# Patient Record
Sex: FEMALE | Race: WHITE | Employment: OTHER | URBAN - METROPOLITAN AREA
[De-identification: names, ages, dates, MRNs, and addresses within clinical notes are randomized per-mention and may not be internally consistent; named-entity substitution may affect disease eponyms.]

---

## 2017-02-05 DIAGNOSIS — I10 HYPERTENSION GOAL BP (BLOOD PRESSURE) < 140/90: Primary | ICD-10-CM

## 2017-02-06 RX ORDER — LISINOPRIL 5 MG/1
TABLET ORAL
Qty: 90 TABLET | Refills: 0 | Status: SHIPPED | OUTPATIENT
Start: 2017-02-06 | End: 2017-04-12

## 2017-02-06 NOTE — TELEPHONE ENCOUNTER
Pres[place] per [facility] [place].  Annel Garay RN

## 2017-02-06 NOTE — TELEPHONE ENCOUNTER
lisinopril (PRINIVIL/ZESTRIL) 5 MG tablet      Last Written Prescription Date: 8.8.16  Last Fill Quantity: 90, # refills: 1  Last Office Visit with G, P or [facility] prescribing provider: 10.5.16       POTASSIUM   Date Value Ref Range Status   04/29/2016 4.5 3.4 - 5.3 mmol/L Final     CREATININE   Date Value Ref Range Status   04/29/2016 0.82 0.52 - 1.04 mg/dL Final     BP Readings from Last 3 Encounters:   10/05/16 124/82   09/26/16 124/86   07/23/16 122/82

## 2017-02-07 DIAGNOSIS — I10 ESSENTIAL HYPERTENSION WITH GOAL BLOOD PRESSURE LESS THAN 140/90: Primary | ICD-10-CM

## 2017-02-07 NOTE — TELEPHONE ENCOUNTER
Last Written Prescription Date: 8-8-16  Last Fill Quantity: 90, # refills: 1  Last Office Visit with [facility], [facility] or [facility] prescribing provider: 10-5-16       POTASSIUM   Date Value Ref Range Status   04/29/2016 4.5 3.4 - 5.3 mmol/L Final     CREATININE   Date Value Ref Range Status   04/29/2016 0.82 0.52 - 1.04 mg/dL Final     BP Readings from Last 3 Encounters:   10/05/16 124/82   09/26/16 124/86   07/23/16 122/82     Thank you,  Jo Dykes, [facility] [place]

## 2017-02-08 RX ORDER — TRIAMTERENE AND HYDROCHLOROTHIAZIDE 37.5; 25 MG/1; MG/1
1 CAPSULE ORAL DAILY
Qty: 90 CAPSULE | Refills: 1 | Status: SHIPPED | OUTPATIENT
Start: 2017-02-08 | End: 2017-04-12

## 2017-02-08 NOTE — TELEPHONE ENCOUNTER
Pres[place] per [facility] [place].  Annel Garay RN

## 2017-03-04 DIAGNOSIS — N95.1 SYMPTOMATIC MENOPAUSAL OR FEMALE CLIMACTERIC STATES: ICD-10-CM

## 2017-03-06 DIAGNOSIS — F41.9 ANXIETY: ICD-10-CM

## 2017-03-06 NOTE — TELEPHONE ENCOUNTER
Last Written Prescription Date: 10-5-16  Last Fill Quantity: 90, # refills: 0  Last Office Visit with G, P or [facility] prescribing provider: 10-5-16        BP Readings from Last 3 Encounters:   10/05/16 124/82   09/26/16 124/86   07/23/16 122/82     Pulse: (for [place])  Creatinine   Date Value Ref Range Status   04/29/2016 0.82 0.52 - 1.04 mg/dL Final   ]    Last PHQ-9 score on record=   PHQ-9 SCORE 9/26/2016   Total Score -   Total Score MyChart -   Total Score 2     Thank you,  Jo Dykes, [place]  [facility]

## 2017-03-07 NOTE — TELEPHONE ENCOUNTER
ESTRADIOL 0.5MG TABS      Last Written Prescription Date: 08/08/2016  Last Fill Quantity: 180, # refills: 1  Last Office Visit with [facility], [facility] or [facility] prescribing provider: 10/05/2016       BP Readings from Last 3 Encounters:   10/05/16 124/82   09/26/16 124/86   07/23/16 122/82     Date of last Breast Exam:

## 2017-03-09 RX ORDER — VENLAFAXINE 37.5 MG/1
TABLET ORAL
Qty: 90 TABLET | Refills: 0 | Status: SHIPPED | OUTPATIENT
Start: 2017-03-09 | End: 2017-04-12

## 2017-03-09 RX ORDER — ESTRADIOL 0.5 MG/1
TABLET ORAL
Qty: 180 TABLET | Refills: 0 | Status: SHIPPED | OUTPATIENT
Start: 2017-03-09 | End: 2017-04-12

## 2017-03-09 NOTE — TELEPHONE ENCOUNTER
For anxiety     Need to verify how much pt is taking     Called # [phone]    Pt stated she is taking 1 tab daily     Refill per RN protocol     Samantha Escoto RN, BSN  [facility]

## 2017-03-22 ENCOUNTER — MYC MEDICAL ADVICE (OUTPATIENT)
Dept: FAMILY MEDICINE | Facility: CLINIC | Age: 57
End: 2017-03-22

## 2017-03-22 DIAGNOSIS — I10 ESSENTIAL HYPERTENSION WITH GOAL BLOOD PRESSURE LESS THAN 140/90: ICD-10-CM

## 2017-03-22 DIAGNOSIS — Z12.11 SPECIAL SCREENING FOR MALIGNANT NEOPLASMS, COLON: ICD-10-CM

## 2017-03-22 DIAGNOSIS — Z79.899 CONTROLLED SUBSTANCE AGREEMENT SIGNED: ICD-10-CM

## 2017-03-22 DIAGNOSIS — E78.5 HYPERLIPIDEMIA LDL GOAL <130: Primary | ICD-10-CM

## 2017-03-22 DIAGNOSIS — K21.9 GASTROESOPHAGEAL REFLUX DISEASE WITHOUT ESOPHAGITIS: ICD-10-CM

## 2017-03-22 DIAGNOSIS — E78.1 HYPERTRIGLYCERIDEMIA: ICD-10-CM

## 2017-03-22 DIAGNOSIS — Z11.59 NEED FOR HEPATITIS C SCREENING TEST: ICD-10-CM

## 2017-03-22 DIAGNOSIS — F51.01 PRIMARY INSOMNIA: ICD-10-CM

## 2017-04-12 ENCOUNTER — OFFICE VISIT (OUTPATIENT)
Dept: FAMILY MEDICINE | Facility: CLINIC | Age: 57
End: 2017-04-12
Payer: COMMERCIAL

## 2017-04-12 VITALS
SYSTOLIC BLOOD PRESSURE: 148 MMHG | OXYGEN SATURATION: 98 % | HEIGHT: 60 IN | TEMPERATURE: 98.1 F | BODY MASS INDEX: 31.1 KG/M2 | WEIGHT: 158.4 LBS | DIASTOLIC BLOOD PRESSURE: 90 MMHG | HEART RATE: 82 BPM

## 2017-04-12 DIAGNOSIS — J30.2 OTHER SEASONAL ALLERGIC RHINITIS: ICD-10-CM

## 2017-04-12 DIAGNOSIS — K91.1 POSTSURGICAL DUMPING SYNDROME: ICD-10-CM

## 2017-04-12 DIAGNOSIS — Z12.31 VISIT FOR SCREENING MAMMOGRAM: ICD-10-CM

## 2017-04-12 DIAGNOSIS — L21.9 SEBORRHEIC DERMATITIS: ICD-10-CM

## 2017-04-12 DIAGNOSIS — K21.9 GASTROESOPHAGEAL REFLUX DISEASE WITHOUT ESOPHAGITIS: ICD-10-CM

## 2017-04-12 DIAGNOSIS — Z11.59 NEED FOR HEPATITIS C SCREENING TEST: ICD-10-CM

## 2017-04-12 DIAGNOSIS — Z12.11 SCREEN FOR COLON CANCER: ICD-10-CM

## 2017-04-12 DIAGNOSIS — Z79.899 CONTROLLED SUBSTANCE AGREEMENT SIGNED: ICD-10-CM

## 2017-04-12 DIAGNOSIS — Z00.01 ENCOUNTER FOR ROUTINE ADULT HEALTH EXAMINATION WITH ABNORMAL FINDINGS: Primary | ICD-10-CM

## 2017-04-12 DIAGNOSIS — F51.01 PRIMARY INSOMNIA: ICD-10-CM

## 2017-04-12 DIAGNOSIS — E78.1 HYPERTRIGLYCERIDEMIA: ICD-10-CM

## 2017-04-12 DIAGNOSIS — F41.9 ANXIETY: ICD-10-CM

## 2017-04-12 DIAGNOSIS — N95.1 SYMPTOMATIC MENOPAUSAL OR FEMALE CLIMACTERIC STATES: ICD-10-CM

## 2017-04-12 DIAGNOSIS — E78.5 HYPERLIPIDEMIA LDL GOAL <130: ICD-10-CM

## 2017-04-12 DIAGNOSIS — I10 ESSENTIAL HYPERTENSION WITH GOAL BLOOD PRESSURE LESS THAN 140/90: ICD-10-CM

## 2017-04-12 LAB
ALBUMIN SERPL-MCNC: 3.7 G/DL (ref 3.4–5)
ALBUMIN UR-MCNC: NEGATIVE MG/DL
ALP SERPL-CCNC: 26 U/L (ref 40–150)
ALT SERPL W P-5'-P-CCNC: 18 U/L (ref 0–50)
ANION GAP SERPL CALCULATED.3IONS-SCNC: 9 MMOL/L (ref 3–14)
APPEARANCE UR: CLEAR
AST SERPL W P-5'-P-CCNC: 12 U/L (ref 0–45)
BACTERIA #/AREA URNS HPF: ABNORMAL /HPF
BASOPHILS # BLD AUTO: 0.1 10E9/L (ref 0–0.2)
BASOPHILS NFR BLD AUTO: 0.7 %
BILIRUB SERPL-MCNC: 0.4 MG/DL (ref 0.2–1.3)
BILIRUB UR QL STRIP: NEGATIVE
BUN SERPL-MCNC: 11 MG/DL (ref 7–30)
CALCIUM SERPL-MCNC: 8.7 MG/DL (ref 8.5–10.1)
CHLORIDE SERPL-SCNC: 97 MMOL/L (ref 94–109)
CHOLEST SERPL-MCNC: 210 MG/DL
CO2 SERPL-SCNC: 29 MMOL/L (ref 20–32)
COLOR UR AUTO: YELLOW
CREAT SERPL-MCNC: 0.74 MG/DL (ref 0.52–1.04)
CREAT UR-MCNC: 150 MG/DL
DIFFERENTIAL METHOD BLD: ABNORMAL
EOSINOPHIL # BLD AUTO: 0.3 10E9/L (ref 0–0.7)
EOSINOPHIL NFR BLD AUTO: 3.3 %
ERYTHROCYTE [DISTWIDTH] IN BLOOD BY AUTOMATED COUNT: 12.1 % (ref 10–15)
GFR SERPL CREATININE-BSD FRML MDRD: 80 ML/MIN/1.7M2
GLUCOSE SERPL-MCNC: 91 MG/DL (ref 70–99)
GLUCOSE UR STRIP-MCNC: NEGATIVE MG/DL
HCT VFR BLD AUTO: 37.1 % (ref 35–47)
HCV AB SERPL QL IA: NORMAL
HDLC SERPL-MCNC: 47 MG/DL
HGB BLD-MCNC: 12.9 G/DL (ref 11.7–15.7)
HGB UR QL STRIP: ABNORMAL
KETONES UR STRIP-MCNC: NEGATIVE MG/DL
LDLC SERPL CALC-MCNC: ABNORMAL MG/DL
LDLC SERPL DIRECT ASSAY-MCNC: 90 MG/DL
LEUKOCYTE ESTERASE UR QL STRIP: NEGATIVE
LYMPHOCYTES # BLD AUTO: 1.8 10E9/L (ref 0.8–5.3)
LYMPHOCYTES NFR BLD AUTO: 23.7 %
MAGNESIUM SERPL-MCNC: 2 MG/DL (ref 1.6–2.3)
MCH RBC QN AUTO: 33.3 PG (ref 26.5–33)
MCHC RBC AUTO-ENTMCNC: 34.8 G/DL (ref 31.5–36.5)
MCV RBC AUTO: 96 FL (ref 78–100)
MICROALBUMIN UR-MCNC: 6 MG/L
MICROALBUMIN/CREAT UR: 3.84 MG/G CR (ref 0–25)
MONOCYTES # BLD AUTO: 0.5 10E9/L (ref 0–1.3)
MONOCYTES NFR BLD AUTO: 7 %
NEUTROPHILS # BLD AUTO: 4.9 10E9/L (ref 1.6–8.3)
NEUTROPHILS NFR BLD AUTO: 65.3 %
NITRATE UR QL: NEGATIVE
NON-SQ EPI CELLS #/AREA URNS LPF: ABNORMAL /LPF
NONHDLC SERPL-MCNC: 163 MG/DL
PH UR STRIP: 7 PH (ref 5–7)
PLATELET # BLD AUTO: 321 10E9/L (ref 150–450)
POTASSIUM SERPL-SCNC: 3.8 MMOL/L (ref 3.4–5.3)
PROT SERPL-MCNC: 7.3 G/DL (ref 6.8–8.8)
RBC # BLD AUTO: 3.87 10E12/L (ref 3.8–5.2)
RBC #/AREA URNS AUTO: ABNORMAL /HPF (ref 0–2)
SODIUM SERPL-SCNC: 135 MMOL/L (ref 133–144)
SP GR UR STRIP: 1.01 (ref 1–1.03)
TRIGL SERPL-MCNC: 660 MG/DL
TSH SERPL DL<=0.005 MIU/L-ACNC: 3.18 MU/L (ref 0.4–4)
URN SPEC COLLECT METH UR: ABNORMAL
UROBILINOGEN UR STRIP-ACNC: 0.2 EU/DL (ref 0.2–1)
WBC # BLD AUTO: 7.6 10E9/L (ref 4–11)
WBC #/AREA URNS AUTO: ABNORMAL /HPF (ref 0–2)

## 2017-04-12 PROCEDURE — 83735 ASSAY OF MAGNESIUM: CPT | Performed by: FAMILY MEDICINE

## 2017-04-12 PROCEDURE — 86803 HEPATITIS C AB TEST: CPT | Performed by: FAMILY MEDICINE

## 2017-04-12 PROCEDURE — 81001 URINALYSIS AUTO W/SCOPE: CPT | Performed by: FAMILY MEDICINE

## 2017-04-12 PROCEDURE — 99214 OFFICE O/P EST MOD 30 MIN: CPT | Mod: 25 | Performed by: FAMILY MEDICINE

## 2017-04-12 PROCEDURE — 99396 PREV VISIT EST AGE 40-64: CPT | Performed by: FAMILY MEDICINE

## 2017-04-12 PROCEDURE — 36415 COLL VENOUS BLD VENIPUNCTURE: CPT | Performed by: FAMILY MEDICINE

## 2017-04-12 PROCEDURE — 80050 GENERAL HEALTH PANEL: CPT | Performed by: FAMILY MEDICINE

## 2017-04-12 PROCEDURE — 82043 UR ALBUMIN QUANTITATIVE: CPT | Performed by: FAMILY MEDICINE

## 2017-04-12 PROCEDURE — 83721 ASSAY OF BLOOD LIPOPROTEIN: CPT | Mod: 59 | Performed by: FAMILY MEDICINE

## 2017-04-12 PROCEDURE — 80061 LIPID PANEL: CPT | Performed by: FAMILY MEDICINE

## 2017-04-12 RX ORDER — FLUTICASONE PROPIONATE 50 MCG
SPRAY, SUSPENSION (ML) NASAL
Qty: 48 G | Refills: 1 | Status: SHIPPED | OUTPATIENT
Start: 2017-04-12 | End: 2018-06-07

## 2017-04-12 RX ORDER — ZOLPIDEM TARTRATE 5 MG/1
5 TABLET ORAL
Qty: 30 TABLET | Refills: 5 | Status: SHIPPED | OUTPATIENT
Start: 2017-04-12 | End: 2017-10-13

## 2017-04-12 RX ORDER — ESTRADIOL 0.5 MG/1
TABLET ORAL
Qty: 180 TABLET | Refills: 3 | Status: SHIPPED | OUTPATIENT
Start: 2017-04-12 | End: 2017-10-30

## 2017-04-12 RX ORDER — LISINOPRIL 5 MG/1
10 TABLET ORAL DAILY
Qty: 180 TABLET | Refills: 1 | Status: SHIPPED | OUTPATIENT
Start: 2017-04-12 | End: 2017-10-30

## 2017-04-12 RX ORDER — HYDROCORTISONE 2.5 %
CREAM (GRAM) TOPICAL
Qty: 30 G | Refills: 3 | Status: SHIPPED | OUTPATIENT
Start: 2017-04-12 | End: 2021-01-29

## 2017-04-12 RX ORDER — VENLAFAXINE 37.5 MG/1
TABLET ORAL
Qty: 90 TABLET | Refills: 1 | Status: SHIPPED | OUTPATIENT
Start: 2017-04-12 | End: 2017-11-03

## 2017-04-12 RX ORDER — ALBUTEROL SULFATE 90 UG/1
2 AEROSOL, METERED RESPIRATORY (INHALATION) EVERY 6 HOURS PRN
Qty: 1 INHALER | Refills: 0 | Status: SHIPPED | OUTPATIENT
Start: 2017-04-12 | End: 2017-11-03

## 2017-04-12 RX ORDER — TRIAMTERENE AND HYDROCHLOROTHIAZIDE 37.5; 25 MG/1; MG/1
1 CAPSULE ORAL DAILY
Qty: 90 CAPSULE | Refills: 1 | Status: SHIPPED | OUTPATIENT
Start: 2017-04-12 | End: 2018-02-04

## 2017-04-12 ASSESSMENT — ANXIETY QUESTIONNAIRES
2. NOT BEING ABLE TO STOP OR CONTROL WORRYING: NOT AT ALL
6. BECOMING EASILY ANNOYED OR IRRITABLE: NOT AT ALL
IF YOU CHECKED OFF ANY PROBLEMS ON THIS QUESTIONNAIRE, HOW DIFFICULT HAVE THESE PROBLEMS MADE IT FOR YOU TO DO YOUR WORK, TAKE CARE OF THINGS AT HOME, OR GET ALONG WITH OTHER PEOPLE: NOT DIFFICULT AT ALL
1. FEELING NERVOUS, ANXIOUS, OR ON EDGE: NOT AT ALL
GAD7 TOTAL SCORE: 2
3. WORRYING TOO MUCH ABOUT DIFFERENT THINGS: SEVERAL DAYS
7. FEELING AFRAID AS IF SOMETHING AWFUL MIGHT HAPPEN: NOT AT ALL
5. BEING SO RESTLESS THAT IT IS HARD TO SIT STILL: NOT AT ALL

## 2017-04-12 ASSESSMENT — PATIENT HEALTH QUESTIONNAIRE - PHQ9: 5. POOR APPETITE OR OVEREATING: SEVERAL DAYS

## 2017-04-12 NOTE — PATIENT INSTRUCTIONS
"Try black cohosh for night sweats - available at [facility] or here at our pharmacy.     Increase your lisinopril to 10mg daily. Recheck your blood pressure in our or any [place] pharmacy in 1 week or sooner if needed.  Have pharmacy send me their note.      Establish an exercise regimen. Activity goal: 45 minutes 5 days a week. New exercise routine: cardiovascular workout on exercise equipment, walking and weightlifting. Diet regimen was discussed and plan is self-directed dieting: reduce calories, reduce portions, reduce processed  carbs, increase fruits/vegetables and avoid sweets and supervised diet program. Avoid artificial sweeteners and \"diet\" drinks and sodas.         Preventive Health Recommendations  Female Ages 50 - 64    Yearly exam: See your health care provider every year in order to  o Review health changes.   o Discuss preventive care.    o Review your medicines if your doctor has prescribed any.      Get a Pap test every three years (unless you have an abnormal result and your provider advises testing more often).    If you get Pap tests with HPV test, you only need to test every 5 years, unless you have an abnormal result.     You do not need a Pap test if your uterus was removed (hysterectomy) and you have not had cancer.    You should be tested each year for STDs (sexually transmitted diseases) if you're at risk.     Have a mammogram every 1 to 2 years.    Have a colonoscopy at age 50, or have a yearly FIT test (stool test). These exams screen for colon cancer.      Have a cholesterol test every 5 years, or more often if advised.    Have a diabetes test (fasting glucose) every three years. If you are at risk for diabetes, you should have this test more often.     If you are at risk for osteoporosis (brittle bone disease), think about having a bone density scan (DEXA).    Shots: Get a flu shot each year. Get a tetanus shot every 10 years.    Nutrition:     Eat at least 5 servings of fruits and " vegetables each day.    Eat whole-grain bread, whole-wheat pasta and brown rice instead of white grains and rice.    Talk to your provider about Calcium and Vitamin D.     Lifestyle    Exercise at least 150 minutes a week (30 minutes a day, 5 days a week). This will help you control your weight and prevent disease.    Limit alcohol to one drink per day.    No smoking.     Wear sunscreen to prevent skin cancer.     See your dentist every six months for an exam and cleaning.    See your eye doctor every 1 to 2 years.               Thank you for choosing [facility]  for your Health Care. It was a pleasure seeing you at your visit today. Please contact us with any questions or concerns you may have.                   Gayatri Stephens MD                                  To reach your [facility] care team after hours call:   [phone]    Our clinic hours are:     Monday- 7:30 am - 7:00 pm                             Tuesday through Friday- 7:30 am - 5:00 pm                                        Saturday- 8:00 am - 12:00 pm                  Phone:  [phone]    Our pharmacy hours are:     Monday  8:00 am to 7:00 pm      Tuesday through Friday 8:00am to 6:00pm                        Saturday - 9:00 am to 1:00 pm      Sunday : Closed.              Phone:  [phone]      There is also information available at our web site:  [email or web address]    If your provider ordered any lab tests and you do not receive the results within 10 business days, please call the clinic.    If you need a medication refill please contact your pharmacy.  Please allow 2 business days for your refill to be completed.    Our clinic offers telephone visits and e visits.  Please ask one of your team members to explain more.      Use [employer] (secure email communication and access to your chart) to send your primary care provider a message or make an appointment. Ask someone on your Team how to sign up for  MyChart.

## 2017-04-12 NOTE — LETTER
[facility] [place]    04/12/17    Patient: Connie Brunson  YOB: 1960  Medical Record Number: [id number]                                                                  Controlled Substance Agreement  I understand that my care provider has prescribed controlled substances (narcotics, tranquilizers, and/or stimulants) to help manage my condition(s).  I am taking this medicine to help me function or work.  I know that this is strong medicine.  It could have serious side effects and even cause a dependency on the drug.  If I stop these medicines suddenly, I could have severe withdrawal symptoms.    The risks, benefits, and side effects of these medication(s) were explained to me.  I agree that:  1. I will take part in other treatments as advised by my provider.  This may be psychiatry or counseling, physical therapy, behavioral therapy, group treatment, or a referral to a pain clinic.  I will reduce or stop my medicine when my provider tells me to do so.   2. I will take my medicines as prescribed.  I will not change the dose or schedule unless my provider tells me to.  There will be no refills if I  run out early.   I may be contacted at any time without warning and asked to complete a drug test or pill count.   3. I will keep all my appointments at the clinic.  If I miss appointments or fail to follow instructions, my provider may stop my medicine.  4. I will not ask other providers to prescribe controlled substances. And I will not accept controlled substances from other people. If I need another prescribed controlled substance for a new reason, I will notify my provider within one business day.  5. If I enroll in the [place] Medical Marijuana program, I will tell my provider.  I will also sign an agreement to share my medical records with my provider.  6. I will use one pharmacy to fill all of my controlled substance prescriptions.  If my prescription is mailed to my pharmacy, it may  take 5 to 7 days for my medicine to be ready.  7. I understand that my provider, clinic care team, and pharmacy can track controlled substance prescriptions from other providers through a central database (prescription monitoring program).  8. I will bring in my list of medications (or my medicine bottles) each time I come to the clinic.  REV- 04/2016                                                                                                                                            Page 1 of 2      [facility] [place]    04/12/17    Patient: Connie Brunson  YOB: 1960  Medical Record Number: [id number]    9. Refills of controlled substances will be made only during office hours.  It is up to me to make sure that I do not run out of my medicines on weekends or holidays.    10. I am responsible for my prescriptions.  If the medicine is lost or stolen, it will not be replaced.   I also agree not to share these medicines with anyone.  11. I agree to not use ANY illegal or recreational drugs.  This includes marijuana, cocaine, bath salts or other drugs.  I agree not to use alcohol unless my provider says I may.  I agree to give urine samples whenever asked.  If I fail to give a urine sample, the provider may stop my medicine.     12. I will tell my nurse or provider right away if I become pregnant or have a new medical problem treated outside of [facility].  13. I understand that this medicine can affect my thinking and judgment.  It may be unsafe for me to drive, use machinery and do dangerous tasks.  I will not do any of these things until I know how the medicine affects me.  If my dose changes, I will wait to see how it affects me.  I will contact my provider if I have concerns about medicine side effects.  I understand that if I do not follow any of the conditions above, my prescriptions or treatment may be stopped.    I agree that my provider, clinic care team, and pharmacy may  work with any city, state or federal law enforcement agency that investigates the misuse, sale, or other diversion of my controlled medicine. I will allow my provider to discuss my care with or share a copy of this agreement with any other treating provider, pharmacy or emergency room where I receive care.  I agree to give up (waive) any right of privacy or confidentiality with respect to these authorizations.   I have read this agreement and have asked questions about anything I did not understand.   ___________________________________    ___________________________  Patient Signature                                                           Date and Time  ___________________________________     ____________________________  Witness                                                                            Date and Time  ___________________________________  Gayatri Stephens MD  REV-  04/2016                                                                                                                                                                 Page 2 of 2

## 2017-04-12 NOTE — PROGRESS NOTES
SUBJECTIVE:     CC: Connie Brunson is an 56 year old woman who presents for preventive health visit.     Healthy Habits:    Do you get at least three servings of calcium containing foods daily (dairy, green leafy vegetables, etc.)? yes    Amount of exercise or daily activities, outside of work: None    Problems taking medications regularly No    Medication side effects: No    Have you had an eye exam in the past two years? yes    Do you see a dentist twice per year? yes    Do you have sleep apnea, excessive snoring or daytime drowsiness? no        Hyperlipidemia Follow-Up      Rate your low fat/cholesterol diet?: not monitoring fat    Taking statin?  No    Other lipid medications/supplements?:  Fenofibrate, without side effects     Hypertension Follow-up      Outpatient blood pressures are not being checked.    Low Salt Diet: no added salt       Depression and Anxiety Follow-Up    Status since last visit: No change - but wants to get off medication.    Other associated symptoms:None    Complicating factors:     Significant life event: No     Current substance abuse: None    PHQ-9 SCORE 1/27/2016 9/26/2016 4/12/2017   Total Score - - -   Total Score MyChart - - -   Total Score 0 2 4     NAHOMY-7 SCORE 1/27/2016 9/26/2016 4/12/2017   Total Score - - -   Total Score - - -   Total Score 4 0 2        PHQ-9  English      PHQ-9   Any Language     GAD7       Today's PHQ-2 Score:   PHQ-2 ( 1999 Pf[employer]) 4/12/2017 4/11/2017   Q1: Little interest or pleasure in doing things 0 -   Q2: Feeling down, depressed or hopeless 0 -   PHQ-2 Score 0 -   Little interest or pleasure in doing things - Not at all   Feeling down, depressed or hopeless - Not at all   PHQ-2 Score - 0       Abuse: Current or Past(Physical, Sexual or Emotional)- No  Do you feel safe in your environment - Yes    Social History   Substance Use Topics     Smoking status: Current Some Day Smoker     Packs/day: 0.00     Types: Other, Cigarettes     Last attempt to  quit: 1/1/1994     Smokeless tobacco: Never Used      Comment: 11/6/13 using nicorette patch now     Alcohol use 0.0 oz/week     0 Standard drinks or equivalent per week     The patient does not drink >3 drinks per day nor >7 drinks per week.    Recent Labs   Lab Test  08/11/16   0742  01/27/16   0903  05/14/14   0753   07/05/13   0857   CHOL  221*  255*  246*   --   215*   HDL  51  31*  40*   --   36*   LDL  113*  Cannot estimate LDL when triglyceride exceeds 400 mg/dL  104*  Cannot estimate LDL when triglyceride exceeds 400 mg/dL  132*   < >  134*   TRIG  284*  911*  424*   --   221*   CHOLHDLRATIO   --    --   6.2*   --   5.9*   NHDL  170*  224*   --    --    --     < > = values in this interval not displayed.   Just stopped taking her fenofibrate 10/2016 secondary to diarrhea s/p her gallbladder surgery.  Chronic diarrhea now. Stools now really loose and getting more urgent . No stool incontinence.  Daily fiber therapy made diarrhea worse. Offered again cholestyramine rx , but pt declines that rx at this time.     Reviewed orders with patient.  Reviewed health maintenance and updated orders accordingly - Yes    Mammo Decision Support:   Patient over age 50, mutual decision to screen reflected in health maintenance.    Pertinent mammograms are reviewed under the imaging tab.  History of abnormal Pap smear: Status post benign hysterectomy. Health Maintenance and Surgical History updated.    Reviewed and updated as needed this visit by clinical staff  Tobacco  Allergies  Meds  Med Hx  Surg Hx  Fam Hx  Soc Hx        Reviewed and updated as needed this visit by Provider          Health Maintenance   Topic Date Due     HEPATITIS C SCREENING  10/11/1976     WELLNESS VISIT Q1 YR (NO INBASKET)  12/19/2012     FIT Q1 YR (NO INBASKET)  12/17/2013     MICROALBUMIN Q1 YEAR( NO INBASKET)  01/27/2017     DEPRESSION ACTION PLAN Q1 YR (NO INBASKET)  01/27/2017     PHQ-9 Q6 MONTHS (NO INBASKET)  03/26/2017     MAMMO Q1 YR  INBASKET MESSAGE  02/25/2017     BMP Q1 YR (NO INBASKET)  04/29/2017     INFLUENZA VACCINE (SYSTEM ASSIGNED)  09/01/2017     ADVANCE DIRECTIVE PLANNING Q5 YRS (NO INBASKET)  12/15/2019     LIPID MONITORING Q5 YEARS (NO INBASKET)  08/11/2021     COLONOSCOPY Q10 YEARS NO INBASKET MESSAGE  01/16/2022     TETANUS Q10 YR  03/14/2025       Patient Active Problem List   Diagnosis     Allergic rhinitis     Diverticulosis of large intestine     Benign meningioma-right frontal posterior - no more annual MRI's needed per neurosurgery     Symptomatic menopausal or female climacteric states     Benign neoplasm of tonsil     Anxiety     HYPERLIPIDEMIA LDL GOAL <130 [272.4CK] - joint aches w/ simvastatin 11/2010,lipitor=nausea/abd pain 1/2012     Primary insomnia     Major depressive disorder, recurrent episode, in partial or unspecified remission     Essential hypertension with goal blood pressure less than 140/90     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- ophtho recommended auto-immune/arthritis work-up     Epiploic appendagitis- 2008 and 10/26/2014     Advanced care planning/counseling discussion     Controlled substance agreement signed- re-signed 4/12/2017 ambien #30 -5mg tabs monthly - refill x5 - ok to see q6 months      Gastroesophageal reflux disease without esophagitis     Hypertriglyceridemia     S/P laparoscopic cholecystectomy for acute cholecystitis with cholelithiasis     Postsurgical dumping syndrome - s/p lap shashi - consider cholestyramine       Past Medical History:   Diagnosis Date     Allergic rhinitis, cause unspecified     year round - dog,dust-  Failed on claritin, claritin-D, zyrtec and zyrtec-D in past.      Benign neoplasm of cerebral meninges (H) 1999    has yearly MRI's. - 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 Thomas ENT      Depressive disorder      Deviated nasal septum     sees  Dr. Thomas ENT      Diverticulosis of colon (without mention of hemorrhage) 02-     History of Guillain-[place] syndrome     NO FLU SHOTS - very mild case in [place] many years ago     Hyperlipidemia LDL goal < 130 doesn't want statins    simvastatin = severe hand joint pain , lipitor =nausea/abd. pain     Hypertension goal BP (blood pressure) < 140/90      Insomnia     trazodone -made pt feel hung over      Major depressive disorder, recurrent episode, moderate (H)     lexapro - sexual side effects      Moderate major depression (H) 2/4/2005    trazodone -made pt feel hung over      Other acne      Other extrapyramidal disease and abnormal movement disorder     ?GBS     Symptomatic menopausal or female climacteric states     vivelle-dot        Past Surgical History:   Procedure Laterality Date     ABDOMEN SURGERY       BIOPSY       C LIGATE FALLOPIAN TUBE  1988    Tubal Ligation     C NONSPECIFIC PROCEDURE      PAROTID TUMOR L SIDE OF NECK - BENIGN     C TOTAL ABDOM HYSTERECTOMY  2000 ?    Hysterectomy, Total Abdominal with BSO, paps every 5 years      COLONOSCOPY  1/16/2012    Procedure:COLONOSCOPY; Colonoscopy; Surgeon:SHOLA JOYCE; Location:[facility]     HC REMOVE TONSILS/ADENOIDS,<11 Y/O      T and A, under 12 yrs     HEAD & NECK SURGERY       LAPAROSCOPIC CHOLECYSTECTOMY WITH CHOLANGIOGRAMS N/A 4/25/2016    Procedure: LAPAROSCOPIC CHOLECYSTECTOMY WITH CHOLANGIOGRAMS;  Surgeon: Rosa M Cristobal MD;  Location:  OR     SURGICAL HISTORY OF -   2004    Menigioma excision       Social History     Social History     Marital status:      Spouse name: Perry Brunson      Number of children: 3     Years of education: 15     Occupational History     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  [employer]     Social History Main Topics     Smoking status: Current Some Day Smoker     Packs/day: 0.00     Types: Other, Cigarettes     Last attempt to quit: 1/1/1994     Smokeless tobacco:  Never Used      Comment: 11/6/13 using nicorette patch now     Alcohol use 0.0 oz/week     0 Standard drinks or equivalent per week     Drug use: No     Sexual activity: No      Comment: tubal ligation-      Other Topics Concern      Service No     Blood Transfusions No     Caffeine Concern Yes     2-3 cups coffee in am     Exercise No     regular walking 4x/week      Seat Belt Yes     always     Self-Exams Yes     SBE encouraged monthly     Parent/Sibling W/ Cabg, Mi Or Angioplasty Before 65f 55m? No     Social History Narrative    calcium - taking 500mg po qday - increase to Calcium - 1000-1200mg/day    flex sig/colonoscopy -at age 48    sun precautions - discussed    mammogram - yearly    Td booster - 1991 per our records - pt will call [facility]     pneumovax -at age 58    DEXA -this year- 2003    stool hemoccults - every year after age 40    ASA- start 81 mg ASA qday.    mulvitamin - encouraged    doing citrucel qday         from second , Dan Schoenbauer. Now back together with her first  and father of their  children, Perry Brunson - fall 2011.        Family History   Problem Relation Age of Onset     CANCER Father      lung     Hypertension Father      Current Outpatient Prescriptions   Medication Sig Dispense Refill     estradiol (ESTRACE) 0.5 MG tablet TAKE TWO TABLETS BY MOUTH ONCE DAILY 180 tablet 3     venlafaxine (EFFEXOR) 37.5 MG tablet Take 1 tab daily 90 tablet 1     triamterene-hydrochlorothiazide (DYAZIDE) 37.5-25 MG per capsule Take 1 capsule by mouth daily 90 capsule 1     zolpidem (AMBIEN) 5 MG tablet Take 1 tablet (5 mg) by mouth nightly as needed for sleep 30 tablet 5     albuterol (PROAIR HFA/PROVENTIL HFA/VENTOLIN HFA) 108 (90 BASE) MCG/ACT Inhaler Inhale 2 puffs into the lungs every 6 hours as needed for shortness of breath / dyspnea or wheezing 1 Inhaler 0     fluticasone (FLONASE) 50 MCG/ACT spray USE TWO SPRAYS IN EACH NOSTRIL EVERY DAY 48 g  1     omeprazole (PRILOSEC) 20 MG CR capsule Take 1 capsule (20 mg) by mouth daily 90 capsule 3     lisinopril (PRINIVIL/ZESTRIL) 5 MG tablet Take 2 tablets (10 mg) by mouth daily 180 tablet 1     hydrocortisone 2.5 % cream Apply sparingly to dermatitis left lower eye 30 g 3     guaiFENesin-codeine (ROBITUSSIN AC) 100-10 MG/5ML SOLN Take 5 mLs by mouth every 4 hours as needed for cough 240 mL 0     triamcinolone (KENALOG) 0.5 % cream Apply topically 2 times daily as needed for irritation (to eczema or psoriatic lesions - Never use on face)       cetirizine (ZYRTEC) 10 MG tablet Take 10 mg by mouth every evening       ibuprofen (ADVIL,MOTRIN) 600 MG tablet Take 1 tablet (600 mg) by mouth every 6 hours as needed for pain (mild) 30 tablet 0     omeprazole (PRILOSEC) 40 MG capsule Take 1 capsule (40 mg) by mouth daily Take 30-60 minutes before a meal. 90 capsule 3     [DISCONTINUED] estradiol (ESTRACE) 0.5 MG tablet TAKE TWO TABLETS BY MOUTH ONCE DAILY 180 tablet 0     [DISCONTINUED] venlafaxine (EFFEXOR) 37.5 MG tablet Take 1 tab daily 90 tablet 0     [DISCONTINUED] triamterene-hydrochlorothiazide (DYAZIDE) 37.5-25 MG per capsule Take 1 capsule by mouth daily 90 capsule 1     [DISCONTINUED] lisinopril (PRINIVIL/ZESTRIL) 5 MG tablet TAKE ONE TABLET BY MOUTH ONCE DAILY 90 tablet 0     [DISCONTINUED] albuterol (PROAIR HFA, PROVENTIL HFA, VENTOLIN HFA) 108 (90 BASE) MCG/ACT inhaler Inhale 2 puffs into the lungs every 6 hours as needed for shortness of breath / dyspnea or wheezing 1 Inhaler 0        Allergies   Allergen Reactions     Atorvastatin Nausea     Nausea and abd pain      Ceftin GI Disturbance     Stomach upset and bloating - given at same time as clindamycin ? Which one as cause.       Clindamycin GI Disturbance     Stomach upset and bloating - given at same time as ceftin, ? Which one as cause      Flagyl [Metronidazole]      immediate migraine headache      Levaquin Rash     Morphine Itching     Severe with  "nose, facial  Swelling - oxycodone = ok /no problems      Penicillins Hives     Sulfa Drugs Rash     Severe red , flushed rash      ROS:pt trying to get off omeprazole. Would like to try to go down to 20mg daily.  Will do this otc if not covered by insurance. Some night sweats at night, but doesn't want to go up on her estradiol quite yet.  Offered option of black cohosh otc.  Pt would like to try that.   C: NEGATIVE for fever, chills, change in weight  I: NEGATIVE for worrisome rashes, moles or lesions  E: NEGATIVE for vision changes or irritation  ENT: NEGATIVE for ear, mouth and throat problems.   R: NEGATIVE for significant cough or SOB  B: NEGATIVE for masses, tenderness or discharge  CV: NEGATIVE for chest pain, palpitations or peripheral edema  GI: NEGATIVE for nausea, abdominal pain, heartburn, or change in bowel habits  : NEGATIVE for unusual urinary or vaginal symptoms. No vaginal bleeding.  M: NEGATIVE for significant arthralgias or myalgia  N: NEGATIVE for weakness, dizziness or paresthesias  P: NEGATIVE for changes in mood or affect     Problem list, Medication list, Allergies, and Medical/Social/Surgical histories reviewed in [facility] and updated as appropriate.  Labs reviewed in EPIC  BP Readings from Last 3 Encounters:   04/12/17 148/90   10/05/16 124/82   09/26/16 124/86    Wt Readings from Last 3 Encounters:   04/12/17 158 lb 6.4 oz (71.8 kg)   10/05/16 153 lb (69.4 kg)   09/26/16 153 lb 3 oz (69.5 kg)         Scheduled for mammogram on Friday 4/14/2017.      OBJECTIVE:     /90 (BP Location: Left arm, Patient Position: Chair, Cuff Size: Adult Large)  Pulse 82  Temp 98.1  F (36.7  C) (Oral)  Ht 4' 11.5\" (1.511 m)  Wt 158 lb 6.4 oz (71.8 kg)  LMP 09/05/2000  SpO2 98%  BMI 31.46 kg/m2  EXAM:  GENERAL APPEARANCE: healthy, alert and no distress  EYES: Eyes grossly normal to inspection, PERRL and conjunctivae and sclerae normal  HENT: ear canals and TM's normal, nose and mouth without ulcers " or lesions, oropharynx clear and oral mucous membranes moist  NECK: no adenopathy, no asymmetry, masses, or scars and thyroid normal to palpation  RESP: lungs clear to auscultation - no rales, rhonchi or wheezes  BREAST: normal without masses, tenderness or nipple discharge and no palpable axillary masses or adenopathy  CV: regular rate and rhythm, normal S1 S2, no S3 or S4, no murmur, click or rub, no peripheral edema and peripheral pulses strong  ABDOMEN: soft, nontender, no hepatosplenomegaly, no masses and bowel sounds normal   (female): normal female external genitalia, normal urethral meatus, vaginal mucosal atrophy noted surgically absent cervix, adnexae, and uterus without masses or abnormal discharge  MS: no musculoskeletal defects are noted and gait is age appropriate without ataxia  SKIN: no suspicious lesions but mild  Slightly seborrheic redness left lower eye area rash  NEURO: Normal strength and tone, sensory exam grossly normal, mentation intact and speech normal  PSYCH: mentation appears normal and affect normal/bright    ASSESSMENT/PLAN:         ICD-10-CM    1. Encounter for routine adult health examination with abnormal findings Z00.01 DEPRESSION ACTION PLAN (DAP) Order [[id number]]   2. Essential hypertension with goal blood pressure less than 140/90- not well controlled today I10 CBC with platelets differential     Comprehensive metabolic panel     UA reflex to Microscopic and Culture     Albumin Random Urine Quantitative     TSH with free T4 reflex     triamterene-hydrochlorothiazide (DYAZIDE) 37.5-25 MG per capsule     lisinopril (PRINIVIL/ZESTRIL) 5 MG tablet     OFFICE/OUTPT VISIT,EST,LEVL IV   3. Controlled substance agreement signed Z79.899 OFFICE/OUTPT VISIT,EST,LEVL IV   4. Postsurgical dumping syndrome - s/p lap shashi - consider cholestyramine K91.1 OFFICE/OUTPT VISIT,EST,LEVL IV   5. HYPERLIPIDEMIA LDL GOAL <130 [272.4CK] - uncertain control - awaiting labs-joint aches w/ simvastatin  "11/2010,lipitor=nausea/abd pain 1/2012 E78.5 Comprehensive metabolic panel     Albumin Random Urine Quantitative     Lipid panel reflex to direct LDL     OFFICE/OUTPT VISIT,EST,LEVL IV   6. Seborrheic dermatitis L21.9 hydrocortisone 2.5 % cream     OFFICE/OUTPT VISIT,EST,LEVL IV   7. Primary insomnia F51.01 zolpidem (AMBIEN) 5 MG tablet     OFFICE/OUTPT VISIT,EST,LEVL IV   8. Hypertriglyceridemia E78.1 Comprehensive metabolic panel     Albumin Random Urine Quantitative     Lipid panel reflex to direct LDL     OFFICE/OUTPT VISIT,EST,LEVL IV   9. Gastroesophageal reflux disease without esophagitis K21.9 Magnesium     omeprazole (PRILOSEC) 20 MG CR capsule     OFFICE/OUTPT VISIT,EST,LEVL IV   10. Symptomatic menopausal or female climacteric states N95.1 estradiol (ESTRACE) 0.5 MG tablet   11. Anxiety F41.9 venlafaxine (EFFEXOR) 37.5 MG tablet   12. Other seasonal allergic rhinitis J30.2 albuterol (PROAIR HFA/PROVENTIL HFA/VENTOLIN HFA) 108 (90 BASE) MCG/ACT Inhaler     fluticasone (FLONASE) 50 MCG/ACT spray   13. Visit for screening mammogram Z12.31 MA SCREENING DIGITAL BILAT - Future  (s+30)     Urine Microscopic   14. Screen for colon cancer Z12.11 Fecal colorectal cancer screen FIT - Future (S+30)   15. Need for hepatitis C screening test Z11.59 Hepatitis C Screen Reflex to HCV RNA Quant and Genotype       Increase your lisinopril to 10mg daily. Recheck your blood pressure in our pharmacy in 1 week or sooner if needed.  Have pharmacy send me their note.  Establish an exercise regimen. Activity goal: 45 minutes 5 days a week. New exercise routine: cardiovascular workout on exercise equipment, walking and weightlifting. Diet regimen was discussed and plan is self-directed dieting: reduce calories, reduce portions, reduce processed  carbs, increase fruits/vegetables and avoid sweets and supervised diet program. Avoid artificial sweeteners and \"diet\" drinks and sodas.       COUNSELING:   Reviewed preventive health " "counseling, as reflected in patient instructions    Spent  25 extra minutes on pt care today outside of preventative care. All face to face time from 0937am  to 10:05am .  Greater than 50% of time spent in coordination of care/counseling today re:  Essential hypertension with goal blood pressure less than 140/90- not well controlled today    Controlled substance agreement signed =re-signed again and discussed in detail 4/12/2017.    Postsurgical dumping syndrome - s/p lap shashi - consider cholestyramine    HYPERLIPIDEMIA LDL GOAL <130 [272.4CK] - uncertain control - awaiting labs-joint aches w/ simvastatin 11/2010,lipitor=nausea/abd pain 1/2012    Primary insomnia    Gastroesophageal reflux disease without esophagitis    Symptomatic menopausal or female climacteric states    Anxiety    Other seasonal allergic rhinitis    Seborrheic dermatitis             reports that she has been smoking Other and Cigarettes.  She has been smoking about 0.00 packs per day. She has never used smokeless tobacco.    Estimated body mass index is 31.46 kg/(m^2) as calculated from the following:    Height as of this encounter: 4' 11.5\" (1.511 m).    Weight as of this encounter: 158 lb 6.4 oz (71.8 kg).   Weight management plan: see next   Establish an exercise regimen. Activity goal: 45 minutes 5 days a week. New exercise routine: cardiovascular workout on exercise equipment, walking and weightlifting. Diet regimen was discussed and plan is self-directed dieting: reduce calories, reduce portions, reduce processed  carbs, increase fruits/vegetables and avoid sweets and supervised diet program. Avoid artificial sweeteners and \"diet\" drinks and sodas.       Counseling Resources:  ATP IV Guidelines  Pooled Cohorts Equation Calculator  Breast Cancer Risk Calculator  FRAX Risk Assessment  ICSI Preventive Guidelines  Dietary Guidelines for Americans, 2010  USDA's MyPlate  ASA Prophylaxis  Lung CA Screening    Gayatri Stephens MD  [facility] " [facility]

## 2017-04-12 NOTE — MR AVS SNAPSHOT
"              After Visit Summary   4/12/2017    Connie Brunson    MRN: [id number]           Patient Information     Date Of Birth          1960        Visit Information        Provider Department      4/12/2017 9:00 AM Gayatri Stephens MD [facility] [place]        Today's Diagnoses     Encounter for routine adult health examination with abnormal findings    -  1    Essential hypertension with goal blood pressure less than 140/90- not well controlled today        Controlled substance agreement signed        Postsurgical dumping syndrome - s/p lap shashi - consider cholestyramine        HYPERLIPIDEMIA LDL GOAL <130 [272.4CK] - uncertain control - awaiting labs-joint aches w/ simvastatin 11/2010,lipitor=nausea/abd pain 1/2012        Seborrheic dermatitis        Primary insomnia        Hypertriglyceridemia        Gastroesophageal reflux disease without esophagitis        Symptomatic menopausal or female climacteric states        Anxiety        Other seasonal allergic rhinitis        Visit for screening mammogram        Screen for colon cancer        Need for hepatitis C screening test          Care Instructions    Try black cohosh for night sweats - available at [facility] or here at our pharmacy.     Increase your lisinopril to 10mg daily. Recheck your blood pressure in our or any [place] pharmacy in 1 week or sooner if needed.  Have pharmacy send me their note.      Establish an exercise regimen. Activity goal: 45 minutes 5 days a week. New exercise routine: cardiovascular workout on exercise equipment, walking and weightlifting. Diet regimen was discussed and plan is self-directed dieting: reduce calories, reduce portions, reduce processed  carbs, increase fruits/vegetables and avoid sweets and supervised diet program. Avoid artificial sweeteners and \"diet\" drinks and sodas.         Preventive Health Recommendations  Female Ages 50 - 64    Yearly exam: See your health care provider every year in " order to  o Review health changes.   o Discuss preventive care.    o Review your medicines if your doctor has prescribed any.      Get a Pap test every three years (unless you have an abnormal result and your provider advises testing more often).    If you get Pap tests with HPV test, you only need to test every 5 years, unless you have an abnormal result.     You do not need a Pap test if your uterus was removed (hysterectomy) and you have not had cancer.    You should be tested each year for STDs (sexually transmitted diseases) if you're at risk.     Have a mammogram every 1 to 2 years.    Have a colonoscopy at age 50, or have a yearly FIT test (stool test). These exams screen for colon cancer.      Have a cholesterol test every 5 years, or more often if advised.    Have a diabetes test (fasting glucose) every three years. If you are at risk for diabetes, you should have this test more often.     If you are at risk for osteoporosis (brittle bone disease), think about having a bone density scan (DEXA).    Shots: Get a flu shot each year. Get a tetanus shot every 10 years.    Nutrition:     Eat at least 5 servings of fruits and vegetables each day.    Eat whole-grain bread, whole-wheat pasta and brown rice instead of white grains and rice.    Talk to your provider about Calcium and Vitamin D.     Lifestyle    Exercise at least 150 minutes a week (30 minutes a day, 5 days a week). This will help you control your weight and prevent disease.    Limit alcohol to one drink per day.    No smoking.     Wear sunscreen to prevent skin cancer.     See your dentist every six months for an exam and cleaning.    See your eye doctor every 1 to 2 years.               Thank you for choosing [facility]  for your Health Care. It was a pleasure seeing you at your visit today. Please contact us with any questions or concerns you may have.                   Gayatri Stephens MD                                  To  reach your [facility] care team after hours call:   [phone]    Our clinic hours are:     Monday- 7:30 am - 7:00 pm                             Tuesday through Friday- 7:30 am - 5:00 pm                                        Saturday- 8:00 am - 12:00 pm                  Phone:  [phone]    Our pharmacy hours are:     Monday  8:00 am to 7:00 pm      Tuesday through Friday 8:00am to 6:00pm                        Saturday - 9:00 am to 1:00 pm      Sunday : Closed.              Phone:  [phone]      There is also information available at our web site:  [email or web address]    If your provider ordered any lab tests and you do not receive the results within 10 business days, please call the clinic.    If you need a medication refill please contact your pharmacy.  Please allow 2 business days for your refill to be completed.    Our clinic offers telephone visits and e visits.  Please ask one of your team members to explain more.      Use [employer] (secure email communication and access to your chart) to send your primary care provider a message or make an appointment. Ask someone on your Team how to sign up for [employer].                       Follow-ups after your visit        Your next 10 appointments already scheduled     Apr 14, 2017 12:05 PM CDT   MA SCREENING DIGITAL BILATERAL with [id number]   [facility]    [address], [address]  [facility] [id number] [phone]           Do not use any powder, lotion or deodorant under your arms or on your breast. If you do, we will ask you to remove it before your exam.  Wear comfortable, two-piece clothing.  If you have any allergies, tell your care team.  Bring any previous mammograms from other facilities or have them mailed to the breast center. This mammogram location, [facility], now offers 3D mammography. It doesn't replace a screening mammogram and can be done with a regular  screening mammogram. It is optional and not all insurances will pay for it. 3D mammography is a special kind of mammogram that produces a three-dimensional image of the breast by using low dose-xrays. 3D allows the radiologist to see the breast tissue differently from 2D, which reduces the chance of repeat testing due to overlapping breast tissue. If you are interested in have a 3D mammogram, please check with your insurance before you arrive for your exam. On the day of your exam you will be asked if you would like 3D imaging.              Future tests that were ordered for you today     Open Future Orders        Priority Expected Expires Ordered    MA SCREENING DIGITAL BILAT - Future  (s+30) Routine  4/12/2018 4/12/2017    Fecal colorectal cancer screen FIT - Future (S+30) Routine 5/3/2017 5/12/2017 4/12/2017            Who to contact     If you have questions or need follow up information about today's clinic visit or your schedule please contact [facility] directly at [phone].  Normal or non-critical lab and imaging results will be communicated to you by [employer], letter or phone within 4 business days after the clinic has received the results. If you do not hear from us within 7 days, please contact the clinic through [employer] or phone. If you have a critical or abnormal lab result, we will notify you by phone as soon as possible.  Submit refill requests through [employer] or call your pharmacy and they will forward the refill request to us. Please allow 3 business days for your refill to be completed.          Additional Information About Your Visit        [employer] Information     [employer] gives you secure access to your electronic health record. If you see a primary care provider, you can also send messages to your care team and make appointments. If you have questions, please call your primary care clinic.  If you do not have a primary care provider, please call [phone] and they will  "assist you.        Care EveryWhere ID     This is your Care EveryWhere ID. This could be used by other organizations to access your [facility] medical records  [id number]        Your Vitals Were     Pulse Temperature Height Last Period Pulse Oximetry BMI (Body Mass Index)    82 98.1  F (36.7  C) (Oral) 4' 11.5\" (1.511 m) 09/05/2000 98% 31.46 kg/m2       Blood Pressure from Last 3 Encounters:   04/12/17 148/90   10/05/16 124/82   09/26/16 124/86    Weight from Last 3 Encounters:   04/12/17 158 lb 6.4 oz (71.8 kg)   10/05/16 153 lb (69.4 kg)   09/26/16 153 lb 3 oz (69.5 kg)              We Performed the Following     Albumin Random Urine Quantitative     CBC with platelets differential     Comprehensive metabolic panel     DEPRESSION ACTION PLAN (DAP) Order [[id number]]     Hepatitis C Screen Reflex to HCV RNA Quant and Genotype     Lipid panel reflex to direct LDL     Magnesium     OFFICE/OUTPT VISIT,EST,LEVL IV     TSH with free T4 reflex     UA reflex to Microscopic and Culture     Urine Microscopic          Today's Medication Changes          These changes are accurate as of: 4/12/17 10:13 AM.  If you have any questions, ask your nurse or doctor.               Start taking these medicines.        Dose/Directions    hydrocortisone 2.5 % cream   Used for:  Seborrheic dermatitis   Started by:  Gayatri Stephens MD        Apply sparingly to dermatitis left lower eye   Quantity:  30 g   Refills:  3         These medicines have changed or have updated prescriptions.        Dose/Directions    estradiol 0.5 MG tablet   Commonly known as:  ESTRACE   This may have changed:  See the new instructions.   Used for:  Symptomatic menopausal or female climacteric states   Changed by:  Gayatri Stephens MD        TAKE TWO TABLETS BY MOUTH ONCE DAILY   Quantity:  180 tablet   Refills:  3       lisinopril 5 MG tablet   Commonly known as:  PRINIVIL/ZESTRIL   This may have changed:  See the new instructions.   Used for:  " Essential hypertension with goal blood pressure less than 140/90   Changed by:  Gayatri Stephens MD        Dose:  10 mg   Take 2 tablets (10 mg) by mouth daily   Quantity:  180 tablet   Refills:  1       * omeprazole 40 MG capsule   Commonly known as:  priLOSEC   This may have changed:  Another medication with the same name was added. Make sure you understand how and when to take each.   Used for:  Gastroesophageal reflux disease without esophagitis   Changed by:  Gayatri Stephens MD        Dose:  40 mg   Take 1 capsule (40 mg) by mouth daily Take 30-60 minutes before a meal.   Quantity:  90 capsule   Refills:  3       * omeprazole 20 MG CR capsule   Commonly known as:  priLOSEC   This may have changed:  You were already taking a medication with the same name, and this prescription was added. Make sure you understand how and when to take each.   Used for:  Gastroesophageal reflux disease without esophagitis   Changed by:  Gayatri Stepehns MD        Dose:  20 mg   Take 1 capsule (20 mg) by mouth daily   Quantity:  90 capsule   Refills:  3       * Notice:  This list has 2 medication(s) that are the same as other medications prescribed for you. Read the directions carefully, and ask your doctor or other care provider to review them with you.      Stop taking these medicines if you haven't already. Please contact your care team if you have questions.     fenofibrate 145 MG tablet   Stopped by:  Gayatri Stephens MD           FLUoxetine 10 MG capsule   Commonly known as:  PROzac   Stopped by:  Gayatri Stephens MD                Where to get your medicines      These medications were sent to [facility] [address] [place] [id number]     Phone:  [phone]     albuterol 108 (90 BASE) MCG/ACT Inhaler    estradiol 0.5 MG tablet    fluticasone 50 MCG/ACT spray    hydrocortisone 2.5 % cream    lisinopril 5 MG  tablet    omeprazole 20 MG CR capsule    triamterene-hydrochlorothiazide 37.5-25 MG per capsule    venlafaxine 37.5 MG tablet         Some of these will need a paper prescription and others can be bought over the counter.  Ask your nurse if you have questions.     Bring a paper prescription for each of these medications     zolpidem 5 MG tablet                Primary Care Provider Office Phone # Fax #    Gayatri Stephens [phone]       [address] [address]        Thank you!     Thank you for choosing [facility]  for your care. Our goal is always to provide you with excellent care. Hearing back from our patients is one way we can continue to improve our services. Please take a few minutes to complete the written survey that you may receive in the mail after your visit with us. Thank you!             Your Updated Medication List - Protect others around you: Learn how to safely use, store and throw away your medicines at www.disposemymeds.org.          This list is accurate as of: 4/12/17 10:13 AM.  Always use your most recent med list.                   Brand Name Dispense Instructions for use    albuterol 108 (90 BASE) MCG/ACT Inhaler    PROAIR HFA/PROVENTIL HFA/VENTOLIN HFA    1 Inhaler    Inhale 2 puffs into the lungs every 6 hours as needed for shortness of breath / dyspnea or wheezing       cetirizine 10 MG tablet    zyrTEC     Take 10 mg by mouth every evening       estradiol 0.5 MG tablet    ESTRACE    180 tablet    TAKE TWO TABLETS BY MOUTH ONCE DAILY       fluticasone 50 MCG/ACT spray    FLONASE    48 g    USE TWO SPRAYS IN EACH NOSTRIL EVERY DAY       guaiFENesin-codeine 100-10 MG/5ML Soln solution    ROBITUSSIN AC    240 mL    Take 5 mLs by mouth every 4 hours as needed for cough       hydrocortisone 2.5 % cream     30 g    Apply sparingly to dermatitis left lower eye       ibuprofen 600 MG tablet    ADVIL/MOTRIN     30 tablet    Take 1 tablet (600 mg) by mouth every 6 hours as needed for pain (mild)       lisinopril 5 MG tablet    PRINIVIL/ZESTRIL    180 tablet    Take 2 tablets (10 mg) by mouth daily       * omeprazole 40 MG capsule    priLOSEC    90 capsule    Take 1 capsule (40 mg) by mouth daily Take 30-60 minutes before a meal.       * omeprazole 20 MG CR capsule    priLOSEC    90 capsule    Take 1 capsule (20 mg) by mouth daily       triamcinolone 0.5 % cream    KENALOG     Apply topically 2 times daily as needed for irritation (to eczema or psoriatic lesions - Never use on face)       triamterene-hydrochlorothiazide 37.5-25 MG per capsule    DYAZIDE    90 capsule    Take 1 capsule by mouth daily       venlafaxine 37.5 MG tablet    EFFEXOR    90 tablet    Take 1 tab daily       zolpidem 5 MG tablet    AMBIEN    30 tablet    Take 1 tablet (5 mg) by mouth nightly as needed for sleep       * Notice:  This list has 2 medication(s) that are the same as other medications prescribed for you. Read the directions carefully, and ask your doctor or other care provider to review them with you.

## 2017-04-12 NOTE — NURSING NOTE
"Chief Complaint   Patient presents with     Physical       Initial /90 (BP Location: Left arm, Patient Position: Chair, Cuff Size: Adult Large)  Pulse 82  Temp 98.1  F (36.7  C) (Oral)  Ht 4' 11.5\" (1.511 m)  Wt 158 lb 6.4 oz (71.8 kg)  LMP 09/05/2000  SpO2 98%  BMI 31.46 kg/m2 Estimated body mass index is 31.46 kg/(m^2) as calculated from the following:    Height as of this encounter: 4' 11.5\" (1.511 m).    Weight as of this encounter: 158 lb 6.4 oz (71.8 kg).  Medication Reconciliation: complete   Anitha Deluca CMA  "

## 2017-04-12 NOTE — LETTER
My Depression Action Plan  Name: Connie Brunson   Date of Birth 1960  Date: 4/12/2017    My doctor: Gayatri Stephens   My clinic: [address] [address] [phone]          GREEN    ZONE   Good Control    What it looks like:     Things are going generally well. You have normal up s and down s. You may even feel depressed from time to time, but bad moods usually last less than a day.   What you need to do:  1. Continue to care for yourself (see self care plan)  2. Check your depression survival kit and update it as needed  3. Follow your physician s recommendations including any medication.  4. Do not stop taking medication unless you consult with your physician first.           YELLOW         ZONE Getting Worse    What it looks like:     Depression is starting to interfere with your life.     It may be hard to get out of bed; you may be starting to isolate yourself from others.    Symptoms of depression are starting to last most all day and this has happened for several days.     You may have suicidal thoughts but they are not constant.   What you need to do:     1. Call your care team, your response to treatment will improve if you keep your care team informed of your progress. Yellow periods are signs an adjustment may need to be made.     2. Continue your self-care, even if you have to fake it!    3. Talk to someone in your support network    4. Open up your depression survival kit           RED    ZONE Medical Alert - Get Help    What it looks like:     Depression is seriously interfering with your life.     You may experience these or other symptoms: You can t get out of bed most days, can t work or engage in other necessary activities, you have trouble taking care of basic hygiene, or basic responsibilities, thoughts of suicide or death that will not go away, self-injurious behavior.     What you need to do:  1. Call  your care team and request a same-day appointment. If they are not available (weekends or after hours) call your local crisis line, emergency room or 911.      Electronically signed by: Anitha Deluca, April 12, 2017    Depression Self Care Plan / Survival Kit    Self-Care for Depression  Here s the deal. Your body and mind are really not as separate as most people think.  What you do and think affects how you feel and how you feel influences what you do and think. This means if you do things that people who feel good do, it will help you feel better.  Sometimes this is all it takes.  There is also a place for medication and therapy depending on how severe your depression is, so be sure to consult with your medical provider and/ or Behavioral Health Consultant if your symptoms are worsening or not improving.     In order to better manage my stress, I will:    Exercise  Get some form of exercise, every day. This will help reduce pain and release endorphins, the  feel good  chemicals in your brain. This is almost as good as taking antidepressants!  This is not the same as joining a gym and then never going! (they count on that by the way ) It can be as simple as just going for a walk or doing some gardening, anything that will get you moving.      Hygiene   Maintain good hygiene (Get out of bed in the morning, Make your bed, Brush your teeth, Take a shower, and Get dressed like you were going to work, even if you are unemployed).  If your clothes don't fit try to get ones that do.    Diet  I will strive to eat foods that are good for me, drink plenty of water, and avoid excessive sugar, caffeine, alcohol, and other mood-altering substances.  Some foods that are helpful in depression are: complex carbohydrates, B vitamins, flaxseed, fish or fish oil, fresh fruits and vegetables.    Psychotherapy  I agree to participate in Individual Therapy (if recommended).    Medication  If prescribed medications, I agree to take  them.  Missing doses can result in serious side effects.  I understand that drinking alcohol, or other illicit drug use, may cause potential side effects.  I will not stop my medication abruptly without first discussing it with my provider.    Staying Connected With Others  I will stay in touch with my friends, family members, and my primary care provider/team.    Use your imagination  Be creative.  We all have a creative side; it doesn t matter if it s oil painting, sand castles, or mud pies! This will also kick up the endorphins.    Witness Beauty  (AKA stop and smell the roses) Take a look outside, even in mid-winter. Notice colors, textures. Watch the squirrels and birds.     Service to others  Be of service to others.  There is always someone else in need.  By helping others we can  get out of ourselves  and remember the really important things.  This also provides opportunities for practicing all the other parts of the program.    Humor  Laugh and be silly!  Adjust your TV habits for less news and crime-drama and more comedy.    Control your stress  Try breathing deep, massage therapy, biofeedback, and meditation. Find time to relax each day.     My support system    Clinic Contact:  Phone number:    Contact 1:  Phone number:    Contact 2:  Phone number:    [employer]eterian/:  Phone number:    Therapist:  Phone number:    Local crisis center:    Phone number:    Other community support:  Phone number:

## 2017-04-13 ASSESSMENT — PATIENT HEALTH QUESTIONNAIRE - PHQ9: SUM OF ALL RESPONSES TO PHQ QUESTIONS 1-9: 4

## 2017-04-13 ASSESSMENT — ANXIETY QUESTIONNAIRES: GAD7 TOTAL SCORE: 2

## 2017-04-14 ENCOUNTER — HOSPITAL ENCOUNTER (OUTPATIENT)
Dept: MAMMOGRAPHY | Facility: CLINIC | Age: 57
Discharge: HOME | End: 2017-04-14
Attending: FAMILY MEDICINE | Admitting: FAMILY MEDICINE
Payer: COMMERCIAL

## 2017-04-14 DIAGNOSIS — Z12.31 VISIT FOR SCREENING MAMMOGRAM: ICD-10-CM

## 2017-04-14 PROCEDURE — G0202 SCR MAMMO BI INCL CAD: HCPCS

## 2017-04-17 DIAGNOSIS — E78.5 HYPERLIPIDEMIA LDL GOAL <130: Primary | ICD-10-CM

## 2017-04-17 DIAGNOSIS — K21.9 GASTROESOPHAGEAL REFLUX DISEASE WITHOUT ESOPHAGITIS: ICD-10-CM

## 2017-04-17 RX ORDER — FENOFIBRATE 130 MG/1
130 CAPSULE ORAL DAILY
Qty: 90 CAPSULE | Refills: 0 | Status: SHIPPED | OUTPATIENT
Start: 2017-04-17 | End: 2017-07-21

## 2017-04-17 NOTE — PROGRESS NOTES
Please call pt with results below:     Mag, tsh, albumin, hep C christian levels = negative/normal.   Total cholesterol high and triglycerides = quite a bit higher than 8 months ago, but better than previous.  Recommend starting on fenofibrate 130mg po qday and recheck fasting lipids, ast , alt (liver function tests)  in 12 weeks. Please  enter future orders for this and pt  can do this as a lab only appointment.     Your recent urine wasn't entirely a clean catch, which is sometimes difficult to obtain, but with no white blood cells in your urine , there is no sign of true infection.   CBC with hemoglobin, white blood cells , and platelets all normal

## 2017-04-21 DIAGNOSIS — Z12.11 SCREEN FOR COLON CANCER: ICD-10-CM

## 2017-04-21 PROCEDURE — 82274 ASSAY TEST FOR BLOOD FECAL: CPT | Performed by: FAMILY MEDICINE

## 2017-04-24 LAB — HEMOCCULT STL QL IA: NEGATIVE

## 2017-04-25 ENCOUNTER — ALLIED HEALTH/NURSE VISIT (OUTPATIENT)
Dept: FAMILY MEDICINE | Facility: CLINIC | Age: 57
End: 2017-04-25
Payer: COMMERCIAL

## 2017-04-25 VITALS — SYSTOLIC BLOOD PRESSURE: 118 MMHG | DIASTOLIC BLOOD PRESSURE: 80 MMHG

## 2017-04-25 DIAGNOSIS — I10 ESSENTIAL HYPERTENSION WITH GOAL BLOOD PRESSURE LESS THAN 140/90: Primary | ICD-10-CM

## 2017-04-25 PROCEDURE — 99207 ZZC NO CHARGE NURSE ONLY: CPT | Performed by: FAMILY MEDICINE

## 2017-04-25 NOTE — MR AVS SNAPSHOT
After Visit Summary   4/25/2017    Connie Brunson    MRN: [id number]           Patient Information     Date Of Birth          1960        Visit Information        Provider Department      4/25/2017 5:32 PM Gayatri Stephens MD [facility]        Today's Diagnoses     Essential hypertension with goal blood pressure less than 140/90    -  1       Follow-ups after your visit        Who to contact     If you have questions or need follow up information about today's clinic visit or your schedule please contact [facility] directly at [phone].  Normal or non-critical lab and imaging results will be communicated to you by [employer], letter or phone within 4 business days after the clinic has received the results. If you do not hear from us within 7 days, please contact the clinic through [employer] or phone. If you have a critical or abnormal lab result, we will notify you by phone as soon as possible.  Submit refill requests through [employer] or call your pharmacy and they will forward the refill request to us. Please allow 3 business days for your refill to be completed.          Additional Information About Your Visit        MyChart Information     [employer] gives you secure access to your electronic health record. If you see a primary care provider, you can also send messages to your care team and make appointments. If you have questions, please call your primary care clinic.  If you do not have a primary care provider, please call [phone] and they will assist you.        Care EveryWhere ID     This is your Care EveryWhere ID. This could be used by other organizations to access your [facility] medical records  [id number]        Your Vitals Were     Last Period                   09/05/2000            Blood Pressure from Last 3 Encounters:   04/25/17 118/80   04/12/17 148/90   10/05/16 124/82    Weight from Last 3 Encounters:   04/12/17 158 lb 6.4 oz (71.8 kg)    10/05/16 153 lb (69.4 kg)   09/26/16 153 lb 3 oz (69.5 kg)              Today, you had the following     No orders found for display       Primary Care Provider Office Phone # Fax #    Gayatri Stephens [phone]       [facility] [address] [facility] [address]        Thank you!     Thank you for choosing [facility]  for your care. Our goal is always to provide you with excellent care. Hearing back from our patients is one way we can continue to improve our services. Please take a few minutes to complete the written survey that you may receive in the mail after your visit with us. Thank you!             Your Updated Medication List - Protect others around you: Learn how to safely use, store and throw away your medicines at www.disposemymeds.org.          This list is accurate as of: 4/25/17  5:32 PM.  Always use your most recent med list.                   Brand Name Dispense Instructions for use    albuterol 108 (90 BASE) MCG/ACT Inhaler    PROAIR HFA/PROVENTIL HFA/VENTOLIN HFA    1 Inhaler    Inhale 2 puffs into the lungs every 6 hours as needed for shortness of breath / dyspnea or wheezing       cetirizine 10 MG tablet    zyrTEC     Take 10 mg by mouth every evening       estradiol 0.5 MG tablet    ESTRACE    180 tablet    TAKE TWO TABLETS BY MOUTH ONCE DAILY       Fenofibrate Micronized 130 MG Caps     90 capsule    Take 130 mg by mouth daily       fluticasone 50 MCG/ACT spray    FLONASE    48 g    USE TWO SPRAYS IN EACH NOSTRIL EVERY DAY       guaiFENesin-codeine 100-10 MG/5ML Soln solution    ROBITUSSIN AC    240 mL    Take 5 mLs by mouth every 4 hours as needed for cough       hydrocortisone 2.5 % cream     30 g    Apply sparingly to dermatitis left lower eye       ibuprofen 600 MG tablet    ADVIL/MOTRIN    30 tablet    Take 1 tablet (600 mg) by mouth every 6 hours as needed for pain (mild)       lisinopril 5 MG tablet    PRINIVIL/ZESTRIL     180 tablet    Take 2 tablets (10 mg) by mouth daily       * omeprazole 40 MG capsule    priLOSEC    90 capsule    Take 1 capsule (40 mg) by mouth daily Take 30-60 minutes before a meal.       * omeprazole 20 MG CR capsule    priLOSEC    90 capsule    Take 1 capsule (20 mg) by mouth daily       triamcinolone 0.5 % cream    KENALOG     Apply topically 2 times daily as needed for irritation (to eczema or psoriatic lesions - Never use on face)       triamterene-hydrochlorothiazide 37.5-25 MG per capsule    DYAZIDE    90 capsule    Take 1 capsule by mouth daily       venlafaxine 37.5 MG tablet    EFFEXOR    90 tablet    Take 1 tab daily       zolpidem 5 MG tablet    AMBIEN    30 tablet    Take 1 tablet (5 mg) by mouth nightly as needed for sleep       * Notice:  This list has 2 medication(s) that are the same as other medications prescribed for you. Read the directions carefully, and ask your doctor or other care provider to review them with you.

## 2017-04-25 NOTE — NURSING NOTE
Connie Brunson is enrolled/participating in the retail pharmacy Blood Pressure Goals Achievement Program (BPGAP).  Connie Brunson was evaluated at [facility] on April 25, 2017 at which time her blood pressure was:    BP Readings from Last 3 Encounters:   04/25/17 118/80   04/12/17 148/90   10/05/16 124/82     Reviewed lifestyle modifications for blood pressure control and reduction: including making healthy food choices, managing weight, getting regular exercise, smoking cessation, reducing alcohol consumption, monitoring blood pressure regularly.     Connie Brunson is not experiencing symptoms.    Follow-Up: BP is at goal of < 140/90mmHg (patient 18+ years of age with or without diabetes).  Recommended follow-up at pharmacy in 6 months.     Completed by: Thank you,  Emilia Gaona RPh, [facility] [phone]

## 2017-06-01 DIAGNOSIS — N95.1 SYMPTOMATIC MENOPAUSAL OR FEMALE CLIMACTERIC STATES: ICD-10-CM

## 2017-06-02 NOTE — TELEPHONE ENCOUNTER
Estrace      Last Written Prescription Date: 04/12/2017  Last Fill Quantity: 180, # refills: 3  Last Office Visit with [facility], [facility] or [facility] prescribing provider: 04/12/2017       BP Readings from Last 3 Encounters:   04/25/17 118/80   04/12/17 148/90   10/05/16 124/82     Date of last Breast Exam: 04/12/2017

## 2017-06-05 RX ORDER — ESTRADIOL 0.5 MG/1
TABLET ORAL
Qty: 180 TABLET | Refills: 0 | OUTPATIENT
Start: 2017-06-05

## 2017-06-27 ENCOUNTER — MYC MEDICAL ADVICE (OUTPATIENT)
Dept: FAMILY MEDICINE | Facility: CLINIC | Age: 57
End: 2017-06-27

## 2017-06-27 DIAGNOSIS — L30.9 DERMATITIS: Primary | ICD-10-CM

## 2017-06-27 DIAGNOSIS — F41.9 ANXIETY: ICD-10-CM

## 2017-06-27 NOTE — TELEPHONE ENCOUNTER
Please see my chart message below     Please review order pended - please advise     Thank you     Samantha Escoto RN, BSN  [place] [facility]

## 2017-06-28 RX ORDER — VENLAFAXINE 37.5 MG/1
TABLET ORAL
Qty: 90 TABLET | Refills: 0 | Status: SHIPPED | OUTPATIENT
Start: 2017-06-28 | End: 2018-04-03

## 2017-06-28 NOTE — TELEPHONE ENCOUNTER
Effexor    Last Written Prescription Date: 04/12/2017  Last Fill Quantity: 90, # refills: 1  Last Office Visit with [facility], [facility] or  [facility] prescribing provider: 04/12/2017        BP Readings from Last 3 Encounters:   04/25/17 118/80   04/12/17 148/90   10/05/16 124/82     Pulse: (for Fetzima)  Creatinine   Date Value Ref Range Status   04/12/2017 0.74 0.52 - 1.04 mg/dL Final   ]    Last PHQ-9 score on record=   PHQ-9 SCORE 4/12/2017   Total Score MyChart -   Total Score 4     NAHOMY-7 SCORE 1/27/2016 9/26/2016 4/12/2017   Total Score - - -   Total Score - - -   Total Score 4 0 2

## 2017-06-29 ENCOUNTER — TRANSFERRED RECORDS (OUTPATIENT)
Dept: HEALTH INFORMATION MANAGEMENT | Facility: CLINIC | Age: 57
End: 2017-06-29

## 2017-07-21 DIAGNOSIS — E78.5 HYPERLIPIDEMIA LDL GOAL <130: ICD-10-CM

## 2017-07-21 DIAGNOSIS — K21.9 GASTROESOPHAGEAL REFLUX DISEASE WITHOUT ESOPHAGITIS: ICD-10-CM

## 2017-07-24 NOTE — TELEPHONE ENCOUNTER
Fenofibrate (last refill states to have patient recheck labs before further refills)       Last Written Prescription Date: 4/17/2017  Last Fill Quantity: 90, # refills: 0    Last Office Visit with [facility], [facility] or [facility] prescribing provider:  4/12/2017   Future Office Visit:      Cholesterol   Date Value Ref Range Status   04/12/2017 210 (H) <200 mg/dL Final     Comment:     Desirable:       <200 mg/dl     HDL Cholesterol   Date Value Ref Range Status   04/12/2017 47 (L) >49 mg/dL Final     LDL Cholesterol Calculated   Date Value Ref Range Status   04/12/2017  <100 mg/dL Final    Cannot estimate LDL when triglyceride exceeds 400 mg/dL     LDL Cholesterol Direct   Date Value Ref Range Status   04/12/2017 90 <100 mg/dL Final     Comment:     Desirable:       <100 mg/dl     Triglycerides   Date Value Ref Range Status   04/12/2017 660 (H) <150 mg/dL Final     Comment:     Borderline high:  150-199 mg/dl   High:             200-499 mg/dl   Very high:       >499 mg/dl   Fasting specimen       Cholesterol/HDL Ratio   Date Value Ref Range Status   05/14/2014 6.2 (H) 0.0 - 5.0 Final     ALT   Date Value Ref Range Status   04/12/2017 18 0 - 50 U/L Final      Anitha Deluca CMA

## 2017-07-25 NOTE — TELEPHONE ENCOUNTER
Routing refill request to provider for review/approval because:  Kari given x1 and patient did not follow up, please advise  Patient due for further labs per [facility] notes  Zoë Mcpherson RN - Triage  [facility]

## 2017-07-26 RX ORDER — FENOFIBRATE 130 MG/1
CAPSULE ORAL
Qty: 90 CAPSULE | Refills: 0 | Status: SHIPPED | OUTPATIENT
Start: 2017-07-26 | End: 2017-08-11 | Stop reason: ALTCHOICE

## 2017-07-26 NOTE — TELEPHONE ENCOUNTER
Called [phone] (home)     Informed of message below - states she will get in within the next few weeks to have her labs drawn    Patient stated an understanding and agreed with plan.    Vero Jones RN  [facility]

## 2017-07-26 NOTE — TELEPHONE ENCOUNTER
rx done x 1 again to give pt a chance to get in for fasting recheck on her labs. Future orders in place. Please let pt know, she can do these at ANY [facility] facility.  Just needs to be fasting for 8 hours, but ok for water, black coffee and plain green or black tea, just no sweeteners or creamers.

## 2017-08-08 DIAGNOSIS — E78.5 HYPERLIPIDEMIA LDL GOAL <130: ICD-10-CM

## 2017-08-08 DIAGNOSIS — K21.9 GASTROESOPHAGEAL REFLUX DISEASE WITHOUT ESOPHAGITIS: ICD-10-CM

## 2017-08-08 LAB
ALT SERPL W P-5'-P-CCNC: 22 U/L (ref 0–50)
AST SERPL W P-5'-P-CCNC: 15 U/L (ref 0–45)
CHOLEST SERPL-MCNC: 219 MG/DL
HDLC SERPL-MCNC: 59 MG/DL
LDLC SERPL CALC-MCNC: ABNORMAL MG/DL
LDLC SERPL DIRECT ASSAY-MCNC: 115 MG/DL
NONHDLC SERPL-MCNC: 160 MG/DL
TRIGL SERPL-MCNC: 476 MG/DL

## 2017-08-08 PROCEDURE — 83721 ASSAY OF BLOOD LIPOPROTEIN: CPT | Mod: 59 | Performed by: FAMILY MEDICINE

## 2017-08-08 PROCEDURE — 84450 TRANSFERASE (AST) (SGOT): CPT | Performed by: FAMILY MEDICINE

## 2017-08-08 PROCEDURE — 84460 ALANINE AMINO (ALT) (SGPT): CPT | Performed by: FAMILY MEDICINE

## 2017-08-08 PROCEDURE — 36415 COLL VENOUS BLD VENIPUNCTURE: CPT | Performed by: FAMILY MEDICINE

## 2017-08-08 PROCEDURE — 80061 LIPID PANEL: CPT | Performed by: FAMILY MEDICINE

## 2017-08-09 ENCOUNTER — MYC MEDICAL ADVICE (OUTPATIENT)
Dept: FAMILY MEDICINE | Facility: CLINIC | Age: 57
End: 2017-08-09

## 2017-08-09 DIAGNOSIS — E78.5 HYPERLIPIDEMIA LDL GOAL <130: ICD-10-CM

## 2017-08-09 DIAGNOSIS — E78.1 HYPERTRIGLYCERIDEMIA: Primary | ICD-10-CM

## 2017-08-11 RX ORDER — FENOFIBRATE 160 MG/1
160 TABLET ORAL DAILY
Qty: 90 TABLET | Refills: 1 | Status: SHIPPED | OUTPATIENT
Start: 2017-08-11 | End: 2018-05-10

## 2017-10-09 ENCOUNTER — HOSPITAL ENCOUNTER (EMERGENCY)
Facility: CLINIC | Age: 57
Discharge: HOME | End: 2017-10-09
Attending: EMERGENCY MEDICINE | Admitting: EMERGENCY MEDICINE
Payer: COMMERCIAL

## 2017-10-09 ENCOUNTER — TELEPHONE (OUTPATIENT)
Dept: FAMILY MEDICINE | Facility: CLINIC | Age: 57
End: 2017-10-09

## 2017-10-09 ENCOUNTER — APPOINTMENT (OUTPATIENT)
Dept: CT IMAGING | Facility: CLINIC | Age: 57
End: 2017-10-09
Attending: EMERGENCY MEDICINE
Payer: COMMERCIAL

## 2017-10-09 VITALS
DIASTOLIC BLOOD PRESSURE: 89 MMHG | SYSTOLIC BLOOD PRESSURE: 132 MMHG | TEMPERATURE: 97.9 F | OXYGEN SATURATION: 98 % | RESPIRATION RATE: 20 BRPM | HEART RATE: 81 BPM

## 2017-10-09 DIAGNOSIS — K63.89 SMALL BOWEL MASS: ICD-10-CM

## 2017-10-09 DIAGNOSIS — K57.32 DIVERTICULITIS OF COLON: ICD-10-CM

## 2017-10-09 LAB
ALBUMIN SERPL-MCNC: 3.7 G/DL (ref 3.4–5)
ALP SERPL-CCNC: 31 U/L (ref 40–150)
ALT SERPL W P-5'-P-CCNC: 23 U/L (ref 0–50)
ANION GAP SERPL CALCULATED.3IONS-SCNC: 9 MMOL/L (ref 3–14)
AST SERPL W P-5'-P-CCNC: 16 U/L (ref 0–45)
BASOPHILS # BLD AUTO: 0.1 10E9/L (ref 0–0.2)
BASOPHILS NFR BLD AUTO: 1.1 %
BILIRUB SERPL-MCNC: 0.5 MG/DL (ref 0.2–1.3)
BUN SERPL-MCNC: 8 MG/DL (ref 7–30)
CALCIUM SERPL-MCNC: 9.3 MG/DL (ref 8.5–10.1)
CHLORIDE SERPL-SCNC: 97 MMOL/L (ref 94–109)
CO2 SERPL-SCNC: 28 MMOL/L (ref 20–32)
CREAT SERPL-MCNC: 0.69 MG/DL (ref 0.52–1.04)
DIFFERENTIAL METHOD BLD: ABNORMAL
EOSINOPHIL # BLD AUTO: 0.3 10E9/L (ref 0–0.7)
EOSINOPHIL NFR BLD AUTO: 3.9 %
ERYTHROCYTE [DISTWIDTH] IN BLOOD BY AUTOMATED COUNT: 12.1 % (ref 10–15)
GFR SERPL CREATININE-BSD FRML MDRD: 87 ML/MIN/1.7M2
GLUCOSE SERPL-MCNC: 101 MG/DL (ref 70–99)
HCT VFR BLD AUTO: 36.5 % (ref 35–47)
HGB BLD-MCNC: 13.1 G/DL (ref 11.7–15.7)
IMM GRANULOCYTES # BLD: 0 10E9/L (ref 0–0.4)
IMM GRANULOCYTES NFR BLD: 0.3 %
LACTATE BLD-SCNC: 1.1 MMOL/L (ref 0.7–2)
LIPASE SERPL-CCNC: 58 U/L (ref 73–393)
LYMPHOCYTES # BLD AUTO: 1.3 10E9/L (ref 0.8–5.3)
LYMPHOCYTES NFR BLD AUTO: 20.4 %
MCH RBC QN AUTO: 33.2 PG (ref 26.5–33)
MCHC RBC AUTO-ENTMCNC: 35.9 G/DL (ref 31.5–36.5)
MCV RBC AUTO: 93 FL (ref 78–100)
MONOCYTES # BLD AUTO: 0.5 10E9/L (ref 0–1.3)
MONOCYTES NFR BLD AUTO: 7.3 %
NEUTROPHILS # BLD AUTO: 4.3 10E9/L (ref 1.6–8.3)
NEUTROPHILS NFR BLD AUTO: 67 %
NRBC # BLD AUTO: 0 10*3/UL
NRBC BLD AUTO-RTO: 0 /100
PLATELET # BLD AUTO: 343 10E9/L (ref 150–450)
POTASSIUM SERPL-SCNC: 3.3 MMOL/L (ref 3.4–5.3)
PROT SERPL-MCNC: 7.9 G/DL (ref 6.8–8.8)
RBC # BLD AUTO: 3.94 10E12/L (ref 3.8–5.2)
SODIUM SERPL-SCNC: 134 MMOL/L (ref 133–144)
WBC # BLD AUTO: 6.4 10E9/L (ref 4–11)

## 2017-10-09 PROCEDURE — 25000128 H RX IP 250 OP 636: Performed by: EMERGENCY MEDICINE

## 2017-10-09 PROCEDURE — 96375 TX/PRO/DX INJ NEW DRUG ADDON: CPT

## 2017-10-09 PROCEDURE — 96361 HYDRATE IV INFUSION ADD-ON: CPT

## 2017-10-09 PROCEDURE — 83605 ASSAY OF LACTIC ACID: CPT | Performed by: EMERGENCY MEDICINE

## 2017-10-09 PROCEDURE — 25000132 ZZH RX MED GY IP 250 OP 250 PS 637: Performed by: EMERGENCY MEDICINE

## 2017-10-09 PROCEDURE — 74177 CT ABD & PELVIS W/CONTRAST: CPT

## 2017-10-09 PROCEDURE — 99285 EMERGENCY DEPT VISIT HI MDM: CPT | Mod: 25

## 2017-10-09 PROCEDURE — 96374 THER/PROPH/DIAG INJ IV PUSH: CPT | Mod: 59

## 2017-10-09 PROCEDURE — 85025 COMPLETE CBC W/AUTO DIFF WBC: CPT | Performed by: EMERGENCY MEDICINE

## 2017-10-09 PROCEDURE — 80053 COMPREHEN METABOLIC PANEL: CPT | Performed by: EMERGENCY MEDICINE

## 2017-10-09 PROCEDURE — 83690 ASSAY OF LIPASE: CPT | Performed by: EMERGENCY MEDICINE

## 2017-10-09 RX ORDER — OXYCODONE HYDROCHLORIDE 5 MG/1
5 TABLET ORAL ONCE
Status: DISCONTINUED | OUTPATIENT
Start: 2017-10-09 | End: 2017-10-09 | Stop reason: HOSPADM

## 2017-10-09 RX ORDER — ONDANSETRON 2 MG/ML
4 INJECTION INTRAMUSCULAR; INTRAVENOUS EVERY 30 MIN PRN
Status: DISCONTINUED | OUTPATIENT
Start: 2017-10-09 | End: 2017-10-09 | Stop reason: HOSPADM

## 2017-10-09 RX ORDER — ONDANSETRON 4 MG/1
4 TABLET, ORALLY DISINTEGRATING ORAL EVERY 8 HOURS PRN
Qty: 10 TABLET | Refills: 0 | Status: SHIPPED | OUTPATIENT
Start: 2017-10-09 | End: 2017-10-12

## 2017-10-09 RX ORDER — OXYCODONE HYDROCHLORIDE 5 MG/1
5 TABLET ORAL EVERY 6 HOURS PRN
Qty: 15 TABLET | Refills: 0 | Status: SHIPPED | OUTPATIENT
Start: 2017-10-09 | End: 2017-11-03

## 2017-10-09 RX ORDER — SODIUM CHLORIDE 9 MG/ML
1000 INJECTION, SOLUTION INTRAVENOUS CONTINUOUS
Status: DISCONTINUED | OUTPATIENT
Start: 2017-10-09 | End: 2017-10-09 | Stop reason: HOSPADM

## 2017-10-09 RX ORDER — KETOROLAC TROMETHAMINE 30 MG/ML
30 INJECTION, SOLUTION INTRAMUSCULAR; INTRAVENOUS ONCE
Status: COMPLETED | OUTPATIENT
Start: 2017-10-09 | End: 2017-10-09

## 2017-10-09 RX ORDER — CIPROFLOXACIN 500 MG/1
500 TABLET, FILM COATED ORAL 2 TIMES DAILY
Qty: 14 TABLET | Refills: 0 | Status: SHIPPED | OUTPATIENT
Start: 2017-10-09 | End: 2020-02-24

## 2017-10-09 RX ORDER — CIPROFLOXACIN 500 MG/1
500 TABLET, FILM COATED ORAL ONCE
Status: COMPLETED | OUTPATIENT
Start: 2017-10-09 | End: 2017-10-09

## 2017-10-09 RX ORDER — IOPAMIDOL 755 MG/ML
500 INJECTION, SOLUTION INTRAVASCULAR ONCE
Status: COMPLETED | OUTPATIENT
Start: 2017-10-09 | End: 2017-10-09

## 2017-10-09 RX ADMIN — SODIUM CHLORIDE 60 ML: 9 INJECTION, SOLUTION INTRAVENOUS at 09:56

## 2017-10-09 RX ADMIN — SODIUM CHLORIDE 1000 ML: 9 INJECTION, SOLUTION INTRAVENOUS at 09:43

## 2017-10-09 RX ADMIN — KETOROLAC TROMETHAMINE 30 MG: 30 INJECTION, SOLUTION INTRAMUSCULAR at 09:40

## 2017-10-09 RX ADMIN — CIPROFLOXACIN HYDROCHLORIDE 500 MG: 500 TABLET, FILM COATED ORAL at 11:05

## 2017-10-09 RX ADMIN — IOPAMIDOL 80 ML: 755 INJECTION, SOLUTION INTRAVENOUS at 09:55

## 2017-10-09 RX ADMIN — ONDANSETRON 4 MG: 2 INJECTION INTRAMUSCULAR; INTRAVENOUS at 09:40

## 2017-10-09 ASSESSMENT — ENCOUNTER SYMPTOMS
VOMITING: 1
ABDOMINAL PAIN: 1
CHILLS: 1

## 2017-10-09 NOTE — TELEPHONE ENCOUNTER
Patient notified by phone and given [facility] number.  She verbalized understanding and agrees with plan.    Latoya Selby, BS, RN, N  [facility]) [phone]

## 2017-10-09 NOTE — ED AVS SNAPSHOT
[facility]    [address]    [facility] [address]    Phone:  [phone]    Fax:  [phone]                                       Connie Brunson   MRN: [id number]    Department:  [facility]   Date of Visit:  10/9/2017           After Visit Summary Signature Page     I have received my discharge instructions, and my questions have been answered. I have discussed any challenges I see with this plan with the nurse or doctor.    ..........................................................................................................................................  Patient/Patient Representative Signature      ..........................................................................................................................................  Patient Representative Print Name and Relationship to Patient    ..................................................               ................................................  Date                                            Time    ..........................................................................................................................................  Reviewed by Signature/Title    ...................................................              ..............................................  Date                                                            Time

## 2017-10-09 NOTE — ED PROVIDER NOTES
History     Chief Complaint:  Abdominal Pain      HPI   Connie Brunson is a 56 year old female with a history of diverticulitis who presents with lower abdominal pain since Saturday (10/7). This, to her, feels like diverticulitis. Patient had gall bladder removed recently and reports that she has had about 6 loose stools a day since. Patient reports that she was advised by mother to take ammonia on Saturday and has since been having this abdominal pain. Patient also complains of mild chills, and increased pressure in the lower abdomen with urination. She took ibuprofen last night for pain.      Allergies:  Arorvastatin  Ceftin  Clindamycin  Flagyl  Levaquin  Morphine  Penicillins  Sulfa drugs    Medications:    Fenofibrate  effexor  Estrace  Dyazide  ambien  Albuterol  prilosec    Past Medical History:      Allergic rhinitis, cause unspecified   Benign neoplasm of cerebral meninges (H)   Benign neoplasm of tonsil   Depressive disorder   Deviated nasal septum   Diverticulosis of colon (without mention of hemorrhage)   History of Guillain-Carver syndrome   Hyperlipidemia LDL goal < 130   Hypertension goal BP (blood pressure) < 140/90   Insomnia   Major depressive disorder, recurrent episode, moderate (H)   Moderate major depression (H)   Other acne   Other extrapyramidal disease and abnormal movement disorder \  Symptomatic menopausal or female climacteric states   S/P laparoscopic cholecystectomy for acute cholecystitis with cholelithiasis   Hypertriglyceridemia   Gastroesophageal reflux disease without esophagitis   Epiploic   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- ophtho recommended auto-immune/arthritis work-up  Diverticulosis of large intestine   Benign neoplasm of tonsil    Past Surgical History:    Abdomen surgery  C ligate fallopian tube  Tubal ligation  C nonspecific procedure    Parotid tumor l side of neck   Colonoscopy    Hc remove tonsils/adenoids,  Head & neck surgery    Laparoscopic  cholecystectomy with cholangiograms   Surgical history of ]menigioma excision     Family History:    Cancer  Hypertension      Social History:  Smoking status: Yes  Alcohol use: No  Marital Status:   [2]     Review of Systems   Constitutional: Positive for chills.   Gastrointestinal: Positive for abdominal pain and vomiting.   All other systems reviewed and are negative.    Physical Exam   First Vitals:  BP: 132/89  Pulse: 81  Temp: 97.9  F (36.6  C)  Resp: 20  SpO2: 98 %    Physical Exam  General: Well-nourished, appears to be uncomfortable  Eyes: PERRL, conjunctivae pink no scleral icterus or conjunctival injection  ENT:  Moist mucus membranes, posterior oropharynx clear without erythema or exudates  Respiratory:  Lungs clear to auscultation bilaterally, no crackles/rubs/wheezes.  Good air movement  CV: Normal rate and rhythm, no murmurs/rubs/gallops  GI:  Abdomen soft and non-distended.  Normoactive BS.  +left sided and suprapubic tenderness, no guarding or rebound  Skin: Warm, dry.  No rashes or petechiae  Musculoskeletal: No peripheral edema or calf tenderness  Neuro: Alert and oriented to person/place/time  Psychiatric: Normal affect    Emergency Department Course   Imaging:  Radiographic findings were communicated with the patient who voiced understanding of the findings.  CT Abdomen Pelvis w Contrast (Final result):  1. Mild acute diverticulitis in the mid sigmoid colon without  associated abscess or free intraperitoneal air.  2. Incidental finding of an enhancing 1.3 cm intraluminal nodule in  the distal ileum. A polyp or mass is possible. No other similar  findings are appreciated. No evidence of luminal obstruction.  3. Cholecystectomy changes. Upper abdominal organs are otherwise  within normal limits. No hydronephrosis.  As read by Radiology.    Laboratory:  CBC: WNL (WBC 6.4, HGB 13.1, )   CMP: K 3.3 (L) Glucose 101 (H) Alkphos 31 (L)   Lactic acid whole blood: 1.1  Lipase: 58 (L)      Interventions:  0.9% sodium chloride BOLUS (1,000 mLs Intravenous New Bag 10/9/17 0943)    Followed by  0.9% sodium chloride infusion (not administered)  ondansetron (ZOFRAN) injection 4 mg (4 mg Intravenous Given 10/9/17 0940)  ketorolac (TORADOL) injection 30 mg (30 mg Intravenous Given 10/9/17 0940)  iopamidol (ISOVUE-370) solution 500 mL (80 mLs Intravenous Given 10/9/17 0955)  0.9% sodium chloride BOLUS (0 mLs Intravenous given 10/9/17 0956)  ciprofloxacin (CIPRO) tablet 500 mg (500 mg Oral Given 10/9/17 1105)    Emergency Department Course:  Past medical records, nursing notes, and vitals reviewed.  0926: I performed an exam of the patient and obtained history, as documented above. An IV was inserted to administer the above interventions. IV inserted and blood drawn. I rechecked the patient. Findings and plan explained to the Patient. Patient discharged home with instructions regarding supportive care, medications, and reasons to return. The importance of close follow-up was reviewed.     An incidental finding of a possible ileum mass or polyp was noted on imaging.  I alerted the patient, gave copies of lab results and urged follow-up with PMD for further testing and repeat imaging as recommended per radiology.      Impression & Plan    Medical Decision Making:  Connie Brunson is a 56 year old female presented with abdominal pain and CT confirms diverticulitis without abscess or perforation; this appears consistent with the history and physical exam so I doubt another underlying etiology is also present.  The patient's pain has been controlled by interventions in the emergency department.  This represents uncomplicated diverticulitis at this time.  There are no signs of sepsis, shock or bacteremia.  The patient is tolerating po and pain is controlled and I believe safe for outpatient treatment.  She states she cannot tolerate augmentin or flagyl and was previously treated with ciprofloxacin alone.  I  discussed with her that she will need close follow-up given the antibiotics would not include flagyl. I discussed with her the incidental finding of a possible ileum mass and referred her to GI.  I educated the patient regarding the symptoms and signs that should prompt return to the Emergency Department.  This would include worsening fevers, chills, vomiting, and more intense pain.  The patient is to take antibiotics and pain medications as directed.    Follow-up with primary care physician is indicated in 1-2 days.     Diagnosis:    ICD-10-CM    1. Diverticulitis of colon K57.32 GASTROENTEROLOGY ADULT REF CONSULT ONLY   2. Possible mass or nodule seen in distal ileum on CT 10/9/17 K63.89 GASTROENTEROLOGY ADULT REF CONSULT ONLY       Disposition:  discharged to home    Discharge Medications:  Discharge Medication List as of 10/9/2017 10:57 AM      START taking these medications    Details   ciprofloxacin (CIPRO) 500 MG tablet Take 1 tablet (500 mg) by mouth 2 times daily for 7 days, Disp-14 tablet, R-0, Local Print      oxyCODONE (ROXICODONE) 5 MG IR tablet Take 1 tablet (5 mg) by mouth every 6 hours as needed for pain, Disp-15 tablet, R-0, Local PrintDo not drive or operate heavy machinery while taking this medication.  Take a stool softener to prevent constipation while taking this medication.      ondansetron (ZOFRAN ODT) 4 MG ODT tab Take 1 tablet (4 mg) by mouth every 8 hours as needed for nausea, Disp-10 tablet, R-0, Local Print             Reji FONTAINE, juan serving as a scribe at 9:26 AM on 10/9/2017 to document services personally performed by Emilia Prajapati MD based on my observations and the provider's statements to me.   [facility] EMERGENCY DEPARTMENT       Emilia Prajapati MD  10/09/17 1646

## 2017-10-09 NOTE — ED AVS SNAPSHOT
[facility] Emergency Department    [address]    [facility] [address]    Phone:  [phone]    Fax:  [phone]                                       Connie Brunson   MRN: [id number]    Department:  [facility] Emergency Department   Date of Visit:  10/9/2017           Patient Information     Date Of Birth          1960        Your diagnoses for this visit were:     Diverticulitis of colon     Possible mass or nodule seen in distal ileum on CT 10/9/17        You were seen by Emilia Prajapati MD.      Follow-up Information     Follow up with Gayatri Stephens MD. Schedule an appointment as soon as possible for a visit in 3 days.    Specialty:  Family Practice    Contact information:    [address] [place] [id number] [phone]          Discharge Instructions       *Get plenty of rest and avoid strenuous activities.  *Take medications as prescribed.  Ibuprofen and/or tylenol for pain.  Oxycodone for severe pain.  Ciproloxacin. Zofran for nausea.   *Follow-up with your doctor for a recheck within 12-36 hours.  You should follow-up with GI regarding the diverticulitis and a possible mass seen in your distal ileum small bowel.  A referral has been made for you in [facility].  *Return to the ER if you develop fever, worsening pain, pain that moves to the right lower abdomen, faint or feel like you will faint or become worse in any way.    Discharge Instructions  Diverticulitis  Your provider has diagnosed you with diverticulitis.  Diverticulitis is an infection of a diverticulum, which is a tiny sack-like structure that protrudes off the wall of the colon (large intestine).  These sacks are created over years of increased pressure in the colon (this is diverticulosis), usually as a result of a diet without enough fruits, vegetables, and whole grains. Because these sacks are small, bacteria can get trapped inside them and cause an infection (this is  divericulitis).  This infection often causes abdominal (belly) pain, fever, nausea (sick to stomach), and vomiting (throwing up). Diverticulitis is usually treated at home.  However, sometimes diverticulitis needs treatment in the hospital and may even need surgery.    Generally, every Emergency Department visit should have a follow-up clinic visit with either a primary or a specialty clinic/provider. Please follow-up as instructed by your emergency provider today.    Return to the Emergency Department if:     You get an oral temperature above 102oF or as directed by your provider.    You have blood in your stools (bright red or black, tarry stools), or have blood in your vomit.    You keep vomiting or cannot drink liquids or cannot keep your medicine down.    You cannot have a bowel movement or you cannot pass gas.    Your stomach gets bloated or bigger.    You faint or become very weak.    Your pain is too bad to tolerate.    You have new symptoms or anything that worries you.    What can I do to help myself?    Fill any antibiotic prescriptions the provider gave you and take them right away. Be sure to finish the whole antibiotic prescription.    For the first day or two at home drink only clear liquids.  This lets your intestines rest.    If your pain has improved after one or two days, you may start eating mild foods. Soda crackers, toast, plain noodles, gelatin, applesauce and bananas are good first choices.  Avoid foods that have acid, are spicy, fatty or fibrous (such as meats, coarse grains, vegetables). You may start eating these foods again in about 3-4 days when you are better.    Once you are back to normal, eat a high fiber diet of fruits, vegetables and whole grains. Some people think you should avoid eating nuts, seeds, and corn, but there is no definite proof this makes any difference in whether you will get diverticulitis again.     Pain and fever can be treated with Tylenol  (acetaminophen) or you  "might have been prescribed a pain medication.    Probiotics: If you have been given an antibiotic, you may want to also take a probiotic pill or eat yogurt with live cultures. Probiotics have \"good bacteria\" to help your intestines stay healthy. Studies have shown that probiotics help prevent diarrhea and other intestine problems (including C. diff infection) when you take antibiotics. You can buy these without a prescription in the pharmacy section of the store.  If you were given a prescription for medicine here today, be sure to read all of the information (including the package insert) that comes with your prescription.  This will include important information about the medicine, its side effects, and any warnings that you need to know about.  The pharmacist who fills the prescription can provide more information and answer questions you may have about the medicine.  If you have questions or concerns that the pharmacist cannot address, please call or return to the Emergency Department.     Remember that you can always come back to the Emergency Department if you are not able to see your regular provider in the amount of time listed above, if you get any new symptoms, or if there is anything that worries you.      24 Hour Appointment Hotline       To make an appointment at any [facility], call [phone] ([phone]). If you don't have a family doctor or clinic, we will help you find one. [facility] clinics are conveniently located to serve the needs of you and your family.          ED Discharge Orders     GASTROENTEROLOGY ADULT REF CONSULT ONLY       Preferred Location: [facility] [phone]      Please be aware that coverage of these services is subject to the terms and limitations of your health insurance plan.  Call member services at your health plan with any benefit or coverage questions.  Any procedures must be performed at a [facility] facility OR coordinated by your clinic's referral " office.    Please bring the following with you to your appointment:    (1) Any X-Rays, CTs or MRIs which have been performed.  Contact the facility where they were done to arrange for  prior to your scheduled appointment.    (2) List of current medications   (3) This referral request   (4) Any documents/labs given to you for this referral                     Review of your medicines      START taking        Dose / Directions Last dose taken    ciprofloxacin 500 MG tablet   Commonly known as:  CIPRO   Dose:  500 mg   Quantity:  14 tablet        Take 1 tablet (500 mg) by mouth 2 times daily for 7 days   Refills:  0        ondansetron 4 MG ODT tab   Commonly known as:  ZOFRAN ODT   Dose:  4 mg   Quantity:  10 tablet        Take 1 tablet (4 mg) by mouth every 8 hours as needed for nausea   Refills:  0        oxyCODONE 5 MG IR tablet   Commonly known as:  ROXICODONE   Dose:  5 mg   Quantity:  15 tablet        Take 1 tablet (5 mg) by mouth every 6 hours as needed for pain   Refills:  0          Our records show that you are taking the medicines listed below. If these are incorrect, please call your family doctor or clinic.        Dose / Directions Last dose taken    albuterol 108 (90 BASE) MCG/ACT Inhaler   Commonly known as:  PROAIR HFA/PROVENTIL HFA/VENTOLIN HFA   Dose:  2 puff   Quantity:  1 Inhaler        Inhale 2 puffs into the lungs every 6 hours as needed for shortness of breath / dyspnea or wheezing   Refills:  0        cetirizine 10 MG tablet   Commonly known as:  zyrTEC   Dose:  10 mg        Take 10 mg by mouth every evening   Refills:  0        estradiol 0.5 MG tablet   Commonly known as:  ESTRACE   Quantity:  180 tablet        TAKE TWO TABLETS BY MOUTH ONCE DAILY   Refills:  3        fenofibrate 160 MG tablet   Dose:  160 mg   Quantity:  90 tablet        Take 1 tablet (160 mg) by mouth daily   Refills:  1        fluticasone 50 MCG/ACT spray   Commonly known as:  FLONASE   Quantity:  48 g        USE TWO  SPRAYS IN EACH NOSTRIL EVERY DAY   Refills:  1        guaiFENesin-codeine 100-10 MG/5ML Soln solution   Commonly known as:  ROBITUSSIN AC   Dose:  1 tsp.   Quantity:  240 mL        Take 5 mLs by mouth every 4 hours as needed for cough   Refills:  0        hydrocortisone 2.5 % cream   Quantity:  30 g        Apply sparingly to dermatitis left lower eye   Refills:  3        ibuprofen 600 MG tablet   Commonly known as:  ADVIL/MOTRIN   Dose:  600 mg   Quantity:  30 tablet        Take 1 tablet (600 mg) by mouth every 6 hours as needed for pain (mild)   Refills:  0        lisinopril 5 MG tablet   Commonly known as:  PRINIVIL/ZESTRIL   Dose:  10 mg   Quantity:  180 tablet        Take 2 tablets (10 mg) by mouth daily   Refills:  1        * omeprazole 40 MG capsule   Commonly known as:  priLOSEC   Dose:  40 mg   Quantity:  90 capsule        Take 1 capsule (40 mg) by mouth daily Take 30-60 minutes before a meal.   Refills:  3        * omeprazole 20 MG CR capsule   Commonly known as:  priLOSEC   Dose:  20 mg   Quantity:  90 capsule        Take 1 capsule (20 mg) by mouth daily   Refills:  3        triamcinolone 0.5 % cream   Commonly known as:  KENALOG        Apply topically 2 times daily as needed for irritation (to eczema or psoriatic lesions - Never use on face)   Refills:  0        triamterene-hydrochlorothiazide 37.5-25 MG per capsule   Commonly known as:  DYAZIDE   Dose:  1 capsule   Quantity:  90 capsule        Take 1 capsule by mouth daily   Refills:  1        * venlafaxine 37.5 MG tablet   Commonly known as:  EFFEXOR   Quantity:  90 tablet        Take 1 tab daily   Refills:  1        * venlafaxine 37.5 MG tablet   Commonly known as:  EFFEXOR   Quantity:  90 tablet        TAKE ONE TABLET BY MOUTH EVERY DAY   Refills:  0        zolpidem 5 MG tablet   Commonly known as:  AMBIEN   Dose:  5 mg   Quantity:  30 tablet        Take 1 tablet (5 mg) by mouth nightly as needed for sleep   Refills:  5        * Notice:  This list has  4 medication(s) that are the same as other medications prescribed for you. Read the directions carefully, and ask your doctor or other care provider to review them with you.            Prescriptions were sent or printed at these locations (3 Prescriptions)                   Other Prescriptions                Printed at Department/Unit printer (3 of 3)         ciprofloxacin (CIPRO) 500 MG tablet               oxyCODONE (ROXICODONE) 5 MG IR tablet               ondansetron (ZOFRAN ODT) 4 MG ODT tab                Procedures and tests performed during your visit     CBC with platelets differential    CT Abdomen Pelvis w Contrast    Comprehensive metabolic panel    Give 20 ounces of water 15 minutes before CT of abdomen    Lactic acid whole blood    Lipase    Pulse oximetry nursing    Vital signs      Orders Needing Specimen Collection     None      Pending Results     No orders found from 10/7/2017 to 10/10/2017.            Pending Culture Results     No orders found from 10/7/2017 to 10/10/2017.            Pending Results Instructions     If you had any lab results that were not finalized at the time of your Discharge, you can call the ED Lab Result RN at [phone]. You will be contacted by this team for any positive Lab results or changes in treatment. The nurses are available 7 days a week from 10A to 6:30P.  You can leave a message 24 hours per day and they will return your call.        Test Results From Your Hospital Stay        10/9/2017  9:49 AM      Component Results     Component Value Ref Range & Units Status    WBC 6.4 4.0 - 11.0 10e9/L Final    RBC Count 3.94 3.8 - 5.2 10e12/L Final    Hemoglobin 13.1 11.7 - 15.7 g/dL Final    Hematocrit 36.5 35.0 - 47.0 % Final    MCV 93 78 - 100 fl Final    MCH 33.2 (H) 26.5 - 33.0 pg Final    MCHC 35.9 31.5 - 36.5 g/dL Final    RDW 12.1 10.0 - 15.0 % Final    Platelet Count 343 150 - 450 10e9/L Final    Diff Method Automated Method  Final    % Neutrophils 67.0 % Final     % Lymphocytes 20.4 % Final    % Monocytes 7.3 % Final    % Eosinophils 3.9 % Final    % Basophils 1.1 % Final    % Immature Granulocytes 0.3 % Final    Nucleated RBCs 0 0 /100 Final    Absolute Neutrophil 4.3 1.6 - 8.3 10e9/L Final    Absolute Lymphocytes 1.3 0.8 - 5.3 10e9/L Final    Absolute Monocytes 0.5 0.0 - 1.3 10e9/L Final    Absolute Eosinophils 0.3 0.0 - 0.7 10e9/L Final    Absolute Basophils 0.1 0.0 - 0.2 10e9/L Final    Abs Immature Granulocytes 0.0 0 - 0.4 10e9/L Final    Absolute Nucleated RBC 0.0  Final         10/9/2017 10:13 AM      Component Results     Component Value Ref Range & Units Status    Sodium 134 133 - 144 mmol/L Final    Potassium 3.3 (L) 3.4 - 5.3 mmol/L Final    Chloride 97 94 - 109 mmol/L Final    Carbon Dioxide 28 20 - 32 mmol/L Final    Anion Gap 9 3 - 14 mmol/L Final    Glucose 101 (H) 70 - 99 mg/dL Final    Urea Nitrogen 8 7 - 30 mg/dL Final    Creatinine 0.69 0.52 - 1.04 mg/dL Final    GFR Estimate 87 >60 mL/min/1.7m2 Final    Non  GFR Calc    GFR Estimate If Black >90 >60 mL/min/1.7m2 Final    African American GFR Calc    Calcium 9.3 8.5 - 10.1 mg/dL Final    Bilirubin Total 0.5 0.2 - 1.3 mg/dL Final    Albumin 3.7 3.4 - 5.0 g/dL Final    Protein Total 7.9 6.8 - 8.8 g/dL Final    Alkaline Phosphatase 31 (L) 40 - 150 U/L Final    ALT 23 0 - 50 U/L Final    AST 16 0 - 45 U/L Final         10/9/2017 10:07 AM      Component Results     Component Value Ref Range & Units Status    Lactic Acid 1.1 0.7 - 2.0 mmol/L Final         10/9/2017 10:13 AM      Component Results     Component Value Ref Range & Units Status    Lipase 58 (L) 73 - 393 U/L Final         10/9/2017 10:25 AM      Narrative     CT ABDOMEN/PELVIS WITH CONTRAST October 9, 2017 10:04 AM     HISTORY: Abdominal pain worse in lower abdomen, loose stools, chills.    COMPARISON: CT abdomen/pelvis 10/27/2014.    TECHNIQUE: Axial images from the lung bases to the symphysis are  performed with additional coronal  reformatted images. 80 mL of Isovue  370 are given intravenously.  Radiation dose for this scan was reduced  using automated exposure control, adjustment of the mA and/or kV  according to patient size, or iterative reconstruction technique.    FINDINGS: Respiratory motion artifact is present at the lung bases but  the lung bases are otherwise clear.    Abdomen: Cholecystectomy changes. The liver, spleen, pancreas, adrenal  glands and kidneys are unremarkable. No hydronephrosis or renal  calculi. No enlarged lymph nodes. The bowel is normal in caliber  without obstruction. Focal inflammation is noted in the mid sigmoid  colon on series 2, image 60 and series 3, image 75 suggesting changes  of mild diverticulitis. A loop of adjacent small bowel demonstrates an  enhancing intraluminal nodule measuring 1.3 cm of uncertain  significance. Small bowel polyp or mass is possible. Appendix is  normal.    Pelvis: The bladder and rectum are unremarkable. Trace free pelvic  fluid is noted likely from the diverticular inflammation. Bone window  examination is within normal limits.        Impression     IMPRESSION:  1. Mild acute diverticulitis in the mid sigmoid colon without  associated abscess or free intraperitoneal air.  2. Incidental finding of an enhancing 1.3 cm intraluminal nodule in  the distal ileum. A polyp or mass is possible. No other similar  findings are appreciated. No evidence of luminal obstruction.  3. Cholecystectomy changes. Upper abdominal organs are otherwise  within normal limits. No hydronephrosis.    KRISTEN SON MD                Clinical Quality Measure: Blood Pressure Screening     Your blood pressure was checked while you were in the emergency department today. The last reading we obtained was  BP: 132/89 . Please read the guidelines below about what these numbers mean and what you should do about them.  If your systolic blood pressure (the top number) is less than 120 and your diastolic blood  pressure (the bottom number) is less than 80, then your blood pressure is normal. There is nothing more that you need to do about it.  If your systolic blood pressure (the top number) is 120-139 or your diastolic blood pressure (the bottom number) is 80-89, your blood pressure may be higher than it should be. You should have your blood pressure rechecked within a year by a primary care provider.  If your systolic blood pressure (the top number) is 140 or greater or your diastolic blood pressure (the bottom number) is 90 or greater, you may have high blood pressure. High blood pressure is treatable, but if left untreated over time it can put you at risk for heart attack, stroke, or kidney failure. You should have your blood pressure rechecked by a primary care provider within the next 4 weeks.  If your provider in the emergency department today gave you specific instructions to follow-up with your doctor or provider even sooner than that, you should follow that instruction and not wait for up to 4 weeks for your follow-up visit.        Thank you for choosing [facility]       Thank you for choosing [facility] for your care. Our goal is always to provide you with excellent care. Hearing back from our patients is one way we can continue to improve our services. Please take a few minutes to complete the written survey that you may receive in the mail after you visit with us. Thank you!        [facility] Information     [employer] gives you secure access to your electronic health record. If you see a primary care provider, you can also send messages to your care team and make appointments. If you have questions, please call your primary care clinic.  If you do not have a primary care provider, please call [phone] and they will assist you.        Care EveryWhere ID     This is your Care EveryWhere ID. This could be used by other organizations to access your [facility] medical records  [id number]        Equal Access to Services      IVA MCKENZIE : Hadii narayan Brown, waaxda luqadaha, qaybta kaalmada robi, nithya camejo. So [facility] [phone].    ATENCIÓN: Si habla español, tiene a ceron disposición servicios gratuitos de asistencia lingüística. Llame al [phone].    We comply with applicable federal civil rights laws and [place] laws. We do not discriminate on the basis of race, color, national origin, age, disability, sex, sexual orientation, or gender identity.            After Visit Summary       This is your record. Keep this with you and show to your community pharmacist(s) and doctor(s) at your next visit.

## 2017-10-09 NOTE — DISCHARGE INSTRUCTIONS
*Get plenty of rest and avoid strenuous activities.  *Take medications as prescribed.  Ibuprofen and/or tylenol for pain.  Oxycodone for severe pain.  Ciproloxacin. Zofran for nausea.   *Follow-up with your doctor for a recheck within 12-36 hours.  You should follow-up with GI regarding the diverticulitis and a possible mass seen in your distal ileum small bowel.  A referral has been made for you in [facility].  *Return to the ER if you develop fever, worsening pain, pain that moves to the right lower abdomen, faint or feel like you will faint or become worse in any way.    Discharge Instructions  Diverticulitis  Your provider has diagnosed you with diverticulitis.  Diverticulitis is an infection of a diverticulum, which is a tiny sack-like structure that protrudes off the wall of the colon (large intestine).  These sacks are created over years of increased pressure in the colon (this is diverticulosis), usually as a result of a diet without enough fruits, vegetables, and whole grains. Because these sacks are small, bacteria can get trapped inside them and cause an infection (this is divericulitis).  This infection often causes abdominal (belly) pain, fever, nausea (sick to stomach), and vomiting (throwing up). Diverticulitis is usually treated at home.  However, sometimes diverticulitis needs treatment in the hospital and may even need surgery.    Generally, every Emergency Department visit should have a follow-up clinic visit with either a primary or a specialty clinic/provider. Please follow-up as instructed by your emergency provider today.    Return to the Emergency Department if:     You get an oral temperature above 102oF or as directed by your provider.    You have blood in your stools (bright red or black, tarry stools), or have blood in your vomit.    You keep vomiting or cannot drink liquids or cannot keep your medicine down.    You cannot have a bowel movement or you cannot pass gas.    Your stomach gets  "bloated or bigger.    You faint or become very weak.    Your pain is too bad to tolerate.    You have new symptoms or anything that worries you.    What can I do to help myself?    Fill any antibiotic prescriptions the provider gave you and take them right away. Be sure to finish the whole antibiotic prescription.    For the first day or two at home drink only clear liquids.  This lets your intestines rest.    If your pain has improved after one or two days, you may start eating mild foods. Soda crackers, toast, plain noodles, gelatin, applesauce and bananas are good first choices.  Avoid foods that have acid, are spicy, fatty or fibrous (such as meats, coarse grains, vegetables). You may start eating these foods again in about 3-4 days when you are better.    Once you are back to normal, eat a high fiber diet of fruits, vegetables and whole grains. Some people think you should avoid eating nuts, seeds, and corn, but there is no definite proof this makes any difference in whether you will get diverticulitis again.     Pain and fever can be treated with Tylenol  (acetaminophen) or you might have been prescribed a pain medication.    Probiotics: If you have been given an antibiotic, you may want to also take a probiotic pill or eat yogurt with live cultures. Probiotics have \"good bacteria\" to help your intestines stay healthy. Studies have shown that probiotics help prevent diarrhea and other intestine problems (including C. diff infection) when you take antibiotics. You can buy these without a prescription in the pharmacy section of the store.  If you were given a prescription for medicine here today, be sure to read all of the information (including the package insert) that comes with your prescription.  This will include important information about the medicine, its side effects, and any warnings that you need to know about.  The pharmacist who fills the prescription can provide more information and answer " questions you may have about the medicine.  If you have questions or concerns that the pharmacist cannot address, please call or return to the Emergency Department.     Remember that you can always come back to the Emergency Department if you are not able to see your regular provider in the amount of time listed above, if you get any new symptoms, or if there is anything that worries you.

## 2017-10-09 NOTE — TELEPHONE ENCOUNTER
From ER notes from today:   CT abd/pelvis showed:   Incidental finding of an enhancing 1.3 cm intraluminal nodule in  the distal ileum. A polyp or mass is possible. No other similar  findings are appreciated. No evidence of luminal obstruction.    Pt was referred to GI by ER doctor today.    Please call pt and let her know, if this is too far into the ileum for the GI doctors to take care of that we can refer her to general surgery - some of their surgeons do a fair amount of colorectal/intestinal surgery as well.   Please have pt call and let us know what the GI doctor's recommendations are when she sees them.

## 2017-10-09 NOTE — TELEPHONE ENCOUNTER
Patient calling with abdominal pain    ABDOMINAL   PAIN     Onset: 2 days    Description:   Character: Sharp (at times) and Fullness  Location: right lower quadrant left lower quadrant pelvic region (concentrated on LLQ)  Radiation: None    Intensity: moderate, severe, 6/10    Progression of Symptoms:  Worsening, constant    Accompanying Signs & Symptoms:  Fever/Chills?: YES- chills  Gas/Bloating: YES- bloating, little bits of gas  Nausea: no   Vomitting: no   Diarrhea?: no   Constipation:YES- Since Saturday, 10/07/2017  Dysuria or Hematuria: no    History:   Trauma: no   Previous similar pain: YES- hx of diverticulitis   Previous tests done: CT    Precipitating factors:   Does the pain change with:     Food: no      BM: no     Urination: YES- after urination, pressure lessens    Alleviating factors:  None    Therapies Tried and outcome: one    LMP:  not applicable      Patient has a history of diverticulitis, having rectal pressure    DENIES: CP, SOB, Difficulty Breathing, numbness/tingling in extremities, severe HA, N/V    Advised patient to go to ED - Patient stated an understanding and agreed with plan.    Vero Jones RN  [facility]

## 2017-10-09 NOTE — ED NOTES
"Bloating and lower abdominal pain since Saturday associated with loose stools.  History of diverticulitis and\" this feels the same.\" Patient alert and oriented x3.  Airway, breathing and circulation intact.  "

## 2017-10-11 ENCOUNTER — TELEPHONE (OUTPATIENT)
Dept: FAMILY MEDICINE | Facility: CLINIC | Age: 57
End: 2017-10-11

## 2017-10-11 DIAGNOSIS — K63.89 MASS OF SMALL INTESTINE: Primary | ICD-10-CM

## 2017-10-11 NOTE — TELEPHONE ENCOUNTER
Our hands are a bit tied with pt's allergies:      Allergies   Allergen Reactions     Atorvastatin Nausea     Nausea and abd pain      Ceftin GI Disturbance     Stomach upset and bloating - given at same time as clindamycin ? Which one as cause.       Clindamycin GI Disturbance     Stomach upset and bloating - given at same time as ceftin, ? Which one as cause      Flagyl [Metronidazole]      immediate migraine headache      Morphine Itching     Severe with nose, facial  Swelling - oxycodone = ok /no problems      Penicillins Hives     Sulfa Drugs Rash     Severe red , flushed rash     Levaquin Rash     States she can take cipro    so we can't use metronidazole ,, but ? Should we try to add clindamycin to the cipro?    Reviewed up to date and that is pretty much our only other oral abx option besides trying moxifloxacin , which might cross react with the levaquin allergy.    Offer both options to patient. Please, call or return to clinic or go to the ER immediately if signs or symptoms worsen or fail to improve as anticipated.

## 2017-10-11 NOTE — TELEPHONE ENCOUNTER
Pt calling     Stating that she is checking with MD KING from her ER visit on Monday - she was diagnosis with diverticulitis and she has been on cipro almost 48 hours and has not noticed a huge improvement yet,   Rn advised pt should try to have antibiotics for 72 hours and if still not improving she should call     Pt has been taking cipro and oxy when the pain is very bad but has been taking tylenol or motrin usually which has helped for pain     Pt was expecting to feel much better by today        Best # [phone] ok LM     Please review and advise     Thank you     Samantha Escoto RN, BSN  [facility]

## 2017-10-12 PROBLEM — K63.89 MASS OF SMALL INTESTINE: Status: ACTIVE | Noted: 2017-10-12

## 2017-10-12 NOTE — TELEPHONE ENCOUNTER
I agree about the surgeon.  Referral placed to [facility]. Please call pt with phone number. In orders . Number for [place] office  = #[phone]  Doesn't need to see GI from my perspective.

## 2017-10-12 NOTE — TELEPHONE ENCOUNTER
"Pt feels they are getting a bit better and are taking the Cipro and will try to go to work tomorrow.    Pt has an appt with GI 11/3/2017. Wonders if they should still go to them due to polyp or mass in intestine.  Pt would rather just go to the surgeon instead of the consult with GI to say \"yup you have this polyp\".    Please advise. Triage noted that most likely seeing the specialist first then helps you make the distinction for surgery or other recommendations.    Routing to PCP for further review/recommendations/orders.  Annel Garay RN  [facility]                "

## 2017-10-13 DIAGNOSIS — F51.01 PRIMARY INSOMNIA: ICD-10-CM

## 2017-10-13 RX ORDER — ZOLPIDEM TARTRATE 5 MG/1
5 TABLET ORAL
Qty: 30 TABLET | Refills: 5 | Status: SHIPPED | OUTPATIENT
Start: 2017-10-13 | End: 2018-03-07

## 2017-10-13 NOTE — TELEPHONE ENCOUNTER
Done. rx walked over to  [facility] .   Can  through the drive-thru. Please inform patient.

## 2017-10-13 NOTE — TELEPHONE ENCOUNTER
Disp Refills Start End CALVIN   zolpidem (AMBIEN) 5 MG tablet 30 tablet 5 4/12/2017  No   Sig: Take 1 tablet (5 mg) by mouth nightly as needed for sleep     Problem List Complete:  Yes    Last Office Visit with [facility] primary care provider: 04/12/2017    Future Office visit: None Scheduled    Last Encounter with Controlled substance agreement signed- re-signed 4/12/2017 ambien #30 -5mg tabs monthly - refill x5 - ok to see q6 months      Processing:  [facility]    Routing refill request to provider for review/approval because:  Drug not on the [facility] refill protocol       Vero Jones RN  [facility]

## 2017-10-13 NOTE — TELEPHONE ENCOUNTER
The patient indicates understanding of these issues and agrees with the plan.  Annel Garay RN  [facility]

## 2017-10-31 ENCOUNTER — OFFICE VISIT (OUTPATIENT)
Dept: SURGERY | Facility: CLINIC | Age: 57
End: 2017-10-31
Payer: COMMERCIAL

## 2017-10-31 VITALS
DIASTOLIC BLOOD PRESSURE: 88 MMHG | OXYGEN SATURATION: 97 % | SYSTOLIC BLOOD PRESSURE: 144 MMHG | HEIGHT: 59 IN | BODY MASS INDEX: 30.24 KG/M2 | WEIGHT: 150 LBS | HEART RATE: 86 BPM

## 2017-10-31 DIAGNOSIS — K63.89 SMALL BOWEL MASS: Primary | ICD-10-CM

## 2017-10-31 DIAGNOSIS — K63.89 MASS OF SMALL INTESTINE: Primary | ICD-10-CM

## 2017-10-31 PROCEDURE — 99204 OFFICE O/P NEW MOD 45 MIN: CPT | Performed by: SURGERY

## 2017-10-31 NOTE — MR AVS SNAPSHOT
After Visit Summary   10/31/2017    Connie Brunson    MRN: [id number]           Patient Information     Date Of Birth          1960        Visit Information        Provider Department      10/31/2017 9:30 AM Rosa M Cristobal MD [facility] [place] [facility] General Surgery      Today's Diagnoses     Mass of small intestine    -  1       Follow-ups after your visit        Your next 10 appointments already scheduled     Nov 02, 2017  9:30 AM CDT   CT ENTEROGRAPHY with [id number]   [facility] ([facility])    [address]  [facility] [id number] [phone]           Please bring any scans or X-rays taken at other hospitals, if similar tests were done. Also bring a list of your medicines, including vitamins, minerals and over-the-counter drugs. It is safest to leave personal items at home.  Be sure to tell your doctor:   If you have any allergies.   If there s any chance you are pregnant.   If you are breastfeeding.   If you have any special needs.  You will have contrast for this exam. To prepare:   Do not eat or drink for 6 hours before your exam. If you need to take medicine, you may take it with small sips of water. (We may ask you to take liquid medicine as well.)   The day before your exam, drink extra fluids at least six 8-ounce glasses (unless your doctor tells you to restrict your fluids).  Patients over 70 or patients with diabetes or kidney problems:   If you haven t had a blood test (creatinine test) within the last 30 days, go to your clinic or Diagnostic Imaging Department for this test.  If you have diabetes:   If your kidney function is normal, continue taking your metformin (Avandamet, Glucophage, Glucovance, Metaglip) on the day of your exam.   If your kidney function is abnormal, wait 48 hours before restarting this medicine.  You will have oral contrast for this exam:   You  will drink the contrast at the imaging site. Please arrive 1 hour early.  Please wear loose clothing, such as a sweat suit or jogging clothes. Avoid snaps, zippers and other metal. We may ask you to undress and put on a hospital gown.  If you have any questions, please call the Imaging Department where you will have your exam.            Nov 07, 2017   Procedure with Rosa M Cristobal MD   [facility] (--)    [address]  [facility] [address]   [phone]            Nov 07, 2017  1:30 PM CST   [facility] OR with Rosa M Cristobal MD, Michael Nguyen PA-C   [facility] ([facility])    [facility] ([facility])   [phone]              Future tests that were ordered for you today     Open Future Orders        Priority Expected Expires Ordered    CT Enterography Routine 10/31/2017 10/31/2018 10/31/2017            Who to contact     If you have questions or need follow up information about today's clinic visit or your schedule please contact [facility] directly at [phone].  Normal or non-critical lab and imaging results will be communicated to you by MyChart, letter or phone within 4 business days after the clinic has received the results. If you do not hear from us within 7 days, please contact the clinic through [employer] or phone. If you have a critical or abnormal lab result, we will notify you by phone as soon as possible.  Submit refill requests through [employer] or call your pharmacy and they will forward the refill request to us. Please allow 3 business days for your refill to be completed.          Additional Information About Your Visit        [employer] Information     [employer] gives you secure access to your electronic health record. If you see a primary care provider, you can also send messages to your care team and make appointments. If  "you have questions, please call your primary care clinic.  If you do not have a primary care provider, please call [phone] and they will assist you.        Care EveryWhere ID     This is your Care EveryWhere ID. This could be used by other organizations to access your [facility] medical records  [id number]        Your Vitals Were     Pulse Height Last Period Pulse Oximetry Breastfeeding? BMI (Body Mass Index)    86 4' 11\" (1.499 m) 09/05/2000 97% No 30.3 kg/m2       Blood Pressure from Last 3 Encounters:   10/31/17 144/88   10/09/17 132/89   04/25/17 118/80    Weight from Last 3 Encounters:   10/31/17 150 lb (68 kg)   04/12/17 158 lb 6.4 oz (71.8 kg)   10/05/16 153 lb (69.4 kg)              Today, you had the following     No orders found for display       Primary Care Provider Office Phone # Fax #    Gayatricassie Stephens [phone]       [address] [place] [address]        Equal Access to Services     [facility]: Hadii narayan ku hadasho Soomaali, waaxda luqadaha, qaybta kaalmada adesuzi, nithya pires . So [facility] [phone].    ATENCIÓN: Si habla español, tiene a ceron disposición servicios gratuitos de asistencia lingüística. [facility] [phone].    We comply with applicable federal civil rights laws and [place] laws. We do not discriminate on the basis of race, color, national origin, age, disability, sex, sexual orientation, or gender identity.            Thank you!     Thank you for choosing [facility]  for your care. Our goal is always to provide you with excellent care. Hearing back from our patients is one way we can continue to improve our services. Please take a few minutes to complete the written survey that you may receive in the mail after your visit with us. Thank you!             Your Updated Medication List - Protect others around you: Learn how to safely use, store and throw away your medicines at " www.disposemymeds.org.          This list is accurate as of: 10/31/17 10:00 AM.  Always use your most recent med list.                   Brand Name Dispense Instructions for use Diagnosis    albuterol 108 (90 BASE) MCG/ACT Inhaler    PROAIR HFA/PROVENTIL HFA/VENTOLIN HFA    1 Inhaler    Inhale 2 puffs into the lungs every 6 hours as needed for shortness of breath / dyspnea or wheezing    Other seasonal allergic rhinitis       cetirizine 10 MG tablet    zyrTEC     Take 10 mg by mouth every evening        estradiol 1 MG tablet    ESTRACE    90 tablet    TAKE ONE TABLET BY MOUTH ONCE DAILY    Symptomatic menopausal or female climacteric states       fenofibrate 160 MG tablet     90 tablet    Take 1 tablet (160 mg) by mouth daily    Hypertriglyceridemia       fluticasone 50 MCG/ACT spray    FLONASE    48 g    USE TWO SPRAYS IN EACH NOSTRIL EVERY DAY    Other seasonal allergic rhinitis       guaiFENesin-codeine 100-10 MG/5ML Soln solution    ROBITUSSIN AC    240 mL    Take 5 mLs by mouth every 4 hours as needed for cough    Acute bronchitis, unspecified organism       hydrocortisone 2.5 % cream     30 g    Apply sparingly to dermatitis left lower eye    Seborrheic dermatitis       ibuprofen 600 MG tablet    ADVIL/MOTRIN    30 tablet    Take 1 tablet (600 mg) by mouth every 6 hours as needed for pain (mild)    Acute cholecystitis       lisinopril 10 MG tablet    PRINIVIL/ZESTRIL    90 tablet    Take 1 tablet (10 mg) by mouth daily    Essential hypertension with goal blood pressure less than 140/90       * omeprazole 40 MG capsule    priLOSEC    90 capsule    Take 1 capsule (40 mg) by mouth daily Take 30-60 minutes before a meal.    Gastroesophageal reflux disease without esophagitis       * omeprazole 20 MG CR capsule    priLOSEC    90 capsule    Take 1 capsule (20 mg) by mouth daily    Gastroesophageal reflux disease without esophagitis       oxyCODONE 5 MG IR tablet    ROXICODONE    15 tablet    Take 1 tablet (5 mg) by  mouth every 6 hours as needed for pain        triamcinolone 0.5 % cream    KENALOG     Apply topically 2 times daily as needed for irritation (to eczema or psoriatic lesions - Never use on face)        triamterene-hydrochlorothiazide 37.5-25 MG per capsule    DYAZIDE    90 capsule    Take 1 capsule by mouth daily    Essential hypertension with goal blood pressure less than 140/90       * venlafaxine 37.5 MG tablet    EFFEXOR    90 tablet    Take 1 tab daily    Anxiety       * venlafaxine 37.5 MG tablet    EFFEXOR    90 tablet    TAKE ONE TABLET BY MOUTH EVERY DAY    Anxiety       zolpidem 5 MG tablet    AMBIEN    30 tablet    Take 1 tablet (5 mg) by mouth nightly as needed for sleep    Primary insomnia       * Notice:  This list has 4 medication(s) that are the same as other medications prescribed for you. Read the directions carefully, and ask your doctor or other care provider to review them with you.

## 2017-10-31 NOTE — PROGRESS NOTES
HPI      ROS (Review of Systems):     Cardiovascular: Positive for hypertension and hyperlipidemia.          Physical Exam           Assessment and Plan:   Assessment:   Connie Brunson is seen in consultation for a small bowel mass, at the request of Gayatri Stephens MD.    Small bowel mass noted on recent CT a/p for abd pain related to sigmoid diverticulitis.  Case discussed with Dr. Rosa M Camacho of [facility].      Plan:   CT enterography to confirm small bowel mass and estimate its location.  Pending the results of this study, the mass may be accessible for a tattoo via colonoscopy.  We discussed exploratory laparoscopy, possible laparotomy and small bowel resection.    We have had a brief discussion of the procedure, its risks, benefits, alternatives, recovery, postop limitations, and likely need for bowel resection with primary anastomosis.    She will need a minimum of three weeks off work postoperatively.                  Chief Complaint:   Small bowel mass diagnosed on recent CT a/p.           History of Present Illness:   This patient is a 57 year old female with a history of tobacco use disorder and diverticulitis who presents to discuss the incidental finding of a small bowel mass on a recent CT of the abdomen and pelvis.  The CT was obtained for bilateral lower quadrant pain.  The patient was diagnosed with diverticulitis and treated with antibiotics as an outpatient.  She is currently pain free.  The patient endorses loose stools, particularly after eating fatty foods.  This may be related to her history of cholecystectomy, though her diarrhea predates cholecystectomy.  She denies blood in the stool, fever, chills, or weight loss.          Past Medical History:    has a past medical history of Allergic rhinitis, cause unspecified; Benign neoplasm of cerebral meninges (H) (1999); Benign neoplasm of tonsil (7/05); Depressive disorder; Deviated nasal septum; Diverticulosis of colon (without  mention of hemorrhage) (02-); History of Guillain-Gladstone syndrome; Hyperlipidemia LDL goal < 130 (doesn't want statins); Hypertension goal BP (blood pressure) < 140/90; Insomnia; Major depressive disorder, recurrent episode, moderate (H); Moderate major depression (H) (2/4/2005); Other acne; Other extrapyramidal disease and abnormal movement disorder; and Symptomatic menopausal or female climacteric states.          Past Surgical History:     Past Surgical History:   Procedure Laterality Date     ABDOMEN SURGERY       BIOPSY       C LIGATE FALLOPIAN TUBE  1988    Tubal Ligation     C NONSPECIFIC PROCEDURE      PAROTID TUMOR L SIDE OF NECK - BENIGN     C TOTAL ABDOM HYSTERECTOMY  2000 ?    Hysterectomy, Total Abdominal with BSO, paps every 5 years      COLONOSCOPY  1/16/2012    Procedure:COLONOSCOPY; Colonoscopy; Surgeon:SHOLA JOYCE; Location: GI     HC REMOVE TONSILS/ADENOIDS,<13 Y/O      T and A, under 12 yrs     HEAD & NECK SURGERY       LAPAROSCOPIC CHOLECYSTECTOMY WITH CHOLANGIOGRAMS N/A 4/25/2016    Procedure: LAPAROSCOPIC CHOLECYSTECTOMY WITH CHOLANGIOGRAMS;  Surgeon: Rosa M Cristobal MD;  Location:  OR     SURGICAL HISTORY OF -   2004    Menigioma excision             Social History:     Social History   Substance Use Topics     Smoking status: Current Some Day Smoker     Packs/day: 0.00     Types: Other, Cigarettes     Last attempt to quit: 1/1/1994     Smokeless tobacco: Never Used      Comment: 11/6/13 using nicorette patch now     Alcohol use 0.0 oz/week     0 Standard drinks or equivalent per week           Family History:   Family history reviewed and not pertinent          Medications:     Current Outpatient Prescriptions on File Prior to Visit:  lisinopril (PRINIVIL/ZESTRIL) 10 MG tablet Take 1 tablet (10 mg) by mouth daily   estradiol (ESTRACE) 1 MG tablet TAKE ONE TABLET BY MOUTH ONCE DAILY   zolpidem (AMBIEN) 5 MG tablet Take 1 tablet (5 mg) by mouth nightly as needed for  "sleep   oxyCODONE (ROXICODONE) 5 MG IR tablet Take 1 tablet (5 mg) by mouth every 6 hours as needed for pain   fenofibrate 160 MG tablet Take 1 tablet (160 mg) by mouth daily   venlafaxine (EFFEXOR) 37.5 MG tablet TAKE ONE TABLET BY MOUTH EVERY DAY   venlafaxine (EFFEXOR) 37.5 MG tablet Take 1 tab daily   triamterene-hydrochlorothiazide (DYAZIDE) 37.5-25 MG per capsule Take 1 capsule by mouth daily   albuterol (PROAIR HFA/PROVENTIL HFA/VENTOLIN HFA) 108 (90 BASE) MCG/ACT Inhaler Inhale 2 puffs into the lungs every 6 hours as needed for shortness of breath / dyspnea or wheezing   fluticasone (FLONASE) 50 MCG/ACT spray USE TWO SPRAYS IN EACH NOSTRIL EVERY DAY   omeprazole (PRILOSEC) 20 MG CR capsule Take 1 capsule (20 mg) by mouth daily   hydrocortisone 2.5 % cream Apply sparingly to dermatitis left lower eye   guaiFENesin-codeine (ROBITUSSIN AC) 100-10 MG/5ML SOLN Take 5 mLs by mouth every 4 hours as needed for cough   triamcinolone (KENALOG) 0.5 % cream Apply topically 2 times daily as needed for irritation (to eczema or psoriatic lesions - Never use on face)   cetirizine (ZYRTEC) 10 MG tablet Take 10 mg by mouth every evening   ibuprofen (ADVIL,MOTRIN) 600 MG tablet Take 1 tablet (600 mg) by mouth every 6 hours as needed for pain (mild)   omeprazole (PRILOSEC) 40 MG capsule Take 1 capsule (40 mg) by mouth daily Take 30-60 minutes before a meal.     No current facility-administered medications on file prior to visit.          Review of Systems:   The Review of Systems is negative other than noted in the HPI.          Physical Exam:   /88 (BP Location: Left arm, Cuff Size: Adult Regular)  Pulse 86  Ht 4' 11\" (1.499 m)  Wt 150 lb (68 kg)  LMP 09/05/2000  SpO2 97%  Breastfeeding? No  BMI 30.3 kg/m2  General - Well developed, well nourished female in no apparent distress  HEENT:  Head normocephalic and atraumatic, pupils equal and round, conjunctivae clear, no scleral icterus, mucous membranes moist, " external ears and nose normal  Neck: Supple without thyromegaly or masses. Left incisional scar.  Lymphatic: No cervical, or supraclavicular lymphadenopathy  Abdomen:   soft, rounded, non-distended without tenderness.  no masses palpated.  Neurologic: alert, speech is clear, moves all extremities with good strength  Psychiatric: Mood and affect appropriate  Skin: Without lesions, rashes, or juandice         Data:   All imaging studies reviewed by me.    Recent Results (from the past 744 hour(s))   CT Abdomen Pelvis w Contrast    Narrative    CT ABDOMEN/PELVIS WITH CONTRAST October 9, 2017 10:04 AM     HISTORY: Abdominal pain worse in lower abdomen, loose stools, chills.    COMPARISON: CT abdomen/pelvis 10/27/2014.    TECHNIQUE: Axial images from the lung bases to the symphysis are  performed with additional coronal reformatted images. 80 mL of Isovue  370 are given intravenously.  Radiation dose for this scan was reduced  using automated exposure control, adjustment of the mA and/or kV  according to patient size, or iterative reconstruction technique.    FINDINGS: Respiratory motion artifact is present at the lung bases but  the lung bases are otherwise clear.    Abdomen: Cholecystectomy changes. The liver, spleen, pancreas, adrenal  glands and kidneys are unremarkable. No hydronephrosis or renal  calculi. No enlarged lymph nodes. The bowel is normal in caliber  without obstruction. Focal inflammation is noted in the mid sigmoid  colon on series 2, image 60 and series 3, image 75 suggesting changes  of mild diverticulitis. A loop of adjacent small bowel demonstrates an  enhancing intraluminal nodule measuring 1.3 cm of uncertain  significance. Small bowel polyp or mass is possible. Appendix is  normal.    Pelvis: The bladder and rectum are unremarkable. Trace free pelvic  fluid is noted likely from the diverticular inflammation. Bone window  examination is within normal limits.      Impression    IMPRESSION:  1.  Mild acute diverticulitis in the mid sigmoid colon without  associated abscess or free intraperitoneal air.  2. Incidental finding of an enhancing 1.3 cm intraluminal nodule in  the distal ileum. A polyp or mass is possible. No other similar  findings are appreciated. No evidence of luminal obstruction.  3. Cholecystectomy changes. Upper abdominal organs are otherwise  within normal limits. No hydronephrosis.    KRISTEN SON MD         This note was created using voice recognition software. Undetected word substitutions or other errors may have occurred.     Time spent of which more than 50% was counseling and coordinating care:  15 minutes.    Rosa M Cristobal MD    Please route or send letter to:  Primary Care Provider (PCP) and Referring Provider

## 2017-10-31 NOTE — LETTER
"2017    Re: Connie Brunson : 1960    Small bowel mass diagnosed on recent CT a/p.     This patient is a 57 year old female with a history of tobacco use disorder and diverticulitis who presents to discuss the incidental finding of a small bowel mass on a recent CT of the abdomen and pelvis.  The CT was obtained for bilateral lower quadrant pain.  The patient was diagnosed with diverticulitis and treated with antibiotics as an outpatient.  She is currently pain free.  The patient endorses loose stools, particularly after eating fatty foods.  This may be related to her history of cholecystectomy, though her diarrhea predates cholecystectomy.  She denies blood in the stool, fever, chills, or weight loss.      Has a past medical history of Allergic rhinitis, cause unspecified; Benign neoplasm of cerebral meninges (H) (); Benign neoplasm of tonsil (); Depressive disorder; Deviated nasal septum; Diverticulosis of colon (without mention of hemorrhage) (02-); History of Guillain-[place] syndrome; Hyperlipidemia LDL goal < 130 (doesn't want statins); Hypertension goal BP (blood pressure) < 140/90; Insomnia; Major depressive disorder, recurrent episode, moderate (H); Moderate major depression (H) (2005); Other acne; Other extrapyramidal disease and abnormal movement disorder; and Symptomatic menopausal or female climacteric states.     Current Outpatient Prescriptions on File Prior to Visit:  No current facility-administered medications on file prior to visit.      The Review of Systems is negative other than noted in the HPI.      /88 (BP Location: Left arm, Cuff Size: Adult Regular)  Pulse 86  Ht 4' 11\" (1.499 m)  Wt 150 lb (68 kg)  LMP 2000  SpO2 97%  Breastfeeding? No  BMI 30.3 kg/m2  General - Well developed, well nourished female in no apparent distress  HEENT:  Head normocephalic and atraumatic, pupils equal and round, conjunctivae clear, no scleral icterus, " mucous membranes moist, external ears and nose normal  Neck: Supple without thyromegaly or masses. Left incisional scar.  Lymphatic: No cervical, or supraclavicular lymphadenopathy  Abdomen: soft, rounded, non-distended without tenderness.  no masses palpated.  Neurologic: alert, speech is clear, moves all extremities with good strength  Psychiatric: Mood and affect appropriate  Skin: Without lesions, rashes, or juandice      Rosa M Cristobal MD

## 2017-11-02 ENCOUNTER — HOSPITAL ENCOUNTER (OUTPATIENT)
Dept: CT IMAGING | Facility: CLINIC | Age: 57
Discharge: HOME | End: 2017-11-02
Attending: SURGERY | Admitting: SURGERY
Payer: COMMERCIAL

## 2017-11-02 DIAGNOSIS — K63.89 SMALL BOWEL MASS: ICD-10-CM

## 2017-11-02 PROCEDURE — 74177 CT ABD & PELVIS W/CONTRAST: CPT

## 2017-11-02 PROCEDURE — 25000128 H RX IP 250 OP 636: Performed by: RADIOLOGY

## 2017-11-02 RX ORDER — IOPAMIDOL 755 MG/ML
500 INJECTION, SOLUTION INTRAVASCULAR ONCE
Status: COMPLETED | OUTPATIENT
Start: 2017-11-02 | End: 2017-11-02

## 2017-11-02 RX ADMIN — IOPAMIDOL 75 ML: 755 INJECTION, SOLUTION INTRAVENOUS at 09:45

## 2017-11-02 RX ADMIN — SODIUM CHLORIDE 49 ML: 9 INJECTION, SOLUTION INTRAVENOUS at 09:45

## 2017-11-03 ENCOUNTER — OFFICE VISIT (OUTPATIENT)
Dept: FAMILY MEDICINE | Facility: CLINIC | Age: 57
End: 2017-11-03
Payer: COMMERCIAL

## 2017-11-03 VITALS
BODY MASS INDEX: 30.82 KG/M2 | OXYGEN SATURATION: 97 % | HEIGHT: 60 IN | TEMPERATURE: 98.4 F | DIASTOLIC BLOOD PRESSURE: 88 MMHG | WEIGHT: 157 LBS | HEART RATE: 86 BPM | SYSTOLIC BLOOD PRESSURE: 136 MMHG

## 2017-11-03 DIAGNOSIS — H15.003 SCLERITIS OF BOTH EYES: ICD-10-CM

## 2017-11-03 DIAGNOSIS — K91.1 POSTSURGICAL DUMPING SYNDROME: ICD-10-CM

## 2017-11-03 DIAGNOSIS — K63.89 MASS OF SMALL INTESTINE: ICD-10-CM

## 2017-11-03 DIAGNOSIS — Z01.818 PREOP GENERAL PHYSICAL EXAM: Primary | ICD-10-CM

## 2017-11-03 DIAGNOSIS — F41.9 ANXIETY: ICD-10-CM

## 2017-11-03 DIAGNOSIS — E78.5 HYPERLIPIDEMIA LDL GOAL <130: ICD-10-CM

## 2017-11-03 DIAGNOSIS — Z90.49 S/P LAPAROSCOPIC CHOLECYSTECTOMY: ICD-10-CM

## 2017-11-03 DIAGNOSIS — Z79.899 CONTROLLED SUBSTANCE AGREEMENT SIGNED: ICD-10-CM

## 2017-11-03 DIAGNOSIS — K63.89 EPIPLOIC APPENDAGITIS: ICD-10-CM

## 2017-11-03 DIAGNOSIS — K21.9 GASTROESOPHAGEAL REFLUX DISEASE WITHOUT ESOPHAGITIS: ICD-10-CM

## 2017-11-03 DIAGNOSIS — K57.30 DIVERTICULOSIS OF LARGE INTESTINE WITHOUT HEMORRHAGE: ICD-10-CM

## 2017-11-03 DIAGNOSIS — D32.9 BENIGN MENINGIOMA (H): ICD-10-CM

## 2017-11-03 DIAGNOSIS — K30 CHRONIC UPSET STOMACH: ICD-10-CM

## 2017-11-03 DIAGNOSIS — I10 ESSENTIAL HYPERTENSION WITH GOAL BLOOD PRESSURE LESS THAN 140/90: ICD-10-CM

## 2017-11-03 LAB
ALBUMIN SERPL-MCNC: 3.8 G/DL (ref 3.4–5)
ALP SERPL-CCNC: 26 U/L (ref 40–150)
ALT SERPL W P-5'-P-CCNC: 21 U/L (ref 0–50)
ANION GAP SERPL CALCULATED.3IONS-SCNC: 11 MMOL/L (ref 3–14)
AST SERPL W P-5'-P-CCNC: 15 U/L (ref 0–45)
BASOPHILS # BLD AUTO: 0 10E9/L (ref 0–0.2)
BASOPHILS NFR BLD AUTO: 0.6 %
BILIRUB SERPL-MCNC: 0.3 MG/DL (ref 0.2–1.3)
BUN SERPL-MCNC: 16 MG/DL (ref 7–30)
CALCIUM SERPL-MCNC: 9.2 MG/DL (ref 8.5–10.1)
CHLORIDE SERPL-SCNC: 100 MMOL/L (ref 94–109)
CO2 SERPL-SCNC: 26 MMOL/L (ref 20–32)
CREAT SERPL-MCNC: 0.72 MG/DL (ref 0.52–1.04)
DIFFERENTIAL METHOD BLD: ABNORMAL
EOSINOPHIL # BLD AUTO: 0.2 10E9/L (ref 0–0.7)
EOSINOPHIL NFR BLD AUTO: 3.4 %
ERYTHROCYTE [DISTWIDTH] IN BLOOD BY AUTOMATED COUNT: 12.1 % (ref 10–15)
GFR SERPL CREATININE-BSD FRML MDRD: 83 ML/MIN/1.7M2
GLUCOSE SERPL-MCNC: 88 MG/DL (ref 70–99)
HCT VFR BLD AUTO: 36.4 % (ref 35–47)
HGB BLD-MCNC: 12.8 G/DL (ref 11.7–15.7)
LYMPHOCYTES # BLD AUTO: 1.4 10E9/L (ref 0.8–5.3)
LYMPHOCYTES NFR BLD AUTO: 19.2 %
MCH RBC QN AUTO: 33.3 PG (ref 26.5–33)
MCHC RBC AUTO-ENTMCNC: 35.2 G/DL (ref 31.5–36.5)
MCV RBC AUTO: 95 FL (ref 78–100)
MONOCYTES # BLD AUTO: 0.5 10E9/L (ref 0–1.3)
MONOCYTES NFR BLD AUTO: 6.9 %
NEUTROPHILS # BLD AUTO: 5 10E9/L (ref 1.6–8.3)
NEUTROPHILS NFR BLD AUTO: 69.9 %
PLATELET # BLD AUTO: 338 10E9/L (ref 150–450)
POTASSIUM SERPL-SCNC: 3.3 MMOL/L (ref 3.4–5.3)
PROT SERPL-MCNC: 7.2 G/DL (ref 6.8–8.8)
RBC # BLD AUTO: 3.84 10E12/L (ref 3.8–5.2)
SODIUM SERPL-SCNC: 137 MMOL/L (ref 133–144)
WBC # BLD AUTO: 7.1 10E9/L (ref 4–11)

## 2017-11-03 PROCEDURE — 99214 OFFICE O/P EST MOD 30 MIN: CPT | Performed by: PHYSICIAN ASSISTANT

## 2017-11-03 PROCEDURE — 93000 ELECTROCARDIOGRAM COMPLETE: CPT | Performed by: PHYSICIAN ASSISTANT

## 2017-11-03 PROCEDURE — 80053 COMPREHEN METABOLIC PANEL: CPT | Performed by: PHYSICIAN ASSISTANT

## 2017-11-03 PROCEDURE — 36415 COLL VENOUS BLD VENIPUNCTURE: CPT | Performed by: PHYSICIAN ASSISTANT

## 2017-11-03 PROCEDURE — 85025 COMPLETE CBC W/AUTO DIFF WBC: CPT | Performed by: PHYSICIAN ASSISTANT

## 2017-11-03 ASSESSMENT — ANXIETY QUESTIONNAIRES
5. BEING SO RESTLESS THAT IT IS HARD TO SIT STILL: NOT AT ALL
6. BECOMING EASILY ANNOYED OR IRRITABLE: NOT AT ALL
2. NOT BEING ABLE TO STOP OR CONTROL WORRYING: NOT AT ALL
7. FEELING AFRAID AS IF SOMETHING AWFUL MIGHT HAPPEN: NOT AT ALL
GAD7 TOTAL SCORE: 0
3. WORRYING TOO MUCH ABOUT DIFFERENT THINGS: NOT AT ALL
1. FEELING NERVOUS, ANXIOUS, OR ON EDGE: NOT AT ALL

## 2017-11-03 ASSESSMENT — PATIENT HEALTH QUESTIONNAIRE - PHQ9
SUM OF ALL RESPONSES TO PHQ QUESTIONS 1-9: 0
5. POOR APPETITE OR OVEREATING: NOT AT ALL

## 2017-11-03 NOTE — PROGRESS NOTES
[address] [address]  [phone]  Dept: [phone]    PRE-OP EVALUATION:  Today's date: 11/3/2017    Connie Brunson (: 1960) presents for pre-operative evaluation assessment as requested by Dr. Rosa M Cristobal.  She requires evaluation and anesthesia risk assessment prior to undergoing surgery/procedure for treatment of Small intestinal mass.  Proposed procedure:     LAPAROTOMY EXPLORATORY N/A General   RESECT SMALL BOWEL WITHOUT OSTOMY           Date of Surgery/ Procedure: 2017  Time of Surgery/ Procedure: 10:10pm  Hospital/Surgical Facility: [facility]  Primary Physician: Gayatri Stephens  Type of Anesthesia Anticipated: General    Patient has a Health Care Directive or Living Will:  YES working on [employer] --      1. NO - Do you have a history of heart attack, stroke, stent, bypass or surgery on an artery in the head, neck, heart or legs?  2. NO - Do you ever have any pain or discomfort in your chest?  3. NO - Do you have a history of  Heart Failure?  4. NO - Are you troubled by shortness of breath when: walking on the level, up a slight hill or at night?  5. NO - Do you currently have a cold, bronchitis or other respiratory infection?  6. NO - Do you have a cough, shortness of breath or wheezing?  7. NO - Do you sometimes get pains in the calves of your legs when you walk?  8. NO - Do you or anyone in your family have previous history of blood clots?  9. NO - Do you or does anyone in your family have a serious bleeding problem such as prolonged bleeding following surgeries or cuts?  10. NO - Have you ever had problems with anemia or been told to take iron pills?  11. NO - Have you had any abnormal blood loss such as black, tarry or bloody stools, or abnormal vaginal bleeding?  12. YES - HAVE YOU EVER HAD A BLOOD TRANSFUSION? Patient is not sure.  She thinks maybe once, but has never had any problems so she is not  certain.    13. NO - Have you or any of your relatives ever had problems with anesthesia?  14. NO - Do you have sleep apnea, excessive snoring or daytime drowsiness?  15. NO - Do you have any prosthetic heart valves?  16. NO - Do you have prosthetic joints?  17. NO - Is there any chance that you may be pregnant?        HPI:                                                      Brief HPI related to upcoming procedure: Patient reports that she had a CT a couple weeks ago for a diverticulitis flare and on the scan they found this mass, and decided to have it looked at.  She has been advised she will likely have it out.  Surgery is planned for Tuesday, but she is still waiting on the final read from the surgeon.        See problem list for active medical problems.  Problems all longstanding and stable, except as noted/documented.  See ROS for pertinent symptoms related to these conditions.                                                                                                  .    MEDICAL HISTORY:                                                    Patient Active Problem List    Diagnosis Date Noted     Essential hypertension with goal blood pressure less than 140/90      Priority: High     Major depressive disorder, recurrent episode, in partial or unspecified remission 06/11/2012     Priority: High     Primary insomnia 08/04/2011     Priority: High     Patient is followed by JIGNA MARTNÍEZ for ongoing prescription of pain medication.  All refills should be approved by this provider, or covering partner.    Medication(s): ambien 5mg .   Maximum quantity per month: #30  Clinic visit frequency required: Q 6  months     Controlled substance agreement on file: Yes       Date(s): 4/12/2017    Pain Clinic evaluation in the past: No    DIRE Total Score(s):  No flowsheet data found.    Last [facility] website verification:  done on 4/12/2017   [email or web address]         HYPERLIPIDEMIA LDL GOAL <130  [272.4CK] - joint aches w/ simvastatin 11/2010,lipitor=nausea/abd pain 1/2012 12/31/2010     Priority: High     Anxiety 01/21/2010     Priority: High     Tried one tab of paxil and felt side effects - too anxious to try again        Benign meningioma-right frontal posterior - no more annual MRI's needed per neurosurgery 10/06/2004     Priority: High     Mass of small intestine- distal ileum - seen on CT scan at time of diverticulitis  10/12/2017     Priority: Medium     Postsurgical dumping syndrome - s/p lap shashi - consider cholestyramine 10/05/2016     Priority: Medium     S/P laparoscopic cholecystectomy for acute cholecystitis with cholelithiasis 04/25/2016     Priority: Medium     Hypertriglyceridemia 02/21/2016     Priority: Medium     Gastroesophageal reflux disease without esophagitis 01/27/2016     Priority: Medium     Controlled substance agreement signed- re-signed 4/12/2017 ambien #30 -5mg tabs monthly - refill x5 - ok to see q6 months  06/19/2015     Priority: Medium     Advanced care planning/counseling discussion 12/15/2014     Priority: Medium     Epiploic appendagitis- 2008 and 10/26/2014 10/27/2014     Priority: Medium     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 Provider to review       Allergic rhinitis      Priority: Medium      Failed on claritin, claritin-D, zyrtec and zyrtec-D in past.   Problem list name updated by automated process. Provider to review       Benign neoplasm of tonsil      Priority: Low     ?tonsillar re-growth - sees Dr. Thomas ENT        Symptomatic menopausal or female climacteric states 11/04/2005     Priority: Low      Past Medical History:   Diagnosis Date     Allergic rhinitis, cause unspecified     year round - dog,dust-  Failed on claritin, claritin-D,  zyrtec and zyrtec-D in past.      Benign neoplasm of cerebral meninges (H) 1999    has yearly MRI's. - 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 Thomas ENT      Depressive disorder      Deviated nasal septum     sees Dr. Thomas ENT      Diverticulosis of colon (without mention of hemorrhage) 02-     History of Guillain-[place] syndrome     NO FLU SHOTS - very mild case in [place] many years ago     Hyperlipidemia LDL goal < 130 doesn't want statins    simvastatin = severe hand joint pain , lipitor =nausea/abd. pain     Hypertension goal BP (blood pressure) < 140/90      Insomnia     trazodone -made pt feel hung over      Major depressive disorder, recurrent episode, moderate (H)     lexapro - sexual side effects      Moderate major depression (H) 2/4/2005    trazodone -made pt feel hung over      Other acne      Other extrapyramidal disease and abnormal movement disorder     ?GBS     Symptomatic menopausal or female climacteric states     vivelle-dot      Past Surgical History:   Procedure Laterality Date     ABDOMEN SURGERY       BIOPSY       C LIGATE FALLOPIAN TUBE  1988    Tubal Ligation     C NONSPECIFIC PROCEDURE      PAROTID TUMOR L SIDE OF NECK - BENIGN     C TOTAL ABDOM HYSTERECTOMY  2000 ?    Hysterectomy, Total Abdominal with BSO, paps every 5 years      COLONOSCOPY  1/16/2012    Procedure:COLONOSCOPY; Colonoscopy; Surgeon:SHOLA JOYCE; Location: GI     HC REMOVE TONSILS/ADENOIDS,<13 Y/O      T and A, under 12 yrs     HEAD & NECK SURGERY       LAPAROSCOPIC CHOLECYSTECTOMY WITH CHOLANGIOGRAMS N/A 4/25/2016    Procedure: LAPAROSCOPIC CHOLECYSTECTOMY WITH CHOLANGIOGRAMS;  Surgeon: Rosa M Cristobal MD;  Location:  OR     SURGICAL HISTORY OF -   2004    Menigioma excision     Current Outpatient Prescriptions   Medication Sig Dispense Refill     lisinopril (PRINIVIL/ZESTRIL) 10 MG  tablet Take 1 tablet (10 mg) by mouth daily 90 tablet 1     estradiol (ESTRACE) 1 MG tablet TAKE ONE TABLET BY MOUTH ONCE DAILY 90 tablet 1     zolpidem (AMBIEN) 5 MG tablet Take 1 tablet (5 mg) by mouth nightly as needed for sleep 30 tablet 5     oxyCODONE (ROXICODONE) 5 MG IR tablet Take 1 tablet (5 mg) by mouth every 6 hours as needed for pain 15 tablet 0     fenofibrate 160 MG tablet Take 1 tablet (160 mg) by mouth daily 90 tablet 1     venlafaxine (EFFEXOR) 37.5 MG tablet TAKE ONE TABLET BY MOUTH EVERY DAY 90 tablet 0     venlafaxine (EFFEXOR) 37.5 MG tablet Take 1 tab daily 90 tablet 1     triamterene-hydrochlorothiazide (DYAZIDE) 37.5-25 MG per capsule Take 1 capsule by mouth daily 90 capsule 1     albuterol (PROAIR HFA/PROVENTIL HFA/VENTOLIN HFA) 108 (90 BASE) MCG/ACT Inhaler Inhale 2 puffs into the lungs every 6 hours as needed for shortness of breath / dyspnea or wheezing 1 Inhaler 0     fluticasone (FLONASE) 50 MCG/ACT spray USE TWO SPRAYS IN EACH NOSTRIL EVERY DAY 48 g 1     omeprazole (PRILOSEC) 20 MG CR capsule Take 1 capsule (20 mg) by mouth daily 90 capsule 3     hydrocortisone 2.5 % cream Apply sparingly to dermatitis left lower eye 30 g 3     guaiFENesin-codeine (ROBITUSSIN AC) 100-10 MG/5ML SOLN Take 5 mLs by mouth every 4 hours as needed for cough 240 mL 0     triamcinolone (KENALOG) 0.5 % cream Apply topically 2 times daily as needed for irritation (to eczema or psoriatic lesions - Never use on face)       cetirizine (ZYRTEC) 10 MG tablet Take 10 mg by mouth every evening       ibuprofen (ADVIL,MOTRIN) 600 MG tablet Take 1 tablet (600 mg) by mouth every 6 hours as needed for pain (mild) 30 tablet 0     omeprazole (PRILOSEC) 40 MG capsule Take 1 capsule (40 mg) by mouth daily Take 30-60 minutes before a meal. 90 capsule 3     OTC products: Tylenol - PRN    Allergies   Allergen Reactions     Atorvastatin Nausea     Nausea and abd pain      Ceftin GI Disturbance     Stomach upset and bloating -  "given at same time as clindamycin ? Which one as cause.       Clindamycin GI Disturbance     Stomach upset and bloating - given at same time as ceftin, ? Which one as cause      Flagyl [Metronidazole]      immediate migraine headache      Morphine Itching     Severe with nose, facial  Swelling - oxycodone = ok /no problems      Penicillins Hives     Sulfa Drugs Rash     Severe red , flushed rash     Levaquin Rash     States she can take cipro      Latex Allergy: NO    Social History   Substance Use Topics     Smoking status: Current Some Day Smoker     Packs/day: 0.00     Types: Other, Cigarettes     Last attempt to quit: 1/1/1994     Smokeless tobacco: Never Used      Comment: 11/6/13 using nicorette patch now     Alcohol use 0.0 oz/week     0 Standard drinks or equivalent per week     History   Drug Use No       REVIEW OF SYSTEMS:                                                    Constitutional, neuro, ENT, endocrine, pulmonary, cardiac, gastrointestinal, genitourinary, musculoskeletal, integument and psychiatric systems are negative, except as otherwise noted.      EXAM:                                                    /88 (BP Location: Left arm, Patient Position: Chair, Cuff Size: Adult Regular)  Pulse 86  Temp 98.4  F (36.9  C) (Oral)  Ht 4' 11.9\" (1.521 m)  Wt 157 lb (71.2 kg)  LMP 09/05/2000  SpO2 97%  Breastfeeding? No  BMI 30.76 kg/m2    GENERAL APPEARANCE: healthy, alert and no distress     EYES: EOMI, PERRL     HENT: ear canals and TM's normal and nose and mouth without ulcers or lesions     NECK: no adenopathy, no asymmetry, masses, or scars and thyroid normal to palpation     RESP: lungs clear to auscultation - no rales, rhonchi or wheezes     CV: regular rates and rhythm, normal S1 S2, no S3 or S4 and no murmur, click or rub     ABDOMEN:  soft, nontender, no HSM or masses and bowel sounds normal     MS: extremities normal- no gross deformities noted, no evidence of inflammation in " joints, FROM in all extremities.     SKIN: no suspicious lesions or rashes     NEURO: Normal strength and tone, sensory exam grossly normal, mentation intact and speech normal     PSYCH: mentation appears normal. and affect normal/bright     LYMPHATICS: No axillary, cervical, or supraclavicular nodes    DIAGNOSTICS:                                                      EKG: appears normal, NSR, normal axis, normal intervals, no acute ST/T changes c/w ischemia, no LVH by voltage criteria, unchanged from previous tracings  Labs Resulted Today: CBC and CMP - Pending    Results for orders placed or performed during the hospital encounter of 11/02/17   CT Enterography    Narrative    CT ENTEROGRAPHY November 2, 2017 9:52 AM     HISTORY: Small bowel mass.    TECHNIQUE:  CT abdomen and pelvis with 75mL Isovue-370 IV.  Enterography protocol. Radiation dose for this scan was reduced using  automated exposure control, adjustment of the mA and/or kV according  to patient size, or iterative reconstruction technique.    COMPARISON: CT abdomen and pelvis 10/9/2017. CT abdomen and pelvis  10/27/2014.    FINDINGS: Again identified is an enhancing nodular structure involving  an ileal small bowel loop at the central pelvis that measures 1.4 x  1.0 cm, stable in size, series 2 image 69. No associated bowel  obstruction. No extraluminal component of this lesion is visualized.  In retrospect, when evaluated images from a CT abdomen and pelvis from  10/27/2014 there is an apparent similar sized nodule in this region,  seen on the prior study series 2 image 59. There are no other visible  bowel focal lesions identified. Colonic assessment is limited. No  acute inflammatory change of the bowel is seen. No abscess, or  fistula.    Cholecystectomy. Liver, adrenals, spleen, pancreas, and kidneys do not  show any significant abnormalities. No enlarged lymph nodes  identified. Normal appendix.      Impression    IMPRESSION:  1. Stable enhancing  nodule involving indication ileal small bowel loop  at the central pelvis level. In retrospect, this appears to be present  and stable since an older CT from 10/27/2014.  2. No active inflammatory bowel disease. No fistula, abscess, or bowel  obstruction.    ISHMAEL ASKEW MD     Labs Drawn and in Process:   Unresulted Labs Ordered in the Past 30 Days of this Admission     No orders found from 9/4/2017 to 11/4/2017.          Recent Labs   Lab Test  10/09/17   0930  04/12/17   0843   HGB  13.1  12.9   PLT  343  321   NA  134  135   POTASSIUM  3.3*  3.8   CR  0.69  0.74        IMPRESSION:                                                    Reason for surgery/procedure: Removal of intestinal mass  Diagnosis/reason for consult: Pre-op for surgery on Tuesday Nov. 7, 2017    The proposed surgical procedure is considered INTERMEDIATE risk.    REVISED CARDIAC RISK INDEX  The patient has the following serious cardiovascular risks for perioperative complications such as (MI, PE, VFib and 3  AV Block):  No serious cardiac risks  INTERPRETATION: 0 risks: Class I (very low risk - 0.4% complication rate)    The patient has the following additional risks for perioperative complications:  No identified additional risks      ICD-10-CM    1. Preop general physical exam Z01.818 CBC with platelets and differential     Comprehensive metabolic panel (BMP + Alb, Alk Phos, ALT, AST, Total. Bili, TP)     EKG 12-lead complete w/read - Clinics   2. Mass of small intestine- distal ileum - seen on CT scan at time of diverticulitis  K63.89    3. S/P laparoscopic cholecystectomy for acute cholecystitis with cholelithiasis Z90.49    4. Postsurgical dumping syndrome - s/p lap shashi - consider cholestyramine K91.1    5. Diverticulosis of large intestine without hemorrhage K57.30    6. Epiploic appendagitis- 2008 and 10/26/2014 K52.9    7. Benign meningioma-right frontal posterior - no more annual MRI's needed per neurosurgery D32.0    8. Essential  hypertension with goal blood pressure less than 140/90 I10    9. Anxiety F41.9    10. HYPERLIPIDEMIA LDL GOAL <130 [272.4CK] - joint aches w/ simvastatin 11/2010,lipitor=nausea/abd pain 1/2012 E78.5    11. 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 Gastroesophageal reflux disease without esophagitis K21.9    13. Controlled substance agreement signed- re-signed 4/12/2017 ambien #30 -5mg tabs monthly - refill x5 - ok to see q6 months  Z79.899    14. Chronic upset stomach K31.9 Tissue transglutaminase christian IgA and IgG    R10.13        RECOMMENDATIONS:                                                      --Consult hospital rounder / IM to assist post-op medical management    --Patient is to take all scheduled medications on the day of surgery EXCEPT for modifications listed below.    ACE Inhibitor or Angiotensin Receptor Blocker (ARB) Use  Ace inhibitor or Angiotensin Receptor Blocker (ARB) and should HOLD this medication for the 24 hours prior to surgery.    APPROVAL GIVEN to proceed with proposed procedure, without further diagnostic evaluation       Signed Electronically by: Mag Baker PA-C    I have reviewed this H&P and any associated studies and agree with Mag Baker PA-C's assessment and plan.        Kj Ly MD         Copy of this evaluation report is provided to requesting physician.    [facility] Preop Guidelines

## 2017-11-03 NOTE — MR AVS SNAPSHOT
After Visit Summary   11/3/2017    Connie Brunson    MRN: [id number]           Patient Information     Date Of Birth          1960        Visit Information        Provider Department      11/3/2017 7:40 AM Mag Baker PA-C [facility] [place]        Today's Diagnoses     Preop general physical exam    -  1    Mass of small intestine- distal ileum - seen on CT scan at time of diverticulitis         S/P laparoscopic cholecystectomy for acute cholecystitis with cholelithiasis        Postsurgical dumping syndrome - s/p lap shashi - consider cholestyramine        Benign meningioma-right frontal posterior - no more annual MRI's needed per neurosurgery        Anxiety        HYPERLIPIDEMIA LDL GOAL <130 [272.4CK] - joint aches w/ simvastatin 11/2010,lipitor=nausea/abd pain 1/2012        Essential hypertension with goal blood pressure less than 140/90          Care Instructions      Before Your Surgery      Call your surgeon if there is any change in your health. This includes signs of a cold or flu (such as a sore throat, runny nose, cough, rash or fever).    Do not smoke, drink alcohol or take over the counter medicine (unless your surgeon or primary care doctor tells you to) for the 24 hours before and after surgery.    If you take prescribed drugs: Follow your doctor s orders about which medicines to take and which to stop until after surgery.    Eating and drinking prior to surgery: follow the instructions from your surgeon    Take a shower or bath the night before surgery. Use the soap your surgeon gave you to gently clean your skin. If you do not have soap from your surgeon, use your regular soap. Do not shave or scrub the surgery site.  Wear clean pajamas and have clean sheets on your bed.           Follow-ups after your visit        Your next 10 appointments already scheduled     Nov 07, 2017   Procedure with Rosa M Cristobal MD   [facility] [place] [facility] (--)    [address]  [facility] [address]   [phone]            Nov 07, 2017 12:30 PM CST   [facility] Main OR with Rosa M Cristobal MD, Michael Nguyen PA-C   [facility] ([facility]    [facility] ([facility])   [phone]              Who to contact     If you have questions or need follow up information about today's clinic visit or your schedule please contact [facility] directly at [phone].  Normal or non-critical lab and imaging results will be communicated to you by Epunchithart, letter or phone within 4 business days after the clinic has received the results. If you do not hear from us within 7 days, please contact the clinic through [email or web address] or phone. If you have a critical or abnormal lab result, we will notify you by phone as soon as possible.  Submit refill requests through [employer] or call your pharmacy and they will forward the refill request to us. Please allow 3 business days for your refill to be completed.          Additional Information About Your Visit        MyChart Information     [employer] gives you secure access to your electronic health record. If you see a primary care provider, you can also send messages to your care team and make appointments. If you have questions, please call your primary care clinic.  If you do not have a primary care provider, please call [phone] and they will assist you.        Care EveryWhere ID     This is your Care EveryWhere ID. This could be used by other organizations to access your [place] medical records  [id number]        Your Vitals Were     Pulse Temperature Height Last Period Pulse Oximetry Breastfeeding?    86 98.4  F (36.9  C) (Oral) 4' 11.9\" (1.521 m) 09/05/2000 97% No    BMI (Body Mass Index)                   30.76 kg/m2            Blood Pressure from Last 3 Encounters:   11/03/17 136/88   10/31/17 144/88   10/09/17 " 132/89    Weight from Last 3 Encounters:   11/03/17 157 lb (71.2 kg)   10/31/17 150 lb (68 kg)   04/12/17 158 lb 6.4 oz (71.8 kg)              We Performed the Following     CBC with platelets and differential     Comprehensive metabolic panel (BMP + Alb, Alk Phos, ALT, AST, Total. Bili, TP)     EKG 12-lead complete w/read - Clinics        Primary Care Provider Office Phone # Fax #    Gayatri Stephens [phone] [address] [facility] [address]        Equal Access to Services     [facility]: Hadii aad ku hadasho Soomaali, waaxda luqadaha, qaybta kaalmada ademarlyyaeulalio, nithya pires . So [facility] [phone].    ATENCIÓN: Si habla español, tiene a ceron disposición servicios gratuitos de asistencia lingüística. [facility] [phone].    We comply with applicable federal civil rights laws and [place] laws. We do not discriminate on the basis of race, color, national origin, age, disability, sex, sexual orientation, or gender identity.            Thank you!     Thank you for choosing [facility]  for your care. Our goal is always to provide you with excellent care. Hearing back from our patients is one way we can continue to improve our services. Please take a few minutes to complete the written survey that you may receive in the mail after your visit with us. Thank you!             Your Updated Medication List - Protect others around you: Learn how to safely use, store and throw away your medicines at www.disposemymeds.org.          This list is accurate as of: 11/3/17  8:33 AM.  Always use your most recent med list.                   Brand Name Dispense Instructions for use Diagnosis    cetirizine 10 MG tablet    zyrTEC     Take 10 mg by mouth every evening        estradiol 1 MG tablet    ESTRACE    90 tablet    TAKE ONE TABLET BY MOUTH ONCE DAILY    Symptomatic menopausal or female climacteric states       fenofibrate 160 MG tablet     90  tablet    Take 1 tablet (160 mg) by mouth daily    Hypertriglyceridemia       fluticasone 50 MCG/ACT spray    FLONASE    48 g    USE TWO SPRAYS IN EACH NOSTRIL EVERY DAY    Other seasonal allergic rhinitis       hydrocortisone 2.5 % cream     30 g    Apply sparingly to dermatitis left lower eye    Seborrheic dermatitis       ibuprofen 600 MG tablet    ADVIL/MOTRIN    30 tablet    Take 1 tablet (600 mg) by mouth every 6 hours as needed for pain (mild)    Acute cholecystitis       lisinopril 10 MG tablet    PRINIVIL/ZESTRIL    90 tablet    Take 1 tablet (10 mg) by mouth daily    Essential hypertension with goal blood pressure less than 140/90       omeprazole 20 MG CR capsule    priLOSEC    90 capsule    Take 1 capsule (20 mg) by mouth daily    Gastroesophageal reflux disease without esophagitis       triamcinolone 0.5 % cream    KENALOG     Apply topically 2 times daily as needed for irritation (to eczema or psoriatic lesions - Never use on face)        triamterene-hydrochlorothiazide 37.5-25 MG per capsule    DYAZIDE    90 capsule    Take 1 capsule by mouth daily    Essential hypertension with goal blood pressure less than 140/90       venlafaxine 37.5 MG tablet    EFFEXOR    90 tablet    TAKE ONE TABLET BY MOUTH EVERY DAY    Anxiety       zolpidem 5 MG tablet    AMBIEN    30 tablet    Take 1 tablet (5 mg) by mouth nightly as needed for sleep    Primary insomnia

## 2017-11-03 NOTE — H&P (VIEW-ONLY)
[address] [address]  [phone]  Dept: [phone]    PRE-OP EVALUATION:  Today's date: 11/3/2017    Connie Brunson (: 1960) presents for pre-operative evaluation assessment as requested by Dr. Rosa M Cristobal.  She requires evaluation and anesthesia risk assessment prior to undergoing surgery/procedure for treatment of Small intestinal mass.  Proposed procedure:     LAPAROTOMY EXPLORATORY N/A General   RESECT SMALL BOWEL WITHOUT OSTOMY           Date of Surgery/ Procedure: 2017  Time of Surgery/ Procedure: 10:10pm  Hospital/Surgical Facility: [facility]  Primary Physician: Gayatri Stephens  Type of Anesthesia Anticipated: General    Patient has a Health Care Directive or Living Will:  YES working on [employer] --      1. NO - Do you have a history of heart attack, stroke, stent, bypass or surgery on an artery in the head, neck, heart or legs?  2. NO - Do you ever have any pain or discomfort in your chest?  3. NO - Do you have a history of  Heart Failure?  4. NO - Are you troubled by shortness of breath when: walking on the level, up a slight hill or at night?  5. NO - Do you currently have a cold, bronchitis or other respiratory infection?  6. NO - Do you have a cough, shortness of breath or wheezing?  7. NO - Do you sometimes get pains in the calves of your legs when you walk?  8. NO - Do you or anyone in your family have previous history of blood clots?  9. NO - Do you or does anyone in your family have a serious bleeding problem such as prolonged bleeding following surgeries or cuts?  10. NO - Have you ever had problems with anemia or been told to take iron pills?  11. NO - Have you had any abnormal blood loss such as black, tarry or bloody stools, or abnormal vaginal bleeding?  12. YES - HAVE YOU EVER HAD A BLOOD TRANSFUSION? Patient is not sure.  She thinks maybe once, but has never had any problems so she is not  certain.    13. NO - Have you or any of your relatives ever had problems with anesthesia?  14. NO - Do you have sleep apnea, excessive snoring or daytime drowsiness?  15. NO - Do you have any prosthetic heart valves?  16. NO - Do you have prosthetic joints?  17. NO - Is there any chance that you may be pregnant?        HPI:                                                      Brief HPI related to upcoming procedure: Patient reports that she had a CT a couple weeks ago for a diverticulitis flare and on the scan they found this mass, and decided to have it looked at.  She has been advised she will likely have it out.  Surgery is planned for Tuesday, but she is still waiting on the final read from the surgeon.        See problem list for active medical problems.  Problems all longstanding and stable, except as noted/documented.  See ROS for pertinent symptoms related to these conditions.                                                                                                  .    MEDICAL HISTORY:                                                    Patient Active Problem List    Diagnosis Date Noted     Essential hypertension with goal blood pressure less than 140/90      Priority: High     Major depressive disorder, recurrent episode, in partial or unspecified remission 06/11/2012     Priority: High     Primary insomnia 08/04/2011     Priority: High     Patient is followed by JIGNA MARTÍNEZ for ongoing prescription of pain medication.  All refills should be approved by this provider, or covering partner.    Medication(s): ambien 5mg .   Maximum quantity per month: #30  Clinic visit frequency required: Q 6  months     Controlled substance agreement on file: Yes       Date(s): 4/12/2017    Pain Clinic evaluation in the past: No    DIRE Total Score(s):  No flowsheet data found.    Last [facility] website verification:  done on 4/12/2017   [email or web address]         HYPERLIPIDEMIA LDL GOAL <130  [272.4CK] - joint aches w/ simvastatin 11/2010,lipitor=nausea/abd pain 1/2012 12/31/2010     Priority: High     Anxiety 01/21/2010     Priority: High     Tried one tab of paxil and felt side effects - too anxious to try again        Benign meningioma-right frontal posterior - no more annual MRI's needed per neurosurgery 10/06/2004     Priority: High     Mass of small intestine- distal ileum - seen on CT scan at time of diverticulitis  10/12/2017     Priority: Medium     Postsurgical dumping syndrome - s/p lap shashi - consider cholestyramine 10/05/2016     Priority: Medium     S/P laparoscopic cholecystectomy for acute cholecystitis with cholelithiasis 04/25/2016     Priority: Medium     Hypertriglyceridemia 02/21/2016     Priority: Medium     Gastroesophageal reflux disease without esophagitis 01/27/2016     Priority: Medium     Controlled substance agreement signed- re-signed 4/12/2017 ambien #30 -5mg tabs monthly - refill x5 - ok to see q6 months  06/19/2015     Priority: Medium     Advanced care planning/counseling discussion 12/15/2014     Priority: Medium     Epiploic appendagitis- 2008 and 10/26/2014 10/27/2014     Priority: Medium     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 Provider to review       Allergic rhinitis      Priority: Medium      Failed on claritin, claritin-D, zyrtec and zyrtec-D in past.   Problem list name updated by automated process. Provider to review       Benign neoplasm of tonsil      Priority: Low     ?tonsillar re-growth - sees Dr. Thomas ENT        Symptomatic menopausal or female climacteric states 11/04/2005     Priority: Low      Past Medical History:   Diagnosis Date     Allergic rhinitis, cause unspecified     year round - dog,dust-  Failed on claritin, claritin-D,  zyrtec and zyrtec-D in past.      Benign neoplasm of cerebral meninges (H) 1999    has yearly MRI's. - sees Dr. 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 Thomas ENT      Depressive disorder      Deviated nasal septum     sees Dr. Thomas ENT      Diverticulosis of colon (without mention of hemorrhage) 02-     History of Guillain-[place] syndrome     NO FLU SHOTS - very mild case in [place] many years ago     Hyperlipidemia LDL goal < 130 doesn't want statins    simvastatin = severe hand joint pain , lipitor =nausea/abd. pain     Hypertension goal BP (blood pressure) < 140/90      Insomnia     trazodone -made pt feel hung over      Major depressive disorder, recurrent episode, moderate (H)     lexapro - sexual side effects      Moderate major depression (H) 2/4/2005    trazodone -made pt feel hung over      Other acne      Other extrapyramidal disease and abnormal movement disorder     ?GBS     Symptomatic menopausal or female climacteric states     vivelle-dot      Past Surgical History:   Procedure Laterality Date     ABDOMEN SURGERY       BIOPSY       C LIGATE FALLOPIAN TUBE  1988    Tubal Ligation     C NONSPECIFIC PROCEDURE      PAROTID TUMOR L SIDE OF NECK - BENIGN     C TOTAL ABDOM HYSTERECTOMY  2000 ?    Hysterectomy, Total Abdominal with BSO, paps every 5 years      COLONOSCOPY  1/16/2012    Procedure:COLONOSCOPY; Colonoscopy; Surgeon:SHOLA JOYCE; Location: GI     HC REMOVE TONSILS/ADENOIDS,<11 Y/O      T and A, under 12 yrs     HEAD & NECK SURGERY       LAPAROSCOPIC CHOLECYSTECTOMY WITH CHOLANGIOGRAMS N/A 4/25/2016    Procedure: LAPAROSCOPIC CHOLECYSTECTOMY WITH CHOLANGIOGRAMS;  Surgeon: Rosa M Cristobal MD;  Location:  OR     SURGICAL HISTORY OF -   2004    Menigioma excision     Current Outpatient Prescriptions   Medication Sig Dispense Refill     lisinopril (PRINIVIL/ZESTRIL) 10 MG  tablet Take 1 tablet (10 mg) by mouth daily 90 tablet 1     estradiol (ESTRACE) 1 MG tablet TAKE ONE TABLET BY MOUTH ONCE DAILY 90 tablet 1     zolpidem (AMBIEN) 5 MG tablet Take 1 tablet (5 mg) by mouth nightly as needed for sleep 30 tablet 5     oxyCODONE (ROXICODONE) 5 MG IR tablet Take 1 tablet (5 mg) by mouth every 6 hours as needed for pain 15 tablet 0     fenofibrate 160 MG tablet Take 1 tablet (160 mg) by mouth daily 90 tablet 1     venlafaxine (EFFEXOR) 37.5 MG tablet TAKE ONE TABLET BY MOUTH EVERY DAY 90 tablet 0     venlafaxine (EFFEXOR) 37.5 MG tablet Take 1 tab daily 90 tablet 1     triamterene-hydrochlorothiazide (DYAZIDE) 37.5-25 MG per capsule Take 1 capsule by mouth daily 90 capsule 1     albuterol (PROAIR HFA/PROVENTIL HFA/VENTOLIN HFA) 108 (90 BASE) MCG/ACT Inhaler Inhale 2 puffs into the lungs every 6 hours as needed for shortness of breath / dyspnea or wheezing 1 Inhaler 0     fluticasone (FLONASE) 50 MCG/ACT spray USE TWO SPRAYS IN EACH NOSTRIL EVERY DAY 48 g 1     omeprazole (PRILOSEC) 20 MG CR capsule Take 1 capsule (20 mg) by mouth daily 90 capsule 3     hydrocortisone 2.5 % cream Apply sparingly to dermatitis left lower eye 30 g 3     guaiFENesin-codeine (ROBITUSSIN AC) 100-10 MG/5ML SOLN Take 5 mLs by mouth every 4 hours as needed for cough 240 mL 0     triamcinolone (KENALOG) 0.5 % cream Apply topically 2 times daily as needed for irritation (to eczema or psoriatic lesions - Never use on face)       cetirizine (ZYRTEC) 10 MG tablet Take 10 mg by mouth every evening       ibuprofen (ADVIL,MOTRIN) 600 MG tablet Take 1 tablet (600 mg) by mouth every 6 hours as needed for pain (mild) 30 tablet 0     omeprazole (PRILOSEC) 40 MG capsule Take 1 capsule (40 mg) by mouth daily Take 30-60 minutes before a meal. 90 capsule 3     OTC products: Tylenol - PRN    Allergies   Allergen Reactions     Atorvastatin Nausea     Nausea and abd pain      Ceftin GI Disturbance     Stomach upset and bloating -  "given at same time as clindamycin ? Which one as cause.       Clindamycin GI Disturbance     Stomach upset and bloating - given at same time as ceftin, ? Which one as cause      Flagyl [Metronidazole]      immediate migraine headache      Morphine Itching     Severe with nose, facial  Swelling - oxycodone = ok /no problems      Penicillins Hives     Sulfa Drugs Rash     Severe red , flushed rash     Levaquin Rash     States she can take cipro      Latex Allergy: NO    Social History   Substance Use Topics     Smoking status: Current Some Day Smoker     Packs/day: 0.00     Types: Other, Cigarettes     Last attempt to quit: 1/1/1994     Smokeless tobacco: Never Used      Comment: 11/6/13 using nicorette patch now     Alcohol use 0.0 oz/week     0 Standard drinks or equivalent per week     History   Drug Use No       REVIEW OF SYSTEMS:                                                    Constitutional, neuro, ENT, endocrine, pulmonary, cardiac, gastrointestinal, genitourinary, musculoskeletal, integument and psychiatric systems are negative, except as otherwise noted.      EXAM:                                                    /88 (BP Location: Left arm, Patient Position: Chair, Cuff Size: Adult Regular)  Pulse 86  Temp 98.4  F (36.9  C) (Oral)  Ht 4' 11.9\" (1.521 m)  Wt 157 lb (71.2 kg)  LMP 09/05/2000  SpO2 97%  Breastfeeding? No  BMI 30.76 kg/m2    GENERAL APPEARANCE: healthy, alert and no distress     EYES: EOMI, PERRL     HENT: ear canals and TM's normal and nose and mouth without ulcers or lesions     NECK: no adenopathy, no asymmetry, masses, or scars and thyroid normal to palpation     RESP: lungs clear to auscultation - no rales, rhonchi or wheezes     CV: regular rates and rhythm, normal S1 S2, no S3 or S4 and no murmur, click or rub     ABDOMEN:  soft, nontender, no HSM or masses and bowel sounds normal     MS: extremities normal- no gross deformities noted, no evidence of inflammation in " joints, FROM in all extremities.     SKIN: no suspicious lesions or rashes     NEURO: Normal strength and tone, sensory exam grossly normal, mentation intact and speech normal     PSYCH: mentation appears normal. and affect normal/bright     LYMPHATICS: No axillary, cervical, or supraclavicular nodes    DIAGNOSTICS:                                                      EKG: appears normal, NSR, normal axis, normal intervals, no acute ST/T changes c/w ischemia, no LVH by voltage criteria, unchanged from previous tracings  Labs Resulted Today: CBC and CMP - Pending    Results for orders placed or performed during the hospital encounter of 11/02/17   CT Enterography    Narrative    CT ENTEROGRAPHY November 2, 2017 9:52 AM     HISTORY: Small bowel mass.    TECHNIQUE:  CT abdomen and pelvis with 75mL Isovue-370 IV.  Enterography protocol. Radiation dose for this scan was reduced using  automated exposure control, adjustment of the mA and/or kV according  to patient size, or iterative reconstruction technique.    COMPARISON: CT abdomen and pelvis 10/9/2017. CT abdomen and pelvis  10/27/2014.    FINDINGS: Again identified is an enhancing nodular structure involving  an ileal small bowel loop at the central pelvis that measures 1.4 x  1.0 cm, stable in size, series 2 image 69. No associated bowel  obstruction. No extraluminal component of this lesion is visualized.  In retrospect, when evaluated images from a CT abdomen and pelvis from  10/27/2014 there is an apparent similar sized nodule in this region,  seen on the prior study series 2 image 59. There are no other visible  bowel focal lesions identified. Colonic assessment is limited. No  acute inflammatory change of the bowel is seen. No abscess, or  fistula.    Cholecystectomy. Liver, adrenals, spleen, pancreas, and kidneys do not  show any significant abnormalities. No enlarged lymph nodes  identified. Normal appendix.      Impression    IMPRESSION:  1. Stable enhancing  nodule involving indication ileal small bowel loop  at the central pelvis level. In retrospect, this appears to be present  and stable since an older CT from 10/27/2014.  2. No active inflammatory bowel disease. No fistula, abscess, or bowel  obstruction.    ISHMAEL ASKEW MD     Labs Drawn and in Process:   Unresulted Labs Ordered in the Past 30 Days of this Admission     No orders found from 9/4/2017 to 11/4/2017.          Recent Labs   Lab Test  10/09/17   0930  04/12/17   0843   HGB  13.1  12.9   PLT  343  321   NA  134  135   POTASSIUM  3.3*  3.8   CR  0.69  0.74        IMPRESSION:                                                    Reason for surgery/procedure: Removal of intestinal mass  Diagnosis/reason for consult: Pre-op for surgery on Tuesday Nov. 7, 2017    The proposed surgical procedure is considered INTERMEDIATE risk.    REVISED CARDIAC RISK INDEX  The patient has the following serious cardiovascular risks for perioperative complications such as (MI, PE, VFib and 3  AV Block):  No serious cardiac risks  INTERPRETATION: 0 risks: Class I (very low risk - 0.4% complication rate)    The patient has the following additional risks for perioperative complications:  No identified additional risks      ICD-10-CM    1. Preop general physical exam Z01.818 CBC with platelets and differential     Comprehensive metabolic panel (BMP + Alb, Alk Phos, ALT, AST, Total. Bili, TP)     EKG 12-lead complete w/read - Clinics   2. Mass of small intestine- distal ileum - seen on CT scan at time of diverticulitis  K63.89    3. S/P laparoscopic cholecystectomy for acute cholecystitis with cholelithiasis Z90.49    4. Postsurgical dumping syndrome - s/p lap shashi - consider cholestyramine K91.1    5. Diverticulosis of large intestine without hemorrhage K57.30    6. Epiploic appendagitis- 2008 and 10/26/2014 K52.9    7. Benign meningioma-right frontal posterior - no more annual MRI's needed per neurosurgery D32.0    8. Essential  hypertension with goal blood pressure less than 140/90 I10    9. Anxiety F41.9    10. HYPERLIPIDEMIA LDL GOAL <130 [272.4CK] - joint aches w/ simvastatin 11/2010,lipitor=nausea/abd pain 1/2012 E78.5    11. 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 Gastroesophageal reflux disease without esophagitis K21.9    13. Controlled substance agreement signed- re-signed 4/12/2017 ambien #30 -5mg tabs monthly - refill x5 - ok to see q6 months  Z79.899    14. Chronic upset stomach K31.9 Tissue transglutaminase christian IgA and IgG    R10.13        RECOMMENDATIONS:                                                      --Consult hospital rounder / IM to assist post-op medical management    --Patient is to take all scheduled medications on the day of surgery EXCEPT for modifications listed below.    ACE Inhibitor or Angiotensin Receptor Blocker (ARB) Use  Ace inhibitor or Angiotensin Receptor Blocker (ARB) and should HOLD this medication for the 24 hours prior to surgery.    APPROVAL GIVEN to proceed with proposed procedure, without further diagnostic evaluation       Signed Electronically by: Mag Baker PA-C    Copy of this evaluation report is provided to requesting physician.    [facility] Preop Guidelines

## 2017-11-03 NOTE — NURSING NOTE
"Chief Complaint   Patient presents with     Pre-Op Exam       Initial /88 (BP Location: Left arm, Patient Position: Chair, Cuff Size: Adult Regular)  Pulse 86  Temp 98.4  F (36.9  C) (Oral)  Ht 4' 11.9\" (1.521 m)  Wt 157 lb (71.2 kg)  LMP 09/05/2000  SpO2 97%  Breastfeeding? No  BMI 30.76 kg/m2 Estimated body mass index is 30.76 kg/(m^2) as calculated from the following:    Height as of this encounter: 4' 11.9\" (1.521 m).    Weight as of this encounter: 157 lb (71.2 kg).  Medication Reconciliation: complete   Csaba Mlnarik CMA    "

## 2017-11-04 ASSESSMENT — ANXIETY QUESTIONNAIRES: GAD7 TOTAL SCORE: 0

## 2017-11-06 DIAGNOSIS — E87.6 LOW BLOOD POTASSIUM: Primary | ICD-10-CM

## 2017-11-06 RX ORDER — POTASSIUM CHLORIDE 1500 MG/1
20 TABLET, EXTENDED RELEASE ORAL DAILY
Qty: 30 TABLET | Refills: 0 | Status: SHIPPED | OUTPATIENT
Start: 2017-11-06 | End: 2017-12-18

## 2017-11-06 NOTE — PROGRESS NOTES
Note to staff: Please call the patient to explain results, also, please send:  Potassium 20 mEq, take one tab once daily, #30, 1 refill.    Please also order a future lab for a BMP to be done in 2 weeks.      The results from your recent lab work show that the potassium is still very mildly decreased.  I advise that you take 20 mEq potassium once daily and followup for a re-check on this lab in about two weeks.        Thank you for choosing [facility] for your health care needs,      Mag Baker PA-C

## 2017-11-07 ENCOUNTER — HOSPITAL ENCOUNTER (INPATIENT)
Facility: CLINIC | Age: 57
LOS: 3 days | Discharge: HOME | DRG: 827 | End: 2017-11-10
Attending: SURGERY | Admitting: SURGERY
Payer: COMMERCIAL

## 2017-11-07 ENCOUNTER — ANESTHESIA EVENT (OUTPATIENT)
Dept: SURGERY | Facility: CLINIC | Age: 57
DRG: 827 | End: 2017-11-07
Payer: COMMERCIAL

## 2017-11-07 ENCOUNTER — ANESTHESIA (OUTPATIENT)
Dept: SURGERY | Facility: CLINIC | Age: 57
DRG: 827 | End: 2017-11-07
Payer: COMMERCIAL

## 2017-11-07 ENCOUNTER — OFFICE VISIT (OUTPATIENT)
Dept: SURGERY | Facility: PHYSICIAN GROUP | Age: 57
End: 2017-11-07
Payer: COMMERCIAL

## 2017-11-07 DIAGNOSIS — Z53.9 ERRONEOUS ENCOUNTER--DISREGARD: Primary | ICD-10-CM

## 2017-11-07 DIAGNOSIS — G89.18 ACUTE POST-OPERATIVE PAIN: Primary | ICD-10-CM

## 2017-11-07 LAB
CREAT SERPL-MCNC: 0.7 MG/DL (ref 0.52–1.04)
GFR SERPL CREATININE-BSD FRML MDRD: 86 ML/MIN/1.7M2
PLATELET # BLD AUTO: 294 10E9/L (ref 150–450)

## 2017-11-07 PROCEDURE — 36000063 ZZH SURGERY LEVEL 4 EA 15 ADDTL MIN: Performed by: SURGERY

## 2017-11-07 PROCEDURE — 71000013 ZZH RECOVERY PHASE 1 LEVEL 1 EA ADDTL HR: Performed by: SURGERY

## 2017-11-07 PROCEDURE — 25000128 H RX IP 250 OP 636: Performed by: ANESTHESIOLOGY

## 2017-11-07 PROCEDURE — 88342 IMHCHEM/IMCYTCHM 1ST ANTB: CPT | Mod: 26 | Performed by: SURGERY

## 2017-11-07 PROCEDURE — 88342 IMHCHEM/IMCYTCHM 1ST ANTB: CPT | Performed by: SURGERY

## 2017-11-07 PROCEDURE — 25000128 H RX IP 250 OP 636: Performed by: SURGERY

## 2017-11-07 PROCEDURE — 71000012 ZZH RECOVERY PHASE 1 LEVEL 1 FIRST HR: Performed by: SURGERY

## 2017-11-07 PROCEDURE — 27210794 ZZH OR GENERAL SUPPLY STERILE: Performed by: SURGERY

## 2017-11-07 PROCEDURE — 37000008 ZZH ANESTHESIA TECHNICAL FEE, 1ST 30 MIN: Performed by: SURGERY

## 2017-11-07 PROCEDURE — 25000566 ZZH SEVOFLURANE, EA 15 MIN: Performed by: SURGERY

## 2017-11-07 PROCEDURE — 36415 COLL VENOUS BLD VENIPUNCTURE: CPT | Performed by: SURGERY

## 2017-11-07 PROCEDURE — 44120 REMOVAL OF SMALL INTESTINE: CPT | Performed by: SURGERY

## 2017-11-07 PROCEDURE — 0DB80ZZ EXCISION OF SMALL INTESTINE, OPEN APPROACH: ICD-10-PCS | Performed by: SURGERY

## 2017-11-07 PROCEDURE — 85049 AUTOMATED PLATELET COUNT: CPT | Performed by: SURGERY

## 2017-11-07 PROCEDURE — 88329 PATH CONSLTJ DRG SURG: CPT | Performed by: SURGERY

## 2017-11-07 PROCEDURE — 88309 TISSUE EXAM BY PATHOLOGIST: CPT | Mod: 26 | Performed by: SURGERY

## 2017-11-07 PROCEDURE — 25000125 ZZHC RX 250: Performed by: NURSE ANESTHETIST, CERTIFIED REGISTERED

## 2017-11-07 PROCEDURE — 40000306 ZZH STATISTIC PRE PROC ASSESS II: Performed by: SURGERY

## 2017-11-07 PROCEDURE — 25000128 H RX IP 250 OP 636: Performed by: NURSE ANESTHETIST, CERTIFIED REGISTERED

## 2017-11-07 PROCEDURE — 36000093 ZZH SURGERY LEVEL 4 1ST 30 MIN: Performed by: SURGERY

## 2017-11-07 PROCEDURE — 25000125 ZZHC RX 250: Performed by: SURGERY

## 2017-11-07 PROCEDURE — 37000009 ZZH ANESTHESIA TECHNICAL FEE, EACH ADDTL 15 MIN: Performed by: SURGERY

## 2017-11-07 PROCEDURE — S0020 INJECTION, BUPIVICAINE HYDRO: HCPCS | Performed by: SURGERY

## 2017-11-07 PROCEDURE — 12000007 ZZH R&B INTERMEDIATE

## 2017-11-07 PROCEDURE — 88341 IMHCHEM/IMCYTCHM EA ADD ANTB: CPT | Mod: 26 | Performed by: SURGERY

## 2017-11-07 PROCEDURE — 44120 REMOVAL OF SMALL INTESTINE: CPT | Mod: AS | Performed by: PHYSICIAN ASSISTANT

## 2017-11-07 PROCEDURE — 82565 ASSAY OF CREATININE: CPT | Performed by: SURGERY

## 2017-11-07 PROCEDURE — 88341 IMHCHEM/IMCYTCHM EA ADD ANTB: CPT | Performed by: SURGERY

## 2017-11-07 PROCEDURE — 88309 TISSUE EXAM BY PATHOLOGIST: CPT | Performed by: SURGERY

## 2017-11-07 RX ORDER — PROMETHAZINE HYDROCHLORIDE 25 MG/ML
6.25 INJECTION, SOLUTION INTRAMUSCULAR; INTRAVENOUS
Status: DISCONTINUED | OUTPATIENT
Start: 2017-11-07 | End: 2017-11-07 | Stop reason: HOSPADM

## 2017-11-07 RX ORDER — PROCHLORPERAZINE MALEATE 5 MG
5-10 TABLET ORAL EVERY 6 HOURS PRN
Status: DISCONTINUED | OUTPATIENT
Start: 2017-11-07 | End: 2017-11-10 | Stop reason: HOSPADM

## 2017-11-07 RX ORDER — DIPHENHYDRAMINE HYDROCHLORIDE 50 MG/ML
25 INJECTION INTRAMUSCULAR; INTRAVENOUS EVERY 6 HOURS PRN
Status: DISCONTINUED | OUTPATIENT
Start: 2017-11-07 | End: 2017-11-09

## 2017-11-07 RX ORDER — ONDANSETRON 4 MG/1
4 TABLET, ORALLY DISINTEGRATING ORAL EVERY 6 HOURS PRN
Status: DISCONTINUED | OUTPATIENT
Start: 2017-11-07 | End: 2017-11-10 | Stop reason: HOSPADM

## 2017-11-07 RX ORDER — BUPIVACAINE HYDROCHLORIDE 2.5 MG/ML
INJECTION, SOLUTION EPIDURAL; INFILTRATION; INTRACAUDAL PRN
Status: DISCONTINUED | OUTPATIENT
Start: 2017-11-07 | End: 2017-11-07 | Stop reason: HOSPADM

## 2017-11-07 RX ORDER — ONDANSETRON 4 MG/1
4 TABLET, ORALLY DISINTEGRATING ORAL EVERY 30 MIN PRN
Status: DISCONTINUED | OUTPATIENT
Start: 2017-11-07 | End: 2017-11-07 | Stop reason: HOSPADM

## 2017-11-07 RX ORDER — DIPHENHYDRAMINE HYDROCHLORIDE 50 MG/ML
25 INJECTION INTRAMUSCULAR; INTRAVENOUS EVERY 6 HOURS PRN
Status: DISCONTINUED | OUTPATIENT
Start: 2017-11-07 | End: 2017-11-07 | Stop reason: HOSPADM

## 2017-11-07 RX ORDER — PROPOFOL 10 MG/ML
INJECTION, EMULSION INTRAVENOUS PRN
Status: DISCONTINUED | OUTPATIENT
Start: 2017-11-07 | End: 2017-11-07

## 2017-11-07 RX ORDER — DIPHENHYDRAMINE HCL 25 MG
25 CAPSULE ORAL EVERY 6 HOURS PRN
Status: DISCONTINUED | OUTPATIENT
Start: 2017-11-07 | End: 2017-11-09

## 2017-11-07 RX ORDER — SODIUM CHLORIDE 9 MG/ML
INJECTION, SOLUTION INTRAVENOUS CONTINUOUS
Status: DISCONTINUED | OUTPATIENT
Start: 2017-11-07 | End: 2017-11-10 | Stop reason: HOSPADM

## 2017-11-07 RX ORDER — NALOXONE HYDROCHLORIDE 0.4 MG/ML
.1-.4 INJECTION, SOLUTION INTRAMUSCULAR; INTRAVENOUS; SUBCUTANEOUS
Status: DISCONTINUED | OUTPATIENT
Start: 2017-11-07 | End: 2017-11-10 | Stop reason: HOSPADM

## 2017-11-07 RX ORDER — ACETAMINOPHEN 325 MG/1
650 TABLET ORAL EVERY 4 HOURS PRN
Status: DISCONTINUED | OUTPATIENT
Start: 2017-11-10 | End: 2017-11-10 | Stop reason: HOSPADM

## 2017-11-07 RX ORDER — ZOLPIDEM TARTRATE 5 MG/1
5 TABLET ORAL
Status: DISCONTINUED | OUTPATIENT
Start: 2017-11-07 | End: 2017-11-10 | Stop reason: HOSPADM

## 2017-11-07 RX ORDER — ACETAMINOPHEN 325 MG/1
650 TABLET ORAL EVERY 8 HOURS
Status: DISCONTINUED | OUTPATIENT
Start: 2017-11-07 | End: 2017-11-10 | Stop reason: HOSPADM

## 2017-11-07 RX ORDER — LIDOCAINE HYDROCHLORIDE 10 MG/ML
INJECTION, SOLUTION INFILTRATION; PERINEURAL PRN
Status: DISCONTINUED | OUTPATIENT
Start: 2017-11-07 | End: 2017-11-07

## 2017-11-07 RX ORDER — LIDOCAINE 40 MG/G
CREAM TOPICAL
Status: DISCONTINUED | OUTPATIENT
Start: 2017-11-07 | End: 2017-11-07 | Stop reason: HOSPADM

## 2017-11-07 RX ORDER — SODIUM CHLORIDE, SODIUM LACTATE, POTASSIUM CHLORIDE, CALCIUM CHLORIDE 600; 310; 30; 20 MG/100ML; MG/100ML; MG/100ML; MG/100ML
INJECTION, SOLUTION INTRAVENOUS CONTINUOUS
Status: DISCONTINUED | OUTPATIENT
Start: 2017-11-07 | End: 2017-11-07 | Stop reason: HOSPADM

## 2017-11-07 RX ORDER — OXYCODONE HYDROCHLORIDE 5 MG/1
5-10 TABLET ORAL
Status: DISCONTINUED | OUTPATIENT
Start: 2017-11-07 | End: 2017-11-10 | Stop reason: HOSPADM

## 2017-11-07 RX ORDER — ONDANSETRON 2 MG/ML
INJECTION INTRAMUSCULAR; INTRAVENOUS PRN
Status: DISCONTINUED | OUTPATIENT
Start: 2017-11-07 | End: 2017-11-07

## 2017-11-07 RX ORDER — NALOXONE HYDROCHLORIDE 0.4 MG/ML
.1-.4 INJECTION, SOLUTION INTRAMUSCULAR; INTRAVENOUS; SUBCUTANEOUS
Status: DISCONTINUED | OUTPATIENT
Start: 2017-11-07 | End: 2017-11-07 | Stop reason: HOSPADM

## 2017-11-07 RX ORDER — ONDANSETRON 2 MG/ML
4 INJECTION INTRAMUSCULAR; INTRAVENOUS EVERY 6 HOURS PRN
Status: DISCONTINUED | OUTPATIENT
Start: 2017-11-07 | End: 2017-11-10 | Stop reason: HOSPADM

## 2017-11-07 RX ORDER — VENLAFAXINE 37.5 MG/1
37.5 TABLET ORAL DAILY
Status: DISCONTINUED | OUTPATIENT
Start: 2017-11-08 | End: 2017-11-10 | Stop reason: HOSPADM

## 2017-11-07 RX ORDER — FENTANYL CITRATE 50 UG/ML
25-50 INJECTION, SOLUTION INTRAMUSCULAR; INTRAVENOUS
Status: DISCONTINUED | OUTPATIENT
Start: 2017-11-07 | End: 2017-11-07 | Stop reason: HOSPADM

## 2017-11-07 RX ORDER — HYDROMORPHONE HYDROCHLORIDE 1 MG/ML
.3-.5 INJECTION, SOLUTION INTRAMUSCULAR; INTRAVENOUS; SUBCUTANEOUS
Status: DISCONTINUED | OUTPATIENT
Start: 2017-11-07 | End: 2017-11-10 | Stop reason: HOSPADM

## 2017-11-07 RX ORDER — POTASSIUM CHLORIDE 1500 MG/1
20 TABLET, EXTENDED RELEASE ORAL DAILY
Status: DISCONTINUED | OUTPATIENT
Start: 2017-11-07 | End: 2017-11-10 | Stop reason: HOSPADM

## 2017-11-07 RX ORDER — PIPERACILLIN SODIUM, TAZOBACTAM SODIUM 3; .375 G/15ML; G/15ML
3.38 INJECTION, POWDER, LYOPHILIZED, FOR SOLUTION INTRAVENOUS ONCE
Status: COMPLETED | OUTPATIENT
Start: 2017-11-07 | End: 2017-11-07

## 2017-11-07 RX ORDER — ONDANSETRON 2 MG/ML
4 INJECTION INTRAMUSCULAR; INTRAVENOUS EVERY 30 MIN PRN
Status: DISCONTINUED | OUTPATIENT
Start: 2017-11-07 | End: 2017-11-07 | Stop reason: HOSPADM

## 2017-11-07 RX ORDER — KETOROLAC TROMETHAMINE 30 MG/ML
30 INJECTION, SOLUTION INTRAMUSCULAR; INTRAVENOUS EVERY 6 HOURS
Status: CANCELLED | OUTPATIENT
Start: 2017-11-07 | End: 2017-11-08

## 2017-11-07 RX ORDER — MEPERIDINE HYDROCHLORIDE 25 MG/ML
12.5 INJECTION INTRAMUSCULAR; INTRAVENOUS; SUBCUTANEOUS
Status: DISCONTINUED | OUTPATIENT
Start: 2017-11-07 | End: 2017-11-07 | Stop reason: HOSPADM

## 2017-11-07 RX ORDER — FENTANYL CITRATE 50 UG/ML
INJECTION, SOLUTION INTRAMUSCULAR; INTRAVENOUS PRN
Status: DISCONTINUED | OUTPATIENT
Start: 2017-11-07 | End: 2017-11-07

## 2017-11-07 RX ORDER — DEXAMETHASONE SODIUM PHOSPHATE 4 MG/ML
INJECTION, SOLUTION INTRA-ARTICULAR; INTRALESIONAL; INTRAMUSCULAR; INTRAVENOUS; SOFT TISSUE PRN
Status: DISCONTINUED | OUTPATIENT
Start: 2017-11-07 | End: 2017-11-07

## 2017-11-07 RX ORDER — METOPROLOL TARTRATE 1 MG/ML
1-2 INJECTION, SOLUTION INTRAVENOUS EVERY 5 MIN PRN
Status: DISCONTINUED | OUTPATIENT
Start: 2017-11-07 | End: 2017-11-07 | Stop reason: HOSPADM

## 2017-11-07 RX ORDER — ACETAMINOPHEN 10 MG/ML
1000 INJECTION, SOLUTION INTRAVENOUS EVERY 8 HOURS
Status: DISCONTINUED | OUTPATIENT
Start: 2017-11-07 | End: 2017-11-10 | Stop reason: HOSPADM

## 2017-11-07 RX ORDER — DIPHENHYDRAMINE HYDROCHLORIDE 50 MG/ML
25 INJECTION INTRAMUSCULAR; INTRAVENOUS ONCE
Status: COMPLETED | OUTPATIENT
Start: 2017-11-07 | End: 2017-11-07

## 2017-11-07 RX ORDER — LIDOCAINE 40 MG/G
CREAM TOPICAL
Status: DISCONTINUED | OUTPATIENT
Start: 2017-11-07 | End: 2017-11-10 | Stop reason: HOSPADM

## 2017-11-07 RX ORDER — NALOXONE HYDROCHLORIDE 0.4 MG/ML
.1-.4 INJECTION, SOLUTION INTRAMUSCULAR; INTRAVENOUS; SUBCUTANEOUS
Status: DISCONTINUED | OUTPATIENT
Start: 2017-11-07 | End: 2017-11-07

## 2017-11-07 RX ORDER — FLUTICASONE PROPIONATE 50 MCG
2 SPRAY, SUSPENSION (ML) NASAL DAILY
Status: DISCONTINUED | OUTPATIENT
Start: 2017-11-08 | End: 2017-11-10 | Stop reason: HOSPADM

## 2017-11-07 RX ORDER — NEOSTIGMINE METHYLSULFATE 1 MG/ML
VIAL (ML) INJECTION PRN
Status: DISCONTINUED | OUTPATIENT
Start: 2017-11-07 | End: 2017-11-07

## 2017-11-07 RX ORDER — GLYCOPYRROLATE 0.2 MG/ML
INJECTION, SOLUTION INTRAMUSCULAR; INTRAVENOUS PRN
Status: DISCONTINUED | OUTPATIENT
Start: 2017-11-07 | End: 2017-11-07

## 2017-11-07 RX ORDER — ALBUTEROL SULFATE 0.83 MG/ML
2.5 SOLUTION RESPIRATORY (INHALATION) EVERY 4 HOURS PRN
Status: DISCONTINUED | OUTPATIENT
Start: 2017-11-07 | End: 2017-11-07 | Stop reason: HOSPADM

## 2017-11-07 RX ORDER — TRIAMTERENE/HYDROCHLOROTHIAZID 37.5-25 MG
1 TABLET ORAL DAILY
Status: DISCONTINUED | OUTPATIENT
Start: 2017-11-08 | End: 2017-11-10 | Stop reason: HOSPADM

## 2017-11-07 RX ORDER — ACETAMINOPHEN 325 MG/1
975 TABLET ORAL EVERY 8 HOURS
Status: DISCONTINUED | OUTPATIENT
Start: 2017-11-07 | End: 2017-11-07

## 2017-11-07 RX ORDER — LISINOPRIL 10 MG/1
10 TABLET ORAL DAILY
Status: DISCONTINUED | OUTPATIENT
Start: 2017-11-07 | End: 2017-11-10 | Stop reason: HOSPADM

## 2017-11-07 RX ADMIN — ROCURONIUM BROMIDE 40 MG: 10 INJECTION INTRAVENOUS at 12:40

## 2017-11-07 RX ADMIN — FENTANYL CITRATE 100 MCG: 50 INJECTION, SOLUTION INTRAMUSCULAR; INTRAVENOUS at 12:40

## 2017-11-07 RX ADMIN — SODIUM CHLORIDE, POTASSIUM CHLORIDE, SODIUM LACTATE AND CALCIUM CHLORIDE: 600; 310; 30; 20 INJECTION, SOLUTION INTRAVENOUS at 13:47

## 2017-11-07 RX ADMIN — PROPOFOL 150 MG: 10 INJECTION, EMULSION INTRAVENOUS at 12:40

## 2017-11-07 RX ADMIN — GLYCOPYRROLATE 0.2 MG: 0.2 INJECTION, SOLUTION INTRAMUSCULAR; INTRAVENOUS at 12:40

## 2017-11-07 RX ADMIN — HYDROMORPHONE HYDROCHLORIDE 1 MG: 1 INJECTION, SOLUTION INTRAMUSCULAR; INTRAVENOUS; SUBCUTANEOUS at 13:05

## 2017-11-07 RX ADMIN — HYDROMORPHONE HYDROCHLORIDE 0.5 MG: 1 INJECTION, SOLUTION INTRAMUSCULAR; INTRAVENOUS; SUBCUTANEOUS at 15:18

## 2017-11-07 RX ADMIN — FENTANYL CITRATE 50 MCG: 50 INJECTION INTRAMUSCULAR; INTRAVENOUS at 14:52

## 2017-11-07 RX ADMIN — HYDROMORPHONE HYDROCHLORIDE: 10 INJECTION, SOLUTION INTRAMUSCULAR; INTRAVENOUS; SUBCUTANEOUS at 18:20

## 2017-11-07 RX ADMIN — DEXAMETHASONE SODIUM PHOSPHATE 4 MG: 4 INJECTION, SOLUTION INTRA-ARTICULAR; INTRALESIONAL; INTRAMUSCULAR; INTRAVENOUS; SOFT TISSUE at 12:40

## 2017-11-07 RX ADMIN — ROCURONIUM BROMIDE 10 MG: 10 INJECTION INTRAVENOUS at 13:16

## 2017-11-07 RX ADMIN — DIPHENHYDRAMINE HYDROCHLORIDE 25 MG: 50 INJECTION, SOLUTION INTRAMUSCULAR; INTRAVENOUS at 15:23

## 2017-11-07 RX ADMIN — Medication 3 MG: at 14:11

## 2017-11-07 RX ADMIN — HYDROMORPHONE HYDROCHLORIDE 1 MG: 1 INJECTION, SOLUTION INTRAMUSCULAR; INTRAVENOUS; SUBCUTANEOUS at 13:16

## 2017-11-07 RX ADMIN — ROCURONIUM BROMIDE 10 MG: 10 INJECTION INTRAVENOUS at 13:46

## 2017-11-07 RX ADMIN — PIPERACILLIN SODIUM,TAZOBACTAM SODIUM 3.38 G: 3; .375 INJECTION, POWDER, FOR SOLUTION INTRAVENOUS at 12:34

## 2017-11-07 RX ADMIN — DIPHENHYDRAMINE HYDROCHLORIDE 25 MG: 50 INJECTION, SOLUTION INTRAMUSCULAR; INTRAVENOUS at 18:56

## 2017-11-07 RX ADMIN — SODIUM CHLORIDE, POTASSIUM CHLORIDE, SODIUM LACTATE AND CALCIUM CHLORIDE: 600; 310; 30; 20 INJECTION, SOLUTION INTRAVENOUS at 12:34

## 2017-11-07 RX ADMIN — MIDAZOLAM HYDROCHLORIDE 2 MG: 1 INJECTION, SOLUTION INTRAMUSCULAR; INTRAVENOUS at 12:37

## 2017-11-07 RX ADMIN — GLYCOPYRROLATE 0.4 MG: 0.2 INJECTION, SOLUTION INTRAMUSCULAR; INTRAVENOUS at 14:11

## 2017-11-07 RX ADMIN — FENTANYL CITRATE 50 MCG: 50 INJECTION INTRAMUSCULAR; INTRAVENOUS at 14:31

## 2017-11-07 RX ADMIN — HYDROMORPHONE HYDROCHLORIDE 0.5 MG: 1 INJECTION, SOLUTION INTRAMUSCULAR; INTRAVENOUS; SUBCUTANEOUS at 17:21

## 2017-11-07 RX ADMIN — LIDOCAINE HYDROCHLORIDE 30 MG: 10 INJECTION, SOLUTION INFILTRATION; PERINEURAL at 12:40

## 2017-11-07 RX ADMIN — HYDROMORPHONE HYDROCHLORIDE 0.5 MG: 1 INJECTION, SOLUTION INTRAMUSCULAR; INTRAVENOUS; SUBCUTANEOUS at 14:35

## 2017-11-07 RX ADMIN — ONDANSETRON 4 MG: 2 INJECTION INTRAMUSCULAR; INTRAVENOUS at 13:47

## 2017-11-07 RX ADMIN — SODIUM CHLORIDE: 9 INJECTION, SOLUTION INTRAVENOUS at 16:57

## 2017-11-07 ASSESSMENT — LIFESTYLE VARIABLES: TOBACCO_USE: 1

## 2017-11-07 ASSESSMENT — ACTIVITIES OF DAILY LIVING (ADL): ADLS_ACUITY_SCORE: 9

## 2017-11-07 ASSESSMENT — PAIN DESCRIPTION - DESCRIPTORS: DESCRIPTORS: ACHING;CONSTANT

## 2017-11-07 NOTE — IP AVS SNAPSHOT
[place] [id number] [facility]    [address]    [facility] [address]    Phone:  [phone]    Fax:  [phone]                                       After Visit Summary   11/7/2017    Connie Brunson    MRN: [id number]           After Visit Summary Signature Page     I have received my discharge instructions, and my questions have been answered. I have discussed any challenges I see with this plan with the nurse or doctor.    ..........................................................................................................................................  Patient/Patient Representative Signature      ..........................................................................................................................................  Patient Representative Print Name and Relationship to Patient    ..................................................               ................................................  Date                                            Time    ..........................................................................................................................................  Reviewed by Signature/Title    ...................................................              ..............................................  Date                                                            Time

## 2017-11-07 NOTE — ANESTHESIA CARE TRANSFER NOTE
Patient: Connie Brunson    Procedure(s):  Exploratory Laparoscopy, Laparotomy and Small Bowel resection. - Wound Class: I-Clean   - Wound Class: I-Clean   - Wound Class: II-Clean Contaminated    Diagnosis: small bowel mass  Diagnosis Additional Information: No value filed.    Anesthesia Type:   General, ETT     Note:  Airway :Face Mask  Patient transferred to:PACU  Comments: Report and signed off to RN in PACU.  Good Resps, skin pink, VSS, O2 via face tent.      Vitals: (Last set prior to Anesthesia Care Transfer)    CRNA VITALS  11/7/2017 1344 - 11/7/2017 1422      11/7/2017             Pulse: 67    SpO2: 98 %    Resp Rate (observed): 10                Electronically Signed By: LORAINE Medina CRNA  November 7, 2017  2:22 PM

## 2017-11-07 NOTE — BRIEF OP NOTE
[facility] Brief Operative Note    Pre-operative diagnosis: Small bowel mass   Post-operative diagnosis Mass of the Ileum   Procedure: Procedure(s):  Exploratory Laparoscopy, Possible Laparotomy, Possible Small Bowel resection - Wound Class: I-Clean   - Wound Class: I-Clean   - Wound Class: II-Clean Contaminated   Surgeon(s): Surgeon(s) and Role:     * Rosa M Cristobal MD - Primary     * Michael Nguyen PA-C - Assisting   Estimated blood loss: 10 mL    Specimens:   ID Type Source Tests Collected by Time Destination   A : Segment of Ileum (stitch marks distal) Tissue Colon SURGICAL PATHOLOGY EXAM Rosa M Cristobal MD 11/7/2017  1:21 PM       Findings: Submucosal mass in the distal ileum measuring 1.5 x 1.3 cm.  There was no evidence of bowel obstruction.  There was no mesenteric adenopathy or other intraabdominal abnormalities noted.  A segmental small bowel resection with primary anastomosis was performed.

## 2017-11-07 NOTE — MR AVS SNAPSHOT
After Visit Summary   11/7/2017    Connie Brunson    MRN: [id number]           Patient Information     Date Of Birth          1960        Visit Information        Provider Department      11/7/2017 12:30 PM Michael Nguyen PA-C; Rosa M Cristobal MD [facility] Scheduling [facility] General Surgery      Today's Diagnoses     ERRONEOUS ENCOUNTER--DISREGARD    -  1       Follow-ups after your visit        Your next 10 appointments already scheduled     Nov 22, 2017  1:00 PM CST   Post Op with Annel Corbin PA-C   [facility] ([facility]    [address] [facility] [address]  [facility] [address]   [phone]            Dec 04, 2017  8:00 AM CST   NM RADIOPHARM with RHNMINJ   [facility]    [address] [facility] [address]   [phone]            Dec 04, 2017 12:00 PM CST   NM SCAN2 with RHNM1   [facility] ([facility])    [address] [facility] [address]   [phone]            Dec 05, 2017  8:00 AM CST   NM WHOLE BODY OCTREOSCAN with RHNM1   [facility] ([facility])    [address] [facility] [address]   [phone]           Please bring a list of your medicines to the exam. (Include vitamins, minerals and over-the-counter drugs.) You should wear comfortable clothes. Leave your valuables at home. Please bring related prior results and films.  Tell your doctor:   If you are breastfeeding or may be pregnant.   If you have had a barium test within the past few days. Barium may change the results of certain exams.   If you think you may need sedation (medicine to help you relax).  You may eat and drink as normal.            Dec 06, 2017  7:30 AM CST   NM SCAN2 with RHNM1   [facility] ([facility])     [address]  [facility] [address]   [phone]            Dec 08, 2017  8:30 AM CST   Return Visit with  ONCOLOGY NURSE   [facility] ([facility])    [facility]  [address]  [facility] [address]   [phone]            Dec 08, 2017  9:30 AM CST   MR ABDOMEN W/O & W CONTRAST with RSCCMR1   [facility] [place] ([facility])    [address]  [facility] [address]   [phone]           Take your medicines as usual, unless your doctor tells you not to. Bring a list of your current medicines to your exam (including vitamins, minerals and over-the-counter drugs). Also bring the results of similar scans you may have had.    The day before your exam, drink extra fluids at least six 8-ounce glasses (unless your doctor tells you to restrict your fluids).   Have a blood test (creatinine test) within 30 days of your exam. Go to your clinic or Diagnostic Imaging Department for this test.   Do not eat or drink for 6 hours prior to exam.  The MRI machine uses a strong magnet. Please wear clothes without metal (snaps, zippers). A sweatsuit works well, or we may give you a hospital gown.  Please remove any body piercings and hair extensions before you arrive. You will also remove watches, jewelry, hairpins, wallets, dentures, partial dental plates and hearing aids. You may wear contact lenses, and you may be able to wear your rings. We have a safe place to keep your personal items, but it is safer to leave them at home.   **IMPORTANT** THE INSTRUCTIONS BELOW ARE ONLY FOR THOSE PATIENTS WHO HAVE BEEN TOLD THEY WILL RECEIVE SEDATION OR GENERAL ANESTHESIA DURING THEIR MRI PROCEDURE:  IF YOU WILL RECEIVE SEDATION (take medicine to help you relax during your exam):   You must get the medicine from your doctor before you arrive. Bring the medicine to the exam. Do not take it at home.    Arrive one hour early. Bring someone who can take you home after the test. Your medicine will make you sleepy. After the exam, you may not drive, take a bus or take a taxi by yourself.   No eating 8 hours before your exam. You may have clear liquids up until 4 hours before your exam. (Clear liquids include water, clear tea, black coffee and fruit juice without pulp.)  IF YOU WILL RECEIVE ANESTHESIA (be asleep for your exam):   Arrive 1 1/2 hours early. Bring someone who can take you home after the test. You may not drive, take a bus or take a taxi by yourself.   No eating 8 hours before your exam. You may have clear liquids up until 4 hours before your exam. (Clear liquids include water, clear tea, black coffee and fruit juice without pulp.)  If you have any questions, please contact your Imaging Department exam site.            Dec 08, 2017 10:45 AM CST   CT CHEST W/O CONTRAST with [address] ([facility])    [address] [facility] [address]  [facility] [id number] [phone]           Please bring any scans or X-rays taken at other hospitals, if similar tests were done. Also bring a list of your medicines, including vitamins, minerals and over-the-counter drugs. It is safest to leave personal items at home.  Be sure to tell your doctor:   If you have any allergies.   If there s any chance you are pregnant.   If you are breastfeeding.   If you have any special needs.  You do not need to do anything special to prepare.  Please wear loose clothing, such as a sweat suit or jogging clothes. Avoid snaps, zippers and other metal. We may ask you to undress and put on a hospital gown.            Dec 14, 2017  8:45 AM CST   Return Visit with Mary Weir MD   [facility] ([facility])    [facility]  [address]  [facility] [address] [phone]              Who to contact      If you have questions or need follow up information about today's clinic visit or your schedule please contact [facility] directly at [phone].  Normal or non-critical lab and imaging results will be communicated to you by MyChart, letter or phone within 4 business days after the clinic has received the results. If you do not hear from us within 7 days, please contact the clinic through [employer] or phone. If you have a critical or abnormal lab result, we will notify you by phone as soon as possible.  Submit refill requests through [employer] or call your pharmacy and they will forward the refill request to us. Please allow 3 business days for your refill to be completed.          Additional Information About Your Visit        [email or web address] Information     [employer] gives you secure access to your electronic health record. If you see a primary care provider, you can also send messages to your care team and make appointments. If you have questions, please call your primary care clinic.  If you do not have a primary care provider, please call [phone] and they will assist you.        Care EveryWhere ID     This is your Care EveryWhere ID. This could be used by other organizations to access your [place] medical records  [id number]        Your Vitals Were     Last Period                   09/05/2000            Blood Pressure from Last 3 Encounters:   11/16/17 129/82   11/10/17 131/67   11/03/17 136/88    Weight from Last 3 Encounters:   11/16/17 150 lb 6 oz (68.2 kg)   11/07/17 151 lb 11.2 oz (68.8 kg)   11/03/17 157 lb (71.2 kg)              Today, you had the following     No orders found for display         Today's Medication Changes      Notice     This visit is during an admission. Changes to the med list made in this visit will be reflected in the After Visit Summary of the admission.             Primary Care Provider Office Phone # Fax #    Gayatri Stephens [phone]  [phone]       [address] [place] [id number]        Equal Access to Services     IVA MCKENZIE : Hadii aad ku haddyandaksha Sugeyconchis, waeduarda clyderikkiha, kentonta kagianlucada jeremiahronda, nithya lilian karmaandres baldwinmarly levy pranay camejo. So [facility] [phone].    ATENCIÓN: Si habla español, tiene a ceron disposición servicios gratuitos de asistencia lingüística. Llame al [phone].    We comply with applicable federal civil rights laws and [place] laws. We do not discriminate on the basis of race, color, national origin, age, disability, sex, sexual orientation, or gender identity.            Thank you!     Thank you for choosing [facility]  for your care. Our goal is always to provide you with excellent care. Hearing back from our patients is one way we can continue to improve our services. Please take a few minutes to complete the written survey that you may receive in the mail after your visit with us. Thank you!             Your Updated Medication List - Protect others around you: Learn how to safely use, store and throw away your medicines at www.disposemymeds.org.      Notice     This visit is during an admission. Changes to the med list made in this visit will be reflected in the After Visit Summary of the admission.

## 2017-11-07 NOTE — IP AVS SNAPSHOT
MRN:[id number]                      After Visit Summary   11/7/2017    Connie Brunson    MRN: [id number]           Thank you!     Thank you for choosing [facility] for your care. Our goal is always to provide you with excellent care. Hearing back from our patients is one way we can continue to improve our services. Please take a few minutes to complete the written survey that you may receive in the mail after you visit. If you would like to speak to someone directly about your visit please contact Patient Relations at [phone]. Thank you!          Patient Information     Date Of Birth          1960        Designated Caregiver       Most Recent Value    Caregiver    Will someone help with your care after discharge? yes    Name of designated caregiver Perry Brunson    Phone number of caregiver [phone]    Caregiver address [address], [place]       About your hospital stay     You were admitted on:  November 7, 2017 You last received care in the:  [place] [address] [facility]    You were discharged on:  November 10, 2017       Who to Call     For medical emergencies, please call 911.  For non-urgent questions about your medical care, please call your primary care provider or clinic, [phone]  For questions related to your surgery, please call your surgery clinic        Attending Provider     Provider Specialty    Rosa M Cristobal MD Surgery       Primary Care Provider Office Phone # Fax #    Gayatri Stephens [phone]      Your next 10 appointments already scheduled     Nov 16, 2017  7:45 AM CST   New Visit with Mary Weir MD   [facility] ([facility])    [facility]  [address] [place] [address] [place] [id number] [phone]              Further instructions from your care team       HOME CARE FOLLOWING ABDOMINAL SURGERY  Eric GREEN  EUNICE Herring E. Gavin, N. Guttormson, D. Maurer, R. O Donnell, L. Thomas   Special instructions for Connie Brunson:  --Continue soft diet (no raw fruits/vegetables) for 3-4 more days.       INCISIONAL CARE:  Replace the bandage over your incision (or incisions) until all drainage stops, or if more comfortable to have in place.  If present, leave the steri-strips (white paper tapes) in place for 14 days after surgery.  If you have staples in your incision at the time of discharge, they will be removed at your follow-up appointment.  If Dermabond (a type of skin glue) is present, leave in place until it wears/flakes off.     BATHING:  Avoid baths for 1 week after surgery.  Showers are okay.  You may wash your hair at any time.  Gently pat your incision dry after bathing.    ACTIVITY:  Light Activity -- you may immediately be up and about as tolerated.  Driving -- you may drive when comfortable and off narcotic pain medications.  Light Work -- resume when comfortable off pain medications.  (If you can drive, you probably can work.)  Strenuous Work/Activity -- limit lifting to 20 pounds for 4 weeks.  Then, progressively increase with time.  Active Sports (running, biking, etc.) -- cautiously resume after 6 weeks.    DISCOMFORT:  Use pain medications as prescribed by your surgeon.  Take the pain medication with some food, when possible, to minimize side effects.  Expect gradual improvement.    DIET:  Return to diet you were on before surgery, unless you are given specific diet instructions.  Drink plenty of fluids.  While taking pain medications, increase dietary fiber or add a fiber supplementation like Metamucil or Citrucel to help prevent constipation - a possible side effect of pain medications.    NAUSEA:  If nauseated from the anesthetic/pain meds; rest in bed, get up cautiously with assistance, and drink clear liquids (juice, tea, broth).    RETURN APPOINTMENT:  Schedule a follow-up visit 2-3 weeks  after discharge from the hospital.  Office Phone:  [phone]     CONTACT US IF THE FOLLOWING DEVELOPS:   1. A fever that is above 101     2. If there is a large amount of drainage, bleeding, or swelling.   3. Severe pain that is not relieved by your prescription.   4. Drainage that is thick, cloudy, yellow, green or white.   5. Any other questions not answered by  Frequently Asked Questions  sheet.      FREQUENTLY ASKED QUESTIONS:    Q:  How should my incision look?    A:  Normally your incision will appear slightly swollen with light redness directly along the incision itself as it heals.  It may feel like a bump or ridge as the healing/scarring happens, and over time (3-4 months) this bump or ridge feeling should slowly go away.  In general, clear or pink watery drainage can be normal at first as your incision heals, but should decrease over time.    Q:  How do I know if my incision is infected?  A:  Look at your incision for signs of infection, like redness around the incision spreading to surrounding skin, or drainage of cloudy or foul-smelling drainage.  If you feel warm, check your temperature to see if you are running a fever.    **If any of these things occur, please notify the nurse at our office.  We may need you to come into the office for an incision check.      Q:  How do I take care of my incision?  A:  If you have a dressing in place - Starting the day after surgery, replace the dressing 1-2 times a day until there is no further drainage from the incision.  At that time, a dressing is no longer needed.  Try to minimize tape on the skin if irritation is occurring at the tape sites.  If you have significant irritation from tape on the skin, please call the office to discuss other method of dressing your incision.    Small pieces of tape called  steri-strips  may be present directly overlying your incision; these may be removed 10 days after surgery unless otherwise specified by your surgeon.  If  these tapes start to loosen at the ends, you may trim them back until they fall off or are removed.    A:  If you had  Dermabond  tissue glue used as a dressing (this causes your incision to look shiny with a clear covering over it) - This type of dressing wears off with time and does not require more dressings over the top unless it is draining around the glue as it wears off.  Do not apply ointments or lotions over the incisions until the glue has completely worn off.    Q:  There is a piece of tape or a sticky  lead  still on my skin.  Can I remove this?  A:  Sometimes the sticky  leads  used for monitoring during surgery or for evaluation in the emergency department are not all removed while you are in the hospital.  These sometimes have a tab or metal dot on them.  You can easily remove these on your own, like taking off a band-aid.  If there is a gel substance under the  lead , simply wipe/clean it off with a washcloth or paper towel.      Q:  What can I do to minimize constipation (very hard stools, or lack of stools)?  A:  Stay well hydrated.  Increase your dietary fiber intake or take a fiber supplement -with plenty of water.  Walk around frequently.  You may consider an over-the-counter stool-softener.  Your Pharmacist can assist you with choosing one that is stocked at your pharmacy.  Constipation is also one of the most common side effects of pain medication.  If you are using pain medication, be pro-active and try to PREVENT problems with constipation by taking the steps above BEFORE constipation becomes a problem.    Q:  What do I do if I need more pain medications?  A:  Call the office to receive refills.  Be aware that certain pain meds cannot be called into a pharmacy and actually require a paper prescription.  A change may be made in your pain med as you progress thru your recovery period or if you have side effects to certain meds.    --Pain meds are NOT refilled after 5pm on weekdays, and NOT AT  ALL on the weekends, so please look ahead to prevent problems.      Q:  Why am I having a hard time sleeping now that I am at home?  A:  Many medications you receive while you are in the hospital can impact your sleep for a number of days after your surgery/hospitalization.  Decreased level of activity and naps during the day may also make sleeping at night difficult.  Try to minimize day-time naps, and get up frequently during the day to walk around your home during your recovery time.  Sleep aides may be of some help, but are not recommended for long-term use.      Q:  I am having some back discomfort.  What should I do?  A:  This may be related to certain positioning that was required for your surgery, extended periods of time in bed, or other changes in your overall activity level.  You may try ice, heat, acetaminophen, or ibuprofen to treat this temporarily.  Note that many pain medications have acetaminophen in them and would state this on the prescription bottle.  Be sure not to exceed the maximum of 4000mg per day of acetaminophen.     **If the pain you are having does not resolve, is severe, or is a flare of back pain you have had on other occasions prior to surgery, please contact your primary physician for further recommendations or for an appointment to be examined at their office.    Q:  Why am I having headaches?  A:  Headaches can be caused by many things:  caffeine withdrawal, use of pain meds, dehydration, high blood pressure, lack of sleep, over-activity/exhaustion, flare-up of usual migraine headaches.  If you feel this is related to muscle tension (a band-like feeling around the head, or a pressure at the low-back of the head) you may try ice or heat to this area.  You may need to drink more fluids (try electrolyte drink like Gatorade), rest, or take your usual migraine medications.   **If your headaches do not resolve, worsen, are accompanied by other symptoms, or if your blood pressure is high,  please call your primary physician for recommendation and/or examination.    Q:  I am unable to urinate.  What do I do?  A:  A small percentage of people can have difficulty urinating initially after surgery.  This includes being able to urinate only a very small amount at a time and feeling discomfort or pressure in the very low abdomen.  This is called  urinary retention , and is actually an urgent situation.  Proceed to your nearest Emergency department for evaluation (not an Urgent Care Center).  Sometimes the bladder does not work correctly after certain medications you receive during surgery, or related to certain procedures.  You may need to have a catheter placed until your bladder recovers.  When planning to go to an Emergency department, it may help to call the ER to let them know you are coming in for this problem after a surgery.  This may help you get in quicker to be evaluated.  **If you have symptoms of a urinary tract infection, please contact your primary physician for the proper evaluation and treatment.          If you have other questions, please call the office Monday thru Friday between 8am and 5pm to discuss with the nurse or physician assistant.  #[phone]    There is a surgeon ON CALL on weekday evenings and over the weekend in case of urgent need only, and may be contacted at the same number.    If you are having an emergency, call 911 or proceed to your nearest emergency department.        Pending Results     No orders found from 11/5/2017 to 11/8/2017.            Statement of Approval     Ordered          11/10/17 1530  I have reviewed and agree with all the recommendations and orders detailed in this document.  EFFECTIVE NOW     Approved and electronically signed by:  Michel Monzon MD             Admission Information     Date & Time Provider Department Dept. Phone    11/7/2017 Rosa M Cristobal MD [place] [address] [facility] [phone]      Your Vitals Were      "Blood Pressure Pulse Temperature Respirations Height Weight    131/67 (BP Location: Left arm) 89 96.1  F (35.6  C) (Oral) 16 1.521 m (4' 11.9\") 68.8 kg (151 lb 11.2 oz)    Last Period Pulse Oximetry BMI (Body Mass Index)             09/05/2000 93% 29.73 kg/m2         [employer] Information     [employer] gives you secure access to your electronic health record. If you see a primary care provider, you can also send messages to your care team and make appointments. If you have questions, please call your primary care clinic.  If you do not have a primary care provider, please call [phone] and they will assist you.        Care EveryWhere ID     This is your Care EveryWhere ID. This could be used by other organizations to access your [facility] medical records  [id number]        Equal Access to Services     IVA MCKENZIE : Danish Brown, landon figueroa, nithya arteaga. So [facility] [phone].    ATENCIÓN: Si habla español, tiene a ceron disposición servicios gratuitos de asistencia lingüística. Marina al [phone].    We comply with applicable federal civil rights laws and [place] laws. We do not discriminate on the basis of race, color, national origin, age, disability, sex, sexual orientation, or gender identity.               Review of your medicines      START taking        Dose / Directions    oxyCODONE IR 5 MG tablet   Commonly known as:  ROXICODONE   Used for:  Acute post-operative pain        Dose:  5-10 mg   Take 1-2 tablets (5-10 mg) by mouth every 3 hours as needed for moderate to severe pain   Quantity:  18 tablet   Refills:  0         CONTINUE these medicines which have NOT CHANGED        Dose / Directions    cetirizine 10 MG tablet   Commonly known as:  zyrTEC        Dose:  10 mg   Take 10 mg by mouth every evening   Refills:  0       estradiol 1 MG tablet   Commonly known as:  ESTRACE   Used for:  Symptomatic menopausal or female " climacteric states        TAKE ONE TABLET BY MOUTH ONCE DAILY   Quantity:  90 tablet   Refills:  1       fenofibrate 160 MG tablet   Used for:  Hypertriglyceridemia        Dose:  160 mg   Take 1 tablet (160 mg) by mouth daily   Quantity:  90 tablet   Refills:  1       fluticasone 50 MCG/ACT spray   Commonly known as:  FLONASE   Used for:  Other seasonal allergic rhinitis        USE TWO SPRAYS IN EACH NOSTRIL EVERY DAY   Quantity:  48 g   Refills:  1       hydrocortisone 2.5 % cream   Used for:  Seborrheic dermatitis        Apply sparingly to dermatitis left lower eye   Quantity:  30 g   Refills:  3       ibuprofen 600 MG tablet   Commonly known as:  ADVIL/MOTRIN   Used for:  Acute cholecystitis        Dose:  600 mg   Take 1 tablet (600 mg) by mouth every 6 hours as needed for pain (mild)   Quantity:  30 tablet   Refills:  0       lisinopril 10 MG tablet   Commonly known as:  PRINIVIL/ZESTRIL   Used for:  Essential hypertension with goal blood pressure less than 140/90        Dose:  10 mg   Take 1 tablet (10 mg) by mouth daily   Quantity:  90 tablet   Refills:  1       omeprazole 20 MG CR capsule   Commonly known as:  priLOSEC   Used for:  Gastroesophageal reflux disease without esophagitis        Dose:  20 mg   Take 1 capsule (20 mg) by mouth daily   Quantity:  90 capsule   Refills:  3       potassium chloride SA 20 MEQ CR tablet   Commonly known as:  KLOR-CON   Used for:  Low blood potassium        Dose:  20 mEq   Take 1 tablet (20 mEq) by mouth daily   Quantity:  30 tablet   Refills:  0       triamcinolone 0.5 % cream   Commonly known as:  KENALOG        Apply topically 2 times daily as needed for irritation (to eczema or psoriatic lesions - Never use on face)   Refills:  0       triamterene-hydrochlorothiazide 37.5-25 MG per capsule   Commonly known as:  DYAZIDE   Used for:  Essential hypertension with goal blood pressure less than 140/90        Dose:  1 capsule   Take 1 capsule by mouth daily   Quantity:  90  capsule   Refills:  1       venlafaxine 37.5 MG tablet   Commonly known as:  EFFEXOR   Used for:  Anxiety        TAKE ONE TABLET BY MOUTH EVERY DAY   Quantity:  90 tablet   Refills:  0       zolpidem 5 MG tablet   Commonly known as:  AMBIEN   Used for:  Primary insomnia        Dose:  5 mg   Take 1 tablet (5 mg) by mouth nightly as needed for sleep   Quantity:  30 tablet   Refills:  5            Where to get your medicines      Some of these will need a paper prescription and others can be bought over the counter. Ask your nurse if you have questions.     Bring a paper prescription for each of these medications     oxyCODONE IR 5 MG tablet                Protect others around you: Learn how to safely use, store and throw away your medicines at www.disposemymeds.org.             Medication List: This is a list of all your medications and when to take them. Check marks below indicate your daily home schedule. Keep this list as a reference.      Medications           Morning Afternoon Evening Bedtime As Needed    cetirizine 10 MG tablet   Commonly known as:  zyrTEC   Take 10 mg by mouth every evening   Next Dose Due:  Resume as previously prescribed                                 estradiol 1 MG tablet   Commonly known as:  ESTRACE   TAKE ONE TABLET BY MOUTH ONCE DAILY   Last time this was given:  1 mg on 11/10/2017  8:55 AM   Next Dose Due:  Take again tomorrow morning 11/10/17                                   fenofibrate 160 MG tablet   Take 1 tablet (160 mg) by mouth daily   Next Dose Due:  Resume as previously prescribed                                 fluticasone 50 MCG/ACT spray   Commonly known as:  FLONASE   USE TWO SPRAYS IN EACH NOSTRIL EVERY DAY   Last time this was given:  2 sprays on 11/9/2017  8:30 AM   Next Dose Due:  Take tomorrow morning 11/10/17                                   hydrocortisone 2.5 % cream   Apply sparingly to dermatitis left lower eye                                ibuprofen 600 MG  tablet   Commonly known as:  ADVIL/MOTRIN   Take 1 tablet (600 mg) by mouth every 6 hours as needed for pain (mild)                                   lisinopril 10 MG tablet   Commonly known as:  PRINIVIL/ZESTRIL   Take 1 tablet (10 mg) by mouth daily   Last time this was given:  10 mg on 11/10/2017  8:56 AM   Next Dose Due:  Take tomorrow morning 11/11/17                                   omeprazole 20 MG CR capsule   Commonly known as:  priLOSEC   Take 1 capsule (20 mg) by mouth daily   Last time this was given:  20 mg on 11/10/2017  8:56 AM   Next Dose Due:  Take again tomorrow morning 11/11/17                                   oxyCODONE IR 5 MG tablet   Commonly known as:  ROXICODONE   Take 1-2 tablets (5-10 mg) by mouth every 3 hours as needed for moderate to severe pain   Last time this was given:  10 mg on 11/10/2017  5:58 PM                                   potassium chloride SA 20 MEQ CR tablet   Commonly known as:  KLOR-CON   Take 1 tablet (20 mEq) by mouth daily   Last time this was given:  20 mEq on 11/10/2017  8:59 AM   Next Dose Due:  Take tomorrow morning 11/11/17                                   triamcinolone 0.5 % cream   Commonly known as:  KENALOG   Apply topically 2 times daily as needed for irritation (to eczema or psoriatic lesions - Never use on face)                                   triamterene-hydrochlorothiazide 37.5-25 MG per capsule   Commonly known as:  DYAZIDE   Take 1 capsule by mouth daily   Next Dose Due:  Resume as previously prescribed                                 venlafaxine 37.5 MG tablet   Commonly known as:  EFFEXOR   TAKE ONE TABLET BY MOUTH EVERY DAY   Last time this was given:  37.5 mg on 11/10/2017  8:56 AM   Next Dose Due:  Take next tomorrow morning 11/11/17                                   zolpidem 5 MG tablet   Commonly known as:  AMBIEN   Take 1 tablet (5 mg) by mouth nightly as needed for sleep   Last time this was given:  5 mg on 11/9/2017 10:13 PM

## 2017-11-07 NOTE — ANESTHESIA PREPROCEDURE EVALUATION
Anesthesia Evaluation     .             ROS/MED HX    ENT/Pulmonary:     (+)tobacco use, , . .    Neurologic:  - neg neurologic ROS     Cardiovascular:     (+) Dyslipidemia, hypertension----. : . . . :. .       METS/Exercise Tolerance:     Hematologic:  - neg hematologic  ROS       Musculoskeletal:  - neg musculoskeletal ROS       GI/Hepatic: Comment: Small bowel mass        Renal/Genitourinary:  - ROS Renal section negative       Endo: Comment: .Body mass index is 29.73 kg/(m^2).   - neg endo ROS       Psychiatric:     (+) psychiatric history anxiety and depression      Infectious Disease:  - neg infectious disease ROS       Malignancy:         Other: Comment: .Lab Test        11/03/17     10/09/17     04/12/17                       0811          0930          0843          WBC          7.1          6.4          7.6           HGB          12.8         13.1         12.9          MCV          95           93           96            PLT          338          343          321            Lab Test        11/03/17     10/09/17     04/12/17                       0811          0930          0843          NA           137          134          135           POTASSIUM    3.3*         3.3*         3.8           CHLORIDE     100          97           97            CO2          26           28           29            BUN          16           8            11            CR           0.72         0.69         0.74          ANIONGAP     11           9            9             FADI          9.2          9.3          8.7           GLC          88           101*         91                                Physical Exam  Normal systems: cardiovascular and pulmonary    Airway   Mallampati: II    Dental     Cardiovascular   Rhythm and rate: regular and normal      Pulmonary    breath sounds clear to auscultation                    Anesthesia Plan      History & Physical Review  History and physical reviewed and following examination; no  interval change.    ASA Status:  2 .        Plan for General and ETT with Intravenous induction.   PONV prophylaxis:  Ondansetron (or other 5HT-3) and Dexamethasone or Solumedrol       Postoperative Care  Postoperative pain management:  IV analgesics, Oral pain medications and Multi-modal analgesia.      Consents  Anesthetic plan, risks, benefits and alternatives discussed with:  Patient or representative..                          .

## 2017-11-08 LAB
ANION GAP SERPL CALCULATED.3IONS-SCNC: 6 MMOL/L (ref 3–14)
BUN SERPL-MCNC: 6 MG/DL (ref 7–30)
CALCIUM SERPL-MCNC: 7.4 MG/DL (ref 8.5–10.1)
CHLORIDE SERPL-SCNC: 101 MMOL/L (ref 94–109)
CO2 SERPL-SCNC: 30 MMOL/L (ref 20–32)
CREAT SERPL-MCNC: 0.72 MG/DL (ref 0.52–1.04)
ERYTHROCYTE [DISTWIDTH] IN BLOOD BY AUTOMATED COUNT: 12.4 % (ref 10–15)
GFR SERPL CREATININE-BSD FRML MDRD: 82 ML/MIN/1.7M2
GLUCOSE SERPL-MCNC: 97 MG/DL (ref 70–99)
HCT VFR BLD AUTO: 30.8 % (ref 35–47)
HGB BLD-MCNC: 10.4 G/DL (ref 11.7–15.7)
MCH RBC QN AUTO: 32.5 PG (ref 26.5–33)
MCHC RBC AUTO-ENTMCNC: 33.8 G/DL (ref 31.5–36.5)
MCV RBC AUTO: 96 FL (ref 78–100)
PLATELET # BLD AUTO: 277 10E9/L (ref 150–450)
POTASSIUM SERPL-SCNC: 3.8 MMOL/L (ref 3.4–5.3)
RBC # BLD AUTO: 3.2 10E12/L (ref 3.8–5.2)
SODIUM SERPL-SCNC: 137 MMOL/L (ref 133–144)
WBC # BLD AUTO: 11.2 10E9/L (ref 4–11)

## 2017-11-08 PROCEDURE — 12000007 ZZH R&B INTERMEDIATE

## 2017-11-08 PROCEDURE — 25000132 ZZH RX MED GY IP 250 OP 250 PS 637: Performed by: SURGERY

## 2017-11-08 PROCEDURE — 36415 COLL VENOUS BLD VENIPUNCTURE: CPT | Performed by: SURGERY

## 2017-11-08 PROCEDURE — 25000128 H RX IP 250 OP 636: Performed by: SURGERY

## 2017-11-08 PROCEDURE — 80048 BASIC METABOLIC PNL TOTAL CA: CPT | Performed by: SURGERY

## 2017-11-08 PROCEDURE — 85027 COMPLETE CBC AUTOMATED: CPT | Performed by: SURGERY

## 2017-11-08 RX ORDER — POTASSIUM CHLORIDE 7.45 MG/ML
10 INJECTION INTRAVENOUS
Status: DISCONTINUED | OUTPATIENT
Start: 2017-11-08 | End: 2017-11-10 | Stop reason: HOSPADM

## 2017-11-08 RX ORDER — POTASSIUM CL/LIDO/0.9 % NACL 10MEQ/0.1L
10 INTRAVENOUS SOLUTION, PIGGYBACK (ML) INTRAVENOUS
Status: DISCONTINUED | OUTPATIENT
Start: 2017-11-08 | End: 2017-11-10 | Stop reason: HOSPADM

## 2017-11-08 RX ORDER — POTASSIUM CHLORIDE 1500 MG/1
20-40 TABLET, EXTENDED RELEASE ORAL
Status: DISCONTINUED | OUTPATIENT
Start: 2017-11-08 | End: 2017-11-10 | Stop reason: HOSPADM

## 2017-11-08 RX ORDER — POTASSIUM CHLORIDE 1.5 G/1.58G
20-40 POWDER, FOR SOLUTION ORAL
Status: DISCONTINUED | OUTPATIENT
Start: 2017-11-08 | End: 2017-11-10 | Stop reason: HOSPADM

## 2017-11-08 RX ORDER — POTASSIUM CHLORIDE 29.8 MG/ML
20 INJECTION INTRAVENOUS
Status: DISCONTINUED | OUTPATIENT
Start: 2017-11-08 | End: 2017-11-10 | Stop reason: HOSPADM

## 2017-11-08 RX ORDER — MAGNESIUM SULFATE HEPTAHYDRATE 40 MG/ML
4 INJECTION, SOLUTION INTRAVENOUS EVERY 4 HOURS PRN
Status: DISCONTINUED | OUTPATIENT
Start: 2017-11-08 | End: 2017-11-10 | Stop reason: HOSPADM

## 2017-11-08 RX ADMIN — VENLAFAXINE 37.5 MG: 37.5 TABLET ORAL at 08:34

## 2017-11-08 RX ADMIN — DIPHENHYDRAMINE HYDROCHLORIDE 25 MG: 25 CAPSULE ORAL at 08:28

## 2017-11-08 RX ADMIN — HYDROMORPHONE HYDROCHLORIDE: 10 INJECTION, SOLUTION INTRAMUSCULAR; INTRAVENOUS; SUBCUTANEOUS at 15:00

## 2017-11-08 RX ADMIN — DIPHENHYDRAMINE HYDROCHLORIDE 25 MG: 25 CAPSULE ORAL at 21:44

## 2017-11-08 RX ADMIN — ACETAMINOPHEN 650 MG: 325 TABLET, FILM COATED ORAL at 08:43

## 2017-11-08 RX ADMIN — DIPHENHYDRAMINE HYDROCHLORIDE 25 MG: 25 CAPSULE ORAL at 16:04

## 2017-11-08 RX ADMIN — SODIUM CHLORIDE: 9 INJECTION, SOLUTION INTRAVENOUS at 22:12

## 2017-11-08 RX ADMIN — SODIUM CHLORIDE: 9 INJECTION, SOLUTION INTRAVENOUS at 02:30

## 2017-11-08 RX ADMIN — OMEPRAZOLE 20 MG: 20 CAPSULE, DELAYED RELEASE ORAL at 08:28

## 2017-11-08 RX ADMIN — TRIAMTERENE AND HYDROCHLOROTHIAZIDE 1 TABLET: 37.5; 25 TABLET ORAL at 08:28

## 2017-11-08 RX ADMIN — ACETAMINOPHEN 1000 MG: 10 INJECTION, SOLUTION INTRAVENOUS at 00:47

## 2017-11-08 RX ADMIN — POTASSIUM CHLORIDE 20 MEQ: 1500 TABLET, EXTENDED RELEASE ORAL at 08:28

## 2017-11-08 RX ADMIN — SODIUM CHLORIDE: 9 INJECTION, SOLUTION INTRAVENOUS at 12:41

## 2017-11-08 RX ADMIN — ENOXAPARIN SODIUM 40 MG: 40 INJECTION SUBCUTANEOUS at 08:30

## 2017-11-08 ASSESSMENT — PAIN DESCRIPTION - DESCRIPTORS
DESCRIPTORS: ACHING;CONSTANT
DESCRIPTORS: DULL;SHARP
DESCRIPTORS: ACHING;CONSTANT;SHARP

## 2017-11-08 ASSESSMENT — ACTIVITIES OF DAILY LIVING (ADL)
ADLS_ACUITY_SCORE: 9

## 2017-11-08 NOTE — PLAN OF CARE
Problem: Patient Care Overview  Goal: Plan of Care/Patient Progress Review  Outcome: Therapy, progress toward functional goals is gradual  Orientation: Alert and oriented x4.   VSS. 94% on RA. Weaned from 2L NC overnight. Afebrile. .    LS: clear and equal bilaterally.  GI: Denies Passing gas. No BM. Denies N/V. Tolerating ice chips.   : Adequate urine output. Clear cheryl.   Skin: incisions to abdomin. Dressing in place. Dried drainage marked. No change from prior shift.   Activity: SBA. Up to bathroom overnight. Pt slept comfortably throughout shift.   Pain: 7/10 abdominal pain. PCA. 2.8 mg total overnight. Pt still complaining of pain with use of IV tylenol and dilaudid. Refusing ice.   Plan: Continue with current cares.

## 2017-11-08 NOTE — PROGRESS NOTES
"[facility]   General Surgery Progress Note           Assessment and Plan:   Assessment:   POD#1 s/p Procedure(s):  Exploratory Laparoscopy, Laparotomy and Small Bowel resection. - Wound Class: I-Clean   - Wound Class: I-Clean   - Wound Class: II-Clean Contaminated  Ileus, as expected  Afebrile      Plan:   -Continue NPO, await return of bowel function  -pain control: ofirmev, Dilaudid PCA  -VTE prophylaxis: lovenox  -GI prophylaxis:omeprazole  -Activity as tolerated         Interval History:   Comfortable in bed. Reports pain is well controlled with medication. Up walking this morning. Voiding independently. -flatus, -BM. Denies nausea/vomiting or bloating. C/o being thirsty. OK for ice chips for comfort.        Physical Exam:   Blood pressure 108/58, temperature 96.2  F (35.7  C), temperature source Oral, resp. rate 12, height 1.521 m (4' 11.9\"), weight 68.8 kg (151 lb 11.2 oz), last menstrual period 09/05/2000, SpO2 100 %, not currently breastfeeding.    I/O last 3 completed shifts:  In: 2979 [P.O.:380; I.V.:2599]  Out: 561 [Urine:551; Blood:10]    Abdomen:   soft, non-distended, tenderness noted in the epigastric region and at incisions and no masses palpated rare BS   Inc(s) - clean, dry, steristrips intact                Data:     Recent Labs   Lab Test  11/08/17   0552  11/03/17   0811  10/09/17   0930   HGB  10.4*  12.8  13.1   WBC  11.2*  7.1  6.4       Haley Valera PA-C     Pt really would like a cup of broth for taste.ok to chew gum, take ice chips,and 1 cup broth per day.  PCA increased to 0.4mg per pt request.  Await RBF.    Rosa M Cristobal MD    "

## 2017-11-08 NOTE — PLAN OF CARE
Problem: Patient Care Overview  Goal: Plan of Care/Patient Progress Review  Outcome: Improving  Pt up with SBA, up x2 to bathroom and once for a walk in the hallway. Voiding small amounts, PVR of 95 after last void. PCDs on while in bed. Pt instructed to use IS, verbalizes understanding. Lovenox education brought into room, pt has no further questions at this time. Continues on 2L O2 via nasal cannula while at rest/sleeping. During walk, oxygen saturation dropped to 91% on RA, utilized 1L O2 via NC for the rest of the walk. IV dilaudid via PCA, rating abdominal pain at 3-5/10. IV ofirmev available if pt requests, pt declining scheduled dose, as her pain is currently well controlled. Tolerating ice chips, denies any nausea.

## 2017-11-08 NOTE — PLAN OF CARE
Problem: Patient Care Overview  Goal: Plan of Care/Patient Progress Review  Outcome: No Change  Pt remains admitted for ex. Lap with small bowel mass removal. Pt reported moderate pain during shift, dilaudid PCA in place, dose increase to 0.4/10 min/1.2 max, used 4mg during shift, PO tylenol given, moderate relief. NPO with only ice, meds, and sips of broth, no nausea reported. Lisinopril held due to low BP. Benadryl given x1 for itching, moderate relief. Scopolamine patch in place. Midline incision dressing clean dry and intact. Up SBA, walked in hallway. Will continue to monitor.

## 2017-11-09 LAB
COPATH REPORT: NORMAL
MAGNESIUM SERPL-MCNC: 1.8 MG/DL (ref 1.6–2.3)
PHOSPHATE SERPL-MCNC: 2.4 MG/DL (ref 2.5–4.5)
POTASSIUM SERPL-SCNC: 3.1 MMOL/L (ref 3.4–5.3)
POTASSIUM SERPL-SCNC: 3.5 MMOL/L (ref 3.4–5.3)

## 2017-11-09 PROCEDURE — 25000125 ZZHC RX 250: Performed by: SURGERY

## 2017-11-09 PROCEDURE — 84100 ASSAY OF PHOSPHORUS: CPT | Performed by: SURGERY

## 2017-11-09 PROCEDURE — 12000000 ZZH R&B MED SURG/OB

## 2017-11-09 PROCEDURE — 25000132 ZZH RX MED GY IP 250 OP 250 PS 637: Performed by: PHYSICIAN ASSISTANT

## 2017-11-09 PROCEDURE — 84132 ASSAY OF SERUM POTASSIUM: CPT | Performed by: SURGERY

## 2017-11-09 PROCEDURE — 36415 COLL VENOUS BLD VENIPUNCTURE: CPT | Performed by: SURGERY

## 2017-11-09 PROCEDURE — 25000132 ZZH RX MED GY IP 250 OP 250 PS 637: Performed by: SURGERY

## 2017-11-09 PROCEDURE — 25000128 H RX IP 250 OP 636: Performed by: SURGERY

## 2017-11-09 PROCEDURE — 83735 ASSAY OF MAGNESIUM: CPT | Performed by: SURGERY

## 2017-11-09 RX ORDER — DIPHENHYDRAMINE HYDROCHLORIDE 50 MG/ML
25 INJECTION INTRAMUSCULAR; INTRAVENOUS EVERY 6 HOURS PRN
Status: DISCONTINUED | OUTPATIENT
Start: 2017-11-09 | End: 2017-11-10 | Stop reason: HOSPADM

## 2017-11-09 RX ORDER — DIPHENHYDRAMINE HCL 50 MG
50 CAPSULE ORAL EVERY 6 HOURS PRN
Status: DISCONTINUED | OUTPATIENT
Start: 2017-11-09 | End: 2017-11-10 | Stop reason: HOSPADM

## 2017-11-09 RX ORDER — ESTRADIOL 0.5 MG/1
1 TABLET ORAL DAILY
Status: DISCONTINUED | OUTPATIENT
Start: 2017-11-09 | End: 2017-11-10 | Stop reason: HOSPADM

## 2017-11-09 RX ADMIN — ACETAMINOPHEN 650 MG: 325 TABLET, FILM COATED ORAL at 00:26

## 2017-11-09 RX ADMIN — DEXTROSE MONOHYDRATE 15 MMOL: 5 INJECTION, SOLUTION INTRAVENOUS at 12:42

## 2017-11-09 RX ADMIN — HYDROMORPHONE HYDROCHLORIDE: 10 INJECTION, SOLUTION INTRAMUSCULAR; INTRAVENOUS; SUBCUTANEOUS at 07:03

## 2017-11-09 RX ADMIN — ACETAMINOPHEN 650 MG: 325 TABLET, FILM COATED ORAL at 08:29

## 2017-11-09 RX ADMIN — FLUTICASONE PROPIONATE 2 SPRAY: 50 SPRAY, METERED NASAL at 08:30

## 2017-11-09 RX ADMIN — ESTRADIOL 1 MG: 0.5 TABLET ORAL at 10:30

## 2017-11-09 RX ADMIN — DIPHENHYDRAMINE HYDROCHLORIDE 50 MG: 50 CAPSULE ORAL at 12:48

## 2017-11-09 RX ADMIN — TRIAMTERENE AND HYDROCHLOROTHIAZIDE 1 TABLET: 37.5; 25 TABLET ORAL at 08:28

## 2017-11-09 RX ADMIN — OMEPRAZOLE 20 MG: 20 CAPSULE, DELAYED RELEASE ORAL at 08:29

## 2017-11-09 RX ADMIN — POTASSIUM CHLORIDE 40 MEQ: 1500 TABLET, EXTENDED RELEASE ORAL at 10:17

## 2017-11-09 RX ADMIN — ZOLPIDEM TARTRATE 5 MG: 5 TABLET, FILM COATED ORAL at 22:13

## 2017-11-09 RX ADMIN — ACETAMINOPHEN 650 MG: 325 TABLET, FILM COATED ORAL at 22:16

## 2017-11-09 RX ADMIN — ENOXAPARIN SODIUM 40 MG: 40 INJECTION SUBCUTANEOUS at 08:28

## 2017-11-09 RX ADMIN — VENLAFAXINE 37.5 MG: 37.5 TABLET ORAL at 08:28

## 2017-11-09 RX ADMIN — POTASSIUM CHLORIDE 20 MEQ: 1500 TABLET, EXTENDED RELEASE ORAL at 08:29

## 2017-11-09 RX ADMIN — LISINOPRIL 10 MG: 10 TABLET ORAL at 08:27

## 2017-11-09 RX ADMIN — SODIUM CHLORIDE: 9 INJECTION, SOLUTION INTRAVENOUS at 22:08

## 2017-11-09 ASSESSMENT — ACTIVITIES OF DAILY LIVING (ADL)
ADLS_ACUITY_SCORE: 9

## 2017-11-09 ASSESSMENT — PAIN DESCRIPTION - DESCRIPTORS: DESCRIPTORS: SHARP

## 2017-11-09 NOTE — PROGRESS NOTES
"[facility]   General Surgery Progress Note           Assessment and Plan:   Assessment:   POD#2 s/p Procedure(s):  Exploratory Laparoscopy, Laparotomy and Small Bowel resection. - Wound Class: I-Clean   - Wound Class: I-Clean   - Wound Class: II-Clean Contaminated  Ileus, as expected  Afebrile  Pathology pending      Plan:   -OK for ice chips/one cup broth/gum, await return of bowel function  -pain control: ofirmev, Dilaudid PCA  -VTE prophylaxis: lovenox  -GI prophylaxis:omeprazole  -Activity as tolerated  -Await return of bowel function         Interval History:   Comfortable in bed. Reports pain is well controlled with PCA, although does report itching with narcotics and is requesting increased benadryl dose. Tolerated broth yesterday, +flatus, +BM x2, +bloating, denies nauseas/vomiting. Up walking. Voiding independently.        Physical Exam:   Blood pressure 113/63, temperature 98.5  F (36.9  C), temperature source Oral, resp. rate 16, height 1.521 m (4' 11.9\"), weight 68.8 kg (151 lb 11.2 oz), last menstrual period 09/05/2000, SpO2 96 %, not currently breastfeeding.    I/O last 3 completed shifts:  In: 2686 [P.O.:320; I.V.:2366]  Out: 1925 [Urine:1925]    Abdomen:   soft, non-distended, tenderness noted in the epigastric region and at incisions and no masses palpated, +BS   Inc(s) - clean, dry, steristrips intact                Data:     Recent Labs   Lab Test  11/08/17   0552  11/03/17   0811  10/09/17   0930   HGB  10.4*  12.8  13.1   WBC  11.2*  7.1  6.4       Haley Valera PA-C     Clear liquids as tolerated.  Path discussed with pt and with Dr. KATERINA Weir of [facility].  Pt to follow up with oncology as an outpatient next week.    Rosa M Cristobal MD    "

## 2017-11-09 NOTE — PLAN OF CARE
Problem: Patient Care Overview  Goal: Plan of Care/Patient Progress Review  Outcome: Improving  Pain 6-7/10. pca for pain. +BS+gas, no BM. VSS with 4L o2. C/o itching, no rash noted, benadryl prn. Up sba to void. Uneventful night. States she did not sleep well

## 2017-11-09 NOTE — PLAN OF CARE
Problem: Patient Care Overview  Goal: Plan of Care/Patient Progress Review  Outcome: No Change  Pt A&O, up in halls with SBA, Tier A activity level. VSS, afebrile + sats maintained on RA while awake, placed on 2-4LPM for sleep. PCA 0.4mg dose, 2mg total with adequate pain control. BS+, +flatus this evening. NPO with ice chips, cup of broth per MD note, tolerating well without n/v.

## 2017-11-09 NOTE — PLAN OF CARE
Problem: Patient Care Overview  Goal: Plan of Care/Patient Progress Review  Outcome: Improving  BS hyper, passing flatus and now having loose frequent stools, states at baseline she has 3-10 stools daily, started on clear liquids today, pain controlled with PCA but did loose IV access and was unable to get new IV for 2 hours, potassium phosphate replacing per protocol and potassium replaced per protocol, only gave 40 meq K+ replacement as she will get some in her potassium phosphate infusion also this was discussed with pharmacy, up independently and showered today, surgeon discussed pathology results with patient and she seems to be handling the news well, will follow up with oncologist outpatient

## 2017-11-10 VITALS
HEART RATE: 89 BPM | SYSTOLIC BLOOD PRESSURE: 131 MMHG | BODY MASS INDEX: 29.78 KG/M2 | RESPIRATION RATE: 16 BRPM | DIASTOLIC BLOOD PRESSURE: 67 MMHG | OXYGEN SATURATION: 93 % | HEIGHT: 60 IN | TEMPERATURE: 96.1 F | WEIGHT: 151.7 LBS

## 2017-11-10 LAB
PHOSPHATE SERPL-MCNC: 2.4 MG/DL (ref 2.5–4.5)
PLATELET # BLD AUTO: 259 10E9/L (ref 150–450)
POTASSIUM SERPL-SCNC: 3.2 MMOL/L (ref 3.4–5.3)

## 2017-11-10 PROCEDURE — 25000132 ZZH RX MED GY IP 250 OP 250 PS 637: Performed by: SURGERY

## 2017-11-10 PROCEDURE — 25000132 ZZH RX MED GY IP 250 OP 250 PS 637: Performed by: PHYSICIAN ASSISTANT

## 2017-11-10 PROCEDURE — 84100 ASSAY OF PHOSPHORUS: CPT | Performed by: SURGERY

## 2017-11-10 PROCEDURE — 84132 ASSAY OF SERUM POTASSIUM: CPT | Performed by: SURGERY

## 2017-11-10 PROCEDURE — 36415 COLL VENOUS BLD VENIPUNCTURE: CPT | Performed by: SURGERY

## 2017-11-10 PROCEDURE — 25000128 H RX IP 250 OP 636: Performed by: SURGERY

## 2017-11-10 PROCEDURE — 85049 AUTOMATED PLATELET COUNT: CPT | Performed by: SURGERY

## 2017-11-10 PROCEDURE — 25000125 ZZHC RX 250: Performed by: SURGERY

## 2017-11-10 RX ORDER — OXYCODONE HYDROCHLORIDE 5 MG/1
5-10 TABLET ORAL
Qty: 18 TABLET | Refills: 0 | Status: SHIPPED | OUTPATIENT
Start: 2017-11-10 | End: 2017-11-13

## 2017-11-10 RX ADMIN — HYDROMORPHONE HYDROCHLORIDE: 10 INJECTION, SOLUTION INTRAMUSCULAR; INTRAVENOUS; SUBCUTANEOUS at 06:07

## 2017-11-10 RX ADMIN — OMEPRAZOLE 20 MG: 20 CAPSULE, DELAYED RELEASE ORAL at 08:56

## 2017-11-10 RX ADMIN — VENLAFAXINE 37.5 MG: 37.5 TABLET ORAL at 08:56

## 2017-11-10 RX ADMIN — DEXTROSE MONOHYDRATE 15 MMOL: 5 INJECTION, SOLUTION INTRAVENOUS at 13:25

## 2017-11-10 RX ADMIN — ENOXAPARIN SODIUM 40 MG: 40 INJECTION SUBCUTANEOUS at 09:01

## 2017-11-10 RX ADMIN — OXYCODONE HYDROCHLORIDE 10 MG: 5 TABLET ORAL at 17:58

## 2017-11-10 RX ADMIN — TRIAMTERENE AND HYDROCHLOROTHIAZIDE 1 TABLET: 37.5; 25 TABLET ORAL at 08:56

## 2017-11-10 RX ADMIN — LISINOPRIL 10 MG: 10 TABLET ORAL at 08:56

## 2017-11-10 RX ADMIN — HYDROMORPHONE HYDROCHLORIDE: 10 INJECTION, SOLUTION INTRAMUSCULAR; INTRAVENOUS; SUBCUTANEOUS at 06:21

## 2017-11-10 RX ADMIN — DIPHENHYDRAMINE HYDROCHLORIDE 50 MG: 50 CAPSULE ORAL at 10:32

## 2017-11-10 RX ADMIN — POTASSIUM CHLORIDE 20 MEQ: 1500 TABLET, EXTENDED RELEASE ORAL at 08:59

## 2017-11-10 RX ADMIN — OXYCODONE HYDROCHLORIDE 10 MG: 5 TABLET ORAL at 14:50

## 2017-11-10 RX ADMIN — ACETAMINOPHEN 650 MG: 325 TABLET, FILM COATED ORAL at 16:02

## 2017-11-10 RX ADMIN — ESTRADIOL 1 MG: 0.5 TABLET ORAL at 08:55

## 2017-11-10 ASSESSMENT — ACTIVITIES OF DAILY LIVING (ADL)
ADLS_ACUITY_SCORE: 9

## 2017-11-10 NOTE — PLAN OF CARE
Problem: Patient Care Overview  Goal: Plan of Care/Patient Progress Review  Outcome: Improving  Drowsy at midnight from ambien. After 0300 pt awake and alert. No stools tonight, enteric isolation initiated r/t reports of diarrhea x10 on evening shift. Specimen still to be collected. PCA for pain, 4mg overnight, 6/10 pain. Incisions CDI with steristrips and bruising. Hopes to advance diet today, start PO pain meds and possibly go home.

## 2017-11-10 NOTE — PLAN OF CARE
"Problem: Patient Care Overview  Goal: Plan of Care/Patient Progress Review  Outcome: No Change  Pt A&O, up in room independently. VSS, afebrile + sats maintained on 2-3LPM. C/o moderate abd pain, PCA dilaudid 0.4mg dose, 4.8mg total. BS+, +flatus, +\"10 bowel movements today.\" Clear liquids, tolerating well, -n/v. K rechecked this evening WDL, Phos replacement completed, recheck in AM.      "

## 2017-11-10 NOTE — PROGRESS NOTES
"[facility]   General Surgery Progress Note           Assessment and Plan:   Assessment:   POD#3 s/p Procedure(s):  Exploratory Laparoscopy, Laparotomy and Small Bowel resection. - Wound Class: I-Clean   - Wound Class: I-Clean   - Wound Class: II-Clean Contaminated  Ileus, resolved  Afebrile  Pathology :  1.5 cm neuroendocrine tumor      Plan:   -ADAT  -DC PCA, start PO pain meds  -DC home later today if tolerates solids and pain controlled with PO meds  -Rx done for oxycodone  -Pt to follow up with oncology as an outpatient next week         Interval History:   Comfortable in bed, pain controlled with PCA.  Tolerating clear liquid diet, about to have full liquid lunch.  + hungry.  Reports minimal bloating.  + passing flatus and multiple loose stools yesterday.  Wants to go home today.          Physical Exam:   Blood pressure 130/59, pulse 89, temperature 96.1  F (35.6  C), temperature source Oral, resp. rate 16, height 1.521 m (4' 11.9\"), weight 68.8 kg (151 lb 11.2 oz), last menstrual period 09/05/2000, SpO2 93 %, not currently breastfeeding.    I/O last 3 completed shifts:  In: 1861 [P.O.:640; I.V.:1221]  Out: 1850 [Urine:1850]    Abdomen:   soft, non-distended, tenderness noted in the epigastric region and at incisions and no masses palpated, +BS   Inc(s) - clean, dry, steristrips intact                Data:       Recent Labs  Lab 11/08/17  0552   HGB 10.4*       Recent Labs  Lab 11/08/17  0552   WBC 11.2*         Bell Schmitz PA-C     Seen and agree,    Michel Monzon MD  [facility]    "

## 2017-11-10 NOTE — DISCHARGE INSTRUCTIONS
HOME CARE FOLLOWING ABDOMINAL SURGERY  EUNICE Hernandez E. Gavin, N. Guttormson, D. Maurer, NORMA Galan   Special instructions for Connie Brunson:  --Continue soft diet (no raw fruits/vegetables) for 3-4 more days.       INCISIONAL CARE:  Replace the bandage over your incision (or incisions) until all drainage stops, or if more comfortable to have in place.  If present, leave the steri-strips (white paper tapes) in place for 14 days after surgery.  If you have staples in your incision at the time of discharge, they will be removed at your follow-up appointment.  If Dermabond (a type of skin glue) is present, leave in place until it wears/flakes off.     BATHING:  Avoid baths for 1 week after surgery.  Showers are okay.  You may wash your hair at any time.  Gently pat your incision dry after bathing.    ACTIVITY:  Light Activity -- you may immediately be up and about as tolerated.  Driving -- you may drive when comfortable and off narcotic pain medications.  Light Work -- resume when comfortable off pain medications.  (If you can drive, you probably can work.)  Strenuous Work/Activity -- limit lifting to 20 pounds for 4 weeks.  Then, progressively increase with time.  Active Sports (running, biking, etc.) -- cautiously resume after 6 weeks.    DISCOMFORT:  Use pain medications as prescribed by your surgeon.  Take the pain medication with some food, when possible, to minimize side effects.  Expect gradual improvement.    DIET:  Return to diet you were on before surgery, unless you are given specific diet instructions.  Drink plenty of fluids.  While taking pain medications, increase dietary fiber or add a fiber supplementation like Metamucil or Citrucel to help prevent constipation - a possible side effect of pain medications.    NAUSEA:  If nauseated from the anesthetic/pain meds; rest in bed, get up cautiously with assistance, and drink clear liquids (juice, tea, broth).    RETURN  APPOINTMENT:  Schedule a follow-up visit 2-3 weeks after discharge from the hospital.  Office Phone:  [phone]     CONTACT US IF THE FOLLOWING DEVELOPS:   1. A fever that is above 101     2. If there is a large amount of drainage, bleeding, or swelling.   3. Severe pain that is not relieved by your prescription.   4. Drainage that is thick, cloudy, yellow, green or white.   5. Any other questions not answered by  Frequently Asked Questions  sheet.      FREQUENTLY ASKED QUESTIONS:    Q:  How should my incision look?    A:  Normally your incision will appear slightly swollen with light redness directly along the incision itself as it heals.  It may feel like a bump or ridge as the healing/scarring happens, and over time (3-4 months) this bump or ridge feeling should slowly go away.  In general, clear or pink watery drainage can be normal at first as your incision heals, but should decrease over time.    Q:  How do I know if my incision is infected?  A:  Look at your incision for signs of infection, like redness around the incision spreading to surrounding skin, or drainage of cloudy or foul-smelling drainage.  If you feel warm, check your temperature to see if you are running a fever.    **If any of these things occur, please notify the nurse at our office.  We may need you to come into the office for an incision check.      Q:  How do I take care of my incision?  A:  If you have a dressing in place - Starting the day after surgery, replace the dressing 1-2 times a day until there is no further drainage from the incision.  At that time, a dressing is no longer needed.  Try to minimize tape on the skin if irritation is occurring at the tape sites.  If you have significant irritation from tape on the skin, please call the office to discuss other method of dressing your incision.    Small pieces of tape called  steri-strips  may be present directly overlying your incision; these may be removed 10 days after surgery  unless otherwise specified by your surgeon.  If these tapes start to loosen at the ends, you may trim them back until they fall off or are removed.    A:  If you had  Dermabond  tissue glue used as a dressing (this causes your incision to look shiny with a clear covering over it) - This type of dressing wears off with time and does not require more dressings over the top unless it is draining around the glue as it wears off.  Do not apply ointments or lotions over the incisions until the glue has completely worn off.    Q:  There is a piece of tape or a sticky  lead  still on my skin.  Can I remove this?  A:  Sometimes the sticky  leads  used for monitoring during surgery or for evaluation in the emergency department are not all removed while you are in the hospital.  These sometimes have a tab or metal dot on them.  You can easily remove these on your own, like taking off a band-aid.  If there is a gel substance under the  lead , simply wipe/clean it off with a washcloth or paper towel.      Q:  What can I do to minimize constipation (very hard stools, or lack of stools)?  A:  Stay well hydrated.  Increase your dietary fiber intake or take a fiber supplement -with plenty of water.  Walk around frequently.  You may consider an over-the-counter stool-softener.  Your Pharmacist can assist you with choosing one that is stocked at your pharmacy.  Constipation is also one of the most common side effects of pain medication.  If you are using pain medication, be pro-active and try to PREVENT problems with constipation by taking the steps above BEFORE constipation becomes a problem.    Q:  What do I do if I need more pain medications?  A:  Call the office to receive refills.  Be aware that certain pain meds cannot be called into a pharmacy and actually require a paper prescription.  A change may be made in your pain med as you progress thru your recovery period or if you have side effects to certain meds.    --Pain meds are  NOT refilled after 5pm on weekdays, and NOT AT ALL on the weekends, so please look ahead to prevent problems.      Q:  Why am I having a hard time sleeping now that I am at home?  A:  Many medications you receive while you are in the hospital can impact your sleep for a number of days after your surgery/hospitalization.  Decreased level of activity and naps during the day may also make sleeping at night difficult.  Try to minimize day-time naps, and get up frequently during the day to walk around your home during your recovery time.  Sleep aides may be of some help, but are not recommended for long-term use.      Q:  I am having some back discomfort.  What should I do?  A:  This may be related to certain positioning that was required for your surgery, extended periods of time in bed, or other changes in your overall activity level.  You may try ice, heat, acetaminophen, or ibuprofen to treat this temporarily.  Note that many pain medications have acetaminophen in them and would state this on the prescription bottle.  Be sure not to exceed the maximum of 4000mg per day of acetaminophen.     **If the pain you are having does not resolve, is severe, or is a flare of back pain you have had on other occasions prior to surgery, please contact your primary physician for further recommendations or for an appointment to be examined at their office.    Q:  Why am I having headaches?  A:  Headaches can be caused by many things:  caffeine withdrawal, use of pain meds, dehydration, high blood pressure, lack of sleep, over-activity/exhaustion, flare-up of usual migraine headaches.  If you feel this is related to muscle tension (a band-like feeling around the head, or a pressure at the low-back of the head) you may try ice or heat to this area.  You may need to drink more fluids (try electrolyte drink like Gatorade), rest, or take your usual migraine medications.   **If your headaches do not resolve, worsen, are accompanied by  other symptoms, or if your blood pressure is high, please call your primary physician for recommendation and/or examination.    Q:  I am unable to urinate.  What do I do?  A:  A small percentage of people can have difficulty urinating initially after surgery.  This includes being able to urinate only a very small amount at a time and feeling discomfort or pressure in the very low abdomen.  This is called  urinary retention , and is actually an urgent situation.  Proceed to your nearest Emergency department for evaluation (not an Urgent Care Center).  Sometimes the bladder does not work correctly after certain medications you receive during surgery, or related to certain procedures.  You may need to have a catheter placed until your bladder recovers.  When planning to go to an Emergency department, it may help to call the ER to let them know you are coming in for this problem after a surgery.  This may help you get in quicker to be evaluated.  **If you have symptoms of a urinary tract infection, please contact your primary physician for the proper evaluation and treatment.          If you have other questions, please call the office Monday thru Friday between 8am and 5pm to discuss with the nurse or physician assistant.  [phone]    There is a surgeon ON CALL on weekday evenings and over the weekend in case of urgent need only, and may be contacted at the same number.    If you are having an emergency, call 911 or proceed to your nearest emergency department.

## 2017-11-11 NOTE — PLAN OF CARE
Problem: Bowel Resection (Adult)  Goal: Signs and Symptoms of Listed Potential Problems Will be Absent, Minimized or Managed (Bowel Resection)  Signs and symptoms of listed potential problems will be absent, minimized or managed by discharge/transition of care (reference Bowel Resection (Adult) CPG).   Outcome: Adequate for Discharge Date Met:  11/10/17  Discharge education provided to patient and patient verbalized understanding of medications and orders. Patient has scheduled follow up with oncology and will follow up with surgery in 2-3 weeks.  Pain medication sent with patient.  Patient discharging to home  And transportation provided by . No further questions at this time.

## 2017-11-13 ENCOUNTER — TELEPHONE (OUTPATIENT)
Dept: FAMILY MEDICINE | Facility: CLINIC | Age: 57
End: 2017-11-13

## 2017-11-13 DIAGNOSIS — G89.18 ACUTE POST-OPERATIVE PAIN: ICD-10-CM

## 2017-11-13 RX ORDER — OXYCODONE HYDROCHLORIDE 5 MG/1
5-10 TABLET ORAL
Qty: 18 TABLET | Refills: 0 | Status: SHIPPED | OUTPATIENT
Start: 2017-11-13 | End: 2017-11-22

## 2017-11-13 NOTE — DISCHARGE SUMMARY
[facility]    Discharge Summary  Surgery    Date of Admission:  11/7/2017  Date of Discharge:  11/10/2017  7:01 PM  Discharging Provider: Michel Monzon MD  Discharge Summary Note completed by: Michael Nguyen PA-C on 11/13/2017  Date of Service: The patient was personally seen by Discharging Providers on the day of discharge.    Discharge Diagnoses   S/p exploratory laparoscopy, laparotomy and small bowel resection for a 1.5cm neuroendocrine tumor with 2 out of three mesenteric lymph nodes with metastatic disease.    Procedure/Surgery Information   Procedure(s):  Exploratory Laparoscopy, Laparotomy and Small Bowel resection. - Wound Class: I-Clean   - Wound Class: I-Clean   - Wound Class: II-Clean Contaminated   Surgeon(s) and Role:     * Rosa M Cristobal MD - Primary     * Michael Nguyen PA-C - Assisting     Specimens:   ID Type Source Tests Collected by Time Destination   A : Segment of Ileum (stitch marks distal) Tissue Colon SURGICAL PATHOLOGY EXAM Rosa M Cristobal MD 11/7/2017  1:21 PM       Non-operative procedures: None performed       History of Present Illness   Connie Brunson is a 57-year-old female with a history of sigmoid diverticulitis who was noted to have a small-bowel mass on a recent CT of the abdomen and pelvis for sigmoid diverticulitis.  This mass was confirmed with CT enterography.  The patient now presents for surgical resection. She consented and agreed to proceed.    Hospital Course   Connie Brunson was admitted on 11/7/2017.  The following problems were addressed during her hospitalization:  Patient Active Problem List   Diagnosis     Mass of small intestine- distal ileum - seen on CT scan at time of diverticulitis        Post-operative antibiotic therapy included: N/A.  Post-operative pain control: was via IV until able to tolerate PO intake and transitioned to PO pain meds.    Remarkable hospital course events: None, she recovered as  otis Rosales met all criteria for release on 11/10/2017  7:01 PM.  She was afebrile, tolerating diet, pain controlled on PO meds, ambulating well, and had return of bowel function.    Medications discontinued or adjusted during this hospitalization: see discharge med list below.    Antibiotics prescribed at discharge: None prescribed     Imaging study follow up needs:   -None performed    Discharge Instructions and Follow-Up:  Discharge diet: Regular   Discharge activity: Lifting restricted to 20 pounds   Discharge follow-up: Follow up with me in 1-3 weeks   Wound/Incision care: May get incision wet in shower but do not soak or scrub       Michael Nguyen PA-C      Discharge Disposition   Discharged to home   Condition at discharge: Stable    Pending Results   Final pathology results: 1.5cm neuroendocrine tumor, Grade 2 pT3 pN1 (see pathology results for further details.     Unresulted Labs Ordered in the Past 30 Days of this Admission     No orders found from 9/8/2017 to 11/8/2017.          Primary Care Physician   Gayatri Stephens    Consultations This Hospital Stay   None    Discharge Orders   No discharge procedures on file.  Discharge Medications   Discharge Medication List as of 11/10/2017  6:03 PM      START taking these medications    Details   oxyCODONE IR (ROXICODONE) 5 MG tablet Take 1-2 tablets (5-10 mg) by mouth every 3 hours as needed for moderate to severe pain, Disp-18 tablet, R-0, Local Print         CONTINUE these medications which have NOT CHANGED    Details   potassium chloride SA (KLOR-CON) 20 MEQ CR tablet Take 1 tablet (20 mEq) by mouth daily, Disp-30 tablet, R-0, E-Prescribe      lisinopril (PRINIVIL/ZESTRIL) 10 MG tablet Take 1 tablet (10 mg) by mouth daily, Disp-90 tablet, R-1, E-PrescribeTablet size change      estradiol (ESTRACE) 1 MG tablet TAKE ONE TABLET BY MOUTH ONCE DAILY, Disp-90 tablet, R-1, E-PrescribeTablet size change      zolpidem (AMBIEN) 5 MG tablet Take 1  tablet (5 mg) by mouth nightly as needed for sleep, Disp-30 tablet, R-5, Local Print      fenofibrate 160 MG tablet Take 1 tablet (160 mg) by mouth daily, Disp-90 tablet, R-1, E-Prescribe      venlafaxine (EFFEXOR) 37.5 MG tablet TAKE ONE TABLET BY MOUTH EVERY DAY, Disp-90 tablet, R-0, E-Prescribe      triamterene-hydrochlorothiazide (DYAZIDE) 37.5-25 MG per capsule Take 1 capsule by mouth daily, Disp-90 capsule, R-1, E-Prescribe      fluticasone (FLONASE) 50 MCG/ACT spray USE TWO SPRAYS IN EACH NOSTRIL EVERY DAY, Disp-48 g, R-1, E-Prescribe      omeprazole (PRILOSEC) 20 MG CR capsule Take 1 capsule (20 mg) by mouth daily, Disp-90 capsule, R-3, E-Prescribe      hydrocortisone 2.5 % cream Apply sparingly to dermatitis left lower eyeDisp-30 g, [id number]      triamcinolone (KENALOG) 0.5 % cream Apply topically 2 times daily as needed for irritation (to eczema or psoriatic lesions - Never use on face)Historical      cetirizine (ZYRTEC) 10 MG tablet Take 10 mg by mouth every evening, Historical      ibuprofen (ADVIL,MOTRIN) 600 MG tablet Take 1 tablet (600 mg) by mouth every 6 hours as needed for pain (mild), Disp-30 tablet, R-0, OTC           Allergies   Allergies   Allergen Reactions     Atorvastatin Nausea     Nausea and abd pain      Ceftin GI Disturbance     Stomach upset and bloating - given at same time as clindamycin ? Which one as cause.       Clindamycin GI Disturbance     Stomach upset and bloating - given at same time as ceftin, ? Which one as cause      Flagyl [Metronidazole]      immediate migraine headache      Morphine Itching     Severe with nose, facial  Swelling - oxycodone = ok /no problems      Penicillins Hives     Sulfa Drugs Rash     Severe red , flushed rash     Levaquin Rash     States she can take cipro     Data   Most Recent 3 CBC's:  Recent Labs   Lab Test  11/10/17   0800  11/08/17   0552  11/07/17   2040  11/03/17   0811  10/09/17   0930   WBC   --   11.2*   --   7.1  6.4   HGB   --    10.4*   --   12.8  13.1   MCV   --   96   --   95  93   PLT  259  277  294  338  343      Most Recent 3 BMP's:  Recent Labs   Lab Test  11/10/17   0800  11/09/17   2130  11/09/17   0810  11/08/17   0552  11/07/17   2040  11/03/17   0811  10/09/17   0930   NA   --    --    --   137   --   137  134   POTASSIUM  3.2*  3.5  3.1*  3.8   --   3.3*  3.3*   CHLORIDE   --    --    --   101   --   100  97   CO2   --    --    --   30   --   26  28   BUN   --    --    --   6*   --   16  8   CR   --    --    --   0.72  0.70  0.72  0.69   ANIONGAP   --    --    --   6   --   11  9   FADI   --    --    --   7.4*   --   9.2  9.3   GLC   --    --    --   97   --   88  101*     Most Recent 2 LFT's:  Recent Labs   Lab Test  11/03/17   0811  10/09/17   0930   AST  15  16   ALT  21  23   ALKPHOS  26*  31*   BILITOTAL  0.3  0.5     Most Recent INR's and Anticoagulation Dosing History:  Anticoagulation Dose History     Recent Dosing and Labs Latest Ref Rng & Units 10/26/2004    INR 0.86 - 1.14 0.96        Most Recent 3 Troponin's:  Recent Labs   Lab Test  04/25/16   0519   TROPI  <0.015  The 99th percentile for upper reference range is 0.045 ug/L.  Troponin values in   the range of 0.045 - 0.120 ug/L may be associated with risks of adverse   clinical events.       Most Recent Cholesterol Panel:  Recent Labs   Lab Test  08/08/17   0852   CHOL  219*   LDL  Cannot estimate LDL when triglyceride exceeds 400 mg/dL  115*   HDL  59   TRIG  476*     Most Recent 6 Bacteria Isolates From Any Culture (See EPIC Reports for Culture Details):  Recent Labs   Lab Test  04/25/16   1137  06/11/12   1228   CULT  On day 7, isolated in broth only: Propionibacterium acnes  Susceptibility testing of Propionibacterium species is not done from this   source. Our antibiogram indicates that Propionibacterum species is susceptible   to penicillin and cefotaxime and most are susceptible to clindamycin. Rocío Brody M.D., Medical Director  *  No growth  No  Beta Streptococcus isolated No growth     Most Recent TSH, T4 and A1c Labs:  Recent Labs   Lab Test  04/12/17   0843   08/20/10   1101   TSH  3.18   < >   --    T4   --    --   0.93    < > = values in this interval not displayed.     Results for orders placed or performed during the hospital encounter of 11/02/17   CT Enterography    Narrative    CT ENTEROGRAPHY November 2, 2017 9:52 AM     HISTORY: Small bowel mass.    TECHNIQUE:  CT abdomen and pelvis with 75mL Isovue-370 IV.  Enterography protocol. Radiation dose for this scan was reduced using  automated exposure control, adjustment of the mA and/or kV according  to patient size, or iterative reconstruction technique.    COMPARISON: CT abdomen and pelvis 10/9/2017. CT abdomen and pelvis  10/27/2014.    FINDINGS: Again identified is an enhancing nodular structure involving  an ileal small bowel loop at the central pelvis that measures 1.4 x  1.0 cm, stable in size, series 2 image 69. No associated bowel  obstruction. No extraluminal component of this lesion is visualized.  In retrospect, when evaluated images from a CT abdomen and pelvis from  10/27/2014 there is an apparent similar sized nodule in this region,  seen on the prior study series 2 image 59. There are no other visible  bowel focal lesions identified. Colonic assessment is limited. No  acute inflammatory change of the bowel is seen. No abscess, or  fistula.    Cholecystectomy. Liver, adrenals, spleen, pancreas, and kidneys do not  show any significant abnormalities. No enlarged lymph nodes  identified. Normal appendix.      Impression    IMPRESSION:  1. Stable enhancing nodule involving indication ileal small bowel loop  at the central pelvis level. In retrospect, this appears to be present  and stable since an older CT from 10/27/2014.  2. No active inflammatory bowel disease. No fistula, abscess, or bowel  obstruction.    ISHMAEL ASKEW MD

## 2017-11-13 NOTE — OP NOTE
PREOPERATIVE DIAGNOSIS:  Small-bowel mass.      POSTOPERATIVE DIAGNOSIS:  Mass of the distal ileum.      PROCEDURE:     1.  Exploratory laparoscopy.    2.  Laparotomy.   3.  Small-bowel resection with primary anastomosis.      SURGEON:  Rosa M Cristobal MD      ASSISTANT:  Michael Nguyen PA-C      ANESTHESIA:  General.      ESTIMATED BLOOD LOSS:  10 mL      SPECIMEN:  Segment of ileum.      INDICATIONS:  This is a 57-year-old female with a history of sigmoid diverticulitis who was noted to have a small-bowel mass on a recent CT of the abdomen and pelvis.  This mass was confirmed with CT enterography.  The patient now presents for surgical resection.      DESCRIPTION OF PROCEDURE:  After obtaining informed consent, the patient was taken to the operating room.  The abdomen was prepped and draped in standard sterile fashion.  A longitudinal incision just above the umbilicus was made with the blade.  The incision was carried down to the fascia.  The fascia was incised in the midline.  Stay sutures of 0 Vicryl were placed at the extremes of this fascial incision.  The peritoneum was entered bluntly with a Carmalt clamp.  The Flower trocar was introduced.  Two 5 mm trocars were placed in the left lower quadrant.  We began running the ileum starting at the ileocecal valve with atraumatic graspers.  A mass was noted in the distal ileum.  No other abnormalities were noted in the peritoneal cavity.  The liver, peritoneal surfaces and bowel were otherwise normal.  We extended our incision for a length of 6 cm centered around the umbilicus.  The incision was carried down to the fascia.  The fascia was divided with Bovie electrocautery.  The Akshat wound retractor was used for exposure.  The distal ileum was brought up through the laparotomy incision.  The firm mass was noted to be 15 cm proximal to the ileocecal valve.  We came across the ileum 4 cm downstream of the mass with the JASS stapler.  We came across the ileum 6 cm  upstream of the mass with a reload of the JASS 80 stapler.  The adjacent mesentery was serially ligated and divided with the LigaSure.  The specimen was marked with a suture at the distal aspect and passed off the field as specimen.  We then performed a side-to-side functional end-to-end anastomosis between the 2 limbs of ileum using a reload of the JASS 80 stapler.  The confluent defect was closed with a TA-60 stapler.  Our anastomosis was widely patent by palpation.  Our staple lines were hemostatic.  We took seromuscular bites of the adjacent limbs of small bowel with a 3-0 Vicryl suture, which was cinched down and tied to take tension off the end of the staple line.  The mesenteric defect was closed with running 2-0 Vicryl suture.  The anastomosis was dropped back into the abdomen.  We irrigated the abdomen with sterile saline and aspirated the effluent.  The fascial incision was closed with running 0 looped PDS.  The subcutaneous tissue was closed with interrupted 3-0 Vicryl sutures, and the skin was closed with a running 4-0 Vicryl subcuticular suture and Steri-Strips.  The two 5 mm trocar sites were likewise closed with interrupted 4-0 Vicryl subcuticular sutures and Steri-Strips.  The patient tolerated the procedure well.  She was transferred to the recovery room in good condition.  All sponge, needle and instrument counts were correct at the end of the case.         IGNACIO VARELA MD             D: 2017 14:15   T: 2017 20:33   MT:       Name:     DMO RENTERIA   MRN:      [id number]        Account:        [id number]   :      1960           Procedure Date: 2017      Document: [id number]

## 2017-11-13 NOTE — TELEPHONE ENCOUNTER
REFILL REQUEST GENERAL SURGERY   Refill request: first  Connie Brunson      MRN# [id number]  AGE:  57 year old     YOB: 1960  [phone] (home)   Surgeon: Dr. Cristobal  Surgical assist: Michael Nguyen PA-C      Surgery type:   1.  Exploratory laparoscopy.    2.  Laparotomy.   3.  Small-bowel resection with primary anastomosis.       Surgery Date: November / 07 / 2017   Discharged: 11/10/17      POD: 6   Connie Brunson is a 57 year old female who called requesting a refill for pain medication.  Patient medication request: Oxycodone  Type/Amount of pain medication in current use: Oxycodone (18 tablets)     Fever:  no  Incision: Patient reports  Healing well, well approximated, without signs of infection and no drainage  Last bowel movement: 1 day ago.  Are you prescribed any other pain medication by any other provider?  no   Patient does have a controlled substance agreement on file with Dr. Stephens.    Patient states agreement is for Ambien only.  Requested patient notify Dr. Stephens.  Patient gave approval for  Perry Brunson to  RX    PLAN:   Refill request forwarded to clinic [facility] for approval.  Additionally to assist with pain control patient will     Cool packs    400 mg Ibuprofen PO 4 times a day.  (with meals and at bedtime with a snack).   Patient is aware we will call after prescription has been approved by provider.  Clinic is located at [address] [place].  Hours are 8-5pm.  Patient is also aware that our clinic policy is one refill by phone triage.  After that, all patients must be physically assessed.    Connie Brunson  is recommended to contact the clinic if worsening pain, onset of fever/redness at any incision site, or new drainage from the area. Pt also recommended to call office at any time if ongoing questions/concerns during recovery. Pt is in agreement with this plan.  Nanda Yates RN

## 2017-11-13 NOTE — TELEPHONE ENCOUNTER
ED / Discharge Outreach Protocol    Patient Contact    Attempt # 1    Was call answered?  No.  Left message on voicemail with information to call me back.  Annel Garay RN  [facility]

## 2017-11-13 NOTE — TELEPHONE ENCOUNTER
RN checked with [employer] and they do not have medical leave paper work for the patient.  She said she will stop by clinic tomorrow so we can help her re-print and fax to Dr. Cristobal.    Latoya Selby, BS, RN, [facility]) [phone]

## 2017-11-13 NOTE — TELEPHONE ENCOUNTER
Patient discharged from [facility] for inpatient hospital stay on 11/10 for acute post op pain.    Please contact patient to follow up; no appointment scheduled at this time.    ER / IP:  1/1    Care Coordination:  susan Grullon

## 2017-11-13 NOTE — TELEPHONE ENCOUNTER
"ED/Discharge Protocol    \"Hi, my name is Latoya ELY Selby, a registered nurse, and I am calling on behalf of Dr. Stephens's office at [place].  I am calling to follow up and see how things are going for you after your recent visit.\"    \"I see that you were in the (ER/UC/IP) on 11/7/2017 to11/10/2017.    How are you doing now that you are home?\" \"slowly getting better\"    Is patient experiencing symptoms that may require a hospital visit?  None    Discharge Instructions    \"Let's review your discharge instructions.  What is/are the follow-up recommendations?  Pt. Response: Follow up with surgeon in 2 weeks    \"Were you instructed to make a follow-up appointment?\"  Pt. Response: Yes.  Has appointment been made?   No.  \"Can I help you schedule that appointment?\" She will call surgeon to follow up      \"When you see the provider, I would recommend that you bring your discharge instructions with you.    Medications    \"How many new medications are you on since your hospitalization/ED visit?\"    0-1  \"How many of your current medicines changed (dose, timing, name, etc.) while you were in the hospital/ED visit?\"   0-1  \"Do you have questions about your medications?\"   No  \"Were you newly diagnosed with heart failure, COPD, diabetes or did you have a heart attack?\"   No  For patients on insulin: \"Did you start on insulin in the hospital or did you have your insulin dose changed?\"   No    Medication reconciliation completed? Yes    Was MTM referral placed (*Make sure to put transitions as reason for referral)?   No    Call Summary    \"Do you have any questions or concerns about your condition or care plan at the moment?\"    Yes Needs medical leave paperwork faxed to Dr. Cristobal's office as surgeon needs to fill it out not PCP  Triage nurse advice given: Patient advised that I will look for forms and fax to Dr. Cristobal at [phone].  I advised her we will let her know.  RN advised patient to call surgeon with questions, " "concerns, fever or increased pain.    Patient was in ER 1 in the past year (assess appropriateness of ER visits.)      \"If you have questions or things don't continue to improve, we encourage you contact us through the main clinic number,  [phone].  Even if the clinic is not open, triage nurses are available 24/7 to help you.     We would like you to know that our clinic has extended hours (provide information).  We also have urgent care (provide details on closest location and hours/contact info)\"      \"Thank you for your time and take care!\"        COCO Gregory, RN, N  [facility]) [phone]            "

## 2017-11-15 ENCOUNTER — TELEPHONE (OUTPATIENT)
Dept: SURGERY | Facility: CLINIC | Age: 57
End: 2017-11-15

## 2017-11-15 NOTE — TELEPHONE ENCOUNTER
S/p exp lap, laparotomy, small bowel resection w/primary anastomosis. 11/7/17.  Surgeon:      Patient reports that on Monday morning she vomited after taking her pills. Since then she has noted that she alternates between chills and sweats. She did take her temperature this morning and it was normal: 97.6.   Incisional area remains tender, but no increased abdominal pain.  Denies, redness, swelling or drainage from incision.  +BM, one loose stool per day (this was her baseline prior to surgery).     Discussed with Bell Schmitz PA-C.  Likely this is viral.  Patient should monitor and call if any new or increased abdominal pain, fever, redness or drainage from incision.  Or if any other new or worsening symptoms.    If she should develop any upper respiratory symptoms she should call PCP.  Pt is scheduled for routine post-op appointment on 11/22/17.

## 2017-11-16 ENCOUNTER — ONCOLOGY VISIT (OUTPATIENT)
Dept: ONCOLOGY | Facility: CLINIC | Age: 57
End: 2017-11-16
Attending: INTERNAL MEDICINE
Payer: COMMERCIAL

## 2017-11-16 ENCOUNTER — CARE COORDINATION (OUTPATIENT)
Dept: ONCOLOGY | Facility: CLINIC | Age: 57
End: 2017-11-16

## 2017-11-16 ENCOUNTER — HOSPITAL ENCOUNTER (OUTPATIENT)
Facility: CLINIC | Age: 57
Setting detail: SPECIMEN
Discharge: HOME | End: 2017-11-16
Attending: SURGERY | Admitting: INTERNAL MEDICINE
Payer: COMMERCIAL

## 2017-11-16 VITALS
TEMPERATURE: 97.8 F | OXYGEN SATURATION: 97 % | DIASTOLIC BLOOD PRESSURE: 82 MMHG | RESPIRATION RATE: 16 BRPM | HEART RATE: 86 BPM | SYSTOLIC BLOOD PRESSURE: 129 MMHG | BODY MASS INDEX: 29.52 KG/M2 | WEIGHT: 150.38 LBS | HEIGHT: 60 IN

## 2017-11-16 DIAGNOSIS — C7A.8 PRIMARY MALIGNANT NEUROENDOCRINE NEOPLASM OF ILEUM (H): Primary | ICD-10-CM

## 2017-11-16 LAB
ALBUMIN SERPL-MCNC: 4 G/DL (ref 3.4–5)
ALP SERPL-CCNC: 34 U/L (ref 40–150)
ALT SERPL W P-5'-P-CCNC: 20 U/L (ref 0–50)
ANION GAP SERPL CALCULATED.3IONS-SCNC: 9 MMOL/L (ref 3–14)
AST SERPL W P-5'-P-CCNC: 12 U/L (ref 0–45)
BASOPHILS # BLD AUTO: 0.1 10E9/L (ref 0–0.2)
BASOPHILS NFR BLD AUTO: 0.7 %
BILIRUB SERPL-MCNC: 0.3 MG/DL (ref 0.2–1.3)
BUN SERPL-MCNC: 6 MG/DL (ref 7–30)
CALCIUM SERPL-MCNC: 9.2 MG/DL (ref 8.5–10.1)
CHLORIDE SERPL-SCNC: 97 MMOL/L (ref 94–109)
CO2 SERPL-SCNC: 27 MMOL/L (ref 20–32)
CREAT SERPL-MCNC: 0.75 MG/DL (ref 0.52–1.04)
DIFFERENTIAL METHOD BLD: ABNORMAL
EOSINOPHIL # BLD AUTO: 0.1 10E9/L (ref 0–0.7)
EOSINOPHIL NFR BLD AUTO: 1 %
ERYTHROCYTE [DISTWIDTH] IN BLOOD BY AUTOMATED COUNT: 11.9 % (ref 10–15)
GFR SERPL CREATININE-BSD FRML MDRD: 79 ML/MIN/1.7M2
GLUCOSE SERPL-MCNC: 112 MG/DL (ref 70–99)
HCT VFR BLD AUTO: 36.9 % (ref 35–47)
HGB BLD-MCNC: 13.1 G/DL (ref 11.7–15.7)
IMM GRANULOCYTES # BLD: 0 10E9/L (ref 0–0.4)
IMM GRANULOCYTES NFR BLD: 0.3 %
LYMPHOCYTES # BLD AUTO: 0.8 10E9/L (ref 0.8–5.3)
LYMPHOCYTES NFR BLD AUTO: 7.5 %
MCH RBC QN AUTO: 32.9 PG (ref 26.5–33)
MCHC RBC AUTO-ENTMCNC: 35.5 G/DL (ref 31.5–36.5)
MCV RBC AUTO: 93 FL (ref 78–100)
MONOCYTES # BLD AUTO: 0.5 10E9/L (ref 0–1.3)
MONOCYTES NFR BLD AUTO: 5.2 %
NEUTROPHILS # BLD AUTO: 8.7 10E9/L (ref 1.6–8.3)
NEUTROPHILS NFR BLD AUTO: 85.3 %
NRBC # BLD AUTO: 0 10*3/UL
NRBC BLD AUTO-RTO: 0 /100
PLATELET # BLD AUTO: 433 10E9/L (ref 150–450)
POTASSIUM SERPL-SCNC: 3.7 MMOL/L (ref 3.4–5.3)
PROT SERPL-MCNC: 8.3 G/DL (ref 6.8–8.8)
RBC # BLD AUTO: 3.98 10E12/L (ref 3.8–5.2)
SODIUM SERPL-SCNC: 133 MMOL/L (ref 133–144)
WBC # BLD AUTO: 10.2 10E9/L (ref 4–11)

## 2017-11-16 PROCEDURE — 86316 IMMUNOASSAY TUMOR OTHER: CPT | Performed by: INTERNAL MEDICINE

## 2017-11-16 PROCEDURE — 85025 COMPLETE CBC W/AUTO DIFF WBC: CPT | Performed by: INTERNAL MEDICINE

## 2017-11-16 PROCEDURE — 80053 COMPREHEN METABOLIC PANEL: CPT | Performed by: INTERNAL MEDICINE

## 2017-11-16 PROCEDURE — 99205 OFFICE O/P NEW HI 60 MIN: CPT | Performed by: INTERNAL MEDICINE

## 2017-11-16 ASSESSMENT — PAIN SCALES - GENERAL: PAINLEVEL: MILD PAIN (3)

## 2017-11-16 NOTE — PATIENT INSTRUCTIONS
-labs today- Drawn BC  -schedule CT chest, MRI abdomen, and octreotide scan and labs the week of 12/4/17  -return to clinic on 12/7/17 with results-Scheduled for Octreotide scan 12/4/17, 12/5/17 and 12/6/17, scheduled for Labs, MRI and CT on 12/8/17, per fercho Roberts to schedule follow-up on 12/14/17. Barby JOYNER printed and mailed to pt home address. Barby RAMSEY

## 2017-11-16 NOTE — LETTER
11/16/2017         RE: Connie Brunson  [address]  [facility] [address]        Dear Colleague,    Thank you for referring your patient, Connie Brunson, to the [facility]. Please see a copy of my visit note below.    [facility]    Hematology/Oncology New Patient Note      Today's Date: 11/16/17    Reason for Consult: neuroendocrine tumor of the ileum      HISTORY OF PRESENT ILLNESS: Connie Brunson is a 57 year old female with PMHx of HTN, HLD, depression, who presents with neuroendocrine tumor of the ileum.  In October 2017, she had a CT abdomen/pelvis done due to lower abdominal pain related to sigmoid diverticulitis and loose stools.  She had been having diarrhea for ~3 years.  It showed an incidental finding of an enhancing 1.3 cm intraluminal nodule in the distal ileum.  There was comment on radiology report on CT enterography on 11/2/17 that the nodule, in retrospect, appears to be present and stable since an older CT from 10/27/14.  On 11/7/17, she underwent a segmental small bowel resection with primary anastomosis by Dr. Cristobal.  Pathology showed a 1.5 cm tumor in the ileum, well-differentiated neuroendocrine tumor, grade 2, mitotic rate 410 HPF, Ki-67 of 5%, 2 of 3 lymph nodes were involved, stage pT3N1 (stage IIIB).      Connie is here with her daughter and  today.  She is still recovering from surgery.   She says that she feels hot and cold. She takes pain medication once or twice a day.  She is having bowel movements.          REVIEW OF SYSTEMS:   14 point ROS was reviewed and is negative other than as noted above in HPI.       HOME MEDICATIONS:  Current Outpatient Prescriptions   Medication Sig Dispense Refill     oxyCODONE IR (ROXICODONE) 5 MG tablet Take 1-2 tablets (5-10 mg) by mouth every 3 hours as needed for moderate to severe pain 18 tablet 0     potassium chloride SA (KLOR-CON) 20 MEQ CR tablet Take 1 tablet (20 mEq) by  mouth daily 30 tablet 0     lisinopril (PRINIVIL/ZESTRIL) 10 MG tablet Take 1 tablet (10 mg) by mouth daily 90 tablet 1     estradiol (ESTRACE) 1 MG tablet TAKE ONE TABLET BY MOUTH ONCE DAILY 90 tablet 1     zolpidem (AMBIEN) 5 MG tablet Take 1 tablet (5 mg) by mouth nightly as needed for sleep 30 tablet 5     fenofibrate 160 MG tablet Take 1 tablet (160 mg) by mouth daily 90 tablet 1     venlafaxine (EFFEXOR) 37.5 MG tablet TAKE ONE TABLET BY MOUTH EVERY DAY 90 tablet 0     triamterene-hydrochlorothiazide (DYAZIDE) 37.5-25 MG per capsule Take 1 capsule by mouth daily 90 capsule 1     fluticasone (FLONASE) 50 MCG/ACT spray USE TWO SPRAYS IN EACH NOSTRIL EVERY DAY 48 g 1     omeprazole (PRILOSEC) 20 MG CR capsule Take 1 capsule (20 mg) by mouth daily 90 capsule 3     hydrocortisone 2.5 % cream Apply sparingly to dermatitis left lower eye 30 g 3     triamcinolone (KENALOG) 0.5 % cream Apply topically 2 times daily as needed for irritation (to eczema or psoriatic lesions - Never use on face)       cetirizine (ZYRTEC) 10 MG tablet Take 10 mg by mouth every evening       ibuprofen (ADVIL,MOTRIN) 600 MG tablet Take 1 tablet (600 mg) by mouth every 6 hours as needed for pain (mild) 30 tablet 0         ALLERGIES:  Allergies   Allergen Reactions     Atorvastatin Nausea     Nausea and abd pain      Ceftin GI Disturbance     Stomach upset and bloating - given at same time as clindamycin ? Which one as cause.       Clindamycin GI Disturbance     Stomach upset and bloating - given at same time as ceftin, ? Which one as cause      Flagyl [Metronidazole]      immediate migraine headache      Morphine Itching     Severe with nose, facial  Swelling - oxycodone = ok /no problems      Penicillins Hives     Sulfa Drugs Rash     Severe red , flushed rash     Levaquin Rash     States she can take cipro         PAST MEDICAL HISTORY:  Past Medical History:   Diagnosis Date     Allergic rhinitis, cause unspecified     year round - dog,dust-   Failed on claritin, claritin-D, zyrtec and zyrtec-D in past.      Benign neoplasm of cerebral meninges (H) 1999    has yearly MRI's. - sees Dr. 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 Thomas ENT      Depressive disorder      Deviated nasal septum     sees Dr. Thomas ENT      Diverticulosis of colon (without mention of hemorrhage) 02-     History of Guillain-[place] syndrome     NO FLU SHOTS - very mild case in [place] many years ago     Hyperlipidemia LDL goal < 130 doesn't want statins    simvastatin = severe hand joint pain , lipitor =nausea/abd. pain     Hypertension goal BP (blood pressure) < 140/90      Insomnia     trazodone -made pt feel hung over      Major depressive disorder, recurrent episode, moderate (H)     lexapro - sexual side effects      Moderate major depression (H) 2/4/2005    trazodone -made pt feel hung over      Other acne      Other extrapyramidal disease and abnormal movement disorder     ?GBS     Symptomatic menopausal or female climacteric states     vivelle-dot          PAST SURGICAL HISTORY:  Past Surgical History:   Procedure Laterality Date     ABDOMEN SURGERY       BIOPSY       C LIGATE FALLOPIAN TUBE  1988    Tubal Ligation     C NONSPECIFIC PROCEDURE      PAROTID TUMOR L SIDE OF NECK - BENIGN     C TOTAL ABDOM HYSTERECTOMY  2000 ?    Hysterectomy, Total Abdominal with BSO, paps every 5 years      COLONOSCOPY  1/16/2012    Procedure:COLONOSCOPY; Colonoscopy; Surgeon:SHOLA JOYCE; Location: GI     HC REMOVE TONSILS/ADENOIDS,<13 Y/O      T and A, under 12 yrs     HEAD & NECK SURGERY       LAPAROSCOPIC CHOLECYSTECTOMY WITH CHOLANGIOGRAMS N/A 4/25/2016    Procedure: LAPAROSCOPIC CHOLECYSTECTOMY WITH CHOLANGIOGRAMS;  Surgeon: Rosa M Cristobal MD;  Location:  OR     LAPAROSCOPY DIAGNOSTIC (GENERAL) N/A 11/7/2017    Procedure: LAPAROSCOPY DIAGNOSTIC (GENERAL);   Exploratory Laparoscopy, Laparotomy and Small Bowel resection.;  Surgeon: Rosa M Cristobal MD;  Location: [facility] OR     LAPAROTOMY EXPLORATORY N/A 11/7/2017    Procedure: LAPAROTOMY EXPLORATORY;;  Surgeon: Rosa M Cristobal MD;  Location: [facility] OR     RESECT SMALL BOWEL WITHOUT OSTOMY N/A 11/7/2017    Procedure: RESECT SMALL BOWEL WITHOUT OSTOMY;;  Surgeon: Rosa M Cristobal MD;  Location: [facility] OR     SURGICAL HISTORY OF -   2004    Menigioma excision         SOCIAL HISTORY:  Social History     Social History     Marital status:      Spouse name: Perry Brunson      Number of children: 3     Years of education: 15     Occupational History     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  [employer]     Social History Main Topics     Smoking status: Light Tobacco Smoker     Packs/day: 0.00     Years: 5.00     Types: Other, Cigarettes     Last attempt to quit: 1/1/1994     Smokeless tobacco: Current User      Comment: 11/3/17 - occ e-cig use     Alcohol use 0.0 oz/week      Comment: 3-5 drinks per week     Drug use: No     Sexual activity: No      Comment: tubal ligation-      Other Topics Concern      Service No     Blood Transfusions No     Caffeine Concern Yes     2-3 cups coffee in am     Exercise No     regular walking 4x/week      Seat Belt Yes     always     Self-Exams Yes     SBE encouraged monthly     Parent/Sibling W/ Cabg, Mi Or Angioplasty Before 65f 55m? No     Social History Narrative    calcium - taking 500mg po qday - increase to Calcium - 1000-1200mg/day    flex sig/colonoscopy -at age 48    sun precautions - discussed    mammogram - yearly    Td booster - 1991 per our records - pt will call [facility]     pneumovax -at age 58    DEXA -this year- 2003    stool hemoccults - every year after age 40    ASA- start 81 mg ASA qday.    mulvitamin - encouraged    doing citrucel qday         from second , Dan [employer]. Now back together with her  "first  and father of their  children, Perry Brunson - 2011.          FAMILY HISTORY:  Family History   Problem Relation Age of Onset     Hyperlipidemia Mother      Thyroid Disease Mother      CANCER Father      lung     Hypertension Father      Hyperlipidemia Father      Thyroid Disease Brother      DIABETES Maternal Grandmother      CEREBROVASCULAR DISEASE Maternal Grandmother      Substance Abuse Maternal Grandfather      Thyroid Disease Son      Her father  of lung cancer and paranasal cancer at around age 65.    She has 3 children.      PHYSICAL EXAM:  Vital signs:  /82  Pulse 86  Temp 97.8  F (36.6  C) (Tympanic)  Resp 16  Ht 1.521 m (4' 11.9\")  Wt 68.2 kg (150 lb 6 oz)  LMP 2000  SpO2 97%  BMI 29.47 kg/m2   ECO  GENERAL/CONSTITUTIONAL: No acute distress. Accompanied by daughter and .  EYES: No scleral icterus.  LYMPH: No anterior cervical, posterior cervical, or supraclavicular adenopathy.   RESPIRATORY: Clear to auscultation bilaterally. No crackles or wheezing.   CARDIOVASCULAR: Regular rate and rhythm without murmurs, gallops, or rubs.  GASTROINTESTINAL: No tenderness. The patient has normal bowel sounds. No guarding.  No distention.  Surgical scars with steri-strips still in place, c/d/i, no erythema or drainage.  MUSCULOSKELETAL: Warm and well-perfused, no cyanosis, clubbing, or edema.  NEUROLOGIC: Alert, oriented, answers questions appropriately.  INTEGUMENTARY: No jaundice.  GAIT: Steady, does not use assistive device      LABS:  CBC RESULTS:   Recent Labs   Lab Test  17   0847   WBC  10.2   RBC  3.98   HGB  13.1   HCT  36.9   MCV  93   MCH  32.9   MCHC  35.5   RDW  11.9   PLT  433         PATHOLOGY:  17:  FINAL DIAGNOSIS:   Small intestine, ileum, segmental resection:     Specimen        Specimen:    Small intestine: Ileum.        Procedure:    Segmental resection        Specimen Size:   See gross description.        Primary Tumor Site: "  Ileum.        Additional Sites Involved by Tumor:   None identified        Tumor Focality:   Unifocal     Tumor        Histologic Type:   Well-differentiated neuroendocrine tumor.        Histologic Grade:    Grade 2.     Extent        Tumor Size:   1.5 cm        Microscopic Tumor Extension, Small Intestine:   Tumor invades   subserosal tissue without involvement of visceral peritoneum     Margins: Negative.   Tumor at 1.5 cm from mesenteric margin, 2.5 cm from   distal margin and 5.5 cm from proximal margin.     Accessory Findings        Mitotic Rate:   4/10 HPF.   (Ki-67 Index: 5%)        Lymph-Vascular Invasion:   Not identified        Perineural Invasion:   Not identified     Pathologic Staging (pTNM)        TNM Descriptors:        Primary Tumor (pT):    pT3:  Tumor invades through the muscularis   propria into subserosal tissue without penetration of overlying serosa.        Regional Lymph Nodes:    pN1:  Metastasis in regional lymph nodes             Number of Lymph Nodes Examined:  3             Number of Lymph Nodes Involved:   2        Distant Metastasis (pM):    pM N/A.     Ancillary Studies:    Immunohistochemical stains for chromogranin and   synaptophysin.       IMAGING:  CT abdomen/pelvis 10/9/17:  FINDINGS: Respiratory motion artifact is present at the lung bases but  the lung bases are otherwise clear.     Abdomen: Cholecystectomy changes. The liver, spleen, pancreas, adrenal  glands and kidneys are unremarkable. No hydronephrosis or renal  calculi. No enlarged lymph nodes. The bowel is normal in caliber  without obstruction. Focal inflammation is noted in the mid sigmoid  colon on series 2, image 60 and series 3, image 75 suggesting changes  of mild diverticulitis. A loop of adjacent small bowel demonstrates an  enhancing intraluminal nodule measuring 1.3 cm of uncertain  significance. Small bowel polyp or mass is possible. Appendix is  normal.     Pelvis: The bladder and rectum are unremarkable.  Trace free pelvic  fluid is noted likely from the diverticular inflammation. Bone window  examination is within normal limits.         IMPRESSION:  1. Mild acute diverticulitis in the mid sigmoid colon without  associated abscess or free intraperitoneal air.  2. Incidental finding of an enhancing 1.3 cm intraluminal nodule in  the distal ileum. A polyp or mass is possible. No other similar  findings are appreciated. No evidence of luminal obstruction.  3. Cholecystectomy changes. Upper abdominal organs are otherwise  within normal limits. No hydronephrosis.     CT enterography 11/2/17:  1. Stable enhancing nodule involving indication ileal small bowel loop  at the central pelvis level. In retrospect, this appears to be present  and stable since an older CT from 10/27/2014.  2. No active inflammatory bowel disease. No fistula, abscess, or bowel  obstruction.      ASSESSMENT/PLAN:  Connie Brunson is a 57 year old female with:    1) Neuroendocrine tumor of the ileum: s/p resection on 11/7/17, 1.5 cm, grade 2, well-differentiated, T3N1 (stage IIIB).  We reviewed the imaging and pathology, as well as the natural history of the disease.  We still need to obtain full staging scans.  This is typically an indolent tumor, with good prognosis.  We will obtain staging scans.  If there is more tumor, then we will discuss if further surgery is needed versus treatment with octreotide injections.  We will also obtain octreotide scan and labs.  She currently does not have symptoms of carcinoid syndrome, although she notes that she has had diarrhea for the last 3 years.  If her scans show no evidence of disease, then we will proceed with surveillance.  -she has post-op with Dr. Cristobal's clinic on 11/22/17  -labs today: CBC, CMP, chromogranan A, urine 5-HIAA  -schedule CT chest, MRI abdomen, and octreotide scan in ~3 weeks  -RTC in 3 weeks to review results    2) HTN, HLD  -follow-up with PCP      I spent a total of 60 minutes with  the patient, with over >50% of the time in counseling and/or coordination of care.       Mary Weir MD  Hematology/Oncology  [facility]      Again, thank you for allowing me to participate in the care of your patient.        Sincerely,        Mary Weir MD

## 2017-11-16 NOTE — PROGRESS NOTES
[facility]    Hematology/Oncology New Patient Note      Today's Date: 11/16/17    Reason for Consult: neuroendocrine tumor of the ileum      HISTORY OF PRESENT ILLNESS: Connie Brunson is a 57 year old female with PMHx of HTN, HLD, depression, who presents with neuroendocrine tumor of the ileum.  In October 2017, she had a CT abdomen/pelvis done due to lower abdominal pain related to sigmoid diverticulitis and loose stools.  She had been having diarrhea for ~3 years.  It showed an incidental finding of an enhancing 1.3 cm intraluminal nodule in the distal ileum.  There was comment on radiology report on CT enterography on 11/2/17 that the nodule, in retrospect, appears to be present and stable since an older CT from 10/27/14.  On 11/7/17, she underwent a segmental small bowel resection with primary anastomosis by Dr. Cristobal.  Pathology showed a 1.5 cm tumor in the ileum, well-differentiated neuroendocrine tumor, grade 2, mitotic rate 410 HPF, Ki-67 of 5%, 2 of 3 lymph nodes were involved, stage pT3N1 (stage IIIB).      Connie is here with her daughter and  today.  She is still recovering from surgery.   She says that she feels hot and cold. She takes pain medication once or twice a day.  She is having bowel movements.          REVIEW OF SYSTEMS:   14 point ROS was reviewed and is negative other than as noted above in HPI.       HOME MEDICATIONS:  Current Outpatient Prescriptions   Medication Sig Dispense Refill     oxyCODONE IR (ROXICODONE) 5 MG tablet Take 1-2 tablets (5-10 mg) by mouth every 3 hours as needed for moderate to severe pain 18 tablet 0     potassium chloride SA (KLOR-CON) 20 MEQ CR tablet Take 1 tablet (20 mEq) by mouth daily 30 tablet 0     lisinopril (PRINIVIL/ZESTRIL) 10 MG tablet Take 1 tablet (10 mg) by mouth daily 90 tablet 1     estradiol (ESTRACE) 1 MG tablet TAKE ONE TABLET BY MOUTH ONCE DAILY 90 tablet 1     zolpidem (AMBIEN) 5 MG tablet Take 1 tablet (5 mg) by  mouth nightly as needed for sleep 30 tablet 5     fenofibrate 160 MG tablet Take 1 tablet (160 mg) by mouth daily 90 tablet 1     venlafaxine (EFFEXOR) 37.5 MG tablet TAKE ONE TABLET BY MOUTH EVERY DAY 90 tablet 0     triamterene-hydrochlorothiazide (DYAZIDE) 37.5-25 MG per capsule Take 1 capsule by mouth daily 90 capsule 1     fluticasone (FLONASE) 50 MCG/ACT spray USE TWO SPRAYS IN EACH NOSTRIL EVERY DAY 48 g 1     omeprazole (PRILOSEC) 20 MG CR capsule Take 1 capsule (20 mg) by mouth daily 90 capsule 3     hydrocortisone 2.5 % cream Apply sparingly to dermatitis left lower eye 30 g 3     triamcinolone (KENALOG) 0.5 % cream Apply topically 2 times daily as needed for irritation (to eczema or psoriatic lesions - Never use on face)       cetirizine (ZYRTEC) 10 MG tablet Take 10 mg by mouth every evening       ibuprofen (ADVIL,MOTRIN) 600 MG tablet Take 1 tablet (600 mg) by mouth every 6 hours as needed for pain (mild) 30 tablet 0         ALLERGIES:  Allergies   Allergen Reactions     Atorvastatin Nausea     Nausea and abd pain      Ceftin GI Disturbance     Stomach upset and bloating - given at same time as clindamycin ? Which one as cause.       Clindamycin GI Disturbance     Stomach upset and bloating - given at same time as ceftin, ? Which one as cause      Flagyl [Metronidazole]      immediate migraine headache      Morphine Itching     Severe with nose, facial  Swelling - oxycodone = ok /no problems      Penicillins Hives     Sulfa Drugs Rash     Severe red , flushed rash     Levaquin Rash     States she can take cipro         PAST MEDICAL HISTORY:  Past Medical History:   Diagnosis Date     Allergic rhinitis, cause unspecified     year round - dog,dust-  Failed on claritin, claritin-D, zyrtec and zyrtec-D in past.      Benign neoplasm of cerebral meninges (H) 1999    has yearly MRI's. - sees Dr. [facility] MRI7/24/06 = no change from 7/21/05      Benign neoplasm of tonsil 7/05    ?tonsillar  re-growth - sees Dr. Thomas ENT      Depressive disorder      Deviated nasal septum     sees Dr. Thomas ENT      Diverticulosis of colon (without mention of hemorrhage) 02-     History of Guillain-Montrose syndrome     NO FLU SHOTS - very mild case in [place] many years ago     Hyperlipidemia LDL goal < 130 doesn't want statins    simvastatin = severe hand joint pain , lipitor =nausea/abd. pain     Hypertension goal BP (blood pressure) < 140/90      Insomnia     trazodone -made pt feel hung over      Major depressive disorder, recurrent episode, moderate (H)     lexapro - sexual side effects      Moderate major depression (H) 2/4/2005    trazodone -made pt feel hung over      Other acne      Other extrapyramidal disease and abnormal movement disorder     ?GBS     Symptomatic menopausal or female climacteric states     vivelle-dot          PAST SURGICAL HISTORY:  Past Surgical History:   Procedure Laterality Date     ABDOMEN SURGERY       BIOPSY       C LIGATE FALLOPIAN TUBE  1988    Tubal Ligation     C NONSPECIFIC PROCEDURE      PAROTID TUMOR L SIDE OF NECK - BENIGN     C TOTAL ABDOM HYSTERECTOMY  2000 ?    Hysterectomy, Total Abdominal with BSO, paps every 5 years      COLONOSCOPY  1/16/2012    Procedure:COLONOSCOPY; Colonoscopy; Surgeon:SHOLA JOYCE; Location: GI     HC REMOVE TONSILS/ADENOIDS,<13 Y/O      T and A, under 12 yrs     HEAD & NECK SURGERY       LAPAROSCOPIC CHOLECYSTECTOMY WITH CHOLANGIOGRAMS N/A 4/25/2016    Procedure: LAPAROSCOPIC CHOLECYSTECTOMY WITH CHOLANGIOGRAMS;  Surgeon: Rosa M Cristobal MD;  Location: [facility] OR     LAPAROSCOPY DIAGNOSTIC (GENERAL) N/A 11/7/2017    Procedure: LAPAROSCOPY DIAGNOSTIC (GENERAL);  Exploratory Laparoscopy, Laparotomy and Small Bowel resection.;  Surgeon: Rosa M Cristobal MD;  Location: [facility] OR     LAPAROTOMY EXPLORATORY N/A 11/7/2017    Procedure: LAPAROTOMY EXPLORATORY;;  Surgeon: Rosa M Cristobal MD;  Location: [facility] OR     RESECT  SMALL BOWEL WITHOUT OSTOMY N/A 11/7/2017    Procedure: RESECT SMALL BOWEL WITHOUT OSTOMY;;  Surgeon: Rosa M Cristobal MD;  Location: [facility] OR     SURGICAL HISTORY OF -   2004    Menigioma excision         SOCIAL HISTORY:  Social History     Social History     Marital status:      Spouse name: Perry Brunson      Number of children: 3     Years of education: 15     Occupational History     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  [employer]     Social History Main Topics     Smoking status: Light Tobacco Smoker     Packs/day: 0.00     Years: 5.00     Types: Other, Cigarettes     Last attempt to quit: 1/1/1994     Smokeless tobacco: Current User      Comment: 11/3/17 - occ e-cig use     Alcohol use 0.0 oz/week      Comment: 3-5 drinks per week     Drug use: No     Sexual activity: No      Comment: tubal ligation-      Other Topics Concern      Service No     Blood Transfusions No     Caffeine Concern Yes     2-3 cups coffee in am     Exercise No     regular walking 4x/week      Seat Belt Yes     always     Self-Exams Yes     SBE encouraged monthly     Parent/Sibling W/ Cabg, Mi Or Angioplasty Before 65f 55m? No     Social History Narrative    calcium - taking 500mg po qday - increase to Calcium - 1000-1200mg/day    flex sig/colonoscopy -at age 48    sun precautions - discussed    mammogram - yearly    Td booster - 1991 per our records - pt will call [facility]     pneumovax -at age 58    DEXA -this year- 2003    stool hemoccults - every year after age 38    ASA- start 81 mg ASA qday.    mulvitamin - encouraged    doing citrucel qday         from second , [employer]. Now back together with her first  and father of their  children, Perry Brunson - fall 2011.          FAMILY HISTORY:  Family History   Problem Relation Age of Onset     Hyperlipidemia Mother      Thyroid Disease Mother      CANCER Father      lung     Hypertension Father       "Hyperlipidemia Father      Thyroid Disease Brother      DIABETES Maternal Grandmother      CEREBROVASCULAR DISEASE Maternal Grandmother      Substance Abuse Maternal Grandfather      Thyroid Disease Son      Her father  of lung cancer and paranasal cancer at around age 65.    She has 3 children.      PHYSICAL EXAM:  Vital signs:  /82  Pulse 86  Temp 97.8  F (36.6  C) (Tympanic)  Resp 16  Ht 1.521 m (4' 11.9\")  Wt 68.2 kg (150 lb 6 oz)  LMP 2000  SpO2 97%  BMI 29.47 kg/m2   ECO  GENERAL/CONSTITUTIONAL: No acute distress. Accompanied by daughter and .  EYES: No scleral icterus.  LYMPH: No anterior cervical, posterior cervical, or supraclavicular adenopathy.   RESPIRATORY: Clear to auscultation bilaterally. No crackles or wheezing.   CARDIOVASCULAR: Regular rate and rhythm without murmurs, gallops, or rubs.  GASTROINTESTINAL: No tenderness. The patient has normal bowel sounds. No guarding.  No distention.  Surgical scars with steri-strips still in place, c/d/i, no erythema or drainage.  MUSCULOSKELETAL: Warm and well-perfused, no cyanosis, clubbing, or edema.  NEUROLOGIC: Alert, oriented, answers questions appropriately.  INTEGUMENTARY: No jaundice.  GAIT: Steady, does not use assistive device      LABS:  CBC RESULTS:   Recent Labs   Lab Test  17   0847   WBC  10.2   RBC  3.98   HGB  13.1   HCT  36.9   MCV  93   MCH  32.9   MCHC  35.5   RDW  11.9   PLT  433         PATHOLOGY:  17:  FINAL DIAGNOSIS:   Small intestine, ileum, segmental resection:     Specimen        Specimen:    Small intestine: Ileum.        Procedure:    Segmental resection        Specimen Size:   See gross description.        Primary Tumor Site:  Ileum.        Additional Sites Involved by Tumor:   None identified        Tumor Focality:   Unifocal     Tumor        Histologic Type:   Well-differentiated neuroendocrine tumor.        Histologic Grade:    Grade 2.     Extent        Tumor Size:   1.5 cm       "  Microscopic Tumor Extension, Small Intestine:   Tumor invades   subserosal tissue without involvement of visceral peritoneum     Margins: Negative.   Tumor at 1.5 cm from mesenteric margin, 2.5 cm from   distal margin and 5.5 cm from proximal margin.     Accessory Findings        Mitotic Rate:   4/10 HPF.   (Ki-67 Index: 5%)        Lymph-Vascular Invasion:   Not identified        Perineural Invasion:   Not identified     Pathologic Staging (pTNM)        TNM Descriptors:        Primary Tumor (pT):    pT3:  Tumor invades through the muscularis   propria into subserosal tissue without penetration of overlying serosa.        Regional Lymph Nodes:    pN1:  Metastasis in regional lymph nodes             Number of Lymph Nodes Examined:  3             Number of Lymph Nodes Involved:   2        Distant Metastasis (pM):    pM N/A.     Ancillary Studies:    Immunohistochemical stains for chromogranin and   synaptophysin.       IMAGING:  CT abdomen/pelvis 10/9/17:  FINDINGS: Respiratory motion artifact is present at the lung bases but  the lung bases are otherwise clear.     Abdomen: Cholecystectomy changes. The liver, spleen, pancreas, adrenal  glands and kidneys are unremarkable. No hydronephrosis or renal  calculi. No enlarged lymph nodes. The bowel is normal in caliber  without obstruction. Focal inflammation is noted in the mid sigmoid  colon on series 2, image 60 and series 3, image 75 suggesting changes  of mild diverticulitis. A loop of adjacent small bowel demonstrates an  enhancing intraluminal nodule measuring 1.3 cm of uncertain  significance. Small bowel polyp or mass is possible. Appendix is  normal.     Pelvis: The bladder and rectum are unremarkable. Trace free pelvic  fluid is noted likely from the diverticular inflammation. Bone window  examination is within normal limits.         IMPRESSION:  1. Mild acute diverticulitis in the mid sigmoid colon without  associated abscess or free intraperitoneal air.  2.  Incidental finding of an enhancing 1.3 cm intraluminal nodule in  the distal ileum. A polyp or mass is possible. No other similar  findings are appreciated. No evidence of luminal obstruction.  3. Cholecystectomy changes. Upper abdominal organs are otherwise  within normal limits. No hydronephrosis.     CT enterography 11/2/17:  1. Stable enhancing nodule involving indication ileal small bowel loop  at the central pelvis level. In retrospect, this appears to be present  and stable since an older CT from 10/27/2014.  2. No active inflammatory bowel disease. No fistula, abscess, or bowel  obstruction.      ASSESSMENT/PLAN:  Connie Brunson is a 57 year old female with:    1) Neuroendocrine tumor of the ileum: s/p resection on 11/7/17, 1.5 cm, grade 2, well-differentiated, T3N1 (stage IIIB).  We reviewed the imaging and pathology, as well as the natural history of the disease.  We still need to obtain full staging scans.  This is typically an indolent tumor, with good prognosis.  We will obtain staging scans.  If there is more tumor, then we will discuss if further surgery is needed versus treatment with octreotide injections.  We will also obtain octreotide scan and labs.  She currently does not have symptoms of carcinoid syndrome, although she notes that she has had diarrhea for the last 3 years.  If her scans show no evidence of disease, then we will proceed with surveillance.  -she has post-op with Dr. Cristobal's clinic on 11/22/17  -labs today: CBC, CMP, chromogranan A, urine 5-HIAA  -schedule CT chest, MRI abdomen, and octreotide scan in ~3 weeks  -RTC in 3 weeks to review results    2) HTN, HLD  -follow-up with PCP      I spent a total of 60 minutes with the patient, with over >50% of the time in counseling and/or coordination of care.       Mary Weir MD  Hematology/Oncology  [facility]

## 2017-11-16 NOTE — MR AVS SNAPSHOT
After Visit Summary   11/16/2017    Connie Brunson    MRN: [id number]           Patient Information     Date Of Birth          1960        Visit Information        Provider Department      11/16/2017 7:45 AM Mary Weir MD [facility]  Oncology [facility]      Today's Diagnoses     Primary malignant neuroendocrine neoplasm of ileum (H)    -  1      Care Instructions      -labs today- [facility]  -schedule CT chest, MRI abdomen, and octreotide scan and labs the week of 12/4/17  -return to clinic on 12/7/17 with results-Scheduled for Octreotide scan 12/4/17, 12/5/17 and 12/6/17, scheduled for Labs, MRI and CT on 12/8/17, per fercho Roberts to schedule follow-up on 12/14/17. Barby RAMSEY  AVS printed and mailed to pt home address. Barby RAMSEY              Follow-ups after your visit        Your next 10 appointments already scheduled     Nov 22, 2017  1:00 PM CST   Post Op with Annel Corbin PA-C   [facility] ([facility]    [address]  [facility] [address]   [phone]            Dec 04, 2017  8:00 AM CST   NM RADIOPHARM with RHNMINJ   [facility])    [address] [facility] [address]   [phone]            Dec 04, 2017 12:00 PM CST   NM SCAN2 with RHNM1   [facility])    [address] [facility] [address]   [phone]            Dec 05, 2017  8:00 AM CST   NM WHOLE BODY OCTREOSCAN with RHNM1   [facility] ([facility]    [address] [facility] [address]   [phone]           Please bring a list of your medicines to the exam. (Include vitamins, minerals and over-the-counter drugs.) You should wear comfortable clothes. Leave your valuables at home. Please bring related prior results and films.  Tell your doctor:   If you are  breastfeeding or may be pregnant.   If you have had a barium test within the past few days. Barium may change the results of certain exams.   If you think you may need sedation (medicine to help you relax).  You may eat and drink as normal.            Dec 06, 2017  7:30 AM CST   NM SCAN2 with RHNM1   [facility] ([facility])    [address]  [facility] [id number] [phone]            Dec 08, 2017  8:30 AM CST   Return Visit with  ONCOLOGY NURSE   [facility] ([facility])    [facility]  [address]  [facility] [id number] [phone]            Dec 08, 2017  9:30 AM CST   MR ABDOMEN W/O & W CONTRAST with RSCCMR1   [facility] ([facility])    [address]  [facility] [id number] [phone]           Take your medicines as usual, unless your doctor tells you not to. Bring a list of your current medicines to your exam (including vitamins, minerals and over-the-counter drugs). Also bring the results of similar scans you may have had.    The day before your exam, drink extra fluids at least six 8-ounce glasses (unless your doctor tells you to restrict your fluids).   Have a blood test (creatinine test) within 30 days of your exam. Go to your clinic or Diagnostic Imaging Department for this test.   Do not eat or drink for 6 hours prior to exam.  The MRI machine uses a strong magnet. Please wear clothes without metal (snaps, zippers). A sweatsuit works well, or we may give you a hospital gown.  Please remove any body piercings and hair extensions before you arrive. You will also remove watches, jewelry, hairpins, wallets, dentures, partial dental plates and hearing aids. You may wear contact lenses, and you may be able to wear your rings. We have a safe place to keep your personal items, but it is safer to leave them at home.    **IMPORTANT** THE INSTRUCTIONS BELOW ARE ONLY FOR THOSE PATIENTS WHO HAVE BEEN TOLD THEY WILL RECEIVE SEDATION OR GENERAL ANESTHESIA DURING THEIR MRI PROCEDURE:  IF YOU WILL RECEIVE SEDATION (take medicine to help you relax during your exam):   You must get the medicine from your doctor before you arrive. Bring the medicine to the exam. Do not take it at home.   Arrive one hour early. Bring someone who can take you home after the test. Your medicine will make you sleepy. After the exam, you may not drive, take a bus or take a taxi by yourself.   No eating 8 hours before your exam. You may have clear liquids up until 4 hours before your exam. (Clear liquids include water, clear tea, black coffee and fruit juice without pulp.)  IF YOU WILL RECEIVE ANESTHESIA (be asleep for your exam):   Arrive 1 1/2 hours early. Bring someone who can take you home after the test. You may not drive, take a bus or take a taxi by yourself.   No eating 8 hours before your exam. You may have clear liquids up until 4 hours before your exam. (Clear liquids include water, clear tea, black coffee and fruit juice without pulp.)  If you have any questions, please contact your Imaging Department exam site.            Dec 08, 2017 10:45 AM CST   CT CHEST W/O CONTRAST with [id number] [facility] ([facility])    [address]  [facility] [address] [phone]           Please bring any scans or X-rays taken at other hospitals, if similar tests were done. Also bring a list of your medicines, including vitamins, minerals and over-the-counter drugs. It is safest to leave personal items at home.  Be sure to tell your doctor:   If you have any allergies.   If there s any chance you are pregnant.   If you are breastfeeding.   If you have any special needs.  You do not need to do anything special to prepare.  Please wear loose clothing, such as a sweat suit or jogging clothes. Avoid snaps,  zippers and other metal. We may ask you to undress and put on a hospital gown.            Dec 14, 2017  8:45 AM CST   Return Visit with Mary Weir MD   [facility] ([facility])    [facility]  [address]  [place] [address]   [phone]              Future tests that were ordered for you today     Open Future Orders        Priority Expected Expires Ordered    CBC with platelets differential Routine 12/6/2017 11/16/2018 11/16/2017    Comprehensive metabolic panel Routine 12/6/2017 11/16/2018 11/16/2017    Chromogranin A Routine 12/6/2017 11/16/2018 11/16/2017    NM Octreoscan Whole Body Routine 12/6/2017 11/16/2018 11/16/2017    MR Abdomen w/o & w Contrast Routine 12/6/2017 11/16/2018 11/16/2017    CT Chest w/o Contrast Routine 12/6/2017 11/16/2018 11/16/2017    5-HIAA quantitative urine Routine  12/16/2017 11/16/2017            Who to contact     If you have questions or need follow up information about today's clinic visit or your schedule please contact [facility] directly at [phone].  Normal or non-critical lab and imaging results will be communicated to you by [employer], letter or phone within 4 business days after the clinic has received the results. If you do not hear from us within 7 days, please contact the clinic through [employer] or phone. If you have a critical or abnormal lab result, we will notify you by phone as soon as possible.  Submit refill requests through [employer] or call your pharmacy and they will forward the refill request to us. Please allow 3 business days for your refill to be completed.          Additional Information About Your Visit        [employer] Information     [employer] gives you secure access to your electronic health record. If you see a primary care provider, you can also send messages to your care team and make appointments. If you have questions, please call your  "primary care clinic.  If you do not have a primary care provider, please call [phone] and they will assist you.        Care EveryWhere ID     This is your Care EveryWhere ID. This could be used by other organizations to access your [facility] medical records  [id number]        Your Vitals Were     Pulse Temperature Respirations Height Last Period Pulse Oximetry    86 97.8  F (36.6  C) (Tympanic) 16 1.521 m (4' 11.9\") 09/05/2000 97%    BMI (Body Mass Index)                   29.47 kg/m2            Blood Pressure from Last 3 Encounters:   11/16/17 129/82   11/10/17 131/67   11/03/17 136/88    Weight from Last 3 Encounters:   11/16/17 68.2 kg (150 lb 6 oz)   11/07/17 68.8 kg (151 lb 11.2 oz)   11/03/17 71.2 kg (157 lb)              We Performed the Following     CBC with platelets differential     Chromogranin A     Comprehensive metabolic panel        Primary Care Provider Office Phone # Fax #    Gayatri Stephens [phone] [address] [address]        Equal Access to Services     [facility] : Hadii aad ku hadasho Sokalpeshali, waaxda luqadaha, qaybta kaalmada ademarlyyada, nithya pires . So [facility] [phone].    ATENCIÓN: Si habla español, tiene a ceron disposición servicios gratuitos de asistencia lingüística. [facility] [phone].    We comply with applicable federal civil rights laws and [place] laws. We do not discriminate on the basis of race, color, national origin, age, disability, sex, sexual orientation, or gender identity.            Thank you!     Thank you for choosing [facility]  for your care. Our goal is always to provide you with excellent care. Hearing back from our patients is one way we can continue to improve our services. Please take a few minutes to complete the written survey that you may receive in the mail after your visit with us. Thank you!             Your Updated Medication List - " Protect others around you: Learn how to safely use, store and throw away your medicines at www.disposemymeds.org.          This list is accurate as of: 11/16/17 10:41 AM.  Always use your most recent med list.                   Brand Name Dispense Instructions for use Diagnosis    cetirizine 10 MG tablet    zyrTEC     Take 10 mg by mouth every evening        estradiol 1 MG tablet    ESTRACE    90 tablet    TAKE ONE TABLET BY MOUTH ONCE DAILY    Symptomatic menopausal or female climacteric states       fenofibrate 160 MG tablet     90 tablet    Take 1 tablet (160 mg) by mouth daily    Hypertriglyceridemia       fluticasone 50 MCG/ACT spray    FLONASE    48 g    USE TWO SPRAYS IN EACH NOSTRIL EVERY DAY    Other seasonal allergic rhinitis       hydrocortisone 2.5 % cream     30 g    Apply sparingly to dermatitis left lower eye    Seborrheic dermatitis       ibuprofen 600 MG tablet    ADVIL/MOTRIN    30 tablet    Take 1 tablet (600 mg) by mouth every 6 hours as needed for pain (mild)    Acute cholecystitis       lisinopril 10 MG tablet    PRINIVIL/ZESTRIL    90 tablet    Take 1 tablet (10 mg) by mouth daily    Essential hypertension with goal blood pressure less than 140/90       omeprazole 20 MG CR capsule    priLOSEC    90 capsule    Take 1 capsule (20 mg) by mouth daily    Gastroesophageal reflux disease without esophagitis       oxyCODONE IR 5 MG tablet    ROXICODONE    18 tablet    Take 1-2 tablets (5-10 mg) by mouth every 3 hours as needed for moderate to severe pain    Acute post-operative pain       potassium chloride SA 20 MEQ CR tablet    KLOR-CON    30 tablet    Take 1 tablet (20 mEq) by mouth daily    Low blood potassium       triamcinolone 0.5 % cream    KENALOG     Apply topically 2 times daily as needed for irritation (to eczema or psoriatic lesions - Never use on face)        triamterene-hydrochlorothiazide 37.5-25 MG per capsule    DYAZIDE    90 capsule    Take 1 capsule by mouth daily    Essential  hypertension with goal blood pressure less than 140/90       venlafaxine 37.5 MG tablet    EFFEXOR    90 tablet    TAKE ONE TABLET BY MOUTH EVERY DAY    Anxiety       zolpidem 5 MG tablet    AMBIEN    30 tablet    Take 1 tablet (5 mg) by mouth nightly as needed for sleep    Primary insomnia

## 2017-11-20 LAB — CGA SERPL-MCNC: 339 NG/ML (ref 0–95)

## 2017-11-21 ENCOUNTER — TELEPHONE (OUTPATIENT)
Dept: ONCOLOGY | Facility: CLINIC | Age: 57
End: 2017-11-21

## 2017-11-21 ENCOUNTER — NURSE TRIAGE (OUTPATIENT)
Dept: NURSING | Facility: CLINIC | Age: 57
End: 2017-11-21

## 2017-11-21 DIAGNOSIS — F40.240 CLAUSTROPHOBIA: Primary | ICD-10-CM

## 2017-11-21 NOTE — TELEPHONE ENCOUNTER
Received Written Order from Dr Weir for Valium 10 mg X 1 dose only prior to MRI scan. Called pt and left message to call back.  Also sent reply back to pt with detailed instructions for medication and  and on my chart and instructed pt to call back with further questions or concerns.    Vandana Zamudio RN, BSN

## 2017-11-21 NOTE — TELEPHONE ENCOUNTER
Pt returned call. Discussed orders with patient. Pt had no questions. She requests Rx go to [facility] pharmacy. Snow Sanchez RN/FNA

## 2017-11-22 ENCOUNTER — HOSPITAL ENCOUNTER (OUTPATIENT)
Dept: LAB | Facility: CLINIC | Age: 57
Discharge: HOME | End: 2017-11-22
Attending: INTERNAL MEDICINE | Admitting: INTERNAL MEDICINE
Payer: COMMERCIAL

## 2017-11-22 ENCOUNTER — OFFICE VISIT (OUTPATIENT)
Dept: SURGERY | Facility: CLINIC | Age: 57
End: 2017-11-22
Payer: COMMERCIAL

## 2017-11-22 VITALS
SYSTOLIC BLOOD PRESSURE: 128 MMHG | HEART RATE: 74 BPM | OXYGEN SATURATION: 98 % | RESPIRATION RATE: 16 BRPM | DIASTOLIC BLOOD PRESSURE: 82 MMHG | WEIGHT: 148 LBS | HEIGHT: 59 IN | BODY MASS INDEX: 29.84 KG/M2

## 2017-11-22 DIAGNOSIS — Z09 SURGICAL FOLLOWUP VISIT: Primary | ICD-10-CM

## 2017-11-22 DIAGNOSIS — C7A.8 PRIMARY MALIGNANT NEUROENDOCRINE NEOPLASM OF ILEUM (H): ICD-10-CM

## 2017-11-22 PROCEDURE — 99024 POSTOP FOLLOW-UP VISIT: CPT | Performed by: PHYSICIAN ASSISTANT

## 2017-11-22 PROCEDURE — 83497 ASSAY OF 5-HIAA: CPT | Performed by: INTERNAL MEDICINE

## 2017-11-22 RX ORDER — DIAZEPAM 10 MG
10 TABLET ORAL ONCE
Qty: 1 TABLET | Refills: 0
Start: 2017-11-22 | End: 2017-11-22

## 2017-11-22 ASSESSMENT — PAIN SCALES - GENERAL: PAINLEVEL: MILD PAIN (2)

## 2017-11-22 NOTE — LETTER
"2017    Re: Connie Brunson - 1960    Connie Brunson is here for her first postoperative visit.  She underwent Laparoscopic exploration with conversion to laparotomy with small bowel resection by Dr. Cristobal on .  Final pathology revealed 1.5cm neuroendocrine tumor with +2/3 lymph nodes.       She is now 2 weeks postop and has seen Oncology for eval and planned outpatient lab/imaging.  After discharge from the hospital she had a few days of chills, sweats, and low energy which has now resolved.  She temporarily reverted her diet to clears and light intake.  She is now feeling like she is turning the corner and back on a better track.  No fever/chills sensation, she is tolerating a \"low fiber\" diet, having some loose stools but overall feeling her BMs are back to her baseline, and slowly having improvement of energy and stamina.  Incisions are healing well, no concerns.  She notes occasional \"acidy stomach\" postop and notes that occasional dosing of zantac has helped this.     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     She wonders today if she may stop taking potassium supplement prescribed by her PCP pre-op.     Objective:  Abd - soft, non-tender, non-distended, normal bowel sounds  Incisions - steri-strips removed, healing well, no erythema/bruising, +normal healing ridge, no seroma/hematoma noted, no hernia noted     Assessment:  S/p Laparoscopic exploration with conversion to laparotomy with small bowel resection  Pathology: 1.5cm neuroendocrine tumor with +2/3 lymph nodes     Plan:  Pt may continue to slowly advance her activities at this time.  General recommendation is to remain at a 20 lb weight restriction until 4 weeks after surgery.  After that time, she may increase activity as tolerated.  We discussed slowly increasing her diet intake as tolerated.  She " may continue to utilize OTC pain management options as well as use of ice/heat to site for comfort.  She should expect progressive resolution of the healing ridge along the incisional site over the following 2-3 months.       Recommended patient to follow-up with PCP for post-op recheck and possible labs for potassium follow-up.  She was encouraged to also discuss effectiveness of zantac for her symptoms in case alternate medication would be recommended.     Pt is recommended to return for appt with Dr Cristobal in 1-2 weeks for recheck as well to ensure her diet/energy is improving and bowel sensitivity is resolving prior to her return to work.  Connie is in agreement with this plan.     Annel Corbin PA-C

## 2017-11-22 NOTE — PROGRESS NOTES
"[facility] Note 11/22/2017  Subjective:  Connie Brunson is here for her first postoperative visit.  She underwent Laparoscopic exploration with conversion to laparotomy with small bowel resection by Dr. Cristobal on November/07.  Final pathology revealed 1.5cm neuroendocrine tumor with +2/3 lymph nodes.      She is now 2 weeks postop and has seen Oncology for eval and planned outpatient lab/imaging.  After discharge from the hospital she had a few days of chills, sweats, and low energy which has now resolved.  She temporarily reverted her diet to clears and light intake.  She is now feeling like she is turning the corner and back on a better track.  No fever/chills sensation, she is tolerating a \"low fiber\" diet, having some loose stools but overall feeling her BMs are back to her baseline, and slowly having improvement of energy and stamina.  Incisions are healing well, no concerns.  She notes occasional \"acidy stomach\" postop and notes that occasional dosing of zantac has helped this.    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    She wonders today if she may stop taking potassium supplement prescribed by her PCP pre-op.    Objective:  Abd - soft, non-tender, non-distended, normal bowel sounds  Incisions - steri-strips removed, healing well, no erythema/bruising, +normal healing ridge, no seroma/hematoma noted, no hernia noted    Assessment:  S/p Laparoscopic exploration with conversion to laparotomy with small bowel resection  Pathology: 1.5cm neuroendocrine tumor with +2/3 lymph nodes    Plan:  Pt may continue to slowly advance her activities at this time.  General recommendation is to remain at a 20 lb weight restriction until 4 weeks after surgery.  After that time, she may increase activity as tolerated.  We discussed slowly increasing her diet intake as tolerated.  She may " continue to utilize OTC pain management options as well as use of ice/heat to site for comfort.  She should expect progressive resolution of the healing ridge along the incisional site over the following 2-3 months.      Recommended patient to follow-up with PCP for post-op recheck and possible labs for potassium follow-up.  She was encouraged to also discuss effectiveness of zantac for her symptoms in case alternate medication would be recommended.    Pt is recommended to return for appt with Dr Cristobal in 1-2 weeks for recheck as well to ensure her diet/energy is improving and bowel sensitivity is resolving prior to her return to work.  Connie is in agreement with this plan.    Annel Corbin PA-C    Please route or send letter to:  Primary Care Provider (PCP)

## 2017-11-22 NOTE — MR AVS SNAPSHOT
After Visit Summary   11/22/2017    Connie Brunson    MRN: [id number]           Patient Information     Date Of Birth          1960        Visit Information        Provider Department      11/22/2017 1:00 PM Annel Corbin PA-C [facility] General Surgery      Today's Diagnoses     Surgical followup visit    -  1       Follow-ups after your visit        Follow-up notes from your care team     Return in about 1 week (around 11/29/2017).      Your next 10 appointments already scheduled     Dec 04, 2017  8:00 AM CST   NM RADIOPHARM with RHNMINJ   [facility] ([facility])    [address] [facility] [address]   [phone]            Dec 04, 2017 12:00 PM CST   NM SCAN2 with RHNM1   [facility] ([facility])    [address] [facility] [address]   [phone]            Dec 05, 2017  8:00 AM CST   NM WHOLE BODY OCTREOSCAN with NM1   [facility] ([facility])    [address] [facility] [address]   [phone]           Please bring a list of your medicines to the exam. (Include vitamins, minerals and over-the-counter drugs.) You should wear comfortable clothes. Leave your valuables at home. Please bring related prior results and films.  Tell your doctor:   If you are breastfeeding or may be pregnant.   If you have had a barium test within the past few days. Barium may change the results of certain exams.   If you think you may need sedation (medicine to help you relax).  You may eat and drink as normal.            Dec 06, 2017  7:30 AM CST   NM SCAN2 with NM1   [facility] ([facility])    [address] [facility] [address]   [phone]            Dec 08, 2017  8:30 AM CST   Return Visit with  ONCOLOGY NURSE   [facility]  [facility])    [facility]  [address]  [facility] [address]   [phone]            Dec 08, 2017  9:30 AM CST   MR ABDOMEN W/O & W CONTRAST with RSCCMR1   [facility] ([facility])    [address]  [facility] [address]   [phone]           Take your medicines as usual, unless your doctor tells you not to. Bring a list of your current medicines to your exam (including vitamins, minerals and over-the-counter drugs). Also bring the results of similar scans you may have had.    The day before your exam, drink extra fluids at least six 8-ounce glasses (unless your doctor tells you to restrict your fluids).   Have a blood test (creatinine test) within 30 days of your exam. Go to your clinic or Diagnostic Imaging Department for this test.   Do not eat or drink for 6 hours prior to exam.  The MRI machine uses a strong magnet. Please wear clothes without metal (snaps, zippers). A sweatsuit works well, or we may give you a hospital gown.  Please remove any body piercings and hair extensions before you arrive. You will also remove watches, jewelry, hairpins, wallets, dentures, partial dental plates and hearing aids. You may wear contact lenses, and you may be able to wear your rings. We have a safe place to keep your personal items, but it is safer to leave them at home.   **IMPORTANT** THE INSTRUCTIONS BELOW ARE ONLY FOR THOSE PATIENTS WHO HAVE BEEN TOLD THEY WILL RECEIVE SEDATION OR GENERAL ANESTHESIA DURING THEIR MRI PROCEDURE:  IF YOU WILL RECEIVE SEDATION (take medicine to help you relax during your exam):   You must get the medicine from your doctor before you arrive. Bring the medicine to the exam. Do not take it at home.   Arrive one hour early. Bring someone who can take you home after the test. Your medicine will make you sleepy. After the exam, you may not drive, take a bus or take a taxi by yourself.    No eating 8 hours before your exam. You may have clear liquids up until 4 hours before your exam. (Clear liquids include water, clear tea, black coffee and fruit juice without pulp.)  IF YOU WILL RECEIVE ANESTHESIA (be asleep for your exam):   Arrive 1 1/2 hours early. Bring someone who can take you home after the test. You may not drive, take a bus or take a taxi by yourself.   No eating 8 hours before your exam. You may have clear liquids up until 4 hours before your exam. (Clear liquids include water, clear tea, black coffee and fruit juice without pulp.)  If you have any questions, please contact your Imaging Department exam site.            Dec 08, 2017 10:45 AM CST   CT CHEST W/O CONTRAST with [address] [facility] ([facility])    [address]  [facility] [id number] [phone]           Please bring any scans or X-rays taken at other hospitals, if similar tests were done. Also bring a list of your medicines, including vitamins, minerals and over-the-counter drugs. It is safest to leave personal items at home.  Be sure to tell your doctor:   If you have any allergies.   If there s any chance you are pregnant.   If you are breastfeeding.   If you have any special needs.  You do not need to do anything special to prepare.  Please wear loose clothing, such as a sweat suit or jogging clothes. Avoid snaps, zippers and other metal. We may ask you to undress and put on a hospital gown.            Dec 14, 2017  8:45 AM CST   Return Visit with Mary Weir MD   [facility] ([facility])    [facility]  [address]  [facility] [address] [phone]              Who to contact     If you have questions or need follow up information about today's clinic visit or your schedule please contact [facility] directly at [phone].  Normal or  "non-critical lab and imaging results will be communicated to you by MyChart, letter or phone within 4 business days after the clinic has received the results. If you do not hear from us within 7 days, please contact the clinic through [employer] or phone. If you have a critical or abnormal lab result, we will notify you by phone as soon as possible.  Submit refill requests through [employer] or call your pharmacy and they will forward the refill request to us. Please allow 3 business days for your refill to be completed.          Additional Information About Your Visit        [employer] Information     [employer] gives you secure access to your electronic health record. If you see a primary care provider, you can also send messages to your care team and make appointments. If you have questions, please call your primary care clinic.  If you do not have a primary care provider, please call [phone] and they will assist you.        Care EveryWhere ID     This is your Care EveryWhere ID. This could be used by other organizations to access your [place] medical records  [id number]        Your Vitals Were     Pulse Respirations Height Last Period Pulse Oximetry Breastfeeding?    74 16 4' 11\" (1.499 m) 09/05/2000 98% No    BMI (Body Mass Index)                   29.89 kg/m2            Blood Pressure from Last 3 Encounters:   11/22/17 128/82   11/16/17 129/82   11/10/17 131/67    Weight from Last 3 Encounters:   11/22/17 148 lb (67.1 kg)   11/16/17 150 lb 6 oz (68.2 kg)   11/07/17 151 lb 11.2 oz (68.8 kg)              Today, you had the following     No orders found for display       Primary Care Provider Office Phone # Fax #    Gayatri Stephens [phone] [address] [facility] [address]        Equal Access to Services     IVA MCKENZIE : Danish Brown, landon figueroa, nithya arteaga. So [facility] [phone].    ATENCIÓN: " Si habla ping, tiene a ceron disposición servicios gratuitos de asistencia lingüística. Marina marie [phone].    We comply with applicable federal civil rights laws and [place] laws. We do not discriminate on the basis of race, color, national origin, age, disability, sex, sexual orientation, or gender identity.            Thank you!     Thank you for choosing [facility]  for your care. Our goal is always to provide you with excellent care. Hearing back from our patients is one way we can continue to improve our services. Please take a few minutes to complete the written survey that you may receive in the mail after your visit with us. Thank you!             Your Updated Medication List - Protect others around you: Learn how to safely use, store and throw away your medicines at www.disposemymeds.org.          This list is accurate as of: 11/22/17  1:56 PM.  Always use your most recent med list.                   Brand Name Dispense Instructions for use Diagnosis    cetirizine 10 MG tablet    zyrTEC     Take 10 mg by mouth every evening        diazepam 10 MG tablet    VALIUM    1 tablet    Take 1 tablet (10 mg) by mouth once for 1 dose Take 30-60 minutes before procedure.  Do not operate a vehicle after taking this medication.    Claustrophobia       estradiol 1 MG tablet    ESTRACE    90 tablet    TAKE ONE TABLET BY MOUTH ONCE DAILY    Symptomatic menopausal or female climacteric states       fenofibrate 160 MG tablet     90 tablet    Take 1 tablet (160 mg) by mouth daily    Hypertriglyceridemia       fluticasone 50 MCG/ACT spray    FLONASE    48 g    USE TWO SPRAYS IN EACH NOSTRIL EVERY DAY    Other seasonal allergic rhinitis       hydrocortisone 2.5 % cream     30 g    Apply sparingly to dermatitis left lower eye    Seborrheic dermatitis       ibuprofen 600 MG tablet    ADVIL/MOTRIN    30 tablet    Take 1 tablet (600 mg) by mouth every 6 hours as needed for pain (mild)    Acute  cholecystitis       lisinopril 10 MG tablet    PRINIVIL/ZESTRIL    90 tablet    Take 1 tablet (10 mg) by mouth daily    Essential hypertension with goal blood pressure less than 140/90       omeprazole 20 MG CR capsule    priLOSEC    90 capsule    Take 1 capsule (20 mg) by mouth daily    Gastroesophageal reflux disease without esophagitis       potassium chloride SA 20 MEQ CR tablet    KLOR-CON    30 tablet    Take 1 tablet (20 mEq) by mouth daily    Low blood potassium       triamcinolone 0.5 % cream    KENALOG     Apply topically 2 times daily as needed for irritation (to eczema or psoriatic lesions - Never use on face)        triamterene-hydrochlorothiazide 37.5-25 MG per capsule    DYAZIDE    90 capsule    Take 1 capsule by mouth daily    Essential hypertension with goal blood pressure less than 140/90       venlafaxine 37.5 MG tablet    EFFEXOR    90 tablet    TAKE ONE TABLET BY MOUTH EVERY DAY    Anxiety       zolpidem 5 MG tablet    AMBIEN    30 tablet    Take 1 tablet (5 mg) by mouth nightly as needed for sleep    Primary insomnia

## 2017-11-22 NOTE — TELEPHONE ENCOUNTER
Noted that pt returned call to nurse line after hours and pt received message about medication.  Prescription for Valium called to [facility] on 11/21/17 to [phone].    Vandana Zamudio, RN, BSN

## 2017-11-25 LAB
5HIAA & CREATININE UR-IMP: NORMAL
5OH-INDOLEACETATE 24H UR-MCNC: 3.1 MG/L
5OH-INDOLEACETATE 24H UR-MRATE: 7 MG/D (ref 0–15)
5OH-INDOLEACETATE/CREAT 24H UR: 6 MG/GCR (ref 0–14)
COLLECT DURATION TIME UR: 24 HR
CREAT 24H UR-MRATE: 1020 MG/D (ref 500–1400)
CREAT SERPL-MCNC: 48 MG/DL
SPECIMEN VOL ?TM UR: 2125 ML

## 2017-11-27 ENCOUNTER — TELEPHONE (OUTPATIENT)
Dept: FAMILY MEDICINE | Facility: CLINIC | Age: 57
End: 2017-11-27

## 2017-11-27 NOTE — TELEPHONE ENCOUNTER
Reason for Call:  Other med question    Detailed comments: Pt would like to know if she should stoop taking medication potassium chloride SA (KLOR-CON) 20 MEQ CR tablet before her surgery.    Phone Number Patient can be reached at: Home number on file [phone] (home)    Best Time: anytime    Can we leave a detailed message on this number? YES    Call taken on 11/27/2017 at 11:38 AM by Kerry Brown

## 2017-11-27 NOTE — TELEPHONE ENCOUNTER
Preop PX done on 11/03/2017 by DWIGHT WHALEN    Note states:   --Patient is to take all scheduled medications on the day of surgery EXCEPT for modifications listed below.   ACE Inhibitor or Angiotensin Receptor Blocker (ARB) Use  Ace inhibitor or Angiotensin Receptor Blocker (ARB) and should HOLD this medication for the 24 hours prior to surgery.      Routing to DWIGHT WHALEN for further review/recommendations/orders.    Vero Jones RN  [facility]

## 2017-11-27 NOTE — TELEPHONE ENCOUNTER
Reason for Call:  Same Day Appointment, Requested Provider:  Gayatri Stephens MD    PCP: Gayatri Stephens    Reason for visit: surgery 11/7, saw oncology recently and advised to follow up with Dr RUVALCABA for med check - stop Estradiol and increase in Fenofibrate (check dose, has 160 but previously took 120 she thinks. Has not taken any for a week.)    Duration of symptoms:     Have you been treated for this in the past? Yes    Additional comments:     Can we leave a detailed message on this number? YES    Phone number patient can be reached at: Home number on file [phone] (home)    Best Time: anytime    Call taken on 11/27/2017 at 11:07 AM by Nay Grullon

## 2017-11-28 ENCOUNTER — MYC MEDICAL ADVICE (OUTPATIENT)
Dept: FAMILY MEDICINE | Facility: CLINIC | Age: 57
End: 2017-11-28

## 2017-11-28 DIAGNOSIS — C7A.8 PRIMARY MALIGNANT NEUROENDOCRINE NEOPLASM OF ILEUM (H): ICD-10-CM

## 2017-11-28 DIAGNOSIS — F43.22 ADJUSTMENT DISORDER WITH ANXIOUS MOOD: Primary | ICD-10-CM

## 2017-11-29 RX ORDER — LORAZEPAM 0.5 MG/1
.25-.5 TABLET ORAL EVERY 8 HOURS PRN
Qty: 10 TABLET | Refills: 0 | Status: SHIPPED | OUTPATIENT
Start: 2017-11-29 | End: 2018-11-30

## 2017-11-29 NOTE — TELEPHONE ENCOUNTER
If oncology wants pt to stop the estrogen, then stop.   Ok to restart fenofibrate for high triglycerides, if she can.   The reason for the potassium supplement is that pt is on triamterene/HCTZ 37.5/25 which can lower potassium.   I'd recommend checking a basic metabolic panel now that pt is recovered from surgery a bit more and seeing where K level is - if it is normal, then stay with current regimen, including potassium supplementation.   Ok for #10 lorazepam - rx at [facility], please bring to me later this am when back in clinic and I'll sign.  ? To our pharmacy?

## 2017-12-04 ENCOUNTER — HOSPITAL ENCOUNTER (OUTPATIENT)
Dept: NUCLEAR MEDICINE | Facility: CLINIC | Age: 57
Setting detail: NUCLEAR MEDICINE
End: 2017-12-04
Attending: INTERNAL MEDICINE
Payer: COMMERCIAL

## 2017-12-04 ENCOUNTER — HOSPITAL ENCOUNTER (OUTPATIENT)
Dept: NUCLEAR MEDICINE | Facility: CLINIC | Age: 57
Setting detail: NUCLEAR MEDICINE
Discharge: HOME | End: 2017-12-04
Attending: INTERNAL MEDICINE | Admitting: INTERNAL MEDICINE
Payer: COMMERCIAL

## 2017-12-04 DIAGNOSIS — C7A.8 PRIMARY MALIGNANT NEUROENDOCRINE NEOPLASM OF ILEUM (H): ICD-10-CM

## 2017-12-04 PROCEDURE — A9572 INDIUM IN-111 PENTETREOTIDE: HCPCS | Performed by: INTERNAL MEDICINE

## 2017-12-04 PROCEDURE — 34300033 ZZH RX 343: Performed by: INTERNAL MEDICINE

## 2017-12-04 RX ADMIN — INDIUM IN -111 PENTETREOTIDE 6.2 MCI.: KIT at 08:28

## 2017-12-05 ENCOUNTER — HOSPITAL ENCOUNTER (OUTPATIENT)
Dept: NUCLEAR MEDICINE | Facility: CLINIC | Age: 57
Setting detail: NUCLEAR MEDICINE
End: 2017-12-05
Attending: INTERNAL MEDICINE
Payer: COMMERCIAL

## 2017-12-06 ENCOUNTER — HOSPITAL ENCOUNTER (OUTPATIENT)
Dept: NUCLEAR MEDICINE | Facility: CLINIC | Age: 57
Setting detail: NUCLEAR MEDICINE
Discharge: HOME | End: 2017-12-06
Attending: INTERNAL MEDICINE | Admitting: INTERNAL MEDICINE
Payer: COMMERCIAL

## 2017-12-06 PROCEDURE — A9572 INDIUM IN-111 PENTETREOTIDE: HCPCS | Performed by: INTERNAL MEDICINE

## 2017-12-06 PROCEDURE — 34300033 ZZH RX 343: Performed by: INTERNAL MEDICINE

## 2017-12-06 PROCEDURE — 78803 RP LOCLZJ TUM SPECT 1 AREA: CPT

## 2017-12-08 ENCOUNTER — HOSPITAL ENCOUNTER (OUTPATIENT)
Facility: CLINIC | Age: 57
Setting detail: SPECIMEN
Discharge: HOME | End: 2017-12-08
Attending: INTERNAL MEDICINE | Admitting: INTERNAL MEDICINE
Payer: COMMERCIAL

## 2017-12-08 ENCOUNTER — HOSPITAL ENCOUNTER (OUTPATIENT)
Dept: MRI IMAGING | Facility: CLINIC | Age: 57
Discharge: HOME | End: 2017-12-08
Attending: INTERNAL MEDICINE | Admitting: INTERNAL MEDICINE
Payer: COMMERCIAL

## 2017-12-08 ENCOUNTER — HOSPITAL ENCOUNTER (OUTPATIENT)
Dept: CT IMAGING | Facility: CLINIC | Age: 57
Discharge: HOME | End: 2017-12-08
Attending: INTERNAL MEDICINE | Admitting: INTERNAL MEDICINE
Payer: COMMERCIAL

## 2017-12-08 ENCOUNTER — ONCOLOGY VISIT (OUTPATIENT)
Dept: ONCOLOGY | Facility: CLINIC | Age: 57
End: 2017-12-08
Attending: INTERNAL MEDICINE
Payer: COMMERCIAL

## 2017-12-08 DIAGNOSIS — C7A.8 PRIMARY MALIGNANT NEUROENDOCRINE NEOPLASM OF ILEUM (H): ICD-10-CM

## 2017-12-08 LAB
ALBUMIN SERPL-MCNC: 3.8 G/DL (ref 3.4–5)
ALP SERPL-CCNC: 33 U/L (ref 40–150)
ALT SERPL W P-5'-P-CCNC: 21 U/L (ref 0–50)
ANION GAP SERPL CALCULATED.3IONS-SCNC: 9 MMOL/L (ref 3–14)
AST SERPL W P-5'-P-CCNC: 14 U/L (ref 0–45)
BASOPHILS # BLD AUTO: 0.1 10E9/L (ref 0–0.2)
BASOPHILS NFR BLD AUTO: 1.3 %
BILIRUB SERPL-MCNC: 0.3 MG/DL (ref 0.2–1.3)
BUN SERPL-MCNC: 13 MG/DL (ref 7–30)
CALCIUM SERPL-MCNC: 8.8 MG/DL (ref 8.5–10.1)
CHLORIDE SERPL-SCNC: 100 MMOL/L (ref 94–109)
CO2 SERPL-SCNC: 26 MMOL/L (ref 20–32)
CREAT SERPL-MCNC: 0.73 MG/DL (ref 0.52–1.04)
DIFFERENTIAL METHOD BLD: NORMAL
EOSINOPHIL # BLD AUTO: 0.3 10E9/L (ref 0–0.7)
EOSINOPHIL NFR BLD AUTO: 5.4 %
ERYTHROCYTE [DISTWIDTH] IN BLOOD BY AUTOMATED COUNT: 12.6 % (ref 10–15)
GFR SERPL CREATININE-BSD FRML MDRD: 82 ML/MIN/1.7M2
GLUCOSE SERPL-MCNC: 100 MG/DL (ref 70–99)
HCT VFR BLD AUTO: 35.8 % (ref 35–47)
HGB BLD-MCNC: 12.5 G/DL (ref 11.7–15.7)
IMM GRANULOCYTES # BLD: 0 10E9/L (ref 0–0.4)
IMM GRANULOCYTES NFR BLD: 0.4 %
LYMPHOCYTES # BLD AUTO: 1.3 10E9/L (ref 0.8–5.3)
LYMPHOCYTES NFR BLD AUTO: 25.8 %
MCH RBC QN AUTO: 32.3 PG (ref 26.5–33)
MCHC RBC AUTO-ENTMCNC: 34.9 G/DL (ref 31.5–36.5)
MCV RBC AUTO: 93 FL (ref 78–100)
MONOCYTES # BLD AUTO: 0.4 10E9/L (ref 0–1.3)
MONOCYTES NFR BLD AUTO: 7.7 %
NEUTROPHILS # BLD AUTO: 3.1 10E9/L (ref 1.6–8.3)
NEUTROPHILS NFR BLD AUTO: 59.4 %
NRBC # BLD AUTO: 0 10*3/UL
NRBC BLD AUTO-RTO: 0 /100
PLATELET # BLD AUTO: 319 10E9/L (ref 150–450)
POTASSIUM SERPL-SCNC: 3.6 MMOL/L (ref 3.4–5.3)
PROT SERPL-MCNC: 7.4 G/DL (ref 6.8–8.8)
RBC # BLD AUTO: 3.87 10E12/L (ref 3.8–5.2)
SODIUM SERPL-SCNC: 135 MMOL/L (ref 133–144)
WBC # BLD AUTO: 5.2 10E9/L (ref 4–11)

## 2017-12-08 PROCEDURE — 25000128 H RX IP 250 OP 636: Performed by: INTERNAL MEDICINE

## 2017-12-08 PROCEDURE — 25000128 H RX IP 250 OP 636: Performed by: RADIOLOGY

## 2017-12-08 PROCEDURE — 36415 COLL VENOUS BLD VENIPUNCTURE: CPT

## 2017-12-08 PROCEDURE — 74183 MRI ABD W/O CNTR FLWD CNTR: CPT

## 2017-12-08 PROCEDURE — 71260 CT THORAX DX C+: CPT

## 2017-12-08 PROCEDURE — 80053 COMPREHEN METABOLIC PANEL: CPT | Performed by: INTERNAL MEDICINE

## 2017-12-08 PROCEDURE — 85025 COMPLETE CBC W/AUTO DIFF WBC: CPT | Performed by: INTERNAL MEDICINE

## 2017-12-08 PROCEDURE — A9585 GADOBUTROL INJECTION: HCPCS | Performed by: INTERNAL MEDICINE

## 2017-12-08 PROCEDURE — 86316 IMMUNOASSAY TUMOR OTHER: CPT | Performed by: INTERNAL MEDICINE

## 2017-12-08 RX ORDER — IOPAMIDOL 755 MG/ML
500 INJECTION, SOLUTION INTRAVASCULAR ONCE
Status: COMPLETED | OUTPATIENT
Start: 2017-12-08 | End: 2017-12-08

## 2017-12-08 RX ORDER — GADOBUTROL 604.72 MG/ML
7.5 INJECTION INTRAVENOUS ONCE
Status: COMPLETED | OUTPATIENT
Start: 2017-12-08 | End: 2017-12-08

## 2017-12-08 RX ADMIN — IOPAMIDOL 80 ML: 755 INJECTION, SOLUTION INTRAVENOUS at 10:05

## 2017-12-08 RX ADMIN — SODIUM CHLORIDE 60 ML: 9 INJECTION, SOLUTION INTRAVENOUS at 10:05

## 2017-12-08 RX ADMIN — GADOBUTROL 6.5 ML: 604.72 INJECTION INTRAVENOUS at 09:10

## 2017-12-08 NOTE — MR AVS SNAPSHOT
After Visit Summary   12/8/2017    Connie Brunson    MRN: [id number]           Patient Information     Date Of Birth          1960        Visit Information        Provider Department      12/8/2017 8:30 AM  ONCOLOGY NURSE [facility]        Today's Diagnoses     Primary malignant neuroendocrine neoplasm of ileum (H)           Follow-ups after your visit        Your next 10 appointments already scheduled     Dec 08, 2017  9:30 AM CST   MR ABDOMEN W/O & W CONTRAST with RSCCMR1   [facility]    [address]  [facility] [address] [phone]           Take your medicines as usual, unless your doctor tells you not to. Bring a list of your current medicines to your exam (including vitamins, minerals and over-the-counter drugs). Also bring the results of similar scans you may have had.    The day before your exam, drink extra fluids at least six 8-ounce glasses (unless your doctor tells you to restrict your fluids).   Have a blood test (creatinine test) within 30 days of your exam. Go to your clinic or Diagnostic Imaging Department for this test.   Do not eat or drink for 6 hours prior to exam.  The MRI machine uses a strong magnet. Please wear clothes without metal (snaps, zippers). A sweatsuit works well, or we may give you a hospital gown.  Please remove any body piercings and hair extensions before you arrive. You will also remove watches, jewelry, hairpins, wallets, dentures, partial dental plates and hearing aids. You may wear contact lenses, and you may be able to wear your rings. We have a safe place to keep your personal items, but it is safer to leave them at home.   **IMPORTANT** THE INSTRUCTIONS BELOW ARE ONLY FOR THOSE PATIENTS WHO HAVE BEEN TOLD THEY WILL RECEIVE SEDATION OR GENERAL ANESTHESIA DURING THEIR MRI PROCEDURE:  IF YOU WILL RECEIVE SEDATION (take medicine to help you  relax during your exam):   You must get the medicine from your doctor before you arrive. Bring the medicine to the exam. Do not take it at home.   Arrive one hour early. Bring someone who can take you home after the test. Your medicine will make you sleepy. After the exam, you may not drive, take a bus or take a taxi by yourself.   No eating 8 hours before your exam. You may have clear liquids up until 4 hours before your exam. (Clear liquids include water, clear tea, black coffee and fruit juice without pulp.)  IF YOU WILL RECEIVE ANESTHESIA (be asleep for your exam):   Arrive 1 1/2 hours early. Bring someone who can take you home after the test. You may not drive, take a bus or take a taxi by yourself.   No eating 8 hours before your exam. You may have clear liquids up until 4 hours before your exam. (Clear liquids include water, clear tea, black coffee and fruit juice without pulp.)  If you have any questions, please contact your Imaging Department exam site.            Dec 08, 2017 10:45 AM CST   CT CHEST W/O CONTRAST with [address] [facility] [place] ([facility])    [address]  [facility] [address] [phone]           Please bring any scans or X-rays taken at other hospitals, if similar tests were done. Also bring a list of your medicines, including vitamins, minerals and over-the-counter drugs. It is safest to leave personal items at home.  Be sure to tell your doctor:   If you have any allergies.   If there s any chance you are pregnant.   If you are breastfeeding.   If you have any special needs.  You do not need to do anything special to prepare.  Please wear loose clothing, such as a sweat suit or jogging clothes. Avoid snaps, zippers and other metal. We may ask you to undress and put on a hospital gown.            Dec 14, 2017  8:45 AM CST   Return Visit with Mary Weir MD   [facility]  [facility])    [facility]  [address]  [facility] [address] [phone]              Who to contact     If you have questions or need follow up information about today's clinic visit or your schedule please contact [facility] directly at [phone].  Normal or non-critical lab and imaging results will be communicated to you by MyChart, letter or phone within 4 business days after the clinic has received the results. If you do not hear from us within 7 days, please contact the clinic through MyChart or phone. If you have a critical or abnormal lab result, we will notify you by phone as soon as possible.  Submit refill requests through [employer] or call your pharmacy and they will forward the refill request to us. Please allow 3 business days for your refill to be completed.          Additional Information About Your Visit        [employer] Information     [employer] gives you secure access to your electronic health record. If you see a primary care provider, you can also send messages to your care team and make appointments. If you have questions, please call your primary care clinic.  If you do not have a primary care provider, please call [phone] and they will assist you.        Care EveryWhere ID     This is your Care EveryWhere ID. This could be used by other organizations to access your [place] medical records  [id number]        Your Vitals Were     Last Period                   09/05/2000            Blood Pressure from Last 3 Encounters:   11/22/17 128/82   11/16/17 129/82   11/10/17 131/67    Weight from Last 3 Encounters:   11/22/17 67.1 kg (148 lb)   11/16/17 68.2 kg (150 lb 6 oz)   11/07/17 68.8 kg (151 lb 11.2 oz)              We Performed the Following     CBC with platelets differential     Chromogranin A     Comprehensive metabolic panel        Primary Care Provider Office Phone # Fax #    Gayatri Stephens [phone]  [phone]       [address] [facility] [address]        Equal Access to Services     IVA MCKENZIE : Hadii aad ku haddyandaksha Brown, waeduarda clyderikkiha, qaybta kagianlucada jeremiahcapoeulalio, waxay idiin haysinanandres wardgumaroroberto camejo. So [facility] [phone].    ATENCIÓN: Si habla español, tiene a ceron disposición servicios gratuitos de asistencia lingüística. Aravindame al [phone].    We comply with applicable federal civil rights laws and [place] laws. We do not discriminate on the basis of race, color, national origin, age, disability, sex, sexual orientation, or gender identity.            Thank you!     Thank you for choosing [facility]  for your care. Our goal is always to provide you with excellent care. Hearing back from our patients is one way we can continue to improve our services. Please take a few minutes to complete the written survey that you may receive in the mail after your visit with us. Thank you!             Your Updated Medication List - Protect others around you: Learn how to safely use, store and throw away your medicines at www.disposemymeds.org.          This list is accurate as of: 12/8/17  8:47 AM.  Always use your most recent med list.                   Brand Name Dispense Instructions for use Diagnosis    cetirizine 10 MG tablet    zyrTEC     Take 10 mg by mouth every evening        estradiol 1 MG tablet    ESTRACE    90 tablet    TAKE ONE TABLET BY MOUTH ONCE DAILY    Symptomatic menopausal or female climacteric states       fenofibrate 160 MG tablet     90 tablet    Take 1 tablet (160 mg) by mouth daily    Hypertriglyceridemia       fluticasone 50 MCG/ACT spray    FLONASE    48 g    USE TWO SPRAYS IN EACH NOSTRIL EVERY DAY    Other seasonal allergic rhinitis       hydrocortisone 2.5 % cream     30 g    Apply sparingly to dermatitis left lower eye    Seborrheic dermatitis       ibuprofen 600 MG tablet    ADVIL/MOTRIN    30 tablet    Take 1 tablet (600 mg) by mouth  every 6 hours as needed for pain (mild)    Acute cholecystitis       lisinopril 10 MG tablet    PRINIVIL/ZESTRIL    90 tablet    Take 1 tablet (10 mg) by mouth daily    Essential hypertension with goal blood pressure less than 140/90       LORazepam 0.5 MG tablet    ATIVAN    10 tablet    Take 0.5-1 tablets (0.25-0.5 mg) by mouth every 8 hours as needed for anxiety    Adjustment disorder with anxious mood       omeprazole 20 MG CR capsule    priLOSEC    90 capsule    Take 1 capsule (20 mg) by mouth daily    Gastroesophageal reflux disease without esophagitis       potassium chloride SA 20 MEQ CR tablet    KLOR-CON    30 tablet    Take 1 tablet (20 mEq) by mouth daily    Low blood potassium       triamcinolone 0.5 % cream    KENALOG     Apply topically 2 times daily as needed for irritation (to eczema or psoriatic lesions - Never use on face)        triamterene-hydrochlorothiazide 37.5-25 MG per capsule    DYAZIDE    90 capsule    Take 1 capsule by mouth daily    Essential hypertension with goal blood pressure less than 140/90       venlafaxine 37.5 MG tablet    EFFEXOR    90 tablet    TAKE ONE TABLET BY MOUTH EVERY DAY    Anxiety       zolpidem 5 MG tablet    AMBIEN    30 tablet    Take 1 tablet (5 mg) by mouth nightly as needed for sleep    Primary insomnia

## 2017-12-08 NOTE — LETTER
12/8/2017         RE: Connie Brunson  [address]  [place] [address]        Dear Colleague,    Thank you for referring your patient, Connie Brunson, to the [facility]. Please see a copy of my visit note below.    HPI      ROS      Physical Exam    See nursing note    Again, thank you for allowing me to participate in the care of your patient.        Sincerely,        [facility] Oncology Nurse

## 2017-12-08 NOTE — NURSING NOTE
Medical Assistant Note:  Connie Brunson presents today for Blood Draw.    Patient seen by provider today: No.   present during visit today: Not Applicable.    Concerns: No Concerns.    Procedure:  Labs drawn: Yes.    Post Assessment:  Labs drawn without difficulty: Yes.    Discharge Plan:  Patient discharged in stable condition accompanied by: self.    Face to Face Time: 15 min.    Hodan Holliday MA

## 2017-12-14 ENCOUNTER — ONCOLOGY VISIT (OUTPATIENT)
Dept: ONCOLOGY | Facility: CLINIC | Age: 57
End: 2017-12-14
Attending: INTERNAL MEDICINE
Payer: COMMERCIAL

## 2017-12-14 VITALS
HEART RATE: 107 BPM | WEIGHT: 150 LBS | OXYGEN SATURATION: 95 % | TEMPERATURE: 97.6 F | SYSTOLIC BLOOD PRESSURE: 137 MMHG | RESPIRATION RATE: 16 BRPM | HEIGHT: 59 IN | DIASTOLIC BLOOD PRESSURE: 80 MMHG | BODY MASS INDEX: 30.24 KG/M2

## 2017-12-14 DIAGNOSIS — C7A.8 PRIMARY MALIGNANT NEUROENDOCRINE NEOPLASM OF ILEUM (H): Primary | ICD-10-CM

## 2017-12-14 DIAGNOSIS — R91.8 PULMONARY NODULES: ICD-10-CM

## 2017-12-14 PROCEDURE — 99214 OFFICE O/P EST MOD 30 MIN: CPT | Performed by: INTERNAL MEDICINE

## 2017-12-14 ASSESSMENT — PAIN SCALES - GENERAL: PAINLEVEL: NO PAIN (0)

## 2017-12-14 NOTE — PATIENT INSTRUCTIONS
-schedule CT chest, MRI abdomen, and labs in 3 months, a few days prior to the next appointment  Scheduled  Klarissa GALLEGOS    -return to clinic in 3 months Scheduled  Klarissa GALLEGOS  AVS given to patient

## 2017-12-14 NOTE — PROGRESS NOTES
[facility]    Hematology/Oncology Established Patient Note      Today's Date: 12/14/17    Reason for Follow-up: neuroendocrine tumor of the ileum      HISTORY OF PRESENT ILLNESS: Connie Brunson is a 57 year old female with PMHx of HTN, HLD, depression, who presents with neuroendocrine tumor of the ileum.  In October 2017, she had a CT abdomen/pelvis done due to lower abdominal pain related to sigmoid diverticulitis and loose stools.  She had been having diarrhea for ~3 years.  It showed an incidental finding of an enhancing 1.3 cm intraluminal nodule in the distal ileum.  There was comment on radiology report on CT enterography on 11/2/17 that the nodule, in retrospect, appears to be present and stable since an older CT from 10/27/14.  On 11/7/17, she underwent a segmental small bowel resection with primary anastomosis by Dr. Cristobal.  Pathology showed a 1.5 cm tumor in the ileum, well-differentiated neuroendocrine tumor, grade 2, mitotic rate 410 HPF, Ki-67 of 5%, 2 of 3 lymph nodes were involved, stage pT3N1 (stage IIIB).          INTERIM HISTORY: Connie returns for follow-up today.  She is feeling better since surgery.  She still has diarrhea, but feels that it is getting better since surgery.  She feels like she is still healing from surgery and not fully recovered.        REVIEW OF SYSTEMS:   14 point ROS was reviewed and is negative other than as noted above in HPI.       HOME MEDICATIONS:  Current Outpatient Prescriptions   Medication Sig Dispense Refill     LORazepam (ATIVAN) 0.5 MG tablet Take 0.5-1 tablets (0.25-0.5 mg) by mouth every 8 hours as needed for anxiety 10 tablet 0     potassium chloride SA (KLOR-CON) 20 MEQ CR tablet Take 1 tablet (20 mEq) by mouth daily 30 tablet 0     lisinopril (PRINIVIL/ZESTRIL) 10 MG tablet Take 1 tablet (10 mg) by mouth daily 90 tablet 1     zolpidem (AMBIEN) 5 MG tablet Take 1 tablet (5 mg) by mouth nightly as needed for sleep 30 tablet 5      fenofibrate 160 MG tablet Take 1 tablet (160 mg) by mouth daily 90 tablet 1     venlafaxine (EFFEXOR) 37.5 MG tablet TAKE ONE TABLET BY MOUTH EVERY DAY 90 tablet 0     triamterene-hydrochlorothiazide (DYAZIDE) 37.5-25 MG per capsule Take 1 capsule by mouth daily 90 capsule 1     fluticasone (FLONASE) 50 MCG/ACT spray USE TWO SPRAYS IN EACH NOSTRIL EVERY DAY 48 g 1     omeprazole (PRILOSEC) 20 MG CR capsule Take 1 capsule (20 mg) by mouth daily 90 capsule 3     hydrocortisone 2.5 % cream Apply sparingly to dermatitis left lower eye 30 g 3     triamcinolone (KENALOG) 0.5 % cream Apply topically 2 times daily as needed for irritation (to eczema or psoriatic lesions - Never use on face)       cetirizine (ZYRTEC) 10 MG tablet Take 10 mg by mouth every evening       ibuprofen (ADVIL,MOTRIN) 600 MG tablet Take 1 tablet (600 mg) by mouth every 6 hours as needed for pain (mild) 30 tablet 0         ALLERGIES:  Allergies   Allergen Reactions     Atorvastatin Nausea     Nausea and abd pain      Ceftin GI Disturbance     Stomach upset and bloating - given at same time as clindamycin ? Which one as cause.       Clindamycin GI Disturbance     Stomach upset and bloating - given at same time as ceftin, ? Which one as cause      Flagyl [Metronidazole]      immediate migraine headache      Morphine Itching     Severe with nose, facial  Swelling - oxycodone = ok /no problems      Penicillins Hives     Sulfa Drugs Rash     Severe red , flushed rash     Levaquin Rash     States she can take cipro         PAST MEDICAL HISTORY:  Past Medical History:   Diagnosis Date     Allergic rhinitis, cause unspecified     year round - dog,dust-  Failed on claritin, claritin-D, zyrtec and zyrtec-D in past.      Benign neoplasm of cerebral meninges (H) 1999    has yearly MRI's. - sees [facility]. 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 Thomas ENT       Depressive disorder      Deviated nasal septum     sees Dr. Thomas ENT      Diverticulosis of colon (without mention of hemorrhage) 02-     History of Guillain-[place] syndrome     NO FLU SHOTS - very mild case in [place] many years ago     Hyperlipidemia LDL goal < 130 doesn't want statins    simvastatin = severe hand joint pain , lipitor =nausea/abd. pain     Hypertension goal BP (blood pressure) < 140/90      Insomnia     trazodone -made pt feel hung over      Major depressive disorder, recurrent episode, moderate (H)     lexapro - sexual side effects      Moderate major depression (H) 2/4/2005    trazodone -made pt feel hung over      Other acne      Other extrapyramidal disease and abnormal movement disorder     ?GBS     Symptomatic menopausal or female climacteric states     vivelle-dot          PAST SURGICAL HISTORY:  Past Surgical History:   Procedure Laterality Date     ABDOMEN SURGERY       BIOPSY       C LIGATE FALLOPIAN TUBE  1988    Tubal Ligation     C NONSPECIFIC PROCEDURE      PAROTID TUMOR L SIDE OF NECK - BENIGN     C TOTAL ABDOM HYSTERECTOMY  2000 ?    Hysterectomy, Total Abdominal with BSO, paps every 5 years      COLONOSCOPY  1/16/2012    Procedure:COLONOSCOPY; Colonoscopy; Surgeon:SHOLA JOYCE; Location: GI     HC REMOVE TONSILS/ADENOIDS,<11 Y/O      T and A, under 12 yrs     HEAD & NECK SURGERY       LAPAROSCOPIC CHOLECYSTECTOMY WITH CHOLANGIOGRAMS N/A 4/25/2016    Procedure: LAPAROSCOPIC CHOLECYSTECTOMY WITH CHOLANGIOGRAMS;  Surgeon: Rosa M Cristobal MD;  Location:  OR     LAPAROSCOPY DIAGNOSTIC (GENERAL) N/A 11/7/2017    Procedure: LAPAROSCOPY DIAGNOSTIC (GENERAL);  Exploratory Laparoscopy, Laparotomy and Small Bowel resection.;  Surgeon: Rosa M Cristobal MD;  Location:  OR     LAPAROTOMY EXPLORATORY N/A 11/7/2017    Procedure: LAPAROTOMY EXPLORATORY;;  Surgeon: Rosa M Cristobal MD;  Location:  OR     RESECT SMALL BOWEL WITHOUT OSTOMY N/A 11/7/2017     Procedure: RESECT SMALL BOWEL WITHOUT OSTOMY;;  Surgeon: Rosa M Cristobal MD;  Location: [facility] OR     SURGICAL HISTORY OF -   2004    Menigioma excision         SOCIAL HISTORY:  Social History     Social History     Marital status:      Spouse name: Perry Brunson      Number of children: 3     Years of education: 15     Occupational History     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  [employer]     Social History Main Topics     Smoking status: Light Tobacco Smoker     Packs/day: 0.00     Years: 5.00     Types: Other, Cigarettes     Last attempt to quit: 1/1/1994     Smokeless tobacco: Current User      Comment: 11/3/17 - occ e-cig use     Alcohol use 0.0 oz/week      Comment: 3-5 drinks per week     Drug use: No     Sexual activity: No      Comment: tubal ligation-      Other Topics Concern      Service No     Blood Transfusions No     Caffeine Concern Yes     2-3 cups coffee in am     Exercise No     regular walking 4x/week      Seat Belt Yes     always     Self-Exams Yes     SBE encouraged monthly     Parent/Sibling W/ Cabg, Mi Or Angioplasty Before 65f 55m? No     Social History Narrative    calcium - taking 500mg po qday - increase to Calcium - 1000-1200mg/day    flex sig/colonoscopy -at age 48    sun precautions - discussed    mammogram - yearly    Td booster - 1991 per our records - pt will call [facility]     pneumovax -at age 58    DEXA -this year- 2003    stool hemoccults - every year after age 38    ASA- start 81 mg ASA qday.    mulvitamin - encouraged    doing citrucel qday         from second , Dan [employer]en[employer]. Now back together with her first  and father of their  children, Perry Brunson - fall 2011.          FAMILY HISTORY:  Family History   Problem Relation Age of Onset     Hyperlipidemia Mother      Thyroid Disease Mother      CANCER Father      lung     Hypertension Father      Hyperlipidemia Father      Thyroid Disease  "Brother      DIABETES Maternal Grandmother      CEREBROVASCULAR DISEASE Maternal Grandmother      Substance Abuse Maternal Grandfather      Thyroid Disease Son      Her father  of lung cancer and paranasal cancer at around age 65.    She has 3 children.      PHYSICAL EXAM:  Vital signs:  /80  Pulse 107  Temp 97.6  F (36.4  C) (Esophageal)  Resp 16  Ht 1.499 m (4' 11\")  Wt 68 kg (150 lb)  LMP 2000  SpO2 95%  BMI 30.3 kg/m2   ECO  GENERAL/CONSTITUTIONAL: No acute distress. Accompanied by daughter and .  EYES: No scleral icterus.  NEUROLOGIC: Alert, oriented, answers questions appropriately.  INTEGUMENTARY: No jaundice.      LABS:  CBC RESULTS:   Recent Labs   Lab Test  17   0835   WBC  5.2   RBC  3.87   HGB  12.5   HCT  35.8   MCV  93   MCH  32.3   MCHC  34.9   RDW  12.6   PLT  319     Recent Labs   Lab Test  17   0835  17   0847   NA  135  133   POTASSIUM  3.6  3.7   CHLORIDE  100  97   CO2  26  27   ANIONGAP  9  9   GLC  100*  112*   BUN  13  6*   CR  0.73  0.75   FADI  8.8  9.2     Lab Results   Component Value Date    AST 14 2017     Lab Results   Component Value Date    ALT 21 2017     No results found for: BILICONJ   Lab Results   Component Value Date    BILITOTAL 0.3 2017     Lab Results   Component Value Date    ALBUMIN 3.8 2017     Lab Results   Component Value Date    PROTTOTAL 7.4 2017      Lab Results   Component Value Date    ALKPHOS 33 2017     Component      Latest Ref Rng & Units 2017   Chromogranin A      0 - 95 ng/mL 339 (H)       PATHOLOGY:  17:  FINAL DIAGNOSIS:   Small intestine, ileum, segmental resection:     Specimen        Specimen:    Small intestine: Ileum.        Procedure:    Segmental resection        Specimen Size:   See gross description.        Primary Tumor Site:  Ileum.        Additional Sites Involved by Tumor:   None identified        Tumor Focality:   Unifocal     Tumor       "  Histologic Type:   Well-differentiated neuroendocrine tumor.        Histologic Grade:    Grade 2.     Extent        Tumor Size:   1.5 cm        Microscopic Tumor Extension, Small Intestine:   Tumor invades   subserosal tissue without involvement of visceral peritoneum     Margins: Negative.   Tumor at 1.5 cm from mesenteric margin, 2.5 cm from   distal margin and 5.5 cm from proximal margin.     Accessory Findings        Mitotic Rate:   4/10 HPF.   (Ki-67 Index: 5%)        Lymph-Vascular Invasion:   Not identified        Perineural Invasion:   Not identified     Pathologic Staging (pTNM)        TNM Descriptors:        Primary Tumor (pT):    pT3:  Tumor invades through the muscularis   propria into subserosal tissue without penetration of overlying serosa.        Regional Lymph Nodes:    pN1:  Metastasis in regional lymph nodes             Number of Lymph Nodes Examined:  3             Number of Lymph Nodes Involved:   2        Distant Metastasis (pM):    pM N/A.     Ancillary Studies:    Immunohistochemical stains for chromogranin and   synaptophysin.       IMAGING:  Octreotide scan 12/6/17:  No suspicious focal tracer uptake identified to suggest  convincing metastatic disease.    MRI abdomen 12/8/17:  No evidence for metastatic disease.    CT chest with contrast 12/8/17:  Indeterminate 2 x 3 mm anterior right mid lung nodular  density. Otherwise normal chest CT.      ASSESSMENT/PLAN:  Connie Brunson is a 57 year old female with:    1) Neuroendocrine tumor of the ileum: s/p resection on 11/7/17, 1.5 cm, grade 2, well-differentiated, T3N1 (stage IIIB).  Post-op scans snow no evidence of metastatic disease.  Octreotide scan shows no suspicious focal tracer uptake to suggest convincing metastatic disease.    We discussed surveillance going forward.  Will obtain scans again in 3 months.  Connie would like to go to every 6 months after that, if the scans look good, which is reasonable.    -CT chest and MRI abdomen in 3  months  -labs in 3 months: CBC, CMP, chromogranin A  -RTC in 3 months    2) HTN, HLD  -follow-up with PCP    3) History of meningioma: diagnosed in 1999, was getting yearly MRI's with neurosurgery, but was told she does not need surveillance MRI's anymore unless she has new neurologic symptoms.    4) Pulmonary nodule: There is an indeterminate 2 x 3 mm anterior right mid-lung nodule seen on CT.  She does have history of smoking.  -monitor on scans      I spent a total of 25 minutes with the patient, with over >50% of the time in counseling and/or coordination of care.       Mary Weir MD  Hematology/Oncology  [facility]

## 2017-12-14 NOTE — NURSING NOTE
"Oncology Rooming Note    December 14, 2017 8:58 AM   Connie Brunson is a 57 year old female who presents for:    Chief Complaint   Patient presents with     Oncology Clinic Visit     Follow up     Initial Vitals: /80  Pulse 107  Temp 97.6  F (36.4  C) (Esophageal)  Resp 16  Ht 1.499 m (4' 11\")  Wt 68 kg (150 lb)  LMP 09/05/2000  SpO2 95%  BMI 30.3 kg/m2 Estimated body mass index is 30.3 kg/(m^2) as calculated from the following:    Height as of this encounter: 1.499 m (4' 11\").    Weight as of this encounter: 68 kg (150 lb). Body surface area is 1.68 meters squared.  No Pain (0) Comment: Data Unavailable   Patient's last menstrual period was 09/05/2000.  Allergies reviewed: Yes  Medications reviewed: Yes    Medications: Medication refills not needed today.  Pharmacy name entered into [facility]:    [facility] [place], [place] - [address] [place] [address]  WRITTEN PRESCRIPTION REQUESTED  [facility] [id number] [place] [facility] [address] [facility]    Clinical concerns: follow up     0 minutes for nursing intake (face to face time)     Patti Garcia, FRANKLYN              "

## 2017-12-14 NOTE — LETTER
12/14/2017         RE: Connie Brunson  [address]  [facility] [address]        Dear Colleague,    Thank you for referring your patient, Connie Brunson, to the [facility]. Please see a copy of my visit note below.    [facility]    Hematology/Oncology Established Patient Note      Today's Date: 12/14/17    Reason for Follow-up: neuroendocrine tumor of the ileum      HISTORY OF PRESENT ILLNESS: Connie Brunson is a 57 year old female with PMHx of HTN, HLD, depression, who presents with neuroendocrine tumor of the ileum.  In October 2017, she had a CT abdomen/pelvis done due to lower abdominal pain related to sigmoid diverticulitis and loose stools.  She had been having diarrhea for ~3 years.  It showed an incidental finding of an enhancing 1.3 cm intraluminal nodule in the distal ileum.  There was comment on radiology report on CT enterography on 11/2/17 that the nodule, in retrospect, appears to be present and stable since an older CT from 10/27/14.  On 11/7/17, she underwent a segmental small bowel resection with primary anastomosis by Dr. Cristobal.  Pathology showed a 1.5 cm tumor in the ileum, well-differentiated neuroendocrine tumor, grade 2, mitotic rate 410 HPF, Ki-67 of 5%, 2 of 3 lymph nodes were involved, stage pT3N1 (stage IIIB).          INTERIM HISTORY: Connie returns for follow-up today.  She is feeling better since surgery.  She still has diarrhea, but feels that it is getting better since surgery.  She feels like she is still healing from surgery and not fully recovered.        REVIEW OF SYSTEMS:   14 point ROS was reviewed and is negative other than as noted above in HPI.       HOME MEDICATIONS:  Current Outpatient Prescriptions   Medication Sig Dispense Refill     LORazepam (ATIVAN) 0.5 MG tablet Take 0.5-1 tablets (0.25-0.5 mg) by mouth every 8 hours as needed for anxiety 10 tablet 0     potassium chloride SA (KLOR-CON) 20 MEQ CR  tablet Take 1 tablet (20 mEq) by mouth daily 30 tablet 0     lisinopril (PRINIVIL/ZESTRIL) 10 MG tablet Take 1 tablet (10 mg) by mouth daily 90 tablet 1     zolpidem (AMBIEN) 5 MG tablet Take 1 tablet (5 mg) by mouth nightly as needed for sleep 30 tablet 5     fenofibrate 160 MG tablet Take 1 tablet (160 mg) by mouth daily 90 tablet 1     venlafaxine (EFFEXOR) 37.5 MG tablet TAKE ONE TABLET BY MOUTH EVERY DAY 90 tablet 0     triamterene-hydrochlorothiazide (DYAZIDE) 37.5-25 MG per capsule Take 1 capsule by mouth daily 90 capsule 1     fluticasone (FLONASE) 50 MCG/ACT spray USE TWO SPRAYS IN EACH NOSTRIL EVERY DAY 48 g 1     omeprazole (PRILOSEC) 20 MG CR capsule Take 1 capsule (20 mg) by mouth daily 90 capsule 3     hydrocortisone 2.5 % cream Apply sparingly to dermatitis left lower eye 30 g 3     triamcinolone (KENALOG) 0.5 % cream Apply topically 2 times daily as needed for irritation (to eczema or psoriatic lesions - Never use on face)       cetirizine (ZYRTEC) 10 MG tablet Take 10 mg by mouth every evening       ibuprofen (ADVIL,MOTRIN) 600 MG tablet Take 1 tablet (600 mg) by mouth every 6 hours as needed for pain (mild) 30 tablet 0         ALLERGIES:  Allergies   Allergen Reactions     Atorvastatin Nausea     Nausea and abd pain      Ceftin GI Disturbance     Stomach upset and bloating - given at same time as clindamycin ? Which one as cause.       Clindamycin GI Disturbance     Stomach upset and bloating - given at same time as ceftin, ? Which one as cause      Flagyl [Metronidazole]      immediate migraine headache      Morphine Itching     Severe with nose, facial  Swelling - oxycodone = ok /no problems      Penicillins Hives     Sulfa Drugs Rash     Severe red , flushed rash     Levaquin Rash     States she can take cipro         PAST MEDICAL HISTORY:  Past Medical History:   Diagnosis Date     Allergic rhinitis, cause unspecified     year round - dog,dust-  Failed on claritin, claritin-D, zyrtec and  zyrtec-D in past.      Benign neoplasm of cerebral meninges (H) 1999    has yearly MRI's. - 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 Thomas ENT      Depressive disorder      Deviated nasal septum     sees Dr. Thomas ENT      Diverticulosis of colon (without mention of hemorrhage) 02-     History of Guillain-Randolph syndrome     NO FLU SHOTS - very mild case in [place] many years ago     Hyperlipidemia LDL goal < 130 doesn't want statins    simvastatin = severe hand joint pain , lipitor =nausea/abd. pain     Hypertension goal BP (blood pressure) < 140/90      Insomnia     trazodone -made pt feel hung over      Major depressive disorder, recurrent episode, moderate (H)     lexapro - sexual side effects      Moderate major depression (H) 2/4/2005    trazodone -made pt feel hung over      Other acne      Other extrapyramidal disease and abnormal movement disorder     ?GBS     Symptomatic menopausal or female climacteric states     vivelle-dot          PAST SURGICAL HISTORY:  Past Surgical History:   Procedure Laterality Date     ABDOMEN SURGERY       BIOPSY       C LIGATE FALLOPIAN TUBE  1988    Tubal Ligation     C NONSPECIFIC PROCEDURE      PAROTID TUMOR L SIDE OF NECK - BENIGN     C TOTAL ABDOM HYSTERECTOMY  2000 ?    Hysterectomy, Total Abdominal with BSO, paps every 5 years      COLONOSCOPY  1/16/2012    Procedure:COLONOSCOPY; Colonoscopy; Surgeon:SHOLA JOYCE; Location: GI     HC REMOVE TONSILS/ADENOIDS,<11 Y/O      T and A, under 12 yrs     HEAD & NECK SURGERY       LAPAROSCOPIC CHOLECYSTECTOMY WITH CHOLANGIOGRAMS N/A 4/25/2016    Procedure: LAPAROSCOPIC CHOLECYSTECTOMY WITH CHOLANGIOGRAMS;  Surgeon: Rosa M Cristobal MD;  Location:  OR     LAPAROSCOPY DIAGNOSTIC (GENERAL) N/A 11/7/2017    Procedure: LAPAROSCOPY DIAGNOSTIC (GENERAL);  Exploratory Laparoscopy, Laparotomy and Small  Bowel resection.;  Surgeon: Rosa M Cristobal MD;  Location: [facility] OR     LAPAROTOMY EXPLORATORY N/A 11/7/2017    Procedure: LAPAROTOMY EXPLORATORY;;  Surgeon: Rosa M Cristobal MD;  Location: [facility] OR     RESECT SMALL BOWEL WITHOUT OSTOMY N/A 11/7/2017    Procedure: RESECT SMALL BOWEL WITHOUT OSTOMY;;  Surgeon: Rosa M Cristobal MD;  Location: [facility] OR     SURGICAL HISTORY OF -   2004    Menigioma excision         SOCIAL HISTORY:  Social History     Social History     Marital status:      Spouse name: Perry Brunson      Number of children: 3     Years of education: 15     Occupational History     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  [employer]     Social History Main Topics     Smoking status: Light Tobacco Smoker     Packs/day: 0.00     Years: 5.00     Types: Other, Cigarettes     Last attempt to quit: 1/1/1994     Smokeless tobacco: Current User      Comment: 11/3/17 - occ e-cig use     Alcohol use 0.0 oz/week      Comment: 3-5 drinks per week     Drug use: No     Sexual activity: No      Comment: tubal ligation-      Other Topics Concern      Service No     Blood Transfusions No     Caffeine Concern Yes     2-3 cups coffee in am     Exercise No     regular walking 4x/week      Seat Belt Yes     always     Self-Exams Yes     SBE encouraged monthly     Parent/Sibling W/ Cabg, Mi Or Angioplasty Before 65f 55m? No     Social History Narrative    calcium - taking 500mg po qday - increase to Calcium - 1000-1200mg/day    flex sig/colonoscopy -at age 48    sun precautions - discussed    mammogram - yearly    Td booster - 1991 per our records - pt will call [facility]     pneumovax -at age 58    DEXA -this year- 2003    stool hemoccults - every year after age 40    ASA- start 81 mg ASA qday.    mulvitamin - encouraged    doing citrucel qday         from second , Dan [employer]. Now back together with her first  and father of their  children,  " [facility] - 2011.          FAMILY HISTORY:  Family History   Problem Relation Age of Onset     Hyperlipidemia Mother      Thyroid Disease Mother      CANCER Father      lung     Hypertension Father      Hyperlipidemia Father      Thyroid Disease Brother      DIABETES Maternal Grandmother      CEREBROVASCULAR DISEASE Maternal Grandmother      Substance Abuse Maternal Grandfather      Thyroid Disease Son      Her father  of lung cancer and paranasal cancer at around age 65.    She has 3 children.      PHYSICAL EXAM:  Vital signs:  /80  Pulse 107  Temp 97.6  F (36.4  C) (Esophageal)  Resp 16  Ht 1.499 m (4' 11\")  Wt 68 kg (150 lb)  LMP 2000  SpO2 95%  BMI 30.3 kg/m2   ECO  GENERAL/CONSTITUTIONAL: No acute distress. Accompanied by daughter and .  EYES: No scleral icterus.  NEUROLOGIC: Alert, oriented, answers questions appropriately.  INTEGUMENTARY: No jaundice.      LABS:  CBC RESULTS:   Recent Labs   Lab Test  17   0835   WBC  5.2   RBC  3.87   HGB  12.5   HCT  35.8   MCV  93   MCH  32.3   MCHC  34.9   RDW  12.6   PLT  319     Recent Labs   Lab Test  17   0835  17   0847   NA  135  133   POTASSIUM  3.6  3.7   CHLORIDE  100  97   CO2  26  27   ANIONGAP  9  9   GLC  100*  112*   BUN  13  6*   CR  0.73  0.75   FADI  8.8  9.2     Lab Results   Component Value Date    AST 14 2017     Lab Results   Component Value Date    ALT 21 2017     No results found for: BILICONJ   Lab Results   Component Value Date    BILITOTAL 0.3 2017     Lab Results   Component Value Date    ALBUMIN 3.8 2017     Lab Results   Component Value Date    PROTTOTAL 7.4 2017      Lab Results   Component Value Date    ALKPHOS 33 2017     Component      Latest Ref Rng & Units 2017   Chromogranin A      0 - 95 ng/mL 339 (H)       PATHOLOGY:  17:  FINAL DIAGNOSIS:   Small intestine, ileum, segmental resection:     Specimen        Specimen:    Small " intestine: Ileum.        Procedure:    Segmental resection        Specimen Size:   See gross description.        Primary Tumor Site:  Ileum.        Additional Sites Involved by Tumor:   None identified        Tumor Focality:   Unifocal     Tumor        Histologic Type:   Well-differentiated neuroendocrine tumor.        Histologic Grade:    Grade 2.     Extent        Tumor Size:   1.5 cm        Microscopic Tumor Extension, Small Intestine:   Tumor invades   subserosal tissue without involvement of visceral peritoneum     Margins: Negative.   Tumor at 1.5 cm from mesenteric margin, 2.5 cm from   distal margin and 5.5 cm from proximal margin.     Accessory Findings        Mitotic Rate:   4/10 HPF.   (Ki-67 Index: 5%)        Lymph-Vascular Invasion:   Not identified        Perineural Invasion:   Not identified     Pathologic Staging (pTNM)        TNM Descriptors:        Primary Tumor (pT):    pT3:  Tumor invades through the muscularis   propria into subserosal tissue without penetration of overlying serosa.        Regional Lymph Nodes:    pN1:  Metastasis in regional lymph nodes             Number of Lymph Nodes Examined:  3             Number of Lymph Nodes Involved:   2        Distant Metastasis (pM):    pM N/A.     Ancillary Studies:    Immunohistochemical stains for chromogranin and   synaptophysin.       IMAGING:  Octreotide scan 12/6/17:  No suspicious focal tracer uptake identified to suggest  convincing metastatic disease.    MRI abdomen 12/8/17:  No evidence for metastatic disease.    CT chest with contrast 12/8/17:  Indeterminate 2 x 3 mm anterior right mid lung nodular  density. Otherwise normal chest CT.      ASSESSMENT/PLAN:  Connie Brunson is a 57 year old female with:    1) Neuroendocrine tumor of the ileum: s/p resection on 11/7/17, 1.5 cm, grade 2, well-differentiated, T3N1 (stage IIIB).  Post-op scans snow no evidence of metastatic disease.  Octreotide scan shows no suspicious focal tracer uptake to  suggest convincing metastatic disease.    We discussed surveillance going forward.  Will obtain scans again in 3 months.  Connie would like to go to every 6 months after that, if the scans look good, which is reasonable.    -CT chest and MRI abdomen in 3 months  -labs in 3 months: CBC, CMP, chromogranin A  -RTC in 3 months    2) HTN, HLD  -follow-up with PCP    3) History of meningioma: diagnosed in 1999, was getting yearly MRI's with neurosurgery, but was told she does not need surveillance MRI's anymore unless she has new neurologic symptoms.    4) Pulmonary nodule: There is an indeterminate 2 x 3 mm anterior right mid-lung nodule seen on CT.  She does have history of smoking.  -monitor on scans      I spent a total of 25 minutes with the patient, with over >50% of the time in counseling and/or coordination of care.       Mary Weir MD  Hematology/Oncology  [facility]      Again, thank you for allowing me to participate in the care of your patient.        Sincerely,        Mary Weir MD

## 2017-12-14 NOTE — MR AVS SNAPSHOT
After Visit Summary   12/14/2017    Connie Brunson    MRN: [id number]           Patient Information     Date Of Birth          1960        Visit Information        Provider Department      12/14/2017 8:45 AM Mary Weir MD [facility]      Today's Diagnoses     Primary malignant neuroendocrine neoplasm of ileum (H)    -  1    Pulmonary nodules          Care Instructions    -schedule CT chest, MRI abdomen, and labs in 3 months, a few days prior to the next appointment  -return to clinic in 3 months          Follow-ups after your visit        Future tests that were ordered for you today     Open Future Orders        Priority Expected Expires Ordered    CBC with platelets differential Routine 3/14/2018 12/14/2018 12/14/2017    Comprehensive metabolic panel Routine 3/14/2018 12/14/2018 12/14/2017    Chromogranin A Routine 3/14/2018 12/14/2018 12/14/2017    MR Abdomen w/o & w Contrast Routine  12/14/2018 12/14/2017    CT Chest w/o Contrast Routine 3/14/2018 12/14/2018 12/14/2017            Who to contact     If you have questions or need follow up information about today's clinic visit or your schedule please contact [facility] directly at [phone].  Normal or non-critical lab and imaging results will be communicated to you by MyChart, letter or phone within 4 business days after the clinic has received the results. If you do not hear from us within 7 days, please contact the clinic through [employer] or phone. If you have a critical or abnormal lab result, we will notify you by phone as soon as possible.  Submit refill requests through [employer] or call your pharmacy and they will forward the refill request to us. Please allow 3 business days for your refill to be completed.          Additional Information About Your Visit        MyChart Information     [employer] gives you secure access to your electronic health record. If  "you see a primary care provider, you can also send messages to your care team and make appointments. If you have questions, please call your primary care clinic.  If you do not have a primary care provider, please call [phone] and they will assist you.        Care EveryWhere ID     This is your Care EveryWhere ID. This could be used by other organizations to access your [facility] medical records  [id number]        Your Vitals Were     Pulse Temperature Respirations Height Last Period Pulse Oximetry    107 97.6  F (36.4  C) (Esophageal) 16 1.499 m (4' 11\") 09/05/2000 95%    BMI (Body Mass Index)                   30.3 kg/m2            Blood Pressure from Last 3 Encounters:   12/14/17 137/80   11/22/17 128/82   11/16/17 129/82    Weight from Last 3 Encounters:   12/14/17 68 kg (150 lb)   11/22/17 67.1 kg (148 lb)   11/16/17 68.2 kg (150 lb 6 oz)                 Today's Medication Changes          These changes are accurate as of: 12/14/17  9:30 AM.  If you have any questions, ask your nurse or doctor.               Stop taking these medicines if you haven't already. Please contact your care team if you have questions.     estradiol 1 MG tablet   Commonly known as:  ESTRACE                    Primary Care Provider Office Phone # Fax #    Gayatri Stephens [phone]       [address] [place], [place] [id number]        Equal Access to Services     [facility]: Hadii narayan Brown, waaxda luqadaha, qaybta kaalmaeulalio rosenbaum, nithya camejo. So [facility] [phone].    ATENCIÓN: Si habla español, tiene a ceron disposición servicios gratuitos de asistencia lingüística. Marina al [phone].    We comply with applicable federal civil rights laws and [place] laws. We do not discriminate on the basis of race, color, national origin, age, disability, sex, sexual orientation, or gender identity.            Thank you!     Thank you for choosing [facility]  for your care. Our goal is always to provide you with excellent care. Hearing back from our patients is one way we can continue to improve our services. Please take a few minutes to complete the written survey that you may receive in the mail after your visit with us. Thank you!             Your Updated Medication List - Protect others around you: Learn how to safely use, store and throw away your medicines at www.disposemymeds.org.          This list is accurate as of: 12/14/17  9:30 AM.  Always use your most recent med list.                   Brand Name Dispense Instructions for use Diagnosis    cetirizine 10 MG tablet    zyrTEC     Take 10 mg by mouth every evening        fenofibrate 160 MG tablet     90 tablet    Take 1 tablet (160 mg) by mouth daily    Hypertriglyceridemia       fluticasone 50 MCG/ACT spray    FLONASE    48 g    USE TWO SPRAYS IN EACH NOSTRIL EVERY DAY    Other seasonal allergic rhinitis       hydrocortisone 2.5 % cream     30 g    Apply sparingly to dermatitis left lower eye    Seborrheic dermatitis       ibuprofen 600 MG tablet    ADVIL/MOTRIN    30 tablet    Take 1 tablet (600 mg) by mouth every 6 hours as needed for pain (mild)    Acute cholecystitis       lisinopril 10 MG tablet    PRINIVIL/ZESTRIL    90 tablet    Take 1 tablet (10 mg) by mouth daily    Essential hypertension with goal blood pressure less than 140/90       LORazepam 0.5 MG tablet    ATIVAN    10 tablet    Take 0.5-1 tablets (0.25-0.5 mg) by mouth every 8 hours as needed for anxiety    Adjustment disorder with anxious mood       omeprazole 20 MG CR capsule    priLOSEC    90 capsule    Take 1 capsule (20 mg) by mouth daily    Gastroesophageal reflux disease without esophagitis       potassium chloride SA 20 MEQ CR tablet    KLOR-CON    30 tablet    Take 1 tablet (20 mEq) by mouth daily    Low blood potassium       triamcinolone 0.5 % cream    KENALOG     Apply topically 2 times daily as needed for  irritation (to eczema or psoriatic lesions - Never use on face)        triamterene-hydrochlorothiazide 37.5-25 MG per capsule    DYAZIDE    90 capsule    Take 1 capsule by mouth daily    Essential hypertension with goal blood pressure less than 140/90       venlafaxine 37.5 MG tablet    EFFEXOR    90 tablet    TAKE ONE TABLET BY MOUTH EVERY DAY    Anxiety       zolpidem 5 MG tablet    AMBIEN    30 tablet    Take 1 tablet (5 mg) by mouth nightly as needed for sleep    Primary insomnia

## 2017-12-15 LAB — CGA SERPL-MCNC: 385 NG/ML (ref 0–95)

## 2017-12-18 DIAGNOSIS — E87.6 LOW BLOOD POTASSIUM: ICD-10-CM

## 2017-12-21 RX ORDER — POTASSIUM CHLORIDE 1500 MG/1
TABLET, EXTENDED RELEASE ORAL
Qty: 30 TABLET | Refills: 1 | Status: SHIPPED | OUTPATIENT
Start: 2017-12-21 | End: 2018-03-07 | Stop reason: SINTOL

## 2017-12-21 NOTE — TELEPHONE ENCOUNTER
Requested Prescriptions   Pending Prescriptions Disp Refills     potassium chloride SA (K-DUR/KLOR-CON M) 20 MEQ CR tablet [Pharmacy Med Name: POTASSIUM CHLORIDE ISSA ER 20 TBCR] 30 tablet 0     Sig: TAKE ONE TABLET BY MOUTH EVERY DAY    Potassium Supplements Protocol Passed    12/18/2017  3:36 PM       Passed - Recent or future visit with authorizing provider's specialty    Patient had office visit in the last year or has a visit in the next 30 days with authorizing provider.  See chart review.              Passed - Patient is age 18 or older       Passed - Normal serum potassium in past 12 months    Recent Labs   Lab Test  12/08/17   0835   POTASSIUM  3.6                    LOV 11/3/2017  Prescription approved per [facility] Ref[facility] Protocol.  nAnel Garay RN  [facility]

## 2017-12-31 DIAGNOSIS — F41.9 ANXIETY: ICD-10-CM

## 2018-01-03 NOTE — TELEPHONE ENCOUNTER
Requested Prescriptions   Pending Prescriptions Disp Refills     venlafaxine (EFFEXOR) 37.5 MG tablet [Pharmacy Med Name: VENLAFAXINE HCL 37.5MG TABS]   90 tablet 1    Last Written Prescription Date:  6.28.17  Last Fill Quantity: 90 tablet,  # refills: 0   Last Office Visit with [facility] or [facility] prescribing provider:  11.3.17   Future Office Visit:    Next 5 appointments (look out 90 days)     Mar 15, 2018  3:45 PM CDT   Return Visit with Mary Weir MD   [facility]    [facility]  [address]  [facility] [address]   [phone]                  Sig: TAKE ONE TABLET BY MOUTH EVERY DAY    Serotonin-Norepinephrine Reuptake Inhibitors  Passed    12/31/2017  2:38 PM       Passed - Blood pressure under 140/90    BP Readings from Last 3 Encounters:   12/14/17 137/80   11/22/17 128/82   11/16/17 129/82            PHQ-9 SCORE 11/3/2017   Total Score -   Total Score MyChart -   Total Score 0         Passed - PHQ-9 score of less than 5 in past 6 months    The PHQ-9 criteria is meant to fail. It requires a PHQ-9 score review         Passed - Recent or future visit with authorizing provider's specialty    Patient had office visit in the last year or has a visit in the next 30 days with authorizing provider.  See chart review.              Passed - Patient is age 18 or older       Passed - No active pregnancy on record       Passed - Normal serum creatinine on file in past 12 months    Recent Labs   Lab Test  12/08/17   0835   CR  0.73            Passed - No positive pregnancy test in past 12 months       Passed - Recent (6 mo) or future visit with authorizing provider's specialty    Patient had office visit in the last 6 months or has a visit in the next 30 days with authorizing provider.  See chart review.

## 2018-01-04 RX ORDER — VENLAFAXINE 37.5 MG/1
TABLET ORAL
Qty: 90 TABLET | Refills: 1 | OUTPATIENT
Start: 2018-01-04

## 2018-01-04 NOTE — TELEPHONE ENCOUNTER
[facility] pharmacy reported they have this on file and to disregard.  Annel Garay RN  [facility]

## 2018-01-18 ENCOUNTER — TELEPHONE (OUTPATIENT)
Dept: ONCOLOGY | Facility: CLINIC | Age: 58
End: 2018-01-18

## 2018-01-18 DIAGNOSIS — C7A.8 PRIMARY MALIGNANT NEUROENDOCRINE NEOPLASM OF ILEUM (H): Primary | ICD-10-CM

## 2018-01-18 RX ORDER — DIAZEPAM 10 MG
TABLET ORAL
Qty: 1 TABLET | Refills: 0 | Status: SHIPPED | OUTPATIENT
Start: 2018-01-18 | End: 2018-09-05

## 2018-01-18 NOTE — TELEPHONE ENCOUNTER
Pt calling about order for valium that she will need prior to MRI in March.  Pt states she took valium 10 mg with last scan in November and it worked well.  Pt states she has taken 5 mg tabs also in the past but frequently needs to stop the scan to take another 5 mg tab and it interrupts the scan.  Dr Weir notified and aware of call and received okay to refill valium 10 mg X 1 dose only and pt to take 30-60 minutes prior to exam.  Pt also asking if she can have Dr Weir call pt with the results and hold on appointment but Dr Weir recommends follow up in clinic to review symptoms and results.  Pt verbalized understanding of plan.    Vandana Zamudio, RN, BSN

## 2018-01-18 NOTE — TELEPHONE ENCOUNTER
Prescription signed per Dr Weir and called to [facility] at [phone] as ordered per Dr Weir.    Vandana Zamudio, RN, BSN

## 2018-02-04 DIAGNOSIS — I10 ESSENTIAL HYPERTENSION WITH GOAL BLOOD PRESSURE LESS THAN 140/90: ICD-10-CM

## 2018-02-05 NOTE — TELEPHONE ENCOUNTER
"Requested Prescriptions   Pending Prescriptions Disp Refills     triamterene-hydrochlorothiazide (DYAZIDE) 37.5-25 MG per capsule [Pharmacy Med Name: TRIAMTERENE-HCTZ 37.5-25MG CAPS] 90 capsule 1    Last Written Prescription Date:  4.12.17  Last Fill Quantity: 90,  # refills: 1   Last Office Visit with [facility] [place]:  11.3.17   Future Office Visit:    Next 5 appointments (look out 90 days)     Mar 29, 2018  3:15 PM CDT   Return Visit with Mary Weir MD   [facility] ([facility])    [facility]  [address]  [address]  [facility] [id number] [phone]                  Sig: TAKE ONE CAPSULE BY MOUTH EVERY DAY    Diuretics (Including Combos) Protocol Passed    2/4/2018  4:01 PM       Passed - Blood pressure under 140/90    BP Readings from Last 3 Encounters:   12/14/17 137/80   11/22/17 128/82   11/16/17 129/82                Passed - Recent or future visit with authorizing provider's specialty    Patient had office visit in the last year or has a visit in the next 30 days with authorizing provider.  See \"Patient Info\" tab in inbasket, or \"Choose Columns\" in Meds & Orders section of the refill encounter.            Passed - Patient is age 18 or older       Passed - No active pregancy on record       Passed - Normal serum creatinine on file in past 12 months    Recent Labs   Lab Test  12/08/17   0835   CR  0.73             Passed - Normal serum potassium on file in past 12 months    Recent Labs   Lab Test  12/08/17   0835   POTASSIUM  3.6                   Passed - Normal serum sodium on file in past 12 months    Recent Labs   Lab Test  12/08/17   0835   NA  135             Passed - No positive pregnancy test in past 12 months          "

## 2018-02-07 DIAGNOSIS — I10 ESSENTIAL HYPERTENSION WITH GOAL BLOOD PRESSURE LESS THAN 140/90: ICD-10-CM

## 2018-02-07 RX ORDER — TRIAMTERENE AND HYDROCHLOROTHIAZIDE 37.5; 25 MG/1; MG/1
CAPSULE ORAL
Qty: 90 CAPSULE | Refills: 0 | Status: SHIPPED | OUTPATIENT
Start: 2018-02-07 | End: 2018-03-07

## 2018-02-08 RX ORDER — TRIAMTERENE AND HYDROCHLOROTHIAZIDE 37.5; 25 MG/1; MG/1
CAPSULE ORAL
Qty: 90 CAPSULE | Refills: 0 | Status: SHIPPED | OUTPATIENT
Start: 2018-02-08 | End: 2018-07-28

## 2018-03-07 ENCOUNTER — OFFICE VISIT (OUTPATIENT)
Dept: FAMILY MEDICINE | Facility: CLINIC | Age: 58
End: 2018-03-07
Payer: COMMERCIAL

## 2018-03-07 VITALS
TEMPERATURE: 98.1 F | OXYGEN SATURATION: 97 % | HEIGHT: 59 IN | BODY MASS INDEX: 31.29 KG/M2 | DIASTOLIC BLOOD PRESSURE: 86 MMHG | HEART RATE: 71 BPM | SYSTOLIC BLOOD PRESSURE: 138 MMHG | WEIGHT: 155.2 LBS

## 2018-03-07 DIAGNOSIS — L30.9 DERMATITIS: ICD-10-CM

## 2018-03-07 DIAGNOSIS — F51.01 PRIMARY INSOMNIA: ICD-10-CM

## 2018-03-07 DIAGNOSIS — R10.2 PELVIC PAIN IN FEMALE: Primary | ICD-10-CM

## 2018-03-07 DIAGNOSIS — C7A.8 PRIMARY MALIGNANT NEUROENDOCRINE NEOPLASM OF ILEUM (H): ICD-10-CM

## 2018-03-07 DIAGNOSIS — R19.7 DIARRHEA, UNSPECIFIED TYPE: ICD-10-CM

## 2018-03-07 DIAGNOSIS — R51.9 NONINTRACTABLE EPISODIC HEADACHE, UNSPECIFIED HEADACHE TYPE: ICD-10-CM

## 2018-03-07 DIAGNOSIS — D32.9 BENIGN MENINGIOMA (H): ICD-10-CM

## 2018-03-07 DIAGNOSIS — Z79.899 CONTROLLED SUBSTANCE AGREEMENT SIGNED: ICD-10-CM

## 2018-03-07 DIAGNOSIS — I10 ESSENTIAL HYPERTENSION WITH GOAL BLOOD PRESSURE LESS THAN 140/90: ICD-10-CM

## 2018-03-07 LAB
ALBUMIN UR-MCNC: NEGATIVE MG/DL
AMPHETAMINES UR QL: NOT DETECTED NG/ML
APPEARANCE UR: CLEAR
BARBITURATES UR QL SCN: NOT DETECTED NG/ML
BENZODIAZ UR QL SCN: NOT DETECTED NG/ML
BILIRUB UR QL STRIP: NEGATIVE
BUPRENORPHINE UR QL: NOT DETECTED NG/ML
CANNABINOIDS UR QL: NOT DETECTED NG/ML
COCAINE UR QL SCN: NOT DETECTED NG/ML
COLOR UR AUTO: YELLOW
D-METHAMPHET UR QL: NOT DETECTED NG/ML
GLUCOSE UR STRIP-MCNC: NEGATIVE MG/DL
HGB UR QL STRIP: ABNORMAL
KETONES UR STRIP-MCNC: NEGATIVE MG/DL
LEUKOCYTE ESTERASE UR QL STRIP: NEGATIVE
METHADONE UR QL SCN: NOT DETECTED NG/ML
NITRATE UR QL: NEGATIVE
NON-SQ EPI CELLS #/AREA URNS LPF: ABNORMAL /LPF
OPIATES UR QL SCN: NOT DETECTED NG/ML
OXYCODONE UR QL SCN: NOT DETECTED NG/ML
PCP UR QL SCN: NOT DETECTED NG/ML
PH UR STRIP: 6 PH (ref 5–7)
PROPOXYPH UR QL: NOT DETECTED NG/ML
RBC #/AREA URNS AUTO: ABNORMAL /HPF
SOURCE: ABNORMAL
SP GR UR STRIP: 1.01 (ref 1–1.03)
TRICYCLICS UR QL SCN: NOT DETECTED NG/ML
UROBILINOGEN UR STRIP-ACNC: 0.2 EU/DL (ref 0.2–1)
WBC #/AREA URNS AUTO: ABNORMAL /HPF

## 2018-03-07 PROCEDURE — 87086 URINE CULTURE/COLONY COUNT: CPT | Performed by: FAMILY MEDICINE

## 2018-03-07 PROCEDURE — 99214 OFFICE O/P EST MOD 30 MIN: CPT | Performed by: FAMILY MEDICINE

## 2018-03-07 PROCEDURE — 80306 DRUG TEST PRSMV INSTRMNT: CPT | Performed by: FAMILY MEDICINE

## 2018-03-07 PROCEDURE — 81001 URINALYSIS AUTO W/SCOPE: CPT | Mod: 59 | Performed by: FAMILY MEDICINE

## 2018-03-07 RX ORDER — ZOLPIDEM TARTRATE 5 MG/1
5 TABLET ORAL
Qty: 30 TABLET | Refills: 5 | Status: SHIPPED | OUTPATIENT
Start: 2018-04-08 | End: 2018-09-19

## 2018-03-07 RX ORDER — MOMETASONE FUROATE 1 MG/G
CREAM TOPICAL
Qty: 15 G | Refills: 3 | Status: SHIPPED | OUTPATIENT
Start: 2018-03-07 | End: 2022-05-02

## 2018-03-07 NOTE — NURSING NOTE
"Chief Complaint   Patient presents with     Urinary Problem       Initial BP (!) 160/98 (BP Location: Left arm, Patient Position: Chair, Cuff Size: Adult Large)  Pulse 71  Temp 98.1  F (36.7  C) (Oral)  Ht 4' 11\" (1.499 m)  Wt 155 lb 3.2 oz (70.4 kg)  LMP 09/05/2000  SpO2 97%  BMI 31.35 kg/m2 Estimated body mass index is 31.35 kg/(m^2) as calculated from the following:    Height as of this encounter: 4' 11\" (1.499 m).    Weight as of this encounter: 155 lb 3.2 oz (70.4 kg).  Medication Reconciliation: complete   Anitha Deluca CMA  "

## 2018-03-07 NOTE — PATIENT INSTRUCTIONS
Keep appt for your CT and MRI scans for 5 days from now. Pt stopped her potassium supplementation secondary to stomach upset.  Wants to wait for her oncology appt to recheck cmp.     See Patient Instructions.    Pt declines  MRI brain order at this time as well . Will call back and/or discuss with her oncologist as well.   Pt wants to hold on possible antibiotic for sinus infection right now as well. Please call if you change your mind. Nizoral shampoo ok to spot treat seborrheic dermatitis on face.     If you desire to try melatonin for sleep:   Try  Nature's Made or other [employer] certified Melatonin for sleep:  take 30-60 minutes prior to bedtime.  Start with 3mg at night x 2 nights, then increase to 6mg nightly for 2 nights, then 9mg nightly for 2 nights, then 12mg nightly x 2 nights, etc, increasing by 3 mg every 2 nights. Max dose is absolutely 18mg nightly.   You can stop at whatever milligram dosage prior to 18mg works well for you.                 Thank you for choosing [facility]  for your Health Care. It was a pleasure seeing you at your visit today. Please contact us with any questions or concerns you may have.                   Gayatri Stephens MD                                  To reach your [facility] care team after hours call:   [phone]    Our clinic hours are:     Monday- 7:30 am - 7:00 pm                             Tuesday through Friday- 7:30 am - 5:00 pm                                        Saturday- 8:00 am - 12:00 pm                  Phone:  [phone]    Our pharmacy hours are:     Monday  8:00 am to 7:00 pm      Tuesday through Friday 8:00am to 6:00pm                        Saturday - 9:00 am to 1:00 pm      Sunday : Closed.              Phone:  [phone]      There is also information available at our web site:  [email or web address]    If your provider ordered any lab tests and you do not receive the results within 10 business days, please  call the clinic.    If you need a medication refill please contact your pharmacy.  Please allow 2 business days for your refill to be completed.    Our clinic offers telephone visits and e visits.  Please ask one of your team members to explain more.      Use [employer] (secure email communication and access to your chart) to send your primary care provider a message or make an appointment. Ask someone on your Team how to sign up for [employer].

## 2018-03-07 NOTE — LETTER
[facility] [place]    03/07/18    Patient: Connie Brunson  YOB: 1960  Medical Record Number: [id number]                                                                  Controlled Substance Agreement  I understand that my care provider has prescribed controlled substances (narcotics, tranquilizers, and/or stimulants) to help manage my condition(s).  I am taking this medicine to help me function or work.  I know that this is strong medicine.  It could have serious side effects and even cause a dependency on the drug.  If I stop these medicines suddenly, I could have severe withdrawal symptoms.    The risks, benefits, and side effects of these medication(s) were explained to me.  I agree that:  1. I will take part in other treatments as advised by my provider.  This may be psychiatry or counseling, physical therapy, behavioral therapy, group treatment, or a referral to a pain clinic.  I will reduce or stop my medicine when my provider tells me to do so.   2. I will take my medicines as prescribed.  I will not change the dose or schedule unless my provider tells me to.  There will be no refills if I  run out early.   I may be contacted at any time without warning and asked to complete a drug test or pill count.   3. I will keep all my appointments at the clinic.  If I miss appointments or fail to follow instructions, my provider may stop my medicine.  4. I will not ask other providers to prescribe controlled substances. And I will not accept controlled substances from other people. If I need another prescribed controlled substance for a new reason, I will notify my provider within one business day.  5. If I enroll in the [place] Medical Marijuana program, I will tell my provider.  I will also sign an agreement to share my medical records with my provider.  6. I will use one pharmacy to fill all of my controlled substance prescriptions.  If my prescription is mailed to my pharmacy, it may  take 5 to 7 days for my medicine to be ready.  7. I understand that my provider, clinic care team, and pharmacy can track controlled substance prescriptions from other providers through a central database (prescription monitoring program).  8. I will bring in my list of medications (or my medicine bottles) each time I come to the clinic.  REV- 04/2016                                                                                                                                            Page 1 of 2      [facility] [place]    03/07/18    Patient: Connie Brunson  YOB: 1960  Medical Record Number: [id number]    9. Refills of controlled substances will be made only during office hours.  It is up to me to make sure that I do not run out of my medicines on weekends or holidays.    10. I am responsible for my prescriptions.  If the medicine is lost or stolen, it will not be replaced.   I also agree not to share these medicines with anyone.  11. I agree to not use ANY illegal or recreational drugs.  This includes marijuana, cocaine, bath salts or other drugs.  I agree not to use alcohol unless my provider says I may.  I agree to give urine samples whenever asked.  If I fail to give a urine sample, the provider may stop my medicine.     12. I will tell my nurse or provider right away if I become pregnant or have a new medical problem treated outside of [facility].  13. I understand that this medicine can affect my thinking and judgment.  It may be unsafe for me to drive, use machinery and do dangerous tasks.  I will not do any of these things until I know how the medicine affects me.  If my dose changes, I will wait to see how it affects me.  I will contact my provider if I have concerns about medicine side effects.  I understand that if I do not follow any of the conditions above, my prescriptions or treatment may be stopped.    I agree that my provider, clinic care team, and pharmacy may  work with any city, state or federal law enforcement agency that investigates the misuse, sale, or other diversion of my controlled medicine. I will allow my provider to discuss my care with or share a copy of this agreement with any other treating provider, pharmacy or emergency room where I receive care.  I agree to give up (waive) any right of privacy or confidentiality with respect to these authorizations.   I have read this agreement and have asked questions about anything I did not understand.   ___________________________________    ___________________________  Patient Signature                                                           Date and Time  ___________________________________     ____________________________  Witness                                                                            Date and Time  ___________________________________  Gayatri Stephens MD  REV-  04/2016                                                                                                                                                                 Page 2 of 2

## 2018-03-07 NOTE — PROGRESS NOTES
"  SUBJECTIVE:                                                    Connie Brunson is a 57 year old female who presents to clinic today for the following health issues:    URINARY TRACT SYMPTOMS:   Onset: x2-3 weeks    Description:   Painful urination (Dysuria): no   Blood in urine (Hematuria): no   Delay in urine (Hesitency): no, but doesn't feel like she is emptying her bladder fully    Intensity: mild    Progression of Symptoms:  worsening    Accompanying Signs & Symptoms:  Fever/chills: YES- chills, and is also having a headache  Flank pain YES- last night she felt a sharp pain in her right side  Nausea and vomiting: YES- nausea   Any vaginal symptoms: none  Abdominal/Pelvic Pain: YES- dull ache    History:   History of frequent UTI's: no   History of kidney stones: no   Sexually Active: no   Possibility of pregnancy: No    Precipitating factors:   She had a small bowel resection in November - final dx - Neuroendocrine ca - margins clear with mets to 2/3 lymph nodes. That was found incidentally on CT abd/pelvis . Hasn't had any radiation or chemo.     Had a full battery of tests at [place] - Dr. Weir her [facility] oncologist.     Having a repeat CT chest and MRI of abd/pelvis next week on 3/12/2018.      ? Hx of kidney stones: no.     Diarrhea and hot flashes present - ? From the neuroendocrine tumor vs. Being off hormones.     Getting some headaches now - throbbing in the last couple of weeks - worse lying on one side or the other. Not worse  lying on her back.  No headaches with exertion.  Feels like she might have a sinus infection.  Mouth feels like cotton. Hx of meningioma.      Had a  \"NUCLEAR MEDICINE OCTREOSCAN WHOLE BODY   12/6/2017 8:18 AM      TECHNIQUE:  The patient was given 6.2 mCi indium-111 octreotide IV.  Whole body scanning performed.     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 Status post small  bowel resection in " "November 2017.     COMPARISON:   Nuclear Study: None.     Other Relevant Studies: CT abdomen and pelvis 11/2/2017.     FINDINGS:  No focal suspicious tracer uptake identified on images of  the whole body. There is some faint tracer uptake within the bowel  that approximates the colon proximally and along the transverse colon.  This is likely physiologic. Physiologic uptake is also identified  within the liver, spleen, and kidneys.         IMPRESSION: No suspicious focal tracer uptake identified to suggest  convincing metastatic disease.\"  Therapies Tried and outcome: Increase fluid intake    Problem list and histories reviewed & adjusted, as indicated.  Additional history: as documented    Reviewed and updated as needed this visit by clinical staff  Tobacco  Allergies  Meds  Med Hx  Surg Hx  Fam Hx  Soc Hx      Reviewed and updated as needed this visit by Provider       bp noted to be a bit high today:   BP Readings from Last 6 Encounters:   03/07/18 (!) 160/98   12/14/17 137/80   11/22/17 128/82   11/16/17 129/82   11/10/17 131/67   11/03/17 136/88     Patient Active Problem List   Diagnosis     Allergic rhinitis     Diverticulosis of large intestine     Benign meningioma-right frontal posterior - no more annual MRI's needed per neurosurgery     Symptomatic menopausal or female climacteric states     Benign neoplasm of tonsil     Anxiety     HYPERLIPIDEMIA LDL GOAL <130 [272.4CK] - joint aches w/ simvastatin 11/2010,lipitor=nausea/abd pain 1/2012     Primary insomnia     Major depressive disorder, recurrent episode, in partial or unspecified remission     Essential hypertension with goal blood pressure less than 140/90     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- ophtho recommended auto-immune/arthritis work-up     Epiploic appendagitis- 2008 and 10/26/2014     Advanced care planning/counseling discussion     Controlled substance agreement signed- re-signed 4/12/2017 ambien #30 -5mg tabs monthly - " refill x5 - ok to see q6 months      Gastroesophageal reflux disease without esophagitis     Hypertriglyceridemia     S/P laparoscopic cholecystectomy for acute cholecystitis with cholelithiasis     Postsurgical dumping syndrome - s/p lap shashi - consider cholestyramine     Mass of small intestine- distal ileum - seen on CT scan at time of diverticulitis      Primary malignant neuroendocrine neoplasm of ileum (H)     Pulmonary nodules       Current Outpatient Prescriptions   Medication Sig Dispense Refill     triamterene-hydrochlorothiazide (DYAZIDE) 37.5-25 MG per capsule TAKE ONE CAPSULE BY MOUTH ONCE DAILY 90 capsule 0     diazepam (VALIUM) 10 MG tablet Take 1 tab or 10 mg 30-60 minutes prior to MRI scan. 1 tablet 0     LORazepam (ATIVAN) 0.5 MG tablet Take 0.5-1 tablets (0.25-0.5 mg) by mouth every 8 hours as needed for anxiety 10 tablet 0     lisinopril (PRINIVIL/ZESTRIL) 10 MG tablet Take 1 tablet (10 mg) by mouth daily 90 tablet 1     zolpidem (AMBIEN) 5 MG tablet Take 1 tablet (5 mg) by mouth nightly as needed for sleep 30 tablet 5     fenofibrate 160 MG tablet Take 1 tablet (160 mg) by mouth daily 90 tablet 1     venlafaxine (EFFEXOR) 37.5 MG tablet TAKE ONE TABLET BY MOUTH EVERY DAY 90 tablet 0     fluticasone (FLONASE) 50 MCG/ACT spray USE TWO SPRAYS IN EACH NOSTRIL EVERY DAY 48 g 1     omeprazole (PRILOSEC) 20 MG CR capsule Take 1 capsule (20 mg) by mouth daily 90 capsule 3     hydrocortisone 2.5 % cream Apply sparingly to dermatitis left lower eye 30 g 3     triamcinolone (KENALOG) 0.5 % cream Apply topically 2 times daily as needed for irritation (to eczema or psoriatic lesions - Never use on face)       cetirizine (ZYRTEC) 10 MG tablet Take 10 mg by mouth every evening       ibuprofen (ADVIL,MOTRIN) 600 MG tablet Take 1 tablet (600 mg) by mouth every 6 hours as needed for pain (mild) 30 tablet 0     [DISCONTINUED] triamterene-hydrochlorothiazide (DYAZIDE) 37.5-25 MG per capsule TAKE ONE CAPSULE BY  "MOUTH EVERY DAY 90 capsule 0          Allergies   Allergen Reactions     Atorvastatin Nausea     Nausea and abd pain      Ceftin GI Disturbance     Stomach upset and bloating - given at same time as clindamycin ? Which one as cause.       Clindamycin GI Disturbance     Stomach upset and bloating - given at same time as ceftin, ? Which one as cause      Flagyl [Metronidazole]      immediate migraine headache      Morphine Itching     Severe with nose, facial  Swelling - oxycodone = ok /no problems      Penicillins Hives     Sulfa Drugs Rash     Severe red , flushed rash     Levaquin Rash     States she can take cipro     ROS:   ROS: 12 point ROS neg other than the symptoms noted above.     OBJECTIVE:                                                    BP (!) 160/98 (BP Location: Left arm, Patient Position: Chair, Cuff Size: Adult Large)  Pulse 71  Temp 98.1  F (36.7  C) (Oral)  Ht 4' 11\" (1.499 m)  Wt 155 lb 3.2 oz (70.4 kg)  LMP 09/05/2000  SpO2 97%  BMI 31.35 kg/m2  Body mass index is 31.35 kg/(m^2).   GENERAL: healthy, alert, well nourished, well hydrated, no distress  HENT: ear canals- normal; TMs- normal; Nose- normal; Mouth- no ulcers, no lesions  NECK: no tenderness, no adenopathy, no asymmetry, no masses, no stiffness; thyroid- normal to palpation  RESP: lungs clear to auscultation - no rales, no rhonchi, no wheezes  CV: regular rates and rhythm, normal S1 S2, no S3 or S4 and no murmur, no click or rub -  ABDOMEN: soft, no tenderness, no  hepatosplenomegaly, no masses, normal bowel sounds  MS: extremities- no gross deformities noted, no edema  BACK: no CVA tenderness, no paralumbar tenderness.     Diagnostic test results:  Results for orders placed or performed in visit on 03/07/18 (from the past 24 hour(s))   UA with Microscopic   Result Value Ref Range    Color Urine Yellow     Appearance Urine Clear     Glucose Urine Negative NEG^Negative mg/dL    Bilirubin Urine Negative NEG^Negative    Ketones " Urine Negative NEG^Negative mg/dL    Specific Gravity Urine 1.010 1.003 - 1.035    pH Urine 6.0 5.0 - 7.0 pH    Protein Albumin Urine Negative NEG^Negative mg/dL    Urobilinogen Urine 0.2 0.2 - 1.0 EU/dL    Nitrite Urine Negative NEG^Negative    Blood Urine Trace (A) NEG^Negative    Leukocyte Esterase Urine Negative NEG^Negative    Source Midstream Urine     WBC Urine 0 - 5 OTO5^0 - 5 /HPF    RBC Urine O - 2 OTO2^O - 2 /HPF    Squamous Epithelial /LPF Urine Few FEW^Few /LPF        ASSESSMENT/PLAN:                                                        ICD-10-CM    1. Pelvic pain in female R10.2 UA with Microscopic     Urine Culture Aerobic Bacterial   2. Essential hypertension with goal blood pressure less than 140/90- elevated today  I10    3. Benign meningioma-right frontal posterior - no more annual MRI's needed per neurosurgery D32.0    4. Primary malignant neuroendocrine neoplasm of ileum (H) C7A.8    5. Diarrhea, unspecified type- since sm bowel resection 11/2017 R19.7    6. Dermatitis L30.9 mometasone (ELOCON) 0.1 % cream   7. Nonintractable episodic headache, unspecified headache type R51    8. Primary insomnia F51.01 zolpidem (AMBIEN) 5 MG tablet   9. Controlled substance agreement signed- re-signed 3/7/2018 ambien #30 -5mg tabs monthly - refill x5 - ok to see q6 months  Z79.899 Drug Abuse Screen Panel 13, Urine (Pain Care Package)     Keep appt for your CT and MRI scans for 5 days from now. Pt stopped her potassium supplementation secondary to stomach upset.  Wants to wait for her oncology appt to recheck cmp.     See Patient Instructions.    Pt refuses MRI brain order at this time as well .   Pt wants to hold on possible antibiotic for sinus infection right now as well. Nizoral shampoo ok to spot treat seborrheic dermatitis on face.   controlled substance agreement discussed in detail and pt initialed today line by line item and re-signed with pt.          Gayatri Stephens MD  [facility]  [place]

## 2018-03-07 NOTE — MR AVS SNAPSHOT
After Visit Summary   3/7/2018    Connie Brunson    MRN: [id number]           Patient Information     Date Of Birth          1960        Visit Information        Provider Department      3/7/2018 9:15 AM Gayatri Stephens MD [facility]        Today's Diagnoses     Pelvic pain in female    -  1    Essential hypertension with goal blood pressure less than 140/90- elevated today         Benign meningioma-right frontal posterior - no more annual MRI's needed per neurosurgery        Primary malignant neuroendocrine neoplasm of ileum (H)        Diarrhea, unspecified type- since sm bowel resection 11/2017        Dermatitis        Nonintractable episodic headache, unspecified headache type        Primary insomnia        Controlled substance agreement signed- re-signed 3/7/2018 ambien #30 -5mg tabs monthly - refill x5 - ok to see q6 months           Care Instructions    Keep appt for your CT and MRI scans for 5 days from now. Pt stopped her potassium supplementation secondary to stomach upset.  Wants to wait for her oncology appt to recheck cmp.     See Patient Instructions.    Pt declines  MRI brain order at this time as well . Will call back and/or discuss with her oncologist as well.   Pt wants to hold on possible antibiotic for sinus infection right now as well. Please call if you change your mind. Nizoral shampoo ok to spot treat seborrheic dermatitis on face.     If you desire to try melatonin for sleep:   Try  Nature's Made or other USP certified Melatonin for sleep:  take 30-60 minutes prior to bedtime.  Start with 3mg at night x 2 nights, then increase to 6mg nightly for 2 nights, then 9mg nightly for 2 nights, then 12mg nightly x 2 nights, etc, increasing by 3 mg every 2 nights. Max dose is absolutely 18mg nightly.   You can stop at whatever milligram dosage prior to 18mg works well for you.                 Thank you for choosing [facility]  for your  Health Care. It was a pleasure seeing you at your visit today. Please contact us with any questions or concerns you may have.                   Gayatri Stephens MD                                  To reach your [facility] care team after hours call:   [phone]    Our clinic hours are:     Monday- 7:30 am - 7:00 pm                             Tuesday through Friday- 7:30 am - 5:00 pm                                        Saturday- 8:00 am - 12:00 pm                  Phone:  [phone]    Our pharmacy hours are:     Monday  8:00 am to 7:00 pm      Tuesday through Friday 8:00am to 6:00pm                        Saturday - 9:00 am to 1:00 pm      Sunday : Closed.              Phone:  [phone]      There is also information available at our web site:  [email or web address]    If your provider ordered any lab tests and you do not receive the results within 10 business days, please call the clinic.    If you need a medication refill please contact your pharmacy.  Please allow 2 business days for your refill to be completed.    Our clinic offers telephone visits and e visits.  Please ask one of your team members to explain more.      Use [employer] (secure email communication and access to your chart) to send your primary care provider a message or make an appointment. Ask someone on your Team how to sign up for [employer]-Exchange.                       Follow-ups after your visit        Follow-up notes from your care team     Return in about 4 weeks (around 4/4/2018), or if symptoms worsen or fail to improve.      Your next 10 appointments already scheduled     Mar 12, 2018  8:30 AM CDT   LAB with RH LAB DRAW 1   [facility] ([facility])    [facility]  [address]  [facility] [address]   [phone]           Please do not eat 10-12 hours before your appointment if you are coming in fasting for labs on lipids,  cholesterol, or glucose (sugar). This does not apply to pregnant women. Water, hot tea and black coffee (with nothing added) are okay. Do not drink other fluids, diet soda or chew gum.            Mar 12, 2018  9:00 AM CDT   (Arrive by 8:45 AM)   CT CHEST W/O CONTRAST with [address] ([facility])    [address] [facility] [address]  [facility] [id number] [phone]           Please bring any scans or X-rays taken at other hospitals, if similar tests were done. Also bring a list of your medicines, including vitamins, minerals and over-the-counter drugs. It is safest to leave personal items at home.  Be sure to tell your doctor:   If you have any allergies.   If there s any chance you are pregnant.   If you are breastfeeding.  You do not need to do anything special to prepare for this exam.  Please wear loose clothing, such as a sweat suit or jogging clothes. Avoid snaps, zippers and other metal. We may ask you to undress and put on a hospital gown.            Mar 12, 2018  9:15 AM CDT   (Arrive by 9:00 AM)   MR ABDOMEN W/O & W CONTRAST with [facility]   [facility] ([facility])    [address] [address] [id number] [phone]           Take your medicines as usual, unless your doctor tells you not to. Bring a list of your current medicines to your exam (including vitamins, minerals and over-the-counter drugs). Also bring the results of similar scans you may have had.    You may or may not receive IV contrast for this exam pending the discretion of the Radiologist.   Do not eat or drink for 6 hours prior to exam.  The MRI machine uses a strong magnet. Please wear clothes without metal (snaps, zippers). A sweatsuit works well, or we may give you a hospital gown.  Please remove any body piercings and hair extensions before you arrive. You will also remove watches, jewelry, hairpins, wallets,  dentures, partial dental plates and hearing aids. You may wear contact lenses, and you may be able to wear your rings. We have a safe place to keep your personal items, but it is safer to leave them at home.  **IMPORTANT** THE INSTRUCTIONS BELOW ARE ONLY FOR THOSE PATIENTS WHO HAVE BEEN PRESCRIBED SEDATION OR GENERAL ANESTHESIA DURING THEIR MRI PROCEDURE:  IF YOUR DOCTOR PRESCRIBED ORAL SEDATION (take medicine to help you relax during your exam):   You must get the medicine from your doctor (oral medication) before you arrive. Bring the medicine to the exam. Do not take it at home. You ll be told when to take it upon arriving for your exam.   Arrive one hour early. Bring someone who can take you home after the test. Your medicine will make you sleepy. After the exam, you may not drive, take a bus or take a taxi by yourself.  IF YOUR DOCTOR PRESCRIBED IV SEDATION:   Arrive one hour early. Bring someone who can take you home after the test. Your medicine will make you sleepy. After the exam, you may not drive, take a bus or take a taxi by yourself.   No eating 6 hours before your exam. You may have clear liquids up until 4 hours before your exam. (Clear liquids include water, clear tea, black coffee and fruit juice without pulp.)  IF YOUR DOCTOR PRESCRIBED ANESTHESIA (be asleep for your exam):   Arrive 1 1/2 hours early. Bring someone who can take you home after the test. You may not drive, take a bus or take a taxi by yourself.   No eating 8 hours before your exam. You may have clear liquids up until 4 hours before your exam. (Clear liquids include water, clear tea, black coffee and fruit juice without pulp.)   You will spend four to five hours in the recovery room.  If you have any questions, please contact your Imaging Department exam site.            Mar 29, 2018  3:15 PM CDT   Return Visit with Mary Weir MD   [facility] ([facility])    [facility] [place]  "[place]  [address]  [facility] [address] [phone]              Who to contact     If you have questions or need follow up information about today's clinic visit or your schedule please contact [facility] directly at [phone].  Normal or non-critical lab and imaging results will be communicated to you by MyChart, letter or phone within 4 business days after the clinic has received the results. If you do not hear from us within 7 days, please contact the clinic through veriCARhart or phone. If you have a critical or abnormal lab result, we will notify you by phone as soon as possible.  Submit refill requests through [employer] or call your pharmacy and they will forward the refill request to us. Please allow 3 business days for your refill to be completed.          Additional Information About Your Visit        MyChart Information     [employer] gives you secure access to your electronic health record. If you see a primary care provider, you can also send messages to your care team and make appointments. If you have questions, please call your primary care clinic.  If you do not have a primary care provider, please call [phone] and they will assist you.        Care EveryWhere ID     This is your Care EveryWhere ID. This could be used by other organizations to access your [facility] medical records  [id number]        Your Vitals Were     Pulse Temperature Height Last Period Pulse Oximetry BMI (Body Mass Index)    71 98.1  F (36.7  C) (Oral) 4' 11\" (1.499 m) 09/05/2000 97% 31.35 kg/m2       Blood Pressure from Last 3 Encounters:   03/07/18 138/86   12/14/17 137/80   11/22/17 128/82    Weight from Last 3 Encounters:   03/07/18 155 lb 3.2 oz (70.4 kg)   12/14/17 150 lb (68 kg)   11/22/17 148 lb (67.1 kg)              We Performed the Following     Drug Abuse Screen Panel 13, Urine (Pain Care Package)     UA with Microscopic     Urine Culture Aerobic Bacterial          Today's " Medication Changes          These changes are accurate as of 3/7/18 10:29 AM.  If you have any questions, ask your nurse or doctor.               Start taking these medicines.        Dose/Directions    mometasone 0.1 % cream   Commonly known as:  ELOCON   Used for:  Dermatitis   Started by:  Gayatri Stephens MD        Apply sparingly to affected area twice daily for 14 days.  Do not apply to face.   Quantity:  15 g   Refills:  3         These medicines have changed or have updated prescriptions.        Dose/Directions    triamterene-hydrochlorothiazide 37.5-25 MG per capsule   Commonly known as:  DYAZIDE   This may have changed:  Another medication with the same name was removed. Continue taking this medication, and follow the directions you see here.   Used for:  Essential hypertension with goal blood pressure less than 140/90   Changed by:  Gayatri Stephens MD        TAKE ONE CAPSULE BY MOUTH ONCE DAILY   Quantity:  90 capsule   Refills:  0         Stop taking these medicines if you haven't already. Please contact your care team if you have questions.     potassium chloride SA 20 MEQ CR tablet   Commonly known as:  K-DUR/KLOR-CON M   Stopped by:  Gayatri Stephens MD                Where to get your medicines      These medications were sent to [facility] - [address] [address]     Phone:  [phone]     mometasone 0.1 % cream         Some of these will need a paper prescription and others can be bought over the counter.  Ask your nurse if you have questions.     Bring a paper prescription for each of these medications     zolpidem 5 MG tablet                Primary Care Provider Office Phone # Fax #    Gayatri Stephens [phone]       [address] [place] [address]        Equal Access to Services     IVA MCKENZIE AH: landon Ren qaybta  nithya leblanc karmaandres baldwinmarly camejo. [facility] [phone].    ATENCIÓN: Si johnnie feng, tiene a ceron disposición servicios gratuitos de asistencia lingüística. Marina al [phone].    We comply with applicable federal civil rights laws and [place] laws. We do not discriminate on the basis of race, color, national origin, age, disability, sex, sexual orientation, or gender identity.            Thank you!     Thank you for choosing [facility]  for your care. Our goal is always to provide you with excellent care. Hearing back from our patients is one way we can continue to improve our services. Please take a few minutes to complete the written survey that you may receive in the mail after your visit with us. Thank you!             Your Updated Medication List - Protect others around you: Learn how to safely use, store and throw away your medicines at www.disposemymeds.org.          This list is accurate as of 3/7/18 10:29 AM.  Always use your most recent med list.                   Brand Name Dispense Instructions for use Diagnosis    cetirizine 10 MG tablet    zyrTEC     Take 10 mg by mouth every evening        diazepam 10 MG tablet    VALIUM    1 tablet    Take 1 tab or 10 mg 30-60 minutes prior to MRI scan.    Primary malignant neuroendocrine neoplasm of ileum (H)       fenofibrate 160 MG tablet     90 tablet    Take 1 tablet (160 mg) by mouth daily    Hypertriglyceridemia       fluticasone 50 MCG/ACT spray    FLONASE    48 g    USE TWO SPRAYS IN EACH NOSTRIL EVERY DAY    Other seasonal allergic rhinitis       hydrocortisone 2.5 % cream     30 g    Apply sparingly to dermatitis left lower eye    Seborrheic dermatitis       ibuprofen 600 MG tablet    ADVIL/MOTRIN    30 tablet    Take 1 tablet (600 mg) by mouth every 6 hours as needed for pain (mild)    Acute cholecystitis       lisinopril 10 MG tablet    PRINIVIL/ZESTRIL    90 tablet    Take 1 tablet (10 mg) by mouth  daily    Essential hypertension with goal blood pressure less than 140/90       LORazepam 0.5 MG tablet    ATIVAN    10 tablet    Take 0.5-1 tablets (0.25-0.5 mg) by mouth every 8 hours as needed for anxiety    Adjustment disorder with anxious mood       mometasone 0.1 % cream    ELOCON    15 g    Apply sparingly to affected area twice daily for 14 days.  Do not apply to face.    Dermatitis       omeprazole 20 MG CR capsule    priLOSEC    90 capsule    Take 1 capsule (20 mg) by mouth daily    Gastroesophageal reflux disease without esophagitis       triamcinolone 0.5 % cream    KENALOG     Apply topically 2 times daily as needed for irritation (to eczema or psoriatic lesions - Never use on face)        triamterene-hydrochlorothiazide 37.5-25 MG per capsule    DYAZIDE    90 capsule    TAKE ONE CAPSULE BY MOUTH ONCE DAILY    Essential hypertension with goal blood pressure less than 140/90       venlafaxine 37.5 MG tablet    EFFEXOR    90 tablet    TAKE ONE TABLET BY MOUTH EVERY DAY    Anxiety       zolpidem 5 MG tablet   Start taking on:  4/8/2018    AMBIEN    30 tablet    Take 1 tablet (5 mg) by mouth nightly as needed for sleep    Primary insomnia

## 2018-03-08 LAB
BACTERIA SPEC CULT: NORMAL
SPECIMEN SOURCE: NORMAL

## 2018-03-12 ENCOUNTER — HOSPITAL ENCOUNTER (OUTPATIENT)
Facility: CLINIC | Age: 58
Setting detail: SPECIMEN
Discharge: HOME | End: 2018-03-12
Attending: INTERNAL MEDICINE | Admitting: INTERNAL MEDICINE
Payer: COMMERCIAL

## 2018-03-12 ENCOUNTER — HOSPITAL ENCOUNTER (OUTPATIENT)
Dept: CT IMAGING | Facility: CLINIC | Age: 58
Discharge: HOME | End: 2018-03-12
Attending: INTERNAL MEDICINE | Admitting: INTERNAL MEDICINE
Payer: COMMERCIAL

## 2018-03-12 ENCOUNTER — HOSPITAL ENCOUNTER (OUTPATIENT)
Dept: MRI IMAGING | Facility: CLINIC | Age: 58
End: 2018-03-12
Attending: INTERNAL MEDICINE
Payer: COMMERCIAL

## 2018-03-12 DIAGNOSIS — C7A.8 PRIMARY MALIGNANT NEUROENDOCRINE NEOPLASM OF ILEUM (H): ICD-10-CM

## 2018-03-12 DIAGNOSIS — R91.8 PULMONARY NODULES: ICD-10-CM

## 2018-03-12 LAB
ALBUMIN SERPL-MCNC: 3.9 G/DL (ref 3.4–5)
ALP SERPL-CCNC: 32 U/L (ref 40–150)
ALT SERPL W P-5'-P-CCNC: 31 U/L (ref 0–50)
ANION GAP SERPL CALCULATED.3IONS-SCNC: 9 MMOL/L (ref 3–14)
AST SERPL W P-5'-P-CCNC: 22 U/L (ref 0–45)
BASOPHILS # BLD AUTO: 0.1 10E9/L (ref 0–0.2)
BASOPHILS NFR BLD AUTO: 1.4 %
BILIRUB SERPL-MCNC: 0.8 MG/DL (ref 0.2–1.3)
BUN SERPL-MCNC: 16 MG/DL (ref 7–30)
CALCIUM SERPL-MCNC: 8.8 MG/DL (ref 8.5–10.1)
CHLORIDE SERPL-SCNC: 99 MMOL/L (ref 94–109)
CO2 SERPL-SCNC: 28 MMOL/L (ref 20–32)
CREAT SERPL-MCNC: 0.6 MG/DL (ref 0.52–1.04)
DIFFERENTIAL METHOD BLD: ABNORMAL
EOSINOPHIL # BLD AUTO: 0.2 10E9/L (ref 0–0.7)
EOSINOPHIL NFR BLD AUTO: 4 %
ERYTHROCYTE [DISTWIDTH] IN BLOOD BY AUTOMATED COUNT: 12.6 % (ref 10–15)
GFR SERPL CREATININE-BSD FRML MDRD: >90 ML/MIN/1.7M2
GLUCOSE SERPL-MCNC: 98 MG/DL (ref 70–99)
HCT VFR BLD AUTO: 34.9 % (ref 35–47)
HGB BLD-MCNC: 12.5 G/DL (ref 11.7–15.7)
IMM GRANULOCYTES # BLD: 0 10E9/L (ref 0–0.4)
IMM GRANULOCYTES NFR BLD: 0.4 %
LYMPHOCYTES # BLD AUTO: 1.5 10E9/L (ref 0.8–5.3)
LYMPHOCYTES NFR BLD AUTO: 29.2 %
MCH RBC QN AUTO: 32.7 PG (ref 26.5–33)
MCHC RBC AUTO-ENTMCNC: 35.8 G/DL (ref 31.5–36.5)
MCV RBC AUTO: 91 FL (ref 78–100)
MONOCYTES # BLD AUTO: 0.4 10E9/L (ref 0–1.3)
MONOCYTES NFR BLD AUTO: 8.2 %
NEUTROPHILS # BLD AUTO: 2.8 10E9/L (ref 1.6–8.3)
NEUTROPHILS NFR BLD AUTO: 56.8 %
NRBC # BLD AUTO: 0 10*3/UL
NRBC BLD AUTO-RTO: 0 /100
PLATELET # BLD AUTO: 315 10E9/L (ref 150–450)
POTASSIUM SERPL-SCNC: 3.4 MMOL/L (ref 3.4–5.3)
PROT SERPL-MCNC: 7.3 G/DL (ref 6.8–8.8)
RBC # BLD AUTO: 3.82 10E12/L (ref 3.8–5.2)
SODIUM SERPL-SCNC: 136 MMOL/L (ref 133–144)
WBC # BLD AUTO: 5 10E9/L (ref 4–11)

## 2018-03-12 PROCEDURE — 36415 COLL VENOUS BLD VENIPUNCTURE: CPT

## 2018-03-12 PROCEDURE — 85025 COMPLETE CBC W/AUTO DIFF WBC: CPT | Performed by: INTERNAL MEDICINE

## 2018-03-12 PROCEDURE — 80053 COMPREHEN METABOLIC PANEL: CPT | Performed by: INTERNAL MEDICINE

## 2018-03-12 PROCEDURE — 74183 MRI ABD W/O CNTR FLWD CNTR: CPT

## 2018-03-12 PROCEDURE — A9585 GADOBUTROL INJECTION: HCPCS | Performed by: INTERNAL MEDICINE

## 2018-03-12 PROCEDURE — 71250 CT THORAX DX C-: CPT

## 2018-03-12 PROCEDURE — 25000128 H RX IP 250 OP 636: Performed by: INTERNAL MEDICINE

## 2018-03-12 PROCEDURE — 86316 IMMUNOASSAY TUMOR OTHER: CPT | Performed by: INTERNAL MEDICINE

## 2018-03-12 RX ORDER — GADOBUTROL 604.72 MG/ML
15 INJECTION INTRAVENOUS ONCE
Status: COMPLETED | OUTPATIENT
Start: 2018-03-12 | End: 2018-03-12

## 2018-03-12 RX ADMIN — GADOBUTROL 7 ML: 604.72 INJECTION INTRAVENOUS at 09:20

## 2018-03-12 NOTE — PROGRESS NOTES
"Infusion Nursing Note:  Connie Brunson presents today for Labs prior to CT/MRI.    Patient seen by provider today: No   present during visit today: Not Applicable.    Note: Attempted to start 20g PIV for contrast for Radiology. Angiocath in vein, but unable to advance catheter. Sebastian labs with butterfly; IV will be needed in Radiology. Patient states she does have \"difficult veins, and sometimes anesthesia has to start it\".    Intravenous Access:  Lab draw site R hand, Needle type butterfly, Gauge 23.    Treatment Conditions:  Not Applicable.    Post Infusion Assessment:  Access discontinued per protocol.    Discharge Plan:   Discharge instructions reviewed with: Patient.  Patient and/or family verbalized understanding of discharge instructions and all questions answered.  AVS to patient via FoodflyHART.    Patient discharged in stable condition accompanied by: self.  Departure Mode: Ambulatory.  Patient going to radiology downstairs for scans.    Emilia Cortez RN  "

## 2018-03-14 LAB — CGA SERPL-MCNC: 597 NG/ML (ref 0–95)

## 2018-03-16 ENCOUNTER — TELEPHONE (OUTPATIENT)
Dept: ONCOLOGY | Facility: CLINIC | Age: 58
End: 2018-03-16

## 2018-03-16 NOTE — TELEPHONE ENCOUNTER
Return call to pt and informed that Dr Weir will need to review scans but stable findings and no new disease per report.  Will send message to Dr Weir and pt will also follow up with MD at visit on 3/291/8.  Pt aware and no further questions at this time.    Vandana Zamudio, RN, BSN

## 2018-03-16 NOTE — TELEPHONE ENCOUNTER
Message left 1:13pm    Patient would like the results of her recent CT .  Patient has an appointments with Dr. Weir March 29 th but thinks that is to long to wait for results. Patient would like her results.

## 2018-03-20 NOTE — TELEPHONE ENCOUNTER
Dr Weir notified and agreed with note, CT results stable and will discuss further with pt at appt as scheduled.  Pt called and aware and no further questions at this time.  Vandana Zamudio, RN, BSN

## 2018-03-29 ENCOUNTER — ONCOLOGY VISIT (OUTPATIENT)
Dept: ONCOLOGY | Facility: CLINIC | Age: 58
End: 2018-03-29
Attending: INTERNAL MEDICINE
Payer: COMMERCIAL

## 2018-03-29 VITALS
WEIGHT: 156 LBS | HEART RATE: 79 BPM | DIASTOLIC BLOOD PRESSURE: 90 MMHG | SYSTOLIC BLOOD PRESSURE: 147 MMHG | HEIGHT: 59 IN | TEMPERATURE: 97.6 F | RESPIRATION RATE: 16 BRPM | OXYGEN SATURATION: 98 % | BODY MASS INDEX: 31.45 KG/M2

## 2018-03-29 DIAGNOSIS — C7A.8 PRIMARY MALIGNANT NEUROENDOCRINE NEOPLASM OF ILEUM (H): Primary | ICD-10-CM

## 2018-03-29 PROCEDURE — 99214 OFFICE O/P EST MOD 30 MIN: CPT | Performed by: INTERNAL MEDICINE

## 2018-03-29 PROCEDURE — G0463 HOSPITAL OUTPT CLINIC VISIT: HCPCS

## 2018-03-29 ASSESSMENT — PAIN SCALES - GENERAL: PAINLEVEL: NO PAIN (0)

## 2018-03-29 NOTE — MR AVS SNAPSHOT
After Visit Summary   3/29/2018    Connie Brunson    MRN: [id number]           Patient Information     Date Of Birth          1960        Visit Information        Provider Department      3/29/2018 3:15 PM Mary Weir MD [facility]      Today's Diagnoses     Primary malignant neuroendocrine neoplasm of ileum (H)    -  1      Care Instructions    -schedule MRI abdomen, CT chest, and labs in 6 months, a few day prior to the next appointment  -return to clinic in 6 months Scheduled  Klarissa JOYNER given to patient            Follow-ups after your visit        Your next 10 appointments already scheduled     Sep 24, 2018  9:00 AM CDT   Return Visit with  ONCOLOGY NURSE   [facility] ([facility])    [facility]  [address]  [address]  [facility] [id number] [phone]            Sep 24, 2018  9:30 AM CDT   (Arrive by 9:15 AM)   MR ABDOMEN W/O & W CONTRAST with [id number]   [facility] ([facility])    [address]  [facility] [id number] [phone]           Take your medicines as usual, unless your doctor tells you not to. Bring a list of your current medicines to your exam (including vitamins, minerals and over-the-counter drugs). Also bring the results of similar scans you may have had.    You may or may not receive IV contrast for this exam pending the discretion of the Radiologist.   Do not eat or drink for 6 hours prior to exam.  The MRI machine uses a strong magnet. Please wear clothes without metal (snaps, zippers). A sweatsuit works well, or we may give you a hospital gown.  Please remove any body piercings and hair extensions before you arrive. You will also remove watches, jewelry, hairpins, wallets, dentures, partial dental plates and hearing aids. You may wear contact lenses, and you may be able to  wear your rings. We have a safe place to keep your personal items, but it is safer to leave them at home.  **IMPORTANT** THE INSTRUCTIONS BELOW ARE ONLY FOR THOSE PATIENTS WHO HAVE BEEN PRESCRIBED SEDATION OR GENERAL ANESTHESIA DURING THEIR MRI PROCEDURE:  IF YOUR DOCTOR PRESCRIBED ORAL SEDATION (take medicine to help you relax during your exam):   You must get the medicine from your doctor (oral medication) before you arrive. Bring the medicine to the exam. Do not take it at home. You ll be told when to take it upon arriving for your exam.   Arrive one hour early. Bring someone who can take you home after the test. Your medicine will make you sleepy. After the exam, you may not drive, take a bus or take a taxi by yourself.  IF YOUR DOCTOR PRESCRIBED IV SEDATION:   Arrive one hour early. Bring someone who can take you home after the test. Your medicine will make you sleepy. After the exam, you may not drive, take a bus or take a taxi by yourself.   No eating 6 hours before your exam. You may have clear liquids up until 4 hours before your exam. (Clear liquids include water, clear tea, black coffee and fruit juice without pulp.)  IF YOUR DOCTOR PRESCRIBED ANESTHESIA (be asleep for your exam):   Arrive 1 1/2 hours early. Bring someone who can take you home after the test. You may not drive, take a bus or take a taxi by yourself.   No eating 8 hours before your exam. You may have clear liquids up until 4 hours before your exam. (Clear liquids include water, clear tea, black coffee and fruit juice without pulp.)   You will spend four to five hours in the recovery room.  If you have any questions, please contact your Imaging Department exam site.            Sep 24, 2018 10:30 AM CDT   (Arrive by 10:15 AM)   CT CHEST W/O CONTRAST with [id number] ([facility])    [address]  [facility] [address] [phone]           Please bring any scans or  X-rays taken at other hospitals, if similar tests were done. Also bring a list of your medicines, including vitamins, minerals and over-the-counter drugs. It is safest to leave personal items at home.  Be sure to tell your doctor:   If you have any allergies.   If there s any chance you are pregnant.   If you are breastfeeding.  You do not need to do anything special to prepare for this exam.  Please wear loose clothing, such as a sweat suit or jogging clothes. Avoid snaps, zippers and other metal. We may ask you to undress and put on a hospital gown.            Sep 27, 2018  2:15 PM CDT   Return Visit with Mary Weir MD   [facility] ([facility])    [facility]  [address]  [address]  [facility] [address] [phone]              Future tests that were ordered for you today     Open Future Orders        Priority Expected Expires Ordered    CBC with platelets differential Routine 9/29/2018 3/29/2019 3/29/2018    Comprehensive metabolic panel Routine 9/29/2018 3/29/2019 3/29/2018    Chromogranin A Routine 9/29/2018 3/29/2019 3/29/2018    MR Abdomen w/o & w Contrast Routine 9/29/2018 3/29/2019 3/29/2018    CT Chest w/o Contrast Routine 9/29/2018 3/29/2019 3/29/2018            Who to contact     If you have questions or need follow up information about today's clinic visit or your schedule please contact [facility] directly at [phone].  Normal or non-critical lab and imaging results will be communicated to you by MyChart, letter or phone within 4 business days after the clinic has received the results. If you do not hear from us within 7 days, please contact the clinic through [employer] or phone. If you have a critical or abnormal lab result, we will notify you by phone as soon as possible.  Submit refill requests through [employer] or call your pharmacy and they will forward the refill request to us. Please  "allow 3 business days for your refill to be completed.          Additional Information About Your Visit        MyChart Information     [employer] gives you secure access to your electronic health record. If you see a primary care provider, you can also send messages to your care team and make appointments. If you have questions, please call your primary care clinic.  If you do not have a primary care provider, please call [phone] and they will assist you.        Care EveryWhere ID     This is your Care EveryWhere ID. This could be used by other organizations to access your [place] medical records  [id number]        Your Vitals Were     Pulse Temperature Respirations Height Last Period Pulse Oximetry    79 97.6  F (36.4  C) (Tympanic) 16 1.499 m (4' 11\") 09/05/2000 98%    BMI (Body Mass Index)                   31.51 kg/m2            Blood Pressure from Last 3 Encounters:   03/29/18 147/90   03/07/18 138/86   12/14/17 137/80    Weight from Last 3 Encounters:   03/29/18 70.8 kg (156 lb)   03/07/18 70.4 kg (155 lb 3.2 oz)   12/14/17 68 kg (150 lb)               Primary Care Provider Office Phone # Fax #    Gayatricassie Stephens [phone]       [address] [place] [address]        Equal Access to Services     IVA MCKENZIE : Hadii aad ku hadasho Soomaali, waaxda luqadaha, qaybta kaalmada adeegyada, nithya camejo. So [facility] [phone].    ATENCIÓN: Si habla español, tiene a ceron disposición servicios gratuitos de asistencia lingüística. Llelsa al [phone].    We comply with applicable federal civil rights laws and [place] laws. We do not discriminate on the basis of race, color, national origin, age, disability, sex, sexual orientation, or gender identity.            Thank you!     Thank you for choosing [facility]  for your care. Our goal is always to provide you with excellent care. Hearing back from our patients is one way we " can continue to improve our services. Please take a few minutes to complete the written survey that you may receive in the mail after your visit with us. Thank you!             Your Updated Medication List - Protect others around you: Learn how to safely use, store and throw away your medicines at www.disposemymeds.org.          This list is accurate as of 3/29/18  4:22 PM.  Always use your most recent med list.                   Brand Name Dispense Instructions for use Diagnosis    cetirizine 10 MG tablet    zyrTEC     Take 10 mg by mouth every evening        diazepam 10 MG tablet    VALIUM    1 tablet    Take 1 tab or 10 mg 30-60 minutes prior to MRI scan.    Primary malignant neuroendocrine neoplasm of ileum (H)       fenofibrate 160 MG tablet     90 tablet    Take 1 tablet (160 mg) by mouth daily    Hypertriglyceridemia       fluticasone 50 MCG/ACT spray    FLONASE    48 g    USE TWO SPRAYS IN EACH NOSTRIL EVERY DAY    Other seasonal allergic rhinitis       hydrocortisone 2.5 % cream     30 g    Apply sparingly to dermatitis left lower eye    Seborrheic dermatitis       ibuprofen 600 MG tablet    ADVIL/MOTRIN    30 tablet    Take 1 tablet (600 mg) by mouth every 6 hours as needed for pain (mild)    Acute cholecystitis       lisinopril 10 MG tablet    PRINIVIL/ZESTRIL    90 tablet    Take 1 tablet (10 mg) by mouth daily    Essential hypertension with goal blood pressure less than 140/90       LORazepam 0.5 MG tablet    ATIVAN    10 tablet    Take 0.5-1 tablets (0.25-0.5 mg) by mouth every 8 hours as needed for anxiety    Adjustment disorder with anxious mood       mometasone 0.1 % cream    ELOCON    15 g    Apply sparingly to affected area twice daily for 14 days.  Do not apply to face.    Dermatitis       omeprazole 20 MG CR capsule    priLOSEC    90 capsule    Take 1 capsule (20 mg) by mouth daily    Gastroesophageal reflux disease without esophagitis       triamcinolone 0.5 % cream    KENALOG     Apply  topically 2 times daily as needed for irritation (to eczema or psoriatic lesions - Never use on face)        triamterene-hydrochlorothiazide 37.5-25 MG per capsule    DYAZIDE    90 capsule    TAKE ONE CAPSULE BY MOUTH ONCE DAILY    Essential hypertension with goal blood pressure less than 140/90       venlafaxine 37.5 MG tablet    EFFEXOR    90 tablet    TAKE ONE TABLET BY MOUTH EVERY DAY    Anxiety       zolpidem 5 MG tablet   Start taking on:  4/8/2018    AMBIEN    30 tablet    Take 1 tablet (5 mg) by mouth nightly as needed for sleep    Primary insomnia

## 2018-03-29 NOTE — NURSING NOTE
"Oncology Rooming Note    March 29, 2018 3:14 PM   Connie Brunson is a 57 year old female who presents for:    Chief Complaint   Patient presents with     Oncology Clinic Visit     Follow up with Results     Initial Vitals: /90  Pulse 79  Temp 97.6  F (36.4  C) (Tympanic)  Resp 16  Ht 1.499 m (4' 11\")  Wt 70.8 kg (156 lb)  LMP 09/05/2000  SpO2 98%  BMI 31.51 kg/m2 Estimated body mass index is 31.51 kg/(m^2) as calculated from the following:    Height as of this encounter: 1.499 m (4' 11\").    Weight as of this encounter: 70.8 kg (156 lb). Body surface area is 1.72 meters squared.  No Pain (0) Comment: Data Unavailable   Patient's last menstrual period was 09/05/2000.  Allergies reviewed: Yes  Medications reviewed: Yes    Medications: Medication refills not needed today.  Pharmacy name entered into [facility]:    [facility] [address]  WRITTEN PRESCRIPTION REQUESTED  [facility] [address] [place] [facility] [address] [facility]    Clinical concerns: follow up     8 minutes for nursing intake (face to face time)     Patti Garcia CMA     DISCHARGE PLAN:  Next appointments: See patient instruction section  Departure Mode: Ambulatory  Accompanied by: self  0 minutes for nursing discharge (face to face time)   Patti Garcia CMA                  "

## 2018-03-29 NOTE — PROGRESS NOTES
[facility]    Hematology/Oncology Established Patient Note      Today's Date: 3/29/18    Reason for Follow-up: neuroendocrine tumor of the ileum      HISTORY OF PRESENT ILLNESS: Connie Brunson is a 57 year old female with PMHx of HTN, HLD, depression, who presents with neuroendocrine tumor of the ileum.  In October 2017, she had a CT abdomen/pelvis done due to lower abdominal pain related to sigmoid diverticulitis and loose stools.  She had been having diarrhea for ~3 years.  It showed an incidental finding of an enhancing 1.3 cm intraluminal nodule in the distal ileum.  There was comment on radiology report on CT enterography on 11/2/17 that the nodule, in retrospect, appears to be present and stable since an older CT from 10/27/14.  On 11/7/17, she underwent a segmental small bowel resection with primary anastomosis by Dr. Cristobal.  Pathology showed a 1.5 cm tumor in the ileum, well-differentiated neuroendocrine tumor, grade 2, mitotic rate 410 HPF, Ki-67 of 5%, 2 of 3 lymph nodes were involved, stage pT3N1 (stage IIIB).          INTERIM HISTORY: Connie returns for follow-up today.  She has been feeling well.  She has diarrhea again, about 2-3 times a day, which isn't nearly as bad as prior to her surgery, when she had diarrhea up to 10 times a day.  She denies abdominal pain.  She otherwise feels pretty good.        REVIEW OF SYSTEMS:   14 point ROS was reviewed and is negative other than as noted above in HPI.       HOME MEDICATIONS:  Current Outpatient Prescriptions   Medication Sig Dispense Refill     mometasone (ELOCON) 0.1 % cream Apply sparingly to affected area twice daily for 14 days.  Do not apply to face. 15 g 3     [START ON 4/8/2018] zolpidem (AMBIEN) 5 MG tablet Take 1 tablet (5 mg) by mouth nightly as needed for sleep 30 tablet 5     triamterene-hydrochlorothiazide (DYAZIDE) 37.5-25 MG per capsule TAKE ONE CAPSULE BY MOUTH ONCE DAILY 90 capsule 0     diazepam (VALIUM) 10 MG  tablet Take 1 tab or 10 mg 30-60 minutes prior to MRI scan. 1 tablet 0     LORazepam (ATIVAN) 0.5 MG tablet Take 0.5-1 tablets (0.25-0.5 mg) by mouth every 8 hours as needed for anxiety 10 tablet 0     lisinopril (PRINIVIL/ZESTRIL) 10 MG tablet Take 1 tablet (10 mg) by mouth daily 90 tablet 1     fenofibrate 160 MG tablet Take 1 tablet (160 mg) by mouth daily 90 tablet 1     venlafaxine (EFFEXOR) 37.5 MG tablet TAKE ONE TABLET BY MOUTH EVERY DAY 90 tablet 0     fluticasone (FLONASE) 50 MCG/ACT spray USE TWO SPRAYS IN EACH NOSTRIL EVERY DAY 48 g 1     omeprazole (PRILOSEC) 20 MG CR capsule Take 1 capsule (20 mg) by mouth daily 90 capsule 3     hydrocortisone 2.5 % cream Apply sparingly to dermatitis left lower eye 30 g 3     triamcinolone (KENALOG) 0.5 % cream Apply topically 2 times daily as needed for irritation (to eczema or psoriatic lesions - Never use on face)       cetirizine (ZYRTEC) 10 MG tablet Take 10 mg by mouth every evening       ibuprofen (ADVIL,MOTRIN) 600 MG tablet Take 1 tablet (600 mg) by mouth every 6 hours as needed for pain (mild) 30 tablet 0         ALLERGIES:  Allergies   Allergen Reactions     Atorvastatin Nausea     Nausea and abd pain      Ceftin GI Disturbance     Stomach upset and bloating - given at same time as clindamycin ? Which one as cause.       Clindamycin GI Disturbance     Stomach upset and bloating - given at same time as ceftin, ? Which one as cause      Flagyl [Metronidazole]      immediate migraine headache      Morphine Itching     Severe with nose, facial  Swelling - oxycodone = ok /no problems      Penicillins Hives     Sulfa Drugs Rash     Severe red , flushed rash     Levaquin Rash     States she can take cipro         PAST MEDICAL HISTORY:  Past Medical History:   Diagnosis Date     Allergic rhinitis, cause unspecified     year round - dog,dust-  Failed on claritin, claritin-D, zyrtec and zyrtec-D in past.      Benign neoplasm of cerebral meninges (H) 1999    has  yearly MRI's. - [facility] Dr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 Thomas ENT      Depressive disorder      Deviated nasal septum     sees Dr. Thomas ENT      Diverticulosis of colon (without mention of hemorrhage) 02-     History of Guillain-[place] syndrome     NO FLU SHOTS - very mild case in [place] many years ago     Hyperlipidemia LDL goal < 130 doesn't want statins    simvastatin = severe hand joint pain , lipitor =nausea/abd. pain     Hypertension goal BP (blood pressure) < 140/90      Insomnia     trazodone -made pt feel hung over      Major depressive disorder, recurrent episode, moderate (H)     lexapro - sexual side effects      Moderate major depression (H) 2/4/2005    trazodone -made pt feel hung over      Other acne      Other extrapyramidal disease and abnormal movement disorder     ?GBS     Symptomatic menopausal or female climacteric states     vivelle-dot          PAST SURGICAL HISTORY:  Past Surgical History:   Procedure Laterality Date     ABDOMEN SURGERY       BIOPSY       C LIGATE FALLOPIAN TUBE  1988    Tubal Ligation     C NONSPECIFIC PROCEDURE      PAROTID TUMOR L SIDE OF NECK - BENIGN     C TOTAL ABDOM HYSTERECTOMY  2000 ?    Hysterectomy, Total Abdominal with BSO, paps every 5 years      COLONOSCOPY  1/16/2012    Procedure:COLONOSCOPY; Colonoscopy; Surgeon:SHOLA JOYCE; Location: GI     HC REMOVE TONSILS/ADENOIDS,<13 Y/O      T and A, under 12 yrs     HEAD & NECK SURGERY       LAPAROSCOPIC CHOLECYSTECTOMY WITH CHOLANGIOGRAMS N/A 4/25/2016    Procedure: LAPAROSCOPIC CHOLECYSTECTOMY WITH CHOLANGIOGRAMS;  Surgeon: Rosa M Cristobal MD;  Location:  OR     LAPAROSCOPY DIAGNOSTIC (GENERAL) N/A 11/7/2017    Procedure: LAPAROSCOPY DIAGNOSTIC (GENERAL);  Exploratory Laparoscopy, Laparotomy and Small Bowel resection.;  Surgeon: Rosa M Cristobal MD;  Location:  OR      LAPAROTOMY EXPLORATORY N/A 11/7/2017    Procedure: LAPAROTOMY EXPLORATORY;;  Surgeon: Rosa M Cristobal MD;  Location: [facility] OR     RESECT SMALL BOWEL WITHOUT OSTOMY N/A 11/7/2017    Procedure: RESECT SMALL BOWEL WITHOUT OSTOMY;;  Surgeon: Rosa M Cristobal MD;  Location: [facility] OR     SURGICAL HISTORY OF -   2004    Menigioma excision         SOCIAL HISTORY:  Social History     Social History     Marital status:      Spouse name: Perry Brunson      Number of children: 3     Years of education: 15     Occupational History     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  [employer]     Social History Main Topics     Smoking status: Light Tobacco Smoker     Packs/day: 0.00     Years: 5.00     Types: Other, Cigarettes     Last attempt to quit: 1/1/1994     Smokeless tobacco: Current User      Comment: 11/3/17 - occ e-cig use     Alcohol use 0.0 oz/week      Comment: 3-5 drinks per week     Drug use: No     Sexual activity: No      Comment: tubal ligation-      Other Topics Concern      Service No     Blood Transfusions No     Caffeine Concern Yes     2-3 cups coffee in am     Exercise No     regular walking 4x/week      Seat Belt Yes     always     Self-Exams Yes     SBE encouraged monthly     Parent/Sibling W/ Cabg, Mi Or Angioplasty Before 65f 55m? No     Social History Narrative    calcium - taking 500mg po qday - increase to Calcium - 1000-1200mg/day    flex sig/colonoscopy -at age 48    sun precautions - discussed    mammogram - yearly    Td booster - 1991 per our records - pt will call [facility]     pneumovax -at age 58    DEXA -this year- 2003    stool hemoccults - every year after age 38    ASA- start 81 mg ASA qday.    mulvitamin - encouraged    doing citrucel qday         from second , Dan [employer]. Now back together with her first  and father of their  children, Perry Brunson - fall 2011.          FAMILY HISTORY:  Family History   Problem  "Relation Age of Onset     Hyperlipidemia Mother      Thyroid Disease Mother      CANCER Father      lung     Hypertension Father      Hyperlipidemia Father      Thyroid Disease Brother      DIABETES Maternal Grandmother      CEREBROVASCULAR DISEASE Maternal Grandmother      Substance Abuse Maternal Grandfather      Thyroid Disease Son      Her father  of lung cancer and paranasal cancer at around age 65.    She has 3 children.      PHYSICAL EXAM:  Vital signs:  /90  Pulse 79  Temp 97.6  F (36.4  C) (Tympanic)  Resp 16  Ht 1.499 m (4' 11\")  Wt 70.8 kg (156 lb)  LMP 2000  SpO2 98%  BMI 31.51 kg/m2   ECO  GENERAL/CONSTITUTIONAL: No acute distress.   RESPIRATORY: Clear to auscultation bilaterally.  CARDIOVASCULAR: Regular rate and rhythm, no murmurs.  GASTROINTESTINAL: Bowel sounds present, non-tender, no guarding, non-distended.  EYES: No scleral icterus.  NEUROLOGIC: Alert, oriented, answers questions appropriately.  INTEGUMENTARY: No jaundice.      LABS:  CBC RESULTS:   Recent Labs   Lab Test  18   0858   WBC  5.0   RBC  3.82   HGB  12.5   HCT  34.9*   MCV  91   MCH  32.7   MCHC  35.8   RDW  12.6   PLT  315     Recent Labs   Lab Test  18   0858  17   0835   NA  136  135   POTASSIUM  3.4  3.6   CHLORIDE  99  100   CO2  28  26   ANIONGAP  9  9   GLC  98  100*   BUN  16  13   CR  0.60  0.73   FADI  8.8  8.8     Lab Results   Component Value Date    AST 22 2018     Lab Results   Component Value Date    ALT 31 2018     No results found for: BILICONJ   Lab Results   Component Value Date    BILITOTAL 0.8 2018     Lab Results   Component Value Date    ALBUMIN 3.9 2018     Lab Results   Component Value Date    PROTTOTAL 7.3 2018      Lab Results   Component Value Date    ALKPHOS 32 2018     Component      Latest Ref Rng & Units 2017 2017 3/12/2018   Chromogranin A      0 - 95 ng/mL 339 (H) 385 (H) 597 (H)       IMAGING:  CT chest " 3/12/18:  FINDINGS:      Chest: Tiny area of subpleural scarring is noted in the anterior right  upper lobe which is stable. The lungs are otherwise clear. No new or  enlarging lung lesions. No infiltrate or consolidation. No pleural or  pericardial fluid. Heart is normal in size. The esophagus is  unremarkable. Thyroid gland appears normal in size. No enlarged  axillary or intrathoracic lymph nodes. Limited images upper abdomen  demonstrate cholecystectomy changes and probable fatty infiltration of  the liver. Bone window examination is unremarkable.         IMPRESSION: Stable tiny anterior right upper lobe lung nodule.  Surveillance as clinically warranted in setting of patient's  neuroendocrine cancer. No new or enlarging lung lesions. No enlarged  lymph nodes.    MRI abdomen 3/12/18:  FINDINGS:  Prior cholecystectomy changes. Upper abdominal organs are  within normal limits. There is evidence of diffuse mild fatty  infiltration of the liver with decreased signal on out-of-phase  T1-weighted sequence. No enlarged lymph nodes. No ascites.     Following contrast administration, no enhancing lesions are noted  within the upper abdominal organs. No hydronephrosis. Imaged portions  of bowel are nondistended.         IMPRESSION: Unremarkable MRI of the abdomen. No evidence of metastatic  disease or lymph node enlargement. Prior cholecystectomy changes.      ASSESSMENT/PLAN:  Connie Brunson is a 57 year old female with:    1) Neuroendocrine tumor of the ileum: s/p resection on 11/7/17, 1.5 cm, grade 2, well-differentiated, T3N1 (stage IIIB).      Recent MRI abdomen and CT scan reviewed.  MRI of abdomen showed no evidence of metastatic disease or lymph node enlargement.  No new or enlarging lung lesions.  No enlarged lymph nodes.    Chromogranin A trended up some this time.  In light of her normal imaging results, will monitor the lab for now.      -she will call if she gets worsening diarrhea or flushing symptoms or  pain and can get labs/scans done earlier.    -CT chest and MRI abdomen in 6 months  -labs in 6 months: CBC, CMP, chromogranin A  -RTC in 6 months    2) HTN, HLD  -follow-up with PCP    3) History of meningioma: diagnosed in 1999, was getting yearly MRI's with neurosurgery, but was told she does not need surveillance MRI's anymore unless she has new neurologic symptoms.    4) Pulmonary nodule: There is an indeterminate 2 x 3 mm anterior right mid-lung nodule seen on CT.  She does have history of smoking.  Recent CT chest shows that the tiny nodule is stable.  -monitor on scans      I spent a total of 25 minutes with the patient, with over >50% of the time in counseling and/or coordination of care.       Mary Weir MD  Hematology/Oncology  [facility]

## 2018-03-29 NOTE — LETTER
3/29/2018         RE: Connie Brunson  [address]  [facility] [address]        Dear Colleague,    Thank you for referring your patient, Connie Brunson, to the [facility]. Please see a copy of my visit note below.    [facility]    Hematology/Oncology Established Patient Note      Today's Date: 3/29/18    Reason for Follow-up: neuroendocrine tumor of the ileum      HISTORY OF PRESENT ILLNESS: Connie Brunson is a 57 year old female with PMHx of HTN, HLD, depression, who presents with neuroendocrine tumor of the ileum.  In October 2017, she had a CT abdomen/pelvis done due to lower abdominal pain related to sigmoid diverticulitis and loose stools.  She had been having diarrhea for ~3 years.  It showed an incidental finding of an enhancing 1.3 cm intraluminal nodule in the distal ileum.  There was comment on radiology report on CT enterography on 11/2/17 that the nodule, in retrospect, appears to be present and stable since an older CT from 10/27/14.  On 11/7/17, she underwent a segmental small bowel resection with primary anastomosis by Dr. Cristobal.  Pathology showed a 1.5 cm tumor in the ileum, well-differentiated neuroendocrine tumor, grade 2, mitotic rate 410 HPF, Ki-67 of 5%, 2 of 3 lymph nodes were involved, stage pT3N1 (stage IIIB).          INTERIM HISTORY: Connie returns for follow-up today.  She has been feeling well.  She has diarrhea again, about 2-3 times a day, which isn't nearly as bad as prior to her surgery, when she had diarrhea up to 10 times a day.  She denies abdominal pain.  She otherwise feels pretty good.        REVIEW OF SYSTEMS:   14 point ROS was reviewed and is negative other than as noted above in HPI.       HOME MEDICATIONS:  Current Outpatient Prescriptions   Medication Sig Dispense Refill     mometasone (ELOCON) 0.1 % cream Apply sparingly to affected area twice daily for 14 days.  Do not apply to face. 15 g 3     [START  ON 4/8/2018] zolpidem (AMBIEN) 5 MG tablet Take 1 tablet (5 mg) by mouth nightly as needed for sleep 30 tablet 5     triamterene-hydrochlorothiazide (DYAZIDE) 37.5-25 MG per capsule TAKE ONE CAPSULE BY MOUTH ONCE DAILY 90 capsule 0     diazepam (VALIUM) 10 MG tablet Take 1 tab or 10 mg 30-60 minutes prior to MRI scan. 1 tablet 0     LORazepam (ATIVAN) 0.5 MG tablet Take 0.5-1 tablets (0.25-0.5 mg) by mouth every 8 hours as needed for anxiety 10 tablet 0     lisinopril (PRINIVIL/ZESTRIL) 10 MG tablet Take 1 tablet (10 mg) by mouth daily 90 tablet 1     fenofibrate 160 MG tablet Take 1 tablet (160 mg) by mouth daily 90 tablet 1     venlafaxine (EFFEXOR) 37.5 MG tablet TAKE ONE TABLET BY MOUTH EVERY DAY 90 tablet 0     fluticasone (FLONASE) 50 MCG/ACT spray USE TWO SPRAYS IN EACH NOSTRIL EVERY DAY 48 g 1     omeprazole (PRILOSEC) 20 MG CR capsule Take 1 capsule (20 mg) by mouth daily 90 capsule 3     hydrocortisone 2.5 % cream Apply sparingly to dermatitis left lower eye 30 g 3     triamcinolone (KENALOG) 0.5 % cream Apply topically 2 times daily as needed for irritation (to eczema or psoriatic lesions - Never use on face)       cetirizine (ZYRTEC) 10 MG tablet Take 10 mg by mouth every evening       ibuprofen (ADVIL,MOTRIN) 600 MG tablet Take 1 tablet (600 mg) by mouth every 6 hours as needed for pain (mild) 30 tablet 0         ALLERGIES:  Allergies   Allergen Reactions     Atorvastatin Nausea     Nausea and abd pain      Ceftin GI Disturbance     Stomach upset and bloating - given at same time as clindamycin ? Which one as cause.       Clindamycin GI Disturbance     Stomach upset and bloating - given at same time as ceftin, ? Which one as cause      Flagyl [Metronidazole]      immediate migraine headache      Morphine Itching     Severe with nose, facial  Swelling - oxycodone = ok /no problems      Penicillins Hives     Sulfa Drugs Rash     Severe red , flushed rash     Levaquin Rash     States she can take cipro          PAST MEDICAL HISTORY:  Past Medical History:   Diagnosis Date     Allergic rhinitis, cause unspecified     year round - dog,dust-  Failed on claritin, claritin-D, zyrtec and zyrtec-D in past.      Benign neoplasm of cerebral meninges (H) 1999    has yearly MRI's. - [facility]. 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 Thomas ENT      Depressive disorder      Deviated nasal septum     sees Dr. Thomas ENT      Diverticulosis of colon (without mention of hemorrhage) 02-     History of Guillain-Birmingham syndrome     NO FLU SHOTS - very mild case in [place] many years ago     Hyperlipidemia LDL goal < 130 doesn't want statins    simvastatin = severe hand joint pain , lipitor =nausea/abd. pain     Hypertension goal BP (blood pressure) < 140/90      Insomnia     trazodone -made pt feel hung over      Major depressive disorder, recurrent episode, moderate (H)     lexapro - sexual side effects      Moderate major depression (H) 2/4/2005    trazodone -made pt feel hung over      Other acne      Other extrapyramidal disease and abnormal movement disorder     ?GBS     Symptomatic menopausal or female climacteric states     vivelle-dot          PAST SURGICAL HISTORY:  Past Surgical History:   Procedure Laterality Date     ABDOMEN SURGERY       BIOPSY       C LIGATE FALLOPIAN TUBE  1988    Tubal Ligation     C NONSPECIFIC PROCEDURE      PAROTID TUMOR L SIDE OF NECK - BENIGN     C TOTAL ABDOM HYSTERECTOMY  2000 ?    Hysterectomy, Total Abdominal with BSO, paps every 5 years      COLONOSCOPY  1/16/2012    Procedure:COLONOSCOPY; Colonoscopy; Surgeon:SHOLA JOYCE; Location: GI     HC REMOVE TONSILS/ADENOIDS,<11 Y/O      T and A, under 12 yrs     HEAD & NECK SURGERY       LAPAROSCOPIC CHOLECYSTECTOMY WITH CHOLANGIOGRAMS N/A 4/25/2016    Procedure: LAPAROSCOPIC CHOLECYSTECTOMY WITH CHOLANGIOGRAMS;  Surgeon: Levar  Rosa M Lea MD;  Location: [facility] OR     LAPAROSCOPY DIAGNOSTIC (GENERAL) N/A 11/7/2017    Procedure: LAPAROSCOPY DIAGNOSTIC (GENERAL);  Exploratory Laparoscopy, Laparotomy and Small Bowel resection.;  Surgeon: Rosa M Cristobal MD;  Location: [facility] OR     LAPAROTOMY EXPLORATORY N/A 11/7/2017    Procedure: LAPAROTOMY EXPLORATORY;;  Surgeon: Rosa M Cristobal MD;  Location: [facility] OR     RESECT SMALL BOWEL WITHOUT OSTOMY N/A 11/7/2017    Procedure: RESECT SMALL BOWEL WITHOUT OSTOMY;;  Surgeon: Rosa M Cristobal MD;  Location: [facility] OR     SURGICAL HISTORY OF -   2004    Menigioma excision         SOCIAL HISTORY:  Social History     Social History     Marital status:      Spouse name: Perry Brunson      Number of children: 3     Years of education: 15     Occupational History     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  [employer]     Social History Main Topics     Smoking status: Light Tobacco Smoker     Packs/day: 0.00     Years: 5.00     Types: Other, Cigarettes     Last attempt to quit: 1/1/1994     Smokeless tobacco: Current User      Comment: 11/3/17 - occ e-cig use     Alcohol use 0.0 oz/week      Comment: 3-5 drinks per week     Drug use: No     Sexual activity: No      Comment: tubal ligation-      Other Topics Concern      Service No     Blood Transfusions No     Caffeine Concern Yes     2-3 cups coffee in am     Exercise No     regular walking 4x/week      Seat Belt Yes     always     Self-Exams Yes     SBE encouraged monthly     Parent/Sibling W/ Cabg, Mi Or Angioplasty Before 65f 55m? No     Social History Narrative    calcium - taking 500mg po qday - increase to Calcium - 1000-1200mg/day    flex sig/colonoscopy -at age 48    sun precautions - discussed    mammogram - yearly    Td booster - 1991 per our records - pt will call [facility]     pneumovax -at age 58    DEXA -this year- 2003    stool hemoccults - every year after age 40    ASA- start 81 mg ASA  "qday.    mulvitamin - encouraged    doing citrucel qday         from second , Dan Sch[employer]enbauer. Now back together with her first  and father of their  children, Perry Brunson - 2011.          FAMILY HISTORY:  Family History   Problem Relation Age of Onset     Hyperlipidemia Mother      Thyroid Disease Mother      CANCER Father      lung     Hypertension Father      Hyperlipidemia Father      Thyroid Disease Brother      DIABETES Maternal Grandmother      CEREBROVASCULAR DISEASE Maternal Grandmother      Substance Abuse Maternal Grandfather      Thyroid Disease Son      Her father  of lung cancer and paranasal cancer at around age 65.    She has 3 children.      PHYSICAL EXAM:  Vital signs:  /90  Pulse 79  Temp 97.6  F (36.4  C) (Tympanic)  Resp 16  Ht 1.499 m (4' 11\")  Wt 70.8 kg (156 lb)  LMP 2000  SpO2 98%  BMI 31.51 kg/m2   ECO  GENERAL/CONSTITUTIONAL: No acute distress.   RESPIRATORY: Clear to auscultation bilaterally.  CARDIOVASCULAR: Regular rate and rhythm, no murmurs.  GASTROINTESTINAL: Bowel sounds present, non-tender, no guarding, non-distended.  EYES: No scleral icterus.  NEUROLOGIC: Alert, oriented, answers questions appropriately.  INTEGUMENTARY: No jaundice.      LABS:  CBC RESULTS:   Recent Labs   Lab Test  18   0858   WBC  5.0   RBC  3.82   HGB  12.5   HCT  34.9*   MCV  91   MCH  32.7   MCHC  35.8   RDW  12.6   PLT  315     Recent Labs   Lab Test  18   0858  17   0835   NA  136  135   POTASSIUM  3.4  3.6   CHLORIDE  99  100   CO2  28  26   ANIONGAP  9  9   GLC  98  100*   BUN  16  13   CR  0.60  0.73   FADI  8.8  8.8     Lab Results   Component Value Date    AST 22 2018     Lab Results   Component Value Date    ALT 31 2018     No results found for: BILICONJ   Lab Results   Component Value Date    BILITOTAL 0.8 2018     Lab Results   Component Value Date    ALBUMIN 3.9 2018     Lab Results   Component " Value Date    PROTTOTAL 7.3 03/12/2018      Lab Results   Component Value Date    ALKPHOS 32 03/12/2018     Component      Latest Ref Rng & Units 11/16/2017 12/8/2017 3/12/2018   Chromogranin A      0 - 95 ng/mL 339 (H) 385 (H) 597 (H)       IMAGING:  CT chest 3/12/18:  FINDINGS:      Chest: Tiny area of subpleural scarring is noted in the anterior right  upper lobe which is stable. The lungs are otherwise clear. No new or  enlarging lung lesions. No infiltrate or consolidation. No pleural or  pericardial fluid. Heart is normal in size. The esophagus is  unremarkable. Thyroid gland appears normal in size. No enlarged  axillary or intrathoracic lymph nodes. Limited images upper abdomen  demonstrate cholecystectomy changes and probable fatty infiltration of  the liver. Bone window examination is unremarkable.         IMPRESSION: Stable tiny anterior right upper lobe lung nodule.  Surveillance as clinically warranted in setting of patient's  neuroendocrine cancer. No new or enlarging lung lesions. No enlarged  lymph nodes.    MRI abdomen 3/12/18:  FINDINGS:  Prior cholecystectomy changes. Upper abdominal organs are  within normal limits. There is evidence of diffuse mild fatty  infiltration of the liver with decreased signal on out-of-phase  T1-weighted sequence. No enlarged lymph nodes. No ascites.     Following contrast administration, no enhancing lesions are noted  within the upper abdominal organs. No hydronephrosis. Imaged portions  of bowel are nondistended.         IMPRESSION: Unremarkable MRI of the abdomen. No evidence of metastatic  disease or lymph node enlargement. Prior cholecystectomy changes.      ASSESSMENT/PLAN:  Connie Brunson is a 57 year old female with:    1) Neuroendocrine tumor of the ileum: s/p resection on 11/7/17, 1.5 cm, grade 2, well-differentiated, T3N1 (stage IIIB).      Recent MRI abdomen and CT scan reviewed.  MRI of abdomen showed no evidence of metastatic disease or lymph node  enlargement.  No new or enlarging lung lesions.  No enlarged lymph nodes.    Chromogranin A trended up some this time.  In light of her normal imaging results, will monitor the lab for now.      -she will call if she gets worsening diarrhea or flushing symptoms or pain and can get labs/scans done earlier.    -CT chest and MRI abdomen in 6 months  -labs in 6 months: CBC, CMP, chromogranin A  -RTC in 6 months    2) HTN, HLD  -follow-up with PCP    3) History of meningioma: diagnosed in 1999, was getting yearly MRI's with neurosurgery, but was told she does not need surveillance MRI's anymore unless she has new neurologic symptoms.    4) Pulmonary nodule: There is an indeterminate 2 x 3 mm anterior right mid-lung nodule seen on CT.  She does have history of smoking.  Recent CT chest shows that the tiny nodule is stable.  -monitor on scans      I spent a total of 25 minutes with the patient, with over >50% of the time in counseling and/or coordination of care.       Mary Weir MD  Hematology/Oncology  [facility]      Again, thank you for allowing me to participate in the care of your patient.        Sincerely,        Mary Weir MD

## 2018-04-03 DIAGNOSIS — F41.9 ANXIETY: ICD-10-CM

## 2018-04-04 NOTE — TELEPHONE ENCOUNTER
"Requested Prescriptions   Pending Prescriptions Disp Refills     venlafaxine (EFFEXOR) 37.5 MG tablet [Pharmacy Med Name: VENLAFAXINE HCL 37.5MG TABS] 90 tablet 0        Last Written Prescription Date:  6.28.17  Last Fill Quantity: 90,  # refills: 0   Last office visit: 3/7/2018 with prescribing provider:     Future Office Visit:     Sig: TAKE ONE TABLET BY MOUTH ONCE DAILY    Serotonin-Norepinephrine Reuptake Inhibitors  Failed    4/3/2018  6:21 PM       Failed - Blood pressure under 140/90 in past 12 months    BP Readings from Last 3 Encounters:   03/29/18 147/90   03/07/18 138/86   12/14/17 137/80                Passed - Recent (12 mo) or future (30 days) visit within the authorizing provider's specialty    Patient had office visit in the last 12 months or has a visit in the next 30 days with authorizing provider or within the authorizing provider's specialty.  See \"Patient Info\" tab in inbasket, or \"Choose Columns\" in Meds & Orders section of the refill encounter.           Passed - Patient is age 18 or older       Passed - No active pregnancy on record       Passed - Normal serum creatinine on file in past 12 months    Recent Labs   Lab Test  03/12/18   0858   CR  0.60            Passed - No positive pregnancy test in past 12 months          "

## 2018-04-05 RX ORDER — VENLAFAXINE 37.5 MG/1
TABLET ORAL
Qty: 90 TABLET | Refills: 1 | Status: SHIPPED | OUTPATIENT
Start: 2018-04-05 | End: 2018-10-21

## 2018-04-05 NOTE — TELEPHONE ENCOUNTER
PHQ-9 SCORE 9/26/2016 4/12/2017 11/3/2017   Total Score - - -   Total Score MyChart - - -   Total Score 2 4 0     Refill per RN protocol     Samantha Escoto RN, BSN  [facility]

## 2018-04-29 DIAGNOSIS — I10 ESSENTIAL HYPERTENSION WITH GOAL BLOOD PRESSURE LESS THAN 140/90: ICD-10-CM

## 2018-04-30 NOTE — TELEPHONE ENCOUNTER
Routing refill request to provider for review/approval because:  BP not at goal.    Latoya Selby, COCO, RN, N  [facility]) [phone]

## 2018-04-30 NOTE — TELEPHONE ENCOUNTER
"Requested Prescriptions   Pending Prescriptions Disp Refills     lisinopril (PRINIVIL/ZESTRIL) 10 MG tablet [Pharmacy Med Name: LISINOPRIL 10MG TABS] 90 tablet 1      Last Written Prescription Date:  10.30.17  Last Fill Quantity: 90,  # refills: 1   Last Office Visit: 3/7/2018   Future Office Visit:      Sig: TAKE ONE TABLET BY MOUTH EVERY DAY    ACE Inhibitors (Including Combos) Protocol Failed    4/29/2018  5:24 PM       Failed - Blood pressure under 140/90 in past 12 months    BP Readings from Last 3 Encounters:   03/29/18 147/90   03/07/18 138/86   12/14/17 137/80                Passed - Recent (12 mo) or future (30 days) visit within the authorizing provider's specialty    Patient had office visit in the last 12 months or has a visit in the next 30 days with authorizing provider or within the authorizing provider's specialty.  See \"Patient Info\" tab in inbasket, or \"Choose Columns\" in Meds & Orders section of the refill encounter.           Passed - Patient is age 18 or older       Passed - No active pregnancy on record       Passed - Normal serum creatinine on file in past 12 months    Recent Labs   Lab Test  03/12/18   0858   CR  0.60            Passed - Normal serum potassium on file in past 12 months    Recent Labs   Lab Test  03/12/18   0858   POTASSIUM  3.4            Passed - No positive pregnancy test in past 12 months          "

## 2018-05-01 RX ORDER — LISINOPRIL 10 MG/1
TABLET ORAL
Qty: 90 TABLET | Refills: 1 | Status: SHIPPED | OUTPATIENT
Start: 2018-05-01 | End: 2018-10-21

## 2018-05-01 NOTE — TELEPHONE ENCOUNTER
Recheck your blood pressure in our pharmacy in 1 week or sooner if needed.  Have pharmacy send me their note.  Please inform patient.

## 2018-05-04 NOTE — TELEPHONE ENCOUNTER
Patient notified by phone.  She verbalized understanding and agreed with plan.    Latoya Selby, BS, RN, PHN  [facility]) [phone]

## 2018-05-04 NOTE — TELEPHONE ENCOUNTER
Attempt #1  Called [phone] - Left a non-detailed message to call back and speak with any triage nurse.    Vero Jones RN  [facility]

## 2018-05-08 ENCOUNTER — ALLIED HEALTH/NURSE VISIT (OUTPATIENT)
Dept: FAMILY MEDICINE | Facility: CLINIC | Age: 58
End: 2018-05-08
Payer: COMMERCIAL

## 2018-05-08 VITALS — DIASTOLIC BLOOD PRESSURE: 84 MMHG | SYSTOLIC BLOOD PRESSURE: 140 MMHG

## 2018-05-08 DIAGNOSIS — I10 ESSENTIAL HYPERTENSION WITH GOAL BLOOD PRESSURE LESS THAN 140/90: Primary | ICD-10-CM

## 2018-05-08 PROCEDURE — 99207 ZZC NO CHARGE NURSE ONLY: CPT | Performed by: FAMILY MEDICINE

## 2018-05-08 NOTE — PROGRESS NOTES
Connie Brunson is enrolled/participating in the retail pharmacy Blood Pressure Goals Achievement Program (BPGAP).  Connie Brunson was evaluated at [facility] on May 8, 2018 at which time her blood pressure was:    BP Readings from Last 3 Encounters:   05/08/18 140/84   03/29/18 147/90   03/07/18 138/86     Reviewed lifestyle modifications for blood pressure control and reduction: including making healthy food choices, managing weight, getting regular exercise, smoking cessation, reducing alcohol consumption, monitoring blood pressure regularly.     Connie Brunson is not experiencing symptoms.    Follow-Up: BP is not at goal of < 140/90mmHg (patient 18+ years of age with or without diabetes), Recommended follow-up in 1 month at the pharmacy. Routing to PCP as an FYI.    Recommendation to Provider: No change.  Pt will recheck again at next refill of htn meds.  She has recently started exercise program.      Connie Brunson was evaluated for enrollment into the PGEN study today.    Patient eligible for enrollment:  Yes  Patient interested in enrollment:  Unknown    Completed by: Thank you,  Emilia Gaona [facility], Mgr [facility] [phone]

## 2018-05-08 NOTE — MR AVS SNAPSHOT
After Visit Summary   5/8/2018    Connie Brunson    MRN: [id number]           Patient Information     Date Of Birth          1960        Visit Information        Provider Department      5/8/2018 8:11 AM Gayatri Stephens MD [facility] [place]        Today's Diagnoses     Essential hypertension with goal blood pressure less than 140/90    -  1       Follow-ups after your visit        Your next 10 appointments already scheduled     Sep 24, 2018  9:00 AM CDT   Return Visit with  ONCOLOGY NURSE   [facility] ([facility])    [facility]  [address]  [facility] [id number] [phone]            Sep 24, 2018  9:30 AM CDT   MR ABDOMEN W/O & W CONTRAST with [id number]   [facility] ([facility])    [address]  [facility] [address] [phone]           Take your medicines as usual, unless your doctor tells you not to. Bring a list of your current medicines to your exam (including vitamins, minerals and over-the-counter drugs). Also bring the results of similar scans you may have had.    You may or may not receive IV contrast for this exam pending the discretion of the Radiologist.   Do not eat or drink for 6 hours prior to exam.  The MRI machine uses a strong magnet. Please wear clothes without metal (snaps, zippers). A sweatsuit works well, or we may give you a hospital gown.  Please remove any body piercings and hair extensions before you arrive. You will also remove watches, jewelry, hairpins, wallets, dentures, partial dental plates and hearing aids. You may wear contact lenses, and you may be able to wear your rings. We have a safe place to keep your personal items, but it is safer to leave them at home.  **IMPORTANT** THE INSTRUCTIONS BELOW ARE ONLY FOR THOSE PATIENTS WHO HAVE BEEN PRESCRIBED SEDATION OR GENERAL ANESTHESIA DURING THEIR  MRI PROCEDURE:  IF YOUR DOCTOR PRESCRIBED ORAL SEDATION (take medicine to help you relax during your exam):   You must get the medicine from your doctor (oral medication) before you arrive. Bring the medicine to the exam. Do not take it at home. You ll be told when to take it upon arriving for your exam.   Arrive one hour early. Bring someone who can take you home after the test. Your medicine will make you sleepy. After the exam, you may not drive, take a bus or take a taxi by yourself.  IF YOUR DOCTOR PRESCRIBED IV SEDATION:   Arrive one hour early. Bring someone who can take you home after the test. Your medicine will make you sleepy. After the exam, you may not drive, take a bus or take a taxi by yourself.   No eating 6 hours before your exam. You may have clear liquids up until 4 hours before your exam. (Clear liquids include water, clear tea, black coffee and fruit juice without pulp.)  IF YOUR DOCTOR PRESCRIBED ANESTHESIA (be asleep for your exam):   Arrive 1 1/2 hours early. Bring someone who can take you home after the test. You may not drive, take a bus or take a taxi by yourself.   No eating 8 hours before your exam. You may have clear liquids up until 4 hours before your exam. (Clear liquids include water, clear tea, black coffee and fruit juice without pulp.)   You will spend four to five hours in the recovery room.  If you have any questions, please contact your Imaging Department exam site.            Sep 24, 2018 10:30 AM CDT   CT CHEST W/O CONTRAST with [id number] [facility] ([facility])    [address] [facility] [address] [place] [address] [phone]           Please bring any scans or X-rays taken at other hospitals, if similar tests were done. Also bring a list of your medicines, including vitamins, minerals and over-the-counter drugs. It is safest to leave personal items at home.  Be sure to tell your doctor:   If you have any allergies.    If there s any chance you are pregnant.   If you are breastfeeding.  You do not need to do anything special to prepare for this exam.  Please wear loose clothing, such as a sweat suit or jogging clothes. Avoid snaps, zippers and other metal. We may ask you to undress and put on a hospital gown.            Sep 27, 2018  2:15 PM CDT   Return Visit with Mary Weir MD   [facility] ([facility])    [facility]  [address]  [facility] [address] [phone]              Who to contact     If you have questions or need follow up information about today's clinic visit or your schedule please contact [facility] [place] directly at [phone].  Normal or non-critical lab and imaging results will be communicated to you by MyChart, letter or phone within 4 business days after the clinic has received the results. If you do not hear from us within 7 days, please contact the clinic through [email or web address] or phone. If you have a critical or abnormal lab result, we will notify you by phone as soon as possible.  Submit refill requests through [employer] or call your pharmacy and they will forward the refill request to us. Please allow 3 business days for your refill to be completed.          Additional Information About Your Visit        Fun[employer] Information     [employer] gives you secure access to your electronic health record. If you see a primary care provider, you can also send messages to your care team and make appointments. If you have questions, please call your primary care clinic.  If you do not have a primary care provider, please call [phone] and they will assist you.        Care EveryWhere ID     This is your Care EveryWhere ID. This could be used by other organizations to access your [place] medical records  [id number]        Your Vitals Were     Last Period                   09/05/2000            Blood Pressure from Last  3 Encounters:   05/08/18 140/84   03/29/18 147/90   03/07/18 138/86    Weight from Last 3 Encounters:   03/29/18 156 lb (70.8 kg)   03/07/18 155 lb 3.2 oz (70.4 kg)   12/14/17 150 lb (68 kg)              Today, you had the following     No orders found for display       Primary Care Provider Office Phone # Fax #    Gayatri Stephens [phone]       [facility] [address]        Equal Access to Services     [facility]: Hadii aad ku hadasho Soomaali, waaxda luqadaha, qaybta kaalmada adeegyaeulalio, nithya pires . So [facility] [phone].    ATENCIÓN: Si habla español, tiene a ceron disposición servicios gratuitos de asistencia lingüística. [facility] [phone].    We comply with applicable federal civil rights laws and [place] laws. We do not discriminate on the basis of race, color, national origin, age, disability, sex, sexual orientation, or gender identity.            Thank you!     Thank you for choosing [facility]  for your care. Our goal is always to provide you with excellent care. Hearing back from our patients is one way we can continue to improve our services. Please take a few minutes to complete the written survey that you may receive in the mail after your visit with us. Thank you!             Your Updated Medication List - Protect others around you: Learn how to safely use, store and throw away your medicines at www.disposemymeds.org.          This list is accurate as of 5/8/18  8:25 AM.  Always use your most recent med list.                   Brand Name Dispense Instructions for use Diagnosis    cetirizine 10 MG tablet    zyrTEC     Take 10 mg by mouth every evening        diazepam 10 MG tablet    VALIUM    1 tablet    Take 1 tab or 10 mg 30-60 minutes prior to MRI scan.    Primary malignant neuroendocrine neoplasm of ileum (H)       fenofibrate 160 MG tablet     90 tablet    Take 1 tablet (160 mg) by mouth daily     Hypertriglyceridemia       fluticasone 50 MCG/ACT spray    FLONASE    48 g    USE TWO SPRAYS IN EACH NOSTRIL EVERY DAY    Other seasonal allergic rhinitis       hydrocortisone 2.5 % cream     30 g    Apply sparingly to dermatitis left lower eye    Seborrheic dermatitis       ibuprofen 600 MG tablet    ADVIL/MOTRIN    30 tablet    Take 1 tablet (600 mg) by mouth every 6 hours as needed for pain (mild)    Acute cholecystitis       lisinopril 10 MG tablet    PRINIVIL/ZESTRIL    90 tablet    TAKE ONE TABLET BY MOUTH EVERY DAY    Essential hypertension with goal blood pressure less than 140/90       LORazepam 0.5 MG tablet    ATIVAN    10 tablet    Take 0.5-1 tablets (0.25-0.5 mg) by mouth every 8 hours as needed for anxiety    Adjustment disorder with anxious mood       mometasone 0.1 % cream    ELOCON    15 g    Apply sparingly to affected area twice daily for 14 days.  Do not apply to face.    Dermatitis       omeprazole 20 MG CR capsule    priLOSEC    90 capsule    Take 1 capsule (20 mg) by mouth daily    Gastroesophageal reflux disease without esophagitis       triamcinolone 0.5 % cream    KENALOG     Apply topically 2 times daily as needed for irritation (to eczema or psoriatic lesions - Never use on face)        triamterene-hydrochlorothiazide 37.5-25 MG per capsule    DYAZIDE    90 capsule    TAKE ONE CAPSULE BY MOUTH ONCE DAILY    Essential hypertension with goal blood pressure less than 140/90       venlafaxine 37.5 MG tablet    EFFEXOR    90 tablet    TAKE ONE TABLET BY MOUTH ONCE DAILY    Anxiety       zolpidem 5 MG tablet    AMBIEN    30 tablet    Take 1 tablet (5 mg) by mouth nightly as needed for sleep    Primary insomnia

## 2018-05-10 DIAGNOSIS — E78.1 HYPERTRIGLYCERIDEMIA: ICD-10-CM

## 2018-05-11 NOTE — TELEPHONE ENCOUNTER
"Requested Prescriptions   Pending Prescriptions Disp Refills     fenofibrate 160 MG tablet [Pharmacy Med Name: FENOFIBRATE 160MG TABS] 90 tablet 1      Last Written Prescription Date:  8.11.17  Last Fill Quantity: 90,  # refills: 1   Last Office Visit: 3/7/2018   Future Office Visit:      Sig: TAKE ONE TABLET BY MOUTH EVERY DAY    Fibrates Passed    5/10/2018  9:54 PM       Passed - Lipid panel on file in past 12 months    Recent Labs   Lab Test  08/08/17   0852   05/14/14   0753   CHOL  219*   < >  246*   TRIG  476*   < >  424*   HDL  59   < >  40*   LDL  Cannot estimate LDL when triglyceride exceeds 400 mg/dL  115*   < >  Cannot estimate LDL when triglyceride exceeds 400 mg/dL  132*   NHDL  160*   < >   --    VLDL   --    --   Cannot estimate VLDL when triglyceride exceeds 400 mg/dL.   CHOLHDLRATIO   --    --   6.2*    < > = values in this interval not displayed.              Passed - No abnormal creatine kinase in past 12 months    Recent Labs   Lab Test  08/27/12   0826   CKT  55               Passed - Recent (12 mo) or future (30 days) visit within the authorizing provider's specialty    Patient had office visit in the last 12 months or has a visit in the next 30 days with authorizing provider or within the authorizing provider's specialty.  See \"Patient Info\" tab in inbasket, or \"Choose Columns\" in Meds & Orders section of the refill encounter.           Passed - Patient is age 18 or older       Passed - No active pregnancy on record       Passed - No positive pregnancy test in past 12 months          "

## 2018-05-14 RX ORDER — FENOFIBRATE 160 MG/1
TABLET ORAL
Qty: 45 TABLET | Refills: 0 | Status: SHIPPED | OUTPATIENT
Start: 2018-05-14 | End: 2018-09-07

## 2018-05-14 NOTE — TELEPHONE ENCOUNTER
Pt due for labs for further refills.   Medication is being filled for 1 time refill only due to:  Patient needs to be seen because see above.   Annel Garay RN  [facility]

## 2018-05-20 ENCOUNTER — MYC REFILL (OUTPATIENT)
Dept: FAMILY MEDICINE | Facility: CLINIC | Age: 58
End: 2018-05-20

## 2018-05-20 DIAGNOSIS — K21.9 GASTROESOPHAGEAL REFLUX DISEASE WITHOUT ESOPHAGITIS: ICD-10-CM

## 2018-05-21 ENCOUNTER — TELEPHONE (OUTPATIENT)
Dept: FAMILY MEDICINE | Facility: CLINIC | Age: 58
End: 2018-05-21

## 2018-05-21 DIAGNOSIS — E78.5 HYPERLIPIDEMIA LDL GOAL <130: Primary | ICD-10-CM

## 2018-05-21 NOTE — TELEPHONE ENCOUNTER
Per 01/27/2016 Result Note:   -Microalbumin (urine protein) test is normal.  ADVISE: recheck annually    Order futured      MD KING - would you like any other labs drawn?      Vero Jones RN  [facility]

## 2018-05-21 NOTE — TELEPHONE ENCOUNTER
Message from [employer]:  Original authorizing provider: MD Connie Navarro would like a refill of the following medications:  omeprazole (PRILOSEC) 20 MG CR capsule [Gayatri Stephens MD]    Preferred pharmacy: [facility] - [address]    Comment:

## 2018-05-21 NOTE — TELEPHONE ENCOUNTER
"Last Written Prescription Date:  4/12/2017  Last Fill Quantity: 90,  # refills: 3   Last office visit: 3/7/2018 with prescribing provider:  Dr. Stephens   Future Office Visit:        Requested Prescriptions   Pending Prescriptions Disp Refills     omeprazole (PRILOSEC) 20 MG CR capsule 90 capsule 3     Sig: Take 1 capsule (20 mg) by mouth daily    PPI Protocol Passed    5/21/2018  7:19 AM       Passed - Not on Clopidogrel (unless Pantoprazole ordered)       Passed - No diagnosis of osteoporosis on record       Passed - Recent (12 mo) or future (30 days) visit within the authorizing provider's specialty    Patient had office visit in the last 12 months or has a visit in the next 30 days with authorizing provider or within the authorizing provider's specialty.  See \"Patient Info\" tab in inbasket, or \"Choose Columns\" in Meds & Orders section of the refill encounter.           Passed - Patient is age 18 or older       Passed - No active pregnacy on record       Passed - No positive pregnancy test in past 12 months            Prescription approved per [facility] Refill Protocol.    Latoya Selby, COCO, RN, PHN  [facility] [phone]      "

## 2018-05-21 NOTE — TELEPHONE ENCOUNTER
Routing to  to review and order.      Latoya Selby, BS, RN, N  [facility]) [phone]

## 2018-05-23 DIAGNOSIS — E78.5 HYPERLIPIDEMIA LDL GOAL <130: ICD-10-CM

## 2018-05-23 DIAGNOSIS — E78.1 HYPERTRIGLYCERIDEMIA: ICD-10-CM

## 2018-05-23 LAB
ALT SERPL W P-5'-P-CCNC: 34 U/L (ref 0–50)
AST SERPL W P-5'-P-CCNC: 17 U/L (ref 0–45)
CHOLEST SERPL-MCNC: 224 MG/DL
CK SERPL-CCNC: 74 U/L (ref 30–225)
CREAT UR-MCNC: 227 MG/DL
HDLC SERPL-MCNC: 52 MG/DL
LDLC SERPL CALC-MCNC: 132 MG/DL
MICROALBUMIN UR-MCNC: 12 MG/L
MICROALBUMIN/CREAT UR: 5.11 MG/G CR (ref 0–25)
NONHDLC SERPL-MCNC: 172 MG/DL
TRIGL SERPL-MCNC: 200 MG/DL

## 2018-05-23 PROCEDURE — 84460 ALANINE AMINO (ALT) (SGPT): CPT | Performed by: FAMILY MEDICINE

## 2018-05-23 PROCEDURE — 82043 UR ALBUMIN QUANTITATIVE: CPT | Performed by: FAMILY MEDICINE

## 2018-05-23 PROCEDURE — 82550 ASSAY OF CK (CPK): CPT | Performed by: FAMILY MEDICINE

## 2018-05-23 PROCEDURE — 80061 LIPID PANEL: CPT | Performed by: FAMILY MEDICINE

## 2018-05-23 PROCEDURE — 36415 COLL VENOUS BLD VENIPUNCTURE: CPT | Performed by: FAMILY MEDICINE

## 2018-05-23 PROCEDURE — 84450 TRANSFERASE (AST) (SGOT): CPT | Performed by: FAMILY MEDICINE

## 2018-05-23 NOTE — TELEPHONE ENCOUNTER
Recent Labs   Lab Test  08/08/17   0852  04/12/17   0843   05/14/14   0753  07/05/13   0857   CHOL  219*  210*   < >  246*  215*   HDL  59  47*   < >  40*  36*   LDL  Cannot estimate LDL when triglyceride exceeds 400 mg/dL  115*  Cannot estimate LDL when triglyceride exceeds 400 mg/dL  90   < >  Cannot estimate LDL when triglyceride exceeds 400 mg/dL  132*  134*   TRIG  476*  660*   < >  424*  221*   CHOLHDLRATIO   --    --    --   6.2*  5.9*    < > = values in this interval not displayed.      recommend rechecking lipid reflex. Please  enter future orders for this and pt  can do this as a lab only appointment.   Please inform patient. She can have this done at any [facility] without appt during regular outpt lab hours or by lab only appt at any [facility] clinic.

## 2018-06-07 ENCOUNTER — MYC REFILL (OUTPATIENT)
Dept: FAMILY MEDICINE | Facility: CLINIC | Age: 58
End: 2018-06-07

## 2018-06-07 DIAGNOSIS — J30.2 OTHER SEASONAL ALLERGIC RHINITIS: ICD-10-CM

## 2018-06-08 ENCOUNTER — HOSPITAL ENCOUNTER (OUTPATIENT)
Dept: MAMMOGRAPHY | Facility: CLINIC | Age: 58
Discharge: HOME | End: 2018-06-08
Attending: FAMILY MEDICINE | Admitting: FAMILY MEDICINE
Payer: COMMERCIAL

## 2018-06-08 DIAGNOSIS — Z12.31 VISIT FOR SCREENING MAMMOGRAM: ICD-10-CM

## 2018-06-08 PROCEDURE — 77067 SCR MAMMO BI INCL CAD: CPT

## 2018-06-08 RX ORDER — FLUTICASONE PROPIONATE 50 MCG
SPRAY, SUSPENSION (ML) NASAL
Qty: 48 G | Refills: 0 | Status: SHIPPED | OUTPATIENT
Start: 2018-06-08 | End: 2018-09-10

## 2018-06-08 NOTE — TELEPHONE ENCOUNTER
Message from [employer]:  Original authorizing provider: MD Connie Navarro would like a refill of the following medications:  fluticasone (FLONASE) 50 MCG/ACT spray [Gayatri Stephens MD]    Preferred pharmacy: [facility] - [address]    Comment:  please fill 90 day perscription

## 2018-06-28 ENCOUNTER — TELEPHONE (OUTPATIENT)
Dept: ONCOLOGY | Facility: CLINIC | Age: 58
End: 2018-06-28

## 2018-07-02 ENCOUNTER — ALLIED HEALTH/NURSE VISIT (OUTPATIENT)
Dept: FAMILY MEDICINE | Facility: CLINIC | Age: 58
End: 2018-07-02
Payer: COMMERCIAL

## 2018-07-02 VITALS — SYSTOLIC BLOOD PRESSURE: 120 MMHG | DIASTOLIC BLOOD PRESSURE: 82 MMHG

## 2018-07-02 DIAGNOSIS — I10 ESSENTIAL HYPERTENSION WITH GOAL BLOOD PRESSURE LESS THAN 140/90: Primary | ICD-10-CM

## 2018-07-02 PROCEDURE — 99207 ZZC NO CHARGE NURSE ONLY: CPT | Performed by: FAMILY MEDICINE

## 2018-07-02 NOTE — PROGRESS NOTES
Connie Brunson is enrolled/participating in the retail pharmacy Blood Pressure Goals Achievement Program (BPGAP).  Connie Brunson was evaluated at [facility] on July 2, 2018 at which time her blood pressure was:    BP Readings from Last 3 Encounters:   07/02/18 120/82   05/08/18 140/84   03/29/18 147/90     Reviewed lifestyle modifications for blood pressure control and reduction: including making healthy food choices, managing weight, getting regular exercise, smoking cessation, reducing alcohol consumption, monitoring blood pressure regularly.     Connie Brunson is not experiencing symptoms.    Follow-Up: BP is at goal of < 140/90mmHg (patient 18+ years of age with or without diabetes).  Recommended follow-up at pharmacy in 6 months.     Recommendation to Provider: No change    Connie Brunson was evaluated for enrollment into the PGEN study today.    Patient eligible for enrollment:  No  Patient interested in enrollment:  No    Completed by: Thank you,  Emilia Gaona RP, [facility] [place] [phone]

## 2018-07-02 NOTE — MR AVS SNAPSHOT
After Visit Summary   7/2/2018    Connie Brunson    MRN: [id number]           Patient Information     Date Of Birth          1960        Visit Information        Provider Department      7/2/2018 5:37 PM Gayatri Stephens MD [facility] [place]        Today's Diagnoses     Essential hypertension with goal blood pressure less than 140/90    -  1       Follow-ups after your visit        Your next 10 appointments already scheduled     Sep 24, 2018  9:00 AM CDT   Return Visit with  ONCOLOGY NURSE   [facility] ([facility])    [facility]  [address]  [facility] [id number] [phone]            Sep 24, 2018  9:30 AM CDT   MR ABDOMEN W/O & W CONTRAST with [id number]   [facility] ([facility])    [address]  [facility] [id number] [phone]           Take your medicines as usual, unless your doctor tells you not to. Bring a list of your current medicines to your exam (including vitamins, minerals and over-the-counter drugs). Also bring the results of similar scans you may have had.    You may or may not receive IV contrast for this exam pending the discretion of the Radiologist.   Do not eat or drink for 6 hours prior to exam.  The MRI machine uses a strong magnet. Please wear clothes without metal (snaps, zippers). A sweatsuit works well, or we may give you a hospital gown.  Please remove any body piercings and hair extensions before you arrive. You will also remove watches, jewelry, hairpins, wallets, dentures, partial dental plates and hearing aids. You may wear contact lenses, and you may be able to wear your rings. We have a safe place to keep your personal items, but it is safer to leave them at home.  **IMPORTANT** THE INSTRUCTIONS BELOW ARE ONLY FOR THOSE PATIENTS WHO HAVE BEEN PRESCRIBED SEDATION OR GENERAL ANESTHESIA DURING THEIR  MRI PROCEDURE:  IF YOUR DOCTOR PRESCRIBED ORAL SEDATION (take medicine to help you relax during your exam):   You must get the medicine from your doctor (oral medication) before you arrive. Bring the medicine to the exam. Do not take it at home. You ll be told when to take it upon arriving for your exam.   Arrive one hour early. Bring someone who can take you home after the test. Your medicine will make you sleepy. After the exam, you may not drive, take a bus or take a taxi by yourself.  IF YOUR DOCTOR PRESCRIBED IV SEDATION:   Arrive one hour early. Bring someone who can take you home after the test. Your medicine will make you sleepy. After the exam, you may not drive, take a bus or take a taxi by yourself.   No eating 6 hours before your exam. You may have clear liquids up until 4 hours before your exam. (Clear liquids include water, clear tea, black coffee and fruit juice without pulp.)  IF YOUR DOCTOR PRESCRIBED ANESTHESIA (be asleep for your exam):   Arrive 1 1/2 hours early. Bring someone who can take you home after the test. You may not drive, take a bus or take a taxi by yourself.   No eating 8 hours before your exam. You may have clear liquids up until 4 hours before your exam. (Clear liquids include water, clear tea, black coffee and fruit juice without pulp.)   You will spend four to five hours in the recovery room.  If you have any questions, please contact your Imaging Department exam site.            Sep 24, 2018 10:30 AM CDT   CT CHEST W/O CONTRAST with [id number] [facility] [place] ([facility])    [address] [facility] [address] [place] [address] [phone]           Please bring any scans or X-rays taken at other hospitals, if similar tests were done. Also bring a list of your medicines, including vitamins, minerals and over-the-counter drugs. It is safest to leave personal items at home.  Be sure to tell your doctor:   If you have any allergies.    If there s any chance you are pregnant.   If you are breastfeeding.  You do not need to do anything special to prepare for this exam.  Please wear loose clothing, such as a sweat suit or jogging clothes. Avoid snaps, zippers and other metal. We may ask you to undress and put on a hospital gown.            Sep 27, 2018  2:15 PM CDT   Return Visit with Mary Weir MD   [facility] ([facility])    [facility]  [address]  [facility] [address] [phone]              Who to contact     If you have questions or need follow up information about today's clinic visit or your schedule please contact [facility] [place] directly at [phone].  Normal or non-critical lab and imaging results will be communicated to you by MyChart, letter or phone within 4 business days after the clinic has received the results. If you do not hear from us within 7 days, please contact the clinic through [employer] or phone. If you have a critical or abnormal lab result, we will notify you by phone as soon as possible.  Submit refill requests through [employer] or call your pharmacy and they will forward the refill request to us. Please allow 3 business days for your refill to be completed.          Additional Information About Your Visit        Pl[employer]ter Information     [employer] gives you secure access to your electronic health record. If you see a primary care provider, you can also send messages to your care team and make appointments. If you have questions, please call your primary care clinic.  If you do not have a primary care provider, please call [phone] and they will assist you.        Care EveryWhere ID     This is your Care EveryWhere ID. This could be used by other organizations to access your [place] medical records  [id number]        Your Vitals Were     Last Period                   09/05/2000            Blood Pressure from Last  3 Encounters:   07/02/18 120/82   05/08/18 140/84   03/29/18 147/90    Weight from Last 3 Encounters:   03/29/18 156 lb (70.8 kg)   03/07/18 155 lb 3.2 oz (70.4 kg)   12/14/17 150 lb (68 kg)              Today, you had the following     No orders found for display       Primary Care Provider Office Phone # Fax #    Gayatri Stephens [phone]       [facility] [address]        Equal Access to Services     [facility]: Hadii aad ku hadasho Soomaali, waaxda luqadaha, qaybta kaalmada ademarlyyaeulalio, nithya pires . So [facility] [phone].    ATENCIÓN: Si habla español, tiene a ceron disposición servicios gratuitos de asistencia lingüística. [facility] [phone].    We comply with applicable federal civil rights laws and [place] laws. We do not discriminate on the basis of race, color, national origin, age, disability, sex, sexual orientation, or gender identity.            Thank you!     Thank you for choosing [facility]  for your care. Our goal is always to provide you with excellent care. Hearing back from our patients is one way we can continue to improve our services. Please take a few minutes to complete the written survey that you may receive in the mail after your visit with us. Thank you!             Your Updated Medication List - Protect others around you: Learn how to safely use, store and throw away your medicines at www.disposemymeds.org.          This list is accurate as of 7/2/18  5:38 PM.  Always use your most recent med list.                   Brand Name Dispense Instructions for use Diagnosis    cetirizine 10 MG tablet    zyrTEC     Take 10 mg by mouth every evening        diazepam 10 MG tablet    VALIUM    1 tablet    Take 1 tab or 10 mg 30-60 minutes prior to MRI scan.    Primary malignant neuroendocrine neoplasm of ileum (H)       fenofibrate 160 MG tablet     45 tablet    TAKE ONE TABLET BY MOUTH EVERY DAY     Hypertriglyceridemia       fluticasone 50 MCG/ACT spray    FLONASE    48 g    USE TWO SPRAYS IN EACH NOSTRIL EVERY DAY    Other seasonal allergic rhinitis       hydrocortisone 2.5 % cream     30 g    Apply sparingly to dermatitis left lower eye    Seborrheic dermatitis       ibuprofen 600 MG tablet    ADVIL/MOTRIN    30 tablet    Take 1 tablet (600 mg) by mouth every 6 hours as needed for pain (mild)    Acute cholecystitis       lisinopril 10 MG tablet    PRINIVIL/ZESTRIL    90 tablet    TAKE ONE TABLET BY MOUTH EVERY DAY    Essential hypertension with goal blood pressure less than 140/90       LORazepam 0.5 MG tablet    ATIVAN    10 tablet    Take 0.5-1 tablets (0.25-0.5 mg) by mouth every 8 hours as needed for anxiety    Adjustment disorder with anxious mood       mometasone 0.1 % cream    ELOCON    15 g    Apply sparingly to affected area twice daily for 14 days.  Do not apply to face.    Dermatitis       * omeprazole 20 MG CR capsule    priLOSEC    90 capsule    Take 1 capsule (20 mg) by mouth daily    Gastroesophageal reflux disease without esophagitis       * omeprazole 20 MG CR capsule    priLOSEC    90 capsule    Take 1 capsule (20 mg) by mouth daily    Gastroesophageal reflux disease without esophagitis       triamcinolone 0.5 % cream    KENALOG     Apply topically 2 times daily as needed for irritation (to eczema or psoriatic lesions - Never use on face)        triamterene-hydrochlorothiazide 37.5-25 MG per capsule    DYAZIDE    90 capsule    TAKE ONE CAPSULE BY MOUTH ONCE DAILY    Essential hypertension with goal blood pressure less than 140/90       venlafaxine 37.5 MG tablet    EFFEXOR    90 tablet    TAKE ONE TABLET BY MOUTH ONCE DAILY    Anxiety       zolpidem 5 MG tablet    AMBIEN    30 tablet    Take 1 tablet (5 mg) by mouth nightly as needed for sleep    Primary insomnia       * Notice:  This list has 2 medication(s) that are the same as other medications prescribed for you. Read the directions  carefully, and ask your doctor or other care provider to review them with you.

## 2018-07-08 DIAGNOSIS — E78.1 HYPERTRIGLYCERIDEMIA: ICD-10-CM

## 2018-07-09 NOTE — TELEPHONE ENCOUNTER
"Requested Prescriptions   Pending Prescriptions Disp Refills     fenofibrate 160 MG tablet [Pharmacy Med Name: FENOFIBRATE 160MG TABS]  Last Written Prescription Date:  5/14/2018  Last Fill Quantity: 45 tablet,  # refills: 0   Last Office Visit: 3/7/2018   Future Office Visit:    Next 5 appointments (look out 90 days)     Sep 24, 2018  9:00 AM CDT   Return Visit with  ONCOLOGY NURSE   [facility] ([facility])    [facility]  [address]  [facility] [address]   [phone]            Sep 27, 2018  2:15 PM CDT   Return Visit with Mary Weir MD   [facility] ([facility])    [facility]  [address]  [facility] [address]   [phone]                  90 tablet 1     Sig: TAKE ONE TABLET BY MOUTH EVERY DAY    Fibrates Passed    7/8/2018 12:37 PM       Passed - Lipid panel on file in past 12 months    Recent Labs   Lab Test  05/23/18   0743   05/14/14   0753   CHOL  224*   < >  246*   TRIG  200*   < >  424*   HDL  52   < >  40*   LDL  132*   < >  Cannot estimate LDL when triglyceride exceeds 400 mg/dL  132*   NHDL  172*   < >   --    VLDL   --    --   Cannot estimate VLDL when triglyceride exceeds 400 mg/dL.   CHOLHDLRATIO   --    --   6.2*    < > = values in this interval not displayed.              Passed - No abnormal creatine kinase in past 12 months    Recent Labs   Lab Test  05/23/18   0743   CKT  74               Passed - Recent (12 mo) or future (30 days) visit within the authorizing provider's specialty    Patient had office visit in the last 12 months or has a visit in the next 30 days with authorizing provider or within the authorizing provider's specialty.  See \"Patient Info\" tab in inbasket, or \"Choose Columns\" in Meds & Orders section of the refill encounter.           Passed - Patient is age 18 or older       Passed - No active pregnancy " on record       Passed - No positive pregnancy test in past 12 months

## 2018-07-10 NOTE — TELEPHONE ENCOUNTER
Routing refill request to provider for review/approval because:  Labs out of range:      Cherry MartinezRN BSN  [facility]  [phone]

## 2018-07-11 RX ORDER — FENOFIBRATE 160 MG/1
TABLET ORAL
Qty: 90 TABLET | Refills: 1 | Status: SHIPPED | OUTPATIENT
Start: 2018-07-11 | End: 2018-11-30

## 2018-07-28 DIAGNOSIS — I10 ESSENTIAL HYPERTENSION WITH GOAL BLOOD PRESSURE LESS THAN 140/90: ICD-10-CM

## 2018-07-30 RX ORDER — TRIAMTERENE AND HYDROCHLOROTHIAZIDE 37.5; 25 MG/1; MG/1
CAPSULE ORAL
Qty: 90 CAPSULE | Refills: 1 | Status: SHIPPED | OUTPATIENT
Start: 2018-07-30 | End: 2018-11-30

## 2018-07-30 NOTE — TELEPHONE ENCOUNTER
"Requested Prescriptions   Pending Prescriptions Disp Refills     triamterene-hydrochlorothiazide (DYAZIDE) 37.5-25 MG per capsule [Pharmacy Med Name: TRIAMTERENE-HCTZ 37.5-25MG CAPS]  Last Written Prescription Date:  2/8/2018  Last Fill Quantity: 90 capsule,  # refills: 0   Last Office Visit: 3/7/2018   Future Office Visit:    Next 5 appointments (look out 90 days)     Sep 24, 2018  9:00 AM CDT   Return Visit with  ONCOLOGY NURSE   [facility] ([facility])    [facility]  [id number] [place] [address] [address]  [facility] [address]   [phone]            Sep 27, 2018  2:15 PM CDT   Return Visit with Mary Weir MD   [facility] ([facility])    [facility]  [address]  [facility] [address]   [phone]                  90 capsule 0     Sig: TAKE ONE CAPSULE BY MOUTH EVERY DAY    Diuretics (Including Combos) Protocol Passed    7/28/2018 11:17 AM       Passed - Blood pressure under 140/90 in past 12 months    BP Readings from Last 3 Encounters:   07/02/18 120/82   05/08/18 140/84   03/29/18 147/90                Passed - Recent (12 mo) or future (30 days) visit within the authorizing provider's specialty    Patient had office visit in the last 12 months or has a visit in the next 30 days with authorizing provider or within the authorizing provider's specialty.  See \"Patient Info\" tab in inbasket, or \"Choose Columns\" in Meds & Orders section of the refill encounter.           Passed - Patient is age 18 or older       Passed - No active pregancy on record       Passed - Normal serum creatinine on file in past 12 months    Recent Labs   Lab Test  03/12/18   0858   CR  0.60             Passed - Normal serum potassium on file in past 12 months    Recent Labs   Lab Test  03/12/18   0858   POTASSIUM  3.4                   Passed - Normal serum sodium on file in past 12 months    " Recent Labs   Lab Test  03/12/18   0858   NA  136             Passed - No positive pregnancy test in past 12 months

## 2018-07-30 NOTE — TELEPHONE ENCOUNTER
Called patient to confirm medication is being taken as directed due to refill request and previous tablets being dispensed and no refills.  Patient unclear on when Rx were written but did state she is taking as direct.    Filled per [facility] protocol.     Vero Carey RN  [facility] T[facility]

## 2018-08-28 ENCOUNTER — MYC MEDICAL ADVICE (OUTPATIENT)
Dept: FAMILY MEDICINE | Facility: CLINIC | Age: 58
End: 2018-08-28

## 2018-08-28 DIAGNOSIS — C7A.8 PRIMARY MALIGNANT NEUROENDOCRINE NEOPLASM OF ILEUM (H): ICD-10-CM

## 2018-08-28 DIAGNOSIS — D32.9 BENIGN MENINGIOMA (H): Primary | ICD-10-CM

## 2018-09-05 ENCOUNTER — MYC MEDICAL ADVICE (OUTPATIENT)
Dept: FAMILY MEDICINE | Facility: CLINIC | Age: 58
End: 2018-09-05

## 2018-09-05 RX ORDER — DIAZEPAM 10 MG
TABLET ORAL
Qty: 2 TABLET | Refills: 1 | Status: SHIPPED | OUTPATIENT
Start: 2018-09-05 | End: 2018-09-05

## 2018-09-05 RX ORDER — DIAZEPAM 10 MG
TABLET ORAL
Qty: 4 TABLET | Refills: 1 | Status: SHIPPED | OUTPATIENT
Start: 2018-09-05 | End: 2019-04-08

## 2018-09-05 NOTE — TELEPHONE ENCOUNTER
Done per previous. at [facility] unit coordinator's in basket /file box.   Inform pt that we rx'd the 10mg valium tab last time in 1/2018.

## 2018-09-05 NOTE — TELEPHONE ENCOUNTER
LOV 3/7/2018    Please see my chart message below     Please review and advise     Thank you     Samantha Escoto RN, BSN  [facility]

## 2018-09-07 ENCOUNTER — OFFICE VISIT (OUTPATIENT)
Dept: FAMILY MEDICINE | Facility: CLINIC | Age: 58
End: 2018-09-07
Payer: COMMERCIAL

## 2018-09-07 VITALS
HEART RATE: 85 BPM | HEIGHT: 59 IN | SYSTOLIC BLOOD PRESSURE: 120 MMHG | BODY MASS INDEX: 32.05 KG/M2 | DIASTOLIC BLOOD PRESSURE: 80 MMHG | OXYGEN SATURATION: 96 % | WEIGHT: 159 LBS | TEMPERATURE: 98.2 F

## 2018-09-07 DIAGNOSIS — Z91.09 ENVIRONMENTAL ALLERGIES: ICD-10-CM

## 2018-09-07 DIAGNOSIS — F41.9 ANXIETY: ICD-10-CM

## 2018-09-07 DIAGNOSIS — C7A.8 PRIMARY MALIGNANT NEUROENDOCRINE NEOPLASM OF ILEUM (H): ICD-10-CM

## 2018-09-07 DIAGNOSIS — F33.0 MAJOR DEPRESSIVE DISORDER, RECURRENT EPISODE, MILD (H): ICD-10-CM
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
## 2018-09-07 DIAGNOSIS — Z51.81 MEDICATION MONITORING ENCOUNTER: ICD-10-CM

## 2018-09-07 DIAGNOSIS — I10 ESSENTIAL HYPERTENSION WITH GOAL BLOOD PRESSURE LESS THAN 140/90: ICD-10-CM

## 2018-09-07 DIAGNOSIS — Z23 NEED FOR TDAP VACCINATION: ICD-10-CM

## 2018-09-07 PROCEDURE — 90715 TDAP VACCINE 7 YRS/> IM: CPT | Performed by: FAMILY MEDICINE

## 2018-09-07 PROCEDURE — 99214 OFFICE O/P EST MOD 30 MIN: CPT | Mod: 25 | Performed by: FAMILY MEDICINE

## 2018-09-07 PROCEDURE — 90471 IMMUNIZATION ADMIN: CPT | Performed by: FAMILY MEDICINE

## 2018-09-07 RX ORDER — TRIAMCINOLONE ACETONIDE 5 MG/G
CREAM TOPICAL
Qty: 60 G | Refills: 3 | Status: SHIPPED | OUTPATIENT
Start: 2018-09-07 | End: 2021-01-29

## 2018-09-07 ASSESSMENT — ANXIETY QUESTIONNAIRES
6. BECOMING EASILY ANNOYED OR IRRITABLE: NOT AT ALL
5. BEING SO RESTLESS THAT IT IS HARD TO SIT STILL: NOT AT ALL
3. WORRYING TOO MUCH ABOUT DIFFERENT THINGS: SEVERAL DAYS
7. FEELING AFRAID AS IF SOMETHING AWFUL MIGHT HAPPEN: NOT AT ALL
IF YOU CHECKED OFF ANY PROBLEMS ON THIS QUESTIONNAIRE, HOW DIFFICULT HAVE THESE PROBLEMS MADE IT FOR YOU TO DO YOUR WORK, TAKE CARE OF THINGS AT HOME, OR GET ALONG WITH OTHER PEOPLE: NOT DIFFICULT AT ALL
GAD7 TOTAL SCORE: 3
2. NOT BEING ABLE TO STOP OR CONTROL WORRYING: SEVERAL DAYS
1. FEELING NERVOUS, ANXIOUS, OR ON EDGE: SEVERAL DAYS

## 2018-09-07 ASSESSMENT — PATIENT HEALTH QUESTIONNAIRE - PHQ9: 5. POOR APPETITE OR OVEREATING: NOT AT ALL

## 2018-09-07 NOTE — PATIENT INSTRUCTIONS
Continue using bug spray, refrain from scratching insect/bug bites, Start triamcinolone cream as needed for itchiness. Continue Zyrtec 10 mg daily. Take Zantac 150 mg twice daily. If symptoms persist consider oral steroids. Consider bug spray with DEET. Wear long pants and avoid exposure. Ask oncologist about symptoms of Neuroendocrine cancer if they were to flare up.       [facility]                        To reach your care team during and after hours:   [phone]  To reach our pharmacy:        [phone]    Clinic Hours                        Our clinic hours are:    Monday   7:30 am to 7:00 pm                  Tuesday through Friday 7:30 am to 5:00 pm                             Saturday   8:00 am to 12:00 pm      Sunday   Closed      Pharmacy Hours                        Our pharmacy hours are:    Monday   8:30 am to 7:00 pm       Tuesday to Friday  8:30 am to 6:00 pm                       Saturday    9:00 am to 1:00 pm              Sunday    Closed              There is also information available at our web site:  [email or web address]    If your provider ordered any lab tests and you do not receive the results within 10 business days, please call the clinic.    If you need a medication refill please contact your pharmacy.  Please allow 2-3 business days for your refill to be completed.    Our clinic offers telephone visits and e visits.  Please ask one of your team members to explain more.      Use [facility] (secure email communication and access to your chart) to send your primary care provider a message or make an appointment. Ask someone on your Team how to sign up for [facility].  Immunizations                      Immunization History   Administered Date(s) Administered     HepB 03/31/2000, 05/08/2000, 12/01/2000, 12/01/2000     TD (ADULT, 7+) 07/02/1991, 09/29/2005, 03/14/2015        Health Maintenance                         Health Maintenance Due   Topic Date Due     HIV SCREEN (SYSTEM  ASSIGNED)  10/11/1976     Depression Action Plan Review - yearly  04/12/2018     Wellness Visit with your Primary Provider - yearly  04/12/2018     Colon Cancer Screening - FIT Test - yearly  04/21/2018     Depression Assessment - every 6 months  05/03/2018     Flu Vaccine (1) 09/01/2018

## 2018-09-07 NOTE — LETTER
My Depression Action Plan  Name: Connie Brunson   Date of Birth 1960  Date: 9/7/2018    My doctor: Gayatri Stephens   My clinic: [address] [address] [phone]          GREEN    ZONE   Good Control    What it looks like:     Things are going generally well. You have normal up s and down s. You may even feel depressed from time to time, but bad moods usually last less than a day.   What you need to do:  1. Continue to care for yourself (see self care plan)  2. Check your depression survival kit and update it as needed  3. Follow your physician s recommendations including any medication.  4. Do not stop taking medication unless you consult with your physician first.           YELLOW         ZONE Getting Worse    What it looks like:     Depression is starting to interfere with your life.     It may be hard to get out of bed; you may be starting to isolate yourself from others.    Symptoms of depression are starting to last most all day and this has happened for several days.     You may have suicidal thoughts but they are not constant.   What you need to do:     1. Call your care team, your response to treatment will improve if you keep your care team informed of your progress. Yellow periods are signs an adjustment may need to be made.     2. Continue your self-care, even if you have to fake it!    3. Talk to someone in your support network    4. Open up your depression survival kit           RED    ZONE Medical Alert - Get Help    What it looks like:     Depression is seriously interfering with your life.     You may experience these or other symptoms: You can t get out of bed most days, can t work or engage in other necessary activities, you have trouble taking care of basic hygiene, or basic responsibilities, thoughts of suicide or death that will not go away, self-injurious behavior.     What you need to do:  1. Call your  care team and request a same-day appointment. If they are not available (weekends or after hours) call your local crisis line, emergency room or 911.            Depression Self Care Plan / Survival Kit    Self-Care for Depression  Here s the deal. Your body and mind are really not as separate as most people think.  What you do and think affects how you feel and how you feel influences what you do and think. This means if you do things that people who feel good do, it will help you feel better.  Sometimes this is all it takes.  There is also a place for medication and therapy depending on how severe your depression is, so be sure to consult with your medical provider and/ or Behavioral Health Consultant if your symptoms are worsening or not improving.     In order to better manage my stress, I will:    Exercise  Get some form of exercise, every day. This will help reduce pain and release endorphins, the  feel good  chemicals in your brain. This is almost as good as taking antidepressants!  This is not the same as joining a gym and then never going! (they count on that by the way ) It can be as simple as just going for a walk or doing some gardening, anything that will get you moving.      Hygiene   Maintain good hygiene (Get out of bed in the morning, Make your bed, Brush your teeth, Take a shower, and Get dressed like you were going to work, even if you are unemployed).  If your clothes don't fit try to get ones that do.    Diet  I will strive to eat foods that are good for me, drink plenty of water, and avoid excessive sugar, caffeine, alcohol, and other mood-altering substances.  Some foods that are helpful in depression are: complex carbohydrates, B vitamins, flaxseed, fish or fish oil, fresh fruits and vegetables.    Psychotherapy  I agree to participate in Individual Therapy (if recommended).    Medication  If prescribed medications, I agree to take them.  Missing doses can result in serious side effects.  I  understand that drinking alcohol, or other illicit drug use, may cause potential side effects.  I will not stop my medication abruptly without first discussing it with my provider.    Staying Connected With Others  I will stay in touch with my friends, family members, and my primary care provider/team.    Use your imagination  Be creative.  We all have a creative side; it doesn t matter if it s oil painting, sand castles, or mud pies! This will also kick up the endorphins.    Witness Beauty  (AKA stop and smell the roses) Take a look outside, even in mid-winter. Notice colors, textures. Watch the squirrels and birds.     Service to others  Be of service to others.  There is always someone else in need.  By helping others we can  get out of ourselves  and remember the really important things.  This also provides opportunities for practicing all the other parts of the program.    Humor  Laugh and be silly!  Adjust your TV habits for less news and crime-drama and more comedy.    Control your stress  Try breathing deep, massage therapy, biofeedback, and meditation. Find time to relax each day.     My support system    Clinic Contact:  Phone number:    Contact 1:  Phone number:    Contact 2:  Phone number:    Yazidism/:  Phone number:    Therapist:  Phone number:    Local crisis center:    Phone number:    Other community support:  Phone number:

## 2018-09-07 NOTE — MR AVS SNAPSHOT
After Visit Summary   9/7/2018    Connie Brunson    MRN: [id number]           Patient Information     Date Of Birth          1960        Visit Information        Provider Department      9/7/2018 9:20 AM Morgan Casey MD [facility]        Today's Diagnoses     Bug bite, initial encounter    -  1    Environmental allergies        Primary malignant neuroendocrine neoplasm of ileum (H)        Essential hypertension with goal blood pressure less than 140/90        Medication monitoring encounter        Need for Tdap vaccination          Care Instructions    Continue using bug spray, refrain from scratching insect/bug bites, Start triamcinolone cream as needed for itchiness. Continue Zyrtec 10 mg daily. Take Zantac 150 mg twice daily. If symptoms persist consider oral steroids. Consider bug spray with DEET. Wear long pants and avoid exposure. Ask oncologist about symptoms of Neuroendocrine cancer if they were to flare up.       [facility]                        To reach your care team during and after hours:   [phone]  To reach our pharmacy:        [phone]    Clinic Hours                        Our clinic hours are:    Monday   7:30 am to 7:00 pm                  Tuesday through Friday 7:30 am to 5:00 pm                             Saturday   8:00 am to 12:00 pm      Sunday   Closed      Pharmacy Hours                        Our pharmacy hours are:    Monday   8:30 am to 7:00 pm       Tuesday to Friday  8:30 am to 6:00 pm                       Saturday    9:00 am to 1:00 pm              Sunday    Closed              There is also information available at our web site:  [email or web address]    If your provider ordered any lab tests and you do not receive the results within 10 business days, please call the clinic.    If you need a medication refill please contact your pharmacy.  Please allow 2-3 business days for your refill to be completed.    Our clinic  offers telephone visits and e visits.  Please ask one of your team members to explain more.      Use MyChart (secure email communication and access to your chart) to send your primary care provider a message or make an appointment. Ask someone on your Team how to sign up for [facility].  Immunizations                      Immunization History   Administered Date(s) Administered     HepB 03/31/2000, 05/08/2000, 12/01/2000, 12/01/2000     TD (ADULT, 7+) 07/02/1991, 09/29/2005, 03/14/2015        Health Maintenance                         Health Maintenance Due   Topic Date Due     HIV SCREEN (SYSTEM ASSIGNED)  10/11/1976     Depression Action Plan Review - yearly  04/12/2018     Wellness Visit with your Primary Provider - yearly  04/12/2018     Colon Cancer Screening - FIT Test - yearly  04/21/2018     Depression Assessment - every 6 months  05/03/2018     Flu Vaccine (1) 09/01/2018               Follow-ups after your visit        Your next 10 appointments already scheduled     Sep 24, 2018  9:00 AM CDT   Return Visit with  ONCOLOGY NURSE   [facility] ([facility])    [facility]  [address]  [facility] [id number] [phone]            Sep 24, 2018  9:30 AM CDT   MR ABDOMEN W/O & W CONTRAST with RSCCMR1   [facility] ([facility])    [address]  [facility] [address] [phone]           Take your medicines as usual, unless your doctor tells you not to. Bring a list of your current medicines to your exam (including vitamins, minerals and over-the-counter drugs). Also bring the results of similar scans you may have had.    You may or may not receive IV contrast for this exam pending the discretion of the Radiologist.   Do not eat or drink for 6 hours prior to exam.  The MRI machine uses a strong magnet. Please wear clothes without metal (snaps, zippers). A sweatsuit works  well, or we may give you a hospital gown.  Please remove any body piercings and hair extensions before you arrive. You will also remove watches, jewelry, hairpins, wallets, dentures, partial dental plates and hearing aids. You may wear contact lenses, and you may be able to wear your rings. We have a safe place to keep your personal items, but it is safer to leave them at home.  **IMPORTANT** THE INSTRUCTIONS BELOW ARE ONLY FOR THOSE PATIENTS WHO HAVE BEEN PRESCRIBED SEDATION OR GENERAL ANESTHESIA DURING THEIR MRI PROCEDURE:  IF YOUR DOCTOR PRESCRIBED ORAL SEDATION (take medicine to help you relax during your exam):   You must get the medicine from your doctor (oral medication) before you arrive. Bring the medicine to the exam. Do not take it at home. You ll be told when to take it upon arriving for your exam.   Arrive one hour early. Bring someone who can take you home after the test. Your medicine will make you sleepy. After the exam, you may not drive, take a bus or take a taxi by yourself.  IF YOUR DOCTOR PRESCRIBED IV SEDATION:   Arrive one hour early. Bring someone who can take you home after the test. Your medicine will make you sleepy. After the exam, you may not drive, take a bus or take a taxi by yourself.   No eating 6 hours before your exam. You may have clear liquids up until 4 hours before your exam. (Clear liquids include water, clear tea, black coffee and fruit juice without pulp.)  IF YOUR DOCTOR PRESCRIBED ANESTHESIA (be asleep for your exam):   Arrive 1 1/2 hours early. Bring someone who can take you home after the test. You may not drive, take a bus or take a taxi by yourself.   No eating 8 hours before your exam. You may have clear liquids up until 4 hours before your exam. (Clear liquids include water, clear tea, black coffee and fruit juice without pulp.)   You will spend four to five hours in the recovery room.  If you have any questions, please contact your Imaging Department exam site.             Sep 24, 2018 10:30 AM CDT   CT CHEST W/O CONTRAST with RSCCCT1   [facility] ([facility])    [address]  [facility] [id number] [phone]           Please bring any scans or X-rays taken at other hospitals, if similar tests were done. Also bring a list of your medicines, including vitamins, minerals and over-the-counter drugs. It is safest to leave personal items at home.  Be sure to tell your doctor:   If you have any allergies.   If there s any chance you are pregnant.   If you are breastfeeding.  You do not need to do anything special to prepare for this exam.  Please wear loose clothing, such as a sweat suit or jogging clothes. Avoid snaps, zippers and other metal. We may ask you to undress and put on a hospital gown.            Sep 27, 2018  2:15 PM CDT   Return Visit with Mary Weir MD   [facility] ([facility])    [facility]  [address]  [facility] [id number] [phone]              Who to contact     If you have questions or need follow up information about today's clinic visit or your schedule please contact [facility] directly at [phone].  Normal or non-critical lab and imaging results will be communicated to you by MyChart, letter or phone within 4 business days after the clinic has received the results. If you do not hear from us within 7 days, please contact the clinic through MyChart or phone. If you have a critical or abnormal lab result, we will notify you by phone as soon as possible.  Submit refill requests through [email or web address] or call your pharmacy and they will forward the refill request to us. Please allow 3 business days for your refill to be completed.          Additional Information About Your Visit        [email or web address] Information     [email or web address] gives you secure access to your electronic health record. If you  "see a primary care provider, you can also send messages to your care team and make appointments. If you have questions, please call your primary care clinic.  If you do not have a primary care provider, please call [phone] and they will assist you.        Care EveryWhere ID     This is your Care EveryWhere ID. This could be used by other organizations to access your [facility] medical records  [id number]        Your Vitals Were     Pulse Temperature Height Last Period Pulse Oximetry BMI (Body Mass Index)    85 98.2  F (36.8  C) (Oral) 4' 11\" (1.499 m) 09/05/2000 96% 32.11 kg/m2       Blood Pressure from Last 3 Encounters:   09/07/18 (!) 164/94   07/02/18 120/82   05/08/18 140/84    Weight from Last 3 Encounters:   09/07/18 159 lb (72.1 kg)   03/29/18 156 lb (70.8 kg)   03/07/18 155 lb 3.2 oz (70.4 kg)              We Performed the Following     DEPRESSION ACTION PLAN (DAP)     TDAP, IM (10 - 64 YRS) - Adacel          Today's Medication Changes          These changes are accurate as of 9/7/18  9:54 AM.  If you have any questions, ask your nurse or doctor.               These medicines have changed or have updated prescriptions.        Dose/Directions    * triamcinolone 0.5 % cream   Commonly known as:  KENALOG   This may have changed:  Another medication with the same name was added. Make sure you understand how and when to take each.   Changed by:  Morgan Casey MD        Apply topically 2 times daily as needed for irritation (to eczema or psoriatic lesions - Never use on face)   Refills:  0       * triamcinolone 0.5 % cream   Commonly known as:  KENALOG   This may have changed:  You were already taking a medication with the same name, and this prescription was added. Make sure you understand how and when to take each.   Used for:  Bug bite, initial encounter   Changed by:  Morgan Casey MD        Apply sparingly to affected area three times daily.   Quantity:  60 g   Refills:  3       * Notice:  This list has 2 " medication(s) that are the same as other medications prescribed for you. Read the directions carefully, and ask your doctor or other care provider to review them with you.      Stop taking these medicines if you haven't already. Please contact your care team if you have questions.     ibuprofen 600 MG tablet   Commonly known as:  ADVIL/MOTRIN   Stopped by:  Morgan Casey MD                Where to get your medicines      These medications were sent to [facility] - [address]  [address] [place] [address]     Phone:  [phone]     triamcinolone 0.5 % cream                Primary Care Provider Office Phone # Fax #    Gayatri Stephens [phone]       [address] [place] [address]        Equal Access to Services     IVA MCKENZIE : Danish owens Soconchis, waaxda luqadaha, qaybta kaalmada adeegyada, nithya pires . So [facility] [phone].    ATENCIÓN: Si habla español, tiene a ceron disposición servicios gratuitos de asistencia lingüística. Ll[facility] [phone].    We comply with applicable federal civil rights laws and [place] laws. We do not discriminate on the basis of race, color, national origin, age, disability, sex, sexual orientation, or gender identity.            Thank you!     Thank you for choosing [facility]  for your care. Our goal is always to provide you with excellent care. Hearing back from our patients is one way we can continue to improve our services. Please take a few minutes to complete the written survey that you may receive in the mail after your visit with us. Thank you!             Your Updated Medication List - Protect others around you: Learn how to safely use, store and throw away your medicines at www.disposemymeds.org.          This list is accurate as of 9/7/18  9:54 AM.  Always use your most recent med list.                   Brand  Name Dispense Instructions for use Diagnosis    cetirizine 10 MG tablet    zyrTEC     Take 10 mg by mouth every evening        diazepam 10 MG tablet    VALIUM    4 tablet    Take 1-2  tab or 10-20 mg 30-60 minutes prior to MRI scan.    Primary malignant neuroendocrine neoplasm of ileum (H)       fenofibrate 160 MG tablet     90 tablet    TAKE ONE TABLET BY MOUTH EVERY DAY    Hypertriglyceridemia       fluticasone 50 MCG/ACT spray    FLONASE    48 g    USE TWO SPRAYS IN EACH NOSTRIL EVERY DAY    Other seasonal allergic rhinitis       hydrocortisone 2.5 % cream     30 g    Apply sparingly to dermatitis left lower eye    Seborrheic dermatitis       lisinopril 10 MG tablet    PRINIVIL/ZESTRIL    90 tablet    TAKE ONE TABLET BY MOUTH EVERY DAY    Essential hypertension with goal blood pressure less than 140/90       LORazepam 0.5 MG tablet    ATIVAN    10 tablet    Take 0.5-1 tablets (0.25-0.5 mg) by mouth every 8 hours as needed for anxiety    Adjustment disorder with anxious mood       mometasone 0.1 % cream    ELOCON    15 g    Apply sparingly to affected area twice daily for 14 days.  Do not apply to face.    Dermatitis       omeprazole 20 MG CR capsule    priLOSEC    90 capsule    Take 1 capsule (20 mg) by mouth daily    Gastroesophageal reflux disease without esophagitis       * triamcinolone 0.5 % cream    KENALOG     Apply topically 2 times daily as needed for irritation (to eczema or psoriatic lesions - Never use on face)        * triamcinolone 0.5 % cream    KENALOG    60 g    Apply sparingly to affected area three times daily.    Bug bite, initial encounter       triamterene-hydrochlorothiazide 37.5-25 MG per capsule    DYAZIDE    90 capsule    TAKE ONE CAPSULE BY MOUTH EVERY DAY    Essential hypertension with goal blood pressure less than 140/90       venlafaxine 37.5 MG tablet    EFFEXOR    90 tablet    TAKE ONE TABLET BY MOUTH ONCE DAILY    Anxiety       zolpidem 5 MG tablet    AMBIEN    30 tablet    Take 1  tablet (5 mg) by mouth nightly as needed for sleep    Primary insomnia       * Notice:  This list has 2 medication(s) that are the same as other medications prescribed for you. Read the directions carefully, and ask your doctor or other care provider to review them with you.

## 2018-09-07 NOTE — PROGRESS NOTES
SUBJECTIVE:   Connie Brunson is a 57 year old female who presents to clinic today for the following health issues:    Insect/bug bite-     Onset: 2 week(s) ago     Description:        Type of insect: knat        Location of bite: bilateral legs    Accompanying Signs & Symptoms:        Itching: YES  - prevents patient from sleeping well at night          Redness: YES        Warmth: no        Swelling: YES        Pain: moderate        Drainage: occasionally        Fever: no - did have some chills over the weekend        Chest Pain: no        Shortness of breath: no                              History of allergic reactions:    Anaphylaxis: no, no tongue swelling and no wheezing   Hives: no - but get as bid as  Hives when itching       If so, did you have/use an epi-pen:  no    Therapies tried and outcome: Calmoseptine, Hydrocortisone, and Zyrtec with minor relief    Her symptoms have somewhat improved today.    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 She isn't currently taking any treatment. She gets a CT chest, MRI abdomen, and octreotide scan every 6 months to monitor, due in a couple weeks.     Hypertension: The patients BP was 120/80 upon examination. Her hypertension is well controlled with Lisinopril.    BP Readings from Last 3 Encounters:   09/07/18 120/80   07/02/18 120/82   05/08/18 140/84       Wt Readings from Last 4 Encounters:   09/07/18 159 lb (72.1 kg)   03/29/18 156 lb (70.8 kg)   03/07/18 155 lb 3.2 oz (70.4 kg)   12/14/17 150 lb (68 kg)     Creatinine   Date Value Ref Range Status   03/12/2018 0.60 0.52 - 1.04 mg/dL Final     Anxiety/Depression/Insomnia:  Stable. Compliant on Effexor and Ambien with no reported side effects. She would like to slowly taper of Effexor, which has been difficult,  advised same with  ambien.    PHQ-2 Score:     PHQ-2 ( 1999 Pfizer) 4/12/2017 4/11/2017   Q1: Little interest or pleasure in doing things 0 -   Q2: Feeling down, depressed or hopeless 0 -   PHQ-2 Score 0 -   Q1: Little interest or pleasure in doing things - Not at all   Q2: Feeling down, depressed or hopeless - Not at all   PHQ-2 Score - 0     NAHOMY-7 SCORE 4/12/2017 11/3/2017 9/7/2018   Total Score - - -   Total Score - - -   Total Score 2 0 3     Problem list and histories reviewed & adjusted, as indicated.  Additional history: as documented    Health Maintenance    Health Maintenance Due   Topic Date Due     HIV SCREEN (SYSTEM ASSIGNED)  10/11/1976     WELLNESS VISIT Q1 YR  04/12/2018     FIT Q1 YR  04/21/2018     INFLUENZA VACCINE (1) 09/01/2018       Current Problem List    Patient Active Problem List   Diagnosis     Allergic rhinitis     Diverticulosis of large intestine     Benign meningioma-right frontal posterior - no more annual MRI's needed per neurosurgery     Symptomatic menopausal or female climacteric states     Benign neoplasm of tonsil     Anxiety     HYPERLIPIDEMIA LDL GOAL <130 [272.4CK] - joint aches w/ simvastatin 11/2010,lipitor=nausea/abd pain 1/2012     Primary insomnia     Major depressive disorder, recurrent episode, mild (H)     Essential hypertension with goal blood pressure less than 140/90     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- ophtho recommended auto-immune/arthritis work-up     Epiploic appendagitis- 2008 and 10/26/2014     Advanced care planning/counseling discussion     Controlled substance agreement signed- re-signed 3/7/2018 ambien #30 -5mg tabs monthly - refill x5 - ok to see q6 months      Gastroesophageal reflux disease without esophagitis     Hypertriglyceridemia     S/P laparoscopic cholecystectomy for acute cholecystitis with cholelithiasis     Postsurgical dumping syndrome - s/p lap shasih - consider cholestyramine     Mass of small intestine- distal ileum - seen on CT scan at  time of diverticulitis      Primary malignant neuroendocrine neoplasm of ileum (H)     Pulmonary nodules     Diarrhea, unspecified type- since sm bowel resection 11/2017       Past Medical History    Past Medical History:   Diagnosis Date     Allergic rhinitis, cause unspecified     year round - dog,dust-  Failed on claritin, claritin-D, zyrtec and zyrtec-D in past.      Benign neoplasm of cerebral meninges (H) 1999    has yearly MRI's. - sees Dr. 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 Thomas ENT      Depressive disorder      Deviated nasal septum     sees Dr. Thomas ENT      Diverticulosis of colon (without mention of hemorrhage) 02-     History of Guillain-[place] syndrome     NO FLU SHOTS - very mild case in [place] many years ago     Hyperlipidemia LDL goal < 130 doesn't want statins    simvastatin = severe hand joint pain , lipitor =nausea/abd. pain     Hypertension goal BP (blood pressure) < 140/90      Insomnia     trazodone -made pt feel hung over      Major depressive disorder, recurrent episode, moderate (H)     lexapro - sexual side effects      Moderate major depression (H) 2/4/2005    trazodone -made pt feel hung over      Other acne      Other extrapyramidal disease and abnormal movement disorder     ?GBS     Symptomatic menopausal or female climacteric states     vivelle-dot        Past Surgical History    Past Surgical History:   Procedure Laterality Date     ABDOMEN SURGERY       BIOPSY       C LIGATE FALLOPIAN TUBE  1988    Tubal Ligation     C NONSPECIFIC PROCEDURE      PAROTID TUMOR L SIDE OF NECK - BENIGN     C TOTAL ABDOM HYSTERECTOMY  2000 ?    Hysterectomy, Total Abdominal with BSO     COLONOSCOPY  1/16/2012    Procedure:COLONOSCOPY; Colonoscopy; Surgeon:SHOLA JOYCE; Location: GI     HC REMOVE TONSILS/ADENOIDS,<11 Y/O      T and A, under 12 yrs     HEAD & NECK SURGERY        LAPAROSCOPIC CHOLECYSTECTOMY WITH CHOLANGIOGRAMS N/A 4/25/2016    Procedure: LAPAROSCOPIC CHOLECYSTECTOMY WITH CHOLANGIOGRAMS;  Surgeon: Rosa M Cristobal MD;  Location: [facility] OR     LAPAROSCOPY DIAGNOSTIC (GENERAL) N/A 11/7/2017    Procedure: LAPAROSCOPY DIAGNOSTIC (GENERAL);  Exploratory Laparoscopy, Laparotomy and Small Bowel resection.;  Surgeon: Rosa M Cristobal MD;  Location: [facility] OR     LAPAROTOMY EXPLORATORY N/A 11/7/2017    Procedure: LAPAROTOMY EXPLORATORY;;  Surgeon: Rosa M Cristobal MD;  Location: [facility] OR     RESECT SMALL BOWEL WITHOUT OSTOMY N/A 11/7/2017    Procedure: RESECT SMALL BOWEL WITHOUT OSTOMY;;  Surgeon: Rosa M Cristobal MD;  Location: [facility] OR     SURGICAL HISTORY OF -   2004    Menigioma excision       Current Medications    Current Outpatient Prescriptions   Medication Sig Dispense Refill     cetirizine (ZYRTEC) 10 MG tablet Take 10 mg by mouth every evening       diazepam (VALIUM) 10 MG tablet Take 1-2  tab or 10-20 mg 30-60 minutes prior to MRI scan. 4 tablet 1     fenofibrate 160 MG tablet TAKE ONE TABLET BY MOUTH EVERY DAY 90 tablet 1     fluticasone (FLONASE) 50 MCG/ACT spray USE TWO SPRAYS IN EACH NOSTRIL EVERY DAY 48 g 0     hydrocortisone 2.5 % cream Apply sparingly to dermatitis left lower eye 30 g 3     lisinopril (PRINIVIL/ZESTRIL) 10 MG tablet TAKE ONE TABLET BY MOUTH EVERY DAY 90 tablet 1     LORazepam (ATIVAN) 0.5 MG tablet Take 0.5-1 tablets (0.25-0.5 mg) by mouth every 8 hours as needed for anxiety 10 tablet 0     mometasone (ELOCON) 0.1 % cream Apply sparingly to affected area twice daily for 14 days.  Do not apply to face. 15 g 3     omeprazole (PRILOSEC) 20 MG CR capsule Take 1 capsule (20 mg) by mouth daily 90 capsule 3     triamcinolone (KENALOG) 0.5 % cream Apply sparingly to affected area three times daily. 60 g 3     triamcinolone (KENALOG) 0.5 % cream Apply topically 2 times daily as needed for irritation (to eczema or psoriatic lesions - Never use  on face)       triamterene-hydrochlorothiazide (DYAZIDE) 37.5-25 MG per capsule TAKE ONE CAPSULE BY MOUTH EVERY DAY 90 capsule 1     venlafaxine (EFFEXOR) 37.5 MG tablet TAKE ONE TABLET BY MOUTH ONCE DAILY 90 tablet 1     zolpidem (AMBIEN) 5 MG tablet Take 1 tablet (5 mg) by mouth nightly as needed for sleep 30 tablet 5       Allergies    Allergies   Allergen Reactions     Atorvastatin Nausea     Nausea and abd pain      Ceftin GI Disturbance     Stomach upset and bloating - given at same time as clindamycin ? Which one as cause.       Clindamycin GI Disturbance     Stomach upset and bloating - given at same time as ceftin, ? Which one as cause      Flagyl [Metronidazole]      immediate migraine headache      Morphine Itching     Severe with nose, facial  Swelling - oxycodone = ok /no problems      Penicillins Hives     Sulfa Drugs Rash     Severe red , flushed rash     Levaquin Rash     States she can take cipro       Immunizations    Immunization History   Administered Date(s) Administered     HepB 03/31/2000, 05/08/2000, 12/01/2000, 12/01/2000     TD (ADULT, 7+) 07/02/1991, 09/29/2005, 03/14/2015     TDAP Vaccine (Adacel) 09/07/2018       Family History    Family History   Problem Relation Age of Onset     Hyperlipidemia Mother      Thyroid Disease Mother      Cancer Father      lung     Hypertension Father      Hyperlipidemia Father      Thyroid Disease Brother      Diabetes Maternal Grandmother      Cerebrovascular Disease Maternal Grandmother      Substance Abuse Maternal Grandfather      Thyroid Disease Son        Social History    Social History     Social History     Marital status:      Spouse name: Perry Brunson      Number of children: 3     Years of education: 15     Occupational History     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  [employer]     Social History Main Topics     Smoking status: Light Tobacco Smoker     Packs/day: 0.00     Years: 5.00     Types: Other, Cigarettes      "Last attempt to quit: 1/1/1994     Smokeless tobacco: Current User      Comment: 11/3/17 - occ e-cig use     Alcohol use 0.0 oz/week      Comment: 3-5 drinks per week     Drug use: No     Sexual activity: No      Comment: tubal ligation-      Other Topics Concern      Service No     Blood Transfusions No     Caffeine Concern Yes     2-3 cups coffee in am     Exercise No     regular walking 4x/week      Seat Belt Yes     always     Self-Exams Yes     SBE encouraged monthly     Parent/Sibling W/ Cabg, Mi Or Angioplasty Before 65f 55m? No     Social History Narrative    calcium - taking 500mg po qday - increase to Calcium - 1000-1200mg/day    flex sig/colonoscopy -at age 48    sun precautions - discussed    mammogram - yearly    [facility]     pneumovax -at age 58    DEXA -this year- 2003    stool hemoccults - every year after age 40    ASA- start 81 mg ASA qday.    mulvitamin - encouraged    doing citrucel qday         from second , Dan Schoenbauer. Now back together with her first  and father of their  children, Perry Brunson - fall 2011.        All above reviewed and updated, all stable unless otherwise noted    Recent labs reviewed    ROS:  Constitutional, HEENT, cardiovascular, pulmonary, GI, , musculoskeletal, neuro, skin, endocrine and psych systems are negative, except as otherwise noted.    OBJECTIVE:                                                    /80  Pulse 85  Temp 98.2  F (36.8  C) (Oral)  Ht 4' 11\" (1.499 m)  Wt 159 lb (72.1 kg)  LMP 09/05/2000  SpO2 96%  BMI 32.11 kg/m2  Body mass index is 32.11 kg/(m^2).  GENERAL: healthy, alert and no distress  EYES: Eyes grossly normal to inspection, extraocular movements - intact, and PERRL  HENT:ear canals and TM's normal upon viewing with otoscope, nose and mouth without ulcers or lesions upon viewing with otoscope  NECK: no tenderness, no adenopathy, no " asymmetry, no masses, no stiffness; thyroid- normal to palpation  RESP: lungs clear to auscultation - no rales, no rhonchi, no wheezes  CV: regular rates and rhythm, normal S1 S2, no S3 or S4 and no murmur, no click or rub -  ABDOMEN: soft, no tenderness, no hepatosplenomegaly, no masses, normal bowel sounds  MS: extremities- no gross deformities noted, no edema  SKIN: Fine papules/bug bites with excoriation marks. No signs of infection. No suspicious lesions, no rashes  NEURO: strength and tone- normal, sensory exam- grossly normal, mentation- intact, speech- normal  BACK: no CVA tenderness, no paralumbar tenderness  PSYCH: Alert and oriented times 3; speech- coherent , normal rate and volume; able to articulate logical thoughts, able to abstract reason, no tangential thoughts, no hallucinations or delusions, affect- normal  LYMPHATICS: ant. cervical- normal, post. cervical- normal, axillary- normal, supraclavicular- normal    DIAGNOSTICS/PROCEDURES:                                                      No results found for this or any previous visit (from the past 24 hour(s)).     ASSESSMENT/PLAN:                                                        ICD-10-CM    1. Bug bite, initial encounter W57.XXXA triamcinolone (KENALOG) 0.5 % cream   2. Environmental allergies Z91.09    3. Primary malignant neuroendocrine neoplasm of ileum (H) C7A.8    4. Essential hypertension with goal blood pressure less than 140/90 I10    5. Major depressive disorder, recurrent episode, mild (H) F33.0    6. Anxiety F41.9    7. Medication monitoring encounter Z51.81    8. Need for Tdap vaccination Z23 TDAP, IM (10 - 64 YRS) - Adacel      1) Advised to continue using bug spray with DEET, wear long pants, avoid exposure, and refrain from scratching insect/bug bites, Will start her on Triamcinolone cream today as needed for pruritis. Advised to continue using Zyrtec  10 mg daily, and begin Zantac 150 mg twice daily. If symptoms persist  consider we can consider oral steroids.     2) Recommended she consult her Oncologist about symptoms of her neuroendocrine cancer if they were to flare up in the case it may manifest as skin reactions that may be contributing to her non-resolving bug bites/pruritis.     3) Patient presented today and BP was abnormally elevated - likely due to taking Lisinopril 50 minutes prior to being checked. BP was reexamined and a value of 120/80 was obtained. Advised to continue use of Lisinopril for hypertension management.    Discussed treatment/modality options, including risk and benefits, she desires advised alcohol consumption 1oz per day or less, advised 1 multivitamin per day, advised dentist every 6 months, advised diet and exercise, advised ophthalmologist every 1-2 years and OTC meds, NAHOMY 7, completed and reviewed and PHQ-9, Depression Action Plan, completed and reviewed. All diagnosis above reviewed and noted above, otherwise stable.  See [facility] orders for further details.  Follow up as needed.      Health Maintenance Due   Topic Date Due     HIV SCREEN (SYSTEM ASSIGNED)  10/11/1976     WELLNESS VISIT Q1 YR  04/12/2018     FIT Q1 YR  04/21/2018     INFLUENZA VACCINE (1) 09/01/2018         Health Maintenance Due   Topic Date Due     HIV SCREEN (SYSTEM ASSIGNED)  10/11/1976     WELLNESS VISIT Q1 YR  04/12/2018     FIT Q1 YR  04/21/2018     INFLUENZA VACCINE (1) 09/01/2018     This document serves as a record of the services and decisions personally performed and made by Morgan Casey MD. It was created on his behalf by Chano Enriquez, a trained medical scribe. The creation of this document is based on the provider's statements to the medical scribe.  Chano Enriquez September 7, 2018 9:39 AM     The information in this document, created by the medical scribe for me, accurately reflects the services I personally performed and the decisions made by me. I have reviewed and approved this document for accuracy prior to leaving the  patient care area.  September 7, 2018 9:39 AM           Morgan Casey MD, FAAFP    [address]  [address] [phone] Fax

## 2018-09-07 NOTE — PROGRESS NOTES
SUBJECTIVE:   Connie Brunson is a 57 year old female who presents to clinic today for the following health issues:      Insect/bug bite-     Onset: 2 week(s) ago     Description:        Type of insect: knat        Location of bite: bilateral legs    Accompanying Signs & Symptoms:        Itching: YES              Redness: YES        Warmth: no        Swelling: YES        Pain: moderate        Drainage: occastionally        Fever: no - did have some chills over the weekend        Chest Pain: no        Shortness of breath: no                              History of allergic reactions:    Anaphylaxis: no  Hives: no - but get as bid as  Hives when itching       If so, did you have/use an epi-pen:  no    Therapies tried and outcome: antihistamine with minor relief    Problem list and histories reviewed & adjusted, as indicated.  Additional history: as documented    BP Readings from Last 3 Encounters:   09/07/18 (!) 164/94   07/02/18 120/82   05/08/18 140/84       Wt Readings from Last 4 Encounters:   09/07/18 72.1 kg (159 lb)   03/29/18 70.8 kg (156 lb)   03/07/18 70.4 kg (155 lb 3.2 oz)   12/14/17 68 kg (150 lb)       Health Maintenance    Health Maintenance Due   Topic Date Due     HIV SCREEN (SYSTEM ASSIGNED)  10/11/1976     DEPRESSION ACTION PLAN Q1 YR  04/12/2018     WELLNESS VISIT Q1 YR  04/12/2018     FIT Q1 YR  04/21/2018     PHQ-9 Q6 MONTHS  05/03/2018     INFLUENZA VACCINE (1) 09/01/2018       Current Problem List    Patient Active Problem List   Diagnosis     Allergic rhinitis     Diverticulosis of large intestine     Benign meningioma-right frontal posterior - no more annual MRI's needed per neurosurgery     Symptomatic menopausal or female climacteric states     Benign neoplasm of tonsil     Anxiety     HYPERLIPIDEMIA LDL GOAL <130 [272.4CK] - joint aches w/ simvastatin 11/2010,lipitor=nausea/abd pain 1/2012     Primary insomnia     Major depressive disorder, recurrent episode, in partial or unspecified  remission     Essential hypertension with goal blood pressure less than 140/90     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- ophtho recommended auto-immune/arthritis work-up     Epiploic appendagitis- 2008 and 10/26/2014     Advanced care planning/counseling discussion     Controlled substance agreement signed- re-signed 3/7/2018 ambien #30 -5mg tabs monthly - refill x5 - ok to see q6 months      Gastroesophageal reflux disease without esophagitis     Hypertriglyceridemia     S/P laparoscopic cholecystectomy for acute cholecystitis with cholelithiasis     Postsurgical dumping syndrome - s/p lap shashi - consider cholestyramine     Mass of small intestine- distal ileum - seen on CT scan at time of diverticulitis      Primary malignant neuroendocrine neoplasm of ileum (H)     Pulmonary nodules     Diarrhea, unspecified type- since sm bowel resection 11/2017       Past Medical History    Past Medical History:   Diagnosis Date     Allergic rhinitis, cause unspecified     year round - dog,dust-  Failed on claritin, claritin-D, zyrtec and zyrtec-D in past.      Benign neoplasm of cerebral meninges (H) 1999    has yearly MRI's. - sees Dr. 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 Thomas ENT      Depressive disorder      Deviated nasal septum     sees Dr. Thomas ENT      Diverticulosis of colon (without mention of hemorrhage) 02-     History of Guillain-[place] syndrome     NO FLU SHOTS - very mild case in [place] many years ago     Hyperlipidemia LDL goal < 130 doesn't want statins    simvastatin = severe hand joint pain , lipitor =nausea/abd. pain     Hypertension goal BP (blood pressure) < 140/90      Insomnia     trazodone -made pt feel hung over      Major depressive disorder, recurrent episode, moderate (H)     lexapro - sexual side effects      Moderate major depression (H) 2/4/2005    trazodone -made pt  feel hung over      Other acne      Other extrapyramidal disease and abnormal movement disorder     ?GBS     Symptomatic menopausal or female climacteric states     vivelle-dot        Past Surgical History    Past Surgical History:   Procedure Laterality Date     ABDOMEN SURGERY       BIOPSY       C LIGATE FALLOPIAN TUBE  1988    Tubal Ligation     C NONSPECIFIC PROCEDURE      PAROTID TUMOR L SIDE OF NECK - BENIGN     C TOTAL ABDOM HYSTERECTOMY  2000 ?    Hysterectomy, Total Abdominal with BSO     COLONOSCOPY  1/16/2012    Procedure:COLONOSCOPY; Colonoscopy; Surgeon:SHOLA JOYCE; Location:[facility]     HC REMOVE TONSILS/ADENOIDS,<11 Y/O      T and A, under 12 yrs     HEAD & NECK SURGERY       LAPAROSCOPIC CHOLECYSTECTOMY WITH CHOLANGIOGRAMS N/A 4/25/2016    Procedure: LAPAROSCOPIC CHOLECYSTECTOMY WITH CHOLANGIOGRAMS;  Surgeon: Rosa M Cristobal MD;  Location: [facility]     LAPAROSCOPY DIAGNOSTIC (GENERAL) N/A 11/7/2017    Procedure: LAPAROSCOPY DIAGNOSTIC (GENERAL);  Exploratory Laparoscopy, Laparotomy and Small Bowel resection.;  Surgeon: Rosa M Cristobal MD;  Location: [facility]     LAPAROTOMY EXPLORATORY N/A 11/7/2017    Procedure: LAPAROTOMY EXPLORATORY;;  Surgeon: Rosa M Cristobal MD;  Location: [facility]     RESECT SMALL BOWEL WITHOUT OSTOMY N/A 11/7/2017    Procedure: RESECT SMALL BOWEL WITHOUT OSTOMY;;  Surgeon: Rosa M Cristobal MD;  Location: [facility]     SURGICAL HISTORY OF -   2004    Menigioma excision       Current Medications    Current Outpatient Prescriptions   Medication Sig Dispense Refill     cetirizine (ZYRTEC) 10 MG tablet Take 10 mg by mouth every evening       diazepam (VALIUM) 10 MG tablet Take 1-2  tab or 10-20 mg 30-60 minutes prior to MRI scan. 4 tablet 1     fenofibrate 160 MG tablet TAKE ONE TABLET BY MOUTH EVERY DAY 90 tablet 1     fluticasone (FLONASE) 50 MCG/ACT spray USE TWO SPRAYS IN EACH NOSTRIL EVERY DAY 48 g 0     hydrocortisone 2.5 % cream Apply sparingly to  dermatitis left lower eye 30 g 3     lisinopril (PRINIVIL/ZESTRIL) 10 MG tablet TAKE ONE TABLET BY MOUTH EVERY DAY 90 tablet 1     LORazepam (ATIVAN) 0.5 MG tablet Take 0.5-1 tablets (0.25-0.5 mg) by mouth every 8 hours as needed for anxiety 10 tablet 0     mometasone (ELOCON) 0.1 % cream Apply sparingly to affected area twice daily for 14 days.  Do not apply to face. 15 g 3     omeprazole (PRILOSEC) 20 MG CR capsule Take 1 capsule (20 mg) by mouth daily 90 capsule 3     triamcinolone (KENALOG) 0.5 % cream Apply topically 2 times daily as needed for irritation (to eczema or psoriatic lesions - Never use on face)       triamterene-hydrochlorothiazide (DYAZIDE) 37.5-25 MG per capsule TAKE ONE CAPSULE BY MOUTH EVERY DAY 90 capsule 1     venlafaxine (EFFEXOR) 37.5 MG tablet TAKE ONE TABLET BY MOUTH ONCE DAILY 90 tablet 1     zolpidem (AMBIEN) 5 MG tablet Take 1 tablet (5 mg) by mouth nightly as needed for sleep 30 tablet 5     [DISCONTINUED] fenofibrate 160 MG tablet TAKE ONE TABLET BY MOUTH EVERY DAY 45 tablet 0       Allergies    Allergies   Allergen Reactions     Atorvastatin Nausea     Nausea and abd pain      Ceftin GI Disturbance     Stomach upset and bloating - given at same time as clindamycin ? Which one as cause.       Clindamycin GI Disturbance     Stomach upset and bloating - given at same time as ceftin, ? Which one as cause      Flagyl [Metronidazole]      immediate migraine headache      Morphine Itching     Severe with nose, facial  Swelling - oxycodone = ok /no problems      Penicillins Hives     Sulfa Drugs Rash     Severe red , flushed rash     Levaquin Rash     States she can take cipro       Immunizations    Immunization History   Administered Date(s) Administered     HepB 03/31/2000, 05/08/2000, 12/01/2000, 12/01/2000     TD (ADULT, 7+) 07/02/1991, 09/29/2005, 03/14/2015       Family History    Family History   Problem Relation Age of Onset     Hyperlipidemia Mother      Thyroid Disease Mother       Cancer Father      lung     Hypertension Father      Hyperlipidemia Father      Thyroid Disease Brother      Diabetes Maternal Grandmother      Cerebrovascular Disease Maternal Grandmother      Substance Abuse Maternal Grandfather      Thyroid Disease Son        Social History    Social History     Social History     Marital status:      Spouse name: Perry Brunson      Number of children: 3     Years of education: 15     Occupational History     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  [employer]     Social History Main Topics     Smoking status: Light Tobacco Smoker     Packs/day: 0.00     Years: 5.00     Types: Other, Cigarettes     Last attempt to quit: 1/1/1994     Smokeless tobacco: Current User      Comment: 11/3/17 - occ e-cig use     Alcohol use 0.0 oz/week      Comment: 3-5 drinks per week     Drug use: No     Sexual activity: No      Comment: tubal ligation-      Other Topics Concern      Service No     Blood Transfusions No     Caffeine Concern Yes     2-3 cups coffee in am     Exercise No     regular walking 4x/week      Seat Belt Yes     always     Self-Exams Yes     SBE encouraged monthly     Parent/Sibling W/ Cabg, Mi Or Angioplasty Before 65f 55m? No     Social History Narrative    calcium - taking 500mg po qday - increase to Calcium - 1000-1200mg/day    flex sig/colonoscopy -at age 48    sun precautions - discussed    mammogram - yearly    Td booster - 1991 per our records - pt will call [facility]     pneumovax -at age 58    DEXA -this year- 2003    stool hemoccults - every year after age 40    ASA- start 81 mg ASA qday.    mulvitamin - encouraged    doing citrucel qday         from second , Dan Sch[employer]enbauer. Now back together with her first  and father of their  children, Perry Brunson - fall 2011.        All above reviewed and updated, all stable unless otherwise noted    Recent labs reviewed    ROS:  Constitutional, HEENT,  "cardiovascular, pulmonary, GI, , musculoskeletal, neuro, skin, endocrine and psych systems are negative, except as otherwise noted.    OBJECTIVE:                                                    BP (!) 164/94  Pulse 85  Temp 98.2  F (36.8  C) (Oral)  Ht 1.499 m (4' 11\")  Wt 72.1 kg (159 lb)  LMP 09/05/2000  SpO2 96%  BMI 32.11 kg/m2  Body mass index is 32.11 kg/(m^2).  GENERAL: healthy, alert and no distress  EYES: Eyes grossly normal to inspection, extraocular movements - intact, and PERRL  HENT: ear canals- normal; TMs- normal; Nose- normal; Mouth- no ulcers, no lesions  NECK: no tenderness, no adenopathy, no asymmetry, no masses, no stiffness; thyroid- normal to palpation  RESP: lungs clear to auscultation - no rales, no rhonchi, no wheezes  CV: regular rates and rhythm, normal S1 S2, no S3 or S4 and no murmur, no click or rub -  ABDOMEN: soft, no tenderness, no  hepatosplenomegaly, no masses, normal bowel sounds  MS: extremities- no gross deformities noted, no edema  SKIN: no suspicious lesions, no rashes  NEURO: strength and tone- normal, sensory exam- grossly normal, mentation- intact, speech- normal  BACK: no CVA tenderness, no paralumbar tenderness  PSYCH: Alert and oriented times 3; speech- coherent , normal rate and volume; able to articulate logical thoughts, able to abstract reason, no tangential thoughts, no hallucinations or delusions, affect- normal  LYMPHATICS: ant. cervical- normal, post. cervical- normal, axillary- normal, supraclavicular- normal    DIAGNOSTICS/PROCEDURES:                                                      No results found for this or any previous visit (from the past 24 hour(s)).     ASSESSMENT/PLAN:                                                    No diagnosis found.    Discussed treatment/modality options, including risk and benefits, she desires {PATIENT WANTS:988046}. All diagnosis above reviewed and noted above, otherwise stable.  See [facility] orders for " further details.  {FOLLOWUP:668792}.    Health Maintenance Due   Topic Date Due     HIV SCREEN (SYSTEM ASSIGNED)  10/11/1976     DEPRESSION ACTION PLAN Q1 YR  04/12/2018     WELLNESS VISIT Q1 YR  04/12/2018     FIT Q1 YR  04/21/2018     PHQ-9 Q6 MONTHS  05/03/2018     INFLUENZA VACCINE (1) 09/01/2018       Follow up with Provider - ***            Morgan Casey MD, FAAFP    [address]  [id number] [phone] Fax    ***

## 2018-09-08 ASSESSMENT — PATIENT HEALTH QUESTIONNAIRE - PHQ9: SUM OF ALL RESPONSES TO PHQ QUESTIONS 1-9: 3

## 2018-09-08 ASSESSMENT — ANXIETY QUESTIONNAIRES: GAD7 TOTAL SCORE: 3

## 2018-09-10 DIAGNOSIS — J30.2 OTHER SEASONAL ALLERGIC RHINITIS: ICD-10-CM

## 2018-09-10 RX ORDER — FLUTICASONE PROPIONATE 50 MCG
2 SPRAY, SUSPENSION (ML) NASAL DAILY
Qty: 48 G | Refills: 0 | Status: SHIPPED | OUTPATIENT
Start: 2018-09-10 | End: 2018-11-30

## 2018-09-10 NOTE — TELEPHONE ENCOUNTER
Prescription approved per [facility] Refill Protocol.  Annel Garay RN  [facility]

## 2018-09-10 NOTE — TELEPHONE ENCOUNTER
"Requested Prescriptions   Pending Prescriptions Disp Refills     fluticasone (FLONASE) 50 MCG/ACT spray [Pharmacy Med Name: FLUTICASONE PROPIONATE 50 SUSP] 48 g 0        Last Written Prescription Date:  6.8.18  Last Fill Quantity: 48 g,  # refills: 0   Last Office Visit: 9/7/2018   Future Office Visit:    Next 5 appointments (look out 90 days)     Sep 24, 2018  9:00 AM CDT   Return Visit with  ONCOLOGY NURSE   [facility]    [facility]  [address]  [facility] [address]   [phone]            Sep 27, 2018  2:15 PM CDT   Return Visit with Mary Weir MD   [facility]    [facility]  [address]  [facility] [address]   [phone]                  Sig: USE TWO SPRAYS IN EACH NOSTRIL EVERY DAY (NEED APPOINTMENT FOR FURTHER REFILLS)    Inhaled Steroids Protocol Passed    9/10/2018 12:47 PM       Passed - Patient is age 12 or older       Passed - Recent (12 mo) or future (30 days) visit within the authorizing provider's specialty    Patient had office visit in the last 12 months or has a visit in the next 30 days with authorizing provider or within the authorizing provider's specialty.  See \"Patient Info\" tab in inbasket, or \"Choose Columns\" in Meds & Orders section of the refill encounter.              "

## 2018-09-19 DIAGNOSIS — F51.01 PRIMARY INSOMNIA: ICD-10-CM

## 2018-09-19 NOTE — TELEPHONE ENCOUNTER
zolpidem (AMBIEN) 5 MG tablet          Last Written Prescription Date:  4.8.18  Last Fill Quantity: 30,  # refills: 5   Last Office Visit: 9/7/2018   Future Office Visit:    Next 5 appointments (look out 90 days)     Sep 24, 2018  9:00 AM CDT   Return Visit with [facility] ONCOLOGY NURSE   [facility]    [place] [address]  [facility] [address]   [phone]            Sep 27, 2018  2:15 PM CDT   Return Visit with Mary Weir MD   [facility] ([facility])    [address]  [facility] [address]   [phone]                   Next 5 appointments (look out 90 days)     Sep 24, 2018  9:00 AM CDT   Return Visit with [facility] ONCOLOGY NURSE   [facility] ([facility])    [facility] [place] [address]  [place] [address]   [phone]            Sep 27, 2018  2:15 PM CDT   Return Visit with Mary Weir MD   [facility] ([facility])    [place] [address]  [facility] [address]   [phone]                   Routing refill request to provider for review/approval because:  Drug not on the [facility], P or [facility] refill protocol or controlled substance

## 2018-09-20 RX ORDER — ZOLPIDEM TARTRATE 5 MG/1
5 TABLET ORAL
Qty: 30 TABLET | Refills: 5 | Status: SHIPPED | OUTPATIENT
Start: 2018-09-20 | End: 2019-03-27

## 2018-09-20 NOTE — TELEPHONE ENCOUNTER
Routing refill request to provider for review/approval because:  Drug not on the [facility] refill protocol   Annel Garay RN  [facility]

## 2018-09-24 ENCOUNTER — HOSPITAL ENCOUNTER (OUTPATIENT)
Facility: CLINIC | Age: 58
Setting detail: SPECIMEN
Discharge: HOME | End: 2018-09-24
Attending: INTERNAL MEDICINE | Admitting: INTERNAL MEDICINE
Payer: COMMERCIAL

## 2018-09-24 ENCOUNTER — HOSPITAL ENCOUNTER (OUTPATIENT)
Dept: CT IMAGING | Facility: CLINIC | Age: 58
Discharge: HOME | End: 2018-09-24
Attending: INTERNAL MEDICINE | Admitting: INTERNAL MEDICINE
Payer: COMMERCIAL

## 2018-09-24 ENCOUNTER — HOSPITAL ENCOUNTER (OUTPATIENT)
Dept: MRI IMAGING | Facility: CLINIC | Age: 58
Discharge: HOME | End: 2018-09-24
Attending: INTERNAL MEDICINE | Admitting: INTERNAL MEDICINE
Payer: COMMERCIAL

## 2018-09-24 ENCOUNTER — ONCOLOGY VISIT (OUTPATIENT)
Dept: ONCOLOGY | Facility: CLINIC | Age: 58
End: 2018-09-24
Attending: INTERNAL MEDICINE
Payer: COMMERCIAL

## 2018-09-24 DIAGNOSIS — C7A.8 PRIMARY MALIGNANT NEUROENDOCRINE NEOPLASM OF ILEUM (H): ICD-10-CM

## 2018-09-24 LAB
ALBUMIN SERPL-MCNC: 4.4 G/DL (ref 3.4–5)
ALP SERPL-CCNC: 39 U/L (ref 40–150)
ALT SERPL W P-5'-P-CCNC: 38 U/L (ref 0–50)
ANION GAP SERPL CALCULATED.3IONS-SCNC: 7 MMOL/L (ref 3–14)
AST SERPL W P-5'-P-CCNC: 26 U/L (ref 0–45)
BASOPHILS # BLD AUTO: 0.1 10E9/L (ref 0–0.2)
BASOPHILS NFR BLD AUTO: 1.4 %
BILIRUB SERPL-MCNC: 0.4 MG/DL (ref 0.2–1.3)
BUN SERPL-MCNC: 17 MG/DL (ref 7–30)
CALCIUM SERPL-MCNC: 9.4 MG/DL (ref 8.5–10.1)
CHLORIDE SERPL-SCNC: 96 MMOL/L (ref 94–109)
CO2 SERPL-SCNC: 32 MMOL/L (ref 20–32)
CREAT SERPL-MCNC: 0.77 MG/DL (ref 0.52–1.04)
DIFFERENTIAL METHOD BLD: ABNORMAL
EOSINOPHIL # BLD AUTO: 0.2 10E9/L (ref 0–0.7)
EOSINOPHIL NFR BLD AUTO: 3.2 %
ERYTHROCYTE [DISTWIDTH] IN BLOOD BY AUTOMATED COUNT: 12.1 % (ref 10–15)
GFR SERPL CREATININE-BSD FRML MDRD: 77 ML/MIN/1.7M2
GLUCOSE SERPL-MCNC: 85 MG/DL (ref 70–99)
HCT VFR BLD AUTO: 38.2 % (ref 35–47)
HGB BLD-MCNC: 13.4 G/DL (ref 11.7–15.7)
IMM GRANULOCYTES # BLD: 0 10E9/L (ref 0–0.4)
IMM GRANULOCYTES NFR BLD: 0.6 %
LYMPHOCYTES # BLD AUTO: 1.6 10E9/L (ref 0.8–5.3)
LYMPHOCYTES NFR BLD AUTO: 23 %
MCH RBC QN AUTO: 33.2 PG (ref 26.5–33)
MCHC RBC AUTO-ENTMCNC: 35.1 G/DL (ref 31.5–36.5)
MCV RBC AUTO: 95 FL (ref 78–100)
MONOCYTES # BLD AUTO: 0.6 10E9/L (ref 0–1.3)
MONOCYTES NFR BLD AUTO: 9.2 %
NEUTROPHILS # BLD AUTO: 4.4 10E9/L (ref 1.6–8.3)
NEUTROPHILS NFR BLD AUTO: 62.6 %
NRBC # BLD AUTO: 0 10*3/UL
NRBC BLD AUTO-RTO: 0 /100
PLATELET # BLD AUTO: 387 10E9/L (ref 150–450)
POTASSIUM SERPL-SCNC: 3.8 MMOL/L (ref 3.4–5.3)
PROT SERPL-MCNC: 8.2 G/DL (ref 6.8–8.8)
RBC # BLD AUTO: 4.04 10E12/L (ref 3.8–5.2)
SODIUM SERPL-SCNC: 135 MMOL/L (ref 133–144)
WBC # BLD AUTO: 7 10E9/L (ref 4–11)

## 2018-09-24 PROCEDURE — 86316 IMMUNOASSAY TUMOR OTHER: CPT | Performed by: INTERNAL MEDICINE

## 2018-09-24 PROCEDURE — A9585 GADOBUTROL INJECTION: HCPCS | Performed by: INTERNAL MEDICINE

## 2018-09-24 PROCEDURE — 25500064 ZZH RX 255 OP 636: Performed by: INTERNAL MEDICINE

## 2018-09-24 PROCEDURE — 85025 COMPLETE CBC W/AUTO DIFF WBC: CPT | Performed by: INTERNAL MEDICINE

## 2018-09-24 PROCEDURE — 80053 COMPREHEN METABOLIC PANEL: CPT | Performed by: INTERNAL MEDICINE

## 2018-09-24 PROCEDURE — 36415 COLL VENOUS BLD VENIPUNCTURE: CPT

## 2018-09-24 PROCEDURE — 71250 CT THORAX DX C-: CPT

## 2018-09-24 PROCEDURE — 74183 MRI ABD W/O CNTR FLWD CNTR: CPT

## 2018-09-24 RX ORDER — GADOBUTROL 604.72 MG/ML
10 INJECTION INTRAVENOUS ONCE
Status: COMPLETED | OUTPATIENT
Start: 2018-09-24 | End: 2018-09-24

## 2018-09-24 RX ADMIN — GADOBUTROL 10 ML: 604.72 INJECTION INTRAVENOUS at 09:37

## 2018-09-24 NOTE — MR AVS SNAPSHOT
After Visit Summary   9/24/2018    Connie Brunson    MRN: [id number]           Patient Information     Date Of Birth          1960        Visit Information        Provider Department      9/24/2018 9:00 AM  ONCOLOGY NURSE [facility]        Today's Diagnoses     Primary malignant neuroendocrine neoplasm of ileum (H)           Follow-ups after your visit        Your next 10 appointments already scheduled     Sep 24, 2018  9:30 AM CDT   MR ABDOMEN W/O & W CONTRAST with [id number]   [facility] ([facility])    [address]  [place] [address] [phone]           How do I prepare for my exam? (Food and drink instructions) Do not eat or drink for 6 hours prior to exam. **If you will be receiving sedation or general anesthesia, please see special notes below.**  How do I prepare for my exam? (Other instructions) Take your medicines as usual, unless your doctor tells you not to. You may or may not receive IV contrast for this exam pending the discretion of the Radiologist.  **If you will be receiving sedation or general anesthesia, please see special notes below.**  What should I wear: The MRI machine uses a strong magnet. Please wear clothes without metal (snaps, zippers). A sweatsuit works well, or we may give you a hospital gown. Please remove any body piercings and hair extensions before you arrive. You will also remove watches, jewelry, hairpins, wallets, dentures, partial dental plates and hearing aids. You may wear contact lenses, and you may be able to wear your rings. We have a safe place to keep your personal items, but it is safer to leave them at home.  How long does the exam take: Most tests take 30 to 60 minutes.  HOWEVER, IF YOUR DOCTOR PRESCRIBES ANESTHESIA please plan on spending four to five hours in the recovery room.  What should I bring: Bring a list of your current medicines to your  exam (including vitamins, minerals and over-the-counter drugs). Also bring the results of similar scans you may have had.  Do I need a : **If you will be receiving sedation or general anesthesia, please see special notes below.**  What should I do after the exam: No Restrictions, You may resume normal activities.  What is this test: MRI (magnetic resonance imaging) uses a strong magnet and radio waves to look inside the body. An MRA (magnetic resonance angiogram) does the same thing, but it lets us look at your blood vessels. A computer turns the radio waves into pictures showing cross sections of the body, much like slices of bread. This helps us see any problems more clearly. You may receive fluid (called  contrast ) before or during your scan. The fluid helps us see the pictures better. We give the fluid through an IV (small needle in your arm).  Who should I call with questions: If you have any questions, please contact your Imaging Department exam site. Directions, parking instructions, and other information is available on our website, [email or web address].            Sep 24, 2018 10:30 AM CDT   CT CHEST W/O CONTRAST with [address] [facility] [place] ([facility])    [address]  [facility] [place] [facility] [address] [phone]           How do I prepare for my exam? (Food and drink instructions) No Food and Drink Restrictions.  How do I prepare for my exam? (Other instructions) You do not need to do anything special to prepare for this exam. For a sinus scan: Use your nose spray (nasal decongestant spray) as directed.  What should I wear: Please wear loose clothing, such as a sweat suit or jogging clothes. Avoid snaps, zippers and other metal. We may ask you to undress and put on a hospital gown.  How long does the exam take: Most scans take less than 20 minutes.  What should I bring: Please bring any scans or X-rays taken at other hospitals, if similar  tests were done. Also bring a list of your medicines, including vitamins, minerals and over-the-counter drugs. It is safest to leave personal items at home.  Do I need a : No  is needed.  What do I need to tell my doctor? Be sure to tell your doctor: * If you have any allergies. * If there s any chance you are pregnant. * If you are breastfeeding.  What should I do after the exam: No restrictions, You may resume normal activities.  What is this test: A CT (computed tomography) scan is a series of pictures that allows us to look inside your body. The scanner creates images of the body in cross sections, much like slices of bread. This helps us see any problems more clearly.  Who should I call with questions: If you have any questions, please call the Imaging Department where you will have your exam. Directions, parking instructions, and other information is available on our website, [email or web address].            Sep 27, 2018  2:15 PM CDT   Return Visit with Mary Weir MD   [facility] ([facility])    [facility]  [address]  [facility] [id number] [phone]              Who to contact     If you have questions or need follow up information about today's clinic visit or your schedule please contact [facility] directly at [phone].  Normal or non-critical lab and imaging results will be communicated to you by MyChart, letter or phone within 4 business days after the clinic has received the results. If you do not hear from us within 7 days, please contact the clinic through MyChart or phone. If you have a critical or abnormal lab result, we will notify you by phone as soon as possible.  Submit refill requests through [employer] or call your pharmacy and they will forward the refill request to us. Please allow 3 business days for your refill to be completed.          Additional  Information About Your Visit        [employer] Information     [employer] gives you secure access to your electronic health record. If you see a primary care provider, you can also send messages to your care team and make appointments. If you have questions, please call your primary care clinic.  If you do not have a primary care provider, please call [phone] and they will assist you.        Care EveryWhere ID     This is your Care EveryWhere ID. This could be used by other organizations to access your [facility] medical records  [id number]        Your Vitals Were     Last Period                   09/05/2000            Blood Pressure from Last 3 Encounters:   09/07/18 120/80   07/02/18 120/82   05/08/18 140/84    Weight from Last 3 Encounters:   09/07/18 72.1 kg (159 lb)   03/29/18 70.8 kg (156 lb)   03/07/18 70.4 kg (155 lb 3.2 oz)              We Performed the Following     CBC with platelets differential     Chromogranin A     Comprehensive metabolic panel        Primary Care Provider Office Phone # Fax #    Gayatricassie Stephens [phone]       [address] [place] [id number]        Equal Access to Services     [facility] : Hadii aad manuel hadasho Soconchis, waaxda luqadaha, qaybta kaalmada adeegyada, nithya pirse . So [facility] [phone].    ATENCIÓN: Si habla español, tiene a ceron disposición servicios gratuitos de asistencia lingüística. Llame al [phone].    We comply with applicable federal civil rights laws and [place] laws. We do not discriminate on the basis of race, color, national origin, age, disability, sex, sexual orientation, or gender identity.            Thank you!     Thank you for choosing [facility]  for your care. Our goal is always to provide you with excellent care. Hearing back from our patients is one way we can continue to improve our services. Please take a few minutes to complete the written survey that  you may receive in the mail after your visit with us. Thank you!             Your Updated Medication List - Protect others around you: Learn how to safely use, store and throw away your medicines at www.disposemymeds.org.          This list is accurate as of 9/24/18  9:09 AM.  Always use your most recent med list.                   Brand Name Dispense Instructions for use Diagnosis    cetirizine 10 MG tablet    zyrTEC     Take 10 mg by mouth every evening        diazepam 10 MG tablet    VALIUM    4 tablet    Take 1-2  tab or 10-20 mg 30-60 minutes prior to MRI scan.    Primary malignant neuroendocrine neoplasm of ileum (H)       fenofibrate 160 MG tablet     90 tablet    TAKE ONE TABLET BY MOUTH EVERY DAY    Hypertriglyceridemia       fluticasone 50 MCG/ACT spray    FLONASE    48 g    Spray 2 sprays into both nostrils daily    Other seasonal allergic rhinitis       hydrocortisone 2.5 % cream     30 g    Apply sparingly to dermatitis left lower eye    Seborrheic dermatitis       lisinopril 10 MG tablet    PRINIVIL/ZESTRIL    90 tablet    TAKE ONE TABLET BY MOUTH EVERY DAY    Essential hypertension with goal blood pressure less than 140/90       LORazepam 0.5 MG tablet    ATIVAN    10 tablet    Take 0.5-1 tablets (0.25-0.5 mg) by mouth every 8 hours as needed for anxiety    Adjustment disorder with anxious mood       mometasone 0.1 % cream    ELOCON    15 g    Apply sparingly to affected area twice daily for 14 days.  Do not apply to face.    Dermatitis       omeprazole 20 MG CR capsule    priLOSEC    90 capsule    Take 1 capsule (20 mg) by mouth daily    Gastroesophageal reflux disease without esophagitis       * triamcinolone 0.5 % cream    KENALOG     Apply topically 2 times daily as needed for irritation (to eczema or psoriatic lesions - Never use on face)        * triamcinolone 0.5 % cream    KENALOG    60 g    Apply sparingly to affected area three times daily.    Bug bite, initial encounter        triamterene-hydrochlorothiazide 37.5-25 MG per capsule    DYAZIDE    90 capsule    TAKE ONE CAPSULE BY MOUTH EVERY DAY    Essential hypertension with goal blood pressure less than 140/90       venlafaxine 37.5 MG tablet    EFFEXOR    90 tablet    TAKE ONE TABLET BY MOUTH ONCE DAILY    Anxiety       zolpidem 5 MG tablet    AMBIEN    30 tablet    Take 1 tablet (5 mg) by mouth nightly as needed for sleep    Primary insomnia       * Notice:  This list has 2 medication(s) that are the same as other medications prescribed for you. Read the directions carefully, and ask your doctor or other care provider to review them with you.

## 2018-09-24 NOTE — LETTER
9/24/2018         RE: Connie Brunson  [address]  [place] [address]        Dear Colleague,    Thank you for referring your patient, Connie Brunson, to the [facility]. Please see a copy of my visit note below.    Medical Assistant Note:  Connie Brunson presents today for lab draw.    Patient seen by provider today: No.   present during visit today: Not Applicable.    Concerns: No Concerns.    Procedure:  Labs drawn: .    Post Assessment:  Labs drawn without difficulty: Yes.    Discharge Plan:  Departure Mode: Ambulatory.    Face to Face Time: 10.    Patti Garcia              Again, thank you for allowing me to participate in the care of your patient.        Sincerely,        Torie Oncology Nurse

## 2018-09-24 NOTE — PROGRESS NOTES
Medical Assistant Note:  Connie Brunson presents today for lab draw.    Patient seen by provider today: No.   present during visit today: Not Applicable.    Concerns: No Concerns.    Procedure:  Labs drawn: .    Post Assessment:  Labs drawn without difficulty: Yes.    Discharge Plan:  Departure Mode: Ambulatory.    Face to Face Time: 10.    Patti Garcia

## 2018-09-26 LAB — CGA SERPL-MCNC: 602 NG/ML (ref 0–95)

## 2018-09-27 ENCOUNTER — ONCOLOGY VISIT (OUTPATIENT)
Dept: ONCOLOGY | Facility: CLINIC | Age: 58
End: 2018-09-27
Attending: INTERNAL MEDICINE
Payer: COMMERCIAL

## 2018-09-27 VITALS
OXYGEN SATURATION: 94 % | TEMPERATURE: 97.8 F | BODY MASS INDEX: 32.21 KG/M2 | DIASTOLIC BLOOD PRESSURE: 87 MMHG | SYSTOLIC BLOOD PRESSURE: 142 MMHG | HEIGHT: 59 IN | RESPIRATION RATE: 16 BRPM | WEIGHT: 159.8 LBS | HEART RATE: 87 BPM

## 2018-09-27 DIAGNOSIS — C7A.8 PRIMARY MALIGNANT NEUROENDOCRINE NEOPLASM OF ILEUM (H): Primary | ICD-10-CM

## 2018-09-27 PROCEDURE — G0463 HOSPITAL OUTPT CLINIC VISIT: HCPCS

## 2018-09-27 PROCEDURE — 99214 OFFICE O/P EST MOD 30 MIN: CPT | Performed by: INTERNAL MEDICINE

## 2018-09-27 ASSESSMENT — PAIN SCALES - GENERAL: PAINLEVEL: NO PAIN (0)

## 2018-09-27 NOTE — NURSING NOTE
"Oncology Rooming Note    September 27, 2018 2:17 PM   Connie Brunson is a 57 year old female who presents for:    Chief Complaint   Patient presents with     Oncology Clinic Visit     Benign neoplasm of tonsil,Primary malignant neuroendocrine neoplasm of ileum     Initial Vitals: /87  Pulse 87  Temp 97.8  F (36.6  C) (Tympanic)  Resp 16  Ht 1.499 m (4' 11\")  Wt 72.5 kg (159 lb 12.8 oz)  LMP 09/05/2000  SpO2 94%  BMI 32.28 kg/m2 Estimated body mass index is 32.28 kg/(m^2) as calculated from the following:    Height as of this encounter: 1.499 m (4' 11\").    Weight as of this encounter: 72.5 kg (159 lb 12.8 oz). Body surface area is 1.74 meters squared.  No Pain (0) Comment: Data Unavailable   Patient's last menstrual period was 09/05/2000.  Allergies reviewed: Yes  Medications reviewed: Yes    Medications: Medication refills not needed today.  Pharmacy name entered into [facility]:    [facility] [address]  WRITTEN PRESCRIPTION REQUESTED  [facility] [address] [place] [facility] [address] [facility]    Clinical concerns: Benign neoplasm of tonsil,Primary malignant neuroendocrine neoplasm of ileum     8 minutes for nursing intake (face to face time)     Rosa M Muse CMA            DISCHARGE PLAN:  Next appointments: See patient instruction section  Departure Mode: Ambulatory  Accompanied by: self  0 minutes for nursing discharge (face to face time)   Rosa M Muse CMA      "

## 2018-09-27 NOTE — PROGRESS NOTES
[facility]    Hematology/Oncology Established Patient Note      Today's Date: 9/27/18    Reason for Follow-up: neuroendocrine tumor of the ileum      HISTORY OF PRESENT ILLNESS: Connie Brunson is a 57 year old female with PMHx of HTN, HLD, depression, who presents with neuroendocrine tumor of the ileum.  In October 2017, she had a CT abdomen/pelvis done due to lower abdominal pain related to sigmoid diverticulitis and loose stools.  She had been having diarrhea for ~3 years.  It showed an incidental finding of an enhancing 1.3 cm intraluminal nodule in the distal ileum.  There was comment on radiology report on CT enterography on 11/2/17 that the nodule, in retrospect, appears to be present and stable since an older CT from 10/27/14.  On 11/7/17, she underwent a segmental small bowel resection with primary anastomosis by Dr. Cristobal.  Pathology showed a 1.5 cm tumor in the ileum, well-differentiated neuroendocrine tumor, grade 2, mitotic rate 410 HPF, Ki-67 of 5%, 2 of 3 lymph nodes were involved, stage pT3N1 (stage IIIB).        INTERIM HISTORY: Connie returns for follow-up today.  She is feeling well.  She continues to be busy at work.  Her bowel habits haven't changed; she still gets diarrhea intermittently, sometimes 2-3 times a day.  She has been trying to eat health.  She has quit smoking, but still uses e-cigarettes.      REVIEW OF SYSTEMS:   14 point ROS was reviewed and is negative other than as noted above in HPI.       HOME MEDICATIONS:  Current Outpatient Prescriptions   Medication Sig Dispense Refill     cetirizine (ZYRTEC) 10 MG tablet Take 10 mg by mouth every evening       diazepam (VALIUM) 10 MG tablet Take 1-2  tab or 10-20 mg 30-60 minutes prior to MRI scan. 4 tablet 1     fenofibrate 160 MG tablet TAKE ONE TABLET BY MOUTH EVERY DAY 90 tablet 1     fluticasone (FLONASE) 50 MCG/ACT spray Spray 2 sprays into both nostrils daily 48 g 0     hydrocortisone 2.5 % cream Apply  sparingly to dermatitis left lower eye 30 g 3     lisinopril (PRINIVIL/ZESTRIL) 10 MG tablet TAKE ONE TABLET BY MOUTH EVERY DAY 90 tablet 1     LORazepam (ATIVAN) 0.5 MG tablet Take 0.5-1 tablets (0.25-0.5 mg) by mouth every 8 hours as needed for anxiety 10 tablet 0     mometasone (ELOCON) 0.1 % cream Apply sparingly to affected area twice daily for 14 days.  Do not apply to face. 15 g 3     omeprazole (PRILOSEC) 20 MG CR capsule Take 1 capsule (20 mg) by mouth daily 90 capsule 3     triamcinolone (KENALOG) 0.5 % cream Apply sparingly to affected area three times daily. 60 g 3     triamcinolone (KENALOG) 0.5 % cream Apply topically 2 times daily as needed for irritation (to eczema or psoriatic lesions - Never use on face)       triamterene-hydrochlorothiazide (DYAZIDE) 37.5-25 MG per capsule TAKE ONE CAPSULE BY MOUTH EVERY DAY 90 capsule 1     venlafaxine (EFFEXOR) 37.5 MG tablet TAKE ONE TABLET BY MOUTH ONCE DAILY 90 tablet 1     zolpidem (AMBIEN) 5 MG tablet Take 1 tablet (5 mg) by mouth nightly as needed for sleep 30 tablet 5         ALLERGIES:  Allergies   Allergen Reactions     Atorvastatin Nausea     Nausea and abd pain      Ceftin GI Disturbance     Stomach upset and bloating - given at same time as clindamycin ? Which one as cause.       Clindamycin GI Disturbance     Stomach upset and bloating - given at same time as ceftin, ? Which one as cause      Flagyl [Metronidazole]      immediate migraine headache      Morphine Itching     Severe with nose, facial  Swelling - oxycodone = ok /no problems      Penicillins Hives     Sulfa Drugs Rash     Severe red , flushed rash     Levaquin Rash     States she can take cipro         PAST MEDICAL HISTORY:  Past Medical History:   Diagnosis Date     Allergic rhinitis, cause unspecified     year round - dog,dust-  Failed on claritin, claritin-D, zyrtec and zyrtec-D in past.      Benign neoplasm of cerebral meninges (H) 1999    has yearly MRI's. - [facility] MRI7/24/06 = no change from 7/21/05      Benign neoplasm of tonsil 7/05    ?tonsillar re-growth - sees Dr. Thomas ENT      Depressive disorder      Deviated nasal septum     sees Dr. Thomas ENT      Diverticulosis of colon (without mention of hemorrhage) 02-     History of Guillain-[place] syndrome     NO FLU SHOTS - very mild case in [place] many years ago     Hyperlipidemia LDL goal < 130 doesn't want statins    simvastatin = severe hand joint pain , lipitor =nausea/abd. pain     Hypertension goal BP (blood pressure) < 140/90      Insomnia     trazodone -made pt feel hung over      Major depressive disorder, recurrent episode, moderate (H)     lexapro - sexual side effects      Moderate major depression (H) 2/4/2005    trazodone -made pt feel hung over      Other acne      Other extrapyramidal disease and abnormal movement disorder     ?GBS     Symptomatic menopausal or female climacteric states     vivelle-dot          PAST SURGICAL HISTORY:  Past Surgical History:   Procedure Laterality Date     ABDOMEN SURGERY       BIOPSY       C LIGATE FALLOPIAN TUBE  1988    Tubal Ligation     C NONSPECIFIC PROCEDURE      PAROTID TUMOR L SIDE OF NECK - BENIGN     C TOTAL ABDOM HYSTERECTOMY  2000 ?    Hysterectomy, Total Abdominal with BSO     COLONOSCOPY  1/16/2012    Procedure:COLONOSCOPY; Colonoscopy; Surgeon:SHOLA JOYCE; Location:[facility]     HC REMOVE TONSILS/ADENOIDS,<11 Y/O      T and A, under 12 yrs     HEAD & NECK SURGERY       LAPAROSCOPIC CHOLECYSTECTOMY WITH CHOLANGIOGRAMS N/A 4/25/2016    Procedure: LAPAROSCOPIC CHOLECYSTECTOMY WITH CHOLANGIOGRAMS;  Surgeon: Rosa M Cristobal MD;  Location: [facility]     LAPAROSCOPY DIAGNOSTIC (GENERAL) N/A 11/7/2017    Procedure: LAPAROSCOPY DIAGNOSTIC (GENERAL);  Exploratory Laparoscopy, Laparotomy and Small Bowel resection.;  Surgeon: Rosa M Cristobal MD;  Location: [facility]     LAPAROTOMY EXPLORATORY N/A 11/7/2017    Procedure:  LAPAROTOMY EXPLORATORY;;  Surgeon: Rosa M Cristobal MD;  Location: [facility]     RESECT SMALL BOWEL WITHOUT OSTOMY N/A 11/7/2017    Procedure: RESECT SMALL BOWEL WITHOUT OSTOMY;;  Surgeon: Rosa M Cristobal MD;  Location: [facility]     SURGICAL HISTORY OF -   2004    Menigioma excision         SOCIAL HISTORY:  Social History     Social History     Marital status:      Spouse name: Perry Brunson      Number of children: 3     Years of education: 15     Occupational History     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  [employer]     Social History Main Topics     Smoking status: Light Tobacco Smoker     Packs/day: 0.00     Years: 5.00     Types: Other, Cigarettes     Last attempt to quit: 1/1/1994     Smokeless tobacco: Current User      Comment: 11/3/17 - occ e-cig use     Alcohol use 0.0 oz/week      Comment: 3-5 drinks per week     Drug use: No     Sexual activity: No      Comment: tubal ligation-      Other Topics Concern      Service No     Blood Transfusions No     Caffeine Concern Yes     2-3 cups coffee in am     Exercise No     regular walking 4x/week      Seat Belt Yes     always     Self-Exams Yes     SBE encouraged monthly     Parent/Sibling W/ Cabg, Mi Or Angioplasty Before 65f 55m? No     Social History Narrative    calcium - taking 500mg po qday - increase to Calcium - 1000-1200mg/day    flex sig/colonoscopy -at age 48    sun precautions - discussed    mammogram - yearly    Td booster - 1991 per our records - pt will call [facility]     pneumovax -at age 58    DEXA -this year- 2003    stool hemoccults - every year after age 40    ASA- start 81 mg ASA qday.    mulvitamin - encouraged    doing citrucel qday         from second , [employer]. Now back together with her first  and father of their  children, Perry Brunson - fall 2011.          FAMILY HISTORY:  Family History   Problem Relation Age of Onset     Hyperlipidemia Mother   "    Thyroid Disease Mother      Cancer Father      lung     Hypertension Father      Hyperlipidemia Father      Thyroid Disease Brother      Diabetes Maternal Grandmother      Cerebrovascular Disease Maternal Grandmother      Substance Abuse Maternal Grandfather      Thyroid Disease Son      Her father  of lung cancer and paranasal cancer at around age 65.    She has 3 children.      PHYSICAL EXAM:  Vital signs:  /87  Pulse 87  Temp 97.8  F (36.6  C) (Tympanic)  Resp 16  Ht 1.499 m (4' 11\")  Wt 72.5 kg (159 lb 12.8 oz)  LMP 2000  SpO2 94%  BMI 32.28 kg/m2   ECO  GENERAL/CONSTITUTIONAL: No acute distress.   EYES: No scleral icterus.  RESPIRATORY: Clear to auscultation bilaterally.  CARDIOVASCULAR: Regular rate and rhythm, no murmurs.  GASTROINTESTINAL: Non-tender, no guarding.  NEUROLOGIC: Alert, oriented, answers questions appropriately.  INTEGUMENTARY: No jaundice.      LABS:  CBC RESULTS:   Recent Labs   Lab Test  18   0900   WBC  7.0   RBC  4.04   HGB  13.4   HCT  38.2   MCV  95   MCH  33.2*   MCHC  35.1   RDW  12.1   PLT  387     Recent Labs   Lab Test  18   0900  18   0858   NA  135  136   POTASSIUM  3.8  3.4   CHLORIDE  96  99   CO2  32  28   ANIONGAP  7  9   GLC  85  98   BUN  17  16   CR  0.77  0.60   FADI  9.4  8.8     Lab Results   Component Value Date    AST 26 2018     Lab Results   Component Value Date    ALT 38 2018     No results found for: BILICONJ   Lab Results   Component Value Date    BILITOTAL 0.4 2018     Lab Results   Component Value Date    ALBUMIN 4.4 2018     Lab Results   Component Value Date    PROTTOTAL 8.2 2018      Lab Results   Component Value Date    ALKPHOS 39 2018     Component      Latest Ref Rng & Units 2017 2017 3/12/2018 2018   Chromogranin A      0 - 95 ng/mL 339 (H) 385 (H) 597 (H) 602 (H)       IMAGING:  CT chest 18:  1. Stable small right-sided pulmonary nodules since  " indicating a  nonaggressive etiology.  2. No new abnormality.  3. Fatty liver.  4. Mild coronary artery calcification.    MRI abdomen 9/24/18:  1. Stable exam without convincing evidence for new disease in the  abdomen.  2. Fatty liver.      ASSESSMENT/PLAN:  Connie Brunson is a 57 year old female with:    1) Neuroendocrine tumor of the ileum: s/p resection on 11/7/17, 1.5 cm, grade 2, well-differentiated, T3N1 (stage IIIB).      Recent MRI abdomen and CT scan reviewed with Connie.  MRI abdomen showed stable exam without convincing evidence for new disease in the abdomen.  CT chest shows no new abnormality.  Stable pulmonary nodules since 2017.    Chromogranin A has remained abnormal, trended up slightly from 6 months ago.  Her symptoms remain the same.  Her imaging remains clear.  Since her imaging is negative, we will continue to observe for now.  This is quite an indolent tumor, and may just take more time to present itself.      -she will call if she gets worsening diarrhea or flushing symptoms or pain and can get labs/scans done earlier.    -CT chest and MRI abdomen in 6 months  -labs in April 2019: CBC, CMP, chromogranin A  -RTC in April 2019 (she will be on vacation in [place] in March)    2) HTN, HLD  -follow-up with PCP    3) History of meningioma: diagnosed in 1999, was getting yearly MRI's with neurosurgery, but was told she does not need surveillance MRI's anymore unless she has new neurologic symptoms.    4) Pulmonary nodule: There is an indeterminate 2 x 3 mm anterior right mid-lung nodule seen on CT.  She does have history of smoking.  Recent CT chest shows that the tiny nodule is stable.  It has been stable since 2017.  -monitor on scans      I spent a total of 25 minutes with the patient, with over >50% of the time in counseling and/or coordination of care.       Mary Weir MD  Hematology/Oncology  [facility]

## 2018-09-27 NOTE — LETTER
9/27/2018         RE: Connie Brunson  [address]  [place] [place] [address]        Dear Colleague,    Thank you for referring your patient, Connie Brunson, to the [facility]. Please see a copy of my visit note below.    [facility]    Hematology/Oncology Established Patient Note      Today's Date: 9/27/18    Reason for Follow-up: neuroendocrine tumor of the ileum      HISTORY OF PRESENT ILLNESS: Connie Brunson is a 57 year old female with PMHx of HTN, HLD, depression, who presents with neuroendocrine tumor of the ileum.  In October 2017, she had a CT abdomen/pelvis done due to lower abdominal pain related to sigmoid diverticulitis and loose stools.  She had been having diarrhea for ~3 years.  It showed an incidental finding of an enhancing 1.3 cm intraluminal nodule in the distal ileum.  There was comment on radiology report on CT enterography on 11/2/17 that the nodule, in retrospect, appears to be present and stable since an older CT from 10/27/14.  On 11/7/17, she underwent a segmental small bowel resection with primary anastomosis by Dr. Cristobal.  Pathology showed a 1.5 cm tumor in the ileum, well-differentiated neuroendocrine tumor, grade 2, mitotic rate 410 HPF, Ki-67 of 5%, 2 of 3 lymph nodes were involved, stage pT3N1 (stage IIIB).        INTERIM HISTORY: Connie returns for follow-up today.  She is feeling well.  She continues to be busy at work.  Her bowel habits haven't changed; she still gets diarrhea intermittently, sometimes 2-3 times a day.  She has been trying to eat health.  She has quit smoking, but still uses e-cigarettes.      REVIEW OF SYSTEMS:   14 point ROS was reviewed and is negative other than as noted above in HPI.       HOME MEDICATIONS:  Current Outpatient Prescriptions   Medication Sig Dispense Refill     cetirizine (ZYRTEC) 10 MG tablet Take 10 mg by mouth every evening       diazepam (VALIUM) 10 MG tablet Take 1-2  tab or  10-20 mg 30-60 minutes prior to MRI scan. 4 tablet 1     fenofibrate 160 MG tablet TAKE ONE TABLET BY MOUTH EVERY DAY 90 tablet 1     fluticasone (FLONASE) 50 MCG/ACT spray Spray 2 sprays into both nostrils daily 48 g 0     hydrocortisone 2.5 % cream Apply sparingly to dermatitis left lower eye 30 g 3     lisinopril (PRINIVIL/ZESTRIL) 10 MG tablet TAKE ONE TABLET BY MOUTH EVERY DAY 90 tablet 1     LORazepam (ATIVAN) 0.5 MG tablet Take 0.5-1 tablets (0.25-0.5 mg) by mouth every 8 hours as needed for anxiety 10 tablet 0     mometasone (ELOCON) 0.1 % cream Apply sparingly to affected area twice daily for 14 days.  Do not apply to face. 15 g 3     omeprazole (PRILOSEC) 20 MG CR capsule Take 1 capsule (20 mg) by mouth daily 90 capsule 3     triamcinolone (KENALOG) 0.5 % cream Apply sparingly to affected area three times daily. 60 g 3     triamcinolone (KENALOG) 0.5 % cream Apply topically 2 times daily as needed for irritation (to eczema or psoriatic lesions - Never use on face)       triamterene-hydrochlorothiazide (DYAZIDE) 37.5-25 MG per capsule TAKE ONE CAPSULE BY MOUTH EVERY DAY 90 capsule 1     venlafaxine (EFFEXOR) 37.5 MG tablet TAKE ONE TABLET BY MOUTH ONCE DAILY 90 tablet 1     zolpidem (AMBIEN) 5 MG tablet Take 1 tablet (5 mg) by mouth nightly as needed for sleep 30 tablet 5         ALLERGIES:  Allergies   Allergen Reactions     Atorvastatin Nausea     Nausea and abd pain      Ceftin GI Disturbance     Stomach upset and bloating - given at same time as clindamycin ? Which one as cause.       Clindamycin GI Disturbance     Stomach upset and bloating - given at same time as ceftin, ? Which one as cause      Flagyl [Metronidazole]      immediate migraine headache      Morphine Itching     Severe with nose, facial  Swelling - oxycodone = ok /no problems      Penicillins Hives     Sulfa Drugs Rash     Severe red , flushed rash     Levaquin Rash     States she can take cipro         PAST MEDICAL HISTORY:  Past  Medical History:   Diagnosis Date     Allergic rhinitis, cause unspecified     year round - dog,dust-  Failed on claritin, claritin-D, zyrtec and zyrtec-D in past.      Benign neoplasm of cerebral meninges (H) 1999    has yearly MRI's. - 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 Thomas ENT      Depressive disorder      Deviated nasal septum     sees Dr. Thomas ENT      Diverticulosis of colon (without mention of hemorrhage) 02-     History of Guillain-[place] syndrome     NO FLU SHOTS - very mild case in [place] many years ago     Hyperlipidemia LDL goal < 130 doesn't want statins    simvastatin = severe hand joint pain , lipitor =nausea/abd. pain     Hypertension goal BP (blood pressure) < 140/90      Insomnia     trazodone -made pt feel hung over      Major depressive disorder, recurrent episode, moderate (H)     lexapro - sexual side effects      Moderate major depression (H) 2/4/2005    trazodone -made pt feel hung over      Other acne      Other extrapyramidal disease and abnormal movement disorder     ?GBS     Symptomatic menopausal or female climacteric states     vivelle-dot          PAST SURGICAL HISTORY:  Past Surgical History:   Procedure Laterality Date     ABDOMEN SURGERY       BIOPSY       C LIGATE FALLOPIAN TUBE  1988    Tubal Ligation     C NONSPECIFIC PROCEDURE      PAROTID TUMOR L SIDE OF NECK - BENIGN     C TOTAL ABDOM HYSTERECTOMY  2000 ?    Hysterectomy, Total Abdominal with BSO     COLONOSCOPY  1/16/2012    Procedure:COLONOSCOPY; Colonoscopy; Surgeon:SHOLA JOYCE; Location: GI     HC REMOVE TONSILS/ADENOIDS,<13 Y/O      T and A, under 12 yrs     HEAD & NECK SURGERY       LAPAROSCOPIC CHOLECYSTECTOMY WITH CHOLANGIOGRAMS N/A 4/25/2016    Procedure: LAPAROSCOPIC CHOLECYSTECTOMY WITH CHOLANGIOGRAMS;  Surgeon: Rosa M Cristobal MD;  Location:  OR     LAPAROSCOPY  DIAGNOSTIC (GENERAL) N/A 11/7/2017    Procedure: LAPAROSCOPY DIAGNOSTIC (GENERAL);  Exploratory Laparoscopy, Laparotomy and Small Bowel resection.;  Surgeon: Rosa M Cristobal MD;  Location: [facility]     LAPAROTOMY EXPLORATORY N/A 11/7/2017    Procedure: LAPAROTOMY EXPLORATORY;;  Surgeon: Rosa M Cristobal MD;  Location: [facility]     RESECT SMALL BOWEL WITHOUT OSTOMY N/A 11/7/2017    Procedure: RESECT SMALL BOWEL WITHOUT OSTOMY;;  Surgeon: Rosa M Cristobal MD;  Location: [facility]     SURGICAL HISTORY OF -   2004    Menigioma excision         SOCIAL HISTORY:  Social History     Social History     Marital status:      Spouse name: Perry Brunson      Number of children: 3     Years of education: 15     Occupational History     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  [employer]     Social History Main Topics     Smoking status: Light Tobacco Smoker     Packs/day: 0.00     Years: 5.00     Types: Other, Cigarettes     Last attempt to quit: 1/1/1994     Smokeless tobacco: Current User      Comment: 11/3/17 - occ e-cig use     Alcohol use 0.0 oz/week      Comment: 3-5 drinks per week     Drug use: No     Sexual activity: No      Comment: tubal ligation-      Other Topics Concern      Service No     Blood Transfusions No     Caffeine Concern Yes     2-3 cups coffee in am     Exercise No     regular walking 4x/week      Seat Belt Yes     always     Self-Exams Yes     SBE encouraged monthly     Parent/Sibling W/ Cabg, Mi Or Angioplasty Before 65f 55m? No     Social History Narrative    calcium - taking 500mg po qday - increase to Calcium - 1000-1200mg/day    flex sig/colonoscopy -at age 48    sun precautions - discussed    mammogram - yearly    Td booster - 1991 per our records - pt will call [facility]     pneumovax -at age 58    DEXA -this year- 2003    stool hemoccults - every year after age 40    ASA- start 81 mg ASA qday.    mulvitamin - encouraged    doing citrucel  "qday         from second , [employer]. Now back together with her first  and father of their  children, Perry Brunson - 2011.          FAMILY HISTORY:  Family History   Problem Relation Age of Onset     Hyperlipidemia Mother      Thyroid Disease Mother      Cancer Father      lung     Hypertension Father      Hyperlipidemia Father      Thyroid Disease Brother      Diabetes Maternal Grandmother      Cerebrovascular Disease Maternal Grandmother      Substance Abuse Maternal Grandfather      Thyroid Disease Son      Her father  of lung cancer and paranasal cancer at around age 65.    She has 3 children.      PHYSICAL EXAM:  Vital signs:  /87  Pulse 87  Temp 97.8  F (36.6  C) (Tympanic)  Resp 16  Ht 1.499 m (4' 11\")  Wt 72.5 kg (159 lb 12.8 oz)  LMP 2000  SpO2 94%  BMI 32.28 kg/m2   ECO  GENERAL/CONSTITUTIONAL: No acute distress.   EYES: No scleral icterus.  RESPIRATORY: Clear to auscultation bilaterally.  CARDIOVASCULAR: Regular rate and rhythm, no murmurs.  GASTROINTESTINAL: Non-tender, no guarding.  NEUROLOGIC: Alert, oriented, answers questions appropriately.  INTEGUMENTARY: No jaundice.      LABS:  CBC RESULTS:   Recent Labs   Lab Test  18   0900   WBC  7.0   RBC  4.04   HGB  13.4   HCT  38.2   MCV  95   MCH  33.2*   MCHC  35.1   RDW  12.1   PLT  387     Recent Labs   Lab Test  18   0900  18   0858   NA  135  136   POTASSIUM  3.8  3.4   CHLORIDE  96  99   CO2  32  28   ANIONGAP  7  9   GLC  85  98   BUN  17  16   CR  0.77  0.60   FADI  9.4  8.8     Lab Results   Component Value Date    AST 26 2018     Lab Results   Component Value Date    ALT 38 2018     No results found for: BILICONJ   Lab Results   Component Value Date    BILITOTAL 0.4 2018     Lab Results   Component Value Date    ALBUMIN 4.4 2018     Lab Results   Component Value Date    PROTTOTAL 8.2 2018      Lab Results   Component Value Date    " ALKPHOS 39 09/24/2018     Component      Latest Ref Rng & Units 11/16/2017 12/8/2017 3/12/2018 9/24/2018   Chromogranin A      0 - 95 ng/mL 339 (H) 385 (H) 597 (H) 602 (H)       IMAGING:  CT chest 9/24/18:  1. Stable small right-sided pulmonary nodules since 2017 indicating a  nonaggressive etiology.  2. No new abnormality.  3. Fatty liver.  4. Mild coronary artery calcification.    MRI abdomen 9/24/18:  1. Stable exam without convincing evidence for new disease in the  abdomen.  2. Fatty liver.      ASSESSMENT/PLAN:  Connie Brunson is a 57 year old female with:    1) Neuroendocrine tumor of the ileum: s/p resection on 11/7/17, 1.5 cm, grade 2, well-differentiated, T3N1 (stage IIIB).      Recent MRI abdomen and CT scan reviewed with Connie.  MRI abdomen showed stable exam without convincing evidence for new disease in the abdomen.  CT chest shows no new abnormality.  Stable pulmonary nodules since 2017.    Chromogranin A has remained abnormal, trended up slightly from 6 months ago.  Her symptoms remain the same.  Her imaging remains clear.  Since her imaging is negative, we will continue to observe for now.  This is quite an indolent tumor, and may just take more time to present itself.      -she will call if she gets worsening diarrhea or flushing symptoms or pain and can get labs/scans done earlier.    -CT chest and MRI abdomen in 6 months  -labs in April 2019: CBC, CMP, chromogranin A  -RTC in April 2019 (she will be on vacation in [place] in March)    2) HTN, HLD  -follow-up with PCP    3) History of meningioma: diagnosed in 1999, was getting yearly MRI's with neurosurgery, but was told she does not need surveillance MRI's anymore unless she has new neurologic symptoms.    4) Pulmonary nodule: There is an indeterminate 2 x 3 mm anterior right mid-lung nodule seen on CT.  She does have history of smoking.  Recent CT chest shows that the tiny nodule is stable.  It has been stable since 2017.  -monitor on  scans      I spent a total of 25 minutes with the patient, with over >50% of the time in counseling and/or coordination of care.       Mary Weir MD  Hematology/Oncology  [facility]      Again, thank you for allowing me to participate in the care of your patient.        Sincerely,        Mary Weir MD

## 2018-09-27 NOTE — PATIENT INSTRUCTIONS
-schedule CT chest, MRI abdomen, and labs in April 2019, a few days prior to the next appointment  -return to clinic with  Dr. Weir on April 2018]9  -patient had to leave, please call her in March 2019 to schedule

## 2018-09-27 NOTE — MR AVS SNAPSHOT
After Visit Summary   9/27/2018    Connie Brunson    MRN: [id number]           Patient Information     Date Of Birth          1960        Visit Information        Provider Department      9/27/2018 2:15 PM Mary Weir MD [facility] Oncology [facility]      Today's Diagnoses     Primary malignant neuroendocrine neoplasm of ileum (H)    -  1      Care Instructions    -schedule CT chest, MRI abdomen, and labs in April 2019, a few days prior to the next appointment  -return to clinic with  Dr. Weir on April 2018]9  -patient had to leave, please call her in March 2019 to schedule          Follow-ups after your visit        Future tests that were ordered for you today     Open Future Orders        Priority Expected Expires Ordered    CBC with platelets differential Routine 4/27/2019 9/27/2019 9/27/2018    Comprehensive metabolic panel Routine 4/27/2019 9/27/2019 9/27/2018    Chromogranin A Routine 4/27/2019 9/27/2019 9/27/2018    MR Abdomen w/o & w Contrast Routine 4/27/2019 9/27/2019 9/27/2018    CT Chest w/o Contrast Routine 4/27/2019 9/27/2019 9/27/2018            Who to contact     If you have questions or need follow up information about today's clinic visit or your schedule please contact [facility] directly at [phone].  Normal or non-critical lab and imaging results will be communicated to you by MyChart, letter or phone within 4 business days after the clinic has received the results. If you do not hear from us within 7 days, please contact the clinic through MyChart or phone. If you have a critical or abnormal lab result, we will notify you by phone as soon as possible.  Submit refill requests through WinProbe or call your pharmacy and they will forward the refill request to us. Please allow 3 business days for your refill to be completed.          Additional Information About Your Visit        MyChart Information     PopUpsterst  "gives you secure access to your electronic health record. If you see a primary care provider, you can also send messages to your care team and make appointments. If you have questions, please call your primary care clinic.  If you do not have a primary care provider, please call [phone] and they will assist you.        Care EveryWhere ID     This is your Care EveryWhere ID. This could be used by other organizations to access your [place] medical records  [id number]        Your Vitals Were     Pulse Temperature Respirations Height Last Period Pulse Oximetry    87 97.8  F (36.6  C) (Tympanic) 16 1.499 m (4' 11\") 09/05/2000 94%    BMI (Body Mass Index)                   32.28 kg/m2            Blood Pressure from Last 3 Encounters:   09/27/18 142/87   09/07/18 120/80   07/02/18 120/82    Weight from Last 3 Encounters:   09/27/18 72.5 kg (159 lb 12.8 oz)   09/07/18 72.1 kg (159 lb)   03/29/18 70.8 kg (156 lb)               Primary Care Provider Office Phone # Fax #    Gayatri Stephens [phone] [address] [address]        Equal Access to Services     IVA MCKENZIE AH: Hadii narayan ku hadasho Soomaali, waaxda luqadaha, qaybta kaalmada adeegyada, waxay idiin haysinann jeremiah levy ladwight . So [facility] [phone].    ATENCIÓN: Si habla español, tiene a ceron disposición servicios gratuitos de asistencia lingüística. ame al [phone].    We comply with applicable federal civil rights laws and [place] laws. We do not discriminate on the basis of race, color, national origin, age, disability, sex, sexual orientation, or gender identity.            Thank you!     Thank you for choosing [facility]  for your care. Our goal is always to provide you with excellent care. Hearing back from our patients is one way we can continue to improve our services. Please take a few minutes to complete the written survey that you may receive in the mail after your " visit with us. Thank you!             Your Updated Medication List - Protect others around you: Learn how to safely use, store and throw away your medicines at www.disposemymeds.org.          This list is accurate as of 9/27/18  2:48 PM.  Always use your most recent med list.                   Brand Name Dispense Instructions for use Diagnosis    cetirizine 10 MG tablet    zyrTEC     Take 10 mg by mouth every evening        diazepam 10 MG tablet    VALIUM    4 tablet    Take 1-2  tab or 10-20 mg 30-60 minutes prior to MRI scan.    Primary malignant neuroendocrine neoplasm of ileum (H)       fenofibrate 160 MG tablet     90 tablet    TAKE ONE TABLET BY MOUTH EVERY DAY    Hypertriglyceridemia       fluticasone 50 MCG/ACT spray    FLONASE    48 g    Spray 2 sprays into both nostrils daily    Other seasonal allergic rhinitis       hydrocortisone 2.5 % cream     30 g    Apply sparingly to dermatitis left lower eye    Seborrheic dermatitis       lisinopril 10 MG tablet    PRINIVIL/ZESTRIL    90 tablet    TAKE ONE TABLET BY MOUTH EVERY DAY    Essential hypertension with goal blood pressure less than 140/90       LORazepam 0.5 MG tablet    ATIVAN    10 tablet    Take 0.5-1 tablets (0.25-0.5 mg) by mouth every 8 hours as needed for anxiety    Adjustment disorder with anxious mood       mometasone 0.1 % cream    ELOCON    15 g    Apply sparingly to affected area twice daily for 14 days.  Do not apply to face.    Dermatitis       omeprazole 20 MG CR capsule    priLOSEC    90 capsule    Take 1 capsule (20 mg) by mouth daily    Gastroesophageal reflux disease without esophagitis       * triamcinolone 0.5 % cream    KENALOG     Apply topically 2 times daily as needed for irritation (to eczema or psoriatic lesions - Never use on face)        * triamcinolone 0.5 % cream    KENALOG    60 g    Apply sparingly to affected area three times daily.    Bug bite, initial encounter       triamterene-hydrochlorothiazide 37.5-25 MG per  capsule    DYAZIDE    90 capsule    TAKE ONE CAPSULE BY MOUTH EVERY DAY    Essential hypertension with goal blood pressure less than 140/90       venlafaxine 37.5 MG tablet    EFFEXOR    90 tablet    TAKE ONE TABLET BY MOUTH ONCE DAILY    Anxiety       zolpidem 5 MG tablet    AMBIEN    30 tablet    Take 1 tablet (5 mg) by mouth nightly as needed for sleep    Primary insomnia       * Notice:  This list has 2 medication(s) that are the same as other medications prescribed for you. Read the directions carefully, and ask your doctor or other care provider to review them with you.

## 2018-10-18 ENCOUNTER — MYC MEDICAL ADVICE (OUTPATIENT)
Dept: FAMILY MEDICINE | Facility: CLINIC | Age: 58
End: 2018-10-18

## 2018-10-18 NOTE — TELEPHONE ENCOUNTER
Routing to PCP for further review/recommendations/orders.  OK for letter?    Vero Jones RN  [facility]

## 2018-10-18 NOTE — LETTER
[address] [address]  [phone]          2018    Re: Connie Brunson                                                                                                                     [address] [facility] [address]    : 1960       To whom it may concern,     Please allow Connie Brunson to have a sit/stand convertible desk at work as a medical necessity.  She is having some chronic problems with back pain and alternating between sitting and standing while at work will help her back pain considerably.          Sincerely,        Gayatri Stephens MD

## 2018-10-19 NOTE — TELEPHONE ENCOUNTER
Done. at [facility] unit coordinator's in basket /file box.   Please inform patient.

## 2018-10-21 DIAGNOSIS — I10 ESSENTIAL HYPERTENSION WITH GOAL BLOOD PRESSURE LESS THAN 140/90: ICD-10-CM

## 2018-10-21 DIAGNOSIS — F41.9 ANXIETY: ICD-10-CM

## 2018-10-22 RX ORDER — LISINOPRIL 10 MG/1
TABLET ORAL
Qty: 30 TABLET | Refills: 0 | Status: SHIPPED | OUTPATIENT
Start: 2018-10-22 | End: 2018-11-18

## 2018-10-22 RX ORDER — VENLAFAXINE 37.5 MG/1
TABLET ORAL
Qty: 30 TABLET | Refills: 0 | Status: SHIPPED | OUTPATIENT
Start: 2018-10-22 | End: 2018-11-18

## 2018-10-22 NOTE — TELEPHONE ENCOUNTER
"Requested Prescriptions   Pending Prescriptions Disp Refills     venlafaxine (EFFEXOR) 37.5 MG tablet [Pharmacy Med Name: VENLAFAXINE HCL 37.5MG TABS] 90 tablet 1    Last Written Prescription Date:  4.5.18  Last Fill Quantity: 90,  # refills: 1   Last Office Visit: 9/7/2018   Future Office Visit:      Sig: TAKE ONE TABLET BY MOUTH ONCE DAILY    Serotonin-Norepinephrine Reuptake Inhibitors  Failed    10/21/2018  2:07 PM       Failed - Blood pressure under 140/90 in past 12 months    BP Readings from Last 3 Encounters:   09/27/18 142/87   09/07/18 120/80   07/02/18 120/82                Passed - Recent (12 mo) or future (30 days) visit within the authorizing provider's specialty    Patient had office visit in the last 12 months or has a visit in the next 30 days with authorizing provider or within the authorizing provider's specialty.  See \"Patient Info\" tab in inbasket, or \"Choose Columns\" in Meds & Orders section of the refill encounter.             Passed - Patient is age 18 or older       Passed - No active pregnancy on record       Passed - Normal serum creatinine on file in past 12 months    Recent Labs   Lab Test  09/24/18   0900   CR  0.77            Passed - No positive pregnancy test in past 12 months        lisinopril (PRINIVIL/ZESTRIL) 10 MG tablet [Pharmacy Med Name: LISINOPRIL 10MG TABS] 90 tablet 1    Last Written Prescription Date:  5.1.18  Last Fill Quantity: 90,  # refills: 1   Last Office Visit: 9/7/2018   Future Office Visit:      Sig: TAKE ONE TABLET BY MOUTH EVERY DAY    ACE Inhibitors (Including Combos) Protocol Failed    10/21/2018  2:07 PM       Failed - Blood pressure under 140/90 in past 12 months    BP Readings from Last 3 Encounters:   09/27/18 142/87   09/07/18 120/80   07/02/18 120/82                Passed - Recent (12 mo) or future (30 days) visit within the authorizing provider's specialty    Patient had office visit in the last 12 months or has a visit in the next 30 days with " "authorizing provider or within the authorizing provider's specialty.  See \"Patient Info\" tab in inbasket, or \"Choose Columns\" in Meds & Orders section of the refill encounter.             Passed - Patient is age 18 or older       Passed - No active pregnancy on record       Passed - Normal serum creatinine on file in past 12 months    Recent Labs   Lab Test  09/24/18   0900   CR  0.77            Passed - Normal serum potassium on file in past 12 months    Recent Labs   Lab Test  09/24/18   0900   POTASSIUM  3.8            Passed - No positive pregnancy test in past 12 months          "

## 2018-10-22 NOTE — TELEPHONE ENCOUNTER
Due for an Office visit for further refills, only fill for 30 days     Samantha Escoto RN, BSN  [facility]

## 2018-10-22 NOTE — TELEPHONE ENCOUNTER
Letter placed at .  Will leave encounter open to document .    COCO Gregory, RN, PHN  [facility]) [phone]

## 2018-11-18 DIAGNOSIS — E78.5 HYPERLIPIDEMIA LDL GOAL <130: Primary | ICD-10-CM

## 2018-11-18 DIAGNOSIS — F41.9 ANXIETY: ICD-10-CM

## 2018-11-18 DIAGNOSIS — I10 ESSENTIAL HYPERTENSION WITH GOAL BLOOD PRESSURE LESS THAN 140/90: ICD-10-CM

## 2018-11-19 NOTE — TELEPHONE ENCOUNTER
"Requested Prescriptions   Pending Prescriptions Disp Refills     lisinopril (PRINIVIL/ZESTRIL) 10 MG tablet [Pharmacy Med Name: LISINOPRIL 10MG TABS] 30 tablet 0     Sig: TAKE ONE TABLET BY MOUTH ONCE DAILY    ACE Inhibitors (Including Combos) Protocol Failed    11/19/2018 10:04 AM       Failed - Blood pressure under 140/90 in past 12 months    BP Readings from Last 3 Encounters:   09/27/18 142/87   09/07/18 120/80   07/02/18 120/82                Passed - Recent (12 mo) or future (30 days) visit within the authorizing provider's specialty    Patient had office visit in the last 12 months or has a visit in the next 30 days with authorizing provider or within the authorizing provider's specialty.  See \"Patient Info\" tab in inbasket, or \"Choose Columns\" in Meds & Orders section of the refill encounter.             Passed - Patient is age 18 or older       Passed - No active pregnancy on record       Passed - Normal serum creatinine on file in past 12 months    Recent Labs   Lab Test  09/24/18   0900   CR  0.77            Passed - Normal serum potassium on file in past 12 months    Recent Labs   Lab Test  09/24/18   0900   POTASSIUM  3.8            Passed - No positive pregnancy test in past 12 months        venlafaxine (EFFEXOR) 37.5 MG tablet [Pharmacy Med Name: VENLAFAXINE HCL 37.5MG TABS] 30 tablet 0     Sig: TAKE ONE TABLET BY MOUTH ONCE DAILY    Serotonin-Norepinephrine Reuptake Inhibitors  Failed    11/19/2018 10:04 AM       Failed - Blood pressure under 140/90 in past 12 months    BP Readings from Last 3 Encounters:   09/27/18 142/87   09/07/18 120/80   07/02/18 120/82                Passed - Recent (12 mo) or future (30 days) visit within the authorizing provider's specialty    Patient had office visit in the last 12 months or has a visit in the next 30 days with authorizing provider or within the authorizing provider's specialty.  See \"Patient Info\" tab in inbasket, or \"Choose Columns\" in Meds & Orders " section of the refill encounter.             Passed - Patient is age 18 or older       Passed - No active pregnancy on record       Passed - Normal serum creatinine on file in past 12 months    Recent Labs   Lab Test  09/24/18   0900   CR  0.77            Passed - No positive pregnancy test in past 12 months        Pt did not follow up per last refill notes. Sent iCharts alerting pt.   Annel Garay RN  [facility]

## 2018-11-23 RX ORDER — LISINOPRIL 10 MG/1
TABLET ORAL
Qty: 30 TABLET | Refills: 0 | Status: SHIPPED | OUTPATIENT
Start: 2018-11-23 | End: 2018-11-30

## 2018-11-23 RX ORDER — VENLAFAXINE 37.5 MG/1
TABLET ORAL
Qty: 30 TABLET | Refills: 0 | Status: SHIPPED | OUTPATIENT
Start: 2018-11-23 | End: 2018-11-30

## 2018-11-23 NOTE — TELEPHONE ENCOUNTER
Pt did not read mychart yet from 11/19/18    Pt noted they have to take the Effexor and they cannot go off of it, due to s/e of dizziness, etc. Pt due for lipid panel per last lab notes.    Scheduled pt for 11/30/18 for [facility] med check appt. Fasting lipid as well. Gave one refill.     The patient indicates understanding of these issues and agrees with the plan.  Annel Garay RN  [facility]

## 2018-11-30 ENCOUNTER — OFFICE VISIT (OUTPATIENT)
Dept: FAMILY MEDICINE | Facility: CLINIC | Age: 58
End: 2018-11-30
Payer: COMMERCIAL

## 2018-11-30 VITALS
DIASTOLIC BLOOD PRESSURE: 88 MMHG | WEIGHT: 157 LBS | HEIGHT: 59 IN | SYSTOLIC BLOOD PRESSURE: 128 MMHG | BODY MASS INDEX: 31.65 KG/M2 | OXYGEN SATURATION: 97 % | TEMPERATURE: 98.1 F | HEART RATE: 72 BPM

## 2018-11-30 DIAGNOSIS — E78.1 HYPERTRIGLYCERIDEMIA: ICD-10-CM

## 2018-11-30 DIAGNOSIS — I10 ESSENTIAL HYPERTENSION WITH GOAL BLOOD PRESSURE LESS THAN 140/90: ICD-10-CM

## 2018-11-30 DIAGNOSIS — F41.9 ANXIETY: ICD-10-CM

## 2018-11-30 DIAGNOSIS — Z11.4 SCREENING FOR HIV (HUMAN IMMUNODEFICIENCY VIRUS): ICD-10-CM

## 2018-11-30 DIAGNOSIS — Z12.11 SCREEN FOR COLON CANCER: ICD-10-CM

## 2018-11-30 DIAGNOSIS — J30.2 OTHER SEASONAL ALLERGIC RHINITIS: ICD-10-CM

## 2018-11-30 LAB — HIV 1+2 AB+HIV1 P24 AG SERPL QL IA: NONREACTIVE

## 2018-11-30 PROCEDURE — 87389 HIV-1 AG W/HIV-1&-2 AB AG IA: CPT | Performed by: PHYSICIAN ASSISTANT

## 2018-11-30 PROCEDURE — 99214 OFFICE O/P EST MOD 30 MIN: CPT | Performed by: PHYSICIAN ASSISTANT

## 2018-11-30 PROCEDURE — 36415 COLL VENOUS BLD VENIPUNCTURE: CPT | Performed by: PHYSICIAN ASSISTANT

## 2018-11-30 RX ORDER — VENLAFAXINE 37.5 MG/1
37.5 TABLET ORAL DAILY
Qty: 90 TABLET | Refills: 1 | Status: SHIPPED | OUTPATIENT
Start: 2018-11-30 | End: 2019-07-01

## 2018-11-30 RX ORDER — FLUTICASONE PROPIONATE 50 MCG
2 SPRAY, SUSPENSION (ML) NASAL DAILY
Qty: 48 G | Refills: 5 | Status: SHIPPED | OUTPATIENT
Start: 2018-11-30 | End: 2020-06-09

## 2018-11-30 RX ORDER — TRIAMTERENE AND HYDROCHLOROTHIAZIDE 37.5; 25 MG/1; MG/1
1 CAPSULE ORAL DAILY
Qty: 90 CAPSULE | Refills: 1 | Status: SHIPPED | OUTPATIENT
Start: 2018-11-30 | End: 2019-08-10

## 2018-11-30 RX ORDER — FENOFIBRATE 160 MG/1
160 TABLET ORAL DAILY
Qty: 90 TABLET | Refills: 1 | Status: SHIPPED | OUTPATIENT
Start: 2018-11-30 | End: 2019-07-13

## 2018-11-30 RX ORDER — LISINOPRIL 10 MG/1
10 TABLET ORAL DAILY
Qty: 90 TABLET | Refills: 1 | Status: SHIPPED | OUTPATIENT
Start: 2018-11-30 | End: 2019-05-31

## 2018-11-30 ASSESSMENT — ANXIETY QUESTIONNAIRES
2. NOT BEING ABLE TO STOP OR CONTROL WORRYING: NOT AT ALL
5. BEING SO RESTLESS THAT IT IS HARD TO SIT STILL: NOT AT ALL
GAD7 TOTAL SCORE: 0
1. FEELING NERVOUS, ANXIOUS, OR ON EDGE: NOT AT ALL
7. FEELING AFRAID AS IF SOMETHING AWFUL MIGHT HAPPEN: NOT AT ALL
3. WORRYING TOO MUCH ABOUT DIFFERENT THINGS: NOT AT ALL
6. BECOMING EASILY ANNOYED OR IRRITABLE: NOT AT ALL

## 2018-11-30 ASSESSMENT — PATIENT HEALTH QUESTIONNAIRE - PHQ9
5. POOR APPETITE OR OVEREATING: NOT AT ALL
SUM OF ALL RESPONSES TO PHQ QUESTIONS 1-9: 0

## 2018-11-30 NOTE — MR AVS SNAPSHOT
After Visit Summary   11/30/2018    Connie Brunson    MRN: [id number]           Patient Information     Date Of Birth          1960        Visit Information        Provider Department      11/30/2018 8:20 AM Mag Baker PA-C [facility]        Today's Diagnoses     Hypertriglyceridemia        Essential hypertension with goal blood pressure less than 140/90- not well controlled today        Other seasonal allergic rhinitis        Anxiety        Screen for colon cancer        Screening for HIV (human immunodeficiency virus)           Follow-ups after your visit        Follow-up notes from your care team     Return in about 6 months (around 5/30/2019) for medication re-check, please be seen sooner if needed.      Future tests that were ordered for you today     Open Future Orders        Priority Expected Expires Ordered    Fecal colorectal cancer screen FIT - Future (S+30) Routine 12/21/2018 12/30/2018 11/30/2018            Who to contact     If you have questions or need follow up information about today's clinic visit or your schedule please contact [facility] directly at [phone].  Normal or non-critical lab and imaging results will be communicated to you by Intertwinehart, letter or phone within 4 business days after the clinic has received the results. If you do not hear from us within 7 days, please contact the clinic through Tribogenicst or phone. If you have a critical or abnormal lab result, we will notify you by phone as soon as possible.  Submit refill requests through Mismi or call your pharmacy and they will forward the refill request to us. Please allow 3 business days for your refill to be completed.          Additional Information About Your Visit        Intertwinehart Information     Mismi gives you secure access to your electronic health record. If you see a primary care provider, you can also send messages to your care team and make appointments. If  "you have questions, please call your primary care clinic.  If you do not have a primary care provider, please call [phone] and they will assist you.        Care EveryWhere ID     This is your Care EveryWhere ID. This could be used by other organizations to access your [place] medical records  [id number]        Your Vitals Were     Pulse Temperature Height Last Period Pulse Oximetry Breastfeeding?    72 98.1  F (36.7  C) (Oral) 4' 11\" (1.499 m) 09/05/2000 97% No    BMI (Body Mass Index)                   31.71 kg/m2            Blood Pressure from Last 3 Encounters:   11/30/18 128/88   09/27/18 142/87   09/07/18 120/80    Weight from Last 3 Encounters:   11/30/18 157 lb (71.2 kg)   09/27/18 159 lb 12.8 oz (72.5 kg)   09/07/18 159 lb (72.1 kg)              We Performed the Following     HIV Screening          Today's Medication Changes          These changes are accurate as of 11/30/18  8:56 AM.  If you have any questions, ask your nurse or doctor.               These medicines have changed or have updated prescriptions.        Dose/Directions    fenofibrate 160 MG tablet   Commonly known as:  TRIGLIDE/LOFIBRA   This may have changed:  See the new instructions.   Used for:  Hypertriglyceridemia        Dose:  160 mg   Take 1 tablet (160 mg) by mouth daily   Quantity:  90 tablet   Refills:  1       lisinopril 10 MG tablet   Commonly known as:  PRINIVIL/ZESTRIL   This may have changed:  See the new instructions.   Used for:  Essential hypertension with goal blood pressure less than 140/90        Dose:  10 mg   Take 1 tablet (10 mg) by mouth daily   Quantity:  90 tablet   Refills:  1       triamterene-HCTZ 37.5-25 MG capsule   Commonly known as:  DYAZIDE   This may have changed:  See the new instructions.   Used for:  Essential hypertension with goal blood pressure less than 140/90        Dose:  1 capsule   Take 1 capsule by mouth daily   Quantity:  90 capsule   Refills:  1       venlafaxine 37.5 MG tablet "   Commonly known as:  EFFEXOR   This may have changed:  See the new instructions.   Used for:  Anxiety        Dose:  37.5 mg   Take 1 tablet (37.5 mg) by mouth daily   Quantity:  90 tablet   Refills:  1            Where to get your medicines      These medications were sent to [facility] - [place], [place] - [address]  [address], [facility] [address]     Phone:  [phone]     fenofibrate 160 MG tablet    fluticasone 50 MCG/ACT nasal spray    lisinopril 10 MG tablet    triamterene-HCTZ 37.5-25 MG capsule    venlafaxine 37.5 MG tablet                Primary Care Provider Office Phone # Fax #    Gayatri Stephens [phone]       [address] [place] [address]        Equal Access to Services     IVA MCKENZIE : Hadii aad ku hadasho Soomaali, waaxda luqadaha, qaybta kaalmada adeegyada, nithya pires . So [facility] [phone].    ATENCIÓN: Si habla español, tiene a ceron disposición servicios gratuitos de asistencia lingüística. [facility] [phone].    We comply with applicable federal civil rights laws and [place] laws. We do not discriminate on the basis of race, color, national origin, age, disability, sex, sexual orientation, or gender identity.            Thank you!     Thank you for choosing [facility]  for your care. Our goal is always to provide you with excellent care. Hearing back from our patients is one way we can continue to improve our services. Please take a few minutes to complete the written survey that you may receive in the mail after your visit with us. Thank you!             Your Updated Medication List - Protect others around you: Learn how to safely use, store and throw away your medicines at www.disposemymeds.org.          This list is accurate as of 11/30/18  8:56 AM.  Always use your most recent med list.                   Brand Name Dispense Instructions for use  Diagnosis    cetirizine 10 MG tablet    zyrTEC     Take 10 mg by mouth every evening        diazepam 10 MG tablet    VALIUM    4 tablet    Take 1-2  tab or 10-20 mg 30-60 minutes prior to MRI scan.    Primary malignant neuroendocrine neoplasm of ileum (H)       fenofibrate 160 MG tablet    TRIGLIDE/LOFIBRA    90 tablet    Take 1 tablet (160 mg) by mouth daily    Hypertriglyceridemia       fluticasone 50 MCG/ACT nasal spray    FLONASE    48 g    Spray 2 sprays into both nostrils daily    Other seasonal allergic rhinitis       hydrocortisone 2.5 % cream     30 g    Apply sparingly to dermatitis left lower eye    Seborrheic dermatitis       lisinopril 10 MG tablet    PRINIVIL/ZESTRIL    90 tablet    Take 1 tablet (10 mg) by mouth daily    Essential hypertension with goal blood pressure less than 140/90       mometasone 0.1 % external cream    ELOCON    15 g    Apply sparingly to affected area twice daily for 14 days.  Do not apply to face.    Dermatitis       omeprazole 20 MG DR capsule    priLOSEC    90 capsule    Take 1 capsule (20 mg) by mouth daily    Gastroesophageal reflux disease without esophagitis       triamcinolone 0.5 % external cream    KENALOG    60 g    Apply sparingly to affected area three times daily.    Bug bite, initial encounter       triamterene-HCTZ 37.5-25 MG capsule    DYAZIDE    90 capsule    Take 1 capsule by mouth daily    Essential hypertension with goal blood pressure less than 140/90       venlafaxine 37.5 MG tablet    EFFEXOR    90 tablet    Take 1 tablet (37.5 mg) by mouth daily    Anxiety       zolpidem 5 MG tablet    AMBIEN    30 tablet    Take 1 tablet (5 mg) by mouth nightly as needed for sleep    Primary insomnia

## 2018-11-30 NOTE — PROGRESS NOTES
SUBJECTIVE:                                                    Connie Brunson is a 58 year old female who presents to clinic today for the following health issues:      Hypertension Follow-up      Outpatient blood pressures are not being checked.    Low Salt Diet: not monitoring salt    Depression and Anxiety Follow-Up    Status since last visit: Stable    Other associated symptoms: None    Complicating factors:     Significant life event: No     Current substance abuse: None    PHQ 11/3/2017 9/7/2018 11/30/2018   PHQ-9 Total Score 0 3 0   Q9: Suicide Ideation Not at all Not at all Not at all     NAHOMY-7 SCORE 11/3/2017 9/7/2018 11/30/2018   Total Score - - -   Total Score - - -   Total Score 0 3 0     In the past two weeks have you had thoughts of suicide or self-harm?  No.    Do you have concerns about your personal safety or the safety of others?   No  PHQ-9  English  PHQ-9   Any Language  NAHOMY-7  Suicide Assessment Five-step Evaluation and Treatment (SAFE-T)    Amount of exercise or physical activity: No routine    Problems taking medications regularly: No    Medication side effects: none    Diet: regular (no restrictions)    Patient is here for a medication check.  She does not have concerns today.  She is taking her medications as prescribed.  She states that she has continued the Effexor because coming off of it has been too difficult with side effects.  She says that she has tried several ways to come off of the Effexor, but was not successful.  She does not have thoughts of wanting to hurt herself or others.       Problem list and histories reviewed & adjusted, as indicated.  Additional history: as documented      ROS:  Constitutional, HEENT, cardiovascular, pulmonary, GI, , musculoskeletal, neuro, skin, endocrine and psych systems are negative, except as otherwise noted.    OBJECTIVE:                                                    /88 (BP Location: Left arm, Patient Position: Chair, Cuff Size:  "Adult Large)  Pulse 72  Temp 98.1  F (36.7  C) (Oral)  Ht 4' 11\" (1.499 m)  Wt 157 lb (71.2 kg)  LMP 09/05/2000  SpO2 97%  Breastfeeding? No  BMI 31.71 kg/m2  Body mass index is 31.71 kg/(m^2).  GENERAL: healthy, alert and no distress  EYES: Eyes grossly normal to inspection, PERRL and conjunctivae and sclerae normal  RESP: lungs clear to auscultation - no rales, rhonchi or wheezes  CV: regular rate and rhythm, normal S1 S2, no S3 or S4, no murmur, click or rub, no peripheral edema and peripheral pulses strong  MS: no gross musculoskeletal defects noted, no edema  NEURO: Normal strength and tone, mentation intact and speech normal  PSYCH: mentation appears normal, affect normal/bright    Diagnostic Test Results:  Labs - pending     ASSESSMENT/PLAN:                                                      Connie was seen today for recheck medication.    Diagnoses and all orders for this visit:    Hypertriglyceridemia  -     fenofibrate (TRIGLIDE/LOFIBRA) 160 MG tablet; Take 1 tablet (160 mg) by mouth daily    Essential hypertension with goal blood pressure less than 140/90- not well controlled today  -     lisinopril (PRINIVIL/ZESTRIL) 10 MG tablet; Take 1 tablet (10 mg) by mouth daily  -     triamterene-HCTZ (DYAZIDE) 37.5-25 MG capsule; Take 1 capsule by mouth daily    Other seasonal allergic rhinitis  -     fluticasone (FLONASE) 50 MCG/ACT nasal spray; Spray 2 sprays into both nostrils daily    Anxiety  -     venlafaxine (EFFEXOR) 37.5 MG tablet; Take 1 tablet (37.5 mg) by mouth daily    Screen for colon cancer  -     Fecal colorectal cancer screen FIT - Future (S+30); Future    Screening for HIV (human immunodeficiency virus)  -     HIV Screening    Other orders  -     Cancel: Lipid panel reflex to direct LDL Fasting; Future      Followup: Return in about 6 months (around 5/30/2019) for medication re-check, please be seen sooner if needed.     - I have discussed the patient's diagnosis, and my plan of treatment " with the patient and/or family. Patient is aware to followup if symptoms do not improve.  Patient has been advised to be seen sooner or seek more immediate care if symptoms change or worsen.  Patient agrees with and understands the plan today.     See Patient Instructions        Mag Baker PA-C    [facility] [place]

## 2018-12-01 ASSESSMENT — ANXIETY QUESTIONNAIRES: GAD7 TOTAL SCORE: 0

## 2018-12-02 NOTE — PROGRESS NOTES
Kirt Rosales ,    The results from your recent lab work are within normal limits.    -HIV test is normal.      Thank you for choosing [facility] for your health care needs,      Mag Baker PA-C

## 2018-12-14 ENCOUNTER — MYC MEDICAL ADVICE (OUTPATIENT)
Dept: FAMILY MEDICINE | Facility: CLINIC | Age: 58
End: 2018-12-14

## 2018-12-14 NOTE — TELEPHONE ENCOUNTER
Mychart note sent to patient.    Latoya Selby, BS, RN, N  [facility]) [phone]

## 2019-01-14 DIAGNOSIS — E78.1 HYPERTRIGLYCERIDEMIA: ICD-10-CM

## 2019-01-14 NOTE — TELEPHONE ENCOUNTER
"Requested Prescriptions   Pending Prescriptions Disp Refills     fenofibrate (TRIGLIDE/LOFIBRA) 160 MG tablet [Pharmacy Med Name: FENOFIBRATE 160MG TABS] 45 tablet 0      Last Written Prescription Date:  11.30.18  Last Fill Quantity: 90,  # refills: 1   Last Office Visit: 11/30/2018   Future Office Visit:      Sig: TAKE ONE TABLET BY MOUTH EVERY DAY    Fibrates Passed - 1/14/2019 12:53 PM       Passed - Lipid panel on file in past 12 months    Recent Labs   Lab Test 05/23/18  0743  05/14/14  0753   CHOL 224*   < > 246*   TRIG 200*   < > 424*   HDL 52   < > 40*   *   < > Cannot estimate LDL when triglyceride exceeds 400 mg/dL  132*   NHDL 172*   < >  --    VLDL  --   --  Cannot estimate VLDL when triglyceride exceeds 400 mg/dL.   CHOLHDLRATIO  --   --  6.2*    < > = values in this interval not displayed.              Passed - No abnormal creatine kinase in past 12 months    Recent Labs   Lab Test 05/23/18  0743   CKT 74               Passed - Recent (12 mo) or future (30 days) visit within the authorizing provider's specialty    Patient had office visit in the last 12 months or has a visit in the next 30 days with authorizing provider or within the authorizing provider's specialty.  See \"Patient Info\" tab in inbasket, or \"Choose Columns\" in Meds & Orders section of the refill encounter.             Passed - Medication is active on med list       Passed - Patient is age 18 or older       Passed - No active pregnancy on record       Passed - No positive pregnancy test in past 12 months          "

## 2019-01-16 RX ORDER — FENOFIBRATE 160 MG/1
TABLET ORAL
Qty: 45 TABLET | Refills: 0 | Status: SHIPPED | OUTPATIENT
Start: 2019-01-16 | End: 2019-12-09

## 2019-01-16 NOTE — TELEPHONE ENCOUNTER
See the note from last OV, follow up 5/2019.     Medication is being filled for 1 time refill only due to:  Patient needs to be seen because see LOV notes..   Annel Garay RN  [facility]

## 2019-02-25 NOTE — TELEPHONE ENCOUNTER
Prescription approved per [facility] Refill Protocol.    Latoya Selby, COCO, RN, PHN  [facility]       Medical Necessity Information: It is in your best interest to select a reason for this procedure from the list below. All of these items fulfill various CMS LCD requirements except lesion extends to a margin.

## 2019-03-25 DIAGNOSIS — F51.01 PRIMARY INSOMNIA: ICD-10-CM

## 2019-03-25 NOTE — TELEPHONE ENCOUNTER
CSA 3/7/2018 every 6 months     Routing refill request to provider for review/approval because:  Drug not on the [facility] refill protocol     Samantha Escoto RN, BSN  [facility]

## 2019-03-25 NOTE — TELEPHONE ENCOUNTER
Health Maintenance Summary     Topic Due On Due Status Completed On Postpone Until Reason    Colorectal Cancer Screening - Colonoscopy Oct 2, 2019 Not Due Oct 2, 2009      Immunization - Td/Tdap Dec 18, 2023 Not Due Dec 18, 2013      Immunization-Influenza Sep 1, 2016 Postponed  Jan 31, 2017 Patient Refused          Patient is due for topics as listed above, he wishes to decline at this time .     zolpidem (AMBIEN) 5 MG tablet        Last Written Prescription Date:  9.20.18  Last Fill Quantity: 30 TABLET,   # refills: 5  Last Office Visit: 3.7.18  Future Office visit:       Routing refill request to provider for review/approval because:  Drug not on the [facility], [facility] or [facility] refill protocol or controlled substance

## 2019-03-27 RX ORDER — ZOLPIDEM TARTRATE 5 MG/1
5 TABLET ORAL
Qty: 30 TABLET | Refills: 1 | Status: SHIPPED | OUTPATIENT
Start: 2019-03-27 | End: 2019-12-09

## 2019-03-27 NOTE — TELEPHONE ENCOUNTER
done x 2 month only. Pt needs recheck office visit before more refills. Please inform pt and  Please assist pt in making appt to be seen. Needs controlled substance agreement  Renewal. rx at [facility], please bring to me later this am when back in clinic and I'll sign.

## 2019-03-28 NOTE — TELEPHONE ENCOUNTER
The patient called back and was notified her 2 month prescription for Zolpidem is at the pharmacy and to schedule a med check. She says she has some scans coming up in April and will call us after that is done to schedule.      Karime Pat

## 2019-03-28 NOTE — TELEPHONE ENCOUNTER
Attempt #1  Called patient @ [phone] - Left a non-detailed message to call back.    If patient calls back, please schedule a MED CHECK within 2 months and close encounter.       Vero Jones RN  [facility]

## 2019-04-05 ENCOUNTER — MYC MEDICAL ADVICE (OUTPATIENT)
Dept: FAMILY MEDICINE | Facility: CLINIC | Age: 59
End: 2019-04-05

## 2019-04-05 DIAGNOSIS — Z79.899 CONTROLLED SUBSTANCE AGREEMENT SIGNED: Primary | ICD-10-CM

## 2019-04-05 DIAGNOSIS — I10 ESSENTIAL HYPERTENSION WITH GOAL BLOOD PRESSURE LESS THAN 140/90: ICD-10-CM

## 2019-04-05 DIAGNOSIS — C7A.8 PRIMARY MALIGNANT NEUROENDOCRINE NEOPLASM OF ILEUM (H): ICD-10-CM

## 2019-04-05 NOTE — TELEPHONE ENCOUNTER
Routing to PCP for further review/recommendations/orders.  Any labs you would like drawn?      Outpatient Medication Detail    Disp Refills Start End CALVIN   diazepam (VALIUM) 10 MG tablet 4 tablet 1 9/5/2018  No   Sig: Take 1-2  tab or 10-20 mg 30-60 minutes prior to MRI scan.     Problem List Complete:    Yes    Last Office Visit with [facility] primary care provider: 11/30/2018    Future Office visit:   Next 5 appointments (look out 90 days)    Apr 12, 2019  9:00 AM CDT  Return Visit with  ONCOLOGY NURSE  [facility] ([facility]) [facility]  [address]  [facility] [address]  [phone]   Apr 18, 2019 12:45 PM CDT  Return Visit with Mary Weir MD  [facility] ([facility]) [facility]  [address]  [facility] [address]  [phone]          Controlled substance agreement:   Encounter-Level CSA - 03/07/2018:    Controlled Substance Agreement - Scan on 4/2/2018  9:34 AM: CONTROLLED SUBSTANCE AGREEMENT (below)       Encounter-Level CSA - 04/12/2017:    Controlled Substance Agreement - Scan on 4/18/2017 10:44 AM: CONTROLLED SUBSTANCE AGREEMENT (below)       Encounter-Level CSA - 01/27/2016:    Controlled Substance Agreement - Scan on 1/28/2016 11:59 AM: CONTROLLED SUBSTANCE AGREEMENT 01/27/16 (below)       Encounter-Level CSA - 12/15/2014:    Controlled Substance Agreement - Scan on 12/16/2014  9:00 AM: Controlled Medication Agreement 12/15/14 (below)       Patient-Level CSA:    There are no patient-level csa.        Controlled substance agreement signed- re-signed 3/7/2018 ambien #30 -5mg tabs monthly - refill x5 - ok to see q6 months     Last Urine Drug Screen: No results found for: CDAUT, No results found for: COMDAT,   Cannabinoids (95-cge-4-carboxy-9-THC)   Date Value Ref Range Status   03/07/2018 Not Detected NDET^Not Detected ng/mL Final     Comment:      Cutoff for a negative cannabinoid is 50 ng/mL or less.     Phencyclidine (Phencyclidine)   Date Value Ref Range Status   03/07/2018 Not Detected NDET^Not Detected ng/mL Final     Comment:     Cutoff for a negative PCP is 25 ng/mL or less.     Cocaine (Benzoylecgonine)   Date Value Ref Range Status   03/07/2018 Not Detected NDET^Not Detected ng/mL Final     Comment:     Cutoff for a negative cocaine is 150 ng/ml or less.     Methamphetamine (d-Methamphetamine)   Date Value Ref Range Status   03/07/2018 Not Detected NDET^Not Detected ng/mL Final     Comment:     Cutoff for a negative methamphetamine is 500 ng/ml or less.     Opiates (Morphine)   Date Value Ref Range Status   03/07/2018 Not Detected NDET^Not Detected ng/mL Final     Comment:     Cutoff for a negative opiate is 100 ng/ml or less.     Amphetamine (d-Amphetamine)   Date Value Ref Range Status   03/07/2018 Not Detected NDET^Not Detected ng/mL Final     Comment:     Cutoff for a negative amphetamine is 500 ng/mL or less.     Benzodiazepines (Nordiazepam)   Date Value Ref Range Status   03/07/2018 Not Detected NDET^Not Detected ng/mL Final     Comment:     Cutoff for a negative benzodiazepine is 150 ng/ml or less.     Tricyclic Antidepressants (Desipramine)   Date Value Ref Range Status   03/07/2018 Not Detected NDET^Not Detected ng/mL Final     Comment:     Cutoff for a negative tricyclic antidepressant is 300 ng/ml or less.     Methadone (Methadone)   Date Value Ref Range Status   03/07/2018 Not Detected NDET^Not Detected ng/mL Final     Comment:     Cutoff for a negative methadone is 200 ng/ml or less.     Barbiturates (Butalbital)   Date Value Ref Range Status   03/07/2018 Not Detected NDET^Not Detected ng/mL Final     Comment:     Cutoff for a negative barbituate is 200 ng/ml or less.     Oxycodone (Oxycodone)   Date Value Ref Range Status   03/07/2018 Not Detected NDET^Not Detected ng/mL Final     Comment:     Cutoff for a negative Oxycodone is 100  ng/mL or less.     Propoxyphene (Norpropoxyphene)   Date Value Ref Range Status   03/07/2018 Not Detected NDET^Not Detected ng/mL Final     Comment:     Cutoff for a negative propoxyphene is 300 ng/ml or less     Buprenorphine (Buprenorphine)   Date Value Ref Range Status   03/07/2018 Not Detected NDET^Not Detected ng/mL Final     Comment:     Cutoff for a negative buprenorphine is 10 ng/ml or less        Processing:  Awaiting patient response    [email or web address]    Routing refill request to provider for review/approval because:  Drug not on the [facility] refill protocol         Vero Jones RN  [facility]

## 2019-04-05 NOTE — TELEPHONE ENCOUNTER
Next 5 appointments (look out 90 days)    Apr 12, 2019  9:00 AM CDT  Return Visit with  ONCOLOGY NURSE  [facility] ([facility]) [facility]  [id number] [place] [address] [address]  [facility] [address]  [phone]   Apr 18, 2019 12:45 PM CDT  Return Visit with Mary Weri MD  [facility] ([facility]) [facility]  [address]  [facility] [address]  [phone]

## 2019-04-08 RX ORDER — DIAZEPAM 10 MG
TABLET ORAL
Qty: 4 TABLET | Refills: 1 | Status: SHIPPED | OUTPATIENT
Start: 2019-04-08 | End: 2020-08-28

## 2019-04-08 NOTE — TELEPHONE ENCOUNTER
Walked Rx to [facility].  Leaving for [facility] to document    Nay Grullon

## 2019-04-08 NOTE — TELEPHONE ENCOUNTER
Awaiting Rx - is not on [place] or [facility] desk or printers.     Vero Jones RN  [facility]

## 2019-04-11 ENCOUNTER — MYC MEDICAL ADVICE (OUTPATIENT)
Dept: FAMILY MEDICINE | Facility: CLINIC | Age: 59
End: 2019-04-11

## 2019-04-11 NOTE — TELEPHONE ENCOUNTER
Noted.    Routing to PCP for further review/recommendations/orders.    Annel Garay RN  [facility]

## 2019-04-12 ENCOUNTER — HOSPITAL ENCOUNTER (OUTPATIENT)
Facility: CLINIC | Age: 59
Setting detail: SPECIMEN
End: 2019-04-12
Attending: INTERNAL MEDICINE
Payer: COMMERCIAL

## 2019-04-12 ENCOUNTER — HOSPITAL ENCOUNTER (OUTPATIENT)
Dept: MRI IMAGING | Facility: CLINIC | Age: 59
Discharge: HOME | End: 2019-04-12
Attending: INTERNAL MEDICINE | Admitting: INTERNAL MEDICINE
Payer: COMMERCIAL

## 2019-04-12 ENCOUNTER — ONCOLOGY VISIT (OUTPATIENT)
Dept: ONCOLOGY | Facility: CLINIC | Age: 59
End: 2019-04-12
Attending: INTERNAL MEDICINE
Payer: COMMERCIAL

## 2019-04-12 ENCOUNTER — HOSPITAL ENCOUNTER (OUTPATIENT)
Dept: CT IMAGING | Facility: CLINIC | Age: 59
Discharge: HOME | End: 2019-04-12
Attending: INTERNAL MEDICINE | Admitting: INTERNAL MEDICINE
Payer: COMMERCIAL

## 2019-04-12 DIAGNOSIS — E78.5 HYPERLIPIDEMIA LDL GOAL <130: ICD-10-CM

## 2019-04-12 DIAGNOSIS — C7A.8 PRIMARY MALIGNANT NEUROENDOCRINE NEOPLASM OF ILEUM (H): ICD-10-CM

## 2019-04-12 LAB
ALBUMIN SERPL-MCNC: 4.1 G/DL (ref 3.4–5)
ALP SERPL-CCNC: 36 U/L (ref 40–150)
ALT SERPL W P-5'-P-CCNC: 33 U/L (ref 0–50)
ANION GAP SERPL CALCULATED.3IONS-SCNC: 5 MMOL/L (ref 3–14)
AST SERPL W P-5'-P-CCNC: 19 U/L (ref 0–45)
BASOPHILS # BLD AUTO: 0.1 10E9/L (ref 0–0.2)
BASOPHILS NFR BLD AUTO: 1.5 %
BILIRUB SERPL-MCNC: 0.4 MG/DL (ref 0.2–1.3)
BUN SERPL-MCNC: 15 MG/DL (ref 7–30)
CALCIUM SERPL-MCNC: 9.3 MG/DL (ref 8.5–10.1)
CHLORIDE SERPL-SCNC: 101 MMOL/L (ref 94–109)
CHOLEST SERPL-MCNC: 233 MG/DL
CO2 SERPL-SCNC: 31 MMOL/L (ref 20–32)
CREAT SERPL-MCNC: 0.78 MG/DL (ref 0.52–1.04)
DIFFERENTIAL METHOD BLD: NORMAL
EOSINOPHIL # BLD AUTO: 0.3 10E9/L (ref 0–0.7)
EOSINOPHIL NFR BLD AUTO: 4.2 %
ERYTHROCYTE [DISTWIDTH] IN BLOOD BY AUTOMATED COUNT: 12.1 % (ref 10–15)
GFR SERPL CREATININE-BSD FRML MDRD: 82 ML/MIN/{1.73_M2}
GLUCOSE SERPL-MCNC: 88 MG/DL (ref 70–99)
HCT VFR BLD AUTO: 39.5 % (ref 35–47)
HDLC SERPL-MCNC: 57 MG/DL
HGB BLD-MCNC: 13.5 G/DL (ref 11.7–15.7)
IMM GRANULOCYTES # BLD: 0 10E9/L (ref 0–0.4)
IMM GRANULOCYTES NFR BLD: 0.2 %
LDLC SERPL CALC-MCNC: 136 MG/DL
LYMPHOCYTES # BLD AUTO: 1.6 10E9/L (ref 0.8–5.3)
LYMPHOCYTES NFR BLD AUTO: 25.9 %
MCH RBC QN AUTO: 32.4 PG (ref 26.5–33)
MCHC RBC AUTO-ENTMCNC: 34.2 G/DL (ref 31.5–36.5)
MCV RBC AUTO: 95 FL (ref 78–100)
MONOCYTES # BLD AUTO: 0.5 10E9/L (ref 0–1.3)
MONOCYTES NFR BLD AUTO: 8 %
NEUTROPHILS # BLD AUTO: 3.6 10E9/L (ref 1.6–8.3)
NEUTROPHILS NFR BLD AUTO: 60.2 %
NONHDLC SERPL-MCNC: 176 MG/DL
NRBC # BLD AUTO: 0 10*3/UL
NRBC BLD AUTO-RTO: 0 /100
PLATELET # BLD AUTO: 354 10E9/L (ref 150–450)
POTASSIUM SERPL-SCNC: 4.1 MMOL/L (ref 3.4–5.3)
PROT SERPL-MCNC: 7.8 G/DL (ref 6.8–8.8)
RBC # BLD AUTO: 4.17 10E12/L (ref 3.8–5.2)
SODIUM SERPL-SCNC: 137 MMOL/L (ref 133–144)
TRIGL SERPL-MCNC: 202 MG/DL
WBC # BLD AUTO: 6 10E9/L (ref 4–11)

## 2019-04-12 PROCEDURE — 80053 COMPREHEN METABOLIC PANEL: CPT | Performed by: FAMILY MEDICINE

## 2019-04-12 PROCEDURE — 25500064 ZZH RX 255 OP 636: Performed by: INTERNAL MEDICINE

## 2019-04-12 PROCEDURE — 85025 COMPLETE CBC W/AUTO DIFF WBC: CPT | Performed by: FAMILY MEDICINE

## 2019-04-12 PROCEDURE — 74183 MRI ABD W/O CNTR FLWD CNTR: CPT

## 2019-04-12 PROCEDURE — A9585 GADOBUTROL INJECTION: HCPCS | Performed by: INTERNAL MEDICINE

## 2019-04-12 PROCEDURE — 86316 IMMUNOASSAY TUMOR OTHER: CPT | Performed by: FAMILY MEDICINE

## 2019-04-12 PROCEDURE — 71250 CT THORAX DX C-: CPT

## 2019-04-12 PROCEDURE — 36415 COLL VENOUS BLD VENIPUNCTURE: CPT

## 2019-04-12 PROCEDURE — 80061 LIPID PANEL: CPT | Performed by: FAMILY MEDICINE

## 2019-04-12 RX ORDER — GADOBUTROL 604.72 MG/ML
7.5 INJECTION INTRAVENOUS ONCE
Status: COMPLETED | OUTPATIENT
Start: 2019-04-12 | End: 2019-04-12

## 2019-04-12 RX ADMIN — GADOBUTROL 7.5 ML: 604.72 INJECTION INTRAVENOUS at 09:45

## 2019-04-12 NOTE — LETTER
4/12/2019         RE: Connie Brunson  [address]  [facility] [address]        Dear Colleague,    [facility] you [facility]. Please see a copy of my visit note below.    Medical Assistant Note:  Connie Brunson presents today for blood draw.    Patient seen by provider today: No.   present during visit today: Not Applicable.    Concerns: No Concerns.    Procedure:  Lab draw site: right antecub, Needle type: butterfly, Gauge: 21.    Post Assessment:  Labs drawn without difficulty: Yes.    Discharge Plan:  Departure Mode: Ambulatory.    Face to Face Time: 10.    Dayana Rose CMA              Again, thank you for allowing me to participate in the care of your patient.        Sincerely,        [facility] Oncology Nurse

## 2019-04-12 NOTE — PROGRESS NOTES
Medical Assistant Note:  Connie Brunson presents today for blood draw.    Patient seen by provider today: No.   present during visit today: Not Applicable.    Concerns: No Concerns.    Procedure:  Lab draw site: right antecub, Needle type: butterfly, Gauge: 21.    Post Assessment:  Labs drawn without difficulty: Yes.    Discharge Plan:  Departure Mode: Ambulatory.    Face to Face Time: 10.    Dayana Rose, CMA

## 2019-04-13 LAB — CGA SERPL-MCNC: 438 NG/ML (ref 0–95)

## 2019-04-18 ENCOUNTER — HOSPITAL ENCOUNTER (OUTPATIENT)
Facility: CLINIC | Age: 59
Setting detail: SPECIMEN
End: 2019-04-18
Attending: INTERNAL MEDICINE
Payer: COMMERCIAL

## 2019-04-18 ENCOUNTER — ONCOLOGY VISIT (OUTPATIENT)
Dept: ONCOLOGY | Facility: CLINIC | Age: 59
End: 2019-04-18
Attending: INTERNAL MEDICINE
Payer: COMMERCIAL

## 2019-04-18 VITALS
WEIGHT: 158.6 LBS | HEIGHT: 59 IN | TEMPERATURE: 97.3 F | DIASTOLIC BLOOD PRESSURE: 87 MMHG | BODY MASS INDEX: 31.97 KG/M2 | RESPIRATION RATE: 16 BRPM | SYSTOLIC BLOOD PRESSURE: 143 MMHG | OXYGEN SATURATION: 95 % | HEART RATE: 81 BPM

## 2019-04-18 DIAGNOSIS — R91.8 PULMONARY NODULES: ICD-10-CM

## 2019-04-18 DIAGNOSIS — C7A.8 PRIMARY MALIGNANT NEUROENDOCRINE NEOPLASM OF ILEUM (H): Primary | ICD-10-CM

## 2019-04-18 PROCEDURE — G0463 HOSPITAL OUTPT CLINIC VISIT: HCPCS

## 2019-04-18 PROCEDURE — 99214 OFFICE O/P EST MOD 30 MIN: CPT | Performed by: INTERNAL MEDICINE

## 2019-04-18 ASSESSMENT — MIFFLIN-ST. JEOR: SCORE: 1195.03

## 2019-04-18 ASSESSMENT — PAIN SCALES - GENERAL: PAINLEVEL: NO PAIN (0)

## 2019-04-18 NOTE — NURSING NOTE
"Oncology Rooming Note    April 18, 2019 12:36 PM   Connie Brunson is a 58 year old female who presents for:    Chief Complaint   Patient presents with     Oncology Clinic Visit     Primary malignant neuroendocrine neoplasm of ileum      Initial Vitals: /87   Pulse 81   Temp 97.3  F (36.3  C) (Tympanic)   Resp 16   Ht 1.499 m (4' 11\")   Wt 71.9 kg (158 lb 9.6 oz)   LMP 09/05/2000   SpO2 95%   BMI 32.03 kg/m   Estimated body mass index is 32.03 kg/m  as calculated from the following:    Height as of this encounter: 1.499 m (4' 11\").    Weight as of this encounter: 71.9 kg (158 lb 9.6 oz). Body surface area is 1.73 meters squared.  No Pain (0) Comment: Data Unavailable   Patient's last menstrual period was 09/05/2000.  Allergies reviewed: Yes  Medications reviewed: Yes    Medications: Medication refills not needed today.  Pharmacy name entered into [employer]:    [facility] [place] - [place], [place] - [address] [place] [address]  WRITTEN PRESCRIPTION REQUESTED    Clinical concerns: Follow Up       Rosa M Muse CMA              "

## 2019-04-18 NOTE — PROGRESS NOTES
[facility]    Hematology/Oncology Established Patient Note      Today's Date: 4/18/19    Reason for Follow-up: neuroendocrine tumor of the ileum      HISTORY OF PRESENT ILLNESS: Connie Brunson is a 58 year old female with PMHx of HTN, HLD, depression, who presents with neuroendocrine tumor of the ileum.  In October 2017, she had a CT abdomen/pelvis done due to lower abdominal pain related to sigmoid diverticulitis and loose stools.  She had been having diarrhea for ~3 years.  It showed an incidental finding of an enhancing 1.3 cm intraluminal nodule in the distal ileum.  There was comment on radiology report on CT enterography on 11/2/17 that the nodule, in retrospect, appears to be present and stable since an older CT from 10/27/14.  On 11/7/17, she underwent a segmental small bowel resection with primary anastomosis by Dr. Cristobal.  Pathology showed a 1.5 cm tumor in the ileum, well-differentiated neuroendocrine tumor, grade 2, mitotic rate 410 HPF, Ki-67 of 5%, 2 of 3 lymph nodes were involved, stage pT3N1 (stage IIIB).        INTERIM HISTORY: Connie returns for follow-up today.  She says that she is doing well.  She denies changes in her health of symptoms.  She still gets diarrhea and flushing intermittently, but stable with no changes.        REVIEW OF SYSTEMS:   14 point ROS was reviewed and is negative other than as noted above in HPI.       HOME MEDICATIONS:  Current Outpatient Medications   Medication Sig Dispense Refill     cetirizine (ZYRTEC) 10 MG tablet Take 10 mg by mouth every evening       diazepam (VALIUM) 10 MG tablet Take 1-2  tab or 10-20 mg 30-60 minutes prior to MRI scan. 4 tablet 1     fenofibrate (TRIGLIDE/LOFIBRA) 160 MG tablet TAKE ONE TABLET BY MOUTH EVERY DAY 45 tablet 0     fenofibrate (TRIGLIDE/LOFIBRA) 160 MG tablet Take 1 tablet (160 mg) by mouth daily 90 tablet 1     fluticasone (FLONASE) 50 MCG/ACT nasal spray Spray 2 sprays into both nostrils daily 48 g 5      hydrocortisone 2.5 % cream Apply sparingly to dermatitis left lower eye 30 g 3     lisinopril (PRINIVIL/ZESTRIL) 10 MG tablet Take 1 tablet (10 mg) by mouth daily 90 tablet 1     mometasone (ELOCON) 0.1 % cream Apply sparingly to affected area twice daily for 14 days.  Do not apply to face. 15 g 3     omeprazole (PRILOSEC) 20 MG CR capsule Take 1 capsule (20 mg) by mouth daily 90 capsule 3     triamcinolone (KENALOG) 0.5 % cream Apply sparingly to affected area three times daily. 60 g 3     triamterene-HCTZ (DYAZIDE) 37.5-25 MG capsule Take 1 capsule by mouth daily 90 capsule 1     venlafaxine (EFFEXOR) 37.5 MG tablet Take 1 tablet (37.5 mg) by mouth daily 90 tablet 1     zolpidem (AMBIEN) 5 MG tablet Take 1 tablet (5 mg) by mouth nightly as needed for sleep 30 tablet 1         ALLERGIES:  Allergies   Allergen Reactions     Atorvastatin Nausea     Nausea and abd pain      Ceftin GI Disturbance     Stomach upset and bloating - given at same time as clindamycin ? Which one as cause.       Clindamycin GI Disturbance     Stomach upset and bloating - given at same time as ceftin, ? Which one as cause      Flagyl [Metronidazole]      immediate migraine headache      Morphine Itching     Severe with nose, facial  Swelling - oxycodone = ok /no problems      Penicillins Hives     Sulfa Drugs Rash     Severe red , flushed rash     Levaquin Rash     States she can take cipro         PAST MEDICAL HISTORY:  Past Medical History:   Diagnosis Date     Allergic rhinitis, cause unspecified     year round - dog,dust-  Failed on claritin, claritin-D, zyrtec and zyrtec-D in past.      Benign neoplasm of cerebral meninges (H) 1999    has yearly MRI's. - sees Dr. 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 Thomas ENT      Depressive disorder      Deviated nasal septum     sees Dr. Thomas ENT      Diverticulosis of colon (without mention of  hemorrhage) 02-     History of Guillain-[place] syndrome     NO FLU SHOTS - very mild case in [place] many years ago     Hyperlipidemia LDL goal < 130 doesn't want statins    simvastatin = severe hand joint pain , lipitor =nausea/abd. pain     Hypertension goal BP (blood pressure) < 140/90      Insomnia     trazodone -made pt feel hung over      Major depressive disorder, recurrent episode, moderate (H)     lexapro - sexual side effects      Moderate major depression (H) 2/4/2005    trazodone -made pt feel hung over      Other acne      Other extrapyramidal disease and abnormal movement disorder     ?GBS     Symptomatic menopausal or female climacteric states     vivelle-dot          PAST SURGICAL HISTORY:  Past Surgical History:   Procedure Laterality Date     ABDOMEN SURGERY       BIOPSY       C LIGATE FALLOPIAN TUBE  1988    Tubal Ligation     C NONSPECIFIC PROCEDURE      PAROTID TUMOR L SIDE OF NECK - BENIGN     C TOTAL ABDOM HYSTERECTOMY  2000 ?    Hysterectomy, Total Abdominal with BSO     COLONOSCOPY  1/16/2012    Procedure:COLONOSCOPY; Colonoscopy; Surgeon:SHOLA JOYCE; Location: GI     HC REMOVE TONSILS/ADENOIDS,<11 Y/O      T and A, under 12 yrs     HEAD & NECK SURGERY       LAPAROSCOPIC CHOLECYSTECTOMY WITH CHOLANGIOGRAMS N/A 4/25/2016    Procedure: LAPAROSCOPIC CHOLECYSTECTOMY WITH CHOLANGIOGRAMS;  Surgeon: Rosa M Cristobal MD;  Location:  OR     LAPAROSCOPY DIAGNOSTIC (GENERAL) N/A 11/7/2017    Procedure: LAPAROSCOPY DIAGNOSTIC (GENERAL);  Exploratory Laparoscopy, Laparotomy and Small Bowel resection.;  Surgeon: Rosa M Cristobal MD;  Location: [facility] OR     LAPAROTOMY EXPLORATORY N/A 11/7/2017    Procedure: LAPAROTOMY EXPLORATORY;;  Surgeon: Rosa M Cristobal MD;  Location:  OR     RESECT SMALL BOWEL WITHOUT OSTOMY N/A 11/7/2017    Procedure: RESECT SMALL BOWEL WITHOUT OSTOMY;;  Surgeon: Rosa M Cristobal MD;  Location:  OR     SURGICAL HISTORY OF -   2004     Menigioma excision         SOCIAL HISTORY:  Social History     Socioeconomic History     Marital status:      Spouse name: Perry Brunson      Number of children: 3     Years of education: 15     Highest education level: Not on file   Occupational History     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      Employer: [employer]   Social Needs     Financial resource strain: Not on file     Food insecurity:     Worry: Not on file     Inability: Not on file     Transportation needs:     Medical: Not on file     Non-medical: Not on file   Tobacco Use     Smoking status: Light Tobacco Smoker     Packs/day: 0.00     Years: 5.00     Pack years: 0.00     Types: Other, Cigarettes     Last attempt to quit: 1994     Years since quittin.3     Smokeless tobacco: Current User     Tobacco comment: 11/3/17 - occ e-cig use   Substance and Sexual Activity     Alcohol use: Yes     Alcohol/week: 0.0 oz     Comment: 3-5 drinks per week     Drug use: No     Sexual activity: Never     Partners: Male     Birth control/protection: Surgical     Comment: tubal ligation-    Lifestyle     Physical activity:     Days per week: Not on file     Minutes per session: Not on file     Stress: Not on file   Relationships     Social connections:     Talks on phone: Not on file     Gets together: Not on file     Attends [employer] service: Not on file     Active member of club or organization: Not on file     Attends meetings of clubs or organizations: Not on file     Relationship status: Not on file     Intimate partner violence:     Fear of current or ex partner: Not on file     Emotionally abused: Not on file     Physically abused: Not on file     Forced sexual activity: Not on file   Other Topics Concern      Service No     Blood Transfusions No     Caffeine Concern Yes     Comment: 2-3 cups coffee in am     Occupational Exposure Not Asked     Hobby Hazards Not Asked     Sleep Concern Not Asked      "Stress Concern Not Asked     Weight Concern Not Asked     Special Diet Not Asked     Back Care Not Asked     Exercise No     Comment: regular walking 4x/week      Bike Helmet Not Asked     Seat Belt Yes     Comment: always     Self-Exams Yes     Comment: SBE encouraged monthly     Parent/sibling w/ CABG, MI or angioplasty before 65F 55M? No   Social History Narrative    calcium - taking 500mg po qday - increase to Calcium - 1000-1200mg/day    flex sig/colonoscopy -at age 48    sun precautions - discussed    mammogram - yearly    Td booster -  per our records - pt will call [facility]     pneumovax -at age 58    DEXA -this year-     stool hemoccults - every year after age 40    ASA- start 81 mg ASA qday.    mulvitamin - encouraged    doing citrucel qday         from second , Dan [employer]. Now back together with her first  and father of their  children, Perry Brunson - 2011.          FAMILY HISTORY:  Family History   Problem Relation Age of Onset     Hyperlipidemia Mother      Thyroid Disease Mother      Cancer Father         lung     Hypertension Father      Hyperlipidemia Father      Thyroid Disease Brother      Diabetes Maternal Grandmother      Cerebrovascular Disease Maternal Grandmother      Substance Abuse Maternal Grandfather      Thyroid Disease Son      Her father  of lung cancer and paranasal cancer at around age 65.    She has 3 children.      PHYSICAL EXAM:  Vital signs:  /87   Pulse 81   Temp 97.3  F (36.3  C) (Tympanic)   Resp 16   Ht 1.499 m (4' 11\")   Wt 71.9 kg (158 lb 9.6 oz)   LMP 2000   SpO2 95%   BMI 32.03 kg/m     ECO  GENERAL/CONSTITUTIONAL: No acute distress. Accompanied by .  EYES: No scleral icterus.  RESPIRATORY: Clear to auscultation bilaterally.  CARDIOVASCULAR: Regular rate and rhythm, no murmurs.  GASTROINTESTINAL: Non-tender, no guarding.  NEUROLOGIC: Alert, oriented, answers questions " appropriately.  INTEGUMENTARY: No jaundice.      LABS:  CBC RESULTS:   Recent Labs   Lab Test 04/12/19  0858   WBC 6.0   RBC 4.17   HGB 13.5   HCT 39.5   MCV 95   MCH 32.4   MCHC 34.2   RDW 12.1        Recent Labs   Lab Test 04/12/19  0858 09/24/18  0900    135   POTASSIUM 4.1 3.8   CHLORIDE 101 96   CO2 31 32   ANIONGAP 5 7   GLC 88 85   BUN 15 17   CR 0.78 0.77   FADI 9.3 9.4     Lab Results   Component Value Date    AST 19 04/12/2019     Lab Results   Component Value Date    ALT 33 04/12/2019     No results found for: BILICONJ   Lab Results   Component Value Date    BILITOTAL 0.4 04/12/2019     Lab Results   Component Value Date    ALBUMIN 4.1 04/12/2019     Lab Results   Component Value Date    PROTTOTAL 7.8 04/12/2019      Lab Results   Component Value Date    ALKPHOS 36 04/12/2019         Component      Latest Ref Rng & Units 11/16/2017 12/8/2017 3/12/2018 9/24/2018   Chromogranin A      0 - 95 ng/mL 339 (H) 385 (H) 597 (H) 602 (H)     Component      Latest Ref Rng & Units 4/12/2019   Chromogranin A      0 - 95 ng/mL 438 (H)         IMAGING:  CT chest 4/12/19:  Stable pulmonary nodule, no acute infiltrates.    MRI abdomen 4/12/19:  FINDINGS: No focal liver lesions. Marked hepatic steatosis. There is  no definite intra or extrahepatic biliary dilatation. Liver is normal  size and normal in contour. Adrenal glands, kidneys, spleen, and  pancreas demonstrate no worrisome focal lesion. Osseous structures  demonstrate no worrisome signal abnormality. No definite adenopathy or  bowel obstruction in the visualized abdomen. No ascites.                                                                    IMPRESSION: Negative MRI of the abdomen.      ASSESSMENT/PLAN:  Connie Brunson is a 58 year old female with:    1) Neuroendocrine tumor of the ileum: s/p resection on 11/7/17, 1.5 cm, grade 2, well-differentiated, T3N1 (stage IIIB).      Recent MRI abdomen and CT scan reviewed with Connie.  MRI abdomen is  negative.  CT chest shows stable pulmonary nodule.      Chromogranin A has remained abnormal, although trended down from previous.  Her symptoms remain the same.  Her imaging remains clear.  Since her imaging is negative, we will continue to observe for now.      She would like to get next scans in 1 year, which is reasonable.      -she will call if she gets worsening diarrhea or flushing symptoms or pain and can get labs/scans done earlier.    -labs and CT chest and MRI abdomen in 1 year  -RTC in 1 year    2) HTN, HLD  -follow-up with PCP    3) History of meningioma: diagnosed in 1999, was getting yearly MRI's with neurosurgery, but was told she does not need surveillance MRI's anymore unless she has new neurologic symptoms.    4) Pulmonary nodule: There is an indeterminate 2 x 3 mm anterior right mid-lung nodule seen on CT.  She does have history of smoking.  Recent CT chest shows that the tiny nodule is stable.  It has been stable since 2017.  -monitor on scans      I spent a total of 25 minutes with the patient, with over >50% of the time in counseling and/or coordination of care.       Mary Weir MD  Hematology/Oncology  [facility]

## 2019-04-18 NOTE — LETTER
4/18/2019         RE: Connie Brunson  [address]  [place] [address]        Dear Colleague,    Thank you for referring your patient, Connie Brunson, to the [facility]. Please see a copy of my visit note below.    [facility]    Hematology/Oncology Established Patient Note      Today's Date: 4/18/19    Reason for Follow-up: neuroendocrine tumor of the ileum      HISTORY OF PRESENT ILLNESS: Connie Brunson is a 58 year old female with PMHx of HTN, HLD, depression, who presents with neuroendocrine tumor of the ileum.  In October 2017, she had a CT abdomen/pelvis done due to lower abdominal pain related to sigmoid diverticulitis and loose stools.  She had been having diarrhea for ~3 years.  It showed an incidental finding of an enhancing 1.3 cm intraluminal nodule in the distal ileum.  There was comment on radiology report on CT enterography on 11/2/17 that the nodule, in retrospect, appears to be present and stable since an older CT from 10/27/14.  On 11/7/17, she underwent a segmental small bowel resection with primary anastomosis by Dr. Cristobal.  Pathology showed a 1.5 cm tumor in the ileum, well-differentiated neuroendocrine tumor, grade 2, mitotic rate 410 HPF, Ki-67 of 5%, 2 of 3 lymph nodes were involved, stage pT3N1 (stage IIIB).        INTERIM HISTORY: Connie returns for follow-up today.  She says that she is doing well.  She denies changes in her health of symptoms.  She still gets diarrhea and flushing intermittently, but stable with no changes.        REVIEW OF SYSTEMS:   14 point ROS was reviewed and is negative other than as noted above in HPI.       HOME MEDICATIONS:  Current Outpatient Medications   Medication Sig Dispense Refill     cetirizine (ZYRTEC) 10 MG tablet Take 10 mg by mouth every evening       diazepam (VALIUM) 10 MG tablet Take 1-2  tab or 10-20 mg 30-60 minutes prior to MRI scan. 4 tablet 1     fenofibrate  (TRIGLIDE/LOFIBRA) 160 MG tablet TAKE ONE TABLET BY MOUTH EVERY DAY 45 tablet 0     fenofibrate (TRIGLIDE/LOFIBRA) 160 MG tablet Take 1 tablet (160 mg) by mouth daily 90 tablet 1     fluticasone (FLONASE) 50 MCG/ACT nasal spray Spray 2 sprays into both nostrils daily 48 g 5     hydrocortisone 2.5 % cream Apply sparingly to dermatitis left lower eye 30 g 3     lisinopril (PRINIVIL/ZESTRIL) 10 MG tablet Take 1 tablet (10 mg) by mouth daily 90 tablet 1     mometasone (ELOCON) 0.1 % cream Apply sparingly to affected area twice daily for 14 days.  Do not apply to face. 15 g 3     omeprazole (PRILOSEC) 20 MG CR capsule Take 1 capsule (20 mg) by mouth daily 90 capsule 3     triamcinolone (KENALOG) 0.5 % cream Apply sparingly to affected area three times daily. 60 g 3     triamterene-HCTZ (DYAZIDE) 37.5-25 MG capsule Take 1 capsule by mouth daily 90 capsule 1     venlafaxine (EFFEXOR) 37.5 MG tablet Take 1 tablet (37.5 mg) by mouth daily 90 tablet 1     zolpidem (AMBIEN) 5 MG tablet Take 1 tablet (5 mg) by mouth nightly as needed for sleep 30 tablet 1         ALLERGIES:  Allergies   Allergen Reactions     Atorvastatin Nausea     Nausea and abd pain      Ceftin GI Disturbance     Stomach upset and bloating - given at same time as clindamycin ? Which one as cause.       Clindamycin GI Disturbance     Stomach upset and bloating - given at same time as ceftin, ? Which one as cause      Flagyl [Metronidazole]      immediate migraine headache      Morphine Itching     Severe with nose, facial  Swelling - oxycodone = ok /no problems      Penicillins Hives     Sulfa Drugs Rash     Severe red , flushed rash     Levaquin Rash     States she can take cipro         PAST MEDICAL HISTORY:  Past Medical History:   Diagnosis Date     Allergic rhinitis, cause unspecified     year round - dog,dust-  Failed on claritin, claritin-D, zyrtec and zyrtec-D in past.      Benign neoplasm of cerebral meninges (H) 1999    has yearly MRI's. - sees   [facility] MRI7/24/06 = no change from 7/21/05      Benign neoplasm of tonsil 7/05    ?tonsillar re-growth - sees Dr. Thomas ENT      Depressive disorder      Deviated nasal septum     sees Dr. Thomas ENT      Diverticulosis of colon (without mention of hemorrhage) 02-     History of Guillain-Bridgewater syndrome     NO FLU SHOTS - very mild case in [place] many years ago     Hyperlipidemia LDL goal < 130 doesn't want statins    simvastatin = severe hand joint pain , lipitor =nausea/abd. pain     Hypertension goal BP (blood pressure) < 140/90      Insomnia     trazodone -made pt feel hung over      Major depressive disorder, recurrent episode, moderate (H)     lexapro - sexual side effects      Moderate major depression (H) 2/4/2005    trazodone -made pt feel hung over      Other acne      Other extrapyramidal disease and abnormal movement disorder     ?GBS     Symptomatic menopausal or female climacteric states     vivelle-dot          PAST SURGICAL HISTORY:  Past Surgical History:   Procedure Laterality Date     ABDOMEN SURGERY       BIOPSY       C LIGATE FALLOPIAN TUBE  1988    Tubal Ligation     C NONSPECIFIC PROCEDURE      PAROTID TUMOR L SIDE OF NECK - BENIGN     C TOTAL ABDOM HYSTERECTOMY  2000 ?    Hysterectomy, Total Abdominal with BSO     COLONOSCOPY  1/16/2012    Procedure:COLONOSCOPY; Colonoscopy; Surgeon:SHOLA JOYCE; Location: GI     HC REMOVE TONSILS/ADENOIDS,<11 Y/O      T and A, under 12 yrs     HEAD & NECK SURGERY       LAPAROSCOPIC CHOLECYSTECTOMY WITH CHOLANGIOGRAMS N/A 4/25/2016    Procedure: LAPAROSCOPIC CHOLECYSTECTOMY WITH CHOLANGIOGRAMS;  Surgeon: Rosa M Cristobal MD;  Location:  OR     LAPAROSCOPY DIAGNOSTIC (GENERAL) N/A 11/7/2017    Procedure: LAPAROSCOPY DIAGNOSTIC (GENERAL);  Exploratory Laparoscopy, Laparotomy and Small Bowel resection.;  Surgeon: Rosa M Cristobal MD;  Location: [facility] OR     LAPAROTOMY EXPLORATORY N/A 11/7/2017     Procedure: LAPAROTOMY EXPLORATORY;;  Surgeon: Rosa M Cristobal MD;  Location: [facility] OR     RESECT SMALL BOWEL WITHOUT OSTOMY N/A 2017    Procedure: RESECT SMALL BOWEL WITHOUT OSTOMY;;  Surgeon: Rosa M Cristobal MD;  Location: [facility] OR     SURGICAL HISTORY OF -       Menigioma excision         SOCIAL HISTORY:  Social History     Socioeconomic History     Marital status:      Spouse name: Perry Brunson      Number of children: 3     Years of education: 15     Highest education level: Not on file   Occupational History     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      Employer: [employer]   Social Needs     Financial resource strain: Not on file     Food insecurity:     Worry: Not on file     Inability: Not on file     Transportation needs:     Medical: Not on file     Non-medical: Not on file   Tobacco Use     Smoking status: Light Tobacco Smoker     Packs/day: 0.00     Years: 5.00     Pack years: 0.00     Types: Other, Cigarettes     Last attempt to quit: 1994     Years since quittin.3     Smokeless tobacco: Current User     Tobacco comment: 11/3/17 - occ e-cig use   Substance and Sexual Activity     Alcohol use: Yes     Alcohol/week: 0.0 oz     Comment: 3-5 drinks per week     Drug use: No     Sexual activity: Never     Partners: Male     Birth control/protection: Surgical     Comment: tubal ligation-    Lifestyle     Physical activity:     Days per week: Not on file     Minutes per session: Not on file     Stress: Not on file   Relationships     Social connections:     Talks on phone: Not on file     Gets together: Not on file     Attends Mormonism service: Not on file     Active member of club or organization: Not on file     Attends meetings of clubs or organizations: Not on file     Relationship status: Not on file     Intimate partner violence:     Fear of current or ex partner: Not on file     Emotionally abused: Not on file     Physically abused:  "Not on file     Forced sexual activity: Not on file   Other Topics Concern      Service No     Blood Transfusions No     Caffeine Concern Yes     Comment: 2-3 cups coffee in am     Occupational Exposure Not Asked     Hobby Hazards Not Asked     Sleep Concern Not Asked     Stress Concern Not Asked     Weight Concern Not Asked     Special Diet Not Asked     Back Care Not Asked     Exercise No     Comment: regular walking 4x/week      Bike Helmet Not Asked     Seat Belt Yes     Comment: always     Self-Exams Yes     Comment: SBE encouraged monthly     Parent/sibling w/ CABG, MI or angioplasty before 65F 55M? No   Social History Narrative    calcium - taking 500mg po qday - increase to Calcium - 1000-1200mg/day    flex sig/colonoscopy -at age 48    sun precautions - discussed    mammogram - yearly    Td booster -  per our records - pt will call [facility]     pneumovax -at age 58    DEXA -this year-     stool hemoccults - every year after age 40    ASA- start 81 mg ASA qday.    mulvitamin - encouraged    doing citrucel qday         from second , Dan Schoenbauer. Now back together with her first  and father of their  children, Perry Brunson - 2011.          FAMILY HISTORY:  Family History   Problem Relation Age of Onset     Hyperlipidemia Mother      Thyroid Disease Mother      Cancer Father         lung     Hypertension Father      Hyperlipidemia Father      Thyroid Disease Brother      Diabetes Maternal Grandmother      Cerebrovascular Disease Maternal Grandmother      Substance Abuse Maternal Grandfather      Thyroid Disease Son      Her father  of lung cancer and paranasal cancer at around age 65.    She has 3 children.      PHYSICAL EXAM:  Vital signs:  /87   Pulse 81   Temp 97.3  F (36.3  C) (Tympanic)   Resp 16   Ht 1.499 m (4' 11\")   Wt 71.9 kg (158 lb 9.6 oz)   LMP 2000   SpO2 95%   BMI 32.03 kg/m      ECO  GENERAL/CONSTITUTIONAL: No " acute distress. Accompanied by .  EYES: No scleral icterus.  RESPIRATORY: Clear to auscultation bilaterally.  CARDIOVASCULAR: Regular rate and rhythm, no murmurs.  GASTROINTESTINAL: Non-tender, no guarding.  NEUROLOGIC: Alert, oriented, answers questions appropriately.  INTEGUMENTARY: No jaundice.      LABS:  CBC RESULTS:   Recent Labs   Lab Test 04/12/19  0858   WBC 6.0   RBC 4.17   HGB 13.5   HCT 39.5   MCV 95   MCH 32.4   MCHC 34.2   RDW 12.1        Recent Labs   Lab Test 04/12/19  0858 09/24/18  0900    135   POTASSIUM 4.1 3.8   CHLORIDE 101 96   CO2 31 32   ANIONGAP 5 7   GLC 88 85   BUN 15 17   CR 0.78 0.77   FADI 9.3 9.4     Lab Results   Component Value Date    AST 19 04/12/2019     Lab Results   Component Value Date    ALT 33 04/12/2019     No results found for: BILICONJ   Lab Results   Component Value Date    BILITOTAL 0.4 04/12/2019     Lab Results   Component Value Date    ALBUMIN 4.1 04/12/2019     Lab Results   Component Value Date    PROTTOTAL 7.8 04/12/2019      Lab Results   Component Value Date    ALKPHOS 36 04/12/2019         Component      Latest Ref Rng & Units 11/16/2017 12/8/2017 3/12/2018 9/24/2018   Chromogranin A      0 - 95 ng/mL 339 (H) 385 (H) 597 (H) 602 (H)     Component      Latest Ref Rng & Units 4/12/2019   Chromogranin A      0 - 95 ng/mL 438 (H)         IMAGING:  CT chest 4/12/19:  Stable pulmonary nodule, no acute infiltrates.    MRI abdomen 4/12/19:  FINDINGS: No focal liver lesions. Marked hepatic steatosis. There is  no definite intra or extrahepatic biliary dilatation. Liver is normal  size and normal in contour. Adrenal glands, kidneys, spleen, and  pancreas demonstrate no worrisome focal lesion. Osseous structures  demonstrate no worrisome signal abnormality. No definite adenopathy or  bowel obstruction in the visualized abdomen. No ascites.                                                                    IMPRESSION: Negative MRI of the  abdomen.      ASSESSMENT/PLAN:  Connie Brunson is a 58 year old female with:    1) Neuroendocrine tumor of the ileum: s/p resection on 11/7/17, 1.5 cm, grade 2, well-differentiated, T3N1 (stage IIIB).      Recent MRI abdomen and CT scan reviewed with Connie.  MRI abdomen is negative.  CT chest shows stable pulmonary nodule.      Chromogranin A has remained abnormal, although trended down from previous.  Her symptoms remain the same.  Her imaging remains clear.  Since her imaging is negative, we will continue to observe for now.      She would like to get next scans in 1 year, which is reasonable.      -she will call if she gets worsening diarrhea or flushing symptoms or pain and can get labs/scans done earlier.    -labs and CT chest and MRI abdomen in 1 year  -RTC in 1 year    2) HTN, HLD  -follow-up with PCP    3) History of meningioma: diagnosed in 1999, was getting yearly MRI's with neurosurgery, but was told she does not need surveillance MRI's anymore unless she has new neurologic symptoms.    4) Pulmonary nodule: There is an indeterminate 2 x 3 mm anterior right mid-lung nodule seen on CT.  She does have history of smoking.  Recent CT chest shows that the tiny nodule is stable.  It has been stable since 2017.  -monitor on scans      I spent a total of 25 minutes with the patient, with over >50% of the time in counseling and/or coordination of care.       Mary Weir MD  Hematology/Oncology  [facility]      Again, thank you for allowing me to participate in the care of your patient.        Sincerely,        Mary Weir MD

## 2019-04-21 PROBLEM — K76.0 HEPATIC STEATOSIS: Status: ACTIVE | Noted: 2019-04-21

## 2019-04-22 DIAGNOSIS — E78.5 HYPERLIPIDEMIA LDL GOAL <130: Primary | ICD-10-CM

## 2019-04-22 RX ORDER — ROSUVASTATIN CALCIUM 5 MG/1
TABLET, COATED ORAL
Qty: 30 TABLET | Refills: 3 | Status: SHIPPED | OUTPATIENT
Start: 2019-04-22 | End: 2019-12-09

## 2019-05-31 DIAGNOSIS — I10 ESSENTIAL HYPERTENSION WITH GOAL BLOOD PRESSURE LESS THAN 140/90: ICD-10-CM

## 2019-05-31 NOTE — TELEPHONE ENCOUNTER
"Requested Prescriptions   Pending Prescriptions Disp Refills     lisinopril (PRINIVIL/ZESTRIL) 10 MG tablet [Pharmacy Med Name: LISINOPRIL 10MG TABS]        Last Written Prescription Date:  11.30.18  Last Fill Quantity: 90 tablet,  # refills: 1   Last office visit: 11/30/2018 with prescribing provider:  Gayatri Stephens MD       Future Office Visit:       90 tablet 1     Sig: TAKE ONE TABLET BY MOUTH ONCE DAILY       ACE Inhibitors (Including Combos) Protocol Failed - 5/31/2019 11:23 AM        Failed - Blood pressure under 140/90 in past 12 months     BP Readings from Last 3 Encounters:   04/18/19 143/87   11/30/18 128/88   09/27/18 142/87                 Passed - Recent (12 mo) or future (30 days) visit within the authorizing provider's specialty     Patient had office visit in the last 12 months or has a visit in the next 30 days with authorizing provider or within the authorizing provider's specialty.  See \"Patient Info\" tab in inbasket, or \"Choose Columns\" in Meds & Orders section of the refill encounter.              Passed - Medication is active on med list        Passed - Patient is age 18 or older        Passed - No active pregnancy on record        Passed - Normal serum creatinine on file in past 12 months     Recent Labs   Lab Test 04/12/19  0858   CR 0.78             Passed - Normal serum potassium on file in past 12 months     Recent Labs   Lab Test 04/12/19  0858   POTASSIUM 4.1             Passed - No positive pregnancy test within past 12 months        "

## 2019-06-03 RX ORDER — LISINOPRIL 10 MG/1
TABLET ORAL
Qty: 90 TABLET | Refills: 1 | Status: SHIPPED | OUTPATIENT
Start: 2019-06-03 | End: 2019-12-09

## 2019-06-03 NOTE — TELEPHONE ENCOUNTER
Recheck your blood pressure as a nurse only visit appointment or walk in to a Mobile  pharmacy in the next 1 week or sooner if needed.  Have nursing or pharmacy send me their note.

## 2019-06-03 NOTE — TELEPHONE ENCOUNTER
Reason for Call:  Other returning call    Detailed comments: The patient is returning a call left for her by the nurse. She would like a call back.    Phone Number Patient can be reached at: Cell number on file:    Telephone Information:   Mobile [phone]     Best Time: Anytime    Can we leave a detailed message on this number? YES    Call taken on 6/3/2019 at 12:42 PM by Karime Pat

## 2019-06-03 NOTE — TELEPHONE ENCOUNTER
Called patient @ # below -     Advised of MD KING message below - Patient stated an understanding and agreed with plan.  Will stop into the pharmacy.     Vero Jones RN  [facility]

## 2019-06-03 NOTE — TELEPHONE ENCOUNTER
Attempt #1  Called patient @ [phone] - Left a non-detailed message to call back and speak with any triage nurse.    Vero Jones RN  [facility]

## 2019-06-05 ENCOUNTER — ALLIED HEALTH/NURSE VISIT (OUTPATIENT)
Dept: FAMILY MEDICINE | Facility: CLINIC | Age: 59
End: 2019-06-05
Payer: COMMERCIAL

## 2019-06-05 VITALS — SYSTOLIC BLOOD PRESSURE: 135 MMHG | DIASTOLIC BLOOD PRESSURE: 84 MMHG

## 2019-06-05 DIAGNOSIS — I10 ESSENTIAL HYPERTENSION WITH GOAL BLOOD PRESSURE LESS THAN 140/90: Primary | ICD-10-CM

## 2019-06-05 PROCEDURE — 99207 ZZC NO CHARGE NURSE ONLY: CPT | Performed by: FAMILY MEDICINE

## 2019-06-05 NOTE — PROGRESS NOTES
Connie Brunson was evaluated at [facility] on June 5, 2019 at which time her blood pressure was:    BP Readings from Last 3 Encounters:   06/05/19 135/84   04/18/19 143/87   11/30/18 128/88     Pulse Readings from Last 3 Encounters:   04/18/19 81   11/30/18 72   09/27/18 87       Reviewed lifestyle modifications for blood pressure control and reduction: including making healthy food choices, managing weight, getting regular exercise, smoking cessation, reducing alcohol consumption, monitoring blood pressure regularly.     Symptoms: None    BP Goal:< 140/90 mmHg    BP Assessment:  BP at goal    Potential Reasons for BP too high: NA - Not applicable    BP Follow-Up Plan: Recheck BP in 6 months at pharmacy    Recommendation to Provider: no change.    Note completed by: Emilia Eagle, PharmD  [facility]  PH: [phone]

## 2019-06-29 DIAGNOSIS — F41.9 ANXIETY: ICD-10-CM

## 2019-07-01 ENCOUNTER — MYC REFILL (OUTPATIENT)
Dept: FAMILY MEDICINE | Facility: CLINIC | Age: 59
End: 2019-07-01

## 2019-07-01 ENCOUNTER — MYC MEDICAL ADVICE (OUTPATIENT)
Dept: FAMILY MEDICINE | Facility: CLINIC | Age: 59
End: 2019-07-01

## 2019-07-01 DIAGNOSIS — F41.9 ANXIETY: ICD-10-CM

## 2019-07-01 RX ORDER — VENLAFAXINE 37.5 MG/1
TABLET ORAL
Qty: 90 TABLET | Refills: 1 | OUTPATIENT
Start: 2019-07-01

## 2019-07-01 RX ORDER — VENLAFAXINE 37.5 MG/1
37.5 TABLET ORAL DAILY
Qty: 90 TABLET | Refills: 1 | Status: SHIPPED | OUTPATIENT
Start: 2019-07-01 | End: 2019-12-09

## 2019-07-01 ASSESSMENT — ANXIETY QUESTIONNAIRES
GAD7 TOTAL SCORE: 0
3. WORRYING TOO MUCH ABOUT DIFFERENT THINGS: NOT AT ALL
GAD7 TOTAL SCORE: 0
7. FEELING AFRAID AS IF SOMETHING AWFUL MIGHT HAPPEN: NOT AT ALL
4. TROUBLE RELAXING: NOT AT ALL
2. NOT BEING ABLE TO STOP OR CONTROL WORRYING: NOT AT ALL
5. BEING SO RESTLESS THAT IT IS HARD TO SIT STILL: NOT AT ALL
GAD7 TOTAL SCORE: 0
1. FEELING NERVOUS, ANXIOUS, OR ON EDGE: NOT AT ALL
6. BECOMING EASILY ANNOYED OR IRRITABLE: NOT AT ALL
7. FEELING AFRAID AS IF SOMETHING AWFUL MIGHT HAPPEN: NOT AT ALL

## 2019-07-01 ASSESSMENT — PATIENT HEALTH QUESTIONNAIRE - PHQ9
SUM OF ALL RESPONSES TO PHQ QUESTIONS 1-9: 1
SUM OF ALL RESPONSES TO PHQ QUESTIONS 1-9: 1
10. IF YOU CHECKED OFF ANY PROBLEMS, HOW DIFFICULT HAVE THESE PROBLEMS MADE IT FOR YOU TO DO YOUR WORK, TAKE CARE OF THINGS AT HOME, OR GET ALONG WITH OTHER PEOPLE: NOT DIFFICULT AT ALL

## 2019-07-01 NOTE — TELEPHONE ENCOUNTER
Prescription approved per [facility] Refill Protocol.    Latoya Selby, BS, RN, N  [facility]) [phone]

## 2019-07-01 NOTE — TELEPHONE ENCOUNTER
"Requested Prescriptions   Pending Prescriptions Disp Refills     venlafaxine (EFFEXOR) 37.5 MG tablet [Pharmacy Med Name: VENLAFAXINE HCL 37.5MG TABS]          Last Written Prescription Date:  11.30.18  Last Fill Quantity: 90 tablet,  # refills: 1   Last office visit: 11/30/2018 with prescribing provider:  TRINA Rg                 Future Office Visit:       90 tablet 1     Sig: TAKE ONE TABLET BY MOUTH ONCE DAILY       Serotonin-Norepinephrine Reuptake Inhibitors  Passed - 7/1/2019  9:45 AM        Passed - Blood pressure under 140/90 in past 12 months     BP Readings from Last 3 Encounters:   06/05/19 135/84   04/18/19 143/87   11/30/18 128/88                 Passed - Recent (12 mo) or future (30 days) visit within the authorizing provider's specialty     Patient had office visit in the last 12 months or has a visit in the next 30 days with authorizing provider or within the authorizing provider's specialty.  See \"Patient Info\" tab in inbasket, or \"Choose Columns\" in Meds & Orders section of the refill encounter.              Passed - Medication is active on med list        Passed - Patient is age 18 or older        Passed - No active pregnancy on record        Passed - Normal serum creatinine on file in past 12 months     Recent Labs   Lab Test 04/12/19  0858   CR 0.78             Passed - No positive pregnancy test in past 12 months        "

## 2019-07-01 NOTE — TELEPHONE ENCOUNTER
"PHQ-9 SCORE 9/7/2018 11/30/2018 7/1/2019   PHQ-9 Total Score - - -   PHQ-9 Total Score MyChart - - 1 (Minimal depression)   PHQ-9 Total Score 3 0 1         NAHOMY-7 SCORE 9/7/2018 11/30/2018 7/1/2019   Total Score - - -   Total Score - - 0 (minimal anxiety)   Total Score 3 0 0           Requested Prescriptions   Pending Prescriptions Disp Refills     venlafaxine (EFFEXOR) 37.5 MG tablet 90 tablet 1     Sig: Take 1 tablet (37.5 mg) by mouth daily       Serotonin-Norepinephrine Reuptake Inhibitors  Passed - 7/1/2019  1:53 PM        Passed - Blood pressure under 140/90 in past 12 months     BP Readings from Last 3 Encounters:   06/05/19 135/84   04/18/19 143/87   11/30/18 128/88                 Passed - Recent (12 mo) or future (30 days) visit within the authorizing provider's specialty     Patient had office visit in the last 12 months or has a visit in the next 30 days with authorizing provider or within the authorizing provider's specialty.  See \"Patient Info\" tab in inbasket, or \"Choose Columns\" in Meds & Orders section of the refill encounter.              Passed - Medication is active on med list        Passed - Patient is age 18 or older        Passed - No active pregnancy on record        Passed - Normal serum creatinine on file in past 12 months     Recent Labs   Lab Test 04/12/19  0858   CR 0.78             Passed - No positive pregnancy test in past 12 months          Prescription approved per [facility] Refill Protocol.      Latoya Selby, BS, RN, PHN  [facility] [phone]      "

## 2019-07-01 NOTE — TELEPHONE ENCOUNTER
"Requested Prescriptions   Pending Prescriptions Disp Refills     venlafaxine (EFFEXOR) 37.5 MG tablet [Pharmacy Med Name: VENLAFAXINE HCL 37.5MG TABS]          Last Written Prescription Date:  11.30.18  Last Fill Quantity: 90 tablet,  # refills: 1   Last office visit: 11/30/2018 with prescribing provider:  TRINA Rg             Future Office Visit:       90 tablet 1     Sig: TAKE ONE TABLET BY MOUTH ONCE DAILY       Serotonin-Norepinephrine Reuptake Inhibitors  Passed - 6/29/2019  9:36 AM        Passed - Blood pressure under 140/90 in past 12 months     BP Readings from Last 3 Encounters:   06/05/19 135/84   04/18/19 143/87   11/30/18 128/88                 Passed - Recent (12 mo) or future (30 days) visit within the authorizing provider's specialty     Patient had office visit in the last 12 months or has a visit in the next 30 days with authorizing provider or within the authorizing provider's specialty.  See \"Patient Info\" tab in inbasket, or \"Choose Columns\" in Meds & Orders section of the refill encounter.              Passed - Medication is active on med list        Passed - Patient is age 18 or older        Passed - No active pregnancy on record        Passed - Normal serum creatinine on file in past 12 months     Recent Labs   Lab Test 04/12/19  0858   CR 0.78             Passed - No positive pregnancy test in past 12 months        "

## 2019-07-01 NOTE — TELEPHONE ENCOUNTER
PHQ-9 SCORE 11/3/2017 9/7/2018 11/30/2018   PHQ-9 Total Score - - -   PHQ-9 Total Score [email or web address] - - -   PHQ-9 Total Score 0 3 0       NAHOMY-7 SCORE 11/3/2017 9/7/2018 11/30/2018   Total Score - - -   Total Score - - -   Total Score 0 3 0             PHQ-9 and NAHOMY-7 sent through [employer].      Latoya Selby, BS, RN, PHN  [facility] [phone]

## 2019-07-02 ASSESSMENT — PATIENT HEALTH QUESTIONNAIRE - PHQ9: SUM OF ALL RESPONSES TO PHQ QUESTIONS 1-9: 1

## 2019-07-02 ASSESSMENT — ANXIETY QUESTIONNAIRES: GAD7 TOTAL SCORE: 0

## 2019-07-13 DIAGNOSIS — E78.1 HYPERTRIGLYCERIDEMIA: ICD-10-CM

## 2019-07-15 ENCOUNTER — MYC REFILL (OUTPATIENT)
Dept: FAMILY MEDICINE | Facility: CLINIC | Age: 59
End: 2019-07-15

## 2019-07-15 DIAGNOSIS — K21.9 GASTROESOPHAGEAL REFLUX DISEASE WITHOUT ESOPHAGITIS: ICD-10-CM

## 2019-07-15 RX ORDER — FENOFIBRATE 160 MG/1
TABLET ORAL
Qty: 30 TABLET | Refills: 0 | Status: SHIPPED | OUTPATIENT
Start: 2019-07-15 | End: 2019-08-19

## 2019-07-15 NOTE — TELEPHONE ENCOUNTER
Prescription approved per [facility] Refill Protocol.  30 day approval given. Due for medication recheck office visit per last office note.    COCO Gregory, RN, N  [facility] [phone]

## 2019-07-15 NOTE — TELEPHONE ENCOUNTER
"Requested Prescriptions   Pending Prescriptions Disp Refills     fenofibrate (TRIGLIDE/LOFIBRA) 160 MG tablet [Pharmacy Med Name: FENOFIBRATE 160MG TABS]          Last Written Prescription Date:  11.30.18  Last Fill Quantity: 90 tablet,  # refills: 1   Last office visit: 11/30/2018 with prescribing provider:  Gayatri Stephens MD               Future Office Visit:       90 tablet 1     Sig: TAKE ONE TABLET BY MOUTH ONCE DAILY       Fibrates Passed - 7/13/2019 12:31 PM        Passed - Lipid panel on file in past 12 months     Recent Labs   Lab Test 04/12/19  0858  05/14/14  0753   CHOL 233*   < > 246*   TRIG 202*   < > 424*   HDL 57   < > 40*   *   < > Cannot estimate LDL when triglyceride exceeds 400 mg/dL  132*   NHDL 176*   < >  --    VLDL  --   --  Cannot estimate VLDL when triglyceride exceeds 400 mg/dL.   CHOLHDLRATIO  --   --  6.2*    < > = values in this interval not displayed.               Passed - No abnormal creatine kinase in past 12 months     Recent Labs   Lab Test 05/23/18  0743   CKT 74                Passed - Recent (12 mo) or future (30 days) visit within the authorizing provider's specialty     Patient had office visit in the last 12 months or has a visit in the next 30 days with authorizing provider or within the authorizing provider's specialty.  See \"Patient Info\" tab in inbasket, or \"Choose Columns\" in Meds & Orders section of the refill encounter.              Passed - Medication is active on med list        Passed - Patient is age 18 or older        Passed - No active pregnancy on record        Passed - No positive pregnancy test in past 12 months        "

## 2019-07-16 NOTE — TELEPHONE ENCOUNTER
"Requested Prescriptions   Pending Prescriptions Disp Refills     omeprazole (PRILOSEC) 20 MG DR capsule          Last Written Prescription Date:  5.21.18  Last Fill Quantity: 90 capsule,  # refills: 3   Last office visit: 11/30/2018 with prescribing provider:  Gayatri Stephens MD           Future Office Visit:       90 capsule 3     Sig: Take 1 capsule (20 mg) by mouth daily       PPI Protocol Passed - 7/15/2019  1:15 PM        Passed - Not on Clopidogrel (unless Pantoprazole ordered)        Passed - No diagnosis of osteoporosis on record        Passed - Recent (12 mo) or future (30 days) visit within the authorizing provider's specialty     Patient had office visit in the last 12 months or has a visit in the next 30 days with authorizing provider or within the authorizing provider's specialty.  See \"Patient Info\" tab in inbasket, or \"Choose Columns\" in Meds & Orders section of the refill encounter.              Passed - Medication is active on med list        Passed - Patient is age 18 or older        Passed - No active pregnacy on record        Passed - No positive pregnancy test in past 12 months        "

## 2019-07-18 ENCOUNTER — E-VISIT (OUTPATIENT)
Dept: FAMILY MEDICINE | Facility: CLINIC | Age: 59
End: 2019-07-18
Payer: COMMERCIAL

## 2019-07-18 DIAGNOSIS — J01.01 ACUTE RECURRENT MAXILLARY SINUSITIS: Primary | ICD-10-CM

## 2019-07-18 PROCEDURE — 99444 ZZC PHYSICIAN ONLINE EVALUATION & MANAGEMENT SERVICE: CPT | Performed by: FAMILY MEDICINE

## 2019-07-18 RX ORDER — AZITHROMYCIN 250 MG/1
TABLET, FILM COATED ORAL
Qty: 6 TABLET | Refills: 3 | Status: SHIPPED | OUTPATIENT
Start: 2019-07-18 | End: 2020-06-07

## 2019-07-19 NOTE — PATIENT INSTRUCTIONS
Patient Education     Sinusitis (Antibiotic Treatment)    The sinuses are air-filled spaces within the bones of the face. They connect to the inside of the nose. Sinusitis is an inflammation of the tissue that lines the sinuses. Sinusitis can occur during a cold. It can also happen due to allergies to pollens and other particles in the air. Sinusitis can cause symptoms of sinus congestion and a feeling of fullness. A sinus infection causes fever, headache, and facial pain. There is often green or yellow fluid draining from the nose or into the back of the throat (post-nasal drip). You have been given antibiotics to treat this condition.  Home care    Take the full course of antibiotics as instructed. Do not stop taking them, even when you feel better.    Drink plenty of water, hot tea, and other liquids. This may help thin nasal mucus. It also may help your sinuses drain fluids.    Heat may help soothe painful areas of your face. Use a towel soaked in hot water. Or,  the shower and direct the warm spray onto your face. Using a vaporizer along with a menthol rub at night may also help soothe symptoms.     An expectorant with guaifenesin may help thin nasal mucus and help your sinuses drain fluids.    You can use an over-the-counter decongestant, unless a similar medicine was prescribed to you. Nasal sprays work the fastest. Use one that contains phenylephrine or oxymetazoline. First blow your nose gently. Then use the spray. Do not use these medicines more often than directed on the label. If you do, your symptoms may get worse. You may also take pills that contain pseudoephedrine. Don t use products that combine multiple medicines. This is because side effects may be increased. Read labels. You can also ask the pharmacist for help. (People with high blood pressure should not use decongestants. They can raise blood pressure.)    Over-the-counter antihistamines may help if allergies contributed to your  sinusitis.      Do not use nasal rinses or irrigation during an acute sinus infection, unless your healthcare provider tells you to. Rinsing may spread the infection to other areas in your sinuses.    Use acetaminophen or ibuprofen to control pain, unless another pain medicine was prescribed to you. If you have chronic liver or kidney disease or ever had a stomach ulcer, talk with your healthcare provider before using these medicines. (Aspirin should never be taken by anyone under age 18 who is ill with a fever. It may cause severe liver damage.)    Don't smoke. This can make symptoms worse.  Follow-up care  Follow up with your healthcare provider or our staff if you are better in 1 week.  When to seek medical advice  Call your healthcare provider if any of these occur:    Facial pain or headache that gets worse    Stiff neck    Unusual drowsiness or confusion    Swelling of your forehead or eyelids    Vision problems, such as blurred or double vision    Fever of 100.4 F (38 C) or higher, or as directed by your healthcare provider    Seizure    Breathing problems    Symptoms don't go away in 10 days  Prevention  Here are steps you can take to help prevent an infection:    Keep good hand washing habits.    Don t have close contact with people who have sore throats, colds, or other upper respiratory infections.    Don t smoke, and stay away from secondhand smoke.    Stay up to date with of your vaccines.  Date Last Reviewed: 11/1/2017 2000-2018 [employer]. [address] [place], [place] [address]. All rights reserved. This information is not intended as a substitute for professional medical care. Always follow your healthcare professional's instructions.               Thank you for choosing [facility]  for your Health Care. It was a pleasure seeing you at your visit today. Please contact us with any questions or concerns you may have.                   Gayatri Stephens MD                                   To reach your [facility] team after hours call:   [phone]    Our clinic hours are:     Monday- 7:30 am - 7:00 pm                             Tuesday through Friday- 7:30 am - 5:00 pm                                        Saturday- 8:00 am - 12:00 pm                  Phone:  [phone]    Our pharmacy hours are:     Monday  8:00 am to 7:00 pm      Tuesday through Friday 8:00am to 6:00pm                        Saturday - 9:00 am to 1:00 pm      Sunday : Closed.              Phone:  [phone]      There is also information available at our web site:  [email or web address]    If your provider ordered any lab tests and you do not receive the results within 10 business days, please call the clinic.    If you need a medication refill please contact your pharmacy.  Please allow 2 business days for your refill to be completed.    Our clinic offers telephone visits and e visits.  Please ask one of your team members to explain more.      Use [employer] (secure email communication and access to your chart) to send your primary care provider a message or make an appointment. Ask someone on your Team how to sign up for [employer].

## 2019-07-26 ENCOUNTER — HOSPITAL ENCOUNTER (OUTPATIENT)
Dept: MAMMOGRAPHY | Facility: CLINIC | Age: 59
Discharge: HOME | End: 2019-07-26
Attending: FAMILY MEDICINE | Admitting: FAMILY MEDICINE
Payer: COMMERCIAL

## 2019-07-26 DIAGNOSIS — Z12.31 VISIT FOR SCREENING MAMMOGRAM: ICD-10-CM

## 2019-07-26 PROCEDURE — 77067 SCR MAMMO BI INCL CAD: CPT

## 2019-08-10 DIAGNOSIS — I10 ESSENTIAL HYPERTENSION WITH GOAL BLOOD PRESSURE LESS THAN 140/90: ICD-10-CM

## 2019-08-12 NOTE — TELEPHONE ENCOUNTER
"Requested Prescriptions   Pending Prescriptions Disp Refills     triamterene-HCTZ (DYAZIDE) 37.5-25 MG capsule [Pharmacy Med Name: TRIAMTERENE-HCTZ 37.5-25MG CAPS] 90 capsule 1     Sig: TAKE ONE CAPSULE BY MOUTH ONCE DAILY       Last Written Prescription Date:  11/30/2018  Last Fill Quantity: 90,  # refills: 1   Last office visit: 11/30/2018 with prescribing provider:     Future Office Visit:          Diuretics (Including Combos) Protocol Passed - 8/10/2019  7:44 PM        Passed - Blood pressure under 140/90 in past 12 months     BP Readings from Last 3 Encounters:   06/05/19 135/84   04/18/19 143/87   11/30/18 128/88                 Passed - Recent (12 mo) or future (30 days) visit within the authorizing provider's specialty     Patient had office visit in the last 12 months or has a visit in the next 30 days with authorizing provider or within the authorizing provider's specialty.  See \"Patient Info\" tab in inbasket, or \"Choose Columns\" in Meds & Orders section of the refill encounter.              Passed - Medication is active on med list        Passed - Patient is age 18 or older        Passed - No active pregancy on record        Passed - Normal serum creatinine on file in past 12 months     Recent Labs   Lab Test 04/12/19  0858   CR 0.78              Passed - Normal serum potassium on file in past 12 months     Recent Labs   Lab Test 04/12/19  0858   POTASSIUM 4.1                    Passed - Normal serum sodium on file in past 12 months     Recent Labs   Lab Test 04/12/19  0858                 Passed - No positive pregnancy test in past 12 months        "

## 2019-08-13 ENCOUNTER — MYC REFILL (OUTPATIENT)
Dept: FAMILY MEDICINE | Facility: CLINIC | Age: 59
End: 2019-08-13

## 2019-08-13 DIAGNOSIS — I10 ESSENTIAL HYPERTENSION WITH GOAL BLOOD PRESSURE LESS THAN 140/90: ICD-10-CM

## 2019-08-13 RX ORDER — TRIAMTERENE AND HYDROCHLOROTHIAZIDE 37.5; 25 MG/1; MG/1
CAPSULE ORAL
Qty: 90 CAPSULE | Refills: 1 | Status: SHIPPED | OUTPATIENT
Start: 2019-08-13 | End: 2019-12-09

## 2019-08-13 NOTE — TELEPHONE ENCOUNTER
Prescription approved per [facility] Refill Protocol.    Naveed Palacio RN   [facility]

## 2019-08-14 RX ORDER — TRIAMTERENE AND HYDROCHLOROTHIAZIDE 37.5; 25 MG/1; MG/1
1 CAPSULE ORAL DAILY
Qty: 90 CAPSULE | Refills: 1 | OUTPATIENT
Start: 2019-08-14

## 2019-08-14 NOTE — TELEPHONE ENCOUNTER
"Duplicate- sent - info sent to pharmacy.  Cherry Martinez,RN  [facility]  [phone]      Last Written Prescription Date:  08/13/19  Last Fill Quantity: 90,  # refills: 1   Requested Prescriptions   Pending Prescriptions Disp Refills     triamterene-HCTZ (DYAZIDE) 37.5-25 MG capsule 90 capsule 1     Sig: Take 1 capsule by mouth daily       Diuretics (Including Combos) Protocol Passed - 8/13/2019  8:47 AM        Passed - Blood pressure under 140/90 in past 12 months     BP Readings from Last 3 Encounters:   06/05/19 135/84   04/18/19 143/87   11/30/18 128/88                 Passed - Recent (12 mo) or future (30 days) visit within the authorizing provider's specialty     Patient had office visit in the last 12 months or has a visit in the next 30 days with authorizing provider or within the authorizing provider's specialty.  See \"Patient Info\" tab in inbasket, or \"Choose Columns\" in Meds & Orders section of the refill encounter.              Passed - Medication is active on med list        Passed - Patient is age 18 or older        Passed - No active pregancy on record        Passed - Normal serum creatinine on file in past 12 months     Recent Labs   Lab Test 04/12/19  0858   CR 0.78              Passed - Normal serum potassium on file in past 12 months     Recent Labs   Lab Test 04/12/19  0858   POTASSIUM 4.1                    Passed - Normal serum sodium on file in past 12 months     Recent Labs   Lab Test 04/12/19  0858                 Passed - No positive pregnancy test in past 12 months            "

## 2019-08-19 DIAGNOSIS — E78.1 HYPERTRIGLYCERIDEMIA: ICD-10-CM

## 2019-08-19 NOTE — TELEPHONE ENCOUNTER
"Requested Prescriptions   Pending Prescriptions Disp Refills     fenofibrate (TRIGLIDE/LOFIBRA) 160 MG tablet [Pharmacy Med Name: FENOFIBRATE 160MG TABS] 30 tablet 0     Sig: TAKE ONE TABLET BY MOUTH ONCE DAILY       Last Written Prescription Date:  7/15/2019  Last Fill Quantity: 30,  # refills: 0   Last office visit: 11/30/2018 with prescribing provider:     Future Office Visit:          Fibrates Passed - 8/19/2019  6:07 PM        Passed - Lipid panel on file in past 12 months     Recent Labs   Lab Test 04/12/19  0858  05/14/14  0753   CHOL 233*   < > 246*   TRIG 202*   < > 424*   HDL 57   < > 40*   *   < > Cannot estimate LDL when triglyceride exceeds 400 mg/dL  132*   NHDL 176*   < >  --    VLDL  --   --  Cannot estimate VLDL when triglyceride exceeds 400 mg/dL.   CHOLHDLRATIO  --   --  6.2*    < > = values in this interval not displayed.               Passed - No abnormal creatine kinase in past 12 months     Recent Labs   Lab Test 05/23/18  0743   CKT 74                Passed - Recent (12 mo) or future (30 days) visit within the authorizing provider's specialty     Patient had office visit in the last 12 months or has a visit in the next 30 days with authorizing provider or within the authorizing provider's specialty.  See \"Patient Info\" tab in inbasket, or \"Choose Columns\" in Meds & Orders section of the refill encounter.              Passed - Medication is active on med list        Passed - Patient is age 18 or older        Passed - No active pregnancy on record        Passed - No positive pregnancy test in past 12 months        "

## 2019-08-20 NOTE — TELEPHONE ENCOUNTER
Routing refill request to provider for review/approval because:  Labs out of range:  Lipid panel    COCO DeweyN, RN  [employer] [facility] [facility]

## 2019-08-21 RX ORDER — FENOFIBRATE 160 MG/1
TABLET ORAL
Qty: 90 TABLET | Refills: 0 | Status: SHIPPED | OUTPATIENT
Start: 2019-08-21 | End: 2019-11-03

## 2019-09-27 ENCOUNTER — HEALTH MAINTENANCE LETTER (OUTPATIENT)
Age: 59
End: 2019-09-27

## 2019-10-28 ENCOUNTER — MYC MEDICAL ADVICE (OUTPATIENT)
Dept: FAMILY MEDICINE | Facility: CLINIC | Age: 59
End: 2019-10-28

## 2019-10-28 DIAGNOSIS — Z86.69 HISTORY OF GUILLAIN-BARRE SYNDROME: Primary | ICD-10-CM

## 2019-10-28 NOTE — TELEPHONE ENCOUNTER
Forwarded to J.  Please review patient's [employer] message and advise.  Letter pending. Please review and revise if needed.    Latoya Selby, BS, RN, PHN  [facility]) [phone]

## 2019-10-28 NOTE — LETTER
[facility]  [address] [place] [id number]  Phone: [phone]    10/28/19    Connie Brunson  [address] [facility] [address]      To whom it may concern:     Connie Brunson is a 56 year old female  patient of mine for greater than 10 years who cannot receive the flu shot secondary to a history of Guillain -Appleton syndrome. Pt was diagnosed with this  in [place] years ago.  The risk of immunization with flu vaccine currently outweighs the benefit.  Please , allow her an exemption to the flu shot requirement in perpetuity.         Sincerely,         Gayatri Stephens M.D.

## 2019-10-31 NOTE — TELEPHONE ENCOUNTER
Routing to PCP for further review/recommendations/orders.    Vero Jones RN  [facility]

## 2019-10-31 NOTE — TELEPHONE ENCOUNTER
Letter completed and replied to pt.   Letter  at [employer], please fax or send to pt's desired destination.

## 2019-11-03 DIAGNOSIS — E78.1 HYPERTRIGLYCERIDEMIA: ICD-10-CM

## 2019-11-04 NOTE — TELEPHONE ENCOUNTER
"Requested Prescriptions   Pending Prescriptions Disp Refills     fenofibrate (TRIGLIDE/LOFIBRA) 160 MG tablet [Pharmacy Med Name: FENOFIBRATE 160MG TABS]        Last Written Prescription Date:  8.21.19  Last Fill Quantity: 90 tablet,  # refills: 0   Last office visit: 11/30/2018 with prescribing provider:  Gayatri Stephens MD             Future Office Visit:       90 tablet 0     Sig: TAKE ONE TABLET BY MOUTH ONCE DAILY       Fibrates Failed - 11/3/2019  3:54 PM        Failed - Recent (12 mo) or future (30 days) visit within the authorizing provider's specialty     Patient has had an office visit with the authorizing provider or a provider within the authorizing providers department within the previous 12 mos or has a future within next 30 days. See \"Patient Info\" tab in inbasket, or \"Choose Columns\" in Meds & Orders section of the refill encounter.              Passed - Lipid panel on file in past 12 months     Recent Labs   Lab Test 04/12/19  0858  05/14/14  0753   CHOL 233*   < > 246*   TRIG 202*   < > 424*   HDL 57   < > 40*   *   < > Cannot estimate LDL when triglyceride exceeds 400 mg/dL  132*   NHDL 176*   < >  --    VLDL  --   --  Cannot estimate VLDL when triglyceride exceeds 400 mg/dL.   CHOLHDLRATIO  --   --  6.2*    < > = values in this interval not displayed.               Passed - No abnormal creatine kinase in past 12 months     Recent Labs   Lab Test 05/23/18  0743   CKT 74                Passed - Medication is active on med list        Passed - Patient is age 18 or older        Passed - No active pregnancy on record        Passed - No positive pregnancy test in past 12 months        "

## 2019-11-05 RX ORDER — FENOFIBRATE 160 MG/1
TABLET ORAL
Qty: 30 TABLET | Refills: 0 | Status: SHIPPED | OUTPATIENT
Start: 2019-11-05 | End: 2019-12-09

## 2019-11-05 NOTE — TELEPHONE ENCOUNTER
Patient due for fasting office visit- 30 days supply given.  Routing to team to schedule appointment     Cherry SHEETS RN  [facility]  [phone]

## 2019-11-06 NOTE — TELEPHONE ENCOUNTER
Left non-detailed message for patient to call back.  Please schedule follow up when patient calls back.  (see previous notes for details)    Thanks Cherry

## 2019-11-13 NOTE — TELEPHONE ENCOUNTER
Made a med check/fasting lab appt with Emilia Barillas on 12/9.   She has a full physical with her oncologist in April of 2020 so does not think she needs to schedule another physical.       Cherry Back

## 2019-12-09 ENCOUNTER — OFFICE VISIT (OUTPATIENT)
Dept: FAMILY MEDICINE | Facility: CLINIC | Age: 59
End: 2019-12-09
Payer: COMMERCIAL

## 2019-12-09 VITALS
TEMPERATURE: 98.3 F | HEIGHT: 60 IN | DIASTOLIC BLOOD PRESSURE: 82 MMHG | HEART RATE: 92 BPM | OXYGEN SATURATION: 95 % | WEIGHT: 165 LBS | BODY MASS INDEX: 32.39 KG/M2 | SYSTOLIC BLOOD PRESSURE: 130 MMHG

## 2019-12-09 DIAGNOSIS — Z78.9 POLST (PHYSICIAN ORDERS FOR LIFE-SUSTAINING TREATMENT): ICD-10-CM

## 2019-12-09 DIAGNOSIS — K21.9 GASTROESOPHAGEAL REFLUX DISEASE WITHOUT ESOPHAGITIS: ICD-10-CM

## 2019-12-09 DIAGNOSIS — I10 ESSENTIAL HYPERTENSION WITH GOAL BLOOD PRESSURE LESS THAN 140/90: ICD-10-CM

## 2019-12-09 DIAGNOSIS — E78.1 HYPERTRIGLYCERIDEMIA: ICD-10-CM

## 2019-12-09 DIAGNOSIS — Z00.00 ROUTINE GENERAL MEDICAL EXAMINATION AT A HEALTH CARE FACILITY: Primary | ICD-10-CM

## 2019-12-09 DIAGNOSIS — Z13.29 SCREENING FOR THYROID DISORDER: ICD-10-CM

## 2019-12-09 DIAGNOSIS — F41.9 ANXIETY: ICD-10-CM

## 2019-12-09 LAB
ALBUMIN SERPL-MCNC: 4.1 G/DL (ref 3.4–5)
ALP SERPL-CCNC: 37 U/L (ref 40–150)
ALT SERPL W P-5'-P-CCNC: 48 U/L (ref 0–50)
ANION GAP SERPL CALCULATED.3IONS-SCNC: 10 MMOL/L (ref 3–14)
AST SERPL W P-5'-P-CCNC: 29 U/L (ref 0–45)
BASOPHILS # BLD AUTO: 0.1 10E9/L (ref 0–0.2)
BASOPHILS NFR BLD AUTO: 0.8 %
BILIRUB SERPL-MCNC: 0.4 MG/DL (ref 0.2–1.3)
BUN SERPL-MCNC: 17 MG/DL (ref 7–30)
CALCIUM SERPL-MCNC: 8.8 MG/DL (ref 8.5–10.1)
CHLORIDE SERPL-SCNC: 99 MMOL/L (ref 94–109)
CHOLEST SERPL-MCNC: 243 MG/DL
CO2 SERPL-SCNC: 26 MMOL/L (ref 20–32)
CREAT SERPL-MCNC: 0.73 MG/DL (ref 0.52–1.04)
CREAT UR-MCNC: 251 MG/DL
DIFFERENTIAL METHOD BLD: NORMAL
EOSINOPHIL # BLD AUTO: 0.3 10E9/L (ref 0–0.7)
EOSINOPHIL NFR BLD AUTO: 4.1 %
ERYTHROCYTE [DISTWIDTH] IN BLOOD BY AUTOMATED COUNT: 12.1 % (ref 10–15)
GFR SERPL CREATININE-BSD FRML MDRD: 88 ML/MIN/{1.73_M2}
GLUCOSE SERPL-MCNC: 101 MG/DL (ref 70–99)
HCT VFR BLD AUTO: 38.2 % (ref 35–47)
HDLC SERPL-MCNC: 51 MG/DL
HGB BLD-MCNC: 13.3 G/DL (ref 11.7–15.7)
LDLC SERPL CALC-MCNC: 141 MG/DL
LYMPHOCYTES # BLD AUTO: 1.6 10E9/L (ref 0.8–5.3)
LYMPHOCYTES NFR BLD AUTO: 23.4 %
MCH RBC QN AUTO: 32.9 PG (ref 26.5–33)
MCHC RBC AUTO-ENTMCNC: 34.8 G/DL (ref 31.5–36.5)
MCV RBC AUTO: 95 FL (ref 78–100)
MICROALBUMIN UR-MCNC: 12 MG/L
MICROALBUMIN/CREAT UR: 4.74 MG/G CR (ref 0–25)
MONOCYTES # BLD AUTO: 0.5 10E9/L (ref 0–1.3)
MONOCYTES NFR BLD AUTO: 7.9 %
NEUTROPHILS # BLD AUTO: 4.2 10E9/L (ref 1.6–8.3)
NEUTROPHILS NFR BLD AUTO: 63.8 %
NONHDLC SERPL-MCNC: 192 MG/DL
PLATELET # BLD AUTO: 331 10E9/L (ref 150–450)
POTASSIUM SERPL-SCNC: 3.8 MMOL/L (ref 3.4–5.3)
PROT SERPL-MCNC: 7.6 G/DL (ref 6.8–8.8)
RBC # BLD AUTO: 4.04 10E12/L (ref 3.8–5.2)
SODIUM SERPL-SCNC: 135 MMOL/L (ref 133–144)
T4 FREE SERPL-MCNC: 0.85 NG/DL (ref 0.76–1.46)
TRIGL SERPL-MCNC: 254 MG/DL
TSH SERPL DL<=0.005 MIU/L-ACNC: 4.07 MU/L (ref 0.4–4)
WBC # BLD AUTO: 6.6 10E9/L (ref 4–11)

## 2019-12-09 PROCEDURE — 80050 GENERAL HEALTH PANEL: CPT | Performed by: NURSE PRACTITIONER

## 2019-12-09 PROCEDURE — 36415 COLL VENOUS BLD VENIPUNCTURE: CPT | Performed by: NURSE PRACTITIONER

## 2019-12-09 PROCEDURE — 84439 ASSAY OF FREE THYROXINE: CPT | Performed by: NURSE PRACTITIONER

## 2019-12-09 PROCEDURE — 82043 UR ALBUMIN QUANTITATIVE: CPT | Performed by: NURSE PRACTITIONER

## 2019-12-09 PROCEDURE — 80061 LIPID PANEL: CPT | Performed by: NURSE PRACTITIONER

## 2019-12-09 PROCEDURE — 99396 PREV VISIT EST AGE 40-64: CPT | Performed by: NURSE PRACTITIONER

## 2019-12-09 PROCEDURE — 96127 BRIEF EMOTIONAL/BEHAV ASSMT: CPT | Performed by: NURSE PRACTITIONER

## 2019-12-09 RX ORDER — VENLAFAXINE 37.5 MG/1
37.5 TABLET ORAL DAILY
Qty: 90 TABLET | Refills: 1 | Status: SHIPPED | OUTPATIENT
Start: 2019-12-09 | End: 2020-06-09

## 2019-12-09 RX ORDER — TRIAMTERENE AND HYDROCHLOROTHIAZIDE 37.5; 25 MG/1; MG/1
1 CAPSULE ORAL DAILY
Qty: 90 CAPSULE | Refills: 3 | Status: SHIPPED | OUTPATIENT
Start: 2019-12-09 | End: 2021-01-29

## 2019-12-09 RX ORDER — FENOFIBRATE 160 MG/1
160 TABLET ORAL DAILY
Qty: 90 TABLET | Refills: 3 | Status: SHIPPED | OUTPATIENT
Start: 2019-12-09 | End: 2020-12-23

## 2019-12-09 RX ORDER — LISINOPRIL 10 MG/1
10 TABLET ORAL DAILY
Qty: 90 TABLET | Refills: 3 | Status: SHIPPED | OUTPATIENT
Start: 2019-12-09 | End: 2020-12-09

## 2019-12-09 ASSESSMENT — ANXIETY QUESTIONNAIRES
5. BEING SO RESTLESS THAT IT IS HARD TO SIT STILL: NOT AT ALL
6. BECOMING EASILY ANNOYED OR IRRITABLE: NOT AT ALL
1. FEELING NERVOUS, ANXIOUS, OR ON EDGE: NOT AT ALL
3. WORRYING TOO MUCH ABOUT DIFFERENT THINGS: NOT AT ALL
7. FEELING AFRAID AS IF SOMETHING AWFUL MIGHT HAPPEN: NOT AT ALL
GAD7 TOTAL SCORE: 0
2. NOT BEING ABLE TO STOP OR CONTROL WORRYING: NOT AT ALL

## 2019-12-09 ASSESSMENT — MIFFLIN-ST. JEOR: SCORE: 1227

## 2019-12-09 ASSESSMENT — PATIENT HEALTH QUESTIONNAIRE - PHQ9
SUM OF ALL RESPONSES TO PHQ QUESTIONS 1-9: 1
5. POOR APPETITE OR OVEREATING: NOT AT ALL

## 2019-12-09 NOTE — PROGRESS NOTES
SUBJECTIVE:   CC: Connie Brunson is an 59 year old woman who presents for preventive health visit.     Healthy Habits:    Do you get at least three servings of calcium containing foods daily (dairy, green leafy vegetables, etc.)? yes    Amount of exercise or daily activities, outside of work: 0 day(s) per week    Problems taking medications regularly No    Medication side effects: No    Have you had an eye exam in the past two years? yes    Do you see a dentist twice per year? yes    Do you have sleep apnea, excessive snoring or daytime drowsiness?no    Hypertension Follow-up      Do you check your blood pressure regularly outside of the clinic? No     Are you following a low salt diet? No    Are your blood pressures ever more than 140 on the top number (systolic) OR more   than 90 on the bottom number (diastolic), for example 140/90? No    Anxiety Follow-Up    How are you doing with your anxiety since your last visit? Stable    Are you having other symptoms that might be associated with anxiety? No    Have you had a significant life event? No     Are you feeling depressed? No    Do you have any concerns with your use of alcohol or other drugs? No    Stable mood feels good excellent control of symptoms. Reports she only continues with medication due to side effects when she discontinues it.  Denies suicidal or homicidal ideation.    Last PHQ-9 12/9/2019   1.  Little interest or pleasure in doing things 0   2.  Feeling down, depressed, or hopeless 0   3.  Trouble falling or staying asleep, or sleeping too much 0   4.  Feeling tired or having little energy 0   5.  Poor appetite or overeating 1   6.  Feeling bad about yourself 0   7.  Trouble concentrating 0   8.  Moving slowly or restless 0   Q9: Thoughts of better off dead/self-harm past 2 weeks 0   PHQ-9 Total Score 1   Difficulty at work, home, or with people Not difficult at all     NAHOMY-7  12/9/2019   1. Feeling nervous, anxious, or on edge 0   2. Not being  able to stop or control worrying 0   3. Worrying too much about different things 0   4. Trouble relaxing 0   5. Being so restless that it is hard to sit still 0   6. Becoming easily annoyed or irritable 0   7. Feeling afraid, as if something awful might happen 0   NAHOMY-7 Total Score 0   If you checked any problems, how difficult have they made it for you to do your work, take care of things at home, or get along with other people? 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 Pulmonary nodule. Primary malignant neuroendocrine neoplasm  of ileum (H).  Has every 6 months chest CT scan with Dr. Weir.     Abuse: Current or Past(Physical, Sexual or Emotional)- No  Do you feel safe in your environment? Yes    Have you ever done Advance Care Planning? (For example, a Health Directive, POLST, or a discussion with a medical provider or your loved ones about your wishes): Yes, advance care planning is on file.  Wants to complete POLST today.    If you drink alcohol do you typically have >3 drinks per day or >7 drinks per week? No                     Reviewed orders with patient.  Reviewed health maintenance and updated orders accordingly - Yes  Lab work is in process  Labs reviewed in EPIC  BP Readings from Last 3 Encounters:   12/09/19 130/82   06/05/19 135/84   04/18/19 143/87    Wt Readings from Last 3 Encounters:   12/09/19 74.8 kg (165 lb)   04/18/19 71.9 kg (158 lb 9.6 oz)   11/30/18 71.2 kg (157 lb)            Patient Active Problem List   Diagnosis     Allergic rhinitis     Diverticulosis of large intestine     Benign meningioma-right frontal posterior - no more annual MRI's  needed per neurosurgery     Symptomatic menopausal or female climacteric states     Benign neoplasm of tonsil     Anxiety     HYPERLIPIDEMIA LDL GOAL <130 [272.4CK] - joint aches w/ simvastatin 11/2010,lipitor=nausea/abd pain 1/2012     Primary insomnia     Major depressive disorder, recurrent episode, mild (H)     Essential hypertension with goal blood pressure less than 140/90     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- ophtho recommended auto-immune/arthritis work-up     Epiploic appendagitis- 2008 and 10/26/2014     Advanced care planning/counseling discussion     Controlled substance agreement signed- re-signed 3/7/2018 ambien #30 -5mg tabs monthly - refill x5 - ok to see q6 months      Gastroesophageal reflux disease without esophagitis     Hypertriglyceridemia     S/P laparoscopic cholecystectomy for acute cholecystitis with cholelithiasis     Postsurgical dumping syndrome - s/p lap shashi - consider cholestyramine     Mass of small intestine- distal ileum - seen on CT scan at time of diverticulitis      Primary malignant neuroendocrine neoplasm of ileum (H)     Pulmonary nodules     Diarrhea, unspecified type- since sm bowel resection 11/2017     Hepatic steatosis - per MRI RUQ abdomen 4/12/2019 - liver not enlarged      Past Surgical History:   Procedure Laterality Date     ABDOMEN SURGERY       BIOPSY       C LIGATE FALLOPIAN TUBE  1988    Tubal Ligation     C NONSPECIFIC PROCEDURE      PAROTID TUMOR L SIDE OF NECK - BENIGN     C TOTAL ABDOM HYSTERECTOMY  2000 ?    Hysterectomy, Total Abdominal with BSO     COLONOSCOPY  1/16/2012    Procedure:COLONOSCOPY; Colonoscopy; Surgeon:SHOLA JOYCE; Location: GI     HC REMOVE TONSILS/ADENOIDS,<13 Y/O      T and A, under 12 yrs     HEAD & NECK SURGERY       LAPAROSCOPIC CHOLECYSTECTOMY WITH CHOLANGIOGRAMS N/A 4/25/2016    Procedure: LAPAROSCOPIC CHOLECYSTECTOMY WITH CHOLANGIOGRAMS;  Surgeon: Rosa M Cristobal MD;  Location:  OR      LAPAROSCOPY DIAGNOSTIC (GENERAL) N/A 2017    Procedure: LAPAROSCOPY DIAGNOSTIC (GENERAL);  Exploratory Laparoscopy, Laparotomy and Small Bowel resection.;  Surgeon: Rosa M Cristobal MD;  Location: [facility] OR     LAPAROTOMY EXPLORATORY N/A 2017    Procedure: LAPAROTOMY EXPLORATORY;;  Surgeon: Rosa M Cristobal MD;  Location: [facility] OR     RESECT SMALL BOWEL WITHOUT OSTOMY N/A 2017    Procedure: RESECT SMALL BOWEL WITHOUT OSTOMY;;  Surgeon: Rosa M Cristobal MD;  Location: [facility] OR     SURGICAL HISTORY OF -       Menigioma excision       Social History     Tobacco Use     Smoking status: Light Tobacco Smoker     Packs/day: 0.00     Years: 5.00     Pack years: 0.00     Types: Other     Last attempt to quit: 1994     Years since quittin.9     Smokeless tobacco: Current User     Tobacco comment: 11/3/17 - occ e-cig use   Substance Use Topics     Alcohol use: Yes     Alcohol/week: 0.0 standard drinks     Comment: 3-5 drinks per week     Family History   Problem Relation Age of Onset     Hyperlipidemia Mother      Thyroid Disease Mother      Cancer Father         lung     Hypertension Father      Hyperlipidemia Father      Thyroid Disease Brother      Diabetes Maternal Grandmother      Cerebrovascular Disease Maternal Grandmother      Substance Abuse Maternal Grandfather      Thyroid Disease Son          Current Outpatient Medications   Medication Sig Dispense Refill     azithromycin (ZITHROMAX) 250 MG tablet 2 tabs first day, then 1 tab by mouth daily for 4 additional days 6 tablet 3     cetirizine (ZYRTEC) 10 MG tablet Take 10 mg by mouth every evening       diazepam (VALIUM) 10 MG tablet Take 1-2  tab or 10-20 mg 30-60 minutes prior to MRI scan. 4 tablet 1     fenofibrate (TRIGLIDE/LOFIBRA) 160 MG tablet Take 1 tablet (160 mg) by mouth daily 90 tablet 3     fluticasone (FLONASE) 50 MCG/ACT nasal spray Spray 2 sprays into both nostrils daily 48 g 5     hydrocortisone 2.5 % cream Apply  sparingly to dermatitis left lower eye 30 g 3     lisinopril (PRINIVIL/ZESTRIL) 10 MG tablet Take 1 tablet (10 mg) by mouth daily 90 tablet 3     mometasone (ELOCON) 0.1 % cream Apply sparingly to affected area twice daily for 14 days.  Do not apply to face. 15 g 3     omeprazole (PRILOSEC) 20 MG DR capsule Take 1 capsule (20 mg) by mouth daily 90 capsule 1     triamcinolone (KENALOG) 0.5 % cream Apply sparingly to affected area three times daily. 60 g 3     triamterene-HCTZ (DYAZIDE) 37.5-25 MG capsule Take 1 capsule by mouth daily 90 capsule 3     venlafaxine (EFFEXOR) 37.5 MG tablet Take 1 tablet (37.5 mg) by mouth daily 90 tablet 1     Allergies   Allergen Reactions     Atorvastatin Nausea     Nausea and abd pain      Ceftin GI Disturbance     Stomach upset and bloating - given at same time as clindamycin ? Which one as cause.       Clindamycin GI Disturbance     Stomach upset and bloating - given at same time as ceftin, ? Which one as cause      Flagyl [Metronidazole]      immediate migraine headache      Morphine Itching     Severe with nose, facial  Swelling - oxycodone = ok /no problems      Penicillins Hives     Sulfa Drugs Rash     Severe red , flushed rash     Levaquin Rash     States she can take cipro     1/16/2012 Colonoscopy unsure if she wants to continue to get these.  Findings:       A few diverticula were found in the sigmoid colon.                                                                                Impression:               - Diverticulosis sigmoid colon.  Recommendation:           High fiber diet. Repeat in 10 years    Mammogram Screening: Patient over age 50, mutual decision to screen reflected in health maintenance.    07/23/2019 IMPRESSION: BI-RADS CATEGORY: 1 -  NEGATIVE.     RECOMMENDED FOLLOW-UP: Annual Mammography.  Pertinent mammograms are reviewed under the imaging tab.      History of abnormal Pap smear: Status post benign hysterectomy. Health Maintenance and Surgical  "History updated.  PAP / HPV 8/20/2010 3/27/2009 2/9/2007   PAP NIL NIL NIL   PAP DATE - [facility] - - -     Reviewed and updated as needed this visit by clinical staff  Tobacco  Allergies  Meds  Problems  Med Hx  Surg Hx  Fam Hx  Soc Hx        Reviewed and updated as needed this visit by Provider  Tobacco  Allergies  Meds  Problems  Med Hx  Surg Hx  Fam Hx        ROS:  Constitutional, HEENT, cardiovascular, pulmonary, GI, , musculoskeletal, neuro, skin, endocrine and psych systems are negative, except as otherwise noted in the HPI.    OBJECTIVE:   /82 (BP Location: Left arm, Patient Position: Chair, Cuff Size: Adult Large)   Pulse 92   Temp 98.3  F (36.8  C) (Oral)   Ht 1.511 m (4' 11.5\")   Wt 74.8 kg (165 lb)   LMP 09/05/2000   SpO2 95%   Breastfeeding No   BMI 32.77 kg/m    EXAM:  GENERAL: healthy, alert and no distress  EYES: Eyes grossly normal to inspection, PERRL and conjunctivae and sclerae normal  HENT: ear canals and TM's normal, nose and mouth without ulcers or lesions  NECK: no adenopathy, no asymmetry, masses, or scars and thyroid normal to palpation  RESP: lungs clear to auscultation - no rales, rhonchi or wheezes  BREAST: normal without masses, tenderness or nipple discharge and no palpable axillary masses or adenopathy  CV: regular rate and rhythm, normal S1 S2, no S3 or S4, no murmur, click or rub, no peripheral edema and peripheral pulses strong  ABDOMEN: soft, nontender, no hepatosplenomegaly, no masses and bowel sounds normal   (female): declines  MS: no gross musculoskeletal defects noted, no edema  SKIN: no suspicious lesions or rashes  NEURO: Normal strength and tone, mentation intact and speech normal  PSYCH: mentation appears normal, affect normal/bright  LYMPH: no cervical, supraclavicular, axillary, or inguinal adenopathy    Diagnostic Test Results:  Labs reviewed in Epic    ASSESSMENT/PLAN:   Connie was seen today for physical.    Diagnoses and all orders for this " "visit:    Routine general medical examination at a health care facility  Well woman exam with breast exam completed today.    Fasting labs today.    Will notify of lab results.  -     CBC with platelets and differential    Hypertriglyceridemia  No concerns.  Stable.  Continue same medication this was refilled today.  Fasting labs today.   -     Comprehensive metabolic panel (BMP + Alb, Alk Phos, ALT, AST, Total. Bili, TP)  -     Lipid Profile (Chol, Trig, HDL, LDL calc)  -     fenofibrate (TRIGLIDE/LOFIBRA) 160 MG tablet; Take 1 tablet (160 mg) by mouth daily    Anxiety  No concerns.  Stable.  Continue same medication this was refilled today.  Follow-up in 6 months for refill of medication.  She can do a phone visit at that time for med check.  -     venlafaxine (EFFEXOR) 37.5 MG tablet; Take 1 tablet (37.5 mg) by mouth daily    Essential hypertension with goal blood pressure less than 140/90- not well controlled today  No concerns.  Stable.  Continue same medication this was refilled today.  -     Albumin Random Urine Quantitative with Creat Ratio  -     lisinopril (PRINIVIL/ZESTRIL) 10 MG tablet; Take 1 tablet (10 mg) by mouth daily  -     triamterene-HCTZ (DYAZIDE) 37.5-25 MG capsule; Take 1 capsule by mouth daily    Gastroesophageal reflux disease without esophagitis  No concerns.  Stable.  Continue same medication this was refilled today.  -     omeprazole (PRILOSEC) 20 MG DR capsule; Take 1 capsule (20 mg) by mouth daily    Screening for thyroid disorder  Labs pending.   -     TSH with free T4 reflex    POLST  POLST completed today.    COUNSELING:   Reviewed preventive health counseling, as reflected in patient instructions       Regular exercise       Healthy diet/nutrition    Estimated body mass index is 32.77 kg/m  as calculated from the following:    Height as of this encounter: 1.511 m (4' 11.5\").    Weight as of this encounter: 74.8 kg (165 lb).    Weight management plan: Discussed healthy diet and " exercise guidelines     reports that she has been smoking other. She has been smoking about 0.00 packs per day for the past 5.00 years. She uses smokeless tobacco.    Counseling Resources:  ATP IV Guidelines  Pooled Cohorts Equation Calculator  Breast Cancer Risk Calculator  FRAX Risk Assessment  ICSI Preventive Guidelines  Dietary Guidelines for Americans, 2010  USDA's MyPlate  ASA Prophylaxis  Lung CA Screening        Emilia Barillas, [facility]

## 2019-12-10 ASSESSMENT — ANXIETY QUESTIONNAIRES: GAD7 TOTAL SCORE: 0

## 2019-12-11 ENCOUNTER — DOCUMENTATION ONLY (OUTPATIENT)
Dept: OTHER | Facility: CLINIC | Age: 59
End: 2019-12-11

## 2020-02-20 ENCOUNTER — PATIENT OUTREACH (OUTPATIENT)
Dept: ONCOLOGY | Facility: CLINIC | Age: 60
End: 2020-02-20

## 2020-02-20 DIAGNOSIS — C7A.8 PRIMARY MALIGNANT NEUROENDOCRINE NEOPLASM OF ILEUM (H): Primary | ICD-10-CM

## 2020-02-20 NOTE — PROGRESS NOTES
Per Dr. Weir, she recommends the patient to have a bone scan prior to seeing her in April.     Writer notified Connie of Dr. Weir's recommendations. She verbalized understanding and is in agreement with the plan.     Writer explained that once Dr. Weir places the order for the bone scan we will call Connie to schedule it. Connie requests that the bone scan be done on the same day as the other two scans, on April 10th, if possible.     Ailyn Bailey, RN, BSN, [facility]  Patient Care Coordinator  [facility]  [phone]

## 2020-02-20 NOTE — PROGRESS NOTES
"From: Klarissa Sin   Sent: 2/20/2020   1:49 PM CST   To:  Cancer Clinic Rn Pool     I talked with Connie today to help her get scheduled for her 1 yr follow up with Dr Weir in April 2020.  She did say that for the last 3 months she has been having lower left hip/pelvic pain.  I told her that one of the nurses would call her back in regards to this.   Thanks,   Klarissa       Writer called Connie to follow up on her back pain. Per Connie, her lower back pain started about 3 months ago. She had a hard time describing the quality of the pain. She described it as \"it comes and goes and when it is bad there is nerve pain that goes through her labia.\"     She reports it is difficult for her to lay flat and is worse with movement. She is aware of it \"all the time.\" When she is in bed she has to \"turn slowly\" and when she gets up from a chair she has to go slowly.     She reports she takes Tylenol 1,000mg at night. She also takes ibuprofen 800mg about 2x per week. She reports more relief with the ibuprofen.     Connie is wondering if  thinks this could be cancer metastasis, and if she would like to image the area when she gets her other scans done in April. Connie is currently scheduled for an MRI of the abdomen and CT of the chest on April 10th. She will also see Dr. Weir for her one year follow up on April 16th.    Writer will notify Dr. Weir and call Connie with her recommendations. Per Connie, ok to leave a detailed message on [phone] if she does answer.     Ailyn Bailey, RN, BSN, [facility]  Patient Care Coordinator  [facility]  [phone]      "

## 2020-02-21 NOTE — PROGRESS NOTES
Writer received an inOnDecksket message from Dr. Weir reporting the orders are in for the bone scan. Writer notified schedulers. Per chart review, Connie is now scheduled for her bone scan on 4/10/20.     Ailyn Bailey RN, BSN, [facility]  Patient Care Coordinator  [facility]  [phone]

## 2020-02-24 ENCOUNTER — NURSE TRIAGE (OUTPATIENT)
Dept: FAMILY MEDICINE | Facility: CLINIC | Age: 60
End: 2020-02-24

## 2020-02-24 DIAGNOSIS — K57.32 DIVERTICULITIS OF COLON: Primary | ICD-10-CM

## 2020-02-24 DIAGNOSIS — Z90.49 S/P LAPAROSCOPIC CHOLECYSTECTOMY: ICD-10-CM

## 2020-02-24 NOTE — TELEPHONE ENCOUNTER
Reason for Call:  Same Day Appointment, Requested Provider:  Gayatri Stephens MD    PCP: Gayatri Stephens    Reason for visit: diverticulitis flare up, Bloating, abd pain       Have you been treated for this in the past? Yes    Additional comments: Connie would like to see Dr Stephens today. She would rather see her because she has treated her for this before.    Can we leave a detailed message on this number? YES    Phone number patient can be reached at: Cell number on file:    Telephone Information:   Mobile [phone]         Call taken on 2/24/2020 at 8:09 AM by Alejandra Cortes

## 2020-02-24 NOTE — TELEPHONE ENCOUNTER
Pt is requesting the Cipro for diverticulitis, Pt also requesting Percocet for pain.  Writer will route back to PCP to review and advise of pended medication.      Pt would like Rx sent to [facility]    Naveed Palacio RN   [facility]

## 2020-02-24 NOTE — TELEPHONE ENCOUNTER
"Called patient @ # below -     Additional Information    Negative: SEVERE abdominal pain (e.g., excruciating)    Negative: Vomiting red blood or black (coffee ground) material    Negative: Bloody, black, or tarry bowel movements    Negative: Constant abdominal pain lasting > 2 hours    Negative: Vomiting bile (green color)    Negative: Patient sounds very sick or weak to the triager    Negative: Vomiting and abdomen looks much more swollen than usual    Negative: White of the eyes have turned yellow (i.e., jaundice)    Negative: Blood in urine (red, pink, or tea-colored)    Negative: Fever > 103 F (39.4 C)    Negative: Fever > 101 F (38.3 C) and over 60 years of age    Negative: Fever > 100.0 F (37.8 C) and has diabetes mellitus or a weak immune system (e.g., HIV positive, cancer chemotherapy, organ transplant, splenectomy, chronic steroids)    Negative: Fever > 100.0 F (37.8 C) and bedridden (e.g., nursing home patient, stroke, chronic illness, recovering from surgery)    Negative: Pregnant or could be pregnant (i.e., missed last menstrual period)    Answer Assessment - Initial Assessment Questions  1. LOCATION: Lower abdomen - across the entire lower abdomen  2. RADIATION: None  3. ONSET: woke up 2/23/20 with pain  4. SUDDEN: \"Gradual or sudden onset?\"      Woke up with pain yesterday morning  5. PATTERN: Constant  6. SEVERITY: 6/10 currently  7. RECURRENT SYMPTOM:  Yes - diverticulitis  8. CAUSE: Diverticulitis - feels exactly the same  9. RELIEVING/AGGRAVATING FACTORS: lying down improves pain  10. OTHER SYMPTOMS: Gas, Bloating, Rectal Pressure, feels feverish, chills  11. PREGNANCY: No    Protocols used: ABDOMINAL PAIN - FEMALE-A-OH    Patient stated she is having all of her \"cancer scans\" in 1 month - doesn't want to do any imaging until that time. Patient had a neuroendocrine tumor.     Patient would prefer to try medication for now, didn't want to make an appointment. Wanted to give the medication a few days " and if no improvement then RTC.   Patient stated she would like Cipro and something for the pain.     Pharmacy: [facility]    Routing to PCP for further review/recommendations/orders.    Vero Jones RN  [facility]

## 2020-02-24 NOTE — TELEPHONE ENCOUNTER
The problem is pt is allergic to Clindamycin, Flagyl and levaquin.    To what medication was she referring?   We can try cipro alone, but this leaves her at increased risk for C . Diff.     Please inform patient.

## 2020-02-25 ENCOUNTER — TELEPHONE (OUTPATIENT)
Dept: FAMILY MEDICINE | Facility: CLINIC | Age: 60
End: 2020-02-25

## 2020-02-25 RX ORDER — OXYCODONE AND ACETAMINOPHEN 5; 325 MG/1; MG/1
1-2 TABLET ORAL EVERY 4 HOURS PRN
Qty: 20 TABLET | Refills: 0 | Status: SHIPPED | OUTPATIENT
Start: 2020-02-25 | End: 2020-09-08

## 2020-02-25 RX ORDER — CIPROFLOXACIN 500 MG/1
500 TABLET, FILM COATED ORAL 2 TIMES DAILY
Qty: 14 TABLET | Refills: 0 | Status: SHIPPED | OUTPATIENT
Start: 2020-02-25 | End: 2020-06-07

## 2020-02-25 NOTE — TELEPHONE ENCOUNTER
Reason for Call:  Medication or medication refill:    Do you use a [facility]?  Name of the pharmacy and phone number for the current request:  [facility] - [phone]    Name of the medication requested: See encounter from yesterday. I couldn't addend it    Other request: Medication as requested in message from yesterday. Patient is very upset that this isn't done & at the pharmacy yet.  Please call her back when you get an answer from Dr Stephens.  Can we leave a detailed message on this number? YES    Phone number patient can be reached at: Cell number on file:    Telephone Information:   Mobile [phone]       Best Time: any      Call taken on 2/25/2020 at 9:17 AM by Luba Waite

## 2020-02-25 NOTE — TELEPHONE ENCOUNTER
Patti Rogers is reviewing.      Latoya Selby, COCO, RN, PHN  [facility]  Office: [phone]  Fax: [phone]

## 2020-02-25 NOTE — TELEPHONE ENCOUNTER
Routing to  to review as JV is out of clinic today.      Latoya Selby, COCO, RN, PHN  [facility]  Office: [phone]  Fax: [phone]

## 2020-02-25 NOTE — TELEPHONE ENCOUNTER
Huddled with DALTON  OK for cipro alone and percocet.  If not improving (or only partially improving) needs to be seen.  With only cipro she is at risk for incomplete coverage due to her numerous allergies.      Patti Rogers MBA, MS, PA-C

## 2020-02-25 NOTE — TELEPHONE ENCOUNTER
Luba Waite           2/25/20 9:23 AM   Note      Reason for Call:  Medication or medication refill:     Do you use a [facility]?  Name of the pharmacy and phone number for the current request:  [facility] - [phone]     Name of the medication requested: See encounter from yesterday. I couldn't addend it     Other request: Medication as requested in message from yesterday. Patient is very upset that this isn't done & at the pharmacy yet.  Please call her back when you get an answer from Dr Stephens.  Can we leave a detailed message on this number? YES     Phone number patient can be reached at: Cell number on file:        Telephone Information:   Mobile [phone]         Best Time: any        Call taken on 2/25/2020 at 9:17 AM by Luba Waite

## 2020-02-25 NOTE — TELEPHONE ENCOUNTER
See yesterdays encounter.    COCO Gregory, RN, PHN  [facility]  Office: [phone]  Fax: [phone]

## 2020-02-25 NOTE — TELEPHONE ENCOUNTER
Patient notified by phone.    Patient verbalized understanding and agreed with plan.        COCO Gregory, RN, PHN  [facility]  Office: [phone]  Fax: [phone]

## 2020-02-25 NOTE — TELEPHONE ENCOUNTER
Pt called for an update. Could not addend yesterday's encounter. Please give the Pt a call back when possible. Stated needs this figured out as soon as possible as her pain is getting worse.

## 2020-02-28 ENCOUNTER — OFFICE VISIT (OUTPATIENT)
Dept: PEDIATRICS | Facility: CLINIC | Age: 60
End: 2020-02-28
Payer: COMMERCIAL

## 2020-02-28 ENCOUNTER — OFFICE VISIT (OUTPATIENT)
Dept: FAMILY MEDICINE | Facility: CLINIC | Age: 60
End: 2020-02-28
Payer: COMMERCIAL

## 2020-02-28 ENCOUNTER — HOSPITAL ENCOUNTER (OUTPATIENT)
Dept: CT IMAGING | Facility: CLINIC | Age: 60
Discharge: HOME | End: 2020-02-28
Attending: PHYSICIAN ASSISTANT | Admitting: PHYSICIAN ASSISTANT
Payer: COMMERCIAL

## 2020-02-28 VITALS
HEART RATE: 80 BPM | SYSTOLIC BLOOD PRESSURE: 113 MMHG | DIASTOLIC BLOOD PRESSURE: 72 MMHG | OXYGEN SATURATION: 95 % | RESPIRATION RATE: 20 BRPM | TEMPERATURE: 99.5 F

## 2020-02-28 VITALS
TEMPERATURE: 99.4 F | HEIGHT: 60 IN | BODY MASS INDEX: 32.39 KG/M2 | DIASTOLIC BLOOD PRESSURE: 72 MMHG | WEIGHT: 165 LBS | SYSTOLIC BLOOD PRESSURE: 116 MMHG | OXYGEN SATURATION: 94 % | HEART RATE: 92 BPM

## 2020-02-28 DIAGNOSIS — R10.2 SUPRAPUBIC PAIN: ICD-10-CM

## 2020-02-28 DIAGNOSIS — Z87.19 HISTORY OF DIVERTICULITIS: ICD-10-CM

## 2020-02-28 DIAGNOSIS — K57.32 DIVERTICULITIS OF COLON: Primary | ICD-10-CM

## 2020-02-28 DIAGNOSIS — R10.31 RLQ ABDOMINAL PAIN: ICD-10-CM

## 2020-02-28 DIAGNOSIS — R10.32 LLQ ABDOMINAL PAIN: ICD-10-CM

## 2020-02-28 DIAGNOSIS — E87.6 HYPOKALEMIA: ICD-10-CM

## 2020-02-28 DIAGNOSIS — R10.84 ABDOMINAL PAIN, GENERALIZED: ICD-10-CM

## 2020-02-28 DIAGNOSIS — R10.84 ABDOMINAL PAIN, GENERALIZED: Primary | ICD-10-CM

## 2020-02-28 LAB
ALBUMIN SERPL-MCNC: 3.8 G/DL (ref 3.4–5)
ALBUMIN UR-MCNC: NEGATIVE MG/DL
ALP SERPL-CCNC: 49 U/L (ref 40–150)
ALT SERPL W P-5'-P-CCNC: 74 U/L (ref 0–50)
ANION GAP SERPL CALCULATED.3IONS-SCNC: 4 MMOL/L (ref 3–14)
APPEARANCE UR: CLEAR
AST SERPL W P-5'-P-CCNC: 34 U/L (ref 0–45)
BASOPHILS # BLD AUTO: 0.1 10E9/L (ref 0–0.2)
BASOPHILS NFR BLD AUTO: 0.7 %
BILIRUB SERPL-MCNC: 0.6 MG/DL (ref 0.2–1.3)
BILIRUB UR QL STRIP: NEGATIVE
BUN SERPL-MCNC: 9 MG/DL (ref 7–30)
CALCIUM SERPL-MCNC: 9.3 MG/DL (ref 8.5–10.1)
CHLORIDE SERPL-SCNC: 98 MMOL/L (ref 94–109)
CO2 SERPL-SCNC: 31 MMOL/L (ref 20–32)
COLOR UR AUTO: YELLOW
CREAT SERPL-MCNC: 0.68 MG/DL (ref 0.52–1.04)
DIFFERENTIAL METHOD BLD: ABNORMAL
EOSINOPHIL # BLD AUTO: 0.2 10E9/L (ref 0–0.7)
EOSINOPHIL NFR BLD AUTO: 1.7 %
ERYTHROCYTE [DISTWIDTH] IN BLOOD BY AUTOMATED COUNT: 12.1 % (ref 10–15)
GFR SERPL CREATININE-BSD FRML MDRD: >90 ML/MIN/{1.73_M2}
GLUCOSE SERPL-MCNC: 105 MG/DL (ref 70–99)
GLUCOSE UR STRIP-MCNC: NEGATIVE MG/DL
HCT VFR BLD AUTO: 35.9 % (ref 35–47)
HGB BLD-MCNC: 12.4 G/DL (ref 11.7–15.7)
HGB UR QL STRIP: NEGATIVE
IMM GRANULOCYTES # BLD: 0.1 10E9/L (ref 0–0.4)
IMM GRANULOCYTES NFR BLD: 0.5 %
KETONES UR STRIP-MCNC: NEGATIVE MG/DL
LEUKOCYTE ESTERASE UR QL STRIP: NEGATIVE
LYMPHOCYTES # BLD AUTO: 1 10E9/L (ref 0.8–5.3)
LYMPHOCYTES NFR BLD AUTO: 9.6 %
MCH RBC QN AUTO: 32.4 PG (ref 26.5–33)
MCHC RBC AUTO-ENTMCNC: 34.5 G/DL (ref 31.5–36.5)
MCV RBC AUTO: 94 FL (ref 78–100)
MONOCYTES # BLD AUTO: 0.8 10E9/L (ref 0–1.3)
MONOCYTES NFR BLD AUTO: 7.2 %
NEUTROPHILS # BLD AUTO: 8.6 10E9/L (ref 1.6–8.3)
NEUTROPHILS NFR BLD AUTO: 80.3 %
NITRATE UR QL: NEGATIVE
NRBC # BLD AUTO: 0 10*3/UL
NRBC BLD AUTO-RTO: 0 /100
PH UR STRIP: 7 PH (ref 5–7)
PLATELET # BLD AUTO: 349 10E9/L (ref 150–450)
POTASSIUM SERPL-SCNC: 3.2 MMOL/L (ref 3.4–5.3)
PROT SERPL-MCNC: 7.6 G/DL (ref 6.8–8.8)
RBC # BLD AUTO: 3.83 10E12/L (ref 3.8–5.2)
SODIUM SERPL-SCNC: 133 MMOL/L (ref 133–144)
SOURCE: NORMAL
SP GR UR STRIP: 1.01 (ref 1–1.03)
UROBILINOGEN UR STRIP-ACNC: 0.2 EU/DL (ref 0.2–1)
WBC # BLD AUTO: 10.7 10E9/L (ref 4–11)

## 2020-02-28 PROCEDURE — 80053 COMPREHEN METABOLIC PANEL: CPT | Performed by: PHYSICIAN ASSISTANT

## 2020-02-28 PROCEDURE — 36415 COLL VENOUS BLD VENIPUNCTURE: CPT | Performed by: PHYSICIAN ASSISTANT

## 2020-02-28 PROCEDURE — 25000128 H RX IP 250 OP 636: Performed by: PHYSICIAN ASSISTANT

## 2020-02-28 PROCEDURE — 25000125 ZZHC RX 250: Performed by: PHYSICIAN ASSISTANT

## 2020-02-28 PROCEDURE — 99207 REFERRAL TO ACUTE AND DIAGNOSTIC SERVICES: CPT | Performed by: NURSE PRACTITIONER

## 2020-02-28 PROCEDURE — 81003 URINALYSIS AUTO W/O SCOPE: CPT | Performed by: NURSE PRACTITIONER

## 2020-02-28 PROCEDURE — 99214 OFFICE O/P EST MOD 30 MIN: CPT | Performed by: PHYSICIAN ASSISTANT

## 2020-02-28 PROCEDURE — 74177 CT ABD & PELVIS W/CONTRAST: CPT

## 2020-02-28 PROCEDURE — 85025 COMPLETE CBC W/AUTO DIFF WBC: CPT | Performed by: PHYSICIAN ASSISTANT

## 2020-02-28 RX ORDER — IOPAMIDOL 755 MG/ML
500 INJECTION, SOLUTION INTRAVASCULAR ONCE
Status: COMPLETED | OUTPATIENT
Start: 2020-02-28 | End: 2020-02-28

## 2020-02-28 RX ORDER — MOXIFLOXACIN HYDROCHLORIDE 400 MG/1
400 TABLET ORAL DAILY
Qty: 10 TABLET | Refills: 0 | Status: SHIPPED | OUTPATIENT
Start: 2020-02-28 | End: 2020-06-07

## 2020-02-28 RX ADMIN — SODIUM CHLORIDE 61 ML: 9 INJECTION, SOLUTION INTRAVENOUS at 10:32

## 2020-02-28 RX ADMIN — IOPAMIDOL 83 ML: 755 INJECTION, SOLUTION INTRAVENOUS at 10:32

## 2020-02-28 ASSESSMENT — MIFFLIN-ST. JEOR: SCORE: 1227

## 2020-02-28 NOTE — PROGRESS NOTES
"Subjective   Connie Brunson is a 59 year old female who presents to clinic today for the following health issues:    HPI       Triaged 02/24/2020 - 11:51a  Answer Assessment - Initial Assessment Questions  1. LOCATION: Lower abdomen - across the entire lower abdomen  2. RADIATION: None  3. ONSET: woke up 2/23/20 with pain  4. SUDDEN: \"Gradual or sudden onset?\"      Woke up with pain yesterday morning  5. PATTERN: Constant  6. SEVERITY: 6/10 currently  7. RECURRENT SYMPTOM:  Yes - diverticulitis  8. CAUSE: Diverticulitis - feels exactly the same  9. RELIEVING/AGGRAVATING FACTORS: lying down improves pain  10. OTHER SYMPTOMS: Gas, Bloating, Rectal Pressure, feels feverish, chills  11. PREGNANCY: No    Protocols used: ABDOMINAL PAIN - FEMALE-A-[place]     Patient stated she is having all of her \"cancer scans\" in 1 month - doesn't want to do any imaging until that time. Patient had a neuroendocrine tumor.      Patient would prefer to try medication for now, didn't want to make an appointment. Wanted to give the medication a few days and if no improvement then RTC.   Patient stated she would like Cipro and something for the pain.       ABDOMINAL   PAIN     Onset: x5 days    Description:   Character: Cramping  Location: left upper quadrant - yesterday entire low abdomen  Radiation: down creating rectal pressure    Intensity: 6/10 - better today 4/10    Progression of Symptoms:  Constant     Accompanying Signs & Symptoms:  Fever/Chills?: YES- both  Gas/Bloating: YES- both  Nausea: no   Vomitting: no   Diarrhea?: no   Constipation:no   Dysuria or Hematuria: no    History:   Trauma: no   Previous similar pain: YES- .diverticulitis  Previous tests done: CT and in past    Precipitating factors:   Does the pain change with:     Food: YES- worse after - anything -- adds to gas     BM: YES- better    Urination: no     Alleviating factors:  Laying flat, bath    Therapies Tried and outcome: Percocet     LMP:  not applicable     She " "was up and down on a stool and is wondering if she may have pulled a muscle. Patient having low abdominal pain left lower quadrant > right lower quadrant for the last several days.  Started on Percocet and Cipro only 3 days ago due to several antibiotic allergies.  Unfortunately she is not feeling better.  Tried to go to work yesterday and needed to go back home.  Today has low-grade temp and generalized malaise chills continued abdominal pain.  Denies urinary symptoms.  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      CT 10/09/2017  IMPRESSION:  1. Mild acute diverticulitis in the mid sigmoid colon without  associated abscess or free intraperitoneal air.  2. Incidental finding of an enhancing 1.3 cm intraluminal nodule in  the distal ileum. A polyp or mass is possible. No other similar  findings are appreciated. No evidence of luminal obstruction.  3. Cholecystectomy changes. Upper abdominal organs are otherwise  within normal limits. No hydronephrosis.    CT 10/27/2014  IMPRESSION  IMPRESSION:   1. Findings compatible with acute epiploic appendagitis  with some  adjacent bowel wall thickening involving the inferior wall of the  proximal transverse colon. This is identified in the anterior right  midabdomen just above the umbilicus. Normal appendix.     Reviewed and updated as needed this visit by provider:  Tobacco  Allergies  Meds  Problems  Med Hx  Surg Hx  Fam Hx         Review of Systems   Constitutional, HEENT, cardiovascular, pulmonary, GI, , musculoskeletal, neuro, skin, endocrine and psych systems are negative, except as otherwise noted in the HPI.          Objective   /72 (BP Location: Right arm, Patient Position: Chair, Cuff Size: Adult Large)   Pulse 92   Temp 99.4  F (37.4  C) (Oral)   Ht 1.511 m (4' 11.5\")   Wt 74.8 kg (165 lb)   LMP 09/05/2000   SpO2 94%   Breastfeeding No   BMI 32.77 kg/m   " Body mass index is 32.77 kg/m .  Physical Exam   GENERAL: healthy, alert, well nourished, well hydrated, no distress but appears mildly ill  RESP: lungs clear to auscultation - no rales, no rhonchi, no wheezes  CV: regular rates and rhythm, normal S1 S2, no S3 or S4 and no murmur, no click or rub -  ABDOMEN: soft slightly distended, LLQ, RLQ and suprapubic tenderness, no  hepatosplenomegaly, no masses, slightly decreased bowel sounds  SKIN: no suspicious lesions, no rashes    Transfer to Acute and Diagnostic Services  I have contacted the staff at the [facility] at [phone] to confirm patient acceptance.  Special Needs: None    Discussed transition to Acute & Diagnostic Services Clinic, and patient agrees with next level of care.  Patient transportation will be provided by the patient.        Diagnostic Test Results  Urinalysis - unremarkable      Assessment & Plan   Connie was seen today for abdominal pain.    Diagnoses and all orders for this visit:    Abdominal pain, generalized  History of diverticulitis  LLQ abdominal pain  RLQ abdominal pain  Suprapubic pain  Discussed with patient that we are unable to sort out what is going on with her abdomen without the proper imaging.  Labs and possible fluids are also appropriate.  Although reluctant to do this she understands the need.  Will refer her to the acute diagnostic services for further evaluation and care.  Her course is also complicated as she has multiple antibiotic allergies that limits our outpatient treatment of diverticulitis if she does happen to have that.  Written instructions provided for address for acute diagnostic services.  Patient feels well enough to drive.  Urged close follow-up with her PCP within the next week.  Connie verbalizes understanding of plan of care and is in agreement.   -     Referral to Acute and Diagnostic Services (Day of diagnostic / First order acute); Future      Tobacco Cessation:    reports that she has been smoking other. She has been smoking about 0.00 packs per day for the past 5.00 years. She uses smokeless tobacco.    See Patient Instructions    No follow-ups on file.     Emilia Barillas, LLUVIA-[address] [address]  [email or web address]   Office: [phone]

## 2020-02-28 NOTE — NURSING NOTE
Tylenol and ibuprofen alternating. Has not helped.    IV Removed at 1116.    Timothy Lynn CMA ([facility])

## 2020-02-28 NOTE — PROGRESS NOTES
"SUBJECTIVE  HPI:  Connie Brunson is a 59 year old female who presents with the CC of abdominal/pelvic pain.    Pain is located in the suprapubic, RLQ and LLQ area, with radiation to None.  The pain is characterized as dull, aching and \"pain\", and at worst is a level 6 on a scale of 1-10.  Pain has been present for 1 week(s) and is stable.  EXACERBATING FACTORS: movements  RELIEVING FACTORS: none.  ASSOCIATED SX: lower abdominal, pelvic pain, tenderness.    Past Medical History:   Diagnosis Date     Allergic rhinitis, cause unspecified     year round - dog,dust-  Failed on claritin, claritin-D, zyrtec and zyrtec-D in past.      Benign neoplasm of cerebral meninges (H) 1999    has yearly MRI's. - sees Dr. 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 Thomas ENT      Cancer (H)      Depressive disorder      Deviated nasal septum     sees Dr. Thomas ENT      Diverticulosis of colon (without mention of hemorrhage) 02-     History of Guillain-Spickard syndrome     NO FLU SHOTS - very mild case in [place] many years ago     Hyperlipidemia LDL goal < 130 doesn't want statins    simvastatin = severe hand joint pain , lipitor =nausea/abd. pain     Hypertension goal BP (blood pressure) < 140/90      Insomnia     trazodone -made pt feel hung over      Major depressive disorder, recurrent episode, moderate (H)     lexapro - sexual side effects      Moderate major depression (H) 2/4/2005    trazodone -made pt feel hung over      Other acne      Other extrapyramidal disease and abnormal movement disorder     ?GBS     Symptomatic menopausal or female climacteric states     vivelle-dot      Allergies   Allergen Reactions     Atorvastatin Nausea     Nausea and abd pain      Ceftin GI Disturbance     Stomach upset and bloating - given at same time as clindamycin ? Which one as cause.       Clindamycin GI Disturbance     Stomach upset " and bloating - given at same time as ceftin, ? Which one as cause      Flagyl [Metronidazole]      immediate migraine headache      Morphine Itching     Severe with nose, facial  Swelling - oxycodone = ok /no problems      Penicillins Hives     Sulfa Drugs Rash     Severe red , flushed rash     Levaquin Rash     States she can take cipro     Social History     Tobacco Use     Smoking status: Light Tobacco Smoker     Packs/day: 0.00     Years: 5.00     Pack years: 0.00     Types: Other     Last attempt to quit: 1994     Years since quittin.1     Smokeless tobacco: Current User     Tobacco comment: 11/3/17 - occ e-cig use   Substance Use Topics     Alcohol use: Yes     Alcohol/week: 0.0 standard drinks     Comment: 3-5 drinks per week       ROS:  CONSTITUTIONAL:POSITIVE  for chills and fever   INTEGUMENTARY/SKIN: NEGATIVE for worrisome rashes, moles or lesions  ENT/MOUTH: NEGATIVE for ear, mouth and throat problems  RESP:NEGATIVE for significant cough or SOB  CV: NEGATIVE for chest pain, palpitations or peripheral edema  GI: POSITIVE for abdominal pain suprapubic, RLQ and LLQ  : negative for and dysuria  MUSCULOSKELETAL: NEGATIVE for significant arthralgias or myalgia  NEURO: NEGATIVE for weakness, dizziness or paresthesias    OBJECTIVE:  /77 (BP Location: Right arm, Patient Position: Chair, Cuff Size: Adult Large)   Pulse 88   Temp 99.5  F (37.5  C) (Oral)   Resp 18   LMP 2000   SpO2 93%   GENERAL APPEARANCE: healthy, alert and no distress  EYES: EOMI,  PERRL, conjunctiva clear  HENT: ear canals and TM's normal.  Nose and mouth without ulcers, erythema or lesions  NECK: supple, nontender, no lymphadenopathy  RESP: lungs clear to auscultation - no rales, rhonchi or wheezes  CV: regular rates and rhythm, normal S1 S2, no murmur noted  ABDOMEN: soft, tenderness moderate RLQ and LLQ  NEURO: Normal strength and tone, sensory exam grossly normal,  normal speech and mentation  SKIN: no suspicious  lesions or rashes    ASSESSMENT/PLAN:    ICD-10-CM    1. Diverticulitis of colon K57.32 moxifloxacin (AVELOX) 400 MG tablet   2. History of diverticulitis Z87.19 sodium chloride (PF) 0.9% PF flush 3 mL     IV access     CT Abdomen Pelvis w Contrast     Comprehensive metabolic panel     CBC with platelets differential     Referral to Acute and Diagnostic Services (Day of diagnostic / First order acute)   3. Abdominal pain, generalized R10.84 sodium chloride (PF) 0.9% PF flush 3 mL     IV access     CT Abdomen Pelvis w Contrast     Comprehensive metabolic panel     CBC with platelets differential     Referral to Acute and Diagnostic Services (Day of diagnostic / First order acute)     moxifloxacin (AVELOX) 400 MG tablet   4. LLQ abdominal pain R10.32 sodium chloride (PF) 0.9% PF flush 3 mL     IV access     CT Abdomen Pelvis w Contrast     Comprehensive metabolic panel     CBC with platelets differential     Referral to Acute and Diagnostic Services (Day of diagnostic / First order acute)     moxifloxacin (AVELOX) 400 MG tablet   5. RLQ abdominal pain R10.31 sodium chloride (PF) 0.9% PF flush 3 mL     IV access     CT Abdomen Pelvis w Contrast     Comprehensive metabolic panel     CBC with platelets differential     Referral to Acute and Diagnostic Services (Day of diagnostic / First order acute)     moxifloxacin (AVELOX) 400 MG tablet   6. Suprapubic pain R10.2 sodium chloride (PF) 0.9% PF flush 3 mL     IV access     CT Abdomen Pelvis w Contrast     Comprehensive metabolic panel     CBC with platelets differential     Referral to Acute and Diagnostic Services (Day of diagnostic / First order acute)   7. Hypokalemia E87.6          PLAN:  Orders Placed This Encounter     CT Abdomen Pelvis w Contrast     Comprehensive metabolic panel     CBC with platelets differential     sodium chloride (PF) 0.9% PF flush 3 mL     moxifloxacin (AVELOX) 400 MG tablet       Patient presents with uncomplicated diverticulitis/colitis  that is not improving on Cipro  Patient is unwilling to try flagyl again as she had severe headaches  Will try Avelox as it has indications for monotherapy treatment of Diverticulitis, stop Cipro  If patient fails on this or has allergic reaction then next alternative is Rifaximin  Increase oral fluids, including those with electrolytes as she has some mild hypokalemia  Increase fiber diet  Follow up with PCP next week for recheck

## 2020-02-28 NOTE — PATIENT INSTRUCTIONS
Patient Education     Diverticulitis    Some people get pouches along the wall of the colon as they get older. The pouches, called diverticuli, usually cause no symptoms. If the pouches become blocked, you can get an infection. This infection is called diverticulitis. It causes pain in your lower abdomen and fever. If not treated, it can become a serious condition, causing an abscess to form inside the pouch. The abscess may block the intestinal tract even or rupture, spreading infection throughout the abdomen.  When treatment is started early, oral antibiotics alone may be enough to cure diverticulitis. This method is tried first. But, if you don't improve or if your condition gets worse while using oral antibiotics, you may need to be admitted to the hospital for IV antibiotics. Severe cases may require surgery.  Home care  The following guidelines will help you care for yourself at home:    During the acute illness, rest and follow your healthcare provider's instructions about diet. Sometimes you will need to follow a clear liquid diet to rest your bowel. Once your symptoms are better, you may be told to follow a low-fiber diet for some time. Include foods like:  ? Flake cereal, mashed potatoes, pancakes, waffles, pasta, white bread, rice, applesauce, bananas, eggs, fish, poultry, tofu, and cooked soft vegetables    Take antibiotics exactly as instructed. Don't miss any doses or stop taking the medication, even if you feel better.    Monitor your temperature and tell your healthcare provider if you have rising temperatures.  Preventing future attacks  Once you have an episode of diverticulitis, you are at risk for having it again. After you have recovered from this episode, you may be able to lower your risk by eating a high-fiber diet (20 gm/day to 35 gm/day of fiber). This cleans out the colon pouches that already exist and may prevent new ones from forming. Foods high in fiber include fresh fruits and edible  peelings, raw or lightly cooked vegetables, whole grain cereals and breads, dried beans and peas, and bran.  Other steps that can help prevent future attacks include:    Take your medicines, such as antibiotics, as your healthcare provider says.    Drink 6 to 8 glasses of water every day, unless told otherwise.    Use a heating pad or hot water bottle to help abdominal cramping or pain.    Begin an exercise program. Ask your healthcare provider how to get started. You can benefit from simple activities such as walking or gardening.    Treat diarrhea with a bland diet. Start with liquids only; then slowly add fiber over time.    Watch for changes in your bowel movements (constipation to diarrhea). Avoid constipation by eating a high fiber diet and taking a stool softener if needed.    Get plenty of rest and sleep.  Follow-up care  Follow up with your healthcare provider as advised or sooner if you are not getting better in the next 2 days.  When to seek medical advice  Call your healthcare provider right away if any of these occur:    Fever of 100.4 F (38 C) or higher, or as directed by your healthcare provider    Repeated vomiting or swelling of the abdomen    Weakness, dizziness, light-headedness    Pain in your abdomen that gets worse, severe, or spreads to your back    Pain that moves to the right lower abdomen    Rectal bleeding (stools that are red, black or maroon color)    Unexpected vaginal bleeding  Date Last Reviewed: 9/1/2016 2000-2019 [employer]. [address] [place] [id number]. All rights reserved. This information is not intended as a substitute for professional medical care. Always follow your healthcare professional's instructions.           Patient Education     Discharge Instructions for Diverticulitis  You have been diagnosed with diverticulitis. This is a condition in which small pouches form in your colon (large intestine) and become inflamed or infected. Follow the  guidelines below for home care.  As you recover  Tips for recovery include:    Eat a low-fiber diet. Your healthcare provider may advise a liquid diet. This gives your bowel a chance to rest so that it can recover.    Foods to include: flake cereal, mashed potatoes, pancakes, waffles, pasta, white bread, rice, applesauce, bananas, eggs, fish, poultry, tofu, and well-cooked vegetables    Take your medicines as directed. Do not stop taking the medicines, even if you feel better.    Monitor your temperature and report any rising temperature to your healthcare provider.    Take antibiotics exactly as directed. Do not miss any and keep taking them even if you feel better.     Drink 6 to 8 glasses of water every day, unless directed otherwise.    Use a heating pad or hot water bottle to reduce abdominal cramping or pain.  Preventing diverticulitis in the future  Tips for prevention include:    Eat a high-fiber diet. Fiber adds bulk to the stool so that it passes through the large intestine more easily.    Keep drinking 6 to 8 glasses of water every day, unless directed otherwise.    Begin an exercise program. Ask your healthcare provider how to get started. You can benefit from simple activities such as walking or gardening.    Treat diarrhea with a bland diet. Start with liquids only, then slowly add fiber over time.    Watch for changes in your bowel movements (constipation to diarrhea).    Avoid constipation with fiber and add a stool softener if needed.     Get plenty of rest and sleep.  Follow-up care  Make a follow-up appointment as directed by our staff.  When to call your healthcare provider  Call your healthcare provider immediately if you have any of the following:    Fever of 100.4 F (38.0 C) or higher, or as directed by your healthcare provider    Chills    Severe cramps in the belly, most commonly the lower left side    Tenderness in the belly, most commonly the lower left side    Nausea and  vomiting    Bleeding from your rectum   Date Last Reviewed: 7/1/2016 2000-2019 [employer]. [address] [place] [address], [place] [address]. All rights reserved. This information is not intended as a substitute for professional medical care. Always follow your healthcare professional's instructions.           Patient Education     Hypokalemia  Hypokalemia means a low level of potassium in the blood. This most often occurs in people who take water pills (diuretics). It can also occur because of severe vomiting or diarrhea. You may also have it if you take laxatives for long periods of time. It sometimes happens if you have low magnesium (hypomagnesemia). If you have this, your healthcare provider will treat the low magnesium first.  A mild case of hypokalemia usually causes no symptoms. It is only found with blood testing. More severe potassium loss causes overall weakness, muscle or abdominal cramps, rapid or irregular heartbeats (heart palpitations), low blood pressure, and muscle weakness.   Home care    Take any potassium supplements as prescribed.    Eat foods rich in potassium. The highest amount is found in avocado, baked potatoes, spinach, cantaloupe, cod, halibut, salmon, and scallops. White, red, or weiss beans are also very good sources. A modest amount of potassium is found in orange juice, bananas, carrots, and tomato juice.    If you take certain types of diuretics, you will also need to take potassium supplements. If you take a diuretic, discuss potassium supplements with your doctor.    Follow-up care  Follow up with your healthcare provider for a repeat blood test within the next week, or as advised by our staff.  When to seek medical advice  Call your healthcare provider right away if any of the following occur:    Increased weakness, fatigue, or muscle cramps    Dizziness  Call 911  Call 911 if any of the following occur:    Irregular heartbeat, extra beats, or very fast heart  rate    Loss of consciousness  Date Last Reviewed: 7/1/2017 2000-2019 [employer]. [address] [place] [address], [place] [address]. All rights reserved. This information is not intended as a substitute for professional medical care. Always follow your healthcare professional's instructions.

## 2020-02-28 NOTE — Clinical Note
[facility] centerPatient presents with uncomplicated diverticulitis/colitis found on CT scan that is not improving on CiproPatient is unwilling to try flagyl again as she had severe headachesWill try Avelox as it has indications for monotherapy treatment of Diverticulitis, stop Cipro at this time.If patient fails on this or has allergic reaction then next alternative is Rifaximin monotherapyIncrease oral fluids, including those with electrolytes as she has some mild hypokalemiaIncrease fiber dietFollow up with PCP next week for recheck

## 2020-03-02 NOTE — RESULT ENCOUNTER NOTE
Results discussed directly with patient while patient was present. Any further details documented in the note.   Patti Rogers PA-C

## 2020-06-04 ENCOUNTER — TELEPHONE (OUTPATIENT)
Dept: FAMILY MEDICINE | Facility: CLINIC | Age: 60
End: 2020-06-04

## 2020-06-04 ENCOUNTER — E-VISIT (OUTPATIENT)
Dept: FAMILY MEDICINE | Facility: CLINIC | Age: 60
End: 2020-06-04
Payer: COMMERCIAL

## 2020-06-04 DIAGNOSIS — K57.32 DIVERTICULITIS OF COLON: ICD-10-CM

## 2020-06-04 PROCEDURE — 99422 OL DIG E/M SVC 11-20 MIN: CPT | Performed by: FAMILY MEDICINE

## 2020-06-04 NOTE — TELEPHONE ENCOUNTER
Routing to PCP for further review/recommendations/orders.  Please see E-Visit from today    Vero Jones RN  [facility]

## 2020-06-04 NOTE — TELEPHONE ENCOUNTER
Patient called & would like to get antibiotics asap . Please call her back with what she can get.  Elle Waite, Clinic Receptionist

## 2020-06-05 ENCOUNTER — MYC MEDICAL ADVICE (OUTPATIENT)
Dept: FAMILY MEDICINE | Facility: CLINIC | Age: 60
End: 2020-06-05

## 2020-06-05 ENCOUNTER — TELEPHONE (OUTPATIENT)
Dept: FAMILY MEDICINE | Facility: CLINIC | Age: 60
End: 2020-06-05

## 2020-06-05 DIAGNOSIS — Z87.891 PERSONAL HISTORY OF TOBACCO USE, PRESENTING HAZARDS TO HEALTH: Primary | ICD-10-CM

## 2020-06-05 RX ORDER — MOXIFLOXACIN HYDROCHLORIDE 400 MG/1
400 TABLET ORAL DAILY
Qty: 10 TABLET | Refills: 0 | Status: SHIPPED | OUTPATIENT
Start: 2020-06-05 | End: 2021-09-07

## 2020-06-05 NOTE — TELEPHONE ENCOUNTER
Spoke with ARELI Gaona, pharmacist:    pt wants it even if not covered.  i spoke with Dr montoya already.    Closing encounter    Vero Jones RN  [facility]

## 2020-06-05 NOTE — PATIENT INSTRUCTIONS
Patient Education     Diverticulitis    Some people get pouches along the wall of the colon as they get older. The pouches, called diverticuli, usually cause no symptoms. If the pouches become blocked, you can get an infection. This infection is called diverticulitis. It causes pain in your lower abdomen and fever. If not treated, it can become a serious condition, causing an abscess to form inside the pouch. The abscess may block the intestinal tract even or rupture, spreading infection throughout the abdomen.  When treatment is started early, oral antibiotics alone may be enough to cure diverticulitis. This method is tried first. But, if you don't improve or if your condition gets worse while using oral antibiotics, you may need to be admitted to the hospital for IV antibiotics. Severe cases may require surgery.  Home care  The following guidelines will help you care for yourself at home:    During the acute illness, rest and follow your healthcare provider's instructions about diet. Sometimes you will need to follow a clear liquid diet to rest your bowel. Once your symptoms are better, you may be told to follow a low-fiber diet for some time. Include foods like:  ? Flake cereal, mashed potatoes, pancakes, waffles, pasta, white bread, rice, applesauce, bananas, eggs, fish, poultry, tofu, and cooked soft vegetables    Take antibiotics exactly as instructed. Don't miss any doses or stop taking the medication, even if you feel better.    Monitor your temperature and tell your healthcare provider if you have rising temperatures.  Preventing future attacks  Once you have an episode of diverticulitis, you are at risk for having it again. After you have recovered from this episode, you may be able to lower your risk by eating a high-fiber diet (20 gm/day to 35 gm/day of fiber). This cleans out the colon pouches that already exist and may prevent new ones from forming. Foods high in fiber include fresh fruits and  edible peelings, raw or lightly cooked vegetables, whole grain cereals and breads, dried beans and peas, and bran.  Other steps that can help prevent future attacks include:    Take your medicines, such as antibiotics, as your healthcare provider says.    Drink 6 to 8 glasses of water every day, unless told otherwise.    Use a heating pad or hot water bottle to help abdominal cramping or pain.    Begin an exercise program. Ask your healthcare provider how to get started. You can benefit from simple activities such as walking or gardening.    Treat diarrhea with a bland diet. Start with liquids only; then slowly add fiber over time.    Watch for changes in your bowel movements (constipation to diarrhea). Avoid constipation by eating a high fiber diet and taking a stool softener if needed.    Get plenty of rest and sleep.  Follow-up care  Follow up with your healthcare provider as advised or sooner if you are not getting better in the next 2 days.  When to seek medical advice  Call your healthcare provider right away if any of these occur:    Fever of 100.4 F (38 C) or higher, or as directed by your healthcare provider    Repeated vomiting or swelling of the abdomen    Weakness, dizziness, light-headedness    Pain in your abdomen that gets worse, severe, or spreads to your back    Pain that moves to the right lower abdomen    Rectal bleeding (stools that are red, black or maroon color)    Unexpected vaginal bleeding  Date Last Reviewed: 9/1/2016 2000-2019 [employer]. [address] [place] [id number]. All rights reserved. This information is not intended as a substitute for professional medical care. Always follow your healthcare professional's instructions.           Patient Education     Understanding Diverticulosis and Diverticulitis     Pouches or diverticula usually occur in the lower part of the colon called the sigmoid.   The colon (large intestine) is the last part of the digestive  tract. It absorbs water from stool and changes it from a liquid to a solid. In certain cases, small pouches called diverticula can form in the colon wall. This condition is called diverticulosis. It's very common as people get older. The pouches can become infected. If this happens, it becomes a more serious problem called diverticulitis. These problems can be painful. But they can be managed.  Managing your condition  Diet changes or medicines may be prescribed.   If you have diverticulosis  Recommendations include:    Diet changes are often enough to control symptoms. The main changes are adding fiber (roughage) and drinking more water. Fiber absorbs water as it travels through your colon. This helps your stool stay soft and move smoothly. Water helps this process.    If needed, you may be told to take over-the-counter stool softeners.    To help ease pain, antispasmodic medicines may be prescribed.    Watch for changes in your bowel movements. Tell your healthcare provider if you notice any changes.    Begin an exercise program. Ask your healthcare provider how to get started.    Get plenty of rest and sleep.   If you have diverticulitis  Treatment depends on how bad your symptoms are.    For mild symptoms. You may be put on a liquid diet for a short time. Antibiotics are usually prescribed. If these 2 steps relieve your symptoms, you may then be prescribed a high-fiber diet. If you still have symptoms, your healthcare provider will discuss more treatment choices with you.    For severe symptoms. You may need to be admitted to the hospital. There, you can be given IV antibiotics and fluids. You will also be put on a low-fiber or liquid diet. Although not common, surgery is needed in some people with severe symptoms.  [place] to colon health     Diverticulitis occurs when the pouches become infected or inflamed.     Help keep your colon healthy with a diet that includes plenty of high-fiber fruits, vegetables, and  whole grains. Drink plenty of liquids like water and juice. Maintain a healthy lifestyle, including regular exercise, stress management, and adequate rest and sleep.   Date Last Reviewed: 7/1/2016 2000-2019 [employer]. [address] [place] [address], [place] [address]. All rights reserved. This information is not intended as a substitute for professional medical care. Always follow your healthcare professional's instructions.           Patient Education     Diet for Diverticular Disease  What is diverticular disease?   When the inner wall of your colon weakens and forms small pouches, you have diverticulosis. For most people, this causes no symptoms.   If the pouches become inflamed or infected, you have diverticulitis. Warning signs may include pain in the lower abdomen, chills and vomiting (throwing up).  These conditions are called diverticular disease.  What causes it?  The cause has been linked to many years of eating too little fiber. People who eat plenty of whole grains, fresh fruits and vegetables are less likely to get this disease.   Once the pouches have formed, there is no way to reverse them.   How much fiber should I get?  If you're healing from diverticulitis or surgery to remove the inflamed area, you will at first follow a low-fiber diet (less than 10 grams each day). Then, as your doctor advises, you may slowly add fiber. Increase your intake by 1 to 2 grams a day. Your goal will be 25 to 30 grams a day.   To avoid future problems, continue to get 25 to 30 grams of fiber each day.   How can fiber help?   A high-fiber diet will help you have regular bowel movements. Fiber adds bulk to the stool and helps it pass more easily. Fiber also helps prevent the pouches from growing larger or getting infected.  In the past, doctors have warned patients to avoid eating nuts, seeds and popcorn. They believed these foods could get caught in the pouches and inflame them. But recent studies do not  support this idea.   Please ask your dietitian for the handout Fiber Content in [employer]. It will show you how to get more fiber in your diet.  For informational purposes only. Not to replace the advice of your health care provider.   Copyright   2007 [employer]. All rights reserved. [employer] 967303 - REV 09/15.  For informational purposes only. Not to replace the advice of your health care provider.  Copyright   2018 [employer]. All rights reserved.

## 2020-06-05 NOTE — TELEPHONE ENCOUNTER
Called pt as her mychart was inactivated by putting in an inactive code last night.I was able to reactivate.on our end .    We Discussed her symptoms in detail and I relayed my understanding that she doesn't want to come in for a CT scan at this time secondary to COVID-19 coronavirus pandemic, as we are trying to minimize patient exposure to the virus,  which is now widespread in the state.    Pt Has been under a lot of stress with working from home, etc. Lately since the COVID-19 novel coronavirus pandemic the last 2.5 months.      Is passing gas and having formed stools.  No melena or hematochezia . No fevers, chills or sweats. Feels like she has to push a bit to get her stool out.  Is feeling slightly decreased symptoms of rectal pressure when she stands up and less LLQ pressure this am compared to the last 1-2 days.   Discussed ok for abx right now and to Try low fiber diet for several days to help colon calms down a bit.      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     Provider E-Visit time total (minutes): 14 minutes .

## 2020-06-05 NOTE — TELEPHONE ENCOUNTER
QPD Message sent requesting pharmacy info    Routing to PCP for further review/recommendations/orders.  Please see QPD request and advise    Vero Jones RN  [facility]

## 2020-06-05 NOTE — TELEPHONE ENCOUNTER
Message sent to pharmacy requesting covered alternative.   Awaiting response    Vero Jones RN  [facility]

## 2020-06-07 RX ORDER — VARENICLINE TARTRATE 1 MG/1
1 TABLET, FILM COATED ORAL 2 TIMES DAILY
Qty: 60 TABLET | Refills: 1 | Status: SHIPPED | OUTPATIENT
Start: 2020-06-07 | End: 2021-01-29

## 2020-06-08 DIAGNOSIS — F41.9 ANXIETY: ICD-10-CM

## 2020-06-08 NOTE — TELEPHONE ENCOUNTER
See e visit - moxifloxacin rx'd - sent to [facility] by our pharmacy as our pharmacy did not have it here.

## 2020-06-09 DIAGNOSIS — J30.2 OTHER SEASONAL ALLERGIC RHINITIS: ICD-10-CM

## 2020-06-09 RX ORDER — VENLAFAXINE 37.5 MG/1
37.5 TABLET ORAL DAILY
Qty: 90 TABLET | Refills: 3 | Status: SHIPPED | OUTPATIENT
Start: 2020-06-09 | End: 2021-01-29

## 2020-06-09 RX ORDER — FLUTICASONE PROPIONATE 50 MCG
2 SPRAY, SUSPENSION (ML) NASAL DAILY
Qty: 48 G | Refills: 5 | Status: SHIPPED | OUTPATIENT
Start: 2020-06-09 | End: 2021-01-29

## 2020-08-04 DIAGNOSIS — C7A.8 PRIMARY MALIGNANT NEUROENDOCRINE NEOPLASM OF ILEUM (H): Primary | ICD-10-CM

## 2020-08-28 ENCOUNTER — MYC MEDICAL ADVICE (OUTPATIENT)
Dept: FAMILY MEDICINE | Facility: CLINIC | Age: 60
End: 2020-08-28

## 2020-08-28 DIAGNOSIS — C7A.8 PRIMARY MALIGNANT NEUROENDOCRINE NEOPLASM OF ILEUM (H): ICD-10-CM

## 2020-08-28 RX ORDER — DIAZEPAM 10 MG
TABLET ORAL
Qty: 4 TABLET | Refills: 1 | Status: SHIPPED | OUTPATIENT
Start: 2020-08-28 | End: 2021-08-20

## 2020-08-28 NOTE — TELEPHONE ENCOUNTER
Outpatient Medication Detail    Disp  Refills  Start  End  CALVIN    diazepam (VALIUM) 10 MG tablet  4 tablet  1  4/8/2019   No    Sig: Take 1-2  tab or 10-20 mg 30-60 minutes prior to MRI scan.      Problem List Complete:    Yes    Last Office Visit with [facility] [place] care provider: 6/4/20 E-Visit    Future Office visit:     Controlled substance agreement:   Encounter-Level CSA - 03/07/2018:    Controlled Substance Agreement - Scan on 4/2/2018  9:34 AM: CONTROLLED SUBSTANCE AGREEMENT     Encounter-Level CSA - 04/12/2017:    Controlled Substance Agreement - Scan on 4/18/2017 10:44 AM: CONTROLLED SUBSTANCE AGREEMENT     Encounter-Level CSA - 01/27/2016:    Controlled Substance Agreement - Scan on 1/28/2016 11:59 AM: CONTROLLED SUBSTANCE AGREEMENT 01/27/16     Encounter-Level CSA - 12/15/2014:    Controlled Substance Agreement - Scan on 12/16/2014  9:00 AM: Controlled Medication Agreement 12/15/14     Patient-Level CSA:    There are no patient-level csa.       Last Urine Drug Screen: No results found for: CDAUT, No results found for: COMDAT,   Cannabinoids (10-pgj-4-carboxy-9-THC)   Date Value Ref Range Status   03/07/2018 Not Detected NDET^Not Detected ng/mL Final     Comment:     Cutoff for a negative cannabinoid is 50 ng/mL or less.     Phencyclidine (Phencyclidine)   Date Value Ref Range Status   03/07/2018 Not Detected NDET^Not Detected ng/mL Final     Comment:     Cutoff for a negative PCP is 25 ng/mL or less.     Cocaine (Benzoylecgonine)   Date Value Ref Range Status   03/07/2018 Not Detected NDET^Not Detected ng/mL Final     Comment:     Cutoff for a negative cocaine is 150 ng/ml or less.     Methamphetamine (d-Methamphetamine)   Date Value Ref Range Status   03/07/2018 Not Detected NDET^Not Detected ng/mL Final     Comment:     Cutoff for a negative methamphetamine is 500 ng/ml or less.     Opiates (Morphine)   Date Value Ref Range Status   03/07/2018 Not Detected NDET^Not Detected ng/mL Final     Comment:      Cutoff for a negative opiate is 100 ng/ml or less.     Amphetamine (d-Amphetamine)   Date Value Ref Range Status   03/07/2018 Not Detected NDET^Not Detected ng/mL Final     Comment:     Cutoff for a negative amphetamine is 500 ng/mL or less.     Benzodiazepines (Nordiazepam)   Date Value Ref Range Status   03/07/2018 Not Detected NDET^Not Detected ng/mL Final     Comment:     Cutoff for a negative benzodiazepine is 150 ng/ml or less.     Tricyclic Antidepressants (Desipramine)   Date Value Ref Range Status   03/07/2018 Not Detected NDET^Not Detected ng/mL Final     Comment:     Cutoff for a negative tricyclic antidepressant is 300 ng/ml or less.     Methadone (Methadone)   Date Value Ref Range Status   03/07/2018 Not Detected NDET^Not Detected ng/mL Final     Comment:     Cutoff for a negative methadone is 200 ng/ml or less.     Barbiturates (Butalbital)   Date Value Ref Range Status   03/07/2018 Not Detected NDET^Not Detected ng/mL Final     Comment:     Cutoff for a negative barbituate is 200 ng/ml or less.     Oxycodone (Oxycodone)   Date Value Ref Range Status   03/07/2018 Not Detected NDET^Not Detected ng/mL Final     Comment:     Cutoff for a negative Oxycodone is 100 ng/mL or less.     Propoxyphene (Norpropoxyphene)   Date Value Ref Range Status   03/07/2018 Not Detected NDET^Not Detected ng/mL Final     Comment:     Cutoff for a negative propoxyphene is 300 ng/ml or less     Buprenorphine (Buprenorphine)   Date Value Ref Range Status   03/07/2018 Not Detected NDET^Not Detected ng/mL Final     Comment:     Cutoff for a negative buprenorphine is 10 ng/ml or less       [email or web address]      Routing refill request to provider for review/approval because:  Drug not on the [facility] refill protocol         Vero Jones RN  [facility]

## 2020-09-04 ENCOUNTER — HOSPITAL ENCOUNTER (OUTPATIENT)
Facility: CLINIC | Age: 60
Setting detail: SPECIMEN
End: 2020-09-04
Attending: INTERNAL MEDICINE
Payer: COMMERCIAL

## 2020-09-04 ENCOUNTER — HOSPITAL ENCOUNTER (OUTPATIENT)
Dept: CT IMAGING | Facility: CLINIC | Age: 60
End: 2020-09-04
Attending: INTERNAL MEDICINE
Payer: COMMERCIAL

## 2020-09-04 ENCOUNTER — HOSPITAL ENCOUNTER (OUTPATIENT)
Dept: MRI IMAGING | Facility: CLINIC | Age: 60
Discharge: HOME | End: 2020-09-04
Attending: INTERNAL MEDICINE | Admitting: INTERNAL MEDICINE
Payer: COMMERCIAL

## 2020-09-04 DIAGNOSIS — C7A.8 PRIMARY MALIGNANT NEUROENDOCRINE NEOPLASM OF ILEUM (H): ICD-10-CM

## 2020-09-04 LAB
ALBUMIN SERPL-MCNC: 4 G/DL (ref 3.4–5)
ALP SERPL-CCNC: 45 U/L (ref 40–150)
ALT SERPL W P-5'-P-CCNC: 30 U/L (ref 0–50)
ANION GAP SERPL CALCULATED.3IONS-SCNC: 6 MMOL/L (ref 3–14)
AST SERPL W P-5'-P-CCNC: 22 U/L (ref 0–45)
BASOPHILS # BLD AUTO: 0.1 10E9/L (ref 0–0.2)
BASOPHILS NFR BLD AUTO: 1.1 %
BILIRUB SERPL-MCNC: 0.3 MG/DL (ref 0.2–1.3)
BUN SERPL-MCNC: 13 MG/DL (ref 7–30)
CALCIUM SERPL-MCNC: 9.7 MG/DL (ref 8.5–10.1)
CHLORIDE SERPL-SCNC: 103 MMOL/L (ref 94–109)
CO2 SERPL-SCNC: 28 MMOL/L (ref 20–32)
CREAT SERPL-MCNC: 0.73 MG/DL (ref 0.52–1.04)
DIFFERENTIAL METHOD BLD: NORMAL
EOSINOPHIL # BLD AUTO: 0.3 10E9/L (ref 0–0.7)
EOSINOPHIL NFR BLD AUTO: 4.8 %
ERYTHROCYTE [DISTWIDTH] IN BLOOD BY AUTOMATED COUNT: 12.6 % (ref 10–15)
GFR SERPL CREATININE-BSD FRML MDRD: 88 ML/MIN/{1.73_M2}
GLUCOSE SERPL-MCNC: 92 MG/DL (ref 70–99)
HCT VFR BLD AUTO: 41.9 % (ref 35–47)
HGB BLD-MCNC: 13.9 G/DL (ref 11.7–15.7)
IMM GRANULOCYTES # BLD: 0.1 10E9/L (ref 0–0.4)
IMM GRANULOCYTES NFR BLD: 0.7 %
LYMPHOCYTES # BLD AUTO: 1.5 10E9/L (ref 0.8–5.3)
LYMPHOCYTES NFR BLD AUTO: 20.5 %
MCH RBC QN AUTO: 32.8 PG (ref 26.5–33)
MCHC RBC AUTO-ENTMCNC: 33.2 G/DL (ref 31.5–36.5)
MCV RBC AUTO: 99 FL (ref 78–100)
MONOCYTES # BLD AUTO: 0.5 10E9/L (ref 0–1.3)
MONOCYTES NFR BLD AUTO: 6.6 %
NEUTROPHILS # BLD AUTO: 4.7 10E9/L (ref 1.6–8.3)
NEUTROPHILS NFR BLD AUTO: 66.3 %
NRBC # BLD AUTO: 0 10*3/UL
NRBC BLD AUTO-RTO: 0 /100
PLATELET # BLD AUTO: 375 10E9/L (ref 150–450)
POTASSIUM SERPL-SCNC: 3.6 MMOL/L (ref 3.4–5.3)
PROT SERPL-MCNC: 7.8 G/DL (ref 6.8–8.8)
RBC # BLD AUTO: 4.24 10E12/L (ref 3.8–5.2)
SODIUM SERPL-SCNC: 137 MMOL/L (ref 133–144)
WBC # BLD AUTO: 7.1 10E9/L (ref 4–11)

## 2020-09-04 PROCEDURE — 25500064 ZZH RX 255 OP 636: Performed by: INTERNAL MEDICINE

## 2020-09-04 PROCEDURE — 80053 COMPREHEN METABOLIC PANEL: CPT | Performed by: INTERNAL MEDICINE

## 2020-09-04 PROCEDURE — A9585 GADOBUTROL INJECTION: HCPCS | Performed by: INTERNAL MEDICINE

## 2020-09-04 PROCEDURE — 74183 MRI ABD W/O CNTR FLWD CNTR: CPT

## 2020-09-04 PROCEDURE — 86316 IMMUNOASSAY TUMOR OTHER: CPT | Performed by: INTERNAL MEDICINE

## 2020-09-04 PROCEDURE — 84999 UNLISTED CHEMISTRY PROCEDURE: CPT | Performed by: INTERNAL MEDICINE

## 2020-09-04 PROCEDURE — 85025 COMPLETE CBC W/AUTO DIFF WBC: CPT | Performed by: INTERNAL MEDICINE

## 2020-09-04 PROCEDURE — 36415 COLL VENOUS BLD VENIPUNCTURE: CPT

## 2020-09-04 PROCEDURE — 71250 CT THORAX DX C-: CPT

## 2020-09-04 RX ORDER — GADOBUTROL 604.72 MG/ML
7.5 INJECTION INTRAVENOUS ONCE
Status: COMPLETED | OUTPATIENT
Start: 2020-09-04 | End: 2020-09-04

## 2020-09-04 RX ORDER — GADOBUTROL 604.72 MG/ML
10 INJECTION INTRAVENOUS ONCE
Status: DISCONTINUED | OUTPATIENT
Start: 2020-09-04 | End: 2020-09-04

## 2020-09-04 RX ADMIN — GADOBUTROL 7.5 ML: 604.72 INJECTION INTRAVENOUS at 08:09

## 2020-09-04 NOTE — PROGRESS NOTES
Nursing Note:  Connie Brunson presents today for labs.    Patient seen by provider today: No   present during visit today: Not Applicable.    Note: N/A.    Intravenous Access:  Peripheral IV placed.    Discharge Plan:   Patient was sent to DONNY Encarnacion RN

## 2020-09-08 ENCOUNTER — VIRTUAL VISIT (OUTPATIENT)
Dept: ONCOLOGY | Facility: CLINIC | Age: 60
End: 2020-09-08
Attending: INTERNAL MEDICINE
Payer: COMMERCIAL

## 2020-09-08 DIAGNOSIS — R91.8 PULMONARY NODULES: Primary | ICD-10-CM

## 2020-09-08 DIAGNOSIS — C7A.8 PRIMARY MALIGNANT NEUROENDOCRINE NEOPLASM OF ILEUM (H): ICD-10-CM

## 2020-09-08 LAB — LAB SCANNED RESULT: NORMAL

## 2020-09-08 PROCEDURE — 40001009 ZZH VIDEO/TELEPHONE VISIT; NO CHARGE

## 2020-09-08 PROCEDURE — 99213 OFFICE O/P EST LOW 20 MIN: CPT | Mod: 95 | Performed by: INTERNAL MEDICINE

## 2020-09-08 NOTE — LETTER
"    9/8/2020         RE: Connie Brunson  [address] [facility] [id number]        Dear Colleague,    Thank you for referring your patient, Connie Brunson, to the [facility]. Please see a copy of my visit note below.    Connie Brunson is a 59 year old female who is being evaluated via a billable video visit.      The patient has been notified of following:     \"This video visit will be conducted via a call between you and your physician/provider. We have found that certain health care needs can be provided without the need for an in-person physical exam.  This service lets us provide the care you need with a video conversation.  If a prescription is necessary we can send it directly to your pharmacy.  If lab work is needed we can place an order for that and you can then stop by our lab to have the test done at a later time.    Video visits are billed at different rates depending on your insurance coverage.  Please reach out to your insurance provider with any questions.    If during the course of the call the physician/provider feels a video visit is not appropriate, you will not be charged for this service.\"    Patient has given verbal consent for Video visit? Yes  How would you like to obtain your AVS? MyChart  If you are dropped from the video visit, the video invite should be resent to: Text to cell phone: [employer] [phone]     [employer] [phone]    Will anyone else be joining your video visit? No     REVIEW TEST RESULTS    Dayana Rose CMA      Video-Visit Details    Type of service:  Video Visit    Video Start Time: 3:49 pm  Video End Time: 3:54 PM    Originating Location (pt. Location): Home    Distant Location (provider location):  [facility]     Platform used for Video Visit: [employer]      [facility]    Hematology/Oncology Established Patient Note      Today's Date: 9/08/20    Reason for Follow-up: neuroendocrine tumor " of the ileum      HISTORY OF PRESENT ILLNESS: Connie Brunson is a 59 year old female with PMHx of HTN, HLD, depression, who presents with neuroendocrine tumor of the ileum.  In October 2017, she had a CT abdomen/pelvis done due to lower abdominal pain related to sigmoid diverticulitis and loose stools.  She had been having diarrhea for ~3 years.  It showed an incidental finding of an enhancing 1.3 cm intraluminal nodule in the distal ileum.  There was comment on radiology report on CT enterography on 11/2/17 that the nodule, in retrospect, appears to be present and stable since an older CT from 10/27/14.  On 11/7/17, she underwent a segmental small bowel resection with primary anastomosis by Dr. Cristobal.  Pathology showed a 1.5 cm tumor in the ileum, well-differentiated neuroendocrine tumor, grade 2, mitotic rate 410 HPF, Ki-67 of 5%, 2 of 3 lymph nodes were involved, stage pT3N1 (stage IIIB).        INTERIM HISTORY: Connie says that she feels well.  She has been working from home.  She denies any new complaints.        REVIEW OF SYSTEMS:   14 point ROS was reviewed and is negative other than as noted above in HPI.       HOME MEDICATIONS:  Current Outpatient Medications   Medication Sig Dispense Refill     cetirizine (ZYRTEC) 10 MG tablet Take 10 mg by mouth every evening       diazepam (VALIUM) 10 MG tablet Take 1-2  tab or 10-20 mg 30-60 minutes prior to MRI scan. 4 tablet 1     fenofibrate (TRIGLIDE/LOFIBRA) 160 MG tablet Take 1 tablet (160 mg) by mouth daily 90 tablet 3     fluticasone (FLONASE) 50 MCG/ACT nasal spray Spray 2 sprays into both nostrils daily 48 g 5     hydrocortisone 2.5 % cream Apply sparingly to dermatitis left lower eye 30 g 3     lisinopril (PRINIVIL/ZESTRIL) 10 MG tablet Take 1 tablet (10 mg) by mouth daily 90 tablet 3     mometasone (ELOCON) 0.1 % cream Apply sparingly to affected area twice daily for 14 days.  Do not apply to face. 15 g 3     moxifloxacin (AVELOX) 400 MG tablet Take 1 tablet  (400 mg) by mouth daily 10 tablet 0     omeprazole (PRILOSEC) 20 MG DR capsule Take 1 capsule (20 mg) by mouth daily 90 capsule 2     triamcinolone (KENALOG) 0.5 % cream Apply sparingly to affected area three times daily. 60 g 3     triamterene-HCTZ (DYAZIDE) 37.5-25 MG capsule Take 1 capsule by mouth daily 90 capsule 3     varenicline (CHANTIX SAMIRA) 0.5 MG X 11 & 1 MG X 42 tablet Take 0.5 mg tab daily for 3 days, THEN 0.5 mg tab twice daily for 4 days, THEN 1 mg twice daily. 53 tablet 0     varenicline (CHANTIX) 1 MG tablet Take 1 tablet (1 mg) by mouth 2 times daily 60 tablet 1     venlafaxine (EFFEXOR) 37.5 MG tablet Take 1 tablet (37.5 mg) by mouth daily 90 tablet 3         ALLERGIES:  Allergies   Allergen Reactions     Atorvastatin Nausea     Nausea and abd pain      Ceftin GI Disturbance     Stomach upset and bloating - given at same time as clindamycin ? Which one as cause.       Clindamycin GI Disturbance     Stomach upset and bloating - given at same time as ceftin, ? Which one as cause      Flagyl [Metronidazole]      immediate migraine headache      Morphine Itching     Severe with nose, facial  Swelling - oxycodone = ok /no problems      Penicillins Hives     Sulfa Drugs Rash     Severe red , flushed rash     Levaquin Rash     States she can take cipro and avelox         PAST MEDICAL HISTORY:  Past Medical History:   Diagnosis Date     Allergic rhinitis, cause unspecified     year round - dog,dust-  Failed on claritin, claritin-D, zyrtec and zyrtec-D in past.      Benign neoplasm of cerebral meninges (H) 1999    has yearly MRI's. - 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 Thomas ENT      Cancer (H)      Depressive disorder      Deviated nasal septum     sees Dr. Thomas ENT      Diverticulosis of colon (without mention of hemorrhage) 02-     History of Guillain-Hudson syndrome     NO FLU  SHOTS - very mild case in [place] many years ago     Hyperlipidemia LDL goal < 130 doesn't want statins    simvastatin = severe hand joint pain , lipitor =nausea/abd. pain     Hypertension goal BP (blood pressure) < 140/90      Insomnia     trazodone -made pt feel hung over      Major depressive disorder, recurrent episode, moderate (H)     lexapro - sexual side effects      Moderate major depression (H) 2/4/2005    trazodone -made pt feel hung over      Other acne      Other extrapyramidal disease and abnormal movement disorder     ?GBS     Symptomatic menopausal or female climacteric states     vivelle-dot          PAST SURGICAL HISTORY:  Past Surgical History:   Procedure Laterality Date     ABDOMEN SURGERY       BIOPSY       C LIGATE FALLOPIAN TUBE  1988    Tubal Ligation     C NONSPECIFIC PROCEDURE      PAROTID TUMOR L SIDE OF NECK - BENIGN     C TOTAL ABDOM HYSTERECTOMY  2000 ?    Hysterectomy, Total Abdominal with BSO     COLONOSCOPY  1/16/2012    Procedure:COLONOSCOPY; Colonoscopy; Surgeon:SHOLA JOYCE; Location: GI     HC REMOVE TONSILS/ADENOIDS,<13 Y/O      T and A, under 12 yrs     HEAD & NECK SURGERY       LAPAROSCOPIC CHOLECYSTECTOMY WITH CHOLANGIOGRAMS N/A 4/25/2016    Procedure: LAPAROSCOPIC CHOLECYSTECTOMY WITH CHOLANGIOGRAMS;  Surgeon: Rosa M Cristobal MD;  Location: [facility] OR     LAPAROSCOPY DIAGNOSTIC (GENERAL) N/A 11/7/2017    Procedure: LAPAROSCOPY DIAGNOSTIC (GENERAL);  Exploratory Laparoscopy, Laparotomy and Small Bowel resection.;  Surgeon: Rosa M Cristobal MD;  Location: [facility] OR     LAPAROTOMY EXPLORATORY N/A 11/7/2017    Procedure: LAPAROTOMY EXPLORATORY;;  Surgeon: Rosa M Cristobal MD;  Location: [facility] OR     RESECT SMALL BOWEL WITHOUT OSTOMY N/A 11/7/2017    Procedure: RESECT SMALL BOWEL WITHOUT OSTOMY;;  Surgeon: Rosa M Cristobal MD;  Location: [facility] OR     SURGICAL HISTORY OF -   2004    Menigioma excision         SOCIAL HISTORY:  Social History     Socioeconomic  History     Marital status:      Spouse name: Perry Brunson      Number of children: 3     Years of education: 15     Highest education level: Not on file   Occupational History     Occupation: RN and in admin with [employer]      Employer: [employer]   Social Needs     Financial resource strain: Not on file     Food insecurity     Worry: Not on file     Inability: Not on file     Transportation needs     Medical: Not on file     Non-medical: Not on file   Tobacco Use     Smoking status: Light Tobacco Smoker     Packs/day: 0.00     Years: 5.00     Pack years: 0.00     Types: Other     Last attempt to quit: 1994     Years since quittin.7     Smokeless tobacco: Current User     Tobacco comment: 11/3/17 - occ e-cig use   Substance and Sexual Activity     Alcohol use: Yes     Alcohol/week: 0.0 standard drinks     Comment: 3-5 drinks per week     Drug use: No     Sexual activity: Yes     Partners: Male     Birth control/protection: Surgical     Comment: tubal ligation-    Lifestyle     Physical activity     Days per week: Not on file     Minutes per session: Not on file     Stress: Not on file   Relationships     Social connections     Talks on phone: Not on file     Gets together: Not on file     Attends Mandaen service: Not on file     Active member of club or organization: Not on file     Attends meetings of clubs or organizations: Not on file     Relationship status: Not on file     Intimate partner violence     Fear of current or ex partner: Not on file     Emotionally abused: Not on file     Physically abused: Not on file     Forced sexual activity: Not on file   Other Topics Concern      Service No     Blood Transfusions No     Caffeine Concern Yes     Comment: 2-3 cups coffee in am     Occupational Exposure Not Asked     Hobby Hazards Not Asked     Sleep Concern Not Asked     Stress Concern Not Asked     Weight Concern Not Asked     Special Diet Not Asked      Back Care Not Asked     Exercise No     Comment: regular walking 4x/week      Bike Helmet Not Asked     Seat Belt Yes     Comment: always     Self-Exams Yes     Comment: SBE encouraged monthly     Parent/sibling w/ CABG, MI or angioplasty before 65F 55M? No   Social History Narrative    calcium - taking 500mg po qday - increase to Calcium - 1000-1200mg/day    flex sig/colonoscopy -at age 48    sun precautions - discussed    mammogram - yearly    Td booster -  per our records - pt will call [facility]     pneumovax -at age 58    DEXA -this year-     stool hemoccults - every year after age 40    ASA- start 81 mg ASA qday.    mulvitamin - encouraged    doing citrucel qday         from second , Dan [employer]enbauer. Now back together with her first  and father of their  children, Perry Brunson - 2011.          FAMILY HISTORY:  Family History   Problem Relation Age of Onset     Hyperlipidemia Mother      Thyroid Disease Mother      Cancer Father         lung     Hypertension Father      Hyperlipidemia Father      Thyroid Disease Brother      Diabetes Maternal Grandmother      Cerebrovascular Disease Maternal Grandmother      Substance Abuse Maternal Grandfather      Thyroid Disease Son      Her father  of lung cancer and paranasal cancer at around age 65.    She has 3 children.      PHYSICAL EXAM:  Vital signs:  LMP 2000    ECO  GENERAL/CONSTITUTIONAL: No acute distress. Healthy, alert.  EYES: No scleral icterus.  No redness or discharge.    RESPIRATORY: No audible wheeze, cough, or visible cyanosis.  No visible retractions or increased work of breathing.  Able to speak fully in complete sentences.  MUSCULOSKELETAL: Normal range of motion.  NEUROLOGIC: Alert, oriented, answers questions appropriately. No tremor. Mentation intact and speech normal  INTEGUMENTARY: No jaundice.  No obvious rash or skin lesions.  PSYCHIATRIC:  Mentation appears normal, affect  normal/bright, judgement and insight intact, normal speech and appearance well-groomed.    The rest of a comprehensive physical exam is deferred due to public health emergency video visit restrictions.        LABS:  CBC RESULTS:   Recent Labs   Lab Test 09/04/20  0750   WBC 7.1   RBC 4.24   HGB 13.9   HCT 41.9   MCV 99   MCH 32.8   MCHC 33.2   RDW 12.6        Recent Labs   Lab Test 09/04/20  0750 02/28/20  0946    133   POTASSIUM 3.6 3.2*   CHLORIDE 103 98   CO2 28 31   ANIONGAP 6 4   GLC 92 105*   BUN 13 9   CR 0.73 0.68   FADI 9.7 9.3     Lab Results   Component Value Date    AST 22 09/04/2020     Lab Results   Component Value Date    ALT 30 09/04/2020     No results found for: BILICONJ   Lab Results   Component Value Date    BILITOTAL 0.3 09/04/2020     Lab Results   Component Value Date    ALBUMIN 4.0 09/04/2020     Lab Results   Component Value Date    PROTTOTAL 7.8 09/04/2020      Lab Results   Component Value Date    ALKPHOS 45 09/04/2020       Component      Latest Ref Rng & Units 11/16/2017 12/8/2017 3/12/2018 9/24/2018   Chromogranin A      0 - 95 ng/mL 339 (H) 385 (H) 597 (H) 602 (H)     Component      Latest Ref Rng & Units 4/12/2019   Chromogranin A      0 - 95 ng/mL 438 (H)         IMAGING:  CT chest 9/04/20:  1. Stable right upper lobe pulmonary nodule.  2. New areas of groundglass change in both upper lobes which are  nonspecific. Correlation with an infectious or inflammatory process  recommended.       MRI abdomen 9/04/20:  1. No evidence of metastatic disease involving the imaged portions of  the abdomen. No enlarged lymph nodes. Liver and pancreas are within  normal limits.  2. Fatty liver infiltration.      ASSESSMENT/PLAN:  Connie GRAVES Nannette is a 58 year old female with:    1) Neuroendocrine tumor of the ileum: s/p resection on 11/7/17, 1.5 cm, grade 2, well-differentiated, T3N1 (stage IIIB).      Recent MRI abdomen and CT scan reviewed with Connie.  MRI abdomen is negative, with no  evidence of metastatic disease.  CT chest shows stable pulmonary nodule.     Chromogranin A has remained abnormal, although trended down from previous.  Her symptoms remain the same.  Her imaging remains clear.  Since her imaging is negative, we will continue to observe for now.  The chromogranin A result from last week's lab draw is still pending.      We will continue annual follow-up's until at least 5 years.    -she will call if she gets worsening diarrhea or flushing symptoms or pain and can get labs/scans done earlier.    -labs (CBC, CMP, chromogranin A) and CT chest and MRI abdomen in 1 year  -RTC in 1 year    2) HTN, HLD  -follow-up with PCP    3) History of meningioma: diagnosed in 1999, was getting yearly MRI's with neurosurgery, but was told she does not need surveillance MRI's anymore unless she has new neurologic symptoms.    4) Pulmonary nodule: There is an indeterminate 2 x 3 mm anterior right mid-lung nodule seen on CT.  She does have history of smoking.  Recent CT chest shows that the tiny nodule is stable.  It has been stable since 2017.    Recent CT chest shows new areas of groundglass change in both upper lobes, which are non-specific, could be infectious or inflammatory.  She is asymptomatic.    -monitor on scans      Mary Weir MD  Hematology/Oncology  [facility]      Again, thank you for allowing me to participate in the care of your patient.        Sincerely,        Mary Weir MD

## 2020-09-08 NOTE — LETTER
"    9/8/2020         RE: Connie Brunson  [address] [facility] [id number]        Dear Colleague,    Thank you for referring your patient, Connie Brunson, to the [facility]. Please see a copy of my visit note below.    Connie Brunson is a 59 year old female who is being evaluated via a billable video visit.      The patient has been notified of following:     \"This video visit will be conducted via a call between you and your physician/provider. We have found that certain health care needs can be provided without the need for an in-person physical exam.  This service lets us provide the care you need with a video conversation.  If a prescription is necessary we can send it directly to your pharmacy.  If lab work is needed we can place an order for that and you can then stop by our lab to have the test done at a later time.    Video visits are billed at different rates depending on your insurance coverage.  Please reach out to your insurance provider with any questions.    If during the course of the call the physician/provider feels a video visit is not appropriate, you will not be charged for this service.\"    Patient has given verbal consent for Video visit? Yes  How would you like to obtain your AVS? MyChart  If you are dropped from the video visit, the video invite should be resent to: Text to cell phone: [employer] TEXT INVITE [phone]     [employer] TEXT INVITE [phone]    Will anyone else be joining your video visit? No     REVIEW TEST RESULTS    Dayana Rose CMA      Video-Visit Details    Type of service:  Video Visit    Video Start Time: 3:49 pm  Video End Time: 3:54 PM    Originating Location (pt. Location): Home    Distant Location (provider location):  [facility]     Platform used for Video Visit: Duda      [facility]    Hematology/Oncology Established Patient Note      Today's Date: 9/08/20    Reason for Follow-up: neuroendocrine tumor " of the ileum      HISTORY OF PRESENT ILLNESS: Connie Brunson is a 59 year old female with PMHx of HTN, HLD, depression, who presents with neuroendocrine tumor of the ileum.  In October 2017, she had a CT abdomen/pelvis done due to lower abdominal pain related to sigmoid diverticulitis and loose stools.  She had been having diarrhea for ~3 years.  It showed an incidental finding of an enhancing 1.3 cm intraluminal nodule in the distal ileum.  There was comment on radiology report on CT enterography on 11/2/17 that the nodule, in retrospect, appears to be present and stable since an older CT from 10/27/14.  On 11/7/17, she underwent a segmental small bowel resection with primary anastomosis by Dr. Cristobal.  Pathology showed a 1.5 cm tumor in the ileum, well-differentiated neuroendocrine tumor, grade 2, mitotic rate 410 HPF, Ki-67 of 5%, 2 of 3 lymph nodes were involved, stage pT3N1 (stage IIIB).        INTERIM HISTORY: Connie says that she feels well.  She has been working from home.  She denies any new complaints.        REVIEW OF SYSTEMS:   14 point ROS was reviewed and is negative other than as noted above in HPI.       HOME MEDICATIONS:  Current Outpatient Medications   Medication Sig Dispense Refill     cetirizine (ZYRTEC) 10 MG tablet Take 10 mg by mouth every evening       diazepam (VALIUM) 10 MG tablet Take 1-2  tab or 10-20 mg 30-60 minutes prior to MRI scan. 4 tablet 1     fenofibrate (TRIGLIDE/LOFIBRA) 160 MG tablet Take 1 tablet (160 mg) by mouth daily 90 tablet 3     fluticasone (FLONASE) 50 MCG/ACT nasal spray Spray 2 sprays into both nostrils daily 48 g 5     hydrocortisone 2.5 % cream Apply sparingly to dermatitis left lower eye 30 g 3     lisinopril (PRINIVIL/ZESTRIL) 10 MG tablet Take 1 tablet (10 mg) by mouth daily 90 tablet 3     mometasone (ELOCON) 0.1 % cream Apply sparingly to affected area twice daily for 14 days.  Do not apply to face. 15 g 3     moxifloxacin (AVELOX) 400 MG tablet Take 1 tablet  (400 mg) by mouth daily 10 tablet 0     omeprazole (PRILOSEC) 20 MG DR capsule Take 1 capsule (20 mg) by mouth daily 90 capsule 2     triamcinolone (KENALOG) 0.5 % cream Apply sparingly to affected area three times daily. 60 g 3     triamterene-HCTZ (DYAZIDE) 37.5-25 MG capsule Take 1 capsule by mouth daily 90 capsule 3     varenicline (CHANTIX SAMIRA) 0.5 MG X 11 & 1 MG X 42 tablet Take 0.5 mg tab daily for 3 days, THEN 0.5 mg tab twice daily for 4 days, THEN 1 mg twice daily. 53 tablet 0     varenicline (CHANTIX) 1 MG tablet Take 1 tablet (1 mg) by mouth 2 times daily 60 tablet 1     venlafaxine (EFFEXOR) 37.5 MG tablet Take 1 tablet (37.5 mg) by mouth daily 90 tablet 3         ALLERGIES:  Allergies   Allergen Reactions     Atorvastatin Nausea     Nausea and abd pain      Ceftin GI Disturbance     Stomach upset and bloating - given at same time as clindamycin ? Which one as cause.       Clindamycin GI Disturbance     Stomach upset and bloating - given at same time as ceftin, ? Which one as cause      Flagyl [Metronidazole]      immediate migraine headache      Morphine Itching     Severe with nose, facial  Swelling - oxycodone = ok /no problems      Penicillins Hives     Sulfa Drugs Rash     Severe red , flushed rash     Levaquin Rash     States she can take cipro and avelox         PAST MEDICAL HISTORY:  Past Medical History:   Diagnosis Date     Allergic rhinitis, cause unspecified     year round - dog,dust-  Failed on claritin, claritin-D, zyrtec and zyrtec-D in past.      Benign neoplasm of cerebral meninges (H) 1999    has yearly MRI's. - 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 Thomas ENT      Cancer (H)      Depressive disorder      Deviated nasal septum     sees Dr. Thomas ENT      Diverticulosis of colon (without mention of hemorrhage) 02-     History of Guillain-[place] syndrome     NO FLU  SHOTS - very mild case in [place] many years ago     Hyperlipidemia LDL goal < 130 doesn't want statins    simvastatin = severe hand joint pain , lipitor =nausea/abd. pain     Hypertension goal BP (blood pressure) < 140/90      Insomnia     trazodone -made pt feel hung over      Major depressive disorder, recurrent episode, moderate (H)     lexapro - sexual side effects      Moderate major depression (H) 2/4/2005    trazodone -made pt feel hung over      Other acne      Other extrapyramidal disease and abnormal movement disorder     ?GBS     Symptomatic menopausal or female climacteric states     vivelle-dot          PAST SURGICAL HISTORY:  Past Surgical History:   Procedure Laterality Date     ABDOMEN SURGERY       BIOPSY       C LIGATE FALLOPIAN TUBE  1988    Tubal Ligation     C NONSPECIFIC PROCEDURE      PAROTID TUMOR L SIDE OF NECK - BENIGN     C TOTAL ABDOM HYSTERECTOMY  2000 ?    Hysterectomy, Total Abdominal with BSO     COLONOSCOPY  1/16/2012    Procedure:COLONOSCOPY; Colonoscopy; Surgeon:SHOLA JOYCE; Location: GI     HC REMOVE TONSILS/ADENOIDS,<13 Y/O      T and A, under 12 yrs     HEAD & NECK SURGERY       LAPAROSCOPIC CHOLECYSTECTOMY WITH CHOLANGIOGRAMS N/A 4/25/2016    Procedure: LAPAROSCOPIC CHOLECYSTECTOMY WITH CHOLANGIOGRAMS;  Surgeon: Rosa M Cristobal MD;  Location: [facility] OR     LAPAROSCOPY DIAGNOSTIC (GENERAL) N/A 11/7/2017    Procedure: LAPAROSCOPY DIAGNOSTIC (GENERAL);  Exploratory Laparoscopy, Laparotomy and Small Bowel resection.;  Surgeon: Rosa M Cristobal MD;  Location: [facility] OR     LAPAROTOMY EXPLORATORY N/A 11/7/2017    Procedure: LAPAROTOMY EXPLORATORY;;  Surgeon: Rosa M Cristobal MD;  Location: [facility] OR     RESECT SMALL BOWEL WITHOUT OSTOMY N/A 11/7/2017    Procedure: RESECT SMALL BOWEL WITHOUT OSTOMY;;  Surgeon: Rosa M Cristobal MD;  Location: [facility] OR     SURGICAL HISTORY OF -   2004    Menigioma excision         SOCIAL HISTORY:  Social History     Socioeconomic  History     Marital status:      Spouse name: Perry Brunson      Number of children: 3     Years of education: 15     Highest education level: Not on file   Occupational History     Occupation: RN and in admin with [employer]      Employer: [employer]   Social Needs     Financial resource strain: Not on file     Food insecurity     Worry: Not on file     Inability: Not on file     Transportation needs     Medical: Not on file     Non-medical: Not on file   Tobacco Use     Smoking status: Light Tobacco Smoker     Packs/day: 0.00     Years: 5.00     Pack years: 0.00     Types: Other     Last attempt to quit: 1994     Years since quittin.7     Smokeless tobacco: Current User     Tobacco comment: 11/3/17 - occ e-cig use   Substance and Sexual Activity     Alcohol use: Yes     Alcohol/week: 0.0 standard drinks     Comment: 3-5 drinks per week     Drug use: No     Sexual activity: Yes     Partners: Male     Birth control/protection: Surgical     Comment: tubal ligation-    Lifestyle     Physical activity     Days per week: Not on file     Minutes per session: Not on file     Stress: Not on file   Relationships     Social connections     Talks on phone: Not on file     Gets together: Not on file     Attends [employer] service: Not on file     Active member of club or organization: Not on file     Attends meetings of clubs or organizations: Not on file     Relationship status: Not on file     Intimate partner violence     Fear of current or ex partner: Not on file     Emotionally abused: Not on file     Physically abused: Not on file     Forced sexual activity: Not on file   Other Topics Concern      Service No     Blood Transfusions No     Caffeine Concern Yes     Comment: 2-3 cups coffee in am     Occupational Exposure Not Asked     Hobby Hazards Not Asked     Sleep Concern Not Asked     Stress Concern Not Asked     Weight Concern Not Asked     Special Diet Not Asked      Back Care Not Asked     Exercise No     Comment: regular walking 4x/week      Bike Helmet Not Asked     Seat Belt Yes     Comment: always     Self-Exams Yes     Comment: SBE encouraged monthly     Parent/sibling w/ CABG, MI or angioplasty before 65F 55M? No   Social History Narrative    calcium - taking 500mg po qday - increase to Calcium - 1000-1200mg/day    flex sig/colonoscopy -at age 48    sun precautions - discussed    mammogram - yearly    Td booster -  per our records - pt will call [facility]     pneumovax -at age 58    DEXA -this year-     stool hemoccults - every year after age 40    ASA- start 81 mg ASA qday.    mulvitamin - encouraged    doing citrucel qday         from second , Dan [employer]. Now back together with her first  and father of their  children, Perry Brunson - 2011.          FAMILY HISTORY:  Family History   Problem Relation Age of Onset     Hyperlipidemia Mother      Thyroid Disease Mother      Cancer Father         lung     Hypertension Father      Hyperlipidemia Father      Thyroid Disease Brother      Diabetes Maternal Grandmother      Cerebrovascular Disease Maternal Grandmother      Substance Abuse Maternal Grandfather      Thyroid Disease Son      Her father  of lung cancer and paranasal cancer at around age 65.    She has 3 children.      PHYSICAL EXAM:  Vital signs:  LMP 2000    ECO  GENERAL/CONSTITUTIONAL: No acute distress. Healthy, alert.  EYES: No scleral icterus.  No redness or discharge.    RESPIRATORY: No audible wheeze, cough, or visible cyanosis.  No visible retractions or increased work of breathing.  Able to speak fully in complete sentences.  MUSCULOSKELETAL: Normal range of motion.  NEUROLOGIC: Alert, oriented, answers questions appropriately. No tremor. Mentation intact and speech normal  INTEGUMENTARY: No jaundice.  No obvious rash or skin lesions.  PSYCHIATRIC:  Mentation appears normal, affect  normal/bright, judgement and insight intact, normal speech and appearance well-groomed.    The rest of a comprehensive physical exam is deferred due to public health emergency video visit restrictions.        LABS:  CBC RESULTS:   Recent Labs   Lab Test 09/04/20  0750   WBC 7.1   RBC 4.24   HGB 13.9   HCT 41.9   MCV 99   MCH 32.8   MCHC 33.2   RDW 12.6        Recent Labs   Lab Test 09/04/20  0750 02/28/20  0946    133   POTASSIUM 3.6 3.2*   CHLORIDE 103 98   CO2 28 31   ANIONGAP 6 4   GLC 92 105*   BUN 13 9   CR 0.73 0.68   FADI 9.7 9.3     Lab Results   Component Value Date    AST 22 09/04/2020     Lab Results   Component Value Date    ALT 30 09/04/2020     No results found for: BILICONJ   Lab Results   Component Value Date    BILITOTAL 0.3 09/04/2020     Lab Results   Component Value Date    ALBUMIN 4.0 09/04/2020     Lab Results   Component Value Date    PROTTOTAL 7.8 09/04/2020      Lab Results   Component Value Date    ALKPHOS 45 09/04/2020       Component      Latest Ref Rng & Units 11/16/2017 12/8/2017 3/12/2018 9/24/2018   Chromogranin A      0 - 95 ng/mL 339 (H) 385 (H) 597 (H) 602 (H)     Component      Latest Ref Rng & Units 4/12/2019   Chromogranin A      0 - 95 ng/mL 438 (H)         IMAGING:  CT chest 9/04/20:  1. Stable right upper lobe pulmonary nodule.  2. New areas of groundglass change in both upper lobes which are  nonspecific. Correlation with an infectious or inflammatory process  recommended.       MRI abdomen 9/04/20:  1. No evidence of metastatic disease involving the imaged portions of  the abdomen. No enlarged lymph nodes. Liver and pancreas are within  normal limits.  2. Fatty liver infiltration.      ASSESSMENT/PLAN:  Connie GRAVES Nannette is a 58 year old female with:    1) Neuroendocrine tumor of the ileum: s/p resection on 11/7/17, 1.5 cm, grade 2, well-differentiated, T3N1 (stage IIIB).      Recent MRI abdomen and CT scan reviewed with [facility].  MRI abdomen is negative, with no  evidence of metastatic disease.  CT chest shows stable pulmonary nodule.     Chromogranin A has remained abnormal, although trended down from previous.  Her symptoms remain the same.  Her imaging remains clear.  Since her imaging is negative, we will continue to observe for now.  The chromogranin A result from last week's lab draw is still pending.      We will continue annual follow-up's until at least 5 years.    -she will call if she gets worsening diarrhea or flushing symptoms or pain and can get labs/scans done earlier.    -labs (CBC, CMP, chromogranin A) and CT chest and MRI abdomen in 1 year  -RTC in 1 year    2) HTN, HLD  -follow-up with PCP    3) History of meningioma: diagnosed in 1999, was getting yearly MRI's with neurosurgery, but was told she does not need surveillance MRI's anymore unless she has new neurologic symptoms.    4) Pulmonary nodule: There is an indeterminate 2 x 3 mm anterior right mid-lung nodule seen on CT.  She does have history of smoking.  Recent CT chest shows that the tiny nodule is stable.  It has been stable since 2017.    Recent CT chest shows new areas of groundglass change in both upper lobes, which are non-specific, could be infectious or inflammatory.  She is asymptomatic.    -monitor on scans      Mary Weir MD  Hematology/Oncology  [facility]      Again, thank you for allowing me to participate in the care of your patient.        Sincerely,        Mary Weir MD

## 2020-09-08 NOTE — PROGRESS NOTES
"Connie Brunson is a 59 year old female who is being evaluated via a billable video visit.      The patient has been notified of following:     \"This video visit will be conducted via a call between you and your physician/provider. We have found that certain health care needs can be provided without the need for an in-person physical exam.  This service lets us provide the care you need with a video conversation.  If a prescription is necessary we can send it directly to your pharmacy.  If lab work is needed we can place an order for that and you can then stop by our lab to have the test done at a later time.    Video visits are billed at different rates depending on your insurance coverage.  Please reach out to your insurance provider with any questions.    If during the course of the call the physician/provider feels a video visit is not appropriate, you will not be charged for this service.\"    Patient has given verbal consent for Video visit? Yes  How would you like to obtain your AVS? MyChart  If you are dropped from the video visit, the video invite should be resent to: Text to cell phone: [employer] TEXT INVITE [phone]     [employer] TEXT INVITE [phone]    Will anyone else be joining your video visit? No     REVIEW TEST RESULTS    Dayana Rose CMA      Video-Visit Details    Type of service:  Video Visit    Video Start Time: 3:49 pm  Video End Time: 3:54 PM    Originating Location (pt. Location): Home    Distant Location (provider location):  [facility]     Platform used for Video Visit: Convergin      [facility]    Hematology/Oncology Established Patient Note      Today's Date: 9/08/20    Reason for Follow-up: neuroendocrine tumor of the ileum      HISTORY OF PRESENT ILLNESS: Connie Brunson is a 59 year old female with PMHx of HTN, HLD, depression, who presents with neuroendocrine tumor of the ileum.  In October 2017, she had a CT abdomen/pelvis done due to lower " abdominal pain related to sigmoid diverticulitis and loose stools.  She had been having diarrhea for ~3 years.  It showed an incidental finding of an enhancing 1.3 cm intraluminal nodule in the distal ileum.  There was comment on radiology report on CT enterography on 11/2/17 that the nodule, in retrospect, appears to be present and stable since an older CT from 10/27/14.  On 11/7/17, she underwent a segmental small bowel resection with primary anastomosis by Dr. Cristobal.  Pathology showed a 1.5 cm tumor in the ileum, well-differentiated neuroendocrine tumor, grade 2, mitotic rate 410 HPF, Ki-67 of 5%, 2 of 3 lymph nodes were involved, stage pT3N1 (stage IIIB).        INTERIM HISTORY: Connie says that she feels well.  She has been working from home.  She denies any new complaints.        REVIEW OF SYSTEMS:   14 point ROS was reviewed and is negative other than as noted above in HPI.       HOME MEDICATIONS:  Current Outpatient Medications   Medication Sig Dispense Refill     cetirizine (ZYRTEC) 10 MG tablet Take 10 mg by mouth every evening       diazepam (VALIUM) 10 MG tablet Take 1-2  tab or 10-20 mg 30-60 minutes prior to MRI scan. 4 tablet 1     fenofibrate (TRIGLIDE/LOFIBRA) 160 MG tablet Take 1 tablet (160 mg) by mouth daily 90 tablet 3     fluticasone (FLONASE) 50 MCG/ACT nasal spray Spray 2 sprays into both nostrils daily 48 g 5     hydrocortisone 2.5 % cream Apply sparingly to dermatitis left lower eye 30 g 3     lisinopril (PRINIVIL/ZESTRIL) 10 MG tablet Take 1 tablet (10 mg) by mouth daily 90 tablet 3     mometasone (ELOCON) 0.1 % cream Apply sparingly to affected area twice daily for 14 days.  Do not apply to face. 15 g 3     moxifloxacin (AVELOX) 400 MG tablet Take 1 tablet (400 mg) by mouth daily 10 tablet 0     omeprazole (PRILOSEC) 20 MG DR capsule Take 1 capsule (20 mg) by mouth daily 90 capsule 2     triamcinolone (KENALOG) 0.5 % cream Apply sparingly to affected area three times daily. 60 g 3      triamterene-HCTZ (DYAZIDE) 37.5-25 MG capsule Take 1 capsule by mouth daily 90 capsule 3     varenicline (CHANTIX SAMIRA) 0.5 MG X 11 & 1 MG X 42 tablet Take 0.5 mg tab daily for 3 days, THEN 0.5 mg tab twice daily for 4 days, THEN 1 mg twice daily. 53 tablet 0     varenicline (CHANTIX) 1 MG tablet Take 1 tablet (1 mg) by mouth 2 times daily 60 tablet 1     venlafaxine (EFFEXOR) 37.5 MG tablet Take 1 tablet (37.5 mg) by mouth daily 90 tablet 3         ALLERGIES:  Allergies   Allergen Reactions     Atorvastatin Nausea     Nausea and abd pain      Ceftin GI Disturbance     Stomach upset and bloating - given at same time as clindamycin ? Which one as cause.       Clindamycin GI Disturbance     Stomach upset and bloating - given at same time as ceftin, ? Which one as cause      Flagyl [Metronidazole]      immediate migraine headache      Morphine Itching     Severe with nose, facial  Swelling - oxycodone = ok /no problems      Penicillins Hives     Sulfa Drugs Rash     Severe red , flushed rash     Levaquin Rash     States she can take cipro and avelox         PAST MEDICAL HISTORY:  Past Medical History:   Diagnosis Date     Allergic rhinitis, cause unspecified     year round - dog,dust-  Failed on claritin, claritin-D, zyrtec and zyrtec-D in past.      Benign neoplasm of cerebral meninges (H) 1999    has yearly MRI's. - sees Dr. 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 Thomas ENT      Cancer (H)      Depressive disorder      Deviated nasal septum     sees Dr. Thomas ENT      Diverticulosis of colon (without mention of hemorrhage) 02-     History of Guillain-[place] syndrome     NO FLU SHOTS - very mild case in [place] many years ago     Hyperlipidemia LDL goal < 130 doesn't want statins    simvastatin = severe hand joint pain , lipitor =nausea/abd. pain     Hypertension goal BP (blood pressure) < 140/90      Insomnia      trazodone -made pt feel hung over      Major depressive disorder, recurrent episode, moderate (H)     lexapro - sexual side effects      Moderate major depression (H) 2/4/2005    trazodone -made pt feel hung over      Other acne      Other extrapyramidal disease and abnormal movement disorder     ?GBS     Symptomatic menopausal or female climacteric states     vivelle-dot          PAST SURGICAL HISTORY:  Past Surgical History:   Procedure Laterality Date     ABDOMEN SURGERY       BIOPSY       C LIGATE FALLOPIAN TUBE  1988    Tubal Ligation     C NONSPECIFIC PROCEDURE      PAROTID TUMOR L SIDE OF NECK - BENIGN     C TOTAL ABDOM HYSTERECTOMY  2000 ?    Hysterectomy, Total Abdominal with BSO     COLONOSCOPY  1/16/2012    Procedure:COLONOSCOPY; Colonoscopy; Surgeon:SHOLA JOYCE; Location:[facility]     HC REMOVE TONSILS/ADENOIDS,<11 Y/O      T and A, under 12 yrs     HEAD & NECK SURGERY       LAPAROSCOPIC CHOLECYSTECTOMY WITH CHOLANGIOGRAMS N/A 4/25/2016    Procedure: LAPAROSCOPIC CHOLECYSTECTOMY WITH CHOLANGIOGRAMS;  Surgeon: Rosa M Cristobal MD;  Location: [facility]     LAPAROSCOPY DIAGNOSTIC (GENERAL) N/A 11/7/2017    Procedure: LAPAROSCOPY DIAGNOSTIC (GENERAL);  Exploratory Laparoscopy, Laparotomy and Small Bowel resection.;  Surgeon: Rosa M Cristobal MD;  Location: [facility]     LAPAROTOMY EXPLORATORY N/A 11/7/2017    Procedure: LAPAROTOMY EXPLORATORY;;  Surgeon: Rosa M Cristobal MD;  Location: [facility]     RESECT SMALL BOWEL WITHOUT OSTOMY N/A 11/7/2017    Procedure: RESECT SMALL BOWEL WITHOUT OSTOMY;;  Surgeon: Rosa M Cristobal MD;  Location: [facility]     SURGICAL HISTORY OF -   2004    Menigioma excision         SOCIAL HISTORY:  Social History     Socioeconomic History     Marital status:      Spouse name: Perry Brunson      Number of children: 3     Years of education: 15     Highest education level: Not on file   Occupational History     Occupation: RN and in admin with [employer]       Employer: [employer]   Social Needs     Financial resource strain: Not on file     Food insecurity     Worry: Not on file     Inability: Not on file     Transportation needs     Medical: Not on file     Non-medical: Not on file   Tobacco Use     Smoking status: Light Tobacco Smoker     Packs/day: 0.00     Years: 5.00     Pack years: 0.00     Types: Other     Last attempt to quit: 1994     Years since quittin.7     Smokeless tobacco: Current User     Tobacco comment: 11/3/17 - occ e-cig use   Substance and Sexual Activity     Alcohol use: Yes     Alcohol/week: 0.0 standard drinks     Comment: 3-5 drinks per week     Drug use: No     Sexual activity: Yes     Partners: Male     Birth control/protection: Surgical     Comment: tubal ligation-    Lifestyle     Physical activity     Days per week: Not on file     Minutes per session: Not on file     Stress: Not on file   Relationships     Social connections     Talks on phone: Not on file     Gets together: Not on file     Attends Latter day service: Not on file     Active member of club or organization: Not on file     Attends meetings of clubs or organizations: Not on file     Relationship status: Not on file     Intimate partner violence     Fear of current or ex partner: Not on file     Emotionally abused: Not on file     Physically abused: Not on file     Forced sexual activity: Not on file   Other Topics Concern      Service No     Blood Transfusions No     Caffeine Concern Yes     Comment: 2-3 cups coffee in am     Occupational Exposure Not Asked     Hobby Hazards Not Asked     Sleep Concern Not Asked     Stress Concern Not Asked     Weight Concern Not Asked     Special Diet Not Asked     Back Care Not Asked     Exercise No     Comment: regular walking 4x/week      Bike Helmet Not Asked     Seat Belt Yes     Comment: always     Self-Exams Yes     Comment: SBE encouraged monthly     Parent/sibling w/ CABG, MI or  angioplasty before 65F 55M? No   Social History Narrative    calcium - taking 500mg po qday - increase to Calcium - 1000-1200mg/day    flex sig/colonoscopy -at age 48    sun precautions - discussed    mammogram - yearly    Td booster -  per our records - pt will call [facility]     pneumovax -at age 58    DEXA -this year-     stool hemoccults - every year after age 40    ASA- start 81 mg ASA qday.    mulvitamin - encouraged    doing citrucel qday         from second , Dan [employer]. Now back together with her first  and father of their  children, Perry Brunson - 2011.          FAMILY HISTORY:  Family History   Problem Relation Age of Onset     Hyperlipidemia Mother      Thyroid Disease Mother      Cancer Father         lung     Hypertension Father      Hyperlipidemia Father      Thyroid Disease Brother      Diabetes Maternal Grandmother      Cerebrovascular Disease Maternal Grandmother      Substance Abuse Maternal Grandfather      Thyroid Disease Son      Her father  of lung cancer and paranasal cancer at around age 65.    She has 3 children.      PHYSICAL EXAM:  Vital signs:  LMP 2000    ECO  GENERAL/CONSTITUTIONAL: No acute distress. Healthy, alert.  EYES: No scleral icterus.  No redness or discharge.    RESPIRATORY: No audible wheeze, cough, or visible cyanosis.  No visible retractions or increased work of breathing.  Able to speak fully in complete sentences.  MUSCULOSKELETAL: Normal range of motion.  NEUROLOGIC: Alert, oriented, answers questions appropriately. No tremor. Mentation intact and speech normal  INTEGUMENTARY: No jaundice.  No obvious rash or skin lesions.  PSYCHIATRIC:  Mentation appears normal, affect normal/bright, judgement and insight intact, normal speech and appearance well-groomed.    The rest of a comprehensive physical exam is deferred due to public health emergency video visit restrictions.        LABS:  CBC RESULTS:   Recent  Labs   Lab Test 09/04/20  0750   WBC 7.1   RBC 4.24   HGB 13.9   HCT 41.9   MCV 99   MCH 32.8   MCHC 33.2   RDW 12.6        Recent Labs   Lab Test 09/04/20  0750 02/28/20  0946    133   POTASSIUM 3.6 3.2*   CHLORIDE 103 98   CO2 28 31   ANIONGAP 6 4   GLC 92 105*   BUN 13 9   CR 0.73 0.68   FADI 9.7 9.3     Lab Results   Component Value Date    AST 22 09/04/2020     Lab Results   Component Value Date    ALT 30 09/04/2020     No results found for: BILICONJ   Lab Results   Component Value Date    BILITOTAL 0.3 09/04/2020     Lab Results   Component Value Date    ALBUMIN 4.0 09/04/2020     Lab Results   Component Value Date    PROTTOTAL 7.8 09/04/2020      Lab Results   Component Value Date    ALKPHOS 45 09/04/2020       Component      Latest Ref Rng & Units 11/16/2017 12/8/2017 3/12/2018 9/24/2018   Chromogranin A      0 - 95 ng/mL 339 (H) 385 (H) 597 (H) 602 (H)     Component      Latest Ref Rng & Units 4/12/2019   Chromogranin A      0 - 95 ng/mL 438 (H)       Chromogranin A 9/4/20: 241    IMAGING:  CT chest 9/04/20:  1. Stable right upper lobe pulmonary nodule.  2. New areas of groundglass change in both upper lobes which are  nonspecific. Correlation with an infectious or inflammatory process  recommended.       MRI abdomen 9/04/20:  1. No evidence of metastatic disease involving the imaged portions of  the abdomen. No enlarged lymph nodes. Liver and pancreas are within  normal limits.  2. Fatty liver infiltration.      ASSESSMENT/PLAN:  Connie Brunson is a 58 year old female with:    1) Neuroendocrine tumor of the ileum: s/p resection on 11/7/17, 1.5 cm, grade 2, well-differentiated, T3N1 (stage IIIB).      Recent MRI abdomen and CT scan reviewed with Connie.  MRI abdomen is negative, with no evidence of metastatic disease.  CT chest shows stable pulmonary nodule.     Chromogranin A has remained abnormal, although trended down from previous.  Her symptoms remain the same.  Her imaging remains clear.   Since her imaging is negative, we will continue to observe for now.  The chromogranin A result from last week's lab draw is still pending.      ADDENDUM: Chromogranin A continues to trend down to 241.    We will continue annual follow-up's until at least 5 years.    -she will call if she gets worsening diarrhea or flushing symptoms or pain and can get labs/scans done earlier.    -labs (CBC, CMP, chromogranin A) and CT chest and MRI abdomen in 1 year  -RTC in 1 year    2) HTN, HLD  -follow-up with PCP    3) History of meningioma: diagnosed in 1999, was getting yearly MRI's with neurosurgery, but was told she does not need surveillance MRI's anymore unless she has new neurologic symptoms.    4) Pulmonary nodule: There is an indeterminate 2 x 3 mm anterior right mid-lung nodule seen on CT.  She does have history of smoking.  Recent CT chest shows that the tiny nodule is stable.  It has been stable since 2017.    Recent CT chest shows new areas of groundglass change in both upper lobes, which are non-specific, could be infectious or inflammatory.  She is asymptomatic.    -monitor on scans      Mary Weir MD  Hematology/Oncology  [facility]

## 2020-09-09 NOTE — PATIENT INSTRUCTIONS
09/09 defer 9 months     Per chart review, appointment scheduling request has been deferred until 6/9/21 by SR Ailyn Bailey RN on 9/9/2020 at 12:49 PM

## 2020-09-18 ENCOUNTER — MYC MEDICAL ADVICE (OUTPATIENT)
Dept: FAMILY MEDICINE | Facility: CLINIC | Age: 60
End: 2020-09-18

## 2020-09-18 DIAGNOSIS — I10 ESSENTIAL HYPERTENSION WITH GOAL BLOOD PRESSURE LESS THAN 140/90: ICD-10-CM

## 2020-09-18 DIAGNOSIS — E78.5 HYPERLIPIDEMIA LDL GOAL <130: Primary | ICD-10-CM

## 2020-09-18 NOTE — TELEPHONE ENCOUNTER
Routing to PCP for further review/recommendations/orders.    Vero Jones RN  [facility]

## 2020-09-21 NOTE — TELEPHONE ENCOUNTER
Reviewed pt's 9/8/2020 oncology visit with Dr.Katherine Weir.  See my chart message reply.      Last px was 12/9/2019 with Emilia Barillas CNP , recommend Please assist pt in making appt for px on/after  12/10/2020 with me or Emilia Barillas CNP   Offer fasting lab only visit if has afternoon appt.      Recent CMP and cbc with diff on 9/4/2020  normal.     Future orders placed in epic for labs. Lipids and urine for microalbumin only .   Recent Labs   Lab Test 12/09/19  0833 04/12/19  0858  05/14/14  0753 07/05/13  0857   CHOL 243* 233*   < > 246* 215*   HDL 51 57   < > 40* 36*   * 136*   < > Cannot estimate LDL when triglyceride exceeds 400 mg/dL  132* 134*   TRIG 254* 202*   < > 424* 221*   CHOLHDLRATIO  --   --   --  6.2* 5.9*    < > = values in this interval not displayed.

## 2020-10-08 NOTE — LETTER
[facility]  [address] [place] [id number]  Office: [phone]   Fax:    [phone]     September 20, 2020    Connie Brunson                                                                                                                     [address] [facility] [address]      To whom it may concern,     Connie Brunson is a 59 year old female patient very well known to me.  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      Please, call or contact me with any questions or concerns.          Sincerely,             Gayatri Stephens MD      
07-Oct-2020 21:00

## 2020-12-01 ENCOUNTER — MYC MEDICAL ADVICE (OUTPATIENT)
Dept: FAMILY MEDICINE | Facility: CLINIC | Age: 60
End: 2020-12-01

## 2020-12-01 DIAGNOSIS — L40.9 PSORIASIS: ICD-10-CM

## 2020-12-01 NOTE — TELEPHONE ENCOUNTER
Please see my chart message below     Please review and advise     Thank you     Samantha Escoto RN, BSN  [place] [facility]

## 2020-12-07 RX ORDER — TRIAMCINOLONE ACETONIDE 5 MG/G
CREAM TOPICAL 2 TIMES DAILY
Qty: 15 G | Refills: 3 | Status: SHIPPED | OUTPATIENT
Start: 2020-12-07 | End: 2022-05-02

## 2020-12-07 NOTE — TELEPHONE ENCOUNTER
rx for triamcinolone 0.5% cream pended.  Need to pharmacy.  Please call patient okay to sign and fill once we have pharmacy.

## 2020-12-08 DIAGNOSIS — I10 ESSENTIAL HYPERTENSION WITH GOAL BLOOD PRESSURE LESS THAN 140/90: ICD-10-CM

## 2020-12-09 RX ORDER — LISINOPRIL 10 MG/1
10 TABLET ORAL DAILY
Qty: 90 TABLET | Refills: 0 | Status: SHIPPED | OUTPATIENT
Start: 2020-12-09 | End: 2021-01-29

## 2020-12-09 NOTE — TELEPHONE ENCOUNTER
Reason for Call:  Other prescription    Detailed comments: Connie calling in to see why we were not filling meds. Told her we were only notified yesterday for refill. Told her it will be refilled soon and being worked on.    She is completely out of meds.    Phone Number Patient can be reached at: Cell number on file:    Telephone Information:   Mobile [phone]       Best Time: any    Can we leave a detailed message on this number? YES    Call taken on 12/9/2020 at 1:00 PM by Moy Clifton

## 2020-12-09 NOTE — TELEPHONE ENCOUNTER
Patient called in pharmacy said she is out of the medication now. She wants to get refills ASAP.     Thanks!

## 2020-12-21 ENCOUNTER — TELEPHONE (OUTPATIENT)
Dept: FAMILY MEDICINE | Facility: CLINIC | Age: 60
End: 2020-12-21

## 2020-12-21 DIAGNOSIS — E78.1 HYPERTRIGLYCERIDEMIA: ICD-10-CM

## 2020-12-23 RX ORDER — FENOFIBRATE 160 MG/1
160 TABLET ORAL DAILY
Qty: 90 TABLET | Refills: 0 | Status: SHIPPED | OUTPATIENT
Start: 2020-12-23 | End: 2021-01-29

## 2020-12-23 NOTE — TELEPHONE ENCOUNTER
Last wellness exam was 12/2019 this is likely why she is due for refills.  Please help her set up/schedule a wellness examination with fasting lab work.      Emilia Barillas,  Patient is FNP-BC

## 2020-12-23 NOTE — TELEPHONE ENCOUNTER
Medication is being filled for 1 time refill only due to:  Due for PX    Vero Jones RN  [facility]

## 2021-01-09 ENCOUNTER — HEALTH MAINTENANCE LETTER (OUTPATIENT)
Age: 61
End: 2021-01-09

## 2021-01-21 DIAGNOSIS — Z12.31 VISIT FOR SCREENING MAMMOGRAM: ICD-10-CM

## 2021-01-21 PROCEDURE — 77067 SCR MAMMO BI INCL CAD: CPT | Mod: TC | Performed by: RADIOLOGY

## 2021-01-21 PROCEDURE — 77063 BREAST TOMOSYNTHESIS BI: CPT | Mod: TC | Performed by: RADIOLOGY

## 2021-01-29 ENCOUNTER — OFFICE VISIT (OUTPATIENT)
Dept: FAMILY MEDICINE | Facility: CLINIC | Age: 61
End: 2021-01-29
Payer: COMMERCIAL

## 2021-01-29 VITALS
BODY MASS INDEX: 32.66 KG/M2 | HEART RATE: 87 BPM | HEIGHT: 59 IN | OXYGEN SATURATION: 99 % | TEMPERATURE: 98.1 F | WEIGHT: 162 LBS | DIASTOLIC BLOOD PRESSURE: 89 MMHG | SYSTOLIC BLOOD PRESSURE: 140 MMHG

## 2021-01-29 DIAGNOSIS — Z00.00 ROUTINE GENERAL MEDICAL EXAMINATION AT A HEALTH CARE FACILITY: Primary | ICD-10-CM

## 2021-01-29 DIAGNOSIS — R21 RASH AND NONSPECIFIC SKIN ERUPTION: ICD-10-CM

## 2021-01-29 DIAGNOSIS — F41.9 ANXIETY: ICD-10-CM

## 2021-01-29 DIAGNOSIS — Z13.220 SCREENING FOR HYPERLIPIDEMIA: ICD-10-CM

## 2021-01-29 DIAGNOSIS — I10 ESSENTIAL HYPERTENSION WITH GOAL BLOOD PRESSURE LESS THAN 140/90: ICD-10-CM

## 2021-01-29 DIAGNOSIS — E78.5 HYPERLIPIDEMIA, UNSPECIFIED HYPERLIPIDEMIA TYPE: ICD-10-CM

## 2021-01-29 DIAGNOSIS — K21.9 GASTROESOPHAGEAL REFLUX DISEASE WITHOUT ESOPHAGITIS: ICD-10-CM

## 2021-01-29 DIAGNOSIS — E78.1 HYPERTRIGLYCERIDEMIA: ICD-10-CM

## 2021-01-29 DIAGNOSIS — J30.2 OTHER SEASONAL ALLERGIC RHINITIS: ICD-10-CM

## 2021-01-29 DIAGNOSIS — Z13.29 SCREENING FOR THYROID DISORDER: ICD-10-CM

## 2021-01-29 LAB
CHOLEST SERPL-MCNC: 254 MG/DL
CREAT UR-MCNC: 81 MG/DL
HDLC SERPL-MCNC: 46 MG/DL
LDLC SERPL CALC-MCNC: 150 MG/DL
MAGNESIUM SERPL-MCNC: 1.8 MG/DL (ref 1.6–2.3)
MICROALBUMIN UR-MCNC: <5 MG/L
MICROALBUMIN/CREAT UR: NORMAL MG/G CR (ref 0–25)
NONHDLC SERPL-MCNC: 208 MG/DL
TRIGL SERPL-MCNC: 288 MG/DL
TSH SERPL DL<=0.005 MIU/L-ACNC: 3.32 MU/L (ref 0.4–4)

## 2021-01-29 PROCEDURE — 84443 ASSAY THYROID STIM HORMONE: CPT | Performed by: NURSE PRACTITIONER

## 2021-01-29 PROCEDURE — 36415 COLL VENOUS BLD VENIPUNCTURE: CPT | Performed by: NURSE PRACTITIONER

## 2021-01-29 PROCEDURE — 82043 UR ALBUMIN QUANTITATIVE: CPT | Performed by: NURSE PRACTITIONER

## 2021-01-29 PROCEDURE — 99213 OFFICE O/P EST LOW 20 MIN: CPT | Mod: 25 | Performed by: NURSE PRACTITIONER

## 2021-01-29 PROCEDURE — 99396 PREV VISIT EST AGE 40-64: CPT | Performed by: NURSE PRACTITIONER

## 2021-01-29 PROCEDURE — 83735 ASSAY OF MAGNESIUM: CPT | Performed by: NURSE PRACTITIONER

## 2021-01-29 PROCEDURE — 80061 LIPID PANEL: CPT | Performed by: NURSE PRACTITIONER

## 2021-01-29 RX ORDER — TRIAMTERENE AND HYDROCHLOROTHIAZIDE 37.5; 25 MG/1; MG/1
1 CAPSULE ORAL DAILY
Qty: 90 CAPSULE | Refills: 3 | Status: SHIPPED | OUTPATIENT
Start: 2021-01-29 | End: 2022-01-25

## 2021-01-29 RX ORDER — FLUTICASONE PROPIONATE 50 MCG
2 SPRAY, SUSPENSION (ML) NASAL DAILY
Qty: 48 G | Refills: 5 | Status: SHIPPED | OUTPATIENT
Start: 2021-01-29 | End: 2022-04-12

## 2021-01-29 RX ORDER — CLOBETASOL PROPIONATE 0.5 MG/G
CREAM TOPICAL 2 TIMES DAILY
Qty: 45 G | Refills: 1 | Status: SHIPPED | OUTPATIENT
Start: 2021-01-29 | End: 2021-06-09 | Stop reason: ALTCHOICE

## 2021-01-29 RX ORDER — FENOFIBRATE 160 MG/1
160 TABLET ORAL DAILY
Qty: 90 TABLET | Refills: 3 | Status: SHIPPED | OUTPATIENT
Start: 2021-01-29 | End: 2022-03-25

## 2021-01-29 RX ORDER — VENLAFAXINE 37.5 MG/1
37.5 TABLET ORAL DAILY
Qty: 90 TABLET | Refills: 1 | Status: SHIPPED | OUTPATIENT
Start: 2021-01-29 | End: 2021-09-13

## 2021-01-29 RX ORDER — TRIAMCINOLONE ACETONIDE 0.25 MG/G
OINTMENT TOPICAL 2 TIMES DAILY
Qty: 15 G | Refills: 1 | Status: SHIPPED | OUTPATIENT
Start: 2021-01-29 | End: 2021-06-09

## 2021-01-29 RX ORDER — LISINOPRIL 10 MG/1
10 TABLET ORAL DAILY
Qty: 90 TABLET | Refills: 3 | Status: SHIPPED | OUTPATIENT
Start: 2021-01-29 | End: 2022-03-02

## 2021-01-29 ASSESSMENT — MIFFLIN-ST. JEOR: SCORE: 1200.46

## 2021-01-29 NOTE — PATIENT INSTRUCTIONS
Preventive Health Recommendations  Female Ages 50 - 64    Yearly exam: See your health care provider every year in order to  o Review health changes.   o Discuss preventive care.    o Review your medicines if your doctor has prescribed any.      Get a Pap test every three years (unless you have an abnormal result and your provider advises testing more often).    If you get Pap tests with HPV test, you only need to test every 5 years, unless you have an abnormal result.     You do not need a Pap test if your uterus was removed (hysterectomy) and you have not had cancer.    You should be tested each year for STDs (sexually transmitted diseases) if you're at risk.     Have a mammogram every 1 to 2 years.    Have a colonoscopy at age 50, or have a yearly FIT test (stool test). These exams screen for colon cancer.      Have a cholesterol test every 5 years, or more often if advised.    Have a diabetes test (fasting glucose) every three years. If you are at risk for diabetes, you should have this test more often.     If you are at risk for osteoporosis (brittle bone disease), think about having a bone density scan (DEXA).    Shots: Get a flu shot each year. Get a tetanus shot every 10 years.    Nutrition:     Eat at least 5 servings of fruits and vegetables each day.    Eat whole-grain bread, whole-wheat pasta and brown rice instead of white grains and rice.    Get adequate Calcium and Vitamin D.     Lifestyle    Exercise at least 150 minutes a week (30 minutes a day, 5 days a week). This will help you control your weight and prevent disease.    Limit alcohol to one drink per day.    No smoking.     Wear sunscreen to prevent skin cancer.     See your dentist every six months for an exam and cleaning.    See your eye doctor every 1 to 2 years.    Patient Education     What Is Psoriasis?  Psoriasis is a chronic skin disease. Researchers believe this condition develops from a combination of immune, genetic, and  environmental factors. Psoriasis can start at any age. It's most common between ages 30 and 39 and also between ages 50 and 69. Psoriasis affects nearly equal numbers of men and women. In people with this disease, the skin grows too fast. Dead skin cells build up on the skin s surface to form inflamed, thick, silvery scales called plaques. Sometimes people form many small lesions that can hurt or have pus in them. Psoriasis does not spread from person to person.   About your symptoms  Psoriasis plaques tend to form on the elbows, knees, scalp, navel, arms, legs, and the penis or vulva. They can be unsightly, painful, and itchy. Plaques on the joints can limit movement. On the fingernails or toenails, psoriasis can cause pitting, a change in nail color, and a change in nail shape. In some cases, psoriasis also causes arthritis. Symptoms may come and go on their own. Factors such as stress, infection, and certain medicines may cause flare-ups. If symptoms bother you, many treatments are available to help ease symptoms. Discuss your treatment options with your healthcare provider.   Treatments for your skin  There are many types of medical medicines that can treat the affected skin lesions. Your healthcare provider may prescribe one of many types of medicines that are put on the skin. These include moisturizers, steroids, types of vitamin D, medicines made from vitamin A (retinoids), and other non-steroid medicines. You put them on your skin on a regular basis. Coal tar is a thick black liquid. You may put it on thick plaques. In some cases, your skin may be exposed to a special light in the healthcare provider's office. Or you can expose the psoriatic plaques to short periods (5 minutes) of natural sun as directed by your healthcare provider. This method is called phototherapy. It can be helped with a type of medicine called psoralen.   Treatments by mouth or by shot  Internal treatments are taken by mouth or given by  shot (injection). A number of new oral and injectable medicines can treat severe psoriasis. Your healthcare provider can tell you more about these treatments.   [employer] last reviewed this educational content on 8/1/2019 2000-2020 [employer]. [address] [place] [address], [place] [address]. All rights reserved. This information is not intended as a substitute for professional medical care. Always follow your healthcare professional's instructions.           Patient Education     Psoriasis   Psoriasis is an inflammatory condition that affects the skin and nails. You may have patches of thick, red skin (plaques) covered with silvery scales. These often appear on the elbows, knees, legs, lower back, and scalp.  The plaques itch and can be painful. People with this condition are more likely to have emotional stress and depression.  Psoriasis is not contagious. It can t spread to someone else who touches it. But it can be inherited. It's an autoimmune skin disease. This means that the immune system has an abnormal reaction. It treats healthy skin like it is a foreign substance. This causes skin cells to grow faster than normal and to stack up in raised red patches. Psoriasis is a long-term (chronic) disease. You will have flare-ups that come and go over time.  Smoking, sun exposure, and alcohol use may affect how often the psoriasis occurs and how long the flare-ups last.  There is no cure, but treatments can offer relief. Treatment can include topical creams, light therapy (phototherapy), and oral or injectable medicines.  Home care    No specific diet is needed. Eat a healthy, well-balanced diet that includes fresh fruits and vegetables, whole grains, and lean meats. Psoriasis can increase your risk for diabetes and heart disease.    Increasing omega-3 fatty acids in your diet can help improve dry skin. The best dietary sources are fatty fish (salmon, mackerel, lake trout, albacore tuna) or fish oil (such  as cod liver oil). A great way to take fish oil is to add it to a juice, shake, or smoothie. Flaxseeds and flaxseed oil, canola oil, walnuts, soybean, and tofu are converted to omega-3 fatty acid in the body.    Stay at a healthy weight. Overlapping skin folds can be a site for psoriasis plaques. If you are overweight, talk to your healthcare provider about a weight-loss program.    Bathing daily can help remove scales and calm inflamed skin. Use lukewarm water and mild soaps that have added oils, fats, and moisturizers. Avoid deodorants, antiperspirants, and antibacterial soaps. These have a drying effect. Many people find it helpful to soak in a tub with added bath oils, oatmeal, apple cider vinegar, or Epsom salts.    After bathing, put on skin cream (or a skin oil for a stronger effect).    Some exposure to UV rays from the sun can improve psoriasis. But too much sun can trigger an outbreak. It also raises your risk for skin cancer. Limit sun exposure and use sunscreen on healthy skin (at least 30 SPF).    If you are prescribed medicine, take it as directed.    Unless another steroid cream was prescribed, you may use over-the-counter hydrocortisone cream for a few weeks during symptom flare-ups.    Stop smoking. If you are a long-time smoker, this can be hard. Think about joining a stop-smoking program. Ask your healthcare provider for help.    Tell your provider if your joints start to ache or get stiff.    Tell your provider if you notice changes in your fingernails.    Depression is more common among people with psoriasis. Get help if you notice changes in your mood.    Follow-up care  Follow up with your healthcare provider, or as advised.  When to seek medical advice  Call your healthcare provider right away if any of these occur:    Skin pain gets worse    Bleeding from the skin plaques that is hard to control    Signs of skin infection (redness, increasing pain, swelling, pus)    Fever of 100.4 F (38 C),  or as directed by your provider  [employer] last reviewed this educational content on 8/1/2019 2000-2020 [employer] [address] [facility], [place] [address]. All rights reserved. This information is not intended as a substitute for professional medical care. Always follow your healthcare professional's instructions.

## 2021-01-29 NOTE — PROGRESS NOTES
SUBJECTIVE:   CC: Connie Brunson is an 60 year old woman who presents for preventive health visit.       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 Psoriasis - cream not working - Triamcinolone 0.5%  She also reports intermittently she gets a rash underneath her breasts  Reports she is having some itching of the skin around her rectum/gluteal cleft    x2-3 months - Spots through out body - starts as raised skin colored bump - turns white, scaly, red, round - sometimes itches. -- Did try Triamcinolone cream - no relief        Hypertension Follow-up      Do you check your blood pressure regularly outside of the clinic? No     Are you following a low salt diet? Yes    Are your blood pressures ever more than 140 on the top number (systolic) OR more   than 90 on the bottom number (diastolic), for example 140/90? Unsure    Depression and Anxiety Follow-Up    How are you doing with your depression since your last visit? Stable    How are you doing with your anxiety since your last visit?  Stable    Are you having other symptoms that might be associated with depression or anxiety? No    Have you had a significant life event? No     Do you have any concerns with your use of alcohol or other drugs? No     Patient reports to me she feels very stable has great mood she just is unable to come off of Effexor due to side effects when she tries to discontinue it.    Social History     Tobacco Use     Smoking status: Light Tobacco Smoker      Packs/day: 0.00     Years: 5.00     Pack years: 0.00     Types: Cigarettes, Other     Last attempt to quit: 1994     Years since quittin.1     Smokeless tobacco: Current User     Tobacco comment: 11/3/17 - occ e-cig use   Substance Use Topics     Alcohol use: Yes     Alcohol/week: 0.0 standard drinks     Comment: 3-5 drinks per week     Drug use: No     PHQ 2019   PHQ-9 Total Score 1 1 0   Q9: Thoughts of better off dead/self-harm past 2 weeks Not at all Not at all Not at all     NAHOMY-7 SCORE 2019   Total Score - - -   Total Score 0 (minimal anxiety) - -   Total Score 0 0 0       Today's PHQ-2 Score:   PHQ-2 (  [employer]) 2021   Q1: Little interest or pleasure in doing things 0   Q2: Feeling down, depressed or hopeless 0   PHQ-2 Score 0   Q1: Little interest or pleasure in doing things -   Q2: Feeling down, depressed or hopeless -   PHQ-2 Score -     Abuse: Current or Past (Physical, Sexual or Emotional) - No  Do you feel safe in your environment? Yes    Social History     Tobacco Use     Smoking status: Light Tobacco Smoker     Packs/day: 0.00     Years: 5.00     Pack years: 0.00     Types: Cigarettes, Other     Last attempt to quit: 1994     Years since quittin.1     Smokeless tobacco: Current User     Tobacco comment: 11/3/17 - occ e-cig use   Substance Use Topics     Alcohol use: Yes     Alcohol/week: 0.0 standard drinks     Comment: 3-5 drinks per week     If you drink alcohol do you typically have >3 drinks per day or >7 drinks per week? No    Alcohol Use 2021   Prescreen: >3 drinks/day or >7 drinks/week? -   Prescreen: >3 drinks/day or >7 drinks/week? No     Any new diagnosis of family breast, ovarian, or bowel cancer? No     Reviewed orders with patient.  Reviewed health maintenance and updated orders accordingly - Yes  Lab work is in process  Labs reviewed in EPIC  BP Readings from Last 3 Encounters:   21 (!) 140/89   20  113/72   02/28/20 116/72    Wt Readings from Last 3 Encounters:   01/29/21 73.5 kg (162 lb)   02/28/20 74.8 kg (165 lb)   12/09/19 74.8 kg (165 lb)            Patient Active Problem List   Diagnosis     Allergic rhinitis     Diverticulosis of large intestine     Benign meningioma-right frontal posterior - no more annual MRI's needed per neurosurgery     Symptomatic menopausal or female climacteric states     Benign neoplasm of tonsil     Anxiety     HYPERLIPIDEMIA LDL GOAL <130 [272.4CK] - joint aches w/ simvastatin 11/2010,lipitor=nausea/abd pain 1/2012     Primary insomnia     Major depressive disorder, recurrent episode, mild (H)     Essential hypertension with goal blood pressure less than 140/90     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- ophtho recommended auto-immune/arthritis work-up     Epiploic appendagitis- 2008 and 10/26/2014     Controlled substance agreement signed- re-signed 3/7/2018 ambien #30 -5mg tabs monthly - refill x5 - ok to see q6 months      Gastroesophageal reflux disease without esophagitis     Hypertriglyceridemia     S/P laparoscopic cholecystectomy for acute cholecystitis with cholelithiasis     Postsurgical dumping syndrome - s/p lap shashi - consider cholestyramine     Mass of small intestine- distal ileum - seen on CT scan at time of diverticulitis      Primary malignant neuroendocrine neoplasm of ileum (H)     Pulmonary nodules     Diarrhea, unspecified type- since sm bowel resection 11/2017     Hepatic steatosis - per MRI RUQ abdomen 4/12/2019 - liver not enlarged      Past Surgical History:   Procedure Laterality Date     ABDOMEN SURGERY       BIOPSY       C LIGATE FALLOPIAN TUBE  1988    Tubal Ligation     C TOTAL ABDOM HYSTERECTOMY  2000 ?    Hysterectomy, Total Abdominal with BSO     COLONOSCOPY  1/16/2012    Procedure:COLONOSCOPY; Colonoscopy; Surgeon:SHOLA JOYCE; Location: GI     HC REMOVE TONSILS/ADENOIDS,<13 Y/O      T and A, under 12 yrs     HEAD & NECK  SURGERY       LAPAROSCOPIC CHOLECYSTECTOMY WITH CHOLANGIOGRAMS N/A 2016    Procedure: LAPAROSCOPIC CHOLECYSTECTOMY WITH CHOLANGIOGRAMS;  Surgeon: Rosa M Cristobal MD;  Location: [facility] OR     LAPAROSCOPY DIAGNOSTIC (GENERAL) N/A 2017    Procedure: LAPAROSCOPY DIAGNOSTIC (GENERAL);  Exploratory Laparoscopy, Laparotomy and Small Bowel resection.;  Surgeon: Rosa M Cristobal MD;  Location: [facility] OR     LAPAROTOMY EXPLORATORY N/A 2017    Procedure: LAPAROTOMY EXPLORATORY;;  Surgeon: Rosa M Cristobal MD;  Location: [facility] OR     RESECT SMALL BOWEL WITHOUT OSTOMY N/A 2017    Procedure: RESECT SMALL BOWEL WITHOUT OSTOMY;;  Surgeon: Rosa M Cristobal MD;  Location: [facility] OR     SURGICAL HISTORY OF -       Menigioma excision     ZZC NONSPECIFIC PROCEDURE      PAROTID TUMOR L SIDE OF NECK - BENIGN       Social History     Tobacco Use     Smoking status: Light Tobacco Smoker     Packs/day: 0.00     Years: 5.00     Pack years: 0.00     Types: Cigarettes, Other     Last attempt to quit: 1994     Years since quittin.1     Smokeless tobacco: Current User     Tobacco comment: 11/3/17 - occ e-cig use   Substance Use Topics     Alcohol use: Yes     Alcohol/week: 0.0 standard drinks     Comment: 3-5 drinks per week     Family History   Problem Relation Age of Onset     Hyperlipidemia Mother      Thyroid Disease Mother      Cancer Father         lung     Hypertension Father      Hyperlipidemia Father      Thyroid Disease Brother      Diabetes Maternal Grandmother      Cerebrovascular Disease Maternal Grandmother      Substance Abuse Maternal Grandfather      Thyroid Disease Son          Current Outpatient Medications   Medication Sig Dispense Refill     cetirizine (ZYRTEC) 10 MG tablet Take 10 mg by mouth every evening       clobetasol (TEMOVATE) 0.05 % external cream Apply topically 2 times daily Apply sparingly twice daily for up to 2 weeks (cream) 45 g 1     fenofibrate (TRIGLIDE/LOFIBRA)  160 MG tablet Take 1 tablet (160 mg) by mouth daily 90 tablet 3     fluticasone (FLONASE) 50 MCG/ACT nasal spray Spray 2 sprays into both nostrils daily 48 g 5     lisinopril (ZESTRIL) 10 MG tablet Take 1 tablet (10 mg) by mouth daily 90 tablet 3     omeprazole (PRILOSEC) 20 MG DR capsule Take 1 capsule (20 mg) by mouth daily 90 capsule 3     rosuvastatin (CRESTOR) 5 MG tablet Take 1 tablet (5 mg) by mouth daily 90 tablet 3     triamcinolone (KENALOG) 0.025 % external ointment Apply topically 2 times daily sparingly for no more than 2 weeks 15 g 1     triamterene-HCTZ (DYAZIDE) 37.5-25 MG capsule Take 1 capsule by mouth daily 90 capsule 3     venlafaxine (EFFEXOR) 37.5 MG tablet Take 1 tablet (37.5 mg) by mouth daily 90 tablet 1     diazepam (VALIUM) 10 MG tablet Take 1-2  tab or 10-20 mg 30-60 minutes prior to MRI scan. 4 tablet 1     mometasone (ELOCON) 0.1 % cream Apply sparingly to affected area twice daily for 14 days.  Do not apply to face. 15 g 3     moxifloxacin (AVELOX) 400 MG tablet Take 1 tablet (400 mg) by mouth daily 10 tablet 0     triamcinolone (ARISTOCORT HP) 0.5 % external cream Apply topically 2 times daily To areas of eczema or psoriasis - do NOT use on face or genitals 15 g 3     Allergies   Allergen Reactions     Atorvastatin Nausea     Nausea and abd pain      Ceftin GI Disturbance     Stomach upset and bloating - given at same time as clindamycin ? Which one as cause.       Clindamycin GI Disturbance     Stomach upset and bloating - given at same time as ceftin, ? Which one as cause      Flagyl [Metronidazole]      immediate migraine headache      Morphine Itching     Severe with nose, facial  Swelling - oxycodone = ok /no problems      Penicillins Hives     Sulfa Drugs Rash     Severe red , flushed rash     Levaquin Rash     States she can take cipro and avelox       Breast CA Risk Screening:  No flowsheet data found.  Mammogram Screening: Recommended mammography every 1-2 years with patient  "discussion and risk factor consideration  Pertinent mammograms are reviewed under the imaging tab.    History of abnormal Pap smear: Status post benign hysterectomy. Health Maintenance and Surgical History updated.  PAP / HPV 8/20/2010 3/27/2009 2/9/2007   PAP NIL NIL NIL   PAP DATE - [facility] - - -     Reviewed and updated as needed this visit by clinical staff  Tobacco  Allergies  Meds  Problems  Med Hx  Surg Hx  Fam Hx  Soc Hx          Reviewed and updated as needed this visit by Provider  Tobacco  Allergies  Meds  Problems  Med Hx  Surg Hx  Fam Hx           Review of Systems  Constitutional, HEENT, cardiovascular, pulmonary, GI, , musculoskeletal, neuro, skin, endocrine and psych systems are negative, except as otherwise noted in the HPI.     OBJECTIVE:   BP (!) 140/89 (BP Location: Right arm, Patient Position: Chair, Cuff Size: Adult Large)   Pulse 87   Temp 98.1  F (36.7  C) (Tympanic)   Ht 1.499 m (4' 11\")   Wt 73.5 kg (162 lb)   LMP 09/05/2000   SpO2 99%   Breastfeeding No   BMI 32.72 kg/m    Physical Exam  GENERAL APPEARANCE: healthy, alert and no distress  EYES: Eyes grossly normal to inspection, PERRL and conjunctivae and sclerae normal  HENT: ear canals and TM's normal, nose and mouth without ulcers or lesions, oropharynx clear and oral mucous membranes moist  NECK: no adenopathy, no asymmetry, masses, or scars and thyroid normal to palpation  RESP: lungs clear to auscultation - no rales, rhonchi or wheezes  BREAST: normal without masses, tenderness or nipple discharge and no palpable axillary masses or adenopathy  CV: regular rate and rhythm, normal S1 S2, no S3 or S4, no murmur, click or rub, no peripheral edema and peripheral pulses strong  ABDOMEN: soft, nontender, no hepatosplenomegaly, no masses and bowel sounds normal   (female): normal female external genitalia, declines internal exam  MS: no musculoskeletal defects are noted and gait is age appropriate without " ataxia  SKIN: multiple small areas of dry erythematous scaling patch mainly right lower abdomen, legs, also extensive gluteal cleft erythematous scaling consistent with psoriasis  NEURO: Normal strength and tone, sensory exam grossly normal, mentation intact and speech normal  PSYCH: mentation appears normal and affect normal/bright    Diagnostic Test Results:  Labs reviewed in Epic    ASSESSMENT/PLAN:   Connie was seen today for physical.    Diagnoses and all orders for this visit:    Routine general medical examination at a health care facility  Well woman exam with breast exam completed today.    Fasting labs today.    Will notify of lab results.  -     REVIEW OF HEALTH MAINTENANCE PROTOCOL ORDERS    Hypertriglyceridemia  Labs.  Continue same medication this was refilled today.  -     fenofibrate (TRIGLIDE/LOFIBRA) 160 MG tablet; Take 1 tablet (160 mg) by mouth daily  -     rosuvastatin (CRESTOR) 5 MG tablet; Take 1 tablet (5 mg) by mouth daily  -     Lipid panel reflex to direct LDL Fasting; Future  -     ALT; Future  -     CK total; Future  -     OFFICE/OUTPT VISIT,EST,LEVL IV    Other seasonal allergic rhinitis  No concerns.  Stable.  Continue same medication this was refilled today.  -     fluticasone (FLONASE) 50 MCG/ACT nasal spray; Spray 2 sprays into both nostrils daily  -     OFFICE/OUTPT VISIT,EST,LEVL IV    Essential hypertension with goal blood pressure less than 140/90-   No concerns.  Stable.  Continue same medication this was refilled today.  -     lisinopril (ZESTRIL) 10 MG tablet; Take 1 tablet (10 mg) by mouth daily  -     triamterene-HCTZ (DYAZIDE) 37.5-25 MG capsule; Take 1 capsule by mouth daily  -     Albumin Random Urine Quantitative with Creat Ratio  -     OFFICE/OUTPT VISIT,EST,LEVL IV    Gastroesophageal reflux disease without esophagitis  No concerns.  Stable.  Continue same medication this was refilled today.  -     omeprazole (PRILOSEC) 20 MG DR capsule; Take 1 capsule (20 mg) by mouth  "daily  -     Magnesium  -     OFFICE/OUTPT VISIT,EST,LEVL IV    Anxiety  No concerns.  Stable.  Continue same medication this was refilled today.  -     venlafaxine (EFFEXOR) 37.5 MG tablet; Take 1 tablet (37.5 mg) by mouth daily  -     OFFICE/OUTPT VISIT,EST,LEVL IV    Rash and nonspecific skin eruption  Discussed that she has symptoms most consistent with psoriasis.  I think that she should see dermatology for the best course of action for control of this.  It is quite extensive around her gluteal cleft at this time.  Refilled clobetasol that is just to be used very sparingly and not on flexural surfaces, genitals or her face.  New concentration of triamcinolone to be applied to gluteal cleft.  Skin care referral made.    -     SKIN CARE REFERRAL  -     clobetasol (TEMOVATE) 0.05 % external cream; Apply topically 2 times daily Apply sparingly twice daily for up to 2 weeks (cream)  -     triamcinolone (KENALOG) 0.025 % external ointment; Apply topically 2 times daily sparingly for no more than 2 weeks  -     OFFICE/OUTPT VISIT,EST,LEVL IV    Screening for thyroid disorder  -     TSH with free T4 reflex    Screening for hyperlipidemia  -     Lipid panel reflex to direct LDL Fasting    Hyperlipidemia, unspecified hyperlipidemia type  -     rosuvastatin (CRESTOR) 5 MG tablet; Take 1 tablet (5 mg) by mouth daily  -     Lipid panel reflex to direct LDL Fasting; Future  -     ALT; Future  -     CK total; Future        Patient has been advised of split billing requirements and indicates understanding: Yes  COUNSELING:  Reviewed preventive health counseling, as reflected in patient instructions       Regular exercise       Healthy diet/nutrition    Estimated body mass index is 32.72 kg/m  as calculated from the following:    Height as of this encounter: 1.499 m (4' 11\").    Weight as of this encounter: 73.5 kg (162 lb).    Weight management plan: Discussed healthy diet and exercise guidelines    She reports that she has " been smoking cigarettes and other. She has been smoking about 0.00 packs per day for the past 5.00 years. She uses smokeless tobacco.  Tobacco Cessation Action Plan:       Counseling Resources:  ATP IV Guidelines  Pooled Cohorts Equation Calculator  Breast Cancer Risk Calculator  BRCA-Related Cancer Risk Assessment: FHS-7 Tool  FRAX Risk Assessment  ICSI Preventive Guidelines  Dietary Guidelines for Americans, 2010  USDA's MyPlate  ASA Prophylaxis  Lung CA Screening      LLUVIA AlmanzaP-BC  [facility]

## 2021-02-02 RX ORDER — ROSUVASTATIN CALCIUM 5 MG/1
5 TABLET, COATED ORAL DAILY
Qty: 90 TABLET | Refills: 3 | Status: SHIPPED | OUTPATIENT
Start: 2021-02-02 | End: 2022-02-24

## 2021-02-02 ASSESSMENT — PATIENT HEALTH QUESTIONNAIRE - PHQ9
SUM OF ALL RESPONSES TO PHQ QUESTIONS 1-9: 0
5. POOR APPETITE OR OVEREATING: NOT AT ALL

## 2021-02-02 ASSESSMENT — ANXIETY QUESTIONNAIRES
7. FEELING AFRAID AS IF SOMETHING AWFUL MIGHT HAPPEN: NOT AT ALL
6. BECOMING EASILY ANNOYED OR IRRITABLE: NOT AT ALL
2. NOT BEING ABLE TO STOP OR CONTROL WORRYING: NOT AT ALL
3. WORRYING TOO MUCH ABOUT DIFFERENT THINGS: NOT AT ALL
1. FEELING NERVOUS, ANXIOUS, OR ON EDGE: NOT AT ALL
5. BEING SO RESTLESS THAT IT IS HARD TO SIT STILL: NOT AT ALL
GAD7 TOTAL SCORE: 0

## 2021-02-02 NOTE — RESULT ENCOUNTER NOTE
Dear Connie,    Here is a summary of your recent test results:    All of your labs are normal except for your cholesterol.     -LDL(bad) cholesterol level is elevated, HDL(good) cholesterol level is low and your triglycerides are elevated which can increase your heart disease risk.  A diet high in fat and simple carbohydrates, genetics and being overweight can contribute to this. ADVISE: exercising 150 minutes of aerobic exercise per week (30 minutes for 5 days per week or 50 minutes for 3 days per week are options) and eating a low saturated fat/low carbohydrate diet are helpful to improve this. I would like to add a statin in to your fenofibrate to have better overall cholesterol control and to lower your heart and stroke disease risk.  I am sending a prescription to your pharmacy: rosuvastatin(Crestor) 5 mg each evening.  If you have concerns about this recommendation then please contact me. In 3 months, you should recheck your fasting cholesterol panel by scheduling a lab-only appointment.    For additional lab test information, [email or web address] is an excellent reference.    In addition, here is a list of due or overdue Health Maintenance reminders:    Depression Assessment due on 06/09/2020  Zoster (Shingles) Vaccine(2 of 2) due on 02/03/2020    Please call us at [phone] (or use [facility]) to address the above recommendations if needed.    Thank you for choosing [facility].  It was an honor and a privilege to participate in your care.       Healthy regards,    Emilia Barillas, CARMEN  [facility]

## 2021-02-03 ASSESSMENT — ANXIETY QUESTIONNAIRES: GAD7 TOTAL SCORE: 0

## 2021-02-14 ENCOUNTER — MYC MEDICAL ADVICE (OUTPATIENT)
Dept: FAMILY MEDICINE | Facility: CLINIC | Age: 61
End: 2021-02-14

## 2021-02-14 DIAGNOSIS — R21 RASH AND NONSPECIFIC SKIN ERUPTION: Primary | ICD-10-CM

## 2021-02-18 NOTE — TELEPHONE ENCOUNTER
Ok for [place].  No primary care skin available in [place].   New referral done. [facility] [phone]  [email or web address]     See my chart message reply.    
Please see my chart message below     Please review and advise     Thank you     Samantha Escoto RN, BSN  [facility]     
details…

## 2021-03-11 ENCOUNTER — OFFICE VISIT (OUTPATIENT)
Dept: FAMILY MEDICINE | Facility: CLINIC | Age: 61
End: 2021-03-11
Payer: COMMERCIAL

## 2021-03-11 VITALS — SYSTOLIC BLOOD PRESSURE: 138 MMHG | DIASTOLIC BLOOD PRESSURE: 78 MMHG

## 2021-03-11 DIAGNOSIS — L40.9 PSORIASIS: Primary | ICD-10-CM

## 2021-03-11 DIAGNOSIS — L90.5 SCAR OF SKIN: ICD-10-CM

## 2021-03-11 PROCEDURE — 11901 INJECT SKIN LESIONS >7: CPT | Performed by: PHYSICIAN ASSISTANT

## 2021-03-11 PROCEDURE — 99203 OFFICE O/P NEW LOW 30 MIN: CPT | Mod: 25 | Performed by: PHYSICIAN ASSISTANT

## 2021-03-11 RX ORDER — BETAMETHASONE DIPROPIONATE 0.5 MG/G
LOTION TOPICAL 2 TIMES DAILY
Qty: 60 ML | Refills: 4 | Status: SHIPPED | OUTPATIENT
Start: 2021-03-11 | End: 2022-01-17

## 2021-03-11 RX ORDER — BETAMETHASONE DIPROPIONATE 0.5 MG/G
CREAM TOPICAL 2 TIMES DAILY
Qty: 100 G | Refills: 3 | Status: SHIPPED | OUTPATIENT
Start: 2021-03-11 | End: 2022-04-04

## 2021-03-11 RX ORDER — CALCIPOTRIENE 50 UG/G
OINTMENT TOPICAL
Qty: 120 G | Refills: 3 | Status: SHIPPED | OUTPATIENT
Start: 2021-03-11 | End: 2023-04-26

## 2021-03-11 NOTE — LETTER
3/11/2021         RE: Connie Brunson  [address] [facility] [id number]        Dear Colleague,    Thank you for referring your patient, Connie Brunson, to the [facility]. Please see a copy of my visit note below.    HPI:  Connie Brunson is a 60 year old female patient here today for rash on intergluteal crease,  feet, LE, hands and scalp .  Patient states this has been present for a while.  Patient reports the following symptoms: itchy, flaring .  Patient reports the following previous treatments: clobetasol with improvement but no longer works, triamcinolone with improvement of rash on intergluteal crease. Elecon with some improvement.  Patient reports the following modifying factors: none.  Associated symptoms: none. Denies joint pain.  Patient has no other skin complaints today.  Remainder of the HPI, Meds, PMH, Allergies, FH, and SH was reviewed in chart.      Past Medical History:   Diagnosis Date     Allergic rhinitis, cause unspecified     year round - dog,dust-  Failed on claritin, claritin-D, zyrtec and zyrtec-D in past.      Benign neoplasm of cerebral meninges (H) 1999    has yearly MRI's. - sees Dr. 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 Thomas ENT      Cancer (H)      Depressive disorder      Deviated nasal septum     sees Dr. Thomas ENT      Diverticulosis of colon (without mention of hemorrhage) 02-     History of Guillain-Stockton syndrome     NO FLU SHOTS - very mild case in [place] many years ago     Hyperlipidemia LDL goal < 130 doesn't want statins    simvastatin = severe hand joint pain , lipitor =nausea/abd. pain     Hypertension goal BP (blood pressure) < 140/90      Insomnia     trazodone -made pt feel hung over      Major depressive disorder, recurrent episode, moderate (H)     lexapro - sexual side effects      Moderate major  depression (H) 2/4/2005    trazodone -made pt feel hung over      Other acne      Other extrapyramidal disease and abnormal movement disorder     ?GBS     Symptomatic menopausal or female climacteric states     vivelle-dot        Past Surgical History:   Procedure Laterality Date     ABDOMEN SURGERY       BIOPSY       C LIGATE FALLOPIAN TUBE  1988    Tubal Ligation     C TOTAL ABDOM HYSTERECTOMY  2000 ?    Hysterectomy, Total Abdominal with BSO     COLONOSCOPY  1/16/2012    Procedure:COLONOSCOPY; Colonoscopy; Surgeon:SHOLA JOYCE; Location:[facility]     HC REMOVE TONSILS/ADENOIDS,<11 Y/O      T and A, under 12 yrs     HEAD & NECK SURGERY       LAPAROSCOPIC CHOLECYSTECTOMY WITH CHOLANGIOGRAMS N/A 4/25/2016    Procedure: LAPAROSCOPIC CHOLECYSTECTOMY WITH CHOLANGIOGRAMS;  Surgeon: Rosa M Cristobal MD;  Location: [facility]     LAPAROSCOPY DIAGNOSTIC (GENERAL) N/A 11/7/2017    Procedure: LAPAROSCOPY DIAGNOSTIC (GENERAL);  Exploratory Laparoscopy, Laparotomy and Small Bowel resection.;  Surgeon: Rosa M Cristobal MD;  Location: [facility]     LAPAROTOMY EXPLORATORY N/A 11/7/2017    Procedure: LAPAROTOMY EXPLORATORY;;  Surgeon: Rosa M Cristobal MD;  Location: [facility]     RESECT SMALL BOWEL WITHOUT OSTOMY N/A 11/7/2017    Procedure: RESECT SMALL BOWEL WITHOUT OSTOMY;;  Surgeon: Rosa M Cristobal MD;  Location: [facility]     SURGICAL HISTORY OF -   2004    Menigioma excision     ZZC NONSPECIFIC PROCEDURE      PAROTID TUMOR L SIDE OF NECK - BENIGN        Family History   Problem Relation Age of Onset     Hyperlipidemia Mother      Thyroid Disease Mother      Cancer Father         lung     Hypertension Father      Hyperlipidemia Father      Thyroid Disease Brother      Diabetes Maternal Grandmother      Cerebrovascular Disease Maternal Grandmother      Substance Abuse Maternal Grandfather      Thyroid Disease Son        Social History     Socioeconomic History     Marital status:      Spouse name:   Nannette      Number of children: 3     Years of education: 15     Highest education level: Not on file   Occupational History     Occupation: RN and in admin with [employer]      Employer: [employer]   Social Needs     Financial resource strain: Not on file     Food insecurity     Worry: Not on file     Inability: Not on file     Transportation needs     Medical: Not on file     Non-medical: Not on file   Tobacco Use     Smoking status: Light Tobacco Smoker     Packs/day: 0.00     Years: 5.00     Pack years: 0.00     Types: Cigarettes, Other     Last attempt to quit: 1994     Years since quittin.2     Smokeless tobacco: Current User     Tobacco comment: 11/3/17 - occ e-cig use   Substance and Sexual Activity     Alcohol use: Yes     Alcohol/week: 0.0 standard drinks     Comment: 3-5 drinks per week     Drug use: No     Sexual activity: Yes     Partners: Male     Birth control/protection: Post-menopausal     Comment: tubal ligation-    Lifestyle     Physical activity     Days per week: Not on file     Minutes per session: Not on file     Stress: Not on file   Relationships     Social connections     Talks on phone: Not on file     Gets together: Not on file     Attends [employer] service: Not on file     Active member of club or organization: Not on file     Attends meetings of clubs or organizations: Not on file     Relationship status: Not on file     Intimate partner violence     Fear of current or ex partner: Not on file     Emotionally abused: Not on file     Physically abused: Not on file     Forced sexual activity: Not on file   Other Topics Concern      Service No     Blood Transfusions No     Caffeine Concern Yes     Comment: 2-3 cups coffee in am     Occupational Exposure Not Asked     Hobby Hazards Not Asked     Sleep Concern Not Asked     Stress Concern Not Asked     Weight Concern Not Asked     Special Diet Not Asked     Back Care Not Asked     Exercise No      Comment: regular walking 4x/week      Bike Helmet Not Asked     Seat Belt Yes     Comment: always     Self-Exams Yes     Comment: SBE encouraged monthly     Parent/sibling w/ CABG, MI or angioplasty before 65F 55M? No   Social History Narrative    calcium - taking 500mg po qday - increase to Calcium - 1000-1200mg/day    flex sig/colonoscopy -at age 48    sun precautions - discussed    mammogram - yearly    Td booster - 1991 per our records - pt will call [facility]     pneumovax -at age 58    DEXA -this year- 2003    stool hemoccults - every year after age 40    ASA- start 81 mg ASA qday.    mulvitamin - encouraged    doing citrucel qday         from second , Dan Schoenbauer. Now back together with her first  and father of their  children, Perry Brunson - fall 2011.        Outpatient Encounter Medications as of 3/11/2021   Medication Sig Dispense Refill     cetirizine (ZYRTEC) 10 MG tablet Take 10 mg by mouth every evening       clobetasol (TEMOVATE) 0.05 % external cream Apply topically 2 times daily Apply sparingly twice daily for up to 2 weeks (cream) 45 g 1     diazepam (VALIUM) 10 MG tablet Take 1-2  tab or 10-20 mg 30-60 minutes prior to MRI scan. 4 tablet 1     fenofibrate (TRIGLIDE/LOFIBRA) 160 MG tablet Take 1 tablet (160 mg) by mouth daily 90 tablet 3     fluticasone (FLONASE) 50 MCG/ACT nasal spray Spray 2 sprays into both nostrils daily 48 g 5     lisinopril (ZESTRIL) 10 MG tablet Take 1 tablet (10 mg) by mouth daily 90 tablet 3     mometasone (ELOCON) 0.1 % cream Apply sparingly to affected area twice daily for 14 days.  Do not apply to face. 15 g 3     moxifloxacin (AVELOX) 400 MG tablet Take 1 tablet (400 mg) by mouth daily 10 tablet 0     omeprazole (PRILOSEC) 20 MG DR capsule Take 1 capsule (20 mg) by mouth daily 90 capsule 3     rosuvastatin (CRESTOR) 5 MG tablet Take 1 tablet (5 mg) by mouth daily 90 tablet 3     triamcinolone (ARISTOCORT HP) 0.5 % external cream  Apply topically 2 times daily To areas of eczema or psoriasis - do NOT use on face or genitals 15 g 3     triamcinolone (KENALOG) 0.025 % external ointment Apply topically 2 times daily sparingly for no more than 2 weeks 15 g 1     triamterene-HCTZ (DYAZIDE) 37.5-25 MG capsule Take 1 capsule by mouth daily 90 capsule 3     venlafaxine (EFFEXOR) 37.5 MG tablet Take 1 tablet (37.5 mg) by mouth daily 90 tablet 1     No facility-administered encounter medications on file as of 3/11/2021.        Review Of Systems:  Skin: rash  Eyes: negative  Ears/Nose/Throat: negative  Respiratory: No shortness of breath, dyspnea on exertion, cough, or hemoptysis  Cardiovascular: negative  Gastrointestinal: negative  Genitourinary: negative  Musculoskeletal: negative  Neurologic: negative  Psychiatric: negative  Hematologic/Lymphatic/Immunologic: negative  Endocrine: negative      Objective:     /78   LMP 09/05/2000   Eyes: Conjunctivae/lids: Normal   ENT: Lips:  Normal  MSK: Normal  Cardiovascular: Peripheral edema none  Pulm: Breathing Normal  Neuro/Psych: Orientation: A/O x 3 Normal; Mood/Affect: Normal, NAD, WDWN  Pt accompanied by: self  Following areas examined: face ( wearing mask), scalp, hands, forearms, thighs, legs, feet  Watson skin type:i   Findings:  Scaly patches on soles of feet with brown/pink macules/papules on plantar feet and hands  Red scaly annular patches on LE and Light brown/pink smooth macules   Pink scaly plaques on scalp  Pink smooth plaques on occipital scalp  Assessment and Plan:  1) scar on scalp  Due to neurosurgery    2) plaque psoriasis  IL TAC: PGACAC discussed.  Risks including but not limited to injection site reaction, bruising, no resolution.  All questions answered and entertained to patient s satisfaction.  Informed consent obtained.  IL TAC in concentration of 10mg/ml was injected ID to 8 plaques on feet and scalp.  Total injected was  1.5 ml.  Patient tolerated without complications  and given wound care instructions, including not to move product around.  Return in 4 weeks for follow-up and possible additional IL TAC. May need additional treatment. May not be effective.    Kenalog-10 lot:[id number] exp 4/22    Disc topical medications, IL TAC, oral medications including Methotrexate and Otezla, NB-UVB, and biologic medications.  Pt elects topicals.   Apply desitin to intergluteal crease daily. Use a very thick layer. Restart triamcinolone 0.025% to area BID for 2 weeks as needed. Stop with improvement and restart with flares.   Apply a thin layer of betamethasone and dovonox to affected area on hands and feet 2x a day for 4 weeks. Tapering with improvement. Do not use on face or body folds.  Popliteal fossae ( back of knees) -Triamcionlone: Apply a thin layer to affected area BID x 2 weeks, tapering with improvement. Do not apply to face or body folds.   Moisturize with a thick emollient daily.     Discussed side effects of topical steroids including but not limited to atrophy (skin thinning), striae (stretch marks) telangiectasias, steroid acne, and others. Do not apply to normal skin. Do not apply to discolored skin that does not have rash present. Educated patient on post inflammatory hyperpigmentation.     Scalp:   Nizoral: Wash scalp daily with a medicated shampoo discussed with provider. Wet affected area daily, apply shampoo and lather, let sit for 3-5 minutes and then rinse.   Betamethasone lotion: Apply a thin layer to affected area BID x 2 weeks, tapering with improvement. Do not apply to face or body folds.           It was a pleasure speaking with Connie Brunson today.   Follow up in one month for additional injections        Again, thank you for allowing me to participate in the care of your patient.        Sincerely,        Vero Carias PA-C

## 2021-03-11 NOTE — PATIENT INSTRUCTIONS
[email or web address]  Psoriasis  Psoriasis foundation  Treatments include creams, steroid injections, oral medications including Methotrexate and Otezla, NB-UVB, and biologic medications ( humira, enbreal, taltz etc).  Apply desitin to intergluteal crease( buttcrack) daily. Use a very thick layer. Restart triamcinolone 0.025% to area BID for 2 weeks as needed. Stop with improvement and restart with flares.   Apply a thin layer of betamethasone and dovonox to affected area on hands and feet 2x a day for 4 weeks. Tapering with improvement. Do not use on face or body folds.  Popliteal fossae ( back of knees) -Triamcionlone: Apply a thin layer to affected area BID x 2 weeks, tapering with improvement. Do not apply to face or body folds.   Moisturize with a thick emollient daily.     Discussed side effects of topical steroids including but not limited to atrophy (skin thinning), striae (stretch marks) telangiectasias, steroid acne, and others. Do not apply to normal skin. Do not apply to discolored skin that does not have rash present. Educated patient on post inflammatory hyperpigmentation.     Scalp:   Nizoral: Wash scalp daily with a medicated shampoo discussed with provider. Wet affected area daily, apply shampoo and lather, let sit for 3-5 minutes and then rinse.   Betamethasone lotion: Apply a thin layer to affected area BID x 2 weeks, tapering with improvement. Do not apply to face or body folds.     Proper skin care from [facility]:    -Eliminate harsh soaps as they strip the natural oils from the skin, often resulting in dry itchy skin ( i.e. Dial, Zest, Penny Spring)  -Use mild soaps such as Cetaphil or Dove Sensitive Skin in the shower. You do not need to use soap on arms, legs, and trunk every time you shower unless visibly soiled.   -Avoid hot or cold showers.  -After showering, lightly dry off and apply moisturizing within 2-3 minutes. This will help trap moisture in the skin.   -Aggressive use of a  moisturizer at least 1-2 times a day to the entire body (including -Vanicream, Cetaphil, Aquaphor or Cerave) and moisturize hands after every washing.  -We recommend using moisturizers that come in a tub that needs to be scooped out, not a pump. This has more of an oil base. It will hold moisture in your skin much better than a water base moisturizer. The above recommended are non-pore clogging.    Today we injected Kenalog. Kenalog is an antiinflammatory medication. We can do injections every four weeks as needed. Atrophy (thinning of the skin) and pigment changes can occur.

## 2021-03-11 NOTE — PROGRESS NOTES
HPI:  Connie Brunson is a 60 year old female patient here today for rash on intergluteal crease,  feet, LE, hands and scalp .  Patient states this has been present for a while.  Patient reports the following symptoms: itchy, flaring .  Patient reports the following previous treatments: clobetasol with improvement but no longer works, triamcinolone with improvement of rash on intergluteal crease. Elecon with some improvement.  Patient reports the following modifying factors: none.  Associated symptoms: none. Denies joint pain.  Patient has no other skin complaints today.  Remainder of the HPI, Meds, PMH, Allergies, FH, and SH was reviewed in chart.      Past Medical History:   Diagnosis Date     Allergic rhinitis, cause unspecified     year round - dog,dust-  Failed on claritin, claritin-D, zyrtec and zyrtec-D in past.      Benign neoplasm of cerebral meninges (H) 1999    has yearly MRI's. - sees Dr. 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 Thomas ENT      Cancer (H)      Depressive disorder      Deviated nasal septum     sees Dr. Thomas ENT      Diverticulosis of colon (without mention of hemorrhage) 02-     History of Guillain-Belvue syndrome     NO FLU SHOTS - very mild case in [place] many years ago     Hyperlipidemia LDL goal < 130 doesn't want statins    simvastatin = severe hand joint pain , lipitor =nausea/abd. pain     Hypertension goal BP (blood pressure) < 140/90      Insomnia     trazodone -made pt feel hung over      Major depressive disorder, recurrent episode, moderate (H)     lexapro - sexual side effects      Moderate major depression (H) 2/4/2005    trazodone -made pt feel hung over      Other acne      Other extrapyramidal disease and abnormal movement disorder     ?GBS     Symptomatic menopausal or female climacteric states     vivelle-dot        Past Surgical History:   Procedure  Laterality Date     ABDOMEN SURGERY       BIOPSY       C LIGATE FALLOPIAN TUBE  1988    Tubal Ligation     C TOTAL ABDOM HYSTERECTOMY  2000 ?    Hysterectomy, Total Abdominal with BSO     COLONOSCOPY  1/16/2012    Procedure:COLONOSCOPY; Colonoscopy; Surgeon:SHOLA JOYCE; Location:[facility]     HC REMOVE TONSILS/ADENOIDS,<13 Y/O      T and A, under 12 yrs     HEAD & NECK SURGERY       LAPAROSCOPIC CHOLECYSTECTOMY WITH CHOLANGIOGRAMS N/A 4/25/2016    Procedure: LAPAROSCOPIC CHOLECYSTECTOMY WITH CHOLANGIOGRAMS;  Surgeon: Rosa M Cristobal MD;  Location: [facility]     LAPAROSCOPY DIAGNOSTIC (GENERAL) N/A 11/7/2017    Procedure: LAPAROSCOPY DIAGNOSTIC (GENERAL);  Exploratory Laparoscopy, Laparotomy and Small Bowel resection.;  Surgeon: Rosa M Cristobal MD;  Location: [facility]     LAPAROTOMY EXPLORATORY N/A 11/7/2017    Procedure: LAPAROTOMY EXPLORATORY;;  Surgeon: Rosa M Cristobal MD;  Location: [facility]     RESECT SMALL BOWEL WITHOUT OSTOMY N/A 11/7/2017    Procedure: RESECT SMALL BOWEL WITHOUT OSTOMY;;  Surgeon: Rosa M Cristobal MD;  Location: [facility]     SURGICAL HISTORY OF -   2004    Menigioma excision     ZZC NONSPECIFIC PROCEDURE      PAROTID TUMOR L SIDE OF NECK - BENIGN        Family History   Problem Relation Age of Onset     Hyperlipidemia Mother      Thyroid Disease Mother      Cancer Father         lung     Hypertension Father      Hyperlipidemia Father      Thyroid Disease Brother      Diabetes Maternal Grandmother      Cerebrovascular Disease Maternal Grandmother      Substance Abuse Maternal Grandfather      Thyroid Disease Son        Social History     Socioeconomic History     Marital status:      Spouse name: Perry Brunson      Number of children: 3     Years of education: 15     Highest education level: Not on file   Occupational History     Occupation: RN and in admin with [employer]      Employer: [employer]   Social Needs     Financial resource strain:  Not on file     Food insecurity     Worry: Not on file     Inability: Not on file     Transportation needs     Medical: Not on file     Non-medical: Not on file   Tobacco Use     Smoking status: Light Tobacco Smoker     Packs/day: 0.00     Years: 5.00     Pack years: 0.00     Types: Cigarettes, Other     Last attempt to quit: 1994     Years since quittin.2     Smokeless tobacco: Current User     Tobacco comment: 11/3/17 - occ e-cig use   Substance and Sexual Activity     Alcohol use: Yes     Alcohol/week: 0.0 standard drinks     Comment: 3-5 drinks per week     Drug use: No     Sexual activity: Yes     Partners: Male     Birth control/protection: Post-menopausal     Comment: tubal ligation-    Lifestyle     Physical activity     Days per week: Not on file     Minutes per session: Not on file     Stress: Not on file   Relationships     Social connections     Talks on phone: Not on file     Gets together: Not on file     Attends Orthodox service: Not on file     Active member of club or organization: Not on file     Attends meetings of clubs or organizations: Not on file     Relationship status: Not on file     Intimate partner violence     Fear of current or ex partner: Not on file     Emotionally abused: Not on file     Physically abused: Not on file     Forced sexual activity: Not on file   Other Topics Concern      Service No     Blood Transfusions No     Caffeine Concern Yes     Comment: 2-3 cups coffee in am     Occupational Exposure Not Asked     Hobby Hazards Not Asked     Sleep Concern Not Asked     Stress Concern Not Asked     Weight Concern Not Asked     Special Diet Not Asked     Back Care Not Asked     Exercise No     Comment: regular walking 4x/week      Bike Helmet Not Asked     Seat Belt Yes     Comment: always     Self-Exams Yes     Comment: SBE encouraged monthly     Parent/sibling w/ CABG, MI or angioplasty before 65F 55M? No   Social History Narrative    calcium - taking  500mg po qday - increase to Calcium - 1000-1200mg/day    flex sig/colonoscopy -at age 48    sun precautions - discussed    mammogram - yearly    Td booster - 1991 per our records - pt will call [facility]     pneumovax -at age 58    DEXA -this year- 2003    stool hemoccults - every year after age 38    ASA- start 81 mg ASA qday.    mulvitamin - encouraged    doing citrucel qday         from second , Dan Sch[employer]enbauer. Now back together with her first  and father of their  children, Perry Brunson - fall 2011.        Outpatient Encounter Medications as of 3/11/2021   Medication Sig Dispense Refill     cetirizine (ZYRTEC) 10 MG tablet Take 10 mg by mouth every evening       clobetasol (TEMOVATE) 0.05 % external cream Apply topically 2 times daily Apply sparingly twice daily for up to 2 weeks (cream) 45 g 1     diazepam (VALIUM) 10 MG tablet Take 1-2  tab or 10-20 mg 30-60 minutes prior to MRI scan. 4 tablet 1     fenofibrate (TRIGLIDE/LOFIBRA) 160 MG tablet Take 1 tablet (160 mg) by mouth daily 90 tablet 3     fluticasone (FLONASE) 50 MCG/ACT nasal spray Spray 2 sprays into both nostrils daily 48 g 5     lisinopril (ZESTRIL) 10 MG tablet Take 1 tablet (10 mg) by mouth daily 90 tablet 3     mometasone (ELOCON) 0.1 % cream Apply sparingly to affected area twice daily for 14 days.  Do not apply to face. 15 g 3     moxifloxacin (AVELOX) 400 MG tablet Take 1 tablet (400 mg) by mouth daily 10 tablet 0     omeprazole (PRILOSEC) 20 MG DR capsule Take 1 capsule (20 mg) by mouth daily 90 capsule 3     rosuvastatin (CRESTOR) 5 MG tablet Take 1 tablet (5 mg) by mouth daily 90 tablet 3     triamcinolone (ARISTOCORT HP) 0.5 % external cream Apply topically 2 times daily To areas of eczema or psoriasis - do NOT use on face or genitals 15 g 3     triamcinolone (KENALOG) 0.025 % external ointment Apply topically 2 times daily sparingly for no more than 2 weeks 15 g 1     triamterene-HCTZ (DYAZIDE) 37.5-25  MG capsule Take 1 capsule by mouth daily 90 capsule 3     venlafaxine (EFFEXOR) 37.5 MG tablet Take 1 tablet (37.5 mg) by mouth daily 90 tablet 1     No facility-administered encounter medications on file as of 3/11/2021.        Review Of Systems:  Skin: rash  Eyes: negative  Ears/Nose/Throat: negative  Respiratory: No shortness of breath, dyspnea on exertion, cough, or hemoptysis  Cardiovascular: negative  Gastrointestinal: negative  Genitourinary: negative  Musculoskeletal: negative  Neurologic: negative  Psychiatric: negative  Hematologic/Lymphatic/Immunologic: negative  Endocrine: negative      Objective:     /78   LMP 09/05/2000   Eyes: Conjunctivae/lids: Normal   ENT: Lips:  Normal  MSK: Normal  Cardiovascular: Peripheral edema none  Pulm: Breathing Normal  Neuro/Psych: Orientation: A/O x 3 Normal; Mood/Affect: Normal, NAD, WDWN  Pt accompanied by: self  Following areas examined: face ( wearing mask), scalp, hands, forearms, thighs, legs, feet  Watson skin type:i   Findings:  Scaly patches on soles of feet with brown/pink macules/papules on plantar feet and hands  Red scaly annular patches on LE and Light brown/pink smooth macules   Pink scaly plaques on scalp  Pink smooth plaques on occipital scalp  Assessment and Plan:  1) scar on scalp  Due to neurosurgery    2) plaque psoriasis  IL TAC: PGACAC discussed.  Risks including but not limited to injection site reaction, bruising, no resolution.  All questions answered and entertained to patient s satisfaction.  Informed consent obtained.  IL TAC in concentration of 10mg/ml was injected ID to 8 plaques on feet and scalp.  Total injected was  1.5 ml.  Patient tolerated without complications and given wound care instructions, including not to move product around.  Return in 4 weeks for follow-up and possible additional IL TAC. May need additional treatment. May not be effective.    Kenalog-10 lot:[id number] exp 4/22    Disc topical medications, IL TAC,  oral medications including Methotrexate and Otezla, NB-UVB, and biologic medications.  Pt elects topicals.   Apply desitin to intergluteal crease daily. Use a very thick layer. Restart triamcinolone 0.025% to area BID for 2 weeks as needed. Stop with improvement and restart with flares.   Apply a thin layer of betamethasone and dovonox to affected area on hands and feet 2x a day for 4 weeks. Tapering with improvement. Do not use on face or body folds.  Popliteal fossae ( back of knees) -Triamcionlone: Apply a thin layer to affected area BID x 2 weeks, tapering with improvement. Do not apply to face or body folds.   Moisturize with a thick emollient daily.     Discussed side effects of topical steroids including but not limited to atrophy (skin thinning), striae (stretch marks) telangiectasias, steroid acne, and others. Do not apply to normal skin. Do not apply to discolored skin that does not have rash present. Educated patient on post inflammatory hyperpigmentation.     Scalp:   Nizoral: Wash scalp daily with a medicated shampoo discussed with provider. Wet affected area daily, apply shampoo and lather, let sit for 3-5 minutes and then rinse.   Betamethasone lotion: Apply a thin layer to affected area BID x 2 weeks, tapering with improvement. Do not apply to face or body folds.           It was a pleasure speaking with Connie Brunson today.   Follow up in one month for additional injections

## 2021-05-03 ENCOUNTER — TELEPHONE (OUTPATIENT)
Dept: FAMILY MEDICINE | Facility: CLINIC | Age: 61
End: 2021-05-03

## 2021-05-03 NOTE — TELEPHONE ENCOUNTER
Reason for Call:  Same Day Appointment, Requested Provider:  Vero Carias PA-C    PCP: Gayatri Stephens    Reason for visit: Psoriasis is getting worse    Duration of symptoms: a while    Have you been treated for this in the past? Yes    Additional comments: Patient would like to be seen as soon as possible.    Can we leave a detailed message on this number? YES    Phone number patient can be reached at: Cell number on file:    Telephone Information:   Mobile [phone]       Best Time: Anytime    Call taken on 5/3/2021 at 8:04 AM by Darius Schulte

## 2021-05-03 NOTE — TELEPHONE ENCOUNTER
Patient called back.    Patients psoriasis has gotten worse despite using prescribed topicals.    Scheduled same week appointment.    Patient voiced understanding.    [place]-BSN-PHN  [facility]  [phone]

## 2021-05-03 NOTE — TELEPHONE ENCOUNTER
Called and LM for patient to call back.    Yong RN-BSN-N  [facility]  [phone]

## 2021-05-04 NOTE — PROGRESS NOTES
[facility] - PRIMARY CARE SKIN    CC:psoriasis  SUBJECTIVE:   Connie Brunson is a(n) 60 year old female who presents to clinic today because of the following issue(s) ;     Issue one : psoriasis that involves buttocks, scalp , just started last winter except for occasional patches on the hands           Current treatment : triamcinolone 0.025% to the gluteal cleft and popliteal fossa                      Scalp and feet : s/p ID injection with triamcinolone 10 mg/ml , nizoral for the scalp, betamethasone diproprinate 0.05% lotion for the scalp                      Hands and feet : betamethasone diproprinate 0.05% ointment and calcipotriene ointment bid for 2 weeks            Response to current treatment : seems to be spreading  Personal Medical History  Skin Cancer: NO  Eczema Psoriasis Lupus   NO YES NO   Other:     Family Medical History  Skin Cancer: NO  Eczema Psoriasis Lupus   NO NO NO   Other: .        Occupation: indoor    Refer to electronic medical record (EMR) for past medical history and medications.      ROS: 14 point review of systems was negative except the symptoms listed above in the HPI.        OBJECTIVE:   GENERAL: healthy, alert and no distress.  HEENT: PERRL. Conjunctiva, sclera clear.  SKIN: Watson Skin Type - II.  Scalp, Face, Neck, Hands, Feet, Fingers and Toes examined. The dermatoscope was used to help evaluate pigmented lesions.  Skin Pertinent Findings:  Scalp - scaly , thick plaques  Hands and feet - scattered small scaly plaques on the dorsum of the hands and fingers           ASSESSMENT:     Encounter Diagnosis   Name Primary?     Psoriasis Yes     MDM: discussed continued use of methotrexate and topical steroids    PLAN:   Patient Instructions   Methotrexate 2.5 mg 5 tablets once per week  Folic acid supplement 1 gm every day except the day you take methotrexate    Buttocks and back of knee     Triamcinolone 0.025 % cream apply continue to apply two times per day for 2  weeks then three times per week    Scalp and feet     Betamethasone diproprinate 0.05% lotion apply to affected areas on the scalp once per day          Hands      Betamethasone diproprinate 0.05% ointment and calcipotriene apply two times per day    Recheck in 4-6 weeks    Vitamin d supplement 2000 international unit(s) per day

## 2021-05-05 ENCOUNTER — OFFICE VISIT (OUTPATIENT)
Dept: FAMILY MEDICINE | Facility: CLINIC | Age: 61
End: 2021-05-05
Payer: COMMERCIAL

## 2021-05-05 VITALS — DIASTOLIC BLOOD PRESSURE: 88 MMHG | SYSTOLIC BLOOD PRESSURE: 138 MMHG

## 2021-05-05 DIAGNOSIS — L40.9 PSORIASIS: Primary | ICD-10-CM

## 2021-05-05 LAB
HBV SURFACE AB SERPL IA-ACNC: 0 M[IU]/ML
HCV AB SERPL QL IA: NONREACTIVE

## 2021-05-05 PROCEDURE — 36415 COLL VENOUS BLD VENIPUNCTURE: CPT | Performed by: FAMILY MEDICINE

## 2021-05-05 PROCEDURE — 86706 HEP B SURFACE ANTIBODY: CPT | Performed by: FAMILY MEDICINE

## 2021-05-05 PROCEDURE — 86481 TB AG RESPONSE T-CELL SUSP: CPT | Performed by: FAMILY MEDICINE

## 2021-05-05 PROCEDURE — 86803 HEPATITIS C AB TEST: CPT | Performed by: FAMILY MEDICINE

## 2021-05-05 PROCEDURE — 99214 OFFICE O/P EST MOD 30 MIN: CPT | Performed by: FAMILY MEDICINE

## 2021-05-05 NOTE — LETTER
5/5/2021         RE: Connie Brunson  [address] [facility] [address]        Dear Colleague,    Thank you for referring your patient, Connie Brunson, to the [facility]. Please see a copy of my visit note below.    [facility] - PRIMARY CARE SKIN    CC:psoriasis  SUBJECTIVE:   Connie Brunson is a(n) 60 year old female who presents to clinic today because of the following issue(s) ;     Issue one : psoriasis that involves buttocks, scalp , just started last winter except for occasional patches on the hands           Current treatment : triamcinolone 0.025% to the gluteal cleft and popliteal fossa                      Scalp and feet : s/p ID injection with triamcinolone 10 mg/ml , nizoral for the scalp, betamethasone diproprinate 0.05% lotion for the scalp                      Hands and feet : betamethasone diproprinate 0.05% ointment and calcipotriene ointment bid for 2 weeks            Response to current treatment : seems to be spreading  Personal Medical History  Skin Cancer: NO  Eczema Psoriasis Lupus   NO YES NO   Other:     Family Medical History  Skin Cancer: NO  Eczema Psoriasis Lupus   NO NO NO   Other: .        Occupation: indoor    Refer to electronic medical record (EMR) for past medical history and medications.      ROS: 14 point review of systems was negative except the symptoms listed above in the HPI.        OBJECTIVE:   GENERAL: healthy, alert and no distress.  HEENT: PERRL. Conjunctiva, sclera clear.  SKIN: Watson Skin Type - II.  Scalp, Face, Neck, Hands, Feet, Fingers and Toes examined. The dermatoscope was used to help evaluate pigmented lesions.  Skin Pertinent Findings:  Scalp - scaly , thick plaques  Hands and feet - scattered small scaly plaques on the dorsum of the hands and fingers           ASSESSMENT:     Encounter Diagnosis   Name Primary?     Psoriasis Yes     MDM: discussed continued use of methotrexate and topical  steroids    PLAN:   Patient Instructions   Methotrexate 2.5 mg 5 tablets once per week  Folic acid supplement 1 gm every day except the day you take methotrexate    Buttocks and back of knee     Triamcinolone 0.025 % cream apply continue to apply two times per day for 2 weeks then three times per week    Scalp and feet     Betamethasone diproprinate 0.05% lotion apply to affected areas on the scalp once per day          Hands      Betamethasone diproprinate 0.05% ointment and calcipotriene apply two times per day    Recheck in 4-6 weeks    Vitamin d supplement 2000 international unit(s) per day                Again, thank you for allowing me to participate in the care of your patient.        Sincerely,        Jeanette Bell MD

## 2021-05-05 NOTE — PATIENT INSTRUCTIONS
Methotrexate 2.5 mg 5 tablets once per week  Folic acid supplement 1 gm every day except the day you take methotrexate    Buttocks and back of knee     Triamcinolone 0.025 % cream apply continue to apply two times per day for 2 weeks then three times per week    Scalp and feet     Betamethasone diproprinate 0.05% lotion apply to affected areas on the scalp once per day          Hands      Betamethasone diproprinate 0.05% ointment and calcipotriene apply two times per day    Recheck in 4-6 weeks    Vitamin d supplement 2000 international unit(s) per day

## 2021-05-07 LAB
GAMMA INTERFERON BACKGROUND BLD IA-ACNC: 0 IU/ML
M TB IFN-G CD4+ BCKGRND COR BLD-ACNC: 2.81 IU/ML
M TB TUBERC IFN-G BLD QL: NEGATIVE
MITOGEN IGNF BCKGRD COR BLD-ACNC: 0 IU/ML
MITOGEN IGNF BCKGRD COR BLD-ACNC: 0 IU/ML

## 2021-05-10 ENCOUNTER — TELEPHONE (OUTPATIENT)
Dept: FAMILY MEDICINE | Facility: CLINIC | Age: 61
End: 2021-05-10

## 2021-05-10 NOTE — TELEPHONE ENCOUNTER
Prior Authorization Retail Medication Request  INS IS LIMITING QTY OF BOTH CREAM AND LOTION. NEED PA FOR BOTH MEDICATION.  (only see lotion on the patient's med list)    Medication/Dose: betamethasone dipropionate (DIPROSONE) 0.05 % external lotion  ICD code (if different than what is on RX):  Previously Tried and Failed:  Rationale:    Insurance Name: unknown  Insurance ID: unknown    Pharmacy Information (if different than what is on RX)  Name: [facility] [place]  Phone: [phone]    Please include previous medications tried and failed.  Please ask insurance for medications on formulary.

## 2021-05-11 ENCOUNTER — TELEPHONE (OUTPATIENT)
Dept: FAMILY MEDICINE | Facility: CLINIC | Age: 61
End: 2021-05-11

## 2021-05-11 DIAGNOSIS — L40.9 PSORIASIS: Primary | ICD-10-CM

## 2021-05-11 RX ORDER — FOLIC ACID 1 MG/1
1 TABLET ORAL DAILY
Qty: 100 TABLET | Refills: 3 | Status: SHIPPED | OUTPATIENT
Start: 2021-05-11 | End: 2022-02-24

## 2021-05-11 RX ORDER — METHOTREXATE 2.5 MG/1
TABLET ORAL
Qty: 20 TABLET | Refills: 0 | Status: SHIPPED | OUTPATIENT
Start: 2021-05-11 | End: 2021-06-09

## 2021-05-11 NOTE — TELEPHONE ENCOUNTER
Called patient and advised of provider message. She may wait to start because she is caring for her MIL that is actively dying.    Cherry SHEETSRN BSN  [facility]  [phone]

## 2021-05-12 NOTE — TELEPHONE ENCOUNTER
PA Initiation    Medication: betamethasone dipropionate (DIPROSONE) 0.05 % external lotion- INITIATED  Insurance Company: [employer] [place] - Phone [phone] Fax [phone]  Pharmacy Filling the Rx: [facility] [place] [address] [place] [address]  Filling Pharmacy Phone: [phone]  Filling Pharmacy Fax: [phone]  Start Date: 5/12/2021

## 2021-05-17 NOTE — TELEPHONE ENCOUNTER
Prior Authorization Approval    Authorization Effective Date: 5/14/2021  Authorization Expiration Date: 5/14/2022  Medication: betamethasone dipropionate (DIPROSONE) 0.05 % external lotion- APPROVED  Approved Dose/Quantity: 60 GM  Reference #: [id number]   Insurance Company: [employer] [place] - Phone [phone] Fax [phone]  Expected CoPay:       CoPay Card Available:      Foundation Assistance Needed:    Which Pharmacy is filling the prescription (Not needed for infusion/clinic administered): [facility] [place] - [address] [place] [address]  Pharmacy Notified: Yes  Patient Notified: Yes                        Central Prior Authorization Team  Phone: [phone]

## 2021-06-08 NOTE — PROGRESS NOTES
[facility] - PRIMARY CARE SKIN    CC:psoriasis  SUBJECTIVE:   Connie Brunson is a(n) 60 year old female who presents to clinic today because of the following issue(s) ;     Issue one : follow up of her psoriasis that involves buttocks, scalp , just started last winter ,occasional patches on the hands. She initially had IL injections with steroid to the plaques on the scalp, hands and feet. This was very helpful and she would like to have repeat injections to selected areas instead of starting the methotrexate. The plaques have not recurred on the feet.        Since the last visit she quit her job which was very stressful and her mother in law passed away. She did not start the methotrexate. She has persisting plaques on the scalp, wrist and left lower leg.       Response to current treatment : plaques in the knee area has cleared. Scalp has multiple plaques            Current treatment :                         Did not start the methotrexate    Buttocks and back of knee     Triamcinolone 0.025 % cream apply continue to apply two times per day for 2 weeks then three times per week . The knee plaque has resolved.    Scalp and feet     Betamethasone diproprinate 0.05% lotion apply to affected areas on the scalp once per day       nizoral shampoo  Hands      Betamethasone diproprinate 0.05% ointment and calcipotriene apply two times per day           Personal Medical History  Skin Cancer: NO  Eczema Psoriasis Lupus   NO YES NO   Other:     Family Medical History  Skin Cancer: NO  Eczema Psoriasis Lupus   NO NO NO   Other: .        Occupation: indoor    Refer to electronic medical record (EMR) for past medical history and medications.      ROS: 14 point review of systems was negative except the symptoms listed above in the HPI.        OBJECTIVE:   GENERAL: healthy, alert and no distress.  HEENT: PERRL. Conjunctiva, sclera clear.  SKIN: Watson Skin Type - II.  Scalp, Face, Neck, Hands, Feet, Fingers and Toes  examined. The dermatoscope was used to help evaluate pigmented lesions.  Skin Pertinent Findings:  Scalp - erythematous plaques on the scalp  Hands  - scattered small scaly plaques wrist  Left lateral leg- 3 cm erythematous plaque                           Name: Triamcinolone acetonide (Kenalog) injection.  Indication: as above  Location(s): * as above  Completed by: Harika Bell MD  Note: Prior to the procedure, we discussed possible hypo- and hyperpigmentation or depression of the skin post-procedure. Explained that more then one injection, up to six injections, spaced 4-6 weeks apart may be necessary.    Skin was cleansed with alcohol.  The solution was injected intralesionally into the lesion(s).  Total injected:  0.8 mL = 8 mg.    Blanching was present during the injection. Minimal bleeding occurred. Patient left in satisfactory condition.  Treatment number: 6          ASSESSMENT:     Encounter Diagnosis   Name Primary?     Psoriasis Yes     MDM: will reassess management of her psoriasis in 3 months , if not controlled with current treatment then recheck sooner    PLAN:   Patient Instructions   Recheck in 3 months                          Further decisions regarding methotrexate depending on the control of your psoriasis.  Buttocks , groin  and back of knee     Triamcinolone 0.025 % cream apply continue to apply two times per day for 2 weeks then three times per week .     Scalp :     Betamethasone diproprinate 0.05% lotion apply to affected areas on the scalp once per day       Hands      Betamethasone diproprinate 0.05% ointment and calcipotriene apply two times per day

## 2021-06-09 ENCOUNTER — OFFICE VISIT (OUTPATIENT)
Dept: FAMILY MEDICINE | Facility: CLINIC | Age: 61
End: 2021-06-09
Payer: COMMERCIAL

## 2021-06-09 VITALS — SYSTOLIC BLOOD PRESSURE: 122 MMHG | DIASTOLIC BLOOD PRESSURE: 64 MMHG

## 2021-06-09 DIAGNOSIS — L40.9 PSORIASIS: Primary | ICD-10-CM

## 2021-06-09 PROCEDURE — 99213 OFFICE O/P EST LOW 20 MIN: CPT | Mod: 25 | Performed by: FAMILY MEDICINE

## 2021-06-09 PROCEDURE — 11900 INJECT SKIN LESIONS </W 7: CPT | Performed by: FAMILY MEDICINE

## 2021-06-09 RX ORDER — TRIAMCINOLONE ACETONIDE 0.25 MG/G
OINTMENT TOPICAL 2 TIMES DAILY
Qty: 80 G | Refills: 3 | Status: SHIPPED | OUTPATIENT
Start: 2021-06-09 | End: 2022-10-05

## 2021-06-09 NOTE — LETTER
6/9/2021         RE: Connie Brunson  [address] [facility] [address]        Dear Colleague,    Thank you for referring your patient, Connie Brunson, to the [facility] [place]. Please see a copy of my visit note below.    [facility] - PRIMARY CARE SKIN    CC:psoriasis  SUBJECTIVE:   Connie Brunson is a(n) 60 year old female who presents to clinic today because of the following issue(s) ;     Issue one : follow up of her psoriasis that involves buttocks, scalp , just started last winter ,occasional patches on the hands. She initially had IL injections with steroid to the plaques on the scalp, hands and feet. This was very helpful and she would like to have repeat injections to selected areas instead of starting the methotrexate. The plaques have not recurred on the feet.        Since the last visit she quit her job which was very stressful and her mother in law passed away. She did not start the methotrexate. She has persisting plaques on the scalp, wrist and left lower leg.       Response to current treatment : plaques in the knee area has cleared. Scalp has multiple plaques            Current treatment :                         Did not start the methotrexate    Buttocks and back of knee     Triamcinolone 0.025 % cream apply continue to apply two times per day for 2 weeks then three times per week . The knee plaque has resolved.    Scalp and feet     Betamethasone diproprinate 0.05% lotion apply to affected areas on the scalp once per day       nizoral shampoo  Hands      Betamethasone diproprinate 0.05% ointment and calcipotriene apply two times per day           Personal Medical History  Skin Cancer: NO  Eczema Psoriasis Lupus   NO YES NO   Other:     Family Medical History  Skin Cancer: NO  Eczema Psoriasis Lupus   NO NO NO   Other: .        Occupation: indoor    Refer to electronic medical record (EMR) for past medical history and medications.      ROS: 14 point  review of systems was negative except the symptoms listed above in the HPI.        OBJECTIVE:   GENERAL: healthy, alert and no distress.  HEENT: PERRL. Conjunctiva, sclera clear.  SKIN: Watson Skin Type - II.  Scalp, Face, Neck, Hands, Feet, Fingers and Toes examined. The dermatoscope was used to help evaluate pigmented lesions.  Skin Pertinent Findings:  Scalp - erythematous plaques on the scalp  Hands  - scattered small scaly plaques wrist  Left lateral leg- 3 cm erythematous plaque                           Name: Triamcinolone acetonide (Kenalog) injection.  Indication: as above  Location(s): * as above  Completed by: Harika Bell MD  Note: Prior to the procedure, we discussed possible hypo- and hyperpigmentation or depression of the skin post-procedure. Explained that more then one injection, up to six injections, spaced 4-6 weeks apart may be necessary.    Skin was cleansed with alcohol.  The solution was injected intralesionally into the lesion(s).  Total injected:  0.8 mL = 8 mg.    Blanching was present during the injection. Minimal bleeding occurred. Patient left in satisfactory condition.  Treatment number: 6          ASSESSMENT:     Encounter Diagnosis   Name Primary?     Psoriasis Yes     MDM: will reassess management of her psoriasis in 3 months , if not controlled with current treatment then recheck sooner    PLAN:   Patient Instructions   Recheck in 3 months                          Further decisions regarding methotrexate depending on the control of your psoriasis.  Buttocks , groin  and back of knee     Triamcinolone 0.025 % cream apply continue to apply two times per day for 2 weeks then three times per week .     Scalp :     Betamethasone diproprinate 0.05% lotion apply to affected areas on the scalp once per day       Hands      Betamethasone diproprinate 0.05% ointment and calcipotriene apply two times per day                Again, thank you for allowing me to participate in the care of  your patient.        Sincerely,        Jeanette Bell MD

## 2021-06-09 NOTE — PATIENT INSTRUCTIONS
Recheck in 3 months                          Further decisions regarding methotrexate depending on the control of your psoriasis.  Buttocks , groin  and back of knee     Triamcinolone 0.025 % cream apply continue to apply two times per day for 2 weeks then three times per week .     Scalp :     Betamethasone diproprinate 0.05% lotion apply to affected areas on the scalp once per day       Hands      Betamethasone diproprinate 0.05% ointment and calcipotriene apply two times per day

## 2021-06-24 ENCOUNTER — MYC MEDICAL ADVICE (OUTPATIENT)
Dept: FAMILY MEDICINE | Facility: CLINIC | Age: 61
End: 2021-06-24

## 2021-06-24 NOTE — TELEPHONE ENCOUNTER
Please see [employer] message and advise.      Thank you,  Cherry DIEHLRN BSN  [facility]  [phone]

## 2021-08-18 ENCOUNTER — MYC MEDICAL ADVICE (OUTPATIENT)
Dept: FAMILY MEDICINE | Facility: CLINIC | Age: 61
End: 2021-08-18

## 2021-08-18 DIAGNOSIS — C7A.8 PRIMARY MALIGNANT NEUROENDOCRINE NEOPLASM OF ILEUM (H): ICD-10-CM

## 2021-08-18 NOTE — TELEPHONE ENCOUNTER
Please see my chart message below     Please review and advise     Thank you     Samantha Escoto RN, BSN  [place]

## 2021-08-20 RX ORDER — DIAZEPAM 10 MG
TABLET ORAL
Qty: 4 TABLET | Refills: 1 | Status: SHIPPED | OUTPATIENT
Start: 2021-08-20 | End: 2022-09-08

## 2021-08-31 ENCOUNTER — LAB (OUTPATIENT)
Dept: INFUSION THERAPY | Facility: CLINIC | Age: 61
End: 2021-08-31
Attending: INTERNAL MEDICINE
Payer: MEDICAID

## 2021-08-31 ENCOUNTER — HOSPITAL ENCOUNTER (OUTPATIENT)
Dept: CT IMAGING | Facility: CLINIC | Age: 61
Discharge: HOME | End: 2021-08-31
Attending: INTERNAL MEDICINE | Admitting: INTERNAL MEDICINE
Payer: MEDICAID

## 2021-08-31 ENCOUNTER — HOSPITAL ENCOUNTER (OUTPATIENT)
Dept: MRI IMAGING | Facility: CLINIC | Age: 61
Discharge: HOME | End: 2021-08-31
Attending: INTERNAL MEDICINE | Admitting: INTERNAL MEDICINE
Payer: MEDICAID

## 2021-08-31 DIAGNOSIS — C7A.8 PRIMARY MALIGNANT NEUROENDOCRINE NEOPLASM OF ILEUM (H): ICD-10-CM

## 2021-08-31 DIAGNOSIS — L40.9 PSORIASIS: ICD-10-CM

## 2021-08-31 DIAGNOSIS — R91.8 PULMONARY NODULES: ICD-10-CM

## 2021-08-31 LAB
ALBUMIN SERPL-MCNC: 4 G/DL (ref 3.4–5)
ALP SERPL-CCNC: 39 U/L (ref 40–150)
ALT SERPL W P-5'-P-CCNC: 33 U/L (ref 0–50)
ANION GAP SERPL CALCULATED.3IONS-SCNC: 5 MMOL/L (ref 3–14)
AST SERPL W P-5'-P-CCNC: 20 U/L (ref 0–45)
BASOPHILS # BLD AUTO: 0.1 10E3/UL (ref 0–0.2)
BASOPHILS NFR BLD AUTO: 1 %
BILIRUB SERPL-MCNC: 0.4 MG/DL (ref 0.2–1.3)
BUN SERPL-MCNC: 9 MG/DL (ref 7–30)
CALCIUM SERPL-MCNC: 9.4 MG/DL (ref 8.5–10.1)
CHLORIDE BLD-SCNC: 100 MMOL/L (ref 94–109)
CO2 SERPL-SCNC: 29 MMOL/L (ref 20–32)
CREAT SERPL-MCNC: 0.64 MG/DL (ref 0.52–1.04)
EOSINOPHIL # BLD AUTO: 0.3 10E3/UL (ref 0–0.7)
EOSINOPHIL NFR BLD AUTO: 4 %
ERYTHROCYTE [DISTWIDTH] IN BLOOD BY AUTOMATED COUNT: 12.8 % (ref 10–15)
GFR SERPL CREATININE-BSD FRML MDRD: >90 ML/MIN/1.73M2
GLUCOSE BLD-MCNC: 103 MG/DL (ref 70–99)
HCT VFR BLD AUTO: 41.9 % (ref 35–47)
HGB BLD-MCNC: 14.6 G/DL (ref 11.7–15.7)
IMM GRANULOCYTES # BLD: 0 10E3/UL
IMM GRANULOCYTES NFR BLD: 1 %
LYMPHOCYTES # BLD AUTO: 1.6 10E3/UL (ref 0.8–5.3)
LYMPHOCYTES NFR BLD AUTO: 19 %
MCH RBC QN AUTO: 33.6 PG (ref 26.5–33)
MCHC RBC AUTO-ENTMCNC: 34.8 G/DL (ref 31.5–36.5)
MCV RBC AUTO: 96 FL (ref 78–100)
MONOCYTES # BLD AUTO: 0.6 10E3/UL (ref 0–1.3)
MONOCYTES NFR BLD AUTO: 7 %
NEUTROPHILS # BLD AUTO: 5.8 10E3/UL (ref 1.6–8.3)
NEUTROPHILS NFR BLD AUTO: 68 %
NRBC # BLD AUTO: 0 10E3/UL
NRBC BLD AUTO-RTO: 0 /100
PLATELET # BLD AUTO: 309 10E3/UL (ref 150–450)
POTASSIUM BLD-SCNC: 3.4 MMOL/L (ref 3.4–5.3)
PROT SERPL-MCNC: 7.6 G/DL (ref 6.8–8.8)
RBC # BLD AUTO: 4.35 10E6/UL (ref 3.8–5.2)
SODIUM SERPL-SCNC: 134 MMOL/L (ref 133–144)
WBC # BLD AUTO: 8.4 10E3/UL (ref 4–11)

## 2021-08-31 PROCEDURE — 36415 COLL VENOUS BLD VENIPUNCTURE: CPT

## 2021-08-31 PROCEDURE — 74183 MRI ABD W/O CNTR FLWD CNTR: CPT

## 2021-08-31 PROCEDURE — 255N000002 HC RX 255 OP 636: Performed by: INTERNAL MEDICINE

## 2021-08-31 PROCEDURE — 71250 CT THORAX DX C-: CPT

## 2021-08-31 PROCEDURE — 86316 IMMUNOASSAY TUMOR OTHER: CPT | Performed by: INTERNAL MEDICINE

## 2021-08-31 PROCEDURE — A9585 GADOBUTROL INJECTION: HCPCS | Performed by: INTERNAL MEDICINE

## 2021-08-31 PROCEDURE — 85025 COMPLETE CBC W/AUTO DIFF WBC: CPT | Performed by: FAMILY MEDICINE

## 2021-08-31 PROCEDURE — 82040 ASSAY OF SERUM ALBUMIN: CPT | Performed by: FAMILY MEDICINE

## 2021-08-31 RX ORDER — GADOBUTROL 604.72 MG/ML
7.5 INJECTION INTRAVENOUS ONCE
Status: COMPLETED | OUTPATIENT
Start: 2021-08-31 | End: 2021-08-31

## 2021-08-31 RX ADMIN — GADOBUTROL 7.5 ML: 604.72 INJECTION INTRAVENOUS at 10:31

## 2021-08-31 NOTE — PROGRESS NOTES
Nursing Note:  Connie Brunson presents today for PIV labs.    Patient seen by provider today: No   present during visit today: Not Applicable.    Note: #20 gu PIV inserted for CT and MRI after labs done.    Intravenous Access:  Lab draw site, Needle type PIV, Gauge 20.  Labs drawn without difficulty.  Peripheral IV placed.    Discharge Plan:   Patient was sent to CT  appointment.    Bell Cotton RN

## 2021-09-02 LAB — CGA SERPL-MCNC: 261 NG/ML

## 2021-09-07 ENCOUNTER — ONCOLOGY VISIT (OUTPATIENT)
Dept: ONCOLOGY | Facility: CLINIC | Age: 61
End: 2021-09-07
Attending: INTERNAL MEDICINE
Payer: COMMERCIAL

## 2021-09-07 VITALS
OXYGEN SATURATION: 97 % | SYSTOLIC BLOOD PRESSURE: 151 MMHG | TEMPERATURE: 97.2 F | WEIGHT: 165.5 LBS | HEART RATE: 82 BPM | BODY MASS INDEX: 33.43 KG/M2 | DIASTOLIC BLOOD PRESSURE: 92 MMHG | RESPIRATION RATE: 16 BRPM

## 2021-09-07 DIAGNOSIS — C7A.8 PRIMARY MALIGNANT NEUROENDOCRINE NEOPLASM OF ILEUM (H): Primary | ICD-10-CM

## 2021-09-07 PROCEDURE — 99213 OFFICE O/P EST LOW 20 MIN: CPT | Performed by: INTERNAL MEDICINE

## 2021-09-07 ASSESSMENT — PAIN SCALES - GENERAL: PAINLEVEL: NO PAIN (0)

## 2021-09-07 NOTE — LETTER
9/7/2021         RE: Connie Brunson  [address] [facility] [address]        Dear Colleague,    Thank you for referring your patient, Connie Brunson, to the [facility]. Please see a copy of my visit note below.    [facility]    Hematology/Oncology Established Patient Note      Today's Date: 9/07/21    Reason for Follow-up: neuroendocrine tumor of the ileum      HISTORY OF PRESENT ILLNESS: Connie Brunson is a 60 year old female with PMHx of HTN, HLD, depression, who presents with neuroendocrine tumor of the ileum.  In October 2017, she had a CT abdomen/pelvis done due to lower abdominal pain related to sigmoid diverticulitis and loose stools.  She had been having diarrhea for ~3 years.  It showed an incidental finding of an enhancing 1.3 cm intraluminal nodule in the distal ileum.  There was comment on radiology report on CT enterography on 11/2/17 that the nodule, in retrospect, appears to be present and stable since an older CT from 10/27/14.  On 11/7/17, she underwent a segmental small bowel resection with primary anastomosis by Dr. Cristobal.  Pathology showed a 1.5 cm tumor in the ileum, well-differentiated neuroendocrine tumor, grade 2, mitotic rate 410 HPF, Ki-67 of 5%, 2 of 3 lymph nodes were involved, stage pT3N1 (stage IIIB).        INTERIM HISTORY: Connie is doing well.  She developed psoriasis, and is considering methotrexate per her dermatologist, but she is reluctant to start it.        REVIEW OF SYSTEMS:   14 point ROS was reviewed and is negative other than as noted above in HPI.       HOME MEDICATIONS:  Current Outpatient Medications   Medication Sig Dispense Refill     augmented betamethasone dipropionate (DIPROLENE-AF) 0.05 % external cream Apply topically 2 times daily To affected area on hands and feet for 3-4 weeks. Do not use on face or body folds. 100 g 3     betamethasone dipropionate (DIPROSONE) 0.05 % external lotion  Apply topically 2 times daily To affected area on scalp for 4-6 weeks. Do not use on face or body folds. 60 mL 4     calcipotriene (DOVONOX) 0.005 % external ointment Apply to hands and feet BID when flared. 120 g 3     cetirizine (ZYRTEC) 10 MG tablet Take 10 mg by mouth every evening       diazepam (VALIUM) 10 MG tablet Take 1-2  tab or 10-20 mg 30-60 minutes prior to MRI scan. 4 tablet 1     fenofibrate (TRIGLIDE/LOFIBRA) 160 MG tablet Take 1 tablet (160 mg) by mouth daily 90 tablet 3     fluticasone (FLONASE) 50 MCG/ACT nasal spray Spray 2 sprays into both nostrils daily 48 g 5     folic acid (FOLVITE) 1 MG tablet Take 1 tablet (1 mg) by mouth daily Except the day you take the methotrexate 100 tablet 3     lisinopril (ZESTRIL) 10 MG tablet Take 1 tablet (10 mg) by mouth daily 90 tablet 3     mometasone (ELOCON) 0.1 % cream Apply sparingly to affected area twice daily for 14 days.  Do not apply to face. 15 g 3     omeprazole (PRILOSEC) 20 MG DR capsule Take 1 capsule (20 mg) by mouth daily 90 capsule 3     rosuvastatin (CRESTOR) 5 MG tablet Take 1 tablet (5 mg) by mouth daily 90 tablet 3     triamcinolone (ARISTOCORT HP) 0.5 % external cream Apply topically 2 times daily To areas of eczema or psoriasis - do NOT use on face or genitals 15 g 3     triamcinolone (KENALOG) 0.025 % external ointment Apply topically 2 times daily Sparingly to buttocks or groin  for no more than 2 weeks 80 g 3     triamterene-HCTZ (DYAZIDE) 37.5-25 MG capsule Take 1 capsule by mouth daily 90 capsule 3     venlafaxine (EFFEXOR) 37.5 MG tablet Take 1 tablet (37.5 mg) by mouth daily 90 tablet 1         ALLERGIES:  Allergies   Allergen Reactions     Atorvastatin Nausea     Nausea and abd pain      Ceftin GI Disturbance     Stomach upset and bloating - given at same time as clindamycin ? Which one as cause.       Clindamycin GI Disturbance     Stomach upset and bloating - given at same time as ceftin, ? Which one as cause      Flagyl  [Metronidazole]      immediate migraine headache      Morphine Itching     Severe with nose, facial  Swelling - oxycodone = ok /no problems      Penicillins Hives     Sulfa Drugs Rash     Severe red , flushed rash     Levaquin Rash     States she can take cipro and avelox         PAST MEDICAL HISTORY:  Past Medical History:   Diagnosis Date     Allergic rhinitis, cause unspecified     year round - dog,dust-  Failed on claritin, claritin-D, zyrtec and zyrtec-D in past.      Benign neoplasm of cerebral meninges (H) 1999    has yearly MRI's. - sees Dr. 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 Thomas ENT      Cancer (H)      Depressive disorder      Deviated nasal septum     sees Dr. Thomas ENT      Diverticulosis of colon (without mention of hemorrhage) 02-     History of Guillain-Oakley syndrome     NO FLU SHOTS - very mild case in [place] many years ago     Hyperlipidemia LDL goal < 130 doesn't want statins    simvastatin = severe hand joint pain , lipitor =nausea/abd. pain     Hypertension goal BP (blood pressure) < 140/90      Insomnia     trazodone -made pt feel hung over      Major depressive disorder, recurrent episode, moderate (H)     lexapro - sexual side effects      Moderate major depression (H) 2/4/2005    trazodone -made pt feel hung over      Other acne      Other extrapyramidal disease and abnormal movement disorder     ?GBS     Symptomatic menopausal or female climacteric states     vivelle-dot          PAST SURGICAL HISTORY:  Past Surgical History:   Procedure Laterality Date     ABDOMEN SURGERY       BIOPSY       C LIGATE FALLOPIAN TUBE  1988    Tubal Ligation     C TOTAL ABDOM HYSTERECTOMY  2000 ?    Hysterectomy, Total Abdominal with BSO     COLONOSCOPY  1/16/2012    Procedure:COLONOSCOPY; Colonoscopy; Surgeon:SHOLA JOYCE; Location:[facility]     HC REMOVE TONSILS/ADENOIDS,<13 Y/O      T  and A, under 12 yrs     HEAD & NECK SURGERY       LAPAROSCOPIC CHOLECYSTECTOMY WITH CHOLANGIOGRAMS N/A 2016    Procedure: LAPAROSCOPIC CHOLECYSTECTOMY WITH CHOLANGIOGRAMS;  Surgeon: Rosa M Cristobal MD;  Location: [facility] OR     LAPAROSCOPY DIAGNOSTIC (GENERAL) N/A 2017    Procedure: LAPAROSCOPY DIAGNOSTIC (GENERAL);  Exploratory Laparoscopy, Laparotomy and Small Bowel resection.;  Surgeon: Rosa M Cristobal MD;  Location: [facility] OR     LAPAROTOMY EXPLORATORY N/A 2017    Procedure: LAPAROTOMY EXPLORATORY;;  Surgeon: Rosa M Cristobal MD;  Location: [facility] OR     RESECT SMALL BOWEL WITHOUT OSTOMY N/A 2017    Procedure: RESECT SMALL BOWEL WITHOUT OSTOMY;;  Surgeon: Rosa M Cristobal MD;  Location: [facility] OR     SURGICAL HISTORY OF -       Menigioma excision     ZZC NONSPECIFIC PROCEDURE      PAROTID TUMOR L SIDE OF NECK - BENIGN         SOCIAL HISTORY:  Social History     Socioeconomic History     Marital status:      Spouse name: Perry Brunson      Number of children: 3     Years of education: 15     Highest education level: Not on file   Occupational History     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      Employer: [employer]   Tobacco Use     Smoking status: Current Every Day Smoker     Packs/day: 0.00     Years: 5.00     Pack years: 0.00     Types: Cigarettes     Last attempt to quit: 1994     Years since quittin.7     Smokeless tobacco: Former User     Tobacco comment: 11/3/17 - occ e-cig use   Substance and Sexual Activity     Alcohol use: Yes     Alcohol/week: 0.0 standard drinks     Comment: 3-5 drinks per week     Drug use: No     Sexual activity: Yes     Partners: Male     Birth control/protection: Post-menopausal     Comment: tubal ligation-    Other Topics Concern      Service No     Blood Transfusions No     Caffeine Concern Yes     Comment: 2-3 cups coffee in am     Occupational Exposure Not Asked     Hobby Hazards  Not Asked     Sleep Concern Not Asked     Stress Concern Not Asked     Weight Concern Not Asked     Special Diet Not Asked     Back Care Not Asked     Exercise No     Comment: regular walking 4x/week      Bike Helmet Not Asked     Seat Belt Yes     Comment: always     Self-Exams Yes     Comment: SBE encouraged monthly     Parent/sibling w/ CABG, MI or angioplasty before 65F 55M? No   Social History Narrative    calcium - taking 500mg po qday - increase to Calcium - 1000-1200mg/day    flex sig/colonoscopy -at age 48    sun precautions - discussed    mammogram - yearly    Td booster - 1991 per our records - pt will call [facility]     pneumovax -at age 58    DEXA -this year- 2003    stool hemoccults - every year after age 40    ASA- start 81 mg ASA qday.    mulvitamin - encouraged    doing citrucel qday         from second , Dan [employer]. Now back together with her first  and father of their  children, Perry Brunson - fall 2011.      Social Determinants of Health     Financial Resource Strain:      Difficulty of Paying Living Expenses:    Food Insecurity:      Worried About Running Out of Food in the Last Year:      Ran Out of Food in the Last Year:    Transportation Needs:      Lack of Transportation (Medical):      Lack of Transportation (Non-Medical):    Physical Activity:      Days of Exercise per Week:      Minutes of Exercise per Session:    Stress:      Feeling of Stress :    Social Connections:      Frequency of Communication with Friends and Family:      Frequency of Social Gatherings with Friends and Family:      Attends Yarsani Services:      Active Member of Clubs or Organizations:      Attends Club or Organization Meetings:      Marital Status:    Intimate Partner Violence:      Fear of Current or Ex-Partner:      Emotionally Abused:      Physically Abused:      Sexually Abused:          FAMILY HISTORY:  Family History   Problem Relation Age of Onset     Hyperlipidemia  Mother      Thyroid Disease Mother      Cancer Father         lung     Hypertension Father      Hyperlipidemia Father      Thyroid Disease Brother      Diabetes Maternal Grandmother      Cerebrovascular Disease Maternal Grandmother      Substance Abuse Maternal Grandfather      Thyroid Disease Son      Her father  of lung cancer and paranasal cancer at around age 65.    She has 3 children.      PHYSICAL EXAM:  Vital signs:  BP (!) 151/92   Pulse 82   Temp 97.2  F (36.2  C) (Tympanic)   Resp 16   Wt 75.1 kg (165 lb 8 oz)   LMP 2000   SpO2 97%   BMI 33.43 kg/m     ECO  GENERAL/CONSTITUTIONAL: No acute distress.  EYES: No scleral icterus.  NEUROLOGIC: Alert, oriented, answers questions appropriately.  INTEGUMENTARY: No jaundice. Psoriatic lesions on left wrist, legs.  GAIT: Steady, does not use assistive device        LABS:  CBC RESULTS: Recent Labs   Lab Test 21   WBC 8.4   RBC 4.35   HGB 14.6   HCT 41.9   MCV 96   MCH 33.6*   MCHC 34.8   RDW 12.8        Recent Labs   Lab Test 21  0952 20  0750    137   POTASSIUM 3.4 3.6   CHLORIDE 100 103   CO2 29 28   ANIONGAP 5 6   * 92   BUN 9 13   CR 0.64 0.73   FADI 9.4 9.7     Lab Results   Component Value Date    AST 20 2021    AST 22 2020     Lab Results   Component Value Date    ALT 33 2021    ALT 30 2020     No results found for: BILICONJ   Lab Results   Component Value Date    BILITOTAL 0.4 2021    BILITOTAL 0.3 2020     Lab Results   Component Value Date    ALBUMIN 4.0 2021    ALBUMIN 4.0 2020     Lab Results   Component Value Date    PROTTOTAL 7.6 2021    PROTTOTAL 7.8 2020      Lab Results   Component Value Date    ALKPHOS 39 2021    ALKPHOS 45 2020         Chromogranin A 20: 241  21: 261      IMAGING:  CT chest 21:  1.  Resolved groundglass pulmonary opacities previously demonstrated  on 2020.  2.  Stable small  pulmonary nodule at the right upper lobe. This is  also present on a CT from 12/8/2017 suggesting a nonaggressive  etiology. No new significant abnormality is seen.  3.  Hepatic steatosis.       MRI abdomen 8/31/21:  1. No new worrisome hepatic lesion or other abnormality identified.  2. Hepatic steatosis.        ASSESSMENT/PLAN:  Connie Brunson is a 60 year old female with:    1) Neuroendocrine tumor of the ileum: s/p resection on 11/7/17, 1.5 cm, grade 2, well-differentiated, T3N1 (stage IIIB).      Recent MRI abdomen and CT scan reviewed with Connie.  MRI abdomen is negative, with no evidence of metastatic disease.  CT chest shows stable pulmonary nodule.  She has hepatic steatosis.  I reviewed the imaging and reports.    Chromogranin A has remained abnormal, although stable.  Her symptoms remain the same.  Her imaging remains clear.  Since her imaging is negative, we will continue to observe for now.      We will continue annual follow-up's until at least 5 years.    -she will call if she gets worsening diarrhea or flushing symptoms or pain and can get labs/scans done earlier.    -labs (CBC, CMP, chromogranin A) and CT chest and MRI abdomen in 1 year  -RTC in 1 year  -we will discuss next year whether to continue routine annual imaging or space out further to every other year or if only symptoms occur.    2) HTN, HLD  -follow-up with PCP    3) History of meningioma: diagnosed in 1999, was getting yearly MRI's with neurosurgery, but was told she does not need surveillance MRI's anymore unless she has new neurologic symptoms.    4) Pulmonary nodule: There is an indeterminate 2 x 3 mm anterior right mid-lung nodule seen on CT.  She does have history of smoking.  Recent CT chest shows that the tiny nodule is stable.  It has been stable since 2017.    5) Psoriasis:  -follows with dermatology  -she considered methotrexate, but is reluctant to start it and wants to wait      Mary Weir  MD  Hematology/Oncology  [facility]    Total time spent on day of visit, including review of tests, obtaining/reviewing separately obtained history, ordering medications/tests/procedures, communicating with PCP/consultants, and documenting in electronic medical record: 25 minutes        Again, thank you for allowing me to participate in the care of your patient.        Sincerely,        Mary Weir MD

## 2021-09-07 NOTE — NURSING NOTE
"Oncology Rooming Note    September 7, 2021 7:57 AM   Connie Brunson is a 60 year old female who presents for:    Chief Complaint   Patient presents with     Oncology Clinic Visit     Primary malignant neuroendocrine neoplasm of ileum      Initial Vitals: BP (!) 151/92   Pulse 82   Temp 97.2  F (36.2  C) (Tympanic)   Resp 16   Wt 75.1 kg (165 lb 8 oz)   LMP 09/05/2000   SpO2 97%   BMI 33.43 kg/m   Estimated body mass index is 33.43 kg/m  as calculated from the following:    Height as of 1/29/21: 1.499 m (4' 11\").    Weight as of this encounter: 75.1 kg (165 lb 8 oz). Body surface area is 1.77 meters squared.  No Pain (0) Comment: Data Unavailable   Patient's last menstrual period was 09/05/2000.  Allergies reviewed: Yes  Medications reviewed: Yes    Medications: Medication refills not needed today.  Pharmacy name entered into [employer]:    [facility] [place], [place] - [address] [place] [address]  WRITTEN PRESCRIPTION REQUESTED    Clinical concerns: f/u       Dayana Rose CMA              "

## 2021-09-07 NOTE — PROGRESS NOTES
[facility]    Hematology/Oncology Established Patient Note      Today's Date: 9/07/21    Reason for Follow-up: neuroendocrine tumor of the ileum      HISTORY OF PRESENT ILLNESS: Connie Brunson is a 60 year old female with PMHx of HTN, HLD, depression, who presents with neuroendocrine tumor of the ileum.  In October 2017, she had a CT abdomen/pelvis done due to lower abdominal pain related to sigmoid diverticulitis and loose stools.  She had been having diarrhea for ~3 years.  It showed an incidental finding of an enhancing 1.3 cm intraluminal nodule in the distal ileum.  There was comment on radiology report on CT enterography on 11/2/17 that the nodule, in retrospect, appears to be present and stable since an older CT from 10/27/14.  On 11/7/17, she underwent a segmental small bowel resection with primary anastomosis by Dr. Cristobal.  Pathology showed a 1.5 cm tumor in the ileum, well-differentiated neuroendocrine tumor, grade 2, mitotic rate 410 HPF, Ki-67 of 5%, 2 of 3 lymph nodes were involved, stage pT3N1 (stage IIIB).        INTERIM HISTORY: Connie is doing well.  She developed psoriasis, and is considering methotrexate per her dermatologist, but she is reluctant to start it.        REVIEW OF SYSTEMS:   14 point ROS was reviewed and is negative other than as noted above in HPI.       HOME MEDICATIONS:  Current Outpatient Medications   Medication Sig Dispense Refill     augmented betamethasone dipropionate (DIPROLENE-AF) 0.05 % external cream Apply topically 2 times daily To affected area on hands and feet for 3-4 weeks. Do not use on face or body folds. 100 g 3     betamethasone dipropionate (DIPROSONE) 0.05 % external lotion Apply topically 2 times daily To affected area on scalp for 4-6 weeks. Do not use on face or body folds. 60 mL 4     calcipotriene (DOVONOX) 0.005 % external ointment Apply to hands and feet BID when flared. 120 g 3     cetirizine (ZYRTEC) 10 MG tablet Take 10 mg by  mouth every evening       diazepam (VALIUM) 10 MG tablet Take 1-2  tab or 10-20 mg 30-60 minutes prior to MRI scan. 4 tablet 1     fenofibrate (TRIGLIDE/LOFIBRA) 160 MG tablet Take 1 tablet (160 mg) by mouth daily 90 tablet 3     fluticasone (FLONASE) 50 MCG/ACT nasal spray Spray 2 sprays into both nostrils daily 48 g 5     folic acid (FOLVITE) 1 MG tablet Take 1 tablet (1 mg) by mouth daily Except the day you take the methotrexate 100 tablet 3     lisinopril (ZESTRIL) 10 MG tablet Take 1 tablet (10 mg) by mouth daily 90 tablet 3     mometasone (ELOCON) 0.1 % cream Apply sparingly to affected area twice daily for 14 days.  Do not apply to face. 15 g 3     omeprazole (PRILOSEC) 20 MG DR capsule Take 1 capsule (20 mg) by mouth daily 90 capsule 3     rosuvastatin (CRESTOR) 5 MG tablet Take 1 tablet (5 mg) by mouth daily 90 tablet 3     triamcinolone (ARISTOCORT HP) 0.5 % external cream Apply topically 2 times daily To areas of eczema or psoriasis - do NOT use on face or genitals 15 g 3     triamcinolone (KENALOG) 0.025 % external ointment Apply topically 2 times daily Sparingly to buttocks or groin  for no more than 2 weeks 80 g 3     triamterene-HCTZ (DYAZIDE) 37.5-25 MG capsule Take 1 capsule by mouth daily 90 capsule 3     venlafaxine (EFFEXOR) 37.5 MG tablet Take 1 tablet (37.5 mg) by mouth daily 90 tablet 1         ALLERGIES:  Allergies   Allergen Reactions     Atorvastatin Nausea     Nausea and abd pain      Ceftin GI Disturbance     Stomach upset and bloating - given at same time as clindamycin ? Which one as cause.       Clindamycin GI Disturbance     Stomach upset and bloating - given at same time as ceftin, ? Which one as cause      Flagyl [Metronidazole]      immediate migraine headache      Morphine Itching     Severe with nose, facial  Swelling - oxycodone = ok /no problems      Penicillins Hives     Sulfa Drugs Rash     Severe red , flushed rash     Levaquin Rash     States she can take cipro and avelox          PAST MEDICAL HISTORY:  Past Medical History:   Diagnosis Date     Allergic rhinitis, cause unspecified     year round - dog,dust-  Failed on claritin, claritin-D, zyrtec and zyrtec-D in past.      Benign neoplasm of cerebral meninges (H) 1999    has yearly MRI's. - [facility]. 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 Thomas ENT      Cancer (H)      Depressive disorder      Deviated nasal septum     sees Dr. Thomas ENT      Diverticulosis of colon (without mention of hemorrhage) 02-     History of Guillain-[place] syndrome     NO FLU SHOTS - very mild case in [place] many years ago     Hyperlipidemia LDL goal < 130 doesn't want statins    simvastatin = severe hand joint pain , lipitor =nausea/abd. pain     Hypertension goal BP (blood pressure) < 140/90      Insomnia     trazodone -made pt feel hung over      Major depressive disorder, recurrent episode, moderate (H)     lexapro - sexual side effects      Moderate major depression (H) 2/4/2005    trazodone -made pt feel hung over      Other acne      Other extrapyramidal disease and abnormal movement disorder     ?GBS     Symptomatic menopausal or female climacteric states     vivelle-dot          PAST SURGICAL HISTORY:  Past Surgical History:   Procedure Laterality Date     ABDOMEN SURGERY       BIOPSY       C LIGATE FALLOPIAN TUBE  1988    Tubal Ligation     C TOTAL ABDOM HYSTERECTOMY  2000 ?    Hysterectomy, Total Abdominal with BSO     COLONOSCOPY  1/16/2012    Procedure:COLONOSCOPY; Colonoscopy; Surgeon:SHOLA JOYCE; Location: GI     HC REMOVE TONSILS/ADENOIDS,<13 Y/O      T and A, under 12 yrs     HEAD & NECK SURGERY       LAPAROSCOPIC CHOLECYSTECTOMY WITH CHOLANGIOGRAMS N/A 4/25/2016    Procedure: LAPAROSCOPIC CHOLECYSTECTOMY WITH CHOLANGIOGRAMS;  Surgeon: Rosa M Cristobal MD;  Location:  OR     LAPAROSCOPY DIAGNOSTIC (GENERAL) N/A  2017    Procedure: LAPAROSCOPY DIAGNOSTIC (GENERAL);  Exploratory Laparoscopy, Laparotomy and Small Bowel resection.;  Surgeon: Rosa M Cristobal MD;  Location: [facility] OR     LAPAROTOMY EXPLORATORY N/A 2017    Procedure: LAPAROTOMY EXPLORATORY;;  Surgeon: Rosa M Cristobal MD;  Location: [facility] OR     RESECT SMALL BOWEL WITHOUT OSTOMY N/A 2017    Procedure: RESECT SMALL BOWEL WITHOUT OSTOMY;;  Surgeon: Rosa M Cristobal MD;  Location: [facility] OR     SURGICAL HISTORY OF -   2004    Menigioma excision     ZZC NONSPECIFIC PROCEDURE      PAROTID TUMOR L SIDE OF NECK - BENIGN         SOCIAL HISTORY:  Social History     Socioeconomic History     Marital status:      Spouse name: Perry Brunson      Number of children: 3     Years of education: 15     Highest education level: Not on file   Occupational History     Occupation: RN and in admin with [employer]      Employer: [employer]   Tobacco Use     Smoking status: Current Every Day Smoker     Packs/day: 0.00     Years: 5.00     Pack years: 0.00     Types: Cigarettes     Last attempt to quit: 1994     Years since quittin.7     Smokeless tobacco: Former User     Tobacco comment: 11/3/17 - occ e-cig use   Substance and Sexual Activity     Alcohol use: Yes     Alcohol/week: 0.0 standard drinks     Comment: 3-5 drinks per week     Drug use: No     Sexual activity: Yes     Partners: Male     Birth control/protection: Post-menopausal     Comment: tubal ligation-    Other Topics Concern      Service No     Blood Transfusions No     Caffeine Concern Yes     Comment: 2-3 cups coffee in am     Occupational Exposure Not Asked     Hobby Hazards Not Asked     Sleep Concern Not Asked     Stress Concern Not Asked     Weight Concern Not Asked     Special Diet Not Asked     Back Care Not Asked     Exercise No     Comment: regular walking 4x/week      Bike Helmet Not Asked     Seat Belt Yes     Comment: always      Self-Exams Yes     Comment: SBE encouraged monthly     Parent/sibling w/ CABG, MI or angioplasty before 65F 55M? No   Social History Narrative    calcium - taking 500mg po qday - increase to Calcium - 1000-1200mg/day    flex sig/colonoscopy -at age 48    sun precautions - discussed    mammogram - yearly    Td booster - 1991 per our records - pt will call [facility]     pneumovax -at age 58    DEXA -this year- 2003    stool hemoccults - every year after age 40    ASA- start 81 mg ASA qday.    mulvitamin - encouraged    doing citrucel qday         from second , [employer]. Now back together with her first  and father of their  children, Perry Brunson - fall 2011.      Social Determinants of Health     Financial Resource Strain:      Difficulty of Paying Living Expenses:    Food Insecurity:      Worried About Running Out of Food in the Last Year:      Ran Out of Food in the Last Year:    Transportation Needs:      Lack of Transportation (Medical):      Lack of Transportation (Non-Medical):    Physical Activity:      Days of Exercise per Week:      Minutes of Exercise per Session:    Stress:      Feeling of Stress :    Social Connections:      Frequency of Communication with Friends and Family:      Frequency of Social Gatherings with Friends and Family:      Attends Church Services:      Active Member of Clubs or Organizations:      Attends Club or Organization Meetings:      Marital Status:    Intimate Partner Violence:      Fear of Current or Ex-Partner:      Emotionally Abused:      Physically Abused:      Sexually Abused:          FAMILY HISTORY:  Family History   Problem Relation Age of Onset     Hyperlipidemia Mother      Thyroid Disease Mother      Cancer Father         lung     Hypertension Father      Hyperlipidemia Father      Thyroid Disease Brother      Diabetes Maternal Grandmother      Cerebrovascular Disease Maternal Grandmother      Substance Abuse Maternal  Grandfather      Thyroid Disease Son      Her father  of lung cancer and paranasal cancer at around age 65.    She has 3 children.      PHYSICAL EXAM:  Vital signs:  BP (!) 151/92   Pulse 82   Temp 97.2  F (36.2  C) (Tympanic)   Resp 16   Wt 75.1 kg (165 lb 8 oz)   LMP 2000   SpO2 97%   BMI 33.43 kg/m     ECO  GENERAL/CONSTITUTIONAL: No acute distress.  EYES: No scleral icterus.  NEUROLOGIC: Alert, oriented, answers questions appropriately.  INTEGUMENTARY: No jaundice. Psoriatic lesions on left wrist, legs.  GAIT: Steady, does not use assistive device        LABS:  CBC RESULTS: Recent Labs   Lab Test 21   WBC 8.4   RBC 4.35   HGB 14.6   HCT 41.9   MCV 96   MCH 33.6*   MCHC 34.8   RDW 12.8        Recent Labs   Lab Test 21  0952 20  0750    137   POTASSIUM 3.4 3.6   CHLORIDE 100 103   CO2 29 28   ANIONGAP 5 6   * 92   BUN 9 13   CR 0.64 0.73   FADI 9.4 9.7     Lab Results   Component Value Date    AST 20 2021    AST 22 2020     Lab Results   Component Value Date    ALT 33 2021    ALT 30 2020     No results found for: BILICONJ   Lab Results   Component Value Date    BILITOTAL 0.4 2021    BILITOTAL 0.3 2020     Lab Results   Component Value Date    ALBUMIN 4.0 2021    ALBUMIN 4.0 2020     Lab Results   Component Value Date    PROTTOTAL 7.6 2021    PROTTOTAL 7.8 2020      Lab Results   Component Value Date    ALKPHOS 39 2021    ALKPHOS 45 2020         Chromogranin A 20: 241  21: 261      IMAGING:  CT chest 21:  1.  Resolved groundglass pulmonary opacities previously demonstrated  on 2020.  2.  Stable small pulmonary nodule at the right upper lobe. This is  also present on a CT from 2017 suggesting a nonaggressive  etiology. No new significant abnormality is seen.  3.  Hepatic steatosis.       MRI abdomen 21:  1. No new worrisome hepatic lesion or other  abnormality identified.  2. Hepatic steatosis.        ASSESSMENT/PLAN:  Connie Brunson is a 60 year old female with:    1) Neuroendocrine tumor of the ileum: s/p resection on 11/7/17, 1.5 cm, grade 2, well-differentiated, T3N1 (stage IIIB).      Recent MRI abdomen and CT scan reviewed with Connie.  MRI abdomen is negative, with no evidence of metastatic disease.  CT chest shows stable pulmonary nodule.  She has hepatic steatosis.  I reviewed the imaging and reports.    Chromogranin A has remained abnormal, although stable.  Her symptoms remain the same.  Her imaging remains clear.  Since her imaging is negative, we will continue to observe for now.      We will continue annual follow-up's until at least 5 years.    -she will call if she gets worsening diarrhea or flushing symptoms or pain and can get labs/scans done earlier.    -labs (CBC, CMP, chromogranin A) and CT chest and MRI abdomen in 1 year  -RTC in 1 year  -we will discuss next year whether to continue routine annual imaging or space out further to every other year or if only symptoms occur.    2) HTN, HLD  -follow-up with PCP    3) History of meningioma: diagnosed in 1999, was getting yearly MRI's with neurosurgery, but was told she does not need surveillance MRI's anymore unless she has new neurologic symptoms.    4) Pulmonary nodule: There is an indeterminate 2 x 3 mm anterior right mid-lung nodule seen on CT.  She does have history of smoking.  Recent CT chest shows that the tiny nodule is stable.  It has been stable since 2017.    5) Psoriasis:  -follows with dermatology  -she considered methotrexate, but is reluctant to start it and wants to wait      Mary Weir MD  Hematology/Oncology  [facility]    Total time spent on day of visit, including review of tests, obtaining/reviewing separately obtained history, ordering medications/tests/procedures, communicating with PCP/consultants, and documenting in electronic medical  record: 25 minutes

## 2021-09-09 NOTE — PATIENT INSTRUCTIONS
Per chart review, appt request order has been deferred until 03/2022 by YOUSIF Bailey RN on 9/9/2021 at 8:17 AM

## 2021-10-12 ENCOUNTER — OFFICE VISIT (OUTPATIENT)
Dept: FAMILY MEDICINE | Facility: CLINIC | Age: 61
End: 2021-10-12
Payer: COMMERCIAL

## 2021-10-12 VITALS
BODY MASS INDEX: 33.47 KG/M2 | TEMPERATURE: 97.3 F | OXYGEN SATURATION: 95 % | HEART RATE: 99 BPM | DIASTOLIC BLOOD PRESSURE: 84 MMHG | WEIGHT: 166 LBS | SYSTOLIC BLOOD PRESSURE: 131 MMHG | HEIGHT: 59 IN

## 2021-10-12 DIAGNOSIS — C7A.8 PRIMARY MALIGNANT NEUROENDOCRINE NEOPLASM OF ILEUM (H): ICD-10-CM

## 2021-10-12 DIAGNOSIS — F41.9 ANXIETY: Primary | ICD-10-CM

## 2021-10-12 PROCEDURE — 99213 OFFICE O/P EST LOW 20 MIN: CPT | Performed by: NURSE PRACTITIONER

## 2021-10-12 RX ORDER — CLOBETASOL PROPIONATE 0.5 MG/G
CREAM TOPICAL
COMMUNITY
Start: 2021-10-02 | End: 2022-05-02

## 2021-10-12 RX ORDER — BETAMETHASONE DIPROPIONATE 0.5 MG/G
CREAM TOPICAL 2 TIMES DAILY
Qty: 100 G | Refills: 3 | Status: CANCELLED | OUTPATIENT
Start: 2021-10-12

## 2021-10-12 RX ORDER — MOMETASONE FUROATE 1 MG/G
CREAM TOPICAL
Qty: 15 G | Refills: 3 | Status: CANCELLED | OUTPATIENT
Start: 2021-10-12

## 2021-10-12 RX ORDER — VENLAFAXINE 37.5 MG/1
37.5 TABLET ORAL DAILY
Qty: 90 TABLET | Refills: 1 | Status: SHIPPED | OUTPATIENT
Start: 2021-10-12 | End: 2022-03-29

## 2021-10-12 RX ORDER — DIAZEPAM 10 MG
TABLET ORAL
Qty: 4 TABLET | Refills: 1 | Status: CANCELLED | OUTPATIENT
Start: 2021-10-12

## 2021-10-12 RX ORDER — FENOFIBRATE 160 MG/1
160 TABLET ORAL DAILY
Qty: 90 TABLET | Refills: 3 | Status: CANCELLED | OUTPATIENT
Start: 2021-10-12

## 2021-10-12 RX ORDER — TRIAMTERENE AND HYDROCHLOROTHIAZIDE 37.5; 25 MG/1; MG/1
1 CAPSULE ORAL DAILY
Qty: 90 CAPSULE | Refills: 3 | Status: CANCELLED | OUTPATIENT
Start: 2021-10-12

## 2021-10-12 RX ORDER — TRIAMCINOLONE ACETONIDE 5 MG/G
CREAM TOPICAL 2 TIMES DAILY
Qty: 15 G | Refills: 3 | Status: CANCELLED | OUTPATIENT
Start: 2021-10-12

## 2021-10-12 RX ORDER — TRIAMCINOLONE ACETONIDE 0.25 MG/G
OINTMENT TOPICAL 2 TIMES DAILY
Qty: 80 G | Refills: 3 | Status: CANCELLED | OUTPATIENT
Start: 2021-10-12

## 2021-10-12 RX ORDER — BETAMETHASONE DIPROPIONATE 0.5 MG/G
LOTION TOPICAL 2 TIMES DAILY
Qty: 60 ML | Refills: 4 | Status: CANCELLED | OUTPATIENT
Start: 2021-10-12

## 2021-10-12 RX ORDER — LISINOPRIL 10 MG/1
10 TABLET ORAL DAILY
Qty: 90 TABLET | Refills: 3 | Status: CANCELLED | OUTPATIENT
Start: 2021-10-12

## 2021-10-12 RX ORDER — FLUTICASONE PROPIONATE 50 MCG
2 SPRAY, SUSPENSION (ML) NASAL DAILY
Qty: 48 G | Refills: 5 | Status: CANCELLED | OUTPATIENT
Start: 2021-10-12

## 2021-10-12 RX ORDER — ROSUVASTATIN CALCIUM 5 MG/1
5 TABLET, COATED ORAL DAILY
Qty: 90 TABLET | Refills: 3 | Status: CANCELLED | OUTPATIENT
Start: 2021-10-12

## 2021-10-12 RX ORDER — CALCIPOTRIENE 50 UG/G
OINTMENT TOPICAL
Qty: 120 G | Refills: 3 | Status: CANCELLED | OUTPATIENT
Start: 2021-10-12

## 2021-10-12 ASSESSMENT — PATIENT HEALTH QUESTIONNAIRE - PHQ9
10. IF YOU CHECKED OFF ANY PROBLEMS, HOW DIFFICULT HAVE THESE PROBLEMS MADE IT FOR YOU TO DO YOUR WORK, TAKE CARE OF THINGS AT HOME, OR GET ALONG WITH OTHER PEOPLE: NOT DIFFICULT AT ALL
SUM OF ALL RESPONSES TO PHQ QUESTIONS 1-9: 2
SUM OF ALL RESPONSES TO PHQ QUESTIONS 1-9: 2

## 2021-10-12 ASSESSMENT — ANXIETY QUESTIONNAIRES
7. FEELING AFRAID AS IF SOMETHING AWFUL MIGHT HAPPEN: NOT AT ALL
7. FEELING AFRAID AS IF SOMETHING AWFUL MIGHT HAPPEN: NOT AT ALL
6. BECOMING EASILY ANNOYED OR IRRITABLE: NOT AT ALL
GAD7 TOTAL SCORE: 0
3. WORRYING TOO MUCH ABOUT DIFFERENT THINGS: NOT AT ALL
8. IF YOU CHECKED OFF ANY PROBLEMS, HOW DIFFICULT HAVE THESE MADE IT FOR YOU TO DO YOUR WORK, TAKE CARE OF THINGS AT HOME, OR GET ALONG WITH OTHER PEOPLE?: NOT DIFFICULT AT ALL
5. BEING SO RESTLESS THAT IT IS HARD TO SIT STILL: NOT AT ALL
GAD7 TOTAL SCORE: 0
GAD7 TOTAL SCORE: 0
2. NOT BEING ABLE TO STOP OR CONTROL WORRYING: NOT AT ALL
1. FEELING NERVOUS, ANXIOUS, OR ON EDGE: NOT AT ALL
4. TROUBLE RELAXING: NOT AT ALL

## 2021-10-12 ASSESSMENT — MIFFLIN-ST. JEOR: SCORE: 1213.6

## 2021-10-12 NOTE — PROGRESS NOTES
"Assessment & Plan     Anxiety  No concerns.  Stable.  Continue same medication this was refilled today.  - venlafaxine (EFFEXOR) 37.5 MG tablet  Dispense: 90 tablet; Refill: 1    Primary malignant neuroendocrine neoplasm of ileum (H)  Follows with oncology.       BMI:   Estimated body mass index is 33.53 kg/m  as calculated from the following:    Height as of this encounter: 1.499 m (4' 11\").    Weight as of this encounter: 75.3 kg (166 lb).     Return in about 3 months (around 2022) for Wellness exam fasting labs.      Emilia Barillas, LORAINE CNP  [facility] PRIOR ANJEL Rosales is a 61 year old who presents for the following health issues     HPI     Depression and Anxiety Follow-Up    How are you doing with your depression since your last visit? Stable    How are you doing with your anxiety since your last visit?  Stable    Are you having other symptoms that might be associated with depression or anxiety? No    Have you had a significant life event? No     Do you have any concerns with your use of alcohol or other drugs? No     Had to complete OV to get her Effexor.   She feels mood overall is good and stable she only remains on Effexor due to horrible side effects when she tried to wean off.    Health maintenance states she is due for colonoscopy; she has history of primary malignant neuroendocrine neoplasm of ileum and follows with oncology. She declines need for FIT or colonoscopy.     Social History     Tobacco Use     Smoking status: Current Every Day Smoker     Packs/day: 1.00     Years: 5.00     Pack years: 5.00     Types: Cigarettes     Last attempt to quit: 1994     Years since quittin.8     Smokeless tobacco: Former User     Tobacco comment: 11/3/17 - occ e-cig use   Substance Use Topics     Alcohol use: Yes     Alcohol/week: 0.0 standard drinks     Comment: 3-5 drinks per week     Drug use: No     PHQ 2019 2021 10/12/2021   PHQ-9 Total Score 1 0 2   Q9: " "Thoughts of better off dead/self-harm past 2 weeks Not at all Not at all Not at all     NAHOMY-7 SCORE 12/9/2019 2/2/2021 10/12/2021   Total Score - - -   Total Score - - 0 (minimal anxiety)   Total Score 0 0 0       How many servings of fruits and vegetables do you eat daily?  4 or more    On average, how many sweetened beverages do you drink each day (Examples: soda, juice, sweet tea, etc.  Do NOT count diet or artificially sweetened beverages)?   0    How many days per week do you exercise enough to make your heart beat faster? Works - up and down stairs    How many minutes a day do you exercise enough to make your heart beat faster? varies    How many days per week do you miss taking your medication? 0    Review of Systems   Constitutional, HEENT, cardiovascular, pulmonary, GI, , musculoskeletal, neuro, skin, endocrine and psych systems are negative, except as otherwise noted in the HPI.      Objective    /84 (BP Location: Right arm, Patient Position: Chair, Cuff Size: Adult Large)   Pulse 99   Temp 97.3  F (36.3  C) (Tympanic)   Ht 1.499 m (4' 11\")   Wt 75.3 kg (166 lb)   LMP 09/05/2000   SpO2 95%   Breastfeeding No   BMI 33.53 kg/m    Body mass index is 33.53 kg/m .  Physical Exam   GENERAL: healthy, alert and no distress  RESP: lungs clear to auscultation - no rales, rhonchi or wheezes  CV: regular rate and rhythm, normal S1 S2, no S3 or S4, no murmur, click or rub, no peripheral edema and peripheral pulses strong  PSYCH: mentation appears normal, affect normal/bright          Answers for HPI/ROS submitted by the patient on 10/12/2021  If you checked off any problems, how difficult have these problems made it for you to do your work, take care of things at home, or get along with other people?: Not difficult at all  PHQ9 TOTAL SCORE: 2  NAHOMY 7 TOTAL SCORE: 0      "

## 2021-10-12 NOTE — PATIENT INSTRUCTIONS
Patient Education     Ganglion Cyst: Hand  A ganglion cyst is a firm, fluid-filled lump that can suddenly appear on the front or back of the wrist or at the base of a finger. They are the most common type of mass or lump on the hand. These cysts grow from normal tissue in the wrist and fingers and range in size from a pea to a peach pit. Although ganglion cysts are common, they don t spread, and they don t become cancerous. They can occur after an injury, but many times it isn t known why they grow. Ganglion cysts can change in size, and may go away on their own.     What are the symptoms of a ganglion cyst?   A ganglion cyst is sometimes painful, especially when it first occurs. Constantly using your hand or wrist can make the cyst enlarge and hurt more. Some hand and wrist movements, such as grasping things, may also be difficult.   How does a ganglion cyst develop?  Your wrist and hand are made up of many small bones that meet at joints. Tendons attach muscles to the bones at the joints. The tendons allow the joints to bend and straighten. Both tendons and joints are lined with tissue called synovium. This tissue makes a thick fluid that keeps the joints and tendons moving easily. Sometimes the tissue balloons out from the joint or tendons and forms a cyst. As the cyst fills with fluid and grows, it appears as a lump you can feel.   Where do ganglion cysts occur?  A ganglion cyst can occur anywhere on the hand near a joint. Cysts most commonly appear on the back or palm side of the wrist, or on the palm at the base of a finger. Your doctor can usually diagnose a cyst by examining the lump. He or she may draw off a little fluid and order an X-ray to rule out other problems.   How is a ganglion cyst treated?  Your healthcare provider may just watch your ganglion cyst. Many shrink and become painless without treatment. Some disappear altogether. If the cyst is unsightly or painful, or makes it hard for you to use  your hand, your healthcare provider can treat it or, if needed, remove it surgically.   Nonsurgical treatment  To shrink the cyst, your provider may remove (aspirate) the fluid with a needle. If the cyst hurts, your provider may also give you an injection of an anti-inflammatory, such as cortisone, to relieve the irritation. Your hand may then be wrapped to help keep the cyst from recurring.   Surgery  If the cyst reappears after treatment, your healthcare provider may remove it surgically. A section of the tissue that lines the joint or tendon is removed along with the cyst. This helps prevent another cyst from forming, although recurrence of the cyst is still possible after surgery. Usually, only your hand or arm is numbed, and you can go home a few hours after surgery. Your hand may be in a splint for several days. You can usually go back to your normal activities 2 to6 weeks after surgery.   Rylee last reviewed this educational content on 10/1/2019    2802-5422 [employer]. All rights reserved. This information is not intended as a substitute for professional medical care. Always follow your healthcare professional's instructions.

## 2021-10-13 ASSESSMENT — PATIENT HEALTH QUESTIONNAIRE - PHQ9: SUM OF ALL RESPONSES TO PHQ QUESTIONS 1-9: 2

## 2021-10-13 ASSESSMENT — ANXIETY QUESTIONNAIRES: GAD7 TOTAL SCORE: 0

## 2021-11-21 ENCOUNTER — MYC MEDICAL ADVICE (OUTPATIENT)
Dept: DERMATOLOGY | Facility: CLINIC | Age: 61
End: 2021-11-21
Payer: COMMERCIAL

## 2021-11-21 ENCOUNTER — MYC MEDICAL ADVICE (OUTPATIENT)
Dept: FAMILY MEDICINE | Facility: CLINIC | Age: 61
End: 2021-11-21
Payer: COMMERCIAL

## 2021-11-21 DIAGNOSIS — F17.210 CIGARETTE NICOTINE DEPENDENCE WITHOUT COMPLICATION: Primary | ICD-10-CM

## 2021-11-24 ENCOUNTER — VIRTUAL VISIT (OUTPATIENT)
Dept: DERMATOLOGY | Facility: CLINIC | Age: 61
End: 2021-11-24
Payer: COMMERCIAL

## 2021-11-24 DIAGNOSIS — L40.0 PLAQUE PSORIASIS: Primary | ICD-10-CM

## 2021-11-24 DIAGNOSIS — Z79.899 ENCOUNTER FOR LONG-TERM (CURRENT) USE OF HIGH-RISK MEDICATION: ICD-10-CM

## 2021-11-24 PROCEDURE — 99204 OFFICE O/P NEW MOD 45 MIN: CPT | Mod: 95 | Performed by: DERMATOLOGY

## 2021-11-24 RX ORDER — BETAMETHASONE DIPROPIONATE 0.5 MG/G
CREAM TOPICAL 2 TIMES DAILY
Qty: 50 G | Refills: 3 | Status: SHIPPED | OUTPATIENT
Start: 2021-11-24 | End: 2022-04-04

## 2021-11-24 ASSESSMENT — PAIN SCALES - GENERAL: PAINLEVEL: NO PAIN (0)

## 2021-11-24 NOTE — PROGRESS NOTES
[facility] Dermatology Note  Encounter Date: Nov 24, 2021  Store-and-Forward and Telephone ([phone]). Location of teledermatologist: [facility] [place].  Start time: 7:07. End time: 7:37.    Dermatology Problem List:  1. Psoriasis: guttate, plaque, scalp, hands  - current: topicals (triamcinolone, calcipotriene, betamethasone)  - plan for apremilast  2. 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 Psoriasis: widespread with scalp, hands, feet, trunk, extremities; refractory to topicals. No psoriatic arthritis. We discussed next steps in treatment including methotrexate, apremilast, and biologics. Given history of neuroendocrine carcinoma, need to avoid immunosuppressive agents, so strongly favor apremilast. In addition, given severe scalp involvement, favor apremilast. 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  - start apremilast  - check BMP    Procedures Performed:    None    Follow-up: 3 months    Staff:     Nick Menjivar MD, FAAD   of Dermatology  Department of Dermatology  [employer] [facility] [employer]    ____________________________________________    CC: Psoriasis (Connie, is here for a psorais appt, no other concerns )    HPI:  Ms. Connie Brunson is a(n) 61 year old female who presents today as a new patient for psoriasis    Psoriasis - all over body - trunk, extremities, scalp - hands have gotten much worse (washing hands frequently)  - using topicals - shampoos, creams, lotions - topical steroids, calcipotriene   - helps to prevent some progression, but does not lead to improvement  - on foot - had triamcinolone injections which helped, but now coming back  - no joint pains  - had discussed methotrexate in past  - feet, hands, head itch  the most    Patient is otherwise feeling well, without additional skin concerns.    Labs Reviewed:  8/2021 Cr unremarkable    Physical Exam:  Vitals: LMP 09/05/2000   SKIN: Teledermatology photos were reviewed; image quality and interpretability: acceptable. Image date: 11/21/21.  - numerous sharply demarcated salmon-colored papules and plaques with overlying silvery scale on the trunk, extremities, conchal bowls  - No other lesions of concern on areas examined.     Medications:  Current Outpatient Medications   Medication     augmented betamethasone dipropionate (DIPROLENE-AF) 0.05 % external cream     betamethasone dipropionate (DIPROSONE) 0.05 % external lotion     calcipotriene (DOVONOX) 0.005 % external ointment     cetirizine (ZYRTEC) 10 MG tablet     clobetasol (TEMOVATE) 0.05 % external cream     diazepam (VALIUM) 10 MG tablet     fenofibrate (TRIGLIDE/LOFIBRA) 160 MG tablet     fluticasone (FLONASE) 50 MCG/ACT nasal spray     folic acid (FOLVITE) 1 MG tablet     lisinopril (ZESTRIL) 10 MG tablet     mometasone (ELOCON) 0.1 % cream     omeprazole (PRILOSEC) 20 MG DR capsule     rosuvastatin (CRESTOR) 5 MG tablet     triamcinolone (ARISTOCORT HP) 0.5 % external cream     triamcinolone (KENALOG) 0.025 % external ointment     triamterene-HCTZ (DYAZIDE) 37.5-25 MG capsule     venlafaxine (EFFEXOR) 37.5 MG tablet     Current Facility-Administered Medications   Medication     triamcinolone acetonide (KENALOG-10) injection 10 mg      Past Medical/Surgical History:   Patient Active Problem List   Diagnosis     Allergic rhinitis     Diverticulosis of large intestine     Benign meningioma-right frontal posterior - no more annual MRI's needed per neurosurgery     Symptomatic menopausal or female climacteric states     Benign neoplasm of tonsil     Anxiety     HYPERLIPIDEMIA LDL GOAL <130 [272.4CK] - joint aches w/ simvastatin 11/2010,lipitor=nausea/abd pain 1/2012     Primary insomnia     Major depressive disorder,  recurrent episode, mild (H)     Essential hypertension with goal blood pressure less than 140/90     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- ophtho recommended auto-immune/arthritis work-up     Epiploic appendagitis- 2008 and 10/26/2014     Controlled substance agreement signed- re-signed 3/7/2018 ambien #30 -5mg tabs monthly - refill x5 - ok to see q6 months      Gastroesophageal reflux disease without esophagitis     Hypertriglyceridemia     S/P laparoscopic cholecystectomy for acute cholecystitis with cholelithiasis     Postsurgical dumping syndrome - s/p lap shashi - consider cholestyramine     Mass of small intestine- distal ileum - seen on CT scan at time of diverticulitis      Primary malignant neuroendocrine neoplasm of ileum (H)     Pulmonary nodules     Diarrhea, unspecified type- since sm bowel resection 11/2017     Hepatic steatosis - per MRI RUQ abdomen 4/12/2019 - liver not enlarged      Past Medical History:   Diagnosis Date     Allergic rhinitis, cause unspecified     year round - dog,dust-  Failed on claritin, claritin-D, zyrtec and zyrtec-D in past.      Benign neoplasm of cerebral meninges (H) 1999    has yearly MRI's. - 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 Thomas ENT      Cancer (H)      Depressive disorder      Deviated nasal septum     sees Dr. Thomas ENT      Diverticulosis of colon (without mention of hemorrhage) 02-     History of Guillain-[place] syndrome     NO FLU SHOTS - very mild case in [place] many years ago     Hyperlipidemia LDL goal < 130 doesn't want statins    simvastatin = severe hand joint pain , lipitor =nausea/abd. pain     Hypertension goal BP (blood pressure) < 140/90      Insomnia     trazodone -made pt feel hung over      Major depressive disorder, recurrent episode, moderate (H)     lexapro - sexual side effects      Moderate major depression  (H) 2/4/2005    trazodone -made pt feel hung over      Other acne      Other extrapyramidal disease and abnormal movement disorder     ?GBS     Symptomatic menopausal or female climacteric states     vivelle-dot        CC No referring provider defined for this encounter. on close of this encounter.

## 2021-11-24 NOTE — TELEPHONE ENCOUNTER
Pt is asking for nicotine gum sent to [facility]     Please see my chart message below     Please review and advise     Thank you     Samantha Escoto RN, BSN  [facility]

## 2021-11-24 NOTE — NURSING NOTE
Chief Complaint   Patient presents with     Psoriasis     Connie, is here for a psorais appt, no other concerns     Ciro Salgado EMT

## 2021-11-26 ENCOUNTER — TELEPHONE (OUTPATIENT)
Dept: DERMATOLOGY | Facility: CLINIC | Age: 61
End: 2021-11-26
Payer: COMMERCIAL

## 2021-11-26 NOTE — TELEPHONE ENCOUNTER
Prior Authorization Approval    Authorization Effective Date: 9/1/2021  Authorization Expiration Date: 10/26/2022  Medication: Otezla  Approved Dose/Quantity:    Reference #:     Insurance Company: [employer] [place] - Phone [phone] Fax [phone]  Expected CoPay: 3.00     CoPay Card Available:      Foundation Assistance Needed:    Which Pharmacy is filling the prescription (Not needed for infusion/clinic administered): [facility] - [place], [place] - [address]  Pharmacy Notified: Yes  Patient Notified: Yes

## 2021-11-26 NOTE — TELEPHONE ENCOUNTER
PA Initiation    Medication: Otezla  Insurance Company: [facility] - Phone [phone] Fax [phone]  Pharmacy Filling the Rx: [facility] - [place], [place] - [facility] [address]  Filling Pharmacy Phone:    Filling Pharmacy Fax:    Start Date: 11/26/2021    Submitted PA on [facility] website.

## 2021-12-05 ENCOUNTER — MYC MEDICAL ADVICE (OUTPATIENT)
Dept: FAMILY MEDICINE | Facility: CLINIC | Age: 61
End: 2021-12-05
Payer: COMMERCIAL

## 2021-12-08 NOTE — TELEPHONE ENCOUNTER
Endoscope was pre-cleaned at the bedside immediately following procedure by [facility], RN. TRANSFER - OUT REPORT:    Verbal report given to Christian Gaytan RN(name) on Breanna Silvestre        Report consisted of patients Situation, Background, Assessment and   Recommendations(SBAR). Information from the following report(s) SBAR, Procedure Summary and MAR was reviewed with the receiving nurse. Opportunity for questions and clarification was provided. Prescription approved per [facility], [facility] or [facility] refill protocol.  Zoë DAWSON RN   [facility]

## 2021-12-14 ENCOUNTER — TELEPHONE (OUTPATIENT)
Dept: FAMILY MEDICINE | Facility: CLINIC | Age: 61
End: 2021-12-14
Payer: COMMERCIAL

## 2021-12-14 NOTE — TELEPHONE ENCOUNTER
pharmacy is asking about this script if it can be sent over to soon     Please review and advise     Thank you     Samantha Escoto RN, BSN  [facility]

## 2021-12-14 NOTE — TELEPHONE ENCOUNTER
"Pt has questions about what topical steroids she is to be using and where.  She has several previous Rx from Dr Bell.  Triamcinolone 0.025% ointment has sig of applying bid to buttocks/groin for not more than 2 weeks.  Pt reports that she was told verbally to use this one \"all over my body\"  Because of the insurance audits, we are required to document in the sig what areas, how much and duration of use to bill for the appropriate qty and days supply.    She also reports that the betamethasone was to be used on her hands and feet (need clarification on location for this rx also please)    If rx are updated, please send to [facility]  Thank you,  Emilia Gaona [facility] [place] [phone]    "

## 2021-12-16 NOTE — TELEPHONE ENCOUNTER
She can use the triamcinolone on any body location with active psoriasis. She should apply a thin film BID - I cannot provide a number of grams to use during each application because it depends upon the size of the psoriatic plaque (also, this is a completely meaningless number to patients and nearly all providers). I didn't write the original prescriptions, so I don't know what quantity was prescribed, but this potency of steroid can easily be used for 4 weeks continuously on areas of psoriasis with little/no risk of atrophy.    For the augmented betamethasone ointment, she can use a thin film BID on psoriatic plaques on the hands/feet only. I prescribed a 30 day supply. I'm not sure why insurance is saying I need to rewrite these prescriptions - I write many dozens of prescriptions for topical steroids every week and this has never come up before.

## 2021-12-17 ENCOUNTER — TELEPHONE (OUTPATIENT)
Dept: DERMATOLOGY | Facility: CLINIC | Age: 61
End: 2021-12-17
Payer: COMMERCIAL

## 2021-12-17 DIAGNOSIS — L40.9 PSORIASIS: Primary | ICD-10-CM

## 2021-12-17 RX ORDER — TRIAMCINOLONE ACETONIDE 0.25 MG/G
OINTMENT TOPICAL 2 TIMES DAILY
Qty: 454 G | Refills: 3 | Status: SHIPPED | OUTPATIENT
Start: 2021-12-17 | End: 2022-05-17

## 2021-12-17 NOTE — TELEPHONE ENCOUNTER
I think I already answered this question yesterday but she may not have seen it yet. She can use betamethasone on the hands BID and triamcinolone on the body/extremities BID. I sent in a new prescription for a larger 1 lb jar of triamcinolone. Thanks

## 2021-12-17 NOTE — TELEPHONE ENCOUNTER
Patient is wondering if she should be using the calcipotriene in the groin area or if she should use the triamcinolone in that area.     Routing to Dr. Menjivar.    Gwen Hayward CMA on 12/17/2021 at 3:10 PM

## 2021-12-17 NOTE — TELEPHONE ENCOUNTER
Yes, she can use the calcipotriene in the groin area. When things are more active, the triamcinolone may work better, but as symptoms improve, she should rotate back to the calcipotriene. Thanks!

## 2021-12-17 NOTE — TELEPHONE ENCOUNTER
Please clarify which creams to use where and how often. Also the Triam. tubes are smaller and are being used often. If this is the only size tube she can get can she get more refills.    Routing to Dr. Menjivar.     Dermatology Problem List:  1. Psoriasis: guttate, plaque, scalp, hands  - current: topicals (triamcinolone, calcipotriene, betamethasone)  - plan for apremilast  2. 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

## 2021-12-20 NOTE — TELEPHONE ENCOUNTER
Called and left message for patient to return call and go over below info. Call back number given.    Gwen Hayward CMA on 12/20/2021 at 8:07 AM

## 2022-01-17 ENCOUNTER — TELEPHONE (OUTPATIENT)
Dept: FAMILY MEDICINE | Facility: CLINIC | Age: 62
End: 2022-01-17
Payer: COMMERCIAL

## 2022-01-17 DIAGNOSIS — L40.9 PSORIASIS: ICD-10-CM

## 2022-01-17 RX ORDER — BETAMETHASONE DIPROPIONATE 0.5 MG/G
LOTION TOPICAL 2 TIMES DAILY
Qty: 60 ML | Refills: 4 | Status: SHIPPED | OUTPATIENT
Start: 2022-01-17 | End: 2022-03-31

## 2022-01-17 NOTE — TELEPHONE ENCOUNTER
Pt requesting refill on betamethasone 0.05% Lotion.  Please send to [facility] if approved.  Thank you,  Emilia Gaona [facility], [facility] [place] [phone]

## 2022-01-24 ENCOUNTER — VIRTUAL VISIT (OUTPATIENT)
Dept: DERMATOLOGY | Facility: CLINIC | Age: 62
End: 2022-01-24
Payer: COMMERCIAL

## 2022-01-24 DIAGNOSIS — L40.0 PLAQUE PSORIASIS: ICD-10-CM

## 2022-01-24 DIAGNOSIS — I10 ESSENTIAL HYPERTENSION WITH GOAL BLOOD PRESSURE LESS THAN 140/90: ICD-10-CM

## 2022-01-24 DIAGNOSIS — L40.9 PSORIASIS: Primary | ICD-10-CM

## 2022-01-24 PROCEDURE — 99214 OFFICE O/P EST MOD 30 MIN: CPT | Mod: 95 | Performed by: DERMATOLOGY

## 2022-01-24 RX ORDER — CLOBETASOL PROPIONATE 0.5 MG/ML
SOLUTION TOPICAL 2 TIMES DAILY PRN
Qty: 50 ML | Refills: 1 | Status: SHIPPED | OUTPATIENT
Start: 2022-01-24 | End: 2022-05-02

## 2022-01-24 RX ORDER — HYDROXYZINE HYDROCHLORIDE 25 MG/1
25 TABLET, FILM COATED ORAL AT BEDTIME
Qty: 30 TABLET | Refills: 3 | Status: SHIPPED | OUTPATIENT
Start: 2022-01-24 | End: 2022-05-20

## 2022-01-24 ASSESSMENT — PAIN SCALES - GENERAL: PAINLEVEL: NO PAIN (0)

## 2022-01-24 NOTE — NURSING NOTE
Chief Complaint   Patient presents with     Psoriasis     Connie, is here for a follow up. she states things are worse.    Ciro Salgado EMT

## 2022-01-24 NOTE — PROGRESS NOTES
[facility] Dermatology Note  Encounter Date: Jan 24, 2022  Emiliano Connected.    Dermatology Problem List:  1. ***    ____________________________________________    Assessment & Plan:     # {Diagnosesderm:130289}.   {kkplans:680805}   - ***     # {Diagnosesderm:081543}.   {kkplans:013470}   - ***     Procedures Performed:    None    Follow-up: {kkfollowup:108111}    {kkstaffinvolved:617303}    ***  ____________________________________________    CC: Psoriasis (Connie, is here for a follow up. she states things are worse.)    HPI:  Ms. Connie Brunson is a(n) 61 year old female who presents today {kknew/return:353326} for ***    Patient is otherwise feeling well, without additional skin concerns.    Labs Reviewed:  ***N/A    Physical Exam:  Vitals: LMP 09/05/2000   SKIN: Teledermatology photos were reviewed; image quality and interpretability: ***acceptable. Image date: ***.  - ***  - No other lesions of concern on areas examined.     Medications:  Current Outpatient Medications   Medication     apremilast (OTEZLA) 30 MG tablet     augmented betamethasone dipropionate (DIPROLENE-AF) 0.05 % external cream     augmented betamethasone dipropionate (DIPROLENE-AF) 0.05 % external cream     betamethasone dipropionate (DIPROSONE) 0.05 % external lotion     calcipotriene (DOVONOX) 0.005 % external ointment     cetirizine (ZYRTEC) 10 MG tablet     clobetasol (TEMOVATE) 0.05 % external cream     diazepam (VALIUM) 10 MG tablet     fenofibrate (TRIGLIDE/LOFIBRA) 160 MG tablet     fluticasone (FLONASE) 50 MCG/ACT nasal spray     lisinopril (ZESTRIL) 10 MG tablet     mometasone (ELOCON) 0.1 % cream     nicotine (NICORETTE) 2 MG gum     omeprazole (PRILOSEC) 20 MG DR capsule     triamcinolone (ARISTOCORT HP) 0.5 % external cream     triamcinolone (KENALOG) 0.025 % external ointment     triamcinolone (KENALOG) 0.025 % external ointment     triamterene-HCTZ (DYAZIDE) 37.5-25 MG capsule     venlafaxine (EFFEXOR)  37.5 MG tablet     Apremilast (OTEZLA) 10 & 20 & 30 MG TBPK     folic acid (FOLVITE) 1 MG tablet     rosuvastatin (CRESTOR) 5 MG tablet     Current Facility-Administered Medications   Medication     triamcinolone acetonide (KENALOG-10) injection 10 mg      Past Medical/Surgical History:   Patient Active Problem List   Diagnosis     Allergic rhinitis     Diverticulosis of large intestine     Benign meningioma-right frontal posterior - no more annual MRI's needed per neurosurgery     Symptomatic menopausal or female climacteric states     Benign neoplasm of tonsil     Anxiety     HYPERLIPIDEMIA LDL GOAL <130 [272.4CK] - joint aches w/ simvastatin 11/2010,lipitor=nausea/abd pain 1/2012     Primary insomnia     Major depressive disorder, recurrent episode, mild (H)     Essential hypertension with goal blood pressure less than 140/90     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- ophtho recommended auto-immune/arthritis work-up     Epiploic appendagitis- 2008 and 10/26/2014     Controlled substance agreement signed- re-signed 3/7/2018 ambien #30 -5mg tabs monthly - refill x5 - ok to see q6 months      Gastroesophageal reflux disease without esophagitis     Hypertriglyceridemia     S/P laparoscopic cholecystectomy for acute cholecystitis with cholelithiasis     Postsurgical dumping syndrome - s/p lap shashi - consider cholestyramine     Mass of small intestine- distal ileum - seen on CT scan at time of diverticulitis      Primary malignant neuroendocrine neoplasm of ileum (H)     Pulmonary nodules     Diarrhea, unspecified type- since sm bowel resection 11/2017     Hepatic steatosis - per MRI RUQ abdomen 4/12/2019 - liver not enlarged      Past Medical History:   Diagnosis Date     Allergic rhinitis, cause unspecified     year round - dog,dust-  Failed on claritin, claritin-D, zyrtec and zyrtec-D in past.      Benign neoplasm of cerebral meninges (H) 1999    has yearly MRI's. - [facility] MRI[facility]/24/06 = no change from 7/21/05      Benign neoplasm of tonsil 7/05    ?tonsillar re-growth - sees Dr. Thomas ENT      Cancer (H)      Depressive disorder      Deviated nasal septum     sees Dr. Thomas ENT      Diverticulosis of colon (without mention of hemorrhage) 02-     History of Guillain-[place] syndrome     NO FLU SHOTS - very mild case in [place] many years ago     Hyperlipidemia LDL goal < 130 doesn't want statins    simvastatin = severe hand joint pain , lipitor =nausea/abd. pain     Hypertension goal BP (blood pressure) < 140/90      Insomnia     trazodone -made pt feel hung over      Major depressive disorder, recurrent episode, moderate (H)     lexapro - sexual side effects      Moderate major depression (H) 2/4/2005    trazodone -made pt feel hung over      Other acne      Other extrapyramidal disease and abnormal movement disorder     ?GBS     Symptomatic menopausal or female climacteric states     vivelle-dot        CC Referred Self, MD  No address on file on close of this encounter.

## 2022-01-24 NOTE — LETTER
1/24/2022       RE: Connie Brunson  [address] [facility] [address]     Dear Colleague,    Thank you for referring your patient, Connie Brunson, to the [facility] [place] at [facility]. Please see a copy of my visit note below.    [facility] Dermatology Note  Encounter Date: Jan 24, 2022  Video only. Location of patient: home. Location of teledermatologist: [facility]. Start time: 9:33AM. End time: 9:50AM.    Dermatology Problem List:  1. Psoriasis: guttate, plaque, scalp, hands  - current: apremilast, topicals (triamcinolone, calcipotriene, betamethasone), cetirizine 10 mg BID, hydroxyzine 25 mg at bedtime   - plan for nbUVB home as adjunct  - future options: adalimumab or methotrexate (patient to discuss with Oncology)  2. 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 Psoriasis without PsA.  With scalp, hands, feet, trunk and extremity involvement refractory to topicals. Started on apremilast 12/2021 with minimal improvement thus far, otherwise tolerating medication well without SE. Discussed further treatment options with patient including other topicals, narrowband UVB, and methotrexate or adalimumab. Phototherapy would help treat trunk and extremities and will start prior authorization process, but would not treat scalp and thus will start topical clobetasol solution given ongoing scalp scalp symptoms. Additionally advised patient to discuss methotrexate or adalimumab for treatment of her psoriasis with her oncologist given her history of small bowel cancer to discuss risks.   - Recommended to continue apremilast 30 mg twice daily  - clobetasol 0.05% solution 1-2 times daily PRN  - hydroxyzine 25mg at bedtime PRN for itch  - increase cetirizine 10 mg BID   - Will start  process for nbUVB home unit  - Patient will discuss methotrexate or humira with Oncologist    Procedures Performed:   None    Follow-up: 3 months, prn for new or changing lesions    Staff and Resident:     Jamarcus Hull MD  PGY-2 Dermatology  Pager: [phone]    Staff Physician Comments:   I evaluated any available patient photographs with the resident and I edited the assessment and plan as documented in the note. I was present on the line and participated in the entire video visit.    Nick Menjivar MD   of Dermatology  Department of Dermatology  [facility]    ____________________________________________    CC: Psoriasis (Connie, is here for a follow up. she states things are worse.)    HPI:  Ms. Connie Brunson is a(n) 61 year old female who presents today as a return patient for psoriasis.    Last seen in clinic 11/24/21 for psoriasis, and was started on Otezla.   - Psoriasis got better after 2 weeks since starting medication, but after booster became more symptomatic  - COVID booster 12/22/21, notes psoriasis got worse after previous COVID vaccines  - Hands worse, groin better, scalp worse, same everywhere else (legs, lower back)  - Reports today still has significant pruritis at night to point it is affecting sleep  - Takes Zyrtec at bedtime without benefit  - Itch during daytime not much  - Current topical use   - triamcinolone 0.025%, everywhere else   - augmented betamethasone cream, uses for hand    - calcipotriene, occasionally in the folds   - Notes more scalp psoriasis    - Current shampoos: Nizoral and Dermaplast   - Not currently using any daily emollient  - No joint pains or significant morning stiffness lasting >1 hour  - No new soaps, detergents, fragrance   - Uses dove for sensitive skin  - No fevers, chills, or weight loss. No diarrhea, actually has been having less watery stoopls and more formed, mood has been good; no SI  - Patient is otherwise  feeling well, without additional skin concerns.    Labs Reviewed:  [facility] 8/31/21    Physical Exam:  Vitals: LMP 09/05/2000   SKIN: Video examination of the extensor elbows and lower extremities   - Well defined erythematous plaques on extensor elbows and lower legs with mild overlying scale noted.   - No other lesions of concern on areas examined.     Medications:  Current Outpatient Medications   Medication     apremilast (OTEZLA) 30 MG tablet     augmented betamethasone dipropionate (DIPROLENE-AF) 0.05 % external cream     augmented betamethasone dipropionate (DIPROLENE-AF) 0.05 % external cream     betamethasone dipropionate (DIPROSONE) 0.05 % external lotion     calcipotriene (DOVONOX) 0.005 % external ointment     cetirizine (ZYRTEC) 10 MG tablet     clobetasol (TEMOVATE) 0.05 % external cream     diazepam (VALIUM) 10 MG tablet     fenofibrate (TRIGLIDE/LOFIBRA) 160 MG tablet     fluticasone (FLONASE) 50 MCG/ACT nasal spray     lisinopril (ZESTRIL) 10 MG tablet     mometasone (ELOCON) 0.1 % cream     nicotine (NICORETTE) 2 MG gum     omeprazole (PRILOSEC) 20 MG DR capsule     triamcinolone (ARISTOCORT HP) 0.5 % external cream     triamcinolone (KENALOG) 0.025 % external ointment     triamcinolone (KENALOG) 0.025 % external ointment     triamterene-HCTZ (DYAZIDE) 37.5-25 MG capsule     venlafaxine (EFFEXOR) 37.5 MG tablet     Apremilast (OTEZLA) 10 & 20 & 30 MG TBPK     folic acid (FOLVITE) 1 MG tablet     rosuvastatin (CRESTOR) 5 MG tablet     Current Facility-Administered Medications   Medication     triamcinolone acetonide (KENALOG-10) injection 10 mg      Past Medical History:   Patient Active Problem List   Diagnosis     Allergic rhinitis     Diverticulosis of large intestine     Benign meningioma-right frontal posterior - no more annual MRI's needed per neurosurgery     Symptomatic menopausal or female climacteric states     Benign neoplasm of tonsil     Anxiety     HYPERLIPIDEMIA LDL GOAL <130 [272.4CK] -  joint aches w/ simvastatin 11/2010,lipitor=nausea/abd pain 1/2012     Primary insomnia     Major depressive disorder, recurrent episode, mild (H)     Essential hypertension with goal blood pressure less than 140/90     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- ophtho recommended auto-immune/arthritis work-up     Epiploic appendagitis- 2008 and 10/26/2014     Controlled substance agreement signed- re-signed 3/7/2018 ambien #30 -5mg tabs monthly - refill x5 - ok to see q6 months      Gastroesophageal reflux disease without esophagitis     Hypertriglyceridemia     S/P laparoscopic cholecystectomy for acute cholecystitis with cholelithiasis     Postsurgical dumping syndrome - s/p lap shashi - consider cholestyramine     Mass of small intestine- distal ileum - seen on CT scan at time of diverticulitis      Primary malignant neuroendocrine neoplasm of ileum (H)     Pulmonary nodules     Diarrhea, unspecified type- since sm bowel resection 11/2017     Hepatic steatosis - per MRI RUQ abdomen 4/12/2019 - liver not enlarged      Past Medical History:   Diagnosis Date     Allergic rhinitis, cause unspecified     year round - dog,dust-  Failed on claritin, claritin-D, zyrtec and zyrtec-D in past.      Benign neoplasm of cerebral meninges (H) 1999    has yearly MRI's. - sees 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 Thomas ENT      Cancer (H)      Depressive disorder      Deviated nasal septum     sees Dr. Thomas ENT      Diverticulosis of colon (without mention of hemorrhage) 02-     History of Guillain-[place] syndrome     NO FLU SHOTS - very mild case in [place] many years ago     Hyperlipidemia LDL goal < 130 doesn't want statins    simvastatin = severe hand joint pain , lipitor =nausea/abd. pain     Hypertension goal BP (blood pressure) < 140/90      Insomnia     trazodone -made pt feel hung over      Major  depressive disorder, recurrent episode, moderate (H)     lexapro - sexual side effects      Moderate major depression (H) 2/4/2005    trazodone -made pt feel hung over      Other acne      Other extrapyramidal disease and abnormal movement disorder     ?GBS     Symptomatic menopausal or female climacteric states     vivelle-dot        CC Referred Self, MD  No address on file on close of this encounter.

## 2022-01-24 NOTE — PROGRESS NOTES
[facility] Dermatology Note  Encounter Date: Jan 24, 2022  Video only. Location of patient: home. Location of teledermatologist: [facility]. Start time: 9:33AM. End time: 9:50AM.    Dermatology Problem List:  1. Psoriasis: guttate, plaque, scalp, hands  - current: apremilast, topicals (triamcinolone, calcipotriene, betamethasone), cetirizine 10 mg BID, hydroxyzine 25 mg at bedtime   - plan for nbUVB home as adjunct  - future options: adalimumab or methotrexate (patient to discuss with Oncology)  2. 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 Psoriasis without PsA.  With scalp, hands, feet, trunk and extremity involvement refractory to topicals. Started on apremilast 12/2021 with minimal improvement thus far, otherwise tolerating medication well without SE. Discussed further treatment options with patient including other topicals, narrowband UVB, and methotrexate or adalimumab. Phototherapy would help treat trunk and extremities and will start prior authorization process, but would not treat scalp and thus will start topical clobetasol solution given ongoing scalp scalp symptoms. Additionally advised patient to discuss methotrexate or adalimumab for treatment of her psoriasis with her oncologist given her history of small bowel cancer to discuss risks.   - Recommended to continue apremilast 30 mg twice daily  - clobetasol 0.05% solution 1-2 times daily PRN  - hydroxyzine 25mg at bedtime PRN for itch  - increase cetirizine 10 mg BID   - Will start process for nbUVB home unit  - Patient will discuss methotrexate or humira with Oncologist    Procedures Performed:   None    Follow-up: 3 months, prn for new or changing lesions    Staff and Resident:     Jamarcus Hull MD  PGY-2 Dermatology  Pager: [phone]    Staff Physician Comments:   I evaluated any available patient  photographs with the resident and I edited the assessment and plan as documented in the note. I was present on the line and participated in the entire video visit.    Nick Menjivar MD   of Dermatology  Department of Dermatology  [facility]    ____________________________________________    CC: Psoriasis (Connie, is here for a follow up. she states things are worse.)    HPI:  Ms. Connie Brunson is a(n) 61 year old female who presents today as a return patient for psoriasis.    Last seen in clinic 11/24/21 for psoriasis, and was started on Otezla.   - Psoriasis got better after 2 weeks since starting medication, but after booster became more symptomatic  - COVID booster 12/22/21, notes psoriasis got worse after previous COVID vaccines  - Hands worse, groin better, scalp worse, same everywhere else (legs, lower back)  - Reports today still has significant pruritis at night to point it is affecting sleep  - Takes Zyrtec at bedtime without benefit  - Itch during daytime not much  - Current topical use   - triamcinolone 0.025%, everywhere else   - augmented betamethasone cream, uses for hand    - calcipotriene, occasionally in the folds   - Notes more scalp psoriasis    - Current shampoos: Nizoral and Dermaplast   - Not currently using any daily emollient  - No joint pains or significant morning stiffness lasting >1 hour  - No new soaps, detergents, fragrance   - Uses dove for sensitive skin  - No fevers, chills, or weight loss. No diarrhea, actually has been having less watery stoopls and more formed, mood has been good; no SI  - Patient is otherwise feeling well, without additional skin concerns.    Labs Reviewed:  [facility] 8/31/21    Physical Exam:  Vitals: LMP 09/05/2000   SKIN: Video examination of the extensor elbows and lower extremities   - Well defined erythematous plaques on extensor elbows and lower legs with mild overlying scale noted.   - No other lesions of  concern on areas examined.     Medications:  Current Outpatient Medications   Medication     apremilast (OTEZLA) 30 MG tablet     augmented betamethasone dipropionate (DIPROLENE-AF) 0.05 % external cream     augmented betamethasone dipropionate (DIPROLENE-AF) 0.05 % external cream     betamethasone dipropionate (DIPROSONE) 0.05 % external lotion     calcipotriene (DOVONOX) 0.005 % external ointment     cetirizine (ZYRTEC) 10 MG tablet     clobetasol (TEMOVATE) 0.05 % external cream     diazepam (VALIUM) 10 MG tablet     fenofibrate (TRIGLIDE/LOFIBRA) 160 MG tablet     fluticasone (FLONASE) 50 MCG/ACT nasal spray     lisinopril (ZESTRIL) 10 MG tablet     mometasone (ELOCON) 0.1 % cream     nicotine (NICORETTE) 2 MG gum     omeprazole (PRILOSEC) 20 MG DR capsule     triamcinolone (ARISTOCORT HP) 0.5 % external cream     triamcinolone (KENALOG) 0.025 % external ointment     triamcinolone (KENALOG) 0.025 % external ointment     triamterene-HCTZ (DYAZIDE) 37.5-25 MG capsule     venlafaxine (EFFEXOR) 37.5 MG tablet     Apremilast (OTEZLA) 10 & 20 & 30 MG TBPK     folic acid (FOLVITE) 1 MG tablet     rosuvastatin (CRESTOR) 5 MG tablet     Current Facility-Administered Medications   Medication     triamcinolone acetonide (KENALOG-10) injection 10 mg      Past Medical History:   Patient Active Problem List   Diagnosis     Allergic rhinitis     Diverticulosis of large intestine     Benign meningioma-right frontal posterior - no more annual MRI's needed per neurosurgery     Symptomatic menopausal or female climacteric states     Benign neoplasm of tonsil     Anxiety     HYPERLIPIDEMIA LDL GOAL <130 [272.4CK] - joint aches w/ simvastatin 11/2010,lipitor=nausea/abd pain 1/2012     Primary insomnia     Major depressive disorder, recurrent episode, mild (H)     Essential hypertension with goal blood pressure less than 140/90     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- ophtho recommended auto-immune/arthritis work-up      Epiploic appendagitis- 2008 and 10/26/2014     Controlled substance agreement signed- re-signed 3/7/2018 ambien #30 -5mg tabs monthly - refill x5 - ok to see q6 months      Gastroesophageal reflux disease without esophagitis     Hypertriglyceridemia     S/P laparoscopic cholecystectomy for acute cholecystitis with cholelithiasis     Postsurgical dumping syndrome - s/p lap shashi - consider cholestyramine     Mass of small intestine- distal ileum - seen on CT scan at time of diverticulitis      Primary malignant neuroendocrine neoplasm of ileum (H)     Pulmonary nodules     Diarrhea, unspecified type- since sm bowel resection 11/2017     Hepatic steatosis - per MRI RUQ abdomen 4/12/2019 - liver not enlarged      Past Medical History:   Diagnosis Date     Allergic rhinitis, cause unspecified     year round - dog,dust-  Failed on claritin, claritin-D, zyrtec and zyrtec-D in past.      Benign neoplasm of cerebral meninges (H) 1999    has yearly MRI's. - sees Dr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 Thomas ENT      Cancer (H)      Depressive disorder      Deviated nasal septum     sees Dr. Thomas ENT      Diverticulosis of colon (without mention of hemorrhage) 02-     History of Guillain-[place] syndrome     NO FLU SHOTS - very mild case in [place] many years ago     Hyperlipidemia LDL goal < 130 doesn't want statins    simvastatin = severe hand joint pain , lipitor =nausea/abd. pain     Hypertension goal BP (blood pressure) < 140/90      Insomnia     trazodone -made pt feel hung over      Major depressive disorder, recurrent episode, moderate (H)     lexapro - sexual side effects      Moderate major depression (H) 2/4/2005    trazodone -made pt feel hung over      Other acne      Other extrapyramidal disease and abnormal movement disorder     ?GBS     Symptomatic menopausal or female climacteric states      vivelle-dot        CC Referred Self, MD  No address on file on close of this encounter.

## 2022-01-24 NOTE — PATIENT INSTRUCTIONS
- Ask your Oncologist about Humira and Methotrexate  - Take Zyrtec twice a day (morning and night)  - Use topical clobetasol solution for your scalp 1-2 times daily as needed  - Take Hydroxyzine 25mg before bedtime   - Continue Otezla twice a day  - We will work on getting you a home light box for phototherapy

## 2022-01-25 ENCOUNTER — TELEPHONE (OUTPATIENT)
Dept: DERMATOLOGY | Facility: CLINIC | Age: 62
End: 2022-01-25
Payer: COMMERCIAL

## 2022-01-25 RX ORDER — TRIAMTERENE AND HYDROCHLOROTHIAZIDE 37.5; 25 MG/1; MG/1
1 CAPSULE ORAL DAILY
Qty: 90 CAPSULE | Refills: 1 | Status: SHIPPED | OUTPATIENT
Start: 2022-01-25 | End: 2022-07-29

## 2022-01-25 NOTE — TELEPHONE ENCOUNTER
Prescription approved per [facility] Refill Protocol.  Pablo SHEETS RN, BSN

## 2022-01-25 NOTE — TELEPHONE ENCOUNTER
Faxed [facility] home phototherapy order form, insurance information, clinic notes and letter of medical necessity to [facility] at [phone]    Sally Mcgarry, [facility]

## 2022-02-17 ENCOUNTER — ANCILLARY PROCEDURE (OUTPATIENT)
Dept: MAMMOGRAPHY | Facility: CLINIC | Age: 62
End: 2022-02-17
Attending: FAMILY MEDICINE
Payer: COMMERCIAL

## 2022-02-17 DIAGNOSIS — Z12.31 VISIT FOR SCREENING MAMMOGRAM: ICD-10-CM

## 2022-02-17 PROCEDURE — 77067 SCR MAMMO BI INCL CAD: CPT | Mod: TC | Performed by: RADIOLOGY

## 2022-02-24 ENCOUNTER — MYC MEDICAL ADVICE (OUTPATIENT)
Dept: FAMILY MEDICINE | Facility: CLINIC | Age: 62
End: 2022-02-24
Payer: COMMERCIAL

## 2022-02-24 NOTE — TELEPHONE ENCOUNTER
Routing to provider to review and advise.     azithromycin (ZITHROMAX) 250 MG tablet (Discontinued) 6 tablet 3 2019 --   Si tabs first day, then 1 tab by mouth daily for 4 additional days   Patient not taking: Reported on 2020        Sent to pharmacy as: azithromycin (ZITHROMAX) 250 MG tablet   Class: E-Prescribe   Reason for Discontinue: Therapy completed       Mira Bonner RN  [facility]

## 2022-03-01 ENCOUNTER — VIRTUAL VISIT (OUTPATIENT)
Dept: FAMILY MEDICINE | Facility: CLINIC | Age: 62
End: 2022-03-01
Payer: COMMERCIAL

## 2022-03-01 DIAGNOSIS — J01.90 ACUTE SINUSITIS WITH SYMPTOMS > 10 DAYS: Primary | ICD-10-CM

## 2022-03-01 PROCEDURE — 99213 OFFICE O/P EST LOW 20 MIN: CPT | Mod: 95 | Performed by: FAMILY MEDICINE

## 2022-03-01 RX ORDER — AZITHROMYCIN 250 MG/1
TABLET, FILM COATED ORAL
Qty: 12 TABLET | Refills: 0 | Status: SHIPPED | OUTPATIENT
Start: 2022-03-01 | End: 2022-05-02

## 2022-03-01 NOTE — PROGRESS NOTES
"Connie is a 61 year old who is being evaluated via a billable telephone visit.      What phone number would you like to be contacted at? [phone]  How would you like to obtain your AVS? MyChart     Assessment & Plan       ICD-10-CM    1. Acute sinusitis with symptoms > 10 days  J01.90 azithromycin (ZITHROMAX) 250 MG tablet       Discussed treatment/modality options, including risk and benefits, she desires:    1) z татьяна x 2    2) mucinex DM prn    3) tylenol prn    All diagnosis above reviewed and noted above, otherwise stable.      See [facility] orders for further details.        Tobacco Cessation:   reports that she has been smoking cigarettes. She has a 5.00 pack-year smoking history. She has quit using smokeless tobacco.  Tobacco Cessation Action Plan: Information offered: Patient not interested at this time    BMI:   Estimated body mass index is 33.53 kg/m  as calculated from the following:    Height as of 10/12/21: 1.499 m (4' 11\").    Weight as of 10/12/21: 75.3 kg (166 lb).   Weight management plan: diet and exercise       No follow-ups on file.      No LOS data to display  -Doing chart review, history and exam, documentation and further activities as noted.           Morgan Casey MD, FAAFP     [facility]  [address] [place] [address] [id number] [place], [place] [email or web address]   Office: [phone]  Fax: [phone]  Pager: [phone]       Subjective   Connie is a 61 year old who presents for the following health issues     HPI     Works at [employer] - COVID negative x 2 - had worked from home prior - vaccinated including booster    Acute Illness  Acute illness concerns: sinus  Onset/Duration: x 2 weeks   Symptoms:    Fever: no  Chills/Sweats: YES- chills/sweats  Headache (location?): YES  Sinus Pressure: YES - maxillary, frontal  Conjunctivitis:  no  Ear Pain: no  Rhinorrhea: YES - clear/yellow  Congestion: YES - nasal and " chest  Sore Throat: no  Cough: YES, just started x 1 day ago - yellow  Wheeze: no  Decreased Appetite: YES  Nausea: no  Vomiting: no  Diarrhea: no  Dysuria/Freq.: no  Dysuria or Hematuria: no  Fatigue/Achiness: YES (hx of psoriasis, Dr Menjivar, rheumatology)  No taste or smell issues    Sick/Strep Exposure: YES- grandkids were sick  Therapies tried and outcome: Mucinex, nyquil    Review of Systems   CONSTITUTIONAL: NEGATIVE for fever, chills, change in weight  INTEGUMENTARY/SKIN: NEGATIVE for worrisome rashes, moles or lesions  EYES: NEGATIVE for vision changes or irritation  ENT/MOUTH: NEGATIVE for ear, mouth and throat problems  RESP: NEGATIVE for significant cough or SOB  CV: NEGATIVE for chest pain, palpitations or peripheral edema  GI: NEGATIVE for nausea, abdominal pain, heartburn, or change in bowel habits  : NEGATIVE for frequency, dysuria, or hematuria  MUSCULOSKELETAL: NEGATIVE for significant arthralgias or myalgia  NEURO: NEGATIVE for weakness, dizziness or paresthesias  ENDOCRINE: NEGATIVE for temperature intolerance, skin/hair changes  HEME: NEGATIVE for bleeding problems  PSYCHIATRIC: NEGATIVE for changes in mood or affect      Objective       Vitals:  No vitals were obtained today due to virtual visit.    Physical Exam   healthy, alert and no distress  PSYCH: Alert and oriented times 3; coherent speech, normal   rate and volume, able to articulate logical thoughts, able   to abstract reason, no tangential thoughts, no hallucinations   or delusions  Her affect is normal  RESP: No cough, no audible wheezing, able to talk in full sentences  Remainder of exam unable to be completed due to telephone visits    Phone call duration: 16 minutes

## 2022-03-07 ENCOUNTER — TELEPHONE (OUTPATIENT)
Dept: DERMATOLOGY | Facility: CLINIC | Age: 62
End: 2022-03-07

## 2022-03-07 ENCOUNTER — VIRTUAL VISIT (OUTPATIENT)
Dept: DERMATOLOGY | Facility: CLINIC | Age: 62
End: 2022-03-07
Payer: COMMERCIAL

## 2022-03-07 ENCOUNTER — TELEPHONE (OUTPATIENT)
Dept: DERMATOLOGY | Facility: CLINIC | Age: 62
End: 2022-03-07
Payer: COMMERCIAL

## 2022-03-07 DIAGNOSIS — L40.0 PLAQUE PSORIASIS: Primary | ICD-10-CM

## 2022-03-07 DIAGNOSIS — D84.9 IMMUNOSUPPRESSION (H): ICD-10-CM

## 2022-03-07 PROCEDURE — 99214 OFFICE O/P EST MOD 30 MIN: CPT | Mod: 95 | Performed by: DERMATOLOGY

## 2022-03-07 ASSESSMENT — PAIN SCALES - GENERAL: PAINLEVEL: NO PAIN (0)

## 2022-03-07 NOTE — PROGRESS NOTES
[facility] Dermatology Note  Encounter Date: Mar 7, 2022  Doximity video Connected.    Dermatology Problem List:  1. Psoriasis: guttate, plaque, scalp, hands  - current: apremilast, topicals (triamcinolone, calcipotriene, betamethasone), cetirizine 10 mg BID, hydroxyzine 25 mg at bedtime, home nbUVB   - plan for adalimumab  - in reserve: methotrexate (okayed per oncology)  2. 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 Psoriasis: poorly controlled on current regimen with apremilast, topicals, nbUVB phototherapy. Likely had recent flare in context of URI/sinus infection, leading to widespread guttate lesions. Methotrexate okayed per oncology but adalimumab not evaluated. I do favor adalimumab given better efficacy, fewer side effects, and lower COVID-19-associated risk than methotrexate, but will confirm with oncology. In the interim, will start process with insurance and get screening labs.  - check QFN, hepatitis panel  - start adalimumab once okayed by oncology    Procedures Performed:    None    Follow-up: 3 months    Staff:     Nick Menjivar MD, FAAD   of Dermatology  Department of Dermatology  [facility]    ____________________________________________    CC: Derm Problem (Connie, is here for her psoriasis, no other concerns )    HPI:  Ms. Connie Brunson is a(n) 61 year old female who presents today as a return patient for psoriasis    Psoriasis - has worsened since last visit - flaring all over  - started azithromycin 6 days ago for sinus infection/chest congestion  - since starting that, spreading onto arms - lots of little spots - all over body   - covering armpits, across belly  - hands, scalp getting worse  - apremilast not working    Patient is otherwise feeling well, without additional skin concerns.    Labs  Reviewed:  N/A    Physical Exam:  Vitals: LMP 09/05/2000   SKIN: video images were reviewed; image quality and interpretability: acceptable. Image date: n/a.  - numerous erythematous scale thin papules on the extremities  - No other lesions of concern on areas examined.     Medications:  Current Outpatient Medications   Medication     apremilast (OTEZLA) 30 MG tablet     augmented betamethasone dipropionate (DIPROLENE-AF) 0.05 % external cream     augmented betamethasone dipropionate (DIPROLENE-AF) 0.05 % external cream     azithromycin (ZITHROMAX) 250 MG tablet     betamethasone dipropionate (DIPROSONE) 0.05 % external lotion     calcipotriene (DOVONOX) 0.005 % external ointment     cetirizine (ZYRTEC) 10 MG tablet     clobetasol (TEMOVATE) 0.05 % external cream     clobetasol (TEMOVATE) 0.05 % external solution     diazepam (VALIUM) 10 MG tablet     fenofibrate (TRIGLIDE/LOFIBRA) 160 MG tablet     fluticasone (FLONASE) 50 MCG/ACT nasal spray     hydrOXYzine (ATARAX) 25 MG tablet     lisinopril (ZESTRIL) 10 MG tablet     mometasone (ELOCON) 0.1 % cream     nicotine (NICORETTE) 2 MG gum     omeprazole (PRILOSEC) 20 MG DR capsule     triamcinolone (ARISTOCORT HP) 0.5 % external cream     triamcinolone (KENALOG) 0.025 % external ointment     triamcinolone (KENALOG) 0.025 % external ointment     triamterene-HCTZ (DYAZIDE) 37.5-25 MG capsule     venlafaxine (EFFEXOR) 37.5 MG tablet     Current Facility-Administered Medications   Medication     triamcinolone acetonide (KENALOG-10) injection 10 mg      Past Medical/Surgical History:   Patient Active Problem List   Diagnosis     Allergic rhinitis     Diverticulosis of large intestine     Benign meningioma-right frontal posterior - no more annual MRI's needed per neurosurgery     Symptomatic menopausal or female climacteric states     Benign neoplasm of tonsil     Anxiety     HYPERLIPIDEMIA LDL GOAL <130 [272.4CK] - joint aches w/ simvastatin 11/2010,lipitor=nausea/abd pain  1/2012     Primary insomnia     Major depressive disorder, recurrent episode, mild (H)     Essential hypertension with goal blood pressure less than 140/90     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- ophtho recommended auto-immune/arthritis work-up     Epiploic appendagitis- 2008 and 10/26/2014     Controlled substance agreement signed- re-signed 3/7/2018 ambien #30 -5mg tabs monthly - refill x5 - ok to see q6 months      Gastroesophageal reflux disease without esophagitis     Hypertriglyceridemia     S/P laparoscopic cholecystectomy for acute cholecystitis with cholelithiasis     Postsurgical dumping syndrome - s/p lap shashi - consider cholestyramine     Mass of small intestine- distal ileum - seen on CT scan at time of diverticulitis      Primary malignant neuroendocrine neoplasm of ileum (H)     Pulmonary nodules     Diarrhea, unspecified type- since sm bowel resection 11/2017     Hepatic steatosis - per MRI RUQ abdomen 4/12/2019 - liver not enlarged      Past Medical History:   Diagnosis Date     Allergic rhinitis, cause unspecified     year round - dog,dust-  Failed on claritin, claritin-D, zyrtec and zyrtec-D in past.      Benign neoplasm of cerebral meninges (H) 1999    has yearly MRI's. - sees Dr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 Thomas ENT      Cancer (H)      Depressive disorder      Deviated nasal septum     sees Dr. Thomas ENT      Diverticulosis of colon (without mention of hemorrhage) 02-     History of Guillain-Winn syndrome     NO FLU SHOTS - very mild case in [place] many years ago     Hyperlipidemia LDL goal < 130 doesn't want statins    simvastatin = severe hand joint pain , lipitor =nausea/abd. pain     Hypertension goal BP (blood pressure) < 140/90      Insomnia     trazodone -made pt feel hung over      Major depressive disorder, recurrent episode, moderate (H)      lexapro - sexual side effects      Moderate major depression (H) 2/4/2005    trazodone -made pt feel hung over      Other acne      Other extrapyramidal disease and abnormal movement disorder     ?GBS     Symptomatic menopausal or female climacteric states     vivelle-dot        CC No referring provider defined for this encounter. on close of this encounter.

## 2022-03-07 NOTE — TELEPHONE ENCOUNTER
PA Initiation    Medication: Humira - Pending  Insurance Company: [employer] [place] - Phone [phone] Fax [phone]  Pharmacy Filling the Rx: [facility] - [place], [place] - [facility] [address]  Filling Pharmacy Phone:    Filling Pharmacy Fax:    Start Date: 3/7/2022    Key: [id number]

## 2022-03-07 NOTE — NURSING NOTE
Chief Complaint   Patient presents with     Derm Problem     Connie, is here for her psoriasis, no other concerns     Ciro Salgado EMT

## 2022-03-07 NOTE — TELEPHONE ENCOUNTER
[facility] Call Center    Phone Message    May a detailed message be left on voicemail: yes     Reason for Call: Symptoms or Concerns     If patient has red-flag symptoms, warm transfer to triage line    Current symptom or concern: Psoriasis flare up   Started a z-pac 6 days ago for a upper respiratory and sinus infection.  Looks like chicken pox and starting on face. Affecting Pt's sleep and up half of the night. Pt said she can't live like this. Pt would like to know if she could stop her apremilast (OTEZLA) 30 MG tablet?     Symptoms have been present for:  6 day(s)    Has patient previously been seen for this? No    By : NA    Date: NA    Are there any new or worsening symptoms? Yes: Pt is getting worse and never improved since last Appt. 01/24/22  Please call Pt to discuss. Thanks      Action Taken: Message routed to:  [facility] Derm    Travel Screening: Not Applicable

## 2022-03-07 NOTE — LETTER
3/7/2022       RE: Connie Brunson  [address] [facility] [address]     Dear Colleague,    Thank you for referring your patient, Connie Brunson, to the [facility] [place] at [facility]. Please see a copy of my visit note below.    [facility] Dermatology Note  Encounter Date: Mar 7, 2022  Doximity video Connected.    Dermatology Problem List:  1. Psoriasis: guttate, plaque, scalp, hands  - current: apremilast, topicals (triamcinolone, calcipotriene, betamethasone), cetirizine 10 mg BID, hydroxyzine 25 mg at bedtime, home nbUVB   - plan for adalimumab  - in reserve: methotrexate (okayed per oncology)  2. 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 Psoriasis: poorly controlled on current regimen with apremilast, topicals, nbUVB phototherapy. Likely had recent flare in context of URI/sinus infection, leading to widespread guttate lesions. Methotrexate okayed per oncology but adalimumab not evaluated. I do favor adalimumab given better efficacy, fewer side effects, and lower COVID-19-associated risk than methotrexate, but will confirm with oncology. In the interim, will start process with insurance and get screening labs.  - check QFN, hepatitis panel  - start adalimumab once okayed by oncology    Procedures Performed:    None    Follow-up: 3 months    Staff:     Nick Menjivar MD, FAAD   of Dermatology  Department of Dermatology  [facility]    ____________________________________________    CC: Derm Problem (Connie, is here for her psoriasis, no other concerns )    HPI:  Ms. Connie Brunson is a(n) 61 year old female who presents today as a return patient for psoriasis    Psoriasis - has worsened since last visit - flaring all over  - started  azithromycin 6 days ago for sinus infection/chest congestion  - since starting that, spreading onto arms - lots of little spots - all over body   - covering armpits, across belly  - hands, scalp getting worse  - apremilast not working    Patient is otherwise feeling well, without additional skin concerns.    Labs Reviewed:  N/A    Physical Exam:  Vitals: LMP 09/05/2000   SKIN: video images were reviewed; image quality and interpretability: acceptable. Image date: n/a.  - numerous erythematous scale thin papules on the extremities  - No other lesions of concern on areas examined.     Medications:  Current Outpatient Medications   Medication     apremilast (OTEZLA) 30 MG tablet     augmented betamethasone dipropionate (DIPROLENE-AF) 0.05 % external cream     augmented betamethasone dipropionate (DIPROLENE-AF) 0.05 % external cream     azithromycin (ZITHROMAX) 250 MG tablet     betamethasone dipropionate (DIPROSONE) 0.05 % external lotion     calcipotriene (DOVONOX) 0.005 % external ointment     cetirizine (ZYRTEC) 10 MG tablet     clobetasol (TEMOVATE) 0.05 % external cream     clobetasol (TEMOVATE) 0.05 % external solution     diazepam (VALIUM) 10 MG tablet     fenofibrate (TRIGLIDE/LOFIBRA) 160 MG tablet     fluticasone (FLONASE) 50 MCG/ACT nasal spray     hydrOXYzine (ATARAX) 25 MG tablet     lisinopril (ZESTRIL) 10 MG tablet     mometasone (ELOCON) 0.1 % cream     nicotine (NICORETTE) 2 MG gum     omeprazole (PRILOSEC) 20 MG DR capsule     triamcinolone (ARISTOCORT HP) 0.5 % external cream     triamcinolone (KENALOG) 0.025 % external ointment     triamcinolone (KENALOG) 0.025 % external ointment     triamterene-HCTZ (DYAZIDE) 37.5-25 MG capsule     venlafaxine (EFFEXOR) 37.5 MG tablet     Current Facility-Administered Medications   Medication     triamcinolone acetonide (KENALOG-10) injection 10 mg      Past Medical/Surgical History:   Patient Active Problem List   Diagnosis     Allergic rhinitis      Diverticulosis of large intestine     Benign meningioma-right frontal posterior - no more annual MRI's needed per neurosurgery     Symptomatic menopausal or female climacteric states     Benign neoplasm of tonsil     Anxiety     HYPERLIPIDEMIA LDL GOAL <130 [272.4CK] - joint aches w/ simvastatin 11/2010,lipitor=nausea/abd pain 1/2012     Primary insomnia     Major depressive disorder, recurrent episode, mild (H)     Essential hypertension with goal blood pressure less than 140/90     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- ophtho recommended auto-immune/arthritis work-up     Epiploic appendagitis- 2008 and 10/26/2014     Controlled substance agreement signed- re-signed 3/7/2018 ambien #30 -5mg tabs monthly - refill x5 - ok to see q6 months      Gastroesophageal reflux disease without esophagitis     Hypertriglyceridemia     S/P laparoscopic cholecystectomy for acute cholecystitis with cholelithiasis     Postsurgical dumping syndrome - s/p lap shashi - consider cholestyramine     Mass of small intestine- distal ileum - seen on CT scan at time of diverticulitis      Primary malignant neuroendocrine neoplasm of ileum (H)     Pulmonary nodules     Diarrhea, unspecified type- since sm bowel resection 11/2017     Hepatic steatosis - per MRI RUQ abdomen 4/12/2019 - liver not enlarged      Past Medical History:   Diagnosis Date     Allergic rhinitis, cause unspecified     year round - dog,dust-  Failed on claritin, claritin-D, zyrtec and zyrtec-D in past.      Benign neoplasm of cerebral meninges (H) 1999    has yearly MRI's. - sees 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 Thomas ENT      Cancer (H)      Depressive disorder      Deviated nasal septum     sees Dr. Thomas ENT      Diverticulosis of colon (without mention of hemorrhage) 02-     History of Guillain-[place] syndrome     NO FLU SHOTS - very  mild case in [place] many years ago     Hyperlipidemia LDL goal < 130 doesn't want statins    simvastatin = severe hand joint pain , lipitor =nausea/abd. pain     Hypertension goal BP (blood pressure) < 140/90      Insomnia     trazodone -made pt feel hung over      Major depressive disorder, recurrent episode, moderate (H)     lexapro - sexual side effects      Moderate major depression (H) 2/4/2005    trazodone -made pt feel hung over      Other acne      Other extrapyramidal disease and abnormal movement disorder     ?GBS     Symptomatic menopausal or female climacteric states     vivelle-dot        CC No referring provider defined for this encounter. on close of this encounter.

## 2022-03-07 NOTE — TELEPHONE ENCOUNTER
Called patient and scheduled with Dr. Menjivar today at 1:15 for a video visit to discuss concerns.    Gwen Hayward CMA on 3/7/2022 at 9:28 AM

## 2022-03-08 NOTE — TELEPHONE ENCOUNTER
Prior Authorization Approval    Authorization Effective Date: 3/7/2022  Authorization Expiration Date: 3/7/2023  Medication: Humira - Approved  Approved Dose/Quantity: UD  Reference #: CMM Key: [id number]   Insurance Company: [facility] - Phone [phone] Fax [phone]  Expected CoPay:       CoPay Card Available:      Foundation Assistance Needed:    Which Pharmacy is filling the prescription (Not needed for infusion/clinic administered): [facility] - [facility] [address]  Pharmacy Notified:    Patient Notified:

## 2022-03-16 ENCOUNTER — LAB (OUTPATIENT)
Dept: LAB | Facility: CLINIC | Age: 62
End: 2022-03-16
Payer: COMMERCIAL

## 2022-03-16 DIAGNOSIS — D84.9 IMMUNOSUPPRESSION (H): ICD-10-CM

## 2022-03-16 LAB
HBV CORE AB SERPL QL IA: NONREACTIVE
HBV SURFACE AB SERPL IA-ACNC: 0.56 M[IU]/ML
HBV SURFACE AG SERPL QL IA: NONREACTIVE
HCV AB SERPL QL IA: NONREACTIVE

## 2022-03-16 PROCEDURE — 86706 HEP B SURFACE ANTIBODY: CPT

## 2022-03-16 PROCEDURE — 86803 HEPATITIS C AB TEST: CPT

## 2022-03-16 PROCEDURE — 86481 TB AG RESPONSE T-CELL SUSP: CPT

## 2022-03-16 PROCEDURE — 86704 HEP B CORE ANTIBODY TOTAL: CPT

## 2022-03-16 PROCEDURE — 87340 HEPATITIS B SURFACE AG IA: CPT

## 2022-03-16 PROCEDURE — 36415 COLL VENOUS BLD VENIPUNCTURE: CPT

## 2022-03-17 LAB
GAMMA INTERFERON BACKGROUND BLD IA-ACNC: 0.02 IU/ML
M TB IFN-G BLD-IMP: NEGATIVE
M TB IFN-G CD4+ BCKGRND COR BLD-ACNC: 2.12 IU/ML
MITOGEN IGNF BCKGRD COR BLD-ACNC: 0 IU/ML
MITOGEN IGNF BCKGRD COR BLD-ACNC: 0 IU/ML
QUANTIFERON MITOGEN: 2.14 IU/ML
QUANTIFERON NIL TUBE: 0.02 IU/ML
QUANTIFERON TB1 TUBE: 0.02 IU/ML
QUANTIFERON TB2 TUBE: 0.02

## 2022-03-21 ENCOUNTER — TELEPHONE (OUTPATIENT)
Dept: DERMATOLOGY | Facility: CLINIC | Age: 62
End: 2022-03-21
Payer: COMMERCIAL

## 2022-03-21 DIAGNOSIS — L40.0 PLAQUE PSORIASIS: ICD-10-CM

## 2022-03-21 NOTE — TELEPHONE ENCOUNTER
Medications were sent on 3/7 - PA was approved.  Connie notes that the pharmacy has not received the RX.    Calling pharmacy to clarify what is going on with this prescription.

## 2022-03-21 NOTE — TELEPHONE ENCOUNTER
[facility] Call Center    Phone Message    May a detailed message be left on voicemail: no     Reason for Call: Medication Question or concern regarding medication   Prescription Clarification  Name of Medication: adalimumab (HUMIRA *CF*) 40 MG/0.4ML pen kit/ adalimumab (HUMIRA *CF*) 80 MG/0.8ML pen kit  Prescribing Provider: Dr. Tiara MD   Pharmacy: [facility] - [place] [address]   What on the order needs clarification? Pt, Connie waiting for these prescriptions at the  Specialty Pharmacy.  Please call [phone] when completed.  Connie          Action Taken: Message routed to:  [facility] DERM    Travel Screening: Not Applicable

## 2022-03-22 NOTE — TELEPHONE ENCOUNTER
The pt called back for a status. She is miserable waiting on the med, she is scratching all nite, etc. Please see if you can rush this. Thanks.

## 2022-03-28 DIAGNOSIS — L40.9 PSORIASIS: ICD-10-CM

## 2022-03-31 RX ORDER — BETAMETHASONE DIPROPIONATE 0.5 MG/G
LOTION TOPICAL 2 TIMES DAILY
Qty: 60 ML | Refills: 4 | Status: SHIPPED | OUTPATIENT
Start: 2022-03-31 | End: 2023-04-26

## 2022-03-31 NOTE — TELEPHONE ENCOUNTER
betamethasone dipropionate (DIPROSONE) 0.05 % external lotion  Apply topically 2 times daily To affected area on scalp for 4-6 weeks. Do not use on face or body folds.   Last Written Prescription Date:  1/7/22  Last Fill Quantity: 60 ml ,   # refills: 4  Last Office Visit : 3/7/22  Future Office visit:  3 months    RF process #1

## 2022-04-09 ENCOUNTER — HEALTH MAINTENANCE LETTER (OUTPATIENT)
Age: 62
End: 2022-04-09

## 2022-04-15 ENCOUNTER — MYC MEDICAL ADVICE (OUTPATIENT)
Dept: DERMATOLOGY | Facility: CLINIC | Age: 62
End: 2022-04-15
Payer: COMMERCIAL

## 2022-04-15 DIAGNOSIS — L29.9 PRURITUS: Primary | ICD-10-CM

## 2022-04-15 RX ORDER — GABAPENTIN 300 MG/1
300 CAPSULE ORAL AT BEDTIME
Qty: 90 CAPSULE | Refills: 3 | Status: SHIPPED | OUTPATIENT
Start: 2022-04-15 | End: 2022-06-08

## 2022-04-25 DIAGNOSIS — K21.9 GASTROESOPHAGEAL REFLUX DISEASE WITHOUT ESOPHAGITIS: ICD-10-CM

## 2022-04-29 ASSESSMENT — ENCOUNTER SYMPTOMS
FREQUENCY: 0
CONSTIPATION: 0
NERVOUS/ANXIOUS: 0
DIARRHEA: 0
ABDOMINAL PAIN: 0
JOINT SWELLING: 0
HEADACHES: 0
COUGH: 0
WEAKNESS: 0
ARTHRALGIAS: 0
PARESTHESIAS: 0
HEMATOCHEZIA: 0
CHILLS: 0
BREAST MASS: 0
SHORTNESS OF BREATH: 0
DYSURIA: 0
EYE PAIN: 0
MYALGIAS: 0
DIZZINESS: 0
FEVER: 0
NAUSEA: 0
HEMATURIA: 0
PALPITATIONS: 0
SORE THROAT: 0
HEARTBURN: 0

## 2022-05-02 ENCOUNTER — OFFICE VISIT (OUTPATIENT)
Dept: FAMILY MEDICINE | Facility: CLINIC | Age: 62
End: 2022-05-02
Payer: COMMERCIAL

## 2022-05-02 VITALS
SYSTOLIC BLOOD PRESSURE: 148 MMHG | HEIGHT: 59 IN | HEART RATE: 83 BPM | RESPIRATION RATE: 16 BRPM | DIASTOLIC BLOOD PRESSURE: 90 MMHG | OXYGEN SATURATION: 96 % | BODY MASS INDEX: 32.48 KG/M2 | WEIGHT: 161.1 LBS | TEMPERATURE: 97.4 F

## 2022-05-02 DIAGNOSIS — Z00.00 ROUTINE PHYSICAL EXAMINATION: Primary | ICD-10-CM

## 2022-05-02 DIAGNOSIS — F33.0 MAJOR DEPRESSIVE DISORDER, RECURRENT EPISODE, MILD (H): ICD-10-CM

## 2022-05-02 DIAGNOSIS — F17.210 CIGARETTE NICOTINE DEPENDENCE WITHOUT COMPLICATION: ICD-10-CM

## 2022-05-02 DIAGNOSIS — I10 ESSENTIAL HYPERTENSION WITH GOAL BLOOD PRESSURE LESS THAN 140/90: ICD-10-CM

## 2022-05-02 DIAGNOSIS — E78.1 HYPERTRIGLYCERIDEMIA: ICD-10-CM

## 2022-05-02 DIAGNOSIS — E78.5 HYPERLIPIDEMIA LDL GOAL <130: ICD-10-CM

## 2022-05-02 DIAGNOSIS — K21.9 GASTROESOPHAGEAL REFLUX DISEASE WITHOUT ESOPHAGITIS: ICD-10-CM

## 2022-05-02 PROCEDURE — 99396 PREV VISIT EST AGE 40-64: CPT | Performed by: NURSE PRACTITIONER

## 2022-05-02 ASSESSMENT — ENCOUNTER SYMPTOMS
DIZZINESS: 0
CHILLS: 0
COUGH: 0
PARESTHESIAS: 0
WEAKNESS: 0
FEVER: 0
PALPITATIONS: 0
JOINT SWELLING: 0
MYALGIAS: 0
EYE PAIN: 0
SORE THROAT: 0
HEADACHES: 0
HEMATURIA: 0
CONSTIPATION: 0
ABDOMINAL PAIN: 0
NERVOUS/ANXIOUS: 0
HEARTBURN: 0
DYSURIA: 0
NAUSEA: 0
BREAST MASS: 0
SHORTNESS OF BREATH: 0
ARTHRALGIAS: 0
FREQUENCY: 0
DIARRHEA: 0
HEMATOCHEZIA: 0

## 2022-05-02 ASSESSMENT — ANXIETY QUESTIONNAIRES

## 2022-05-02 ASSESSMENT — PATIENT HEALTH QUESTIONNAIRE - PHQ9
SUM OF ALL RESPONSES TO PHQ QUESTIONS 1-9: 0
5. POOR APPETITE OR OVEREATING: NOT AT ALL

## 2022-05-02 NOTE — PROGRESS NOTES
SUBJECTIVE:   CC: Connie Brunson is an 61 year old woman who presents for preventive health visit.       Patient has been advised of split billing requirements and indicates understanding: Yes  Healthy Habits:     Getting at least 3 servings of Calcium per day:  Yes    Bi-annual eye exam:  Yes    Dental care twice a year:  Yes    Sleep apnea or symptoms of sleep apnea:  None    Diet:  Regular (no restrictions)    Frequency of exercise:  None    Taking medications regularly:  Yes    Medication side effects:  None    PHQ-2 Total Score: 0    Additional concerns today:  No      Will talk to oncology about colonoscopy.    Today's PHQ-2 Score:   PHQ-2 (  [employer]) 2022   Q1: Little interest or pleasure in doing things -   Q2: Feeling down, depressed or hopeless 0   PHQ-2 Score -   PHQ-2 Total Score (12-17 Years)- Positive if 3 or more points; Administer PHQ-A if positive -   Q1: Little interest or pleasure in doing things -   Q2: Feeling down, depressed or hopeless -   PHQ-2 Score -       Abuse: Current or Past (Physical, Sexual or Emotional) - No  Do you feel safe in your environment? Yes        Social History     Tobacco Use     Smoking status: Current Every Day Smoker     Packs/day: 1.00     Years: 5.00     Pack years: 5.00     Types: Cigarettes     Last attempt to quit: 1994     Years since quittin.3     Smokeless tobacco: Former User     Tobacco comment: 11/3/17 - occ e-cig use   Substance Use Topics     Alcohol use: Yes     Alcohol/week: 0.0 standard drinks     Comment: 3-5 drinks per week     If you drink alcohol do you typically have >3 drinks per day or >7 drinks per week? No    No flowsheet data found.    Reviewed orders with patient.  Reviewed health maintenance and updated orders accordingly - Yes  Lab work is in process    Breast Cancer Screening:    FHS-7:   Breast CA Risk Assessment (FHS-7) 2022   Did any of your first-degree relatives have breast or ovarian cancer? No   Did any of  your relatives have bilateral breast cancer? No   Did any man in your family have breast cancer? No   Did any woman in your family have breast and ovarian cancer? No   Did any woman in your family have breast cancer before age 50 y? No   Do you have 2 or more relatives with breast and/or ovarian cancer? No   Do you have 2 or more relatives with breast and/or bowel cancer? No       Mammogram Screening: Recommended mammography every 1-2 years with patient discussion and risk factor consideration  Pertinent mammograms are reviewed under the imaging tab.    History of abnormal Pap smear: NO - age 30-65 PAP every 5 years with negative HPV co-testing recommended  PAP / HPV 8/20/2010 3/27/2009 2/9/2007   PAP (Historical) NIL NIL NIL     Reviewed and updated as needed this visit by clinical staff   Tobacco  Allergies  Meds  Problems  Med Hx  Surg Hx  Fam Hx            Reviewed and updated as needed this visit by Provider                   Past Medical History:   Diagnosis Date     Allergic rhinitis, cause unspecified     year round - dog,dust-  Failed on claritin, claritin-D, zyrtec and zyrtec-D in past.      Benign neoplasm of cerebral meninges (H) 1999    has yearly MRI's. - 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 Thomas ENT      Cancer (H)      Depressive disorder      Deviated nasal septum     sees Dr. Thomas ENT      Diverticulosis of colon (without mention of hemorrhage) 02-     History of Guillain-[place] syndrome     NO FLU SHOTS - very mild case in [place] many years ago     Hyperlipidemia LDL goal < 130 doesn't want statins    simvastatin = severe hand joint pain , lipitor =nausea/abd. pain     Hypertension goal BP (blood pressure) < 140/90      Insomnia     trazodone -made pt feel hung over      Major depressive disorder, recurrent episode, moderate (H)     lexapro - sexual side effects       Moderate major depression (H) 2/4/2005    trazodone -made pt feel hung over      Other acne      Other extrapyramidal disease and abnormal movement disorder     ?GBS     Symptomatic menopausal or female climacteric states     vivelle-dot       Past Surgical History:   Procedure Laterality Date     ABDOMEN SURGERY       BIOPSY       COLONOSCOPY  1/16/2012    Procedure:COLONOSCOPY; Colonoscopy; Surgeon:SHOLA JOYCE; Location: GI     HC REMOVE TONSILS/ADENOIDS,<11 Y/O      T and A, under 12 yrs     HEAD & NECK SURGERY       LAPAROSCOPIC CHOLECYSTECTOMY WITH CHOLANGIOGRAMS N/A 4/25/2016    Procedure: LAPAROSCOPIC CHOLECYSTECTOMY WITH CHOLANGIOGRAMS;  Surgeon: Rosa M Cristobal MD;  Location: [facility]     LAPAROSCOPY DIAGNOSTIC (GENERAL) N/A 11/7/2017    Procedure: LAPAROSCOPY DIAGNOSTIC (GENERAL);  Exploratory Laparoscopy, Laparotomy and Small Bowel resection.;  Surgeon: Rosa M Cristobal MD;  Location: [facility] OR     LAPAROTOMY EXPLORATORY N/A 11/7/2017    Procedure: LAPAROTOMY EXPLORATORY;;  Surgeon: Rosa M Cristobal MD;  Location: [facility] OR     RESECT SMALL BOWEL WITHOUT OSTOMY N/A 11/7/2017    Procedure: RESECT SMALL BOWEL WITHOUT OSTOMY;;  Surgeon: Rosa M Cristobal MD;  Location: [facility] OR     SURGICAL HISTORY OF -   2004    Menigioma excision     ZZC LIGATE FALLOPIAN TUBE  1988    Tubal Ligation     ZZ NONSPECIFIC PROCEDURE      PAROTID TUMOR L SIDE OF NECK - BENIGN     ZZC TOTAL ABDOM HYSTERECTOMY  2000 ?    Hysterectomy, Total Abdominal with BSO       Review of Systems   Constitutional: Negative for chills and fever.   HENT: Positive for hearing loss. Negative for congestion, ear pain and sore throat.    Eyes: Negative for pain and visual disturbance.   Respiratory: Negative for cough and shortness of breath.    Cardiovascular: Negative for chest pain, palpitations and peripheral edema.   Gastrointestinal: Negative for abdominal pain, constipation, diarrhea, heartburn, hematochezia and  "nausea.   Breasts:  Negative for tenderness, breast mass and discharge.   Genitourinary: Negative for dysuria, frequency, genital sores, hematuria, pelvic pain, urgency, vaginal bleeding and vaginal discharge.   Musculoskeletal: Negative for arthralgias, joint swelling and myalgias.   Skin: Negative for rash.   Neurological: Negative for dizziness, weakness, headaches and paresthesias.   Psychiatric/Behavioral: Negative for mood changes. The patient is not nervous/anxious.           OBJECTIVE:   BP (!) 148/90   Pulse 83   Temp 97.4  F (36.3  C) (Tympanic)   Resp 16   Ht 1.499 m (4' 11\")   Wt 73.1 kg (161 lb 1.6 oz)   LMP 09/05/2000   SpO2 96%   BMI 32.54 kg/m    Physical Exam  GENERAL: healthy, alert and no distress  EYES: Eyes grossly normal to inspection, PERRL and conjunctivae and sclerae normal  HENT: ear canals and TM's normal, nose and mouth without ulcers or lesions  NECK: no adenopathy, no asymmetry, masses, or scars and thyroid normal to palpation  RESP: lungs clear to auscultation - no rales, rhonchi or wheezes  BREAST: normal without masses, tenderness or nipple discharge and no palpable axillary masses or adenopathy  CV: regular rate and rhythm, normal S1 S2, no S3 or S4, no murmur, click or rub, no peripheral edema and peripheral pulses strong  ABDOMEN: soft, nontender, no hepatosplenomegaly, no masses and bowel sounds normal  MS: no gross musculoskeletal defects noted, no edema  SKIN: psoriasis covers most of legs, arms, lower back/buttocks  NEURO: Normal strength and tone, mentation intact and speech normal  PSYCH: mentation appears normal, affect normal/bright    Diagnostic Test Results:  Labs reviewed in Epic    ASSESSMENT/PLAN:   Connie was seen today for physical.    Diagnoses and all orders for this visit:    Routine physical examination    Essential hypertension with goal blood pressure less than 140/90  -     Albumin Random Urine Quantitative with Creat Ratio; Future    Cigarette nicotine " "dependence without complication  -     nicotine (NICORETTE) 2 MG gum; Place 1 each (2 mg) inside cheek every hour as needed for smoking cessation    HYPERLIPIDEMIA LDL GOAL <130 [272.4CK] - joint aches w/ simvastatin 11/2010,lipitor=nausea/abd pain 1/2012  -     Lipid panel reflex to direct LDL Fasting; Future    Gastroesophageal reflux disease without esophagitis    Hypertriglyceridemia    Major depressive disorder, recurrent episode, mild (H)  Currently stable      Patient has been advised of split billing requirements and indicates understanding: Yes    COUNSELING:  Reviewed preventive health counseling, as reflected in patient instructions    Estimated body mass index is 32.54 kg/m  as calculated from the following:    Height as of this encounter: 1.499 m (4' 11\").    Weight as of this encounter: 73.1 kg (161 lb 1.6 oz).    Weight management plan: Discussed healthy diet and exercise guidelines    She reports that she has been smoking cigarettes. She has a 5.00 pack-year smoking history. She has quit using smokeless tobacco.  Tobacco Cessation Action Plan:   Information offered: Patient not interested at this time      Counseling Resources:  ATP IV Guidelines  Pooled Cohorts Equation Calculator  Breast Cancer Risk Calculator  BRCA-Related Cancer Risk Assessment: FHS-7 Tool  FRAX Risk Assessment  ICSI Preventive Guidelines  Dietary Guidelines for Americans, 2010  USDA's MyPlate  ASA Prophylaxis  Lung CA Screening    Emilia Funk, [facility]  "

## 2022-05-03 ASSESSMENT — ANXIETY QUESTIONNAIRES: GAD7 TOTAL SCORE: 0

## 2022-05-16 DIAGNOSIS — L40.9 PSORIASIS: ICD-10-CM

## 2022-05-17 RX ORDER — TRIAMCINOLONE ACETONIDE 0.25 MG/G
OINTMENT TOPICAL 2 TIMES DAILY
Qty: 454 G | Refills: 3 | Status: SHIPPED | OUTPATIENT
Start: 2022-05-17 | End: 2023-04-26

## 2022-05-17 NOTE — TELEPHONE ENCOUNTER
triamcinolone (KENALOG) 0.025 % external ointment  Last Written Prescription Date:   12/17/2021  Last Fill Quantity: 454,   # refills: 3  Last Office Visit :  3/7/2022  Future Office visit:  None   454 g, 3 Refills sent to pharm 5/17/2022      Annel Carranza RN  Central Triage Red Flags/Med Refills

## 2022-05-19 DIAGNOSIS — L40.9 PSORIASIS: ICD-10-CM

## 2022-05-20 RX ORDER — HYDROXYZINE HYDROCHLORIDE 25 MG/1
25 TABLET, FILM COATED ORAL AT BEDTIME
Qty: 30 TABLET | Refills: 1 | Status: SHIPPED | OUTPATIENT
Start: 2022-05-20 | End: 2022-07-19

## 2022-05-20 NOTE — TELEPHONE ENCOUNTER
Received refill request for hydroxyzine as the resident on call. Reviewed patient's chart and attached communication. Patient last seen 3/7/22 for psoriasis. RTC not currently scheduled, but was recommended for 3 months. After reviewing the medication list and assessment and plan from last visit, the refill request was accepted for one month s supply until patient can be scheduled in clinic. No further refills should be granted until patient is scheduled in clinic.    The patient's information will be forwarded to the scheduling pool for return visit as planned at prior visit.    CC'ing Dr. Menjivar as a ESTELITA Barron  PGY-4 Dermatology Resident  Pager: [phone]

## 2022-05-20 NOTE — TELEPHONE ENCOUNTER
hydrOXYzine (ATARAX) 25 MG tablet   Last Written Prescription Date:   1/24/2022  Last Fill Quantity: 30,   # refills: 3  Last Office Visit :  3/7/2022  Future Office visit:  None  Routing refill request to provider for review/approval because:  Pt needing a follow up appointment  Refer to clinic/provider for review       Annel Carranza RN  Central Triage Red Flags/Med Refills    Assessment & Plan: 3/7/2022     1. Psoriasis: poorly controlled on current regimen with apremilast, topicals, nbUVB phototherapy. Likely had recent flare in context of URI/sinus infection, leading to widespread guttate lesions. Methotrexate okayed per oncology but adalimumab not evaluated. I do favor adalimumab given better efficacy, fewer side effects, and lower COVID-19-associated risk than methotrexate, but will confirm with oncology. In the interim, will start process with insurance and get screening labs.  - check QFN, hepatitis panel  - start adalimumab once okayed by oncology     Procedures Performed:    None     Follow-up: 3 months

## 2022-05-28 ENCOUNTER — MYC MEDICAL ADVICE (OUTPATIENT)
Dept: DERMATOLOGY | Facility: CLINIC | Age: 62
End: 2022-05-28
Payer: COMMERCIAL

## 2022-06-07 ENCOUNTER — MYC MEDICAL ADVICE (OUTPATIENT)
Dept: DERMATOLOGY | Facility: CLINIC | Age: 62
End: 2022-06-07
Payer: COMMERCIAL

## 2022-06-07 DIAGNOSIS — L29.9 PRURITUS: ICD-10-CM

## 2022-06-08 RX ORDER — GABAPENTIN 300 MG/1
600 CAPSULE ORAL AT BEDTIME
Qty: 180 CAPSULE | Refills: 3 | Status: SHIPPED | OUTPATIENT
Start: 2022-06-08 | End: 2022-10-05

## 2022-06-18 DIAGNOSIS — E78.1 HYPERTRIGLYCERIDEMIA: ICD-10-CM

## 2022-06-20 NOTE — TELEPHONE ENCOUNTER
Routing refill request to provider for review/approval because:  Fibrates Failed 06/18/2022 09:38 AM   Protocol Details  Lipid panel on file in past 12 months     5/2/2022      Please advise     Thank you     Samantha Escoto RN, BSN  [facility]

## 2022-06-22 RX ORDER — FENOFIBRATE 160 MG/1
TABLET ORAL
Qty: 90 TABLET | Refills: 0 | Status: SHIPPED | OUTPATIENT
Start: 2022-06-22 | End: 2022-09-23

## 2022-07-07 ENCOUNTER — OFFICE VISIT (OUTPATIENT)
Dept: DERMATOLOGY | Facility: CLINIC | Age: 62
End: 2022-07-07
Payer: COMMERCIAL

## 2022-07-07 DIAGNOSIS — L40.50 PSORIATIC ARTHRITIS (H): ICD-10-CM

## 2022-07-07 DIAGNOSIS — D84.9 IMMUNOSUPPRESSION (H): ICD-10-CM

## 2022-07-07 DIAGNOSIS — L40.0 PLAQUE PSORIASIS: Primary | ICD-10-CM

## 2022-07-07 PROCEDURE — 99214 OFFICE O/P EST MOD 30 MIN: CPT | Performed by: DERMATOLOGY

## 2022-07-07 ASSESSMENT — PAIN SCALES - GENERAL: PAINLEVEL: NO PAIN (0)

## 2022-07-07 NOTE — LETTER
7/7/2022       RE: Connie Brunson  [address] [facility] [address]     Dear Colleague,    Thank you for referring your patient, Connie Brunson, to the [facility] [place] at [facility]. Please see a copy of my visit note below.    [facility] Dermatology Note    Encounter Date: Jul 7, 2022    Dermatology Problem List:  1. Psoriasis: guttate, plaque, scalp, hands  - current: adalimumab, topicals (triamcinolone, calcipotriene, betamethasone), cetirizine 10 mg BID, hydroxyzine 25 mg at bedtime, home nbUVB, gabapentin   - prior: apremilast  - plan for IL-23, IL-17, or IL-12/23 inhibitor per oncology preference  - in reserve: methotrexate (okayed per oncology)  2. 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 Psoriasis: with suspected psoriatic arthritis, versus less likely TNF-induced lupus. Her cutaneous disease is essentially uncontrolled after 3 months of adalimumab and she still has >20% BSA with >40% affected BSA. While she has demonstrated some modest improvement, it is certainly not to an acceptable level and far below expected at this interval. Given this lack of adequate response, recommend switch to an alternative biologic. We discussed risks/benefits and will discuss also with her oncologist. I favor IL-23 > IL-17 > IL-12/23 from an effectiveness perspective, if okay by oncology. Has been using gabapentin for pruritus; ideally once psoriasis is well-controlled, pruritus will also improve so can taper off this.  - switch to alternative biologic pending oncology input  - in the interim, continue adalimumab, topicals, nbUVB, antihistamines, gabapentin      Follow-up in 3 months.       Staff Involved:  Staff Only    Over 34 minutes was spent during this visit on the date of service, including direct  contact time with the patient counseling and coordinating care as well as the discussion and documentation of diagnosis, natural history, treatment, and prognosis.      Nick Menjivar MD   of Dermatology  Department of Dermatology  [facility]      CC:   Chief Complaint   Patient presents with     Psoriasis     Plaque psoriasis, states it is getting better, wants to converse about side effects        History of Present Illness:  Ms. Connie Brunson is a 61 year old female who presents as a return patient for psoriasis    Psoriasis - a little better - especially on scalp - no longer symptomatic  - arms, upper body a little better but still lots of activity  - legs not much improved  - getting lots of smaller spots that come and go  - also pain in joints of fingers and wrists, worse in morning   - was severe for a few weeks but a little better now   - has had some fatigue; no other rashes aside from poorly controlled psoriasis    Labs:  N/A    Past Medical History:   Past Medical History:   Diagnosis Date     Allergic rhinitis, cause unspecified     year round - dog,dust-  Failed on claritin, claritin-D, zyrtec and zyrtec-D in past.      Benign neoplasm of cerebral meninges (H) 1999    has yearly MRI's. - 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 Thomas ENT      Cancer (H)      Depressive disorder      Deviated nasal septum     sees Dr. Thomas ENT      Diverticulosis of colon (without mention of hemorrhage) 02-     History of Guillain-[place] syndrome     NO FLU SHOTS - very mild case in [place] many years ago     Hyperlipidemia LDL goal < 130 doesn't want statins    simvastatin = severe hand joint pain , lipitor =nausea/abd. pain     Hypertension goal BP (blood pressure) < 140/90      Insomnia     trazodone -made pt feel hung over      Major  depressive disorder, recurrent episode, moderate (H)     lexapro - sexual side effects      Moderate major depression (H) 2/4/2005    trazodone -made pt feel hung over      Other acne      Other extrapyramidal disease and abnormal movement disorder     ?GBS     Symptomatic menopausal or female climacteric states     vivelle-dot      Past Surgical History:   Procedure Laterality Date     ABDOMEN SURGERY       BIOPSY       COLONOSCOPY  1/16/2012    Procedure:COLONOSCOPY; Colonoscopy; Surgeon:SHOLA JOYCE; Location: GI     HC REMOVE TONSILS/ADENOIDS,<13 Y/O      T and A, under 12 yrs     HEAD & NECK SURGERY       LAPAROSCOPIC CHOLECYSTECTOMY WITH CHOLANGIOGRAMS N/A 4/25/2016    Procedure: LAPAROSCOPIC CHOLECYSTECTOMY WITH CHOLANGIOGRAMS;  Surgeon: Rosa M Cristobal MD;  Location: [facility] OR     LAPAROSCOPY DIAGNOSTIC (GENERAL) N/A 11/7/2017    Procedure: LAPAROSCOPY DIAGNOSTIC (GENERAL);  Exploratory Laparoscopy, Laparotomy and Small Bowel resection.;  Surgeon: Rosa M Cristobal MD;  Location: [facility] OR     LAPAROTOMY EXPLORATORY N/A 11/7/2017    Procedure: LAPAROTOMY EXPLORATORY;;  Surgeon: Rosa M Cristobal MD;  Location: [facility] OR     RESECT SMALL BOWEL WITHOUT OSTOMY N/A 11/7/2017    Procedure: RESECT SMALL BOWEL WITHOUT OSTOMY;;  Surgeon: Rosa M Cristobal MD;  Location: [facility] OR     SURGICAL HISTORY OF -   2004    Menigioma excision     Z LIGATE FALLOPIAN TUBE  1988    Tubal Ligation     ZZ NONSPECIFIC PROCEDURE      PAROTID TUMOR L SIDE OF NECK - BENIGN     ZZC TOTAL ABDOM HYSTERECTOMY  2000 ?    Hysterectomy, Total Abdominal with BSO       Social History:   reports that she has been smoking cigarettes. She has a 5.00 pack-year smoking history. She has quit using smokeless tobacco. She reports current alcohol use. She reports that she does not use drugs.    Family History:  Family History   Problem Relation Age of Onset     Hyperlipidemia Mother      Thyroid Disease Mother      Cancer Father          lung     Hypertension Father      Hyperlipidemia Father      Thyroid Disease Brother      Diabetes Maternal Grandmother      Cerebrovascular Disease Maternal Grandmother      Substance Abuse Maternal Grandfather      Thyroid Disease Son        Medications:  Current Outpatient Medications   Medication Sig Dispense Refill     adalimumab (HUMIRA *CF*) 40 MG/0.4ML pen kit Inject 0.4 mLs (40 mg) Subcutaneous every 14 days 0.8 mL 11     adalimumab (HUMIRA *CF*) 80 MG/0.8ML pen kit Inject 0.8 mL (80 mg) subcutaneous for 1 dose, followed by 40 mg every other week beginning 1 week after initial dose (see separate order) 0.8 mL 0     augmented betamethasone dipropionate (DIPROLENE-AF) 0.05 % external cream Apply topically 2 times daily as needed (Psoriasis) To affected area on hands and feet as needed for severe flares. Do not use on face or body folds. 100 g 2     betamethasone dipropionate (DIPROSONE) 0.05 % external lotion Apply topically 2 times daily To affected area on scalp for 4-6 weeks. Do not use on face or body folds. 60 mL 4     cetirizine (ZYRTEC) 10 MG tablet Take 10 mg by mouth every evening       diazepam (VALIUM) 10 MG tablet Take 1-2  tab or 10-20 mg 30-60 minutes prior to MRI scan. 4 tablet 1     fenofibrate (TRIGLIDE/LOFIBRA) 160 MG tablet TAKE ONE TABLET BY MOUTH ONCE DAILY 90 tablet 0     fluticasone (FLONASE) 50 MCG/ACT nasal spray Spray 2 sprays into both nostrils in the morning. 48 g 5     gabapentin (NEURONTIN) 300 MG capsule Take 2 capsules (600 mg) by mouth At Bedtime 180 capsule 3     hydrOXYzine (ATARAX) 25 MG tablet Take 1 tablet (25 mg) by mouth At Bedtime 30 tablet 1     lisinopril (ZESTRIL) 10 MG tablet Take 1 tablet (10 mg) by mouth daily 90 tablet 1     nicotine (NICORETTE) 2 MG gum Place 1 each (2 mg) inside cheek every hour as needed for smoking cessation 40 each 3     omeprazole (PRILOSEC) 20 MG DR capsule Take 1 capsule (20 mg) by mouth daily 90 capsule 3     triamcinolone  (KENALOG) 0.025 % external ointment Apply topically 2 times daily To body, arms, legs 454 g 3     triamcinolone (KENALOG) 0.025 % external ointment Apply topically 2 times daily Sparingly to buttocks or groin  for no more than 2 weeks 80 g 3     triamterene-HCTZ (DYAZIDE) 37.5-25 MG capsule Take 1 capsule by mouth daily 90 capsule 1     venlafaxine (EFFEXOR) 37.5 MG tablet Take 1 tablet (37.5 mg) by mouth daily Please call clinic and set up office visit for further refills 90 tablet 1     calcipotriene (DOVONOX) 0.005 % external ointment Apply to hands and feet BID when flared. (Patient not taking: Reported on 7/7/2022) 120 g 3     Allergies   Allergen Reactions     Atorvastatin Nausea     Nausea and abd pain      Ceftin GI Disturbance     Stomach upset and bloating - given at same time as clindamycin ? Which one as cause.       Clindamycin GI Disturbance     Stomach upset and bloating - given at same time as ceftin, ? Which one as cause      Flagyl [Metronidazole]      immediate migraine headache      Morphine Itching     Severe with nose, facial  Swelling - oxycodone = ok /no problems      Penicillins Hives     Sulfa Drugs Rash     Severe red , flushed rash     Levaquin Rash     States she can take cipro and avelox       Physical exam:  Vitals: LMP 09/05/2000   GEN: This is a well developed, well-nourished female in no acute distress, in a pleasant mood.    SKIN: Watson phototype II  - Total skin excluding the undergarment areas was performed. The exam included the head/face, neck, both arms, chest, back, abdomen, both legs, digits and/or nails.   - Widespread sharply demarcated salmon-colored papules and plaques with overlying micaceous scale on the trunk, all four extremities, and to a lesser extent, the scalp  - some joint swelling on the small joints of the hands  - No other lesions of concern on areas examined.

## 2022-07-07 NOTE — NURSING NOTE
Dermatology Rooming Note    Connie Brunson's goals for this visit include:   Chief Complaint   Patient presents with     Psoriasis     Plaque psoriasis, states it is getting better, wants to converse about side effects      Monica Joseph, Visit Facilitator

## 2022-07-07 NOTE — PROGRESS NOTES
[facility] Dermatology Note    Encounter Date: Jul 7, 2022    Dermatology Problem List:  1. Psoriasis: guttate, plaque, scalp, hands  - current: adalimumab, topicals (triamcinolone, calcipotriene, betamethasone), cetirizine 10 mg BID, hydroxyzine 25 mg at bedtime, home nbUVB, gabapentin   - prior: apremilast  - plan for IL-23, IL-17, or IL-12/23 inhibitor per oncology preference  - in reserve: methotrexate (okayed per oncology)  2. 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 Psoriasis: with suspected psoriatic arthritis, versus less likely TNF-induced lupus. Her cutaneous disease is essentially uncontrolled after 3 months of adalimumab and she still has >20% BSA with >40% affected BSA. While she has demonstrated some modest improvement, it is certainly not to an acceptable level and far below expected at this interval. Given this lack of adequate response, recommend switch to an alternative biologic. We discussed risks/benefits and will discuss also with her oncologist. I favor IL-23 > IL-17 > IL-12/23 from an effectiveness perspective, if okay by oncology. Has been using gabapentin for pruritus; ideally once psoriasis is well-controlled, pruritus will also improve so can taper off this.  - switch to alternative biologic pending oncology input  - in the interim, continue adalimumab, topicals, nbUVB, antihistamines, gabapentin      Follow-up in 3 months.       Staff Involved:  Staff Only    Over 34 minutes was spent during this visit on the date of service, including direct contact time with the patient counseling and coordinating care as well as the discussion and documentation of diagnosis, natural history, treatment, and prognosis.      Nick Menjivar MD   of Dermatology  Department of Dermatology  [facility]      CC:   Chief Complaint    Patient presents with     Psoriasis     Plaque psoriasis, states it is getting better, wants to converse about side effects        History of Present Illness:  Ms. Connie Brunson is a 61 year old female who presents as a return patient for psoriasis    Psoriasis - a little better - especially on scalp - no longer symptomatic  - arms, upper body a little better but still lots of activity  - legs not much improved  - getting lots of smaller spots that come and go  - also pain in joints of fingers and wrists, worse in morning   - was severe for a few weeks but a little better now   - has had some fatigue; no other rashes aside from poorly controlled psoriasis    Labs:  N/A    Past Medical History:   Past Medical History:   Diagnosis Date     Allergic rhinitis, cause unspecified     year round - dog,dust-  Failed on claritin, claritin-D, zyrtec and zyrtec-D in past.      Benign neoplasm of cerebral meninges (H) 1999    has yearly MRI's. - sees Dr. 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 Thomas ENT      Cancer (H)      Depressive disorder      Deviated nasal septum     sees Dr. Thomas ENT      Diverticulosis of colon (without mention of hemorrhage) 02-     History of Guillain-Quincy syndrome     NO FLU SHOTS - very mild case in [place] many years ago     Hyperlipidemia LDL goal < 130 doesn't want statins    simvastatin = severe hand joint pain , lipitor =nausea/abd. pain     Hypertension goal BP (blood pressure) < 140/90      Insomnia     trazodone -made pt feel hung over      Major depressive disorder, recurrent episode, moderate (H)     lexapro - sexual side effects      Moderate major depression (H) 2/4/2005    trazodone -made pt feel hung over      Other acne      Other extrapyramidal disease and abnormal movement disorder     ?GBS     Symptomatic menopausal or female climacteric states     vivelle-dot       Past Surgical History:   Procedure Laterality Date     ABDOMEN SURGERY       BIOPSY       COLONOSCOPY  1/16/2012    Procedure:COLONOSCOPY; Colonoscopy; Surgeon:SHOLA JOYCE; Location:[facility]     HC REMOVE TONSILS/ADENOIDS,<13 Y/O      T and A, under 12 yrs     HEAD & NECK SURGERY       LAPAROSCOPIC CHOLECYSTECTOMY WITH CHOLANGIOGRAMS N/A 4/25/2016    Procedure: LAPAROSCOPIC CHOLECYSTECTOMY WITH CHOLANGIOGRAMS;  Surgeon: Rosa M Cristobal MD;  Location: [facility] OR     LAPAROSCOPY DIAGNOSTIC (GENERAL) N/A 11/7/2017    Procedure: LAPAROSCOPY DIAGNOSTIC (GENERAL);  Exploratory Laparoscopy, Laparotomy and Small Bowel resection.;  Surgeon: Rosa M Cristobal MD;  Location: [facility] OR     LAPAROTOMY EXPLORATORY N/A 11/7/2017    Procedure: LAPAROTOMY EXPLORATORY;;  Surgeon: Rosa M Cristobal MD;  Location: [facility] OR     RESECT SMALL BOWEL WITHOUT OSTOMY N/A 11/7/2017    Procedure: RESECT SMALL BOWEL WITHOUT OSTOMY;;  Surgeon: Rosa M Cristobal MD;  Location: [facility] OR     SURGICAL HISTORY OF -   2004    Menigioma excision     ZZC LIGATE FALLOPIAN TUBE  1988    Tubal Ligation     ZZC NONSPECIFIC PROCEDURE      PAROTID TUMOR L SIDE OF NECK - BENIGN     ZZC TOTAL ABDOM HYSTERECTOMY  2000 ?    Hysterectomy, Total Abdominal with BSO       Social History:   reports that she has been smoking cigarettes. She has a 5.00 pack-year smoking history. She has quit using smokeless tobacco. She reports current alcohol use. She reports that she does not use drugs.    Family History:  Family History   Problem Relation Age of Onset     Hyperlipidemia Mother      Thyroid Disease Mother      Cancer Father         lung     Hypertension Father      Hyperlipidemia Father      Thyroid Disease Brother      Diabetes Maternal Grandmother      Cerebrovascular Disease Maternal Grandmother      Substance Abuse Maternal Grandfather      Thyroid Disease Son        Medications:  Current Outpatient Medications   Medication Sig Dispense Refill      adalimumab (HUMIRA *CF*) 40 MG/0.4ML pen kit Inject 0.4 mLs (40 mg) Subcutaneous every 14 days 0.8 mL 11     adalimumab (HUMIRA *CF*) 80 MG/0.8ML pen kit Inject 0.8 mL (80 mg) subcutaneous for 1 dose, followed by 40 mg every other week beginning 1 week after initial dose (see separate order) 0.8 mL 0     augmented betamethasone dipropionate (DIPROLENE-AF) 0.05 % external cream Apply topically 2 times daily as needed (Psoriasis) To affected area on hands and feet as needed for severe flares. Do not use on face or body folds. 100 g 2     betamethasone dipropionate (DIPROSONE) 0.05 % external lotion Apply topically 2 times daily To affected area on scalp for 4-6 weeks. Do not use on face or body folds. 60 mL 4     cetirizine (ZYRTEC) 10 MG tablet Take 10 mg by mouth every evening       diazepam (VALIUM) 10 MG tablet Take 1-2  tab or 10-20 mg 30-60 minutes prior to MRI scan. 4 tablet 1     fenofibrate (TRIGLIDE/LOFIBRA) 160 MG tablet TAKE ONE TABLET BY MOUTH ONCE DAILY 90 tablet 0     fluticasone (FLONASE) 50 MCG/ACT nasal spray Spray 2 sprays into both nostrils in the morning. 48 g 5     gabapentin (NEURONTIN) 300 MG capsule Take 2 capsules (600 mg) by mouth At Bedtime 180 capsule 3     hydrOXYzine (ATARAX) 25 MG tablet Take 1 tablet (25 mg) by mouth At Bedtime 30 tablet 1     lisinopril (ZESTRIL) 10 MG tablet Take 1 tablet (10 mg) by mouth daily 90 tablet 1     nicotine (NICORETTE) 2 MG gum Place 1 each (2 mg) inside cheek every hour as needed for smoking cessation 40 each 3     omeprazole (PRILOSEC) 20 MG DR capsule Take 1 capsule (20 mg) by mouth daily 90 capsule 3     triamcinolone (KENALOG) 0.025 % external ointment Apply topically 2 times daily To body, arms, legs 454 g 3     triamcinolone (KENALOG) 0.025 % external ointment Apply topically 2 times daily Sparingly to buttocks or groin  for no more than 2 weeks 80 g 3     triamterene-HCTZ (DYAZIDE) 37.5-25 MG capsule Take 1 capsule by mouth daily 90 capsule 1      venlafaxine (EFFEXOR) 37.5 MG tablet Take 1 tablet (37.5 mg) by mouth daily Please call clinic and set up office visit for further refills 90 tablet 1     calcipotriene (DOVONOX) 0.005 % external ointment Apply to hands and feet BID when flared. (Patient not taking: Reported on 7/7/2022) 120 g 3     Allergies   Allergen Reactions     Atorvastatin Nausea     Nausea and abd pain      Ceftin GI Disturbance     Stomach upset and bloating - given at same time as clindamycin ? Which one as cause.       Clindamycin GI Disturbance     Stomach upset and bloating - given at same time as ceftin, ? Which one as cause      Flagyl [Metronidazole]      immediate migraine headache      Morphine Itching     Severe with nose, facial  Swelling - oxycodone = ok /no problems      Penicillins Hives     Sulfa Drugs Rash     Severe red , flushed rash     Levaquin Rash     States she can take cipro and avelox       Physical exam:  Vitals: LMP 09/05/2000   GEN: This is a well developed, well-nourished female in no acute distress, in a pleasant mood.    SKIN: Watson phototype II  - Total skin excluding the undergarment areas was performed. The exam included the head/face, neck, both arms, chest, back, abdomen, both legs, digits and/or nails.   - Widespread sharply demarcated salmon-colored papules and plaques with overlying micaceous scale on the trunk, all four extremities, and to a lesser extent, the scalp  - some joint swelling on the small joints of the hands  - No other lesions of concern on areas examined.

## 2022-07-08 ENCOUNTER — TELEPHONE (OUTPATIENT)
Dept: DERMATOLOGY | Facility: CLINIC | Age: 62
End: 2022-07-08

## 2022-07-08 DIAGNOSIS — L40.0 PLAQUE PSORIASIS: Primary | ICD-10-CM

## 2022-07-08 DIAGNOSIS — L40.50 PSORIATIC ARTHRITIS (H): ICD-10-CM

## 2022-07-08 NOTE — TELEPHONE ENCOUNTER
Called to discuss switch from adalimumab to risankizumab, since okayed by oncology in setting of neuroendrocrine carcinoma. Rx sent.

## 2022-07-11 ENCOUNTER — TELEPHONE (OUTPATIENT)
Dept: DERMATOLOGY | Facility: CLINIC | Age: 62
End: 2022-07-11

## 2022-07-11 NOTE — TELEPHONE ENCOUNTER
PA Initiation    Medication: Skyrizi  Insurance Company: [facility] - Phone [phone] Fax [phone]  Pharmacy Filling the Rx: [facility] - [place], [place] - [facility] [address]  Filling Pharmacy Phone:    Filling Pharmacy Fax:    Start Date: 7/11/2022

## 2022-07-14 NOTE — TELEPHONE ENCOUNTER
Prior Authorization Approval    Authorization Effective Date: 7/11/2022  Authorization Expiration Date: 7/11/2023  Medication: Skyrizi  Approved Dose/Quantity: 1ml for 28 days  Reference #: Key: [id number]   Insurance Company: [employer] [place] - Phone [phone] Fax [phone]  Expected CoPay: $0.00     CoPay Card Available:      Foundation Assistance Needed:    Which Pharmacy is filling the prescription (Not needed for infusion/clinic administered): [facility] - [place], [place] - [address]  Pharmacy Notified: Yes  Patient Notified: Yes

## 2022-07-19 ENCOUNTER — TELEPHONE (OUTPATIENT)
Dept: DERMATOLOGY | Facility: CLINIC | Age: 62
End: 2022-07-19

## 2022-07-19 DIAGNOSIS — L40.9 PSORIASIS: ICD-10-CM

## 2022-07-19 RX ORDER — HYDROXYZINE HYDROCHLORIDE 25 MG/1
25 TABLET, FILM COATED ORAL AT BEDTIME
Qty: 30 TABLET | Refills: 1 | Status: SHIPPED | OUTPATIENT
Start: 2022-07-19 | End: 2022-10-05

## 2022-07-19 NOTE — TELEPHONE ENCOUNTER
Attempted call to patient to schedule follow up in the Dermatology Clinic per Dr Menjivar last visit on 07/07/22 checkout comment dispositions. Left message with clinic number and send [employer].

## 2022-07-22 ENCOUNTER — OFFICE VISIT (OUTPATIENT)
Dept: PEDIATRICS | Facility: CLINIC | Age: 62
End: 2022-07-22
Attending: NURSE PRACTITIONER
Payer: COMMERCIAL

## 2022-07-22 ENCOUNTER — NURSE TRIAGE (OUTPATIENT)
Dept: FAMILY MEDICINE | Facility: CLINIC | Age: 62
End: 2022-07-22
Payer: COMMERCIAL

## 2022-07-22 ENCOUNTER — HOSPITAL ENCOUNTER (OUTPATIENT)
Dept: CT IMAGING | Facility: CLINIC | Age: 62
Discharge: HOME | End: 2022-07-22
Attending: NURSE PRACTITIONER | Admitting: NURSE PRACTITIONER
Payer: COMMERCIAL

## 2022-07-22 VITALS
DIASTOLIC BLOOD PRESSURE: 88 MMHG | RESPIRATION RATE: 18 BRPM | BODY MASS INDEX: 33.93 KG/M2 | HEART RATE: 72 BPM | TEMPERATURE: 98.6 F | WEIGHT: 168 LBS | SYSTOLIC BLOOD PRESSURE: 149 MMHG | OXYGEN SATURATION: 98 %

## 2022-07-22 DIAGNOSIS — R10.32 LLQ ABDOMINAL PAIN: ICD-10-CM

## 2022-07-22 DIAGNOSIS — K57.32 DIVERTICULITIS OF COLON: ICD-10-CM

## 2022-07-22 DIAGNOSIS — K57.30 DIVERTICULOSIS OF LARGE INTESTINE WITHOUT HEMORRHAGE: ICD-10-CM

## 2022-07-22 DIAGNOSIS — R10.32 LLQ ABDOMINAL PAIN: Primary | ICD-10-CM

## 2022-07-22 LAB
ALBUMIN SERPL-MCNC: 3.6 G/DL (ref 3.4–5)
ALP SERPL-CCNC: 42 U/L (ref 40–150)
ALT SERPL W P-5'-P-CCNC: 31 U/L (ref 0–50)
ANION GAP SERPL CALCULATED.3IONS-SCNC: 4 MMOL/L (ref 3–14)
AST SERPL W P-5'-P-CCNC: NORMAL U/L
BASOPHILS # BLD AUTO: 0.1 10E3/UL (ref 0–0.2)
BASOPHILS NFR BLD AUTO: 1 %
BILIRUB SERPL-MCNC: 0.4 MG/DL (ref 0.2–1.3)
BUN SERPL-MCNC: 15 MG/DL (ref 7–30)
CALCIUM SERPL-MCNC: 9 MG/DL (ref 8.5–10.1)
CHLORIDE BLD-SCNC: 103 MMOL/L (ref 94–109)
CO2 SERPL-SCNC: 28 MMOL/L (ref 20–32)
CREAT SERPL-MCNC: 0.52 MG/DL (ref 0.52–1.04)
CRP SERPL-MCNC: 55.4 MG/L (ref 0–8)
EOSINOPHIL # BLD AUTO: 0.2 10E3/UL (ref 0–0.7)
EOSINOPHIL NFR BLD AUTO: 2 %
ERYTHROCYTE [DISTWIDTH] IN BLOOD BY AUTOMATED COUNT: 13.1 % (ref 10–15)
GFR SERPL CREATININE-BSD FRML MDRD: >90 ML/MIN/1.73M2
GLUCOSE BLD-MCNC: 82 MG/DL (ref 70–99)
HCT VFR BLD AUTO: 40.4 % (ref 35–47)
HGB BLD-MCNC: 13.5 G/DL (ref 11.7–15.7)
IMM GRANULOCYTES # BLD: 0 10E3/UL
IMM GRANULOCYTES NFR BLD: 0 %
LYMPHOCYTES # BLD AUTO: 1.2 10E3/UL (ref 0.8–5.3)
LYMPHOCYTES NFR BLD AUTO: 16 %
MCH RBC QN AUTO: 32.7 PG (ref 26.5–33)
MCHC RBC AUTO-ENTMCNC: 33.4 G/DL (ref 31.5–36.5)
MCV RBC AUTO: 98 FL (ref 78–100)
MONOCYTES # BLD AUTO: 0.5 10E3/UL (ref 0–1.3)
MONOCYTES NFR BLD AUTO: 6 %
NEUTROPHILS # BLD AUTO: 5.8 10E3/UL (ref 1.6–8.3)
NEUTROPHILS NFR BLD AUTO: 75 %
NRBC # BLD AUTO: 0 10E3/UL
NRBC BLD AUTO-RTO: 0 /100
PLATELET # BLD AUTO: 317 10E3/UL (ref 150–450)
POTASSIUM BLD-SCNC: NORMAL MMOL/L
PROT SERPL-MCNC: 7.6 G/DL (ref 6.8–8.8)
RBC # BLD AUTO: 4.13 10E6/UL (ref 3.8–5.2)
SODIUM SERPL-SCNC: 135 MMOL/L (ref 133–144)
WBC # BLD AUTO: 7.8 10E3/UL (ref 4–11)

## 2022-07-22 PROCEDURE — 82247 BILIRUBIN TOTAL: CPT | Performed by: NURSE PRACTITIONER

## 2022-07-22 PROCEDURE — 84155 ASSAY OF PROTEIN SERUM: CPT | Performed by: NURSE PRACTITIONER

## 2022-07-22 PROCEDURE — 84460 ALANINE AMINO (ALT) (SGPT): CPT | Performed by: NURSE PRACTITIONER

## 2022-07-22 PROCEDURE — 82435 ASSAY OF BLOOD CHLORIDE: CPT | Performed by: NURSE PRACTITIONER

## 2022-07-22 PROCEDURE — 82947 ASSAY GLUCOSE BLOOD QUANT: CPT | Performed by: NURSE PRACTITIONER

## 2022-07-22 PROCEDURE — 36415 COLL VENOUS BLD VENIPUNCTURE: CPT | Performed by: NURSE PRACTITIONER

## 2022-07-22 PROCEDURE — 82040 ASSAY OF SERUM ALBUMIN: CPT | Performed by: NURSE PRACTITIONER

## 2022-07-22 PROCEDURE — 84295 ASSAY OF SERUM SODIUM: CPT | Performed by: NURSE PRACTITIONER

## 2022-07-22 PROCEDURE — 82565 ASSAY OF CREATININE: CPT | Performed by: NURSE PRACTITIONER

## 2022-07-22 PROCEDURE — 86140 C-REACTIVE PROTEIN: CPT | Performed by: NURSE PRACTITIONER

## 2022-07-22 PROCEDURE — 250N000009 HC RX 250: Performed by: NURSE PRACTITIONER

## 2022-07-22 PROCEDURE — 82374 ASSAY BLOOD CARBON DIOXIDE: CPT | Performed by: NURSE PRACTITIONER

## 2022-07-22 PROCEDURE — 82310 ASSAY OF CALCIUM: CPT | Performed by: NURSE PRACTITIONER

## 2022-07-22 PROCEDURE — 99207 REFERRAL TO ACUTE AND DIAGNOSTIC SERVICES: CPT | Performed by: FAMILY MEDICINE

## 2022-07-22 PROCEDURE — 84520 ASSAY OF UREA NITROGEN: CPT | Performed by: NURSE PRACTITIONER

## 2022-07-22 PROCEDURE — 99214 OFFICE O/P EST MOD 30 MIN: CPT | Performed by: NURSE PRACTITIONER

## 2022-07-22 PROCEDURE — 250N000011 HC RX IP 250 OP 636: Performed by: NURSE PRACTITIONER

## 2022-07-22 PROCEDURE — 85025 COMPLETE CBC W/AUTO DIFF WBC: CPT | Performed by: NURSE PRACTITIONER

## 2022-07-22 PROCEDURE — 74177 CT ABD & PELVIS W/CONTRAST: CPT

## 2022-07-22 PROCEDURE — 84075 ASSAY ALKALINE PHOSPHATASE: CPT | Performed by: NURSE PRACTITIONER

## 2022-07-22 RX ORDER — IOPAMIDOL 755 MG/ML
500 INJECTION, SOLUTION INTRAVASCULAR ONCE
Status: COMPLETED | OUTPATIENT
Start: 2022-07-22 | End: 2022-07-22

## 2022-07-22 RX ORDER — CIPROFLOXACIN 500 MG/1
500 TABLET, FILM COATED ORAL 2 TIMES DAILY
Qty: 20 TABLET | Refills: 0 | Status: SHIPPED | OUTPATIENT
Start: 2022-07-22 | End: 2022-08-01

## 2022-07-22 RX ADMIN — SODIUM CHLORIDE 61 ML: 9 INJECTION, SOLUTION INTRAVENOUS at 13:19

## 2022-07-22 RX ADMIN — IOPAMIDOL 76 ML: 755 INJECTION, SOLUTION INTRAVENOUS at 13:19

## 2022-07-22 NOTE — PATIENT INSTRUCTIONS
Results for orders placed or performed during the hospital encounter of 07/22/22   CT Abdomen Pelvis w Contrast     Status: None (Preliminary result)    Narrative    CT ABDOMEN AND PELVIS WITH CONTRAST 7/22/2022 1:28 PM    CLINICAL HISTORY: Left lower quadrant pain.    TECHNIQUE: CT scan of the abdomen and pelvis was performed following  injection of IV contrast. Multiplanar reformats were obtained. Dose  reduction techniques were used.  CONTRAST: 76 mL Isovue-370    COMPARISON: CT of the abdomen and pelvis performed 2/28/2020.    FINDINGS:   LOWER CHEST: The visualized lung bases are clear.    HEPATOBILIARY: Cholecystectomy. Diffuse fatty infiltration of the  liver. No hepatic masses are seen.    PANCREAS: Normal.    SPLEEN: Normal.    ADRENAL GLANDS: Normal.    KIDNEYS/BLADDER: Unremarkable. No hydronephrosis.    BOWEL: Postoperative changes are again noted involving the distal  small bowel in the right lower quadrant. Few scattered diverticula are  noted in the sigmoid colon. There is mild bowel wall thickening in the  mid sigmoid colon, less prominent than on the previous exam, with mild  adjacent fat stranding. No associated fluid collection to suggest  abscess. No bowel obstruction. Unremarkable appendix.    PELVIC ORGANS: Hysterectomy.    LYMPH NODES: No enlarged lymph nodes are identified in the abdomen or  pelvis.    VASCULATURE: Unremarkable.    ADDITIONAL FINDINGS: None.    MUSCULOSKELETAL: Unremarkable.      Impression    IMPRESSION:   1.  Mild bowel wall thickening with adjacent fat stranding in the  sigmoid colon. These findings are less prominent than on the previous  exam of 2/28/2020. Differential considerations include acute  diverticulitis, a focal colitis, or epiploic appendagitis. No  associated abscess or bowel obstruction.  2.  Diffuse hepatic steatosis.    I discussed these findings with LORAINE Williamson CNP at 1:47 PM on  7/22/2022.   Results for orders placed or performed in visit on  07/22/22   Comprehensive metabolic panel     Status: None   Result Value Ref Range    Sodium 135 133 - 144 mmol/L    Potassium      Chloride 103 94 - 109 mmol/L    Carbon Dioxide (CO2) 28 20 - 32 mmol/L    Anion Gap 4 3 - 14 mmol/L    Urea Nitrogen 15 7 - 30 mg/dL    Creatinine 0.52 0.52 - 1.04 mg/dL    Calcium 9.0 8.5 - 10.1 mg/dL    Glucose 82 70 - 99 mg/dL    Alkaline Phosphatase 42 40 - 150 U/L    AST      ALT 31 0 - 50 U/L    Protein Total 7.6 6.8 - 8.8 g/dL    Albumin 3.6 3.4 - 5.0 g/dL    Bilirubin Total 0.4 0.2 - 1.3 mg/dL    GFR Estimate >90 >60 mL/min/1.73m2   CRP inflammation     Status: Abnormal   Result Value Ref Range    CRP Inflammation 55.4 (H) 0.0 - 8.0 mg/L   CBC with platelets and differential     Status: None   Result Value Ref Range    WBC Count 7.8 4.0 - 11.0 10e3/uL    RBC Count 4.13 3.80 - 5.20 10e6/uL    Hemoglobin 13.5 11.7 - 15.7 g/dL    Hematocrit 40.4 35.0 - 47.0 %    MCV 98 78 - 100 fL    MCH 32.7 26.5 - 33.0 pg    MCHC 33.4 31.5 - 36.5 g/dL    RDW 13.1 10.0 - 15.0 %    Platelet Count 317 150 - 450 10e3/uL    % Neutrophils 75 %    % Lymphocytes 16 %    % Monocytes 6 %    % Eosinophils 2 %    % Basophils 1 %    % Immature Granulocytes 0 %    NRBCs per 100 WBC 0 <1 /100    Absolute Neutrophils 5.8 1.6 - 8.3 10e3/uL    Absolute Lymphocytes 1.2 0.8 - 5.3 10e3/uL    Absolute Monocytes 0.5 0.0 - 1.3 10e3/uL    Absolute Eosinophils 0.2 0.0 - 0.7 10e3/uL    Absolute Basophils 0.1 0.0 - 0.2 10e3/uL    Absolute Immature Granulocytes 0.0 <=0.4 10e3/uL    Absolute NRBCs 0.0 10e3/uL   CBC with platelets differential     Status: None    Narrative    The following orders were created for panel order CBC with platelets differential.  Procedure                               Abnormality         Status                     ---------                               -----------         ------                     CBC with platelets and d...[[id number]]                      Final result                 Please  view results for these tests on the individual orders.     .

## 2022-07-22 NOTE — TELEPHONE ENCOUNTER
[facility] can take her as soon as she can get there for scan and assessment. Will give her abx if appropriate.    Please call patient with information.     Emilia Funk, CNP

## 2022-07-22 NOTE — PROGRESS NOTES
Assessment & Plan     LLQ abdominal pain  - CBC with platelets differential; Future  - Comprehensive metabolic panel; Future  - CRP inflammation; Future  - CT Abdomen Pelvis w Contrast; Future  - sodium chloride (PF) 0.9% PF flush 3 mL  - IV access  - CBC with platelets differential  - Comprehensive metabolic panel  - CRP inflammation  - ciprofloxacin (CIPRO) 500 MG tablet; Take 1 tablet (500 mg) by mouth 2 times daily for 10 days    Diverticulosis of large intestine without hemorrhage  - CBC with platelets differential; Future  - Comprehensive metabolic panel; Future  - CRP inflammation; Future  - CT Abdomen Pelvis w Contrast; Future  - sodium chloride (PF) 0.9% PF flush 3 mL  - IV access  - CBC with platelets differential  - Comprehensive metabolic panel  - CRP inflammation    Diverticulitis of colon  - ciprofloxacin (CIPRO) 500 MG tablet; Take 1 tablet (500 mg) by mouth 2 times daily for 10 days    Allergy to flagyl and does not tolerate augmentin.    Has used cipro alone in the past and pt prefers to do this again.   Reports cipro gave her upset stomach but this was tolerated better than flagyl and augmentin.    Discussed low fiber diet while on antibiotics/healing then to advance to high fiber.    Push fluids.    Discussed worrysome signs to f/u.    Will f/u with PCP if persists or worsens         Return in about 2 days (around 7/24/2022) for with regular provider if symptoms persist.    LORAINE Adams CNP  [facility] is a 61 year old, presenting  urgently to [facility] by referral from Emilia Funk NP for the following health issues:  Abdominal Pain (LLQ pain X 3 days, previous hx of diverticulitis, last flare up possibly 2017)      HPI     Abdominal/Flank Pain  Onset/Duration: X 3 days  Description:   Character: Sharp and Dull ache  Location: left lower quadrant  Radiation: None  Intensity: 6/10  Progression of Symptoms:  worsening  Accompanying Signs &  "Symptoms:  Fever/Chills: YES- chills this AM, did check and did not have a fever  Gas/Bloating: YES  Nausea: no   Vomiting: no   Diarrhea: no   Constipation: YES- \"it feels like this, but I am still able to go\"  Dysuria or Hematuria: no   History:   Trauma: no   Previous similar pain: YES- Diverticulitis previously, possibly 2017,  Has had this a few times  Previous tests done: no   Previous Abdominal Surgery: YES- Endocrine cancer, Gallbladder removal, Hysterectomy  Precipitating factors:   Does the pain change with:     Food: YES- a short time after eating, gets increased pressure    Bowel Movement: YES- helps relieve pressure    Urination: YES- helps relieve pressure   Other factors:  no   Therapies tried and outcome: IBU, this did not help.    When did you eat last: Breakfast at 08:00, Egg McMuffin.      Review of Systems   Constitutional, HEENT, cardiovascular, pulmonary, GI, , musculoskeletal, neuro, skin, endocrine and psych systems are negative, except as otherwise noted.      Objective    BP (!) 149/88 (BP Location: Left arm, Patient Position: Chair, Cuff Size: Adult Regular)   Pulse 72   Temp 98.6  F (37  C) (Oral)   Resp 18   Wt 76.2 kg (168 lb)   LMP 09/05/2000   SpO2 98%   BMI 33.93 kg/m    Body mass index is 33.93 kg/m .  Physical Exam   GENERAL: healthy, alert and no distress  EYES: Eyes grossly normal to inspection, PERRL and conjunctivae and sclerae normal  HENT: ear canals and TM's normal, nose and mouth without ulcers or lesions  NECK: no adenopathy, no asymmetry, masses, or scars and thyroid normal to palpation  RESP: lungs clear to auscultation - no rales, rhonchi or wheezes  CV: regular rate and rhythm, normal S1 S2, no S3 or S4, no murmur, click or rub, no peripheral edema and peripheral pulses strong  ABDOMEN: soft, nontender, without hepatosplenomegaly or masses, tenderness LUQ, LLQ and lower midline and bowel sounds normal  MS: no gross musculoskeletal defects noted, no " edema  SKIN: no suspicious lesions or rashes  NEURO: Normal strength and tone, mentation intact and speech normal  PSYCH: mentation appears normal, affect normal/bright                .  ..

## 2022-07-22 NOTE — TELEPHONE ENCOUNTER
Provider Response to 2nd Level Triage Request    I have reviewed the RN documentation. My recommendation is:  Refer to [facility] ([facility]) clinic.     Referral to Acute and Diagnostic Services          Patient qualifies for First Order Acute services at the [facility] clinic.    Patient diagnoses/treatments needed:  rule out diverticulitis-should have CT scan  [facility] Referral placed: Yes    Nursing staff to contact patient and confirm willingness to receive next level of care at the [facility] site in [place] ([phone], internal use only) or [place] ([phone], internal use only). Confirm the following are true:      Patient has transportation to travel to [facility] without delay    Patient understands that evaluation/treatment at [facility] typically takes significantly longer than in clinic/urgent care (>2 hours)    If patient is in agreement, contact the [facility] to confirm patient acceptance and provide necessary information to [facility] provider.       For patients going to the [place] [facility], instruct patients to enter the east entrance of the building ([address]) and go to Check In C    For patients going to the [place] [facility], have them enter building ([address] attached to [facility]) and go to the 2nd floor, [address]

## 2022-07-22 NOTE — TELEPHONE ENCOUNTER
"S-(situation): Pt calling to report abd pain    B-(background): Pt report hx of diverticulitis, last bout in 2017. Pt noted she thinks she is having a flare, started 3 days ago with lower abd pain, pressure, and bloating.    A-(assessment): Pt reports pain at 6/10 constant pressure, along with rectal pressure. Pt denies fever, N/V, melena, hematochezia, UTI like symptoms. Pt reports lower abd pain is all across lower abdomen, better at times after voiding. Pt reports stools are normal, denies diarrhea. Pt reports feelings of chills, denies fever. Pt noted she is on immunosuppressive medication for psoriasis.     Pt requested Abx.     R-(recommendations): Pt advised will discuss [facility] option with provider today. Pt advised unable to see in clinic due to access.     Naveed MARY RN   [facility]      Reason for Disposition    MODERATE OR MILD pain that comes and goes (cramps) lasts > 24 hours    Age > 60 years    Answer Assessment - Initial Assessment Questions  1. LOCATION: \"Where does it hurt?\"       Lower abdomen  2. RADIATION: \"Does the pain shoot anywhere else?\" (e.g., chest, back)      no  3. ONSET: \"When did the pain begin?\" (e.g., minutes, hours or days ago)       3 days ago  4. SUDDEN: \"Gradual or sudden onset?\"      Gradual onset  5. PATTERN \"Does the pain come and go, or is it constant?\"     - If constant: \"Is it getting better, staying the same, or worsening?\"       (Note: Constant means the pain never goes away completely; most serious pain is constant and it progresses)      - If intermittent: \"How long does it last?\" \"Do you have pain now?\"      (Note: Intermittent means the pain goes away completely between bouts)      constant  6. SEVERITY: \"How bad is the pain?\"  (e.g., Scale 1-10; mild, moderate, or severe)    - MILD (1-3): doesn't interfere with normal activities, abdomen soft and not tender to touch     - MODERATE (4-7): interferes with normal activities or awakens from sleep, " "tender to touch     - SEVERE (8-10): excruciating pain, doubled over, unable to do any normal activities       6/10  7. RECURRENT SYMPTOM: \"Have you ever had this type of abdominal pain before?\" If so, ask: \"When was the last time?\" and \"What happened that time?\"       Yes diverticulitis in 2017  8. CAUSE: \"What do you think is causing the abdominal pain?\"      diverticulitis  9. RELIEVING/AGGRAVATING FACTORS: \"What makes it better or worse?\" (e.g., movement, antacids, bowel movement)      none  10. OTHER SYMPTOMS: \"Has there been any vomiting, diarrhea, constipation, or urine problems?\"        none  11. PREGNANCY: \"Is there any chance you are pregnant?\" \"When was your last menstrual period?\"        no    Protocols used: ABDOMINAL PAIN - FEMALE-A-OH      "

## 2022-07-22 NOTE — TELEPHONE ENCOUNTER
Called [facility]  Phone is [phone]    They do have capacity today for diagnostic CT. Need to do provider to provider call. Jessica Chung is provider at [facility] today.     Routing to provider to review and advise.     Mira Bonner RN  [facility]

## 2022-07-22 NOTE — TELEPHONE ENCOUNTER
Called and spoke with patient.     She is able to go today to [facility]. Address given.     Mira Bonner RN  [facility]

## 2022-07-25 ENCOUNTER — TELEPHONE (OUTPATIENT)
Dept: FAMILY MEDICINE | Facility: CLINIC | Age: 62
End: 2022-07-25

## 2022-07-25 DIAGNOSIS — I10 ESSENTIAL HYPERTENSION WITH GOAL BLOOD PRESSURE LESS THAN 140/90: ICD-10-CM

## 2022-07-25 NOTE — TELEPHONE ENCOUNTER
Was prescribed Cipro on Friday but only took 2 because it was making her sick.  Patient wants to know if there is a different medication that you can prescribe   Please call patient to advise       Cherry Back

## 2022-07-28 NOTE — TELEPHONE ENCOUNTER
Routing refill request to provider for review/approval because:  Please review as not prescribed by provider in past     Annel Ramírez RN

## 2022-07-29 RX ORDER — TRIAMTERENE AND HYDROCHLOROTHIAZIDE 37.5; 25 MG/1; MG/1
1 CAPSULE ORAL DAILY
Qty: 90 CAPSULE | Refills: 1 | Status: SHIPPED | OUTPATIENT
Start: 2022-07-29 | End: 2023-01-06

## 2022-08-09 DIAGNOSIS — C7A.8 PRIMARY MALIGNANT NEUROENDOCRINE NEOPLASM OF ILEUM (H): Primary | ICD-10-CM

## 2022-08-09 NOTE — PROGRESS NOTES
Dr. Weir's orders are being discontinued as she is no longer a provider with [facility].     Pt transferring care to Dr. Sorto.     Ailyn Bailey, RN, BSN, JESS  RN Care Coordinator  [facility]  Ph: [phone]  F: [phone]

## 2022-08-26 DIAGNOSIS — F41.9 ANXIETY: Primary | ICD-10-CM

## 2022-08-26 DIAGNOSIS — I10 ESSENTIAL HYPERTENSION WITH GOAL BLOOD PRESSURE LESS THAN 140/90: ICD-10-CM

## 2022-08-30 RX ORDER — VENLAFAXINE 37.5 MG/1
37.5 TABLET ORAL DAILY
Qty: 90 TABLET | Refills: 3 | Status: SHIPPED | OUTPATIENT
Start: 2022-08-30 | End: 2023-04-26

## 2022-08-30 RX ORDER — VENLAFAXINE 37.5 MG/1
37.5 TABLET ORAL 2 TIMES DAILY
Qty: 90 TABLET | Refills: 3 | Status: SHIPPED | OUTPATIENT
Start: 2022-08-30 | End: 2023-09-22

## 2022-08-30 RX ORDER — LISINOPRIL 10 MG/1
10 TABLET ORAL DAILY
Qty: 90 TABLET | Refills: 1 | Status: SHIPPED | OUTPATIENT
Start: 2022-08-30 | End: 2023-01-05

## 2022-08-30 NOTE — TELEPHONE ENCOUNTER
Pt stated she works in [employer] and would rather have one of her RN's check her BP at work. She will MyChart her BP to Emilia Funk.     She also mentioned she had a Px 05/02/2022 and needs a refill of her Venlafaxine at the end of next month. States she was told all meds would be refilled at appt.     Tello Gonzales CMA

## 2022-08-30 NOTE — TELEPHONE ENCOUNTER
Please have her come in for BP check with RN, last BP was done during diverticulitis episode.   Filled medication.    Emilia Funk, CNP

## 2022-08-30 NOTE — TELEPHONE ENCOUNTER
Routing refill request to provider for review/approval because:  BP Readings from Last 3 Encounters:   07/22/22 (!) 149/88   05/02/22 (!) 148/90   10/12/21 131/84      Annel Ramírez RN

## 2022-08-31 ENCOUNTER — MYC MEDICAL ADVICE (OUTPATIENT)
Dept: FAMILY MEDICINE | Facility: CLINIC | Age: 62
End: 2022-08-31

## 2022-09-05 ENCOUNTER — MYC MEDICAL ADVICE (OUTPATIENT)
Dept: FAMILY MEDICINE | Facility: CLINIC | Age: 62
End: 2022-09-05

## 2022-09-05 DIAGNOSIS — C7A.8 PRIMARY MALIGNANT NEUROENDOCRINE NEOPLASM OF ILEUM (H): ICD-10-CM

## 2022-09-07 NOTE — TELEPHONE ENCOUNTER
Disp Refills Start End CALVIN   diazepam (VALIUM) 10 MG tablet 4 tablet 1 8/20/2021  No   Sig: Take 1-2  tab or 10-20 mg 30-60 minutes prior to MRI scan.   Sent to pharmacy as: diazePAM 10 MG Oral Tablet (Valium)   Class: E-Prescribe       Last office visit: 5/2/2022     Future Office Visit:   Next 5 appointments (look out 90 days)    Sep 16, 2022  2:00 PM  Return Visit with Remy Sorto MD  [facility] ([facility] ) [address]  [address]  [facility] [address]  [phone]             CSA -- Patient Level:    CSA: None found at the patient level.             Routing refill request to provider for review/approval because:  Drug not on the [facility] refill protocol     Samantha Escoto RN, BSN  [facility]

## 2022-09-08 RX ORDER — DIAZEPAM 10 MG
TABLET ORAL
Qty: 4 TABLET | Refills: 1 | Status: SHIPPED | OUTPATIENT
Start: 2022-09-08 | End: 2023-04-26

## 2022-09-12 ENCOUNTER — LAB (OUTPATIENT)
Dept: ONCOLOGY | Facility: CLINIC | Age: 62
End: 2022-09-12
Attending: INTERNAL MEDICINE
Payer: COMMERCIAL

## 2022-09-12 ENCOUNTER — HOSPITAL ENCOUNTER (OUTPATIENT)
Dept: CT IMAGING | Facility: CLINIC | Age: 62
Discharge: HOME | End: 2022-09-12
Attending: INTERNAL MEDICINE
Payer: COMMERCIAL

## 2022-09-12 ENCOUNTER — HOSPITAL ENCOUNTER (OUTPATIENT)
Dept: MRI IMAGING | Facility: CLINIC | Age: 62
Discharge: HOME | End: 2022-09-12
Attending: INTERNAL MEDICINE
Payer: COMMERCIAL

## 2022-09-12 DIAGNOSIS — C7A.8 PRIMARY MALIGNANT NEUROENDOCRINE NEOPLASM OF ILEUM (H): ICD-10-CM

## 2022-09-12 LAB
ALBUMIN SERPL BCG-MCNC: 4.3 G/DL (ref 3.5–5.2)
ALP SERPL-CCNC: 42 U/L (ref 35–104)
ALT SERPL W P-5'-P-CCNC: 19 U/L (ref 10–35)
ANION GAP SERPL CALCULATED.3IONS-SCNC: 12 MMOL/L (ref 7–15)
AST SERPL W P-5'-P-CCNC: 20 U/L (ref 10–35)
BASOPHILS # BLD AUTO: 0.1 10E3/UL (ref 0–0.2)
BASOPHILS NFR BLD AUTO: 1 %
BILIRUB SERPL-MCNC: 0.4 MG/DL
BUN SERPL-MCNC: 10.5 MG/DL (ref 8–23)
CALCIUM SERPL-MCNC: 9.4 MG/DL (ref 8.8–10.2)
CHLORIDE SERPL-SCNC: 94 MMOL/L (ref 98–107)
CREAT SERPL-MCNC: 0.59 MG/DL (ref 0.51–0.95)
DEPRECATED HCO3 PLAS-SCNC: 28 MMOL/L (ref 22–29)
EOSINOPHIL # BLD AUTO: 0.2 10E3/UL (ref 0–0.7)
EOSINOPHIL NFR BLD AUTO: 3 %
ERYTHROCYTE [DISTWIDTH] IN BLOOD BY AUTOMATED COUNT: 12.5 % (ref 10–15)
GFR SERPL CREATININE-BSD FRML MDRD: >90 ML/MIN/1.73M2
GLUCOSE SERPL-MCNC: 99 MG/DL (ref 70–99)
HCT VFR BLD AUTO: 40.4 % (ref 35–47)
HGB BLD-MCNC: 13.9 G/DL (ref 11.7–15.7)
IMM GRANULOCYTES # BLD: 0 10E3/UL
IMM GRANULOCYTES NFR BLD: 0 %
LYMPHOCYTES # BLD AUTO: 1.3 10E3/UL (ref 0.8–5.3)
LYMPHOCYTES NFR BLD AUTO: 19 %
MCH RBC QN AUTO: 31.9 PG (ref 26.5–33)
MCHC RBC AUTO-ENTMCNC: 34.4 G/DL (ref 31.5–36.5)
MCV RBC AUTO: 93 FL (ref 78–100)
MONOCYTES # BLD AUTO: 0.4 10E3/UL (ref 0–1.3)
MONOCYTES NFR BLD AUTO: 6 %
NEUTROPHILS # BLD AUTO: 4.7 10E3/UL (ref 1.6–8.3)
NEUTROPHILS NFR BLD AUTO: 71 %
NRBC # BLD AUTO: 0 10E3/UL
NRBC BLD AUTO-RTO: 0 /100
PLATELET # BLD AUTO: 302 10E3/UL (ref 150–450)
POTASSIUM SERPL-SCNC: 3.7 MMOL/L (ref 3.4–5.3)
PROT SERPL-MCNC: 7.4 G/DL (ref 6.4–8.3)
RBC # BLD AUTO: 4.36 10E6/UL (ref 3.8–5.2)
SODIUM SERPL-SCNC: 134 MMOL/L (ref 136–145)
WBC # BLD AUTO: 6.7 10E3/UL (ref 4–11)

## 2022-09-12 PROCEDURE — A9585 GADOBUTROL INJECTION: HCPCS | Performed by: INTERNAL MEDICINE

## 2022-09-12 PROCEDURE — 36415 COLL VENOUS BLD VENIPUNCTURE: CPT

## 2022-09-12 PROCEDURE — 74183 MRI ABD W/O CNTR FLWD CNTR: CPT

## 2022-09-12 PROCEDURE — 255N000002 HC RX 255 OP 636: Performed by: INTERNAL MEDICINE

## 2022-09-12 PROCEDURE — 86316 IMMUNOASSAY TUMOR OTHER: CPT | Performed by: INTERNAL MEDICINE

## 2022-09-12 PROCEDURE — 71250 CT THORAX DX C-: CPT

## 2022-09-12 PROCEDURE — 82040 ASSAY OF SERUM ALBUMIN: CPT | Performed by: INTERNAL MEDICINE

## 2022-09-12 PROCEDURE — 80053 COMPREHEN METABOLIC PANEL: CPT | Performed by: INTERNAL MEDICINE

## 2022-09-12 PROCEDURE — 85025 COMPLETE CBC W/AUTO DIFF WBC: CPT | Performed by: INTERNAL MEDICINE

## 2022-09-12 RX ORDER — GADOBUTROL 604.72 MG/ML
7.5 INJECTION INTRAVENOUS ONCE
Status: COMPLETED | OUTPATIENT
Start: 2022-09-12 | End: 2022-09-12

## 2022-09-12 RX ADMIN — GADOBUTROL 7.5 ML: 604.72 INJECTION INTRAVENOUS at 07:12

## 2022-09-12 NOTE — PROGRESS NOTES
Medical Assistant Note:  Connie Brunson presents today for blood draw.    Patient seen by provider today: No.   present during visit today: Not Applicable.    Concerns: No Concerns.    Procedure:  Labs drawn    Post Assessment:  Labs drawn without difficulty: Yes.    Discharge Plan:  Departure Mode: Ambulatory.    Face to Face Time: 10 min.    Rosa M Muse CMA

## 2022-09-15 LAB — CGA SERPL-MCNC: 182 NG/ML

## 2022-09-16 ENCOUNTER — VIRTUAL VISIT (OUTPATIENT)
Dept: ONCOLOGY | Facility: CLINIC | Age: 62
End: 2022-09-16
Attending: INTERNAL MEDICINE
Payer: COMMERCIAL

## 2022-09-16 DIAGNOSIS — C7A.8 PRIMARY MALIGNANT NEUROENDOCRINE NEOPLASM OF ILEUM (H): Primary | ICD-10-CM

## 2022-09-16 PROCEDURE — 99214 OFFICE O/P EST MOD 30 MIN: CPT | Mod: 95 | Performed by: INTERNAL MEDICINE

## 2022-09-16 NOTE — LETTER
9/16/2022         RE: Connie Brunson  [address] [facility] [address]        Dear Colleague,    Thank you for referring your patient, Connie Brunson, to the [facility]. Please see a copy of my visit note below.    Connie is a 61 year old who is being evaluated via a billable telephone visit.      What phone number would you like to be contacted at? [phone]  How would you like to obtain your AVS? Emiliano LOPEZ        [facility]    Hematology/Oncology Established Patient Note      Today's Date: Sep 16, 2022     Reason for Follow-up: neuroendocrine tumor of the ileum      HISTORY OF PRESENT ILLNESS: Connie Brunson is a 60 year old female with PMHx of HTN, HLD, depression, who presents with neuroendocrine tumor of the ileum.  In October 2017, she had a CT abdomen/pelvis done due to lower abdominal pain related to sigmoid diverticulitis and loose stools.  She had been having diarrhea for ~3 years.  It showed an incidental finding of an enhancing 1.3 cm intraluminal nodule in the distal ileum.  There was comment on radiology report on CT enterography on 11/2/17 that the nodule, in retrospect, appears to be present and stable since an older CT from 10/27/14.  On 11/7/17, she underwent a segmental small bowel resection with primary anastomosis by Dr. Cristobal.  Pathology showed a 1.5 cm tumor in the ileum, well-differentiated neuroendocrine tumor, grade 2, mitotic rate 410 HPF, Ki-67 of 5%, 2 of 3 lymph nodes were involved, stage pT3N1 (stage IIIB).        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      She was reached over a telephone call and is doing well.  She has no new complaints at this visit.    REVIEW OF SYSTEMS:   14 point ROS was reviewed and is negative other than as noted above in HPI.       HOME MEDICATIONS:  Current Outpatient Medications   Medication Sig  Dispense Refill     augmented betamethasone dipropionate (DIPROLENE-AF) 0.05 % external cream Apply topically 2 times daily as needed (Psoriasis) To affected area on hands and feet as needed for severe flares. Do not use on face or body folds. 100 g 2     betamethasone dipropionate (DIPROSONE) 0.05 % external lotion Apply topically 2 times daily To affected area on scalp for 4-6 weeks. Do not use on face or body folds. 60 mL 4     calcipotriene (DOVONOX) 0.005 % external ointment Apply to hands and feet BID when flared. 120 g 3     cetirizine (ZYRTEC) 10 MG tablet Take 10 mg by mouth every evening       diazepam (VALIUM) 10 MG tablet Take 1-2  tab or 10-20 mg 30-60 minutes prior to MRI scan. 4 tablet 1     fenofibrate (TRIGLIDE/LOFIBRA) 160 MG tablet TAKE ONE TABLET BY MOUTH ONCE DAILY 90 tablet 0     fluticasone (FLONASE) 50 MCG/ACT nasal spray Spray 2 sprays into both nostrils in the morning. 48 g 5     lisinopril (ZESTRIL) 10 MG tablet Take 1 tablet (10 mg) by mouth daily 90 tablet 1     nicotine (NICORETTE) 2 MG gum Place 1 each (2 mg) inside cheek every hour as needed for smoking cessation 40 each 3     omeprazole (PRILOSEC) 20 MG DR capsule Take 1 capsule (20 mg) by mouth daily 90 capsule 3     Risankizumab-rzaa (SKYRIZI) 150 MG/ML subcutaneous Inject 1 mL (150 mg) Subcutaneous every 3 months MAINTENANCE DOSE: Start at day 28, then every 12 weeks. 1 mL 3     triamcinolone (KENALOG) 0.025 % external ointment Apply topically 2 times daily Sparingly to buttocks or groin  for no more than 2 weeks 80 g 3     triamterene-HCTZ (DYAZIDE) 37.5-25 MG capsule Take 1 capsule by mouth daily 90 capsule 1     venlafaxine (EFFEXOR) 37.5 MG tablet Take 1 tablet (37.5 mg) by mouth daily Please call clinic and set up office visit for further refills 90 tablet 1     adalimumab (HUMIRA *CF*) 40 MG/0.4ML pen kit Inject 0.4 mLs (40 mg) Subcutaneous every 14 days (Patient not taking: Reported on 9/16/2022) 0.8 mL 11     adalimumab  (HUMIRA *CF*) 80 MG/0.8ML pen kit Inject 0.8 mL (80 mg) subcutaneous for 1 dose, followed by 40 mg every other week beginning 1 week after initial dose (see separate order) (Patient not taking: Reported on 9/16/2022) 0.8 mL 0     gabapentin (NEURONTIN) 300 MG capsule Take 2 capsules (600 mg) by mouth At Bedtime (Patient not taking: Reported on 9/16/2022) 180 capsule 3     hydrOXYzine (ATARAX) 25 MG tablet Take 1 tablet (25 mg) by mouth At Bedtime (Patient not taking: Reported on 9/16/2022) 30 tablet 1     triamcinolone (KENALOG) 0.025 % external ointment Apply topically 2 times daily To body, arms, legs 454 g 3     venlafaxine (EFFEXOR) 37.5 MG tablet Take 1 tablet (37.5 mg) by mouth 2 times daily 90 tablet 3     venlafaxine (EFFEXOR) 37.5 MG tablet Take 1 tablet (37.5 mg) by mouth daily 90 tablet 3         ALLERGIES:  Allergies   Allergen Reactions     Atorvastatin Nausea     Nausea and abd pain      Ceftin GI Disturbance     Stomach upset and bloating - given at same time as clindamycin ? Which one as cause.       Clindamycin GI Disturbance     Stomach upset and bloating - given at same time as ceftin, ? Which one as cause      Flagyl [Metronidazole]      immediate migraine headache      Morphine Itching     Severe with nose, facial  Swelling - oxycodone = ok /no problems      Penicillins Hives     Sulfa Drugs Rash     Severe red , flushed rash     Levaquin Rash     States she can take cipro and avelox         PAST MEDICAL HISTORY:  Past Medical History:   Diagnosis Date     Allergic rhinitis, cause unspecified     year round - dog,dust-  Failed on claritin, claritin-D, zyrtec and zyrtec-D in past.      Benign neoplasm of cerebral meninges (H) 1999    has yearly MRI's. - sees Dr. 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 Thomas ENT      Cancer (H)      Depressive disorder      Deviated nasal septum     sees   [facility]      Diverticulosis of colon (without mention of hemorrhage) 02-     History of Guillain-[place] syndrome     NO FLU SHOTS - very mild case in [place] many years ago     Hyperlipidemia LDL goal < 130 doesn't want statins    simvastatin = severe hand joint pain , lipitor =nausea/abd. pain     Hypertension goal BP (blood pressure) < 140/90      Insomnia     trazodone -made pt feel hung over      Major depressive disorder, recurrent episode, moderate (H)     lexapro - sexual side effects      Moderate major depression (H) 2/4/2005    trazodone -made pt feel hung over      Other acne      Other extrapyramidal disease and abnormal movement disorder     ?GBS     Symptomatic menopausal or female climacteric states     vivelle-dot          PAST SURGICAL HISTORY:  Past Surgical History:   Procedure Laterality Date     ABDOMEN SURGERY       BIOPSY       COLONOSCOPY  1/16/2012    Procedure:COLONOSCOPY; Colonoscopy; Surgeon:SHOLA JOYCE; Location: GI     HC REMOVE TONSILS/ADENOIDS,<11 Y/O      T and A, under 12 yrs     HEAD & NECK SURGERY       LAPAROSCOPIC CHOLECYSTECTOMY WITH CHOLANGIOGRAMS N/A 4/25/2016    Procedure: LAPAROSCOPIC CHOLECYSTECTOMY WITH CHOLANGIOGRAMS;  Surgeon: Rosa M Cristobal MD;  Location:  OR     LAPAROSCOPY DIAGNOSTIC (GENERAL) N/A 11/7/2017    Procedure: LAPAROSCOPY DIAGNOSTIC (GENERAL);  Exploratory Laparoscopy, Laparotomy and Small Bowel resection.;  Surgeon: Rosa M Cristobal MD;  Location: [facility] OR     LAPAROTOMY EXPLORATORY N/A 11/7/2017    Procedure: LAPAROTOMY EXPLORATORY;;  Surgeon: Rosa M Cristobal MD;  Location: [facility] OR     RESECT SMALL BOWEL WITHOUT OSTOMY N/A 11/7/2017    Procedure: RESECT SMALL BOWEL WITHOUT OSTOMY;;  Surgeon: Rosa M Cristobal MD;  Location: [facility] OR     SURGICAL HISTORY OF -   2004    Menigioma excision     ZZC LIGATE FALLOPIAN TUBE  1988    Tubal Ligation     ZZC NONSPECIFIC PROCEDURE      PAROTID TUMOR L SIDE OF NECK -  BENIGN     ZZC TOTAL ABDOM HYSTERECTOMY   ?    Hysterectomy, Total Abdominal with BSO         SOCIAL HISTORY:  Social History     Socioeconomic History     Marital status:      Spouse name: Perry Brunson      Number of children: 3     Years of education: 15     Highest education level: Not on file   Occupational History     Occupation: RN and in admin with [employer]      Employer: [employer]   Tobacco Use     Smoking status: Current Every Day Smoker     Packs/day: 1.00     Years: 5.00     Pack years: 5.00     Types: Cigarettes     Last attempt to quit: 1994     Years since quittin.7     Smokeless tobacco: Former User     Tobacco comment: 11/3/17 - occ e-cig use   Vaping Use     Vaping Use: Former     Substances: Nicotine     Devices: Refillable tank   Substance and Sexual Activity     Alcohol use: Yes     Alcohol/week: 0.0 standard drinks     Comment: 3-5 drinks per week     Drug use: No     Sexual activity: Not Currently     Partners: Male     Birth control/protection: Post-menopausal     Comment: tubal ligation-    Other Topics Concern      Service No     Blood Transfusions No     Caffeine Concern Yes     Comment: 2-3 cups coffee in am     Occupational Exposure Not Asked     Hobby Hazards Not Asked     Sleep Concern Not Asked     Stress Concern Not Asked     Weight Concern Not Asked     Special Diet Not Asked     Back Care Not Asked     Exercise No     Comment: regular walking 4x/week      Bike Helmet Not Asked     Seat Belt Yes     Comment: always     Self-Exams Yes     Comment: SBE encouraged monthly     Parent/sibling w/ CABG, MI or angioplasty before 65F 55M? No   Social History Narrative    calcium - taking 500mg po qday - increase to Calcium - 1000-1200mg/day    flex sig/colonoscopy -at age 48    sun precautions - discussed    mammogram - yearly    Td booster -  per our records - pt will call [facility]     pneumovax -at age 58    DEXA -this  year-     stool hemoccults - every year after age 40    ASA- start 81 mg ASA qday.    mulvitamin - encouraged    doing citrucel qday         from second , [employer]. Now back together with her first  and father of their  children, Perry Brunson - 2011.      Social Determinants of Health     Financial Resource Strain: Not on file   Food Insecurity: Not on file   Transportation Needs: Not on file   Physical Activity: Not on file   Stress: Not on file   Social Connections: Not on file   Intimate Partner Violence: Not on file   Housing Stability: Not on file         FAMILY HISTORY:  Family History   Problem Relation Age of Onset     Hyperlipidemia Mother      Thyroid Disease Mother      Cancer Father         lung     Hypertension Father      Hyperlipidemia Father      Thyroid Disease Brother      Diabetes Maternal Grandmother      Cerebrovascular Disease Maternal Grandmother      Substance Abuse Maternal Grandfather      Thyroid Disease Son      Her father  of lung cancer and paranasal cancer at around age 65.    She has 3 children.      PHYSICAL EXAM:  Vital signs:  LMP 2000    ECO  GENERAL/CONSTITUTIONAL: No acute distress.  EYES: No scleral icterus.  NEUROLOGIC: Alert, oriented, answers questions appropriately.  INTEGUMENTARY: No jaundice. Psoriatic lesions on left wrist, legs.  GAIT: Steady, does not use assistive device        LABS:    Recent Labs   Lab Test 22  0825 22  1246 21  0952 20  0750 20  0946 19  0833   * 135 134 137 133 135   POTASSIUM 3.7  --  3.4 3.6 3.2* 3.8   CHLORIDE 94* 103 100 103 98 99   CO2 28 28 29 28 31 26   ANIONGAP 12 4 5 6 4 10   BUN 10.5 15 9 13 9 17   CR 0.59 0.52 0.64 0.73 0.68 0.73   GLC 99 82 103* 92 105* 101*   FADI 9.4 9.0 9.4 9.7 9.3 8.8     Recent Labs   Lab Test 21  0859 11/10/17  0800 17  0810 17  0843 16  0903   MAG 1.8  --  1.8 2.0 1.9   PHOS  --  2.4* 2.4*  --    --      Recent Labs   Lab Test 09/12/22  0825 07/22/22  1246 08/31/21  0952 09/04/20  0750 02/28/20  0946   WBC 6.7 7.8 8.4 7.1 10.7   HGB 13.9 13.5 14.6 13.9 12.4    317 309 375 349   MCV 93 98 96 99 94   NEUTROPHIL 71 75 68 66.3 80.3     Recent Labs   Lab Test 09/12/22  0825 07/22/22  1246 08/31/21  0952 09/04/20  0750   BILITOTAL 0.4 0.4 0.4 0.3   ALKPHOS 42 42 39* 45   ALT 19 31 33 30   AST 20  --  20 22   ALBUMIN 4.3 3.6 4.0 4.0     TSH   Date Value Ref Range Status   01/29/2021 3.32 0.40 - 4.00 mU/L Final   12/09/2019 4.07 (H) 0.40 - 4.00 mU/L Final   04/12/2017 3.18 0.40 - 4.00 mU/L Final     No results for input(s): CEA in the last 52217 hours.  Results for orders placed or performed during the hospital encounter of 09/12/22   CT Chest w/o Contrast    Narrative    CT CHEST WITHOUT CONTRAST 9/12/2022 8:01 AM    CLINICAL HISTORY: Pulmonary nodule evaluation; History of cancer; No  known/automatically detected potential contraindications to iodinated  contrast; Primary malignant neuroendocrine neoplasm of ileum (H)    TECHNIQUE: CT chest without IV contrast. Multiplanar reformats were  obtained. Dose reduction techniques were used.  CONTRAST: None.    COMPARISON: CT chest 8/31/2021, 12/8/2017.    FINDINGS:   LUNGS AND PLEURA: No effusion or acute airspace disease. Stable  anterior right upper lobe solid nodule measuring 0.4 cm series 6 image  99. This appears stable since 12/8/2017 consistent with a benign  nodule. No new airspace disease.    MEDIASTINUM/AXILLAE: No lymphadenopathy. No thoracic aortic aneurysm.    CORONARY ARTERY CALCIFICATION: Moderate.    UPPER ABDOMEN: Fatty liver. Cholecystectomy.    MUSCULOSKELETAL: Unremarkable.      Impression    IMPRESSION:   1.  Stable nodule at the right upper lobe, also present on 12/8/2017  suggesting a nonaggressive etiology. No new airspace disease.  2.  Fatty liver.    ISHMAEL ASKEW MD         SYSTEM ID:  [id number]     Narrative & Impression   CT ABDOMEN AND  PELVIS WITH CONTRAST 7/22/2022 1:28 PM     CLINICAL HISTORY: Left lower quadrant pain.     TECHNIQUE: CT scan of the abdomen and pelvis was performed following  injection of IV contrast. Multiplanar reformats were obtained. Dose  reduction techniques were used.  CONTRAST: 76 mL [id number]     COMPARISON: CT of the abdomen and pelvis performed 2/28/2020.     FINDINGS:   LOWER CHEST: The visualized lung bases are clear.     HEPATOBILIARY: Cholecystectomy. Diffuse fatty infiltration of the  liver. No hepatic masses are seen.     PANCREAS: Normal.     SPLEEN: Normal.     ADRENAL GLANDS: Normal.     KIDNEYS/BLADDER: Unremarkable. No hydronephrosis.     BOWEL: Postoperative changes are again noted involving the distal  small bowel in the right lower quadrant. Few scattered diverticula are  noted in the sigmoid colon. There is mild bowel wall thickening in the  mid sigmoid colon, less prominent than on the previous exam, with mild  adjacent fat stranding. No associated fluid collection to suggest  abscess. No bowel obstruction. Unremarkable appendix.     PELVIC ORGANS: Hysterectomy.     LYMPH NODES: No enlarged lymph nodes are identified in the abdomen or  pelvis.     VASCULATURE: Unremarkable.     ADDITIONAL FINDINGS: None.     MUSCULOSKELETAL: Unremarkable.                                                                      IMPRESSION:   1.  Mild bowel wall thickening with adjacent fat stranding in the  sigmoid colon. These findings are less prominent than on the previous  exam of 2/28/2020. Differential considerations include acute  diverticulitis, a focal colitis, or epiploic appendagitis. No  associated abscess or bowel obstruction.  2.  Diffuse hepatic steatosis.     I discussed these findings with LORAINE Williamson CNP at 1:47 PM on  7/22/2022.     LINO GARCIA MD         SYSTEM ID:  [id number]     Narrative & Impression   MR ABDOMEN WITHOUT AND WITH CONTRAST   9/12/2022 7:44 AM      HISTORY: Primary  malignant neuroendocrine neoplasm of ileum (H)     TECHNIQUE: Multiplanar, multiecho MRI was performed using T1, T2, and  in- and out-of-phase imaging. Pre- and postcontrast T1 images were  acquired after the administration of 7.5 mL Gadavist IV.     COMPARISON: CT abdomen and pelvis 7/22/2022.     FINDINGS:  HEPATOBILIARY: Fatty liver. No new worrisome lesion identified. No  biliary ductal dilatation cholecystectomy. Common bile duct is normal  measuring 4 mm.     PANCREAS: Normal.     SPLEEN: Normal.     ADRENAL GLANDS: Normal.     KIDNEYS/BLADDER: No new significant abnormality. Tiny cyst suggested  at the lower right kidney.     BOWEL: No visible bowel abnormality. No obstruction.     ADDITIONAL FINDINGS: No enlarged lymph nodes identified.     MUSCULOSKELETAL: Normal.                                                                      IMPRESSION:   1.  Stable exam without evidence for new disease.  2.  Fatty liver.     ISHMAEL ASKEW MD         SYSTEM ID:  [id number]         ASSESSMENT/PLAN:  Connie Brunson is a 60 year old female with:    1) Neuroendocrine tumor of the ileum: s/p resection on 11/7/17, 1.5 cm, grade 2, well-differentiated, T3N1 (stage IIIB).      Recent MRI abdomen and CT scan reviewed with Connie.  MRI abdomen is negative, with no evidence of metastatic disease.  CT chest shows stable pulmonary nodule.  She has hepatic steatosis.  I reviewed the imaging and reports.    Chromogranin A has remained abnormal, although stable although it has been trending down over time.  Her symptoms remain the same.  Her imaging remains clear.  Since her imaging is negative, we will continue to observe for now.      She has been monitored for nearly 5 years now without any evidence of disease recurrence.  I offered her options of stopping any further surveillance scans at this time and monitoring the labs alone.  She would like to continue with annual visits but without the restaging scans.    -she will call if she  gets worsening diarrhea or flushing symptoms or pain and can get labs/scans done earlier.    -labs (CBC, CMP, chromogranin A) in 1 year  -RTC in 1 year    2) HTN, HLD  -follow-up with PCP    3) History of meningioma: diagnosed in 1999, was getting yearly MRI's with neurosurgery, but was told she does not need surveillance MRI's anymore unless she has new neurologic symptoms.    4) Pulmonary nodule: There is an indeterminate 2 x 3 mm anterior right mid-lung nodule seen on CT.  She does have history of smoking.  Recent CT chest shows that the tiny nodule is stable.  It has been stable since 2017.    5) Psoriasis:  -follows with dermatology  -she considered methotrexate, but is reluctant to start it and wants to wait    Telephone duration: 21 minutes      Again, thank you for allowing me to participate in the care of your patient.        Sincerely,        Remy Sorto MD

## 2022-09-16 NOTE — PROGRESS NOTES
[facility]    Hematology/Oncology Established Patient Note      Today's Date: Sep 16, 2022     Reason for Follow-up: neuroendocrine tumor of the ileum      HISTORY OF PRESENT ILLNESS: Connie Brunson is a 60 year old female with PMHx of HTN, HLD, depression, who presents with neuroendocrine tumor of the ileum.  In October 2017, she had a CT abdomen/pelvis done due to lower abdominal pain related to sigmoid diverticulitis and loose stools.  She had been having diarrhea for ~3 years.  It showed an incidental finding of an enhancing 1.3 cm intraluminal nodule in the distal ileum.  There was comment on radiology report on CT enterography on 11/2/17 that the nodule, in retrospect, appears to be present and stable since an older CT from 10/27/14.  On 11/7/17, she underwent a segmental small bowel resection with primary anastomosis by Dr. Cristobal.  Pathology showed a 1.5 cm tumor in the ileum, well-differentiated neuroendocrine tumor, grade 2, mitotic rate 410 HPF, Ki-67 of 5%, 2 of 3 lymph nodes were involved, stage pT3N1 (stage IIIB).        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      She was reached over a telephone call and is doing well.  She has no new complaints at this visit.    REVIEW OF SYSTEMS:   14 point ROS was reviewed and is negative other than as noted above in HPI.       HOME MEDICATIONS:  Current Outpatient Medications   Medication Sig Dispense Refill     augmented betamethasone dipropionate (DIPROLENE-AF) 0.05 % external cream Apply topically 2 times daily as needed (Psoriasis) To affected area on hands and feet as needed for severe flares. Do not use on face or body folds. 100 g 2     betamethasone dipropionate (DIPROSONE) 0.05 % external lotion Apply topically 2 times daily To affected area on scalp for 4-6 weeks. Do not use on face or body folds. 60 mL 4     calcipotriene (DOVONOX) 0.005 % external ointment Apply to  hands and feet BID when flared. 120 g 3     cetirizine (ZYRTEC) 10 MG tablet Take 10 mg by mouth every evening       diazepam (VALIUM) 10 MG tablet Take 1-2  tab or 10-20 mg 30-60 minutes prior to MRI scan. 4 tablet 1     fenofibrate (TRIGLIDE/LOFIBRA) 160 MG tablet TAKE ONE TABLET BY MOUTH ONCE DAILY 90 tablet 0     fluticasone (FLONASE) 50 MCG/ACT nasal spray Spray 2 sprays into both nostrils in the morning. 48 g 5     lisinopril (ZESTRIL) 10 MG tablet Take 1 tablet (10 mg) by mouth daily 90 tablet 1     nicotine (NICORETTE) 2 MG gum Place 1 each (2 mg) inside cheek every hour as needed for smoking cessation 40 each 3     omeprazole (PRILOSEC) 20 MG DR capsule Take 1 capsule (20 mg) by mouth daily 90 capsule 3     Risankizumab-rzaa (SKYRIZI) 150 MG/ML subcutaneous Inject 1 mL (150 mg) Subcutaneous every 3 months MAINTENANCE DOSE: Start at day 28, then every 12 weeks. 1 mL 3     triamcinolone (KENALOG) 0.025 % external ointment Apply topically 2 times daily Sparingly to buttocks or groin  for no more than 2 weeks 80 g 3     triamterene-HCTZ (DYAZIDE) 37.5-25 MG capsule Take 1 capsule by mouth daily 90 capsule 1     venlafaxine (EFFEXOR) 37.5 MG tablet Take 1 tablet (37.5 mg) by mouth daily Please call clinic and set up office visit for further refills 90 tablet 1     adalimumab (HUMIRA *CF*) 40 MG/0.4ML pen kit Inject 0.4 mLs (40 mg) Subcutaneous every 14 days (Patient not taking: Reported on 9/16/2022) 0.8 mL 11     adalimumab (HUMIRA *CF*) 80 MG/0.8ML pen kit Inject 0.8 mL (80 mg) subcutaneous for 1 dose, followed by 40 mg every other week beginning 1 week after initial dose (see separate order) (Patient not taking: Reported on 9/16/2022) 0.8 mL 0     gabapentin (NEURONTIN) 300 MG capsule Take 2 capsules (600 mg) by mouth At Bedtime (Patient not taking: Reported on 9/16/2022) 180 capsule 3     hydrOXYzine (ATARAX) 25 MG tablet Take 1 tablet (25 mg) by mouth At Bedtime (Patient not taking: Reported on 9/16/2022)  30 tablet 1     triamcinolone (KENALOG) 0.025 % external ointment Apply topically 2 times daily To body, arms, legs 454 g 3     venlafaxine (EFFEXOR) 37.5 MG tablet Take 1 tablet (37.5 mg) by mouth 2 times daily 90 tablet 3     venlafaxine (EFFEXOR) 37.5 MG tablet Take 1 tablet (37.5 mg) by mouth daily 90 tablet 3         ALLERGIES:  Allergies   Allergen Reactions     Atorvastatin Nausea     Nausea and abd pain      Ceftin GI Disturbance     Stomach upset and bloating - given at same time as clindamycin ? Which one as cause.       Clindamycin GI Disturbance     Stomach upset and bloating - given at same time as ceftin, ? Which one as cause      Flagyl [Metronidazole]      immediate migraine headache      Morphine Itching     Severe with nose, facial  Swelling - oxycodone = ok /no problems      Penicillins Hives     Sulfa Drugs Rash     Severe red , flushed rash     Levaquin Rash     States she can take cipro and avelox         PAST MEDICAL HISTORY:  Past Medical History:   Diagnosis Date     Allergic rhinitis, cause unspecified     year round - dog,dust-  Failed on claritin, claritin-D, zyrtec and zyrtec-D in past.      Benign neoplasm of cerebral meninges (H) 1999    has yearly MRI's. - sees Dr. 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 Thomas ENT      Cancer (H)      Depressive disorder      Deviated nasal septum     sees Dr. Thomas ENT      Diverticulosis of colon (without mention of hemorrhage) 02-     History of Guillain-St John syndrome     NO FLU SHOTS - very mild case in [place] many years ago     Hyperlipidemia LDL goal < 130 doesn't want statins    simvastatin = severe hand joint pain , lipitor =nausea/abd. pain     Hypertension goal BP (blood pressure) < 140/90      Insomnia     trazodone -made pt feel hung over      Major depressive disorder, recurrent episode, moderate (H)     lexapro - sexual side  effects      Moderate major depression (H) 2/4/2005    trazodone -made pt feel hung over      Other acne      Other extrapyramidal disease and abnormal movement disorder     ?GBS     Symptomatic menopausal or female climacteric states     vivelle-dot          PAST SURGICAL HISTORY:  Past Surgical History:   Procedure Laterality Date     ABDOMEN SURGERY       BIOPSY       COLONOSCOPY  1/16/2012    Procedure:COLONOSCOPY; Colonoscopy; Surgeon:SHOLA JOYCE; Location:[facility]     HC REMOVE TONSILS/ADENOIDS,<13 Y/O      T and A, under 12 yrs     HEAD & NECK SURGERY       LAPAROSCOPIC CHOLECYSTECTOMY WITH CHOLANGIOGRAMS N/A 4/25/2016    Procedure: LAPAROSCOPIC CHOLECYSTECTOMY WITH CHOLANGIOGRAMS;  Surgeon: Rosa M Cristobal MD;  Location: [facility]     LAPAROSCOPY DIAGNOSTIC (GENERAL) N/A 11/7/2017    Procedure: LAPAROSCOPY DIAGNOSTIC (GENERAL);  Exploratory Laparoscopy, Laparotomy and Small Bowel resection.;  Surgeon: Rosa M Cristobal MD;  Location: [facility]     LAPAROTOMY EXPLORATORY N/A 11/7/2017    Procedure: LAPAROTOMY EXPLORATORY;;  Surgeon: Rosa M Cristobal MD;  Location: [facility]     RESECT SMALL BOWEL WITHOUT OSTOMY N/A 11/7/2017    Procedure: RESECT SMALL BOWEL WITHOUT OSTOMY;;  Surgeon: Rosa M Cristobal MD;  Location: [facility]     SURGICAL HISTORY OF -   2004    Menigioma excision     ZZC LIGATE FALLOPIAN TUBE  1988    Tubal Ligation     ZZC NONSPECIFIC PROCEDURE      PAROTID TUMOR L SIDE OF NECK - BENIGN     ZZC TOTAL ABDOM HYSTERECTOMY  2000 ?    Hysterectomy, Total Abdominal with BSO         SOCIAL HISTORY:  Social History     Socioeconomic History     Marital status:      Spouse name: Perry Brunson      Number of children: 3     Years of education: 15     Highest education level: Not on file   Occupational History     Occupation: RN and in admin with [employer]      Employer: [employer]   Tobacco Use     Smoking status: Current Every Day Smoker     Packs/day:  1.00     Years: 5.00     Pack years: 5.00     Types: Cigarettes     Last attempt to quit: 1994     Years since quittin.7     Smokeless tobacco: Former User     Tobacco comment: 11/3/17 - occ e-cig use   Vaping Use     Vaping Use: Former     Substances: Nicotine     Devices: Refillable tank   Substance and Sexual Activity     Alcohol use: Yes     Alcohol/week: 0.0 standard drinks     Comment: 3-5 drinks per week     Drug use: No     Sexual activity: Not Currently     Partners: Male     Birth control/protection: Post-menopausal     Comment: tubal ligation-    Other Topics Concern      Service No     Blood Transfusions No     Caffeine Concern Yes     Comment: 2-3 cups coffee in am     Occupational Exposure Not Asked     Hobby Hazards Not Asked     Sleep Concern Not Asked     Stress Concern Not Asked     Weight Concern Not Asked     Special Diet Not Asked     Back Care Not Asked     Exercise No     Comment: regular walking 4x/week      Bike Helmet Not Asked     Seat Belt Yes     Comment: always     Self-Exams Yes     Comment: SBE encouraged monthly     Parent/sibling w/ CABG, MI or angioplasty before 65F 55M? No   Social History Narrative    calcium - taking 500mg po qday - increase to Calcium - 1000-1200mg/day    flex sig/colonoscopy -at age 48    sun precautions - discussed    mammogram - yearly    Td booster -  per our records - pt will call [facility]     pneumovax -at age 58    DEXA -this year-     stool hemoccults - every year after age 40    ASA- start 81 mg ASA qday.    mulvitamin - encouraged    doing citrucel qday         from second , [employer]. Now back together with her first  and father of their  children, Perry Brunson - 2011.      Social Determinants of Health     Financial Resource Strain: Not on file   Food Insecurity: Not on file   Transportation Needs: Not on file   Physical Activity: Not on file   Stress: Not on file   Social  Connections: Not on file   Intimate Partner Violence: Not on file   Housing Stability: Not on file         FAMILY HISTORY:  Family History   Problem Relation Age of Onset     Hyperlipidemia Mother      Thyroid Disease Mother      Cancer Father         lung     Hypertension Father      Hyperlipidemia Father      Thyroid Disease Brother      Diabetes Maternal Grandmother      Cerebrovascular Disease Maternal Grandmother      Substance Abuse Maternal Grandfather      Thyroid Disease Son      Her father  of lung cancer and paranasal cancer at around age 65.    She has 3 children.      PHYSICAL EXAM:  Vital signs:  LMP 2000    ECO  GENERAL/CONSTITUTIONAL: No acute distress.  EYES: No scleral icterus.  NEUROLOGIC: Alert, oriented, answers questions appropriately.  INTEGUMENTARY: No jaundice. Psoriatic lesions on left wrist, legs.  GAIT: Steady, does not use assistive device        LABS:    Recent Labs   Lab Test 22  0825 22  1246 21  0952 20  0750 20  0946 19  0833   * 135 134 137 133 135   POTASSIUM 3.7  --  3.4 3.6 3.2* 3.8   CHLORIDE 94* 103 100 103 98 99   CO2 28 28 29 28 31 26   ANIONGAP 12 4 5 6 4 10   BUN 10.5 15 9 13 9 17   CR 0.59 0.52 0.64 0.73 0.68 0.73   GLC 99 82 103* 92 105* 101*   FADI 9.4 9.0 9.4 9.7 9.3 8.8     Recent Labs   Lab Test 21  0859 11/10/17  0800 17  0810 17  0843 16  0903   MAG 1.8  --  1.8 2.0 1.9   PHOS  --  2.4* 2.4*  --   --      Recent Labs   Lab Test 22  0825 22  1246 21  0952 20  0750 20  0946   WBC 6.7 7.8 8.4 7.1 10.7   HGB 13.9 13.5 14.6 13.9 12.4    317 309 375 349   MCV 93 98 96 99 94   NEUTROPHIL 71 75 68 66.3 80.3     Recent Labs   Lab Test 22  0825 22  1246 21  0952 20  0750   BILITOTAL 0.4 0.4 0.4 0.3   ALKPHOS 42 42 39* 45   ALT 19 31 33 30   AST 20  --  20 22   ALBUMIN 4.3 3.6 4.0 4.0     TSH   Date Value Ref Range Status   2021  3.32 0.40 - 4.00 mU/L Final   12/09/2019 4.07 (H) 0.40 - 4.00 mU/L Final   04/12/2017 3.18 0.40 - 4.00 mU/L Final     No results for input(s): CEA in the last 79168 hours.  Results for orders placed or performed during the hospital encounter of 09/12/22   CT Chest w/o Contrast    Narrative    CT CHEST WITHOUT CONTRAST 9/12/2022 8:01 AM    CLINICAL HISTORY: Pulmonary nodule evaluation; History of cancer; No  known/automatically detected potential contraindications to iodinated  contrast; Primary malignant neuroendocrine neoplasm of ileum (H)    TECHNIQUE: CT chest without IV contrast. Multiplanar reformats were  obtained. Dose reduction techniques were used.  CONTRAST: None.    COMPARISON: CT chest 8/31/2021, 12/8/2017.    FINDINGS:   LUNGS AND PLEURA: No effusion or acute airspace disease. Stable  anterior right upper lobe solid nodule measuring 0.4 cm series 6 image  99. This appears stable since 12/8/2017 consistent with a benign  nodule. No new airspace disease.    MEDIASTINUM/AXILLAE: No lymphadenopathy. No thoracic aortic aneurysm.    CORONARY ARTERY CALCIFICATION: Moderate.    UPPER ABDOMEN: Fatty liver. Cholecystectomy.    MUSCULOSKELETAL: Unremarkable.      Impression    IMPRESSION:   1.  Stable nodule at the right upper lobe, also present on 12/8/2017  suggesting a nonaggressive etiology. No new airspace disease.  2.  Fatty liver.    ISHMAEL ASKEW MD         SYSTEM ID:  [id number]     Narrative & Impression   CT ABDOMEN AND PELVIS WITH CONTRAST 7/22/2022 1:28 PM     CLINICAL HISTORY: Left lower quadrant pain.     TECHNIQUE: CT scan of the abdomen and pelvis was performed following  injection of IV contrast. Multiplanar reformats were obtained. Dose  reduction techniques were used.  CONTRAST: 76 mL [id number]     COMPARISON: CT of the abdomen and pelvis performed 2/28/2020.     FINDINGS:   LOWER CHEST: The visualized lung bases are clear.     HEPATOBILIARY: Cholecystectomy. Diffuse fatty infiltration of  the  liver. No hepatic masses are seen.     PANCREAS: Normal.     SPLEEN: Normal.     ADRENAL GLANDS: Normal.     KIDNEYS/BLADDER: Unremarkable. No hydronephrosis.     BOWEL: Postoperative changes are again noted involving the distal  small bowel in the right lower quadrant. Few scattered diverticula are  noted in the sigmoid colon. There is mild bowel wall thickening in the  mid sigmoid colon, less prominent than on the previous exam, with mild  adjacent fat stranding. No associated fluid collection to suggest  abscess. No bowel obstruction. Unremarkable appendix.     PELVIC ORGANS: Hysterectomy.     LYMPH NODES: No enlarged lymph nodes are identified in the abdomen or  pelvis.     VASCULATURE: Unremarkable.     ADDITIONAL FINDINGS: None.     MUSCULOSKELETAL: Unremarkable.                                                                      IMPRESSION:   1.  Mild bowel wall thickening with adjacent fat stranding in the  sigmoid colon. These findings are less prominent than on the previous  exam of 2/28/2020. Differential considerations include acute  diverticulitis, a focal colitis, or epiploic appendagitis. No  associated abscess or bowel obstruction.  2.  Diffuse hepatic steatosis.     I discussed these findings with LORAINE Williamson CNP at 1:47 PM on  7/22/2022.     LINO GARCIA MD         SYSTEM ID:  [id number]     Narrative & Impression   MR ABDOMEN WITHOUT AND WITH CONTRAST   9/12/2022 7:44 AM      HISTORY: Primary malignant neuroendocrine neoplasm of ileum (H)     TECHNIQUE: Multiplanar, multiecho MRI was performed using T1, T2, and  in- and out-of-phase imaging. Pre- and postcontrast T1 images were  acquired after the administration of 7.5 mL Gadavist IV.     COMPARISON: CT abdomen and pelvis 7/22/2022.     FINDINGS:  HEPATOBILIARY: Fatty liver. No new worrisome lesion identified. No  biliary ductal dilatation cholecystectomy. Common bile duct is normal  measuring 4 mm.     PANCREAS:  Normal.     SPLEEN: Normal.     ADRENAL GLANDS: Normal.     KIDNEYS/BLADDER: No new significant abnormality. Tiny cyst suggested  at the lower right kidney.     BOWEL: No visible bowel abnormality. No obstruction.     ADDITIONAL FINDINGS: No enlarged lymph nodes identified.     MUSCULOSKELETAL: Normal.                                                                      IMPRESSION:   1.  Stable exam without evidence for new disease.  2.  Fatty liver.     ISHMAEL ASKEW MD         SYSTEM ID:  [id number]         ASSESSMENT/PLAN:  Connie Brunson is a 60 year old female with:    1) Neuroendocrine tumor of the ileum: s/p resection on 11/7/17, 1.5 cm, grade 2, well-differentiated, T3N1 (stage IIIB).      Recent MRI abdomen and CT scan reviewed with Connie.  MRI abdomen is negative, with no evidence of metastatic disease.  CT chest shows stable pulmonary nodule.  She has hepatic steatosis.  I reviewed the imaging and reports.    Chromogranin A has remained abnormal, although stable although it has been trending down over time.  Her symptoms remain the same.  Her imaging remains clear.  Since her imaging is negative, we will continue to observe for now.      She has been monitored for nearly 5 years now without any evidence of disease recurrence.  I offered her options of stopping any further surveillance scans at this time and monitoring the labs alone.  She would like to continue with annual visits but without the restaging scans.    -she will call if she gets worsening diarrhea or flushing symptoms or pain and can get labs/scans done earlier.    -labs (CBC, CMP, chromogranin A) in 1 year  -RTC in 1 year    2) HTN, HLD  -follow-up with PCP    3) History of meningioma: diagnosed in 1999, was getting yearly MRI's with neurosurgery, but was told she does not need surveillance MRI's anymore unless she has new neurologic symptoms.    4) Pulmonary nodule: There is an indeterminate 2 x 3 mm anterior right mid-lung nodule seen on CT.   She does have history of smoking.  Recent CT chest shows that the tiny nodule is stable.  It has been stable since 2017.    5) Psoriasis:  -follows with dermatology  -she considered methotrexate, but is reluctant to start it and wants to wait    Telephone duration: 21 minutes

## 2022-09-16 NOTE — LETTER
9/16/2022         RE: Connie Brunson  [address] [facility] [address]        Dear Colleague,    Thank you for referring your patient, Connie Brunson, to the [facility]. Please see a copy of my visit note below.    Connie is a 61 year old who is being evaluated via a billable telephone visit.      What phone number would you like to be contacted at? [phone]  How would you like to obtain your AVS? Emiliano LOPEZ        [facility]    Hematology/Oncology Established Patient Note      Today's Date: Sep 16, 2022     Reason for Follow-up: neuroendocrine tumor of the ileum      HISTORY OF PRESENT ILLNESS: Connie Brunson is a 60 year old female with PMHx of HTN, HLD, depression, who presents with neuroendocrine tumor of the ileum.  In October 2017, she had a CT abdomen/pelvis done due to lower abdominal pain related to sigmoid diverticulitis and loose stools.  She had been having diarrhea for ~3 years.  It showed an incidental finding of an enhancing 1.3 cm intraluminal nodule in the distal ileum.  There was comment on radiology report on CT enterography on 11/2/17 that the nodule, in retrospect, appears to be present and stable since an older CT from 10/27/14.  On 11/7/17, she underwent a segmental small bowel resection with primary anastomosis by Dr. Cristobal.  Pathology showed a 1.5 cm tumor in the ileum, well-differentiated neuroendocrine tumor, grade 2, mitotic rate 410 HPF, Ki-67 of 5%, 2 of 3 lymph nodes were involved, stage pT3N1 (stage IIIB).        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      She was reached over a telephone call and is doing well.  She has no new complaints at this visit.    REVIEW OF SYSTEMS:   14 point ROS was reviewed and is negative other than as noted above in HPI.       HOME MEDICATIONS:  Current Outpatient Medications   Medication Sig  Dispense Refill     augmented betamethasone dipropionate (DIPROLENE-AF) 0.05 % external cream Apply topically 2 times daily as needed (Psoriasis) To affected area on hands and feet as needed for severe flares. Do not use on face or body folds. 100 g 2     betamethasone dipropionate (DIPROSONE) 0.05 % external lotion Apply topically 2 times daily To affected area on scalp for 4-6 weeks. Do not use on face or body folds. 60 mL 4     calcipotriene (DOVONOX) 0.005 % external ointment Apply to hands and feet BID when flared. 120 g 3     cetirizine (ZYRTEC) 10 MG tablet Take 10 mg by mouth every evening       diazepam (VALIUM) 10 MG tablet Take 1-2  tab or 10-20 mg 30-60 minutes prior to MRI scan. 4 tablet 1     fenofibrate (TRIGLIDE/LOFIBRA) 160 MG tablet TAKE ONE TABLET BY MOUTH ONCE DAILY 90 tablet 0     fluticasone (FLONASE) 50 MCG/ACT nasal spray Spray 2 sprays into both nostrils in the morning. 48 g 5     lisinopril (ZESTRIL) 10 MG tablet Take 1 tablet (10 mg) by mouth daily 90 tablet 1     nicotine (NICORETTE) 2 MG gum Place 1 each (2 mg) inside cheek every hour as needed for smoking cessation 40 each 3     omeprazole (PRILOSEC) 20 MG DR capsule Take 1 capsule (20 mg) by mouth daily 90 capsule 3     Risankizumab-rzaa (SKYRIZI) 150 MG/ML subcutaneous Inject 1 mL (150 mg) Subcutaneous every 3 months MAINTENANCE DOSE: Start at day 28, then every 12 weeks. 1 mL 3     triamcinolone (KENALOG) 0.025 % external ointment Apply topically 2 times daily Sparingly to buttocks or groin  for no more than 2 weeks 80 g 3     triamterene-HCTZ (DYAZIDE) 37.5-25 MG capsule Take 1 capsule by mouth daily 90 capsule 1     venlafaxine (EFFEXOR) 37.5 MG tablet Take 1 tablet (37.5 mg) by mouth daily Please call clinic and set up office visit for further refills 90 tablet 1     adalimumab (HUMIRA *CF*) 40 MG/0.4ML pen kit Inject 0.4 mLs (40 mg) Subcutaneous every 14 days (Patient not taking: Reported on 9/16/2022) 0.8 mL 11     adalimumab  (HUMIRA *CF*) 80 MG/0.8ML pen kit Inject 0.8 mL (80 mg) subcutaneous for 1 dose, followed by 40 mg every other week beginning 1 week after initial dose (see separate order) (Patient not taking: Reported on 9/16/2022) 0.8 mL 0     gabapentin (NEURONTIN) 300 MG capsule Take 2 capsules (600 mg) by mouth At Bedtime (Patient not taking: Reported on 9/16/2022) 180 capsule 3     hydrOXYzine (ATARAX) 25 MG tablet Take 1 tablet (25 mg) by mouth At Bedtime (Patient not taking: Reported on 9/16/2022) 30 tablet 1     triamcinolone (KENALOG) 0.025 % external ointment Apply topically 2 times daily To body, arms, legs 454 g 3     venlafaxine (EFFEXOR) 37.5 MG tablet Take 1 tablet (37.5 mg) by mouth 2 times daily 90 tablet 3     venlafaxine (EFFEXOR) 37.5 MG tablet Take 1 tablet (37.5 mg) by mouth daily 90 tablet 3         ALLERGIES:  Allergies   Allergen Reactions     Atorvastatin Nausea     Nausea and abd pain      Ceftin GI Disturbance     Stomach upset and bloating - given at same time as clindamycin ? Which one as cause.       Clindamycin GI Disturbance     Stomach upset and bloating - given at same time as ceftin, ? Which one as cause      Flagyl [Metronidazole]      immediate migraine headache      Morphine Itching     Severe with nose, facial  Swelling - oxycodone = ok /no problems      Penicillins Hives     Sulfa Drugs Rash     Severe red , flushed rash     Levaquin Rash     States she can take cipro and avelox         PAST MEDICAL HISTORY:  Past Medical History:   Diagnosis Date     Allergic rhinitis, cause unspecified     year round - dog,dust-  Failed on claritin, claritin-D, zyrtec and zyrtec-D in past.      Benign neoplasm of cerebral meninges (H) 1999    has yearly MRI's. - sees [facility]. 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 Thomas ENT      Cancer (H)      Depressive disorder      Deviated nasal septum     sees   [facility]      Diverticulosis of colon (without mention of hemorrhage) 02-     History of Guillain-Trail syndrome     NO FLU SHOTS - very mild case in [place] many years ago     Hyperlipidemia LDL goal < 130 doesn't want statins    simvastatin = severe hand joint pain , lipitor =nausea/abd. pain     Hypertension goal BP (blood pressure) < 140/90      Insomnia     trazodone -made pt feel hung over      Major depressive disorder, recurrent episode, moderate (H)     lexapro - sexual side effects      Moderate major depression (H) 2/4/2005    trazodone -made pt feel hung over      Other acne      Other extrapyramidal disease and abnormal movement disorder     ?GBS     Symptomatic menopausal or female climacteric states     vivelle-dot          PAST SURGICAL HISTORY:  Past Surgical History:   Procedure Laterality Date     ABDOMEN SURGERY       BIOPSY       COLONOSCOPY  1/16/2012    Procedure:COLONOSCOPY; Colonoscopy; Surgeon:SHOLA JOYCE; Location: GI     HC REMOVE TONSILS/ADENOIDS,<13 Y/O      T and A, under 12 yrs     HEAD & NECK SURGERY       LAPAROSCOPIC CHOLECYSTECTOMY WITH CHOLANGIOGRAMS N/A 4/25/2016    Procedure: LAPAROSCOPIC CHOLECYSTECTOMY WITH CHOLANGIOGRAMS;  Surgeon: Rosa M Cristobal MD;  Location:  OR     LAPAROSCOPY DIAGNOSTIC (GENERAL) N/A 11/7/2017    Procedure: LAPAROSCOPY DIAGNOSTIC (GENERAL);  Exploratory Laparoscopy, Laparotomy and Small Bowel resection.;  Surgeon: Rosa M Cristobal MD;  Location: [facility] OR     LAPAROTOMY EXPLORATORY N/A 11/7/2017    Procedure: LAPAROTOMY EXPLORATORY;;  Surgeon: Rosa M Cristobal MD;  Location: [facility] OR     RESECT SMALL BOWEL WITHOUT OSTOMY N/A 11/7/2017    Procedure: RESECT SMALL BOWEL WITHOUT OSTOMY;;  Surgeon: Rosa M Cristobal MD;  Location: [facility] OR     SURGICAL HISTORY OF -   2004    Menigioma excision     ZZC LIGATE FALLOPIAN TUBE  1988    Tubal Ligation     ZZC NONSPECIFIC PROCEDURE      PAROTID TUMOR L SIDE OF NECK -  BENIGN     ZZC TOTAL ABDOM HYSTERECTOMY   ?    Hysterectomy, Total Abdominal with BSO         SOCIAL HISTORY:  Social History     Socioeconomic History     Marital status:      Spouse name: Perry Brunson      Number of children: 3     Years of education: 15     Highest education level: Not on file   Occupational History     Occupation: RN and in admin with [employer]      Employer: [employer]   Tobacco Use     Smoking status: Current Every Day Smoker     Packs/day: 1.00     Years: 5.00     Pack years: 5.00     Types: Cigarettes     Last attempt to quit: 1994     Years since quittin.7     Smokeless tobacco: Former User     Tobacco comment: 11/3/17 - occ e-cig use   Vaping Use     Vaping Use: Former     Substances: Nicotine     Devices: Refillable tank   Substance and Sexual Activity     Alcohol use: Yes     Alcohol/week: 0.0 standard drinks     Comment: 3-5 drinks per week     Drug use: No     Sexual activity: Not Currently     Partners: Male     Birth control/protection: Post-menopausal     Comment: tubal ligation-    Other Topics Concern      Service No     Blood Transfusions No     Caffeine Concern Yes     Comment: 2-3 cups coffee in am     Occupational Exposure Not Asked     Hobby Hazards Not Asked     Sleep Concern Not Asked     Stress Concern Not Asked     Weight Concern Not Asked     Special Diet Not Asked     Back Care Not Asked     Exercise No     Comment: regular walking 4x/week      Bike Helmet Not Asked     Seat Belt Yes     Comment: always     Self-Exams Yes     Comment: SBE encouraged monthly     Parent/sibling w/ CABG, MI or angioplasty before 65F 55M? No   Social History Narrative    calcium - taking 500mg po qday - increase to Calcium - 1000-1200mg/day    flex sig/colonoscopy -at age 48    sun precautions - discussed    mammogram - yearly    Td booster -  per our records - pt will call [facility]     pneumovax -at age 58    DEXA -this  year-     stool hemoccults - every year after age 40    ASA- start 81 mg ASA qday.    mulvitamin - encouraged    doing citrucel qday         from second , [employer]. Now back together with her first  and father of their  children, Perry Brunson - 2011.      Social Determinants of Health     Financial Resource Strain: Not on file   Food Insecurity: Not on file   Transportation Needs: Not on file   Physical Activity: Not on file   Stress: Not on file   Social Connections: Not on file   Intimate Partner Violence: Not on file   Housing Stability: Not on file         FAMILY HISTORY:  Family History   Problem Relation Age of Onset     Hyperlipidemia Mother      Thyroid Disease Mother      Cancer Father         lung     Hypertension Father      Hyperlipidemia Father      Thyroid Disease Brother      Diabetes Maternal Grandmother      Cerebrovascular Disease Maternal Grandmother      Substance Abuse Maternal Grandfather      Thyroid Disease Son      Her father  of lung cancer and paranasal cancer at around age 65.    She has 3 children.      PHYSICAL EXAM:  Vital signs:  LMP 2000    ECO  GENERAL/CONSTITUTIONAL: No acute distress.  EYES: No scleral icterus.  NEUROLOGIC: Alert, oriented, answers questions appropriately.  INTEGUMENTARY: No jaundice. Psoriatic lesions on left wrist, legs.  GAIT: Steady, does not use assistive device        LABS:    Recent Labs   Lab Test 22  0825 22  1246 21  0952 20  0750 20  0946 19  0833   * 135 134 137 133 135   POTASSIUM 3.7  --  3.4 3.6 3.2* 3.8   CHLORIDE 94* 103 100 103 98 99   CO2 28 28 29 28 31 26   ANIONGAP 12 4 5 6 4 10   BUN 10.5 15 9 13 9 17   CR 0.59 0.52 0.64 0.73 0.68 0.73   GLC 99 82 103* 92 105* 101*   FADI 9.4 9.0 9.4 9.7 9.3 8.8     Recent Labs   Lab Test 21  0859 11/10/17  0800 17  0810 17  0843 16  0903   MAG 1.8  --  1.8 2.0 1.9   PHOS  --  2.4* 2.4*  --    --      Recent Labs   Lab Test 09/12/22  0825 07/22/22  1246 08/31/21  0952 09/04/20  0750 02/28/20  0946   WBC 6.7 7.8 8.4 7.1 10.7   HGB 13.9 13.5 14.6 13.9 12.4    317 309 375 349   MCV 93 98 96 99 94   NEUTROPHIL 71 75 68 66.3 80.3     Recent Labs   Lab Test 09/12/22  0825 07/22/22  1246 08/31/21  0952 09/04/20  0750   BILITOTAL 0.4 0.4 0.4 0.3   ALKPHOS 42 42 39* 45   ALT 19 31 33 30   AST 20  --  20 22   ALBUMIN 4.3 3.6 4.0 4.0     TSH   Date Value Ref Range Status   01/29/2021 3.32 0.40 - 4.00 mU/L Final   12/09/2019 4.07 (H) 0.40 - 4.00 mU/L Final   04/12/2017 3.18 0.40 - 4.00 mU/L Final     No results for input(s): CEA in the last 58890 hours.  Results for orders placed or performed during the hospital encounter of 09/12/22   CT Chest w/o Contrast    Narrative    CT CHEST WITHOUT CONTRAST 9/12/2022 8:01 AM    CLINICAL HISTORY: Pulmonary nodule evaluation; History of cancer; No  known/automatically detected potential contraindications to iodinated  contrast; Primary malignant neuroendocrine neoplasm of ileum (H)    TECHNIQUE: CT chest without IV contrast. Multiplanar reformats were  obtained. Dose reduction techniques were used.  CONTRAST: None.    COMPARISON: CT chest 8/31/2021, 12/8/2017.    FINDINGS:   LUNGS AND PLEURA: No effusion or acute airspace disease. Stable  anterior right upper lobe solid nodule measuring 0.4 cm series 6 image  99. This appears stable since 12/8/2017 consistent with a benign  nodule. No new airspace disease.    MEDIASTINUM/AXILLAE: No lymphadenopathy. No thoracic aortic aneurysm.    CORONARY ARTERY CALCIFICATION: Moderate.    UPPER ABDOMEN: Fatty liver. Cholecystectomy.    MUSCULOSKELETAL: Unremarkable.      Impression    IMPRESSION:   1.  Stable nodule at the right upper lobe, also present on 12/8/2017  suggesting a nonaggressive etiology. No new airspace disease.  2.  Fatty liver.    ISHMAEL ASKEW MD         SYSTEM ID:  [id number]     Narrative & Impression   CT ABDOMEN AND  PELVIS WITH CONTRAST 7/22/2022 1:28 PM     CLINICAL HISTORY: Left lower quadrant pain.     TECHNIQUE: CT scan of the abdomen and pelvis was performed following  injection of IV contrast. Multiplanar reformats were obtained. Dose  reduction techniques were used.  CONTRAST: 76 mL Isovue-370     COMPARISON: CT of the abdomen and pelvis performed 2/28/2020.     FINDINGS:   LOWER CHEST: The visualized lung bases are clear.     HEPATOBILIARY: Cholecystectomy. Diffuse fatty infiltration of the  liver. No hepatic masses are seen.     PANCREAS: Normal.     SPLEEN: Normal.     ADRENAL GLANDS: Normal.     KIDNEYS/BLADDER: Unremarkable. No hydronephrosis.     BOWEL: Postoperative changes are again noted involving the distal  small bowel in the right lower quadrant. Few scattered diverticula are  noted in the sigmoid colon. There is mild bowel wall thickening in the  mid sigmoid colon, less prominent than on the previous exam, with mild  adjacent fat stranding. No associated fluid collection to suggest  abscess. No bowel obstruction. Unremarkable appendix.     PELVIC ORGANS: Hysterectomy.     LYMPH NODES: No enlarged lymph nodes are identified in the abdomen or  pelvis.     VASCULATURE: Unremarkable.     ADDITIONAL FINDINGS: None.     MUSCULOSKELETAL: Unremarkable.                                                                      IMPRESSION:   1.  Mild bowel wall thickening with adjacent fat stranding in the  sigmoid colon. These findings are less prominent than on the previous  exam of 2/28/2020. Differential considerations include acute  diverticulitis, a focal colitis, or epiploic appendagitis. No  associated abscess or bowel obstruction.  2.  Diffuse hepatic steatosis.     I discussed these findings with LORAINE Williamson CNP at 1:47 PM on  7/22/2022.     LINO GARCIA MD         SYSTEM ID:  [id number]     Narrative & Impression   MR ABDOMEN WITHOUT AND WITH CONTRAST   9/12/2022 7:44 AM      HISTORY: Primary  malignant neuroendocrine neoplasm of ileum (H)     TECHNIQUE: Multiplanar, multiecho MRI was performed using T1, T2, and  in- and out-of-phase imaging. Pre- and postcontrast T1 images were  acquired after the administration of 7.5 mL Gadavist IV.     COMPARISON: CT abdomen and pelvis 7/22/2022.     FINDINGS:  HEPATOBILIARY: Fatty liver. No new worrisome lesion identified. No  biliary ductal dilatation cholecystectomy. Common bile duct is normal  measuring 4 mm.     PANCREAS: Normal.     SPLEEN: Normal.     ADRENAL GLANDS: Normal.     KIDNEYS/BLADDER: No new significant abnormality. Tiny cyst suggested  at the lower right kidney.     BOWEL: No visible bowel abnormality. No obstruction.     ADDITIONAL FINDINGS: No enlarged lymph nodes identified.     MUSCULOSKELETAL: Normal.                                                                      IMPRESSION:   1.  Stable exam without evidence for new disease.  2.  Fatty liver.     ISHMAEL ASKEW MD         SYSTEM ID:  [id number]         ASSESSMENT/PLAN:  Connie Brunson is a 60 year old female with:    1) Neuroendocrine tumor of the ileum: s/p resection on 11/7/17, 1.5 cm, grade 2, well-differentiated, T3N1 (stage IIIB).      Recent MRI abdomen and CT scan reviewed with Connie.  MRI abdomen is negative, with no evidence of metastatic disease.  CT chest shows stable pulmonary nodule.  She has hepatic steatosis.  I reviewed the imaging and reports.    Chromogranin A has remained abnormal, although stable although it has been trending down over time.  Her symptoms remain the same.  Her imaging remains clear.  Since her imaging is negative, we will continue to observe for now.      She has been monitored for nearly 5 years now without any evidence of disease recurrence.  I offered her options of stopping any further surveillance scans at this time and monitoring the labs alone.  She would like to continue with annual visits but without the restaging scans.    -she will call if she  gets worsening diarrhea or flushing symptoms or pain and can get labs/scans done earlier.    -labs (CBC, CMP, chromogranin A) in 1 year  -RTC in 1 year    2) HTN, HLD  -follow-up with PCP    3) History of meningioma: diagnosed in 1999, was getting yearly MRI's with neurosurgery, but was told she does not need surveillance MRI's anymore unless she has new neurologic symptoms.    4) Pulmonary nodule: There is an indeterminate 2 x 3 mm anterior right mid-lung nodule seen on CT.  She does have history of smoking.  Recent CT chest shows that the tiny nodule is stable.  It has been stable since 2017.    5) Psoriasis:  -follows with dermatology  -she considered methotrexate, but is reluctant to start it and wants to wait    Telephone duration: 21 minutes      Again, thank you for allowing me to participate in the care of your patient.        Sincerely,        Remy Sorto MD

## 2022-09-16 NOTE — PROGRESS NOTES
Connie is a 61 year old who is being evaluated via a billable telephone visit.      What phone number would you like to be contacted at? [phone]  How would you like to obtain your AVS? Emiliano Obrien VF

## 2022-09-22 DIAGNOSIS — E78.1 HYPERTRIGLYCERIDEMIA: ICD-10-CM

## 2022-09-23 RX ORDER — FENOFIBRATE 160 MG/1
TABLET ORAL
Qty: 90 TABLET | Refills: 0 | Status: SHIPPED | OUTPATIENT
Start: 2022-09-23 | End: 2022-12-20

## 2022-10-04 ENCOUNTER — NURSE TRIAGE (OUTPATIENT)
Dept: FAMILY MEDICINE | Facility: CLINIC | Age: 62
End: 2022-10-04
Payer: COMMERCIAL

## 2022-10-04 DIAGNOSIS — R10.9 ABDOMINAL PAIN, UNSPECIFIED ABDOMINAL LOCATION: Primary | ICD-10-CM

## 2022-10-04 PROCEDURE — 99207 REFERRAL TO ACUTE AND DIAGNOSTIC SERVICES: CPT | Performed by: FAMILY MEDICINE

## 2022-10-04 NOTE — TELEPHONE ENCOUNTER
"Per Dr. Lancaster:  Head over to HUB now.   NPO.     Called patient.   States she \"doesnt't want to do that. Just wants to do Cipro\".     Will call tomorrow if not better. Has taken 2 pills already. Feeling a little better already.     ADS updated that patient will not be coming to [facility] today.     DILSHAD MARROQUIN RN on 10/4/2022 at 1:14 PM   [facility]        "

## 2022-10-04 NOTE — TELEPHONE ENCOUNTER
Referral to Acute and Diagnostic Services    The [facility] has been contacted at [phone] ([place]) to confirm patient acceptance. The transition to Acute & Diagnostic Services Clinic has been discussed with patient, and she agrees with next level of care.  Patient understands that evaluation/treatment at [facility] typically takes significantly longer than in clinic/urgent care (>2 hours).      Special issues:  None      Referral placed: Yes  Patient has transportation arranged and will travel to the [facility]S without delay: Yes  Patient aware not to eat or drink. Yes    The following provider has assessed this patient for intervention at [facility], and directed the patient for referral: Jacinto Lancaster, DO    Jacinto Lancaster, DO

## 2022-10-04 NOTE — TELEPHONE ENCOUNTER
Called and spoke with patient.     Advised to not take Cipro at this time, it was not prescribed. Patient stated an understanding and agreed with plan.     Scheduled for appointment tomorrow.     Next 5 appointments (look out 90 days)    Oct 05, 2022  9:00 AM  (Arrive by 8:40 AM)  Provider Visit with LORAINE Saucedo CNP  [facility] ([facility] ) [address] [place] [id number]  [phone]         DILSHAD MARROQUIN RN on 10/4/2022 at 3:12 PM   [facility]

## 2022-10-04 NOTE — TELEPHONE ENCOUNTER
"Nurse Triage SBAR    Is this a 2nd Level Triage? YES, LICENSED PRACTITIONER REVIEW IS REQUIRED    Situation: abdominal pain - wants to know if its OK take 5 days of Cipro she has leftover.    Background: Hx of diverticulitis. Had MRI abdomen last month - normal. Couple months ago had CT scan that showed \"inflammation\" - given cipro. She took for 2 days, couldn't tolerate it, then stopped taking. But symptoms subsided.     Assessment: symptoms started on Friday 9/30. Pain is worsening. RLQ and LLQ abdominal pain that feels like severe gas pain. Rectal pressure. Pain is constant at 2 but gets \"waves\" of more severe pain every hour, rating it an 8 or 9 that lasts for a minute. Still has an appetite.     Protocol Recommended Disposition:   Go To ED/UCC Now (Or To Office With PCP Approval)    Recommendation: Routing to provider to review and advise.     Mira Bonner RN  [place] Triage       Does the patient meet one of the following criteria for [facility] visit consideration? 16+ years old, with an [facility] PCP     TIP  Providers, please consider if this condition is appropriate for management at one of our Acute and Diagnostic Services sites.     If patient is a good candidate, please use dotphrase <dot>triageresponse and select Refer to [facility] to document.      Reason for Disposition    Constant abdominal pain lasting > 2 hours    Answer Assessment - Initial Assessment Questions  1. LOCATION: \"Where does it hurt?\"       Lower abdomen - right and left lower quadrants.      2. RADIATION: \"Does the pain shoot anywhere else?\" (e.g., chest, back)      No    3. ONSET: \"When did the pain begin?\" (e.g., minutes, hours or days ago)       Friday     4. SUDDEN: \"Gradual or sudden onset?\"      Gradually    5. PATTERN \"Does the pain come and go, or is it constant?\"     - If constant: \"Is it getting better, staying the same, or worsening?\"       (Note: Constant means the pain never goes away completely; most serious pain is constant and it " "progresses)      - If intermittent: \"How long does it last?\" \"Do you have pain now?\"      (Note: Intermittent means the pain goes away completely between bouts)      constant    6. SEVERITY: \"How bad is the pain?\"  (e.g., Scale 1-10; mild, moderate, or severe)    - MILD (1-3): doesn't interfere with normal activities, abdomen soft and not tender to touch     - MODERATE (4-7): interferes with normal activities or awakens from sleep, abdomen tender to touch     - SEVERE (8-10): excruciating pain, doubled over, unable to do any normal activities       Comes in waves. 2 at baseline. An 8 or 9 it lasts for a minute. At least every hour. Feels like severe gas pain.    7. RECURRENT SYMPTOM: \"Have you ever had this type of stomach pain before?\" If Yes, ask: \"When was the last time?\" and \"What happened that time?\"       Yes last month    8. CAUSE: \"What do you think is causing the stomach pain?\"      Diverticulitis    9. RELIEVING/AGGRAVATING FACTORS: \"What makes it better or worse?\" (e.g., movement, antacids, bowel movement)      Sitting makes it better. Walking makes it worse.    10. OTHER SYMPTOMS: \"Do you have any other symptoms?\" (e.g., back pain, diarrhea, fever, urination pain, vomiting)        No    11. PREGNANCY: \"Is there any chance you are pregnant?\" \"When was your last menstrual period?\"        n/a    Protocols used: ABDOMINAL PAIN - FEMALE-A-OH      "

## 2022-10-04 NOTE — TELEPHONE ENCOUNTER
Please assist her to be seen -virtual is fine because she needs referral to [facility].  Last CT abdomen pelvis was in January and was noted to have acute diverticulitis at that time.    Would not be appropriate for her to just take leftover medications at home.  We need to see was going.         Emilia Barillas, FNP-BC

## 2022-10-04 NOTE — TELEPHONE ENCOUNTER
Dr. Lancaster is calling patient. Routed to him.     DILSHAD MARROQUIN RN on 10/4/2022 at 12:47 PM   [facility]

## 2022-10-05 ENCOUNTER — OFFICE VISIT (OUTPATIENT)
Dept: FAMILY MEDICINE | Facility: CLINIC | Age: 62
End: 2022-10-05
Payer: COMMERCIAL

## 2022-10-05 VITALS
HEART RATE: 103 BPM | WEIGHT: 158.4 LBS | OXYGEN SATURATION: 98 % | SYSTOLIC BLOOD PRESSURE: 137 MMHG | HEIGHT: 59 IN | DIASTOLIC BLOOD PRESSURE: 84 MMHG | TEMPERATURE: 99.1 F | BODY MASS INDEX: 31.93 KG/M2

## 2022-10-05 DIAGNOSIS — R10.9 ABDOMINAL PAIN, UNSPECIFIED ABDOMINAL LOCATION: Primary | ICD-10-CM

## 2022-10-05 DIAGNOSIS — Z12.11 SCREEN FOR COLON CANCER: ICD-10-CM

## 2022-10-05 PROCEDURE — 99213 OFFICE O/P EST LOW 20 MIN: CPT | Performed by: NURSE PRACTITIONER

## 2022-10-05 NOTE — PROGRESS NOTES
Assessment & Plan     Connie was seen today for abdominal pain.    Diagnoses and all orders for this visit:    Abdominal pain, unspecified abdominal location  Mild improvement in pain since yesterday.    Discussed likely Diverticulitis and reviewed bowel rest/return to clear liquid diet for the next 24 hours.    Reviewed new evidence based recommendations of not treating with antibiotics.    Patient declined further imaging work up at this time.    Follow-up with no improvement or worsening of symptoms.      Screen for colon cancer  -     Colonoscopy Screening  Referral; Future        Return in about 1 week (around 10/12/2022) for No improvement or sooner with worsening symptoms.    Sonia Rossi, LORAINE CNP  [facility]        Subjective   Connie is a 61 year old, presenting for the following health issues:    Abdominal Pain    History of Present Illness   Started on Friday and progressively has worsened.   Was unable to go to work yesterday.  Works as a nurse.    Severe abdominal pressure and pain.   Tried Ibuprofen, but then stomach gets irritated.      Last episode was in July.  Was prescribed Ciprofloxacin.  Took this for 2 days and then stopped due to not feeling well.      Now with this episode, waited until Monday night and took a Ciprofloxacin.    After 2 cipro doses pain has eased up slightly. Didn't take Cipro this morning.   Ciprofloxacin 500 mg twice daily.  Has 8 days remaining.    Severe bloating, gas pain.    Doesn't want to repeat CT scan.    Started some clear liquids.     Last bowel movement was yesterday.    No bowel movement on Monday.      Reason for visit:  Abdominal pain  Symptom onset:  3-7 days ago  Symptoms include:  Abdominal pain  Symptom intensity:  Severe  Symptom progression:  Improving  Had these symptoms before:  Yes  Has tried/received treatment for these symptoms:  Yes  Previous treatment was successful:  Yes  Prior treatment description:   "Cipro  What makes it worse:  No  What makes it better:  No    She eats 2-3 servings of fruits and vegetables daily.She consumes 0 sweetened beverage(s) daily.She exercises with enough effort to increase her heart rate 9 or less minutes per day.  She exercises with enough effort to increase her heart rate 3 or less days per week.   She is taking medications regularly.    Today's PHQ-9         PHQ-9 Total Score: 2    PHQ-9 Q9 Thoughts of better off dead/self-harm past 2 weeks :   Not at all    How difficult have these problems made it for you to do your work, take care of things at home, or get along with other people: Not difficult at all       Review of Systems   Constitutional, HEENT, cardiovascular, pulmonary, gi and gu systems are negative, except as otherwise noted.      Objective    /84   Pulse 103   Temp 99.1  F (37.3  C) (Tympanic)   Ht 1.499 m (4' 11\")   Wt 71.8 kg (158 lb 6.4 oz)   LMP 09/05/2000   SpO2 98%   Breastfeeding No   BMI 31.99 kg/m    Body mass index is 31.99 kg/m .     Physical Exam     GENERAL: healthy, alert and no distress  RESP: lungs clear to auscultation - no rales, rhonchi or wheezes  CV: regular rate and rhythm, normal S1 S2, no S3 or S4, no murmur, click or rub, no peripheral edema  ABDOMEN: soft, diffuse tenderness to palpation over lower abdomen, no rebound or guarding, no hepatosplenomegaly, no masses and bowel sounds normal  PSYCH: mentation appears normal, affect normal/bright                "

## 2022-10-09 ENCOUNTER — HEALTH MAINTENANCE LETTER (OUTPATIENT)
Age: 62
End: 2022-10-09

## 2022-10-10 ENCOUNTER — VIRTUAL VISIT (OUTPATIENT)
Dept: DERMATOLOGY | Facility: CLINIC | Age: 62
End: 2022-10-10
Payer: COMMERCIAL

## 2022-10-10 DIAGNOSIS — L40.0 PLAQUE PSORIASIS: Primary | ICD-10-CM

## 2022-10-10 DIAGNOSIS — D84.9 IMMUNOSUPPRESSION (H): ICD-10-CM

## 2022-10-10 DIAGNOSIS — L40.50 PSORIATIC ARTHRITIS (H): ICD-10-CM

## 2022-10-10 PROCEDURE — 99213 OFFICE O/P EST LOW 20 MIN: CPT | Mod: 95 | Performed by: DERMATOLOGY

## 2022-10-10 NOTE — LETTER
10/10/2022       RE: Connie Brunson  [address] [place] [address]     Dear Colleague,    Thank you for referring your patient, Connie Brunson, to the [facility] [place] at [facility]. Please see a copy of my visit note below.    [facility] Dermatology Note  Encounter Date: Oct 10, 2022  Video (invitation sent by [phone] ). Location of teledermatologist: [facility] [place]. Start time: 8:36. End time: 8:46.    Dermatology Problem List:  1. Psoriasis/psoriatic arthritis: guttate, plaque, scalp, hands  - current: risankizumab, topicals (triamcinolone, calcipotriene, betamethasone), cetirizine 10 mg BID, hydroxyzine 25 mg at bedtime, home nbUVB, gabapentin   - prior: apremilast, adalimumab  - in reserve: methotrexate (okayed per oncology)  2. 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 Psoriasis/psoriatic arthritis: much improved after starting risankizumab, and only 2 injections to date (next due ~mid November). Skin with only one remaining spot by ankle, joints in hands still active but improving.  - risankizumab q12 weeks  - QFN due 3-4/2023    Procedures Performed:    None    Follow-up: 6 months    Staff:     Nick Menjivar MD, FAAD   of Dermatology  Department of Dermatology  [facility]    ____________________________________________    CC: Video Visit (Plaque psoriasis /)    HPI:  Ms. Connie Brunson is a(n) 61 year old female who presents today as a return patient for psoriasis/psoriatic arthritis    Skin almost totally clear - one small rough spot near ankle  - no itching  - not needing creams    Joints - still having issue with hands - some exacerbation with certain activities (e.g. squeezing  washcloth, opening can, etc)  - other joints are okay    Has had 2 bouts    Risankizumab - has had 2 shots so far    Patient is otherwise feeling well, without additional skin concerns.    Labs Reviewed:  3/2022 QFN negative    Physical Exam:  Vitals: LMP 09/05/2000   SKIN: video visits were reviewed; image quality and interpretability: acceptable. Image date: n/a.  - face, forearms, hands clear  - No other lesions of concern on areas examined.     Medications:  Current Outpatient Medications   Medication     cetirizine (ZYRTEC) 10 MG tablet     fenofibrate (TRIGLIDE/LOFIBRA) 160 MG tablet     fluticasone (FLONASE) 50 MCG/ACT nasal spray     lisinopril (ZESTRIL) 10 MG tablet     nicotine (NICORETTE) 2 MG gum     omeprazole (PRILOSEC) 20 MG DR capsule     Risankizumab-rzaa (SKYRIZI) 150 MG/ML subcutaneous     triamterene-HCTZ (DYAZIDE) 37.5-25 MG capsule     venlafaxine (EFFEXOR) 37.5 MG tablet     augmented betamethasone dipropionate (DIPROLENE-AF) 0.05 % external cream     betamethasone dipropionate (DIPROSONE) 0.05 % external lotion     calcipotriene (DOVONOX) 0.005 % external ointment     diazepam (VALIUM) 10 MG tablet     triamcinolone (KENALOG) 0.025 % external ointment     venlafaxine (EFFEXOR) 37.5 MG tablet     venlafaxine (EFFEXOR) 37.5 MG tablet     Current Facility-Administered Medications   Medication     triamcinolone acetonide (KENALOG-10) injection 10 mg      Past Medical/Surgical History:   Patient Active Problem List   Diagnosis     Allergic rhinitis     Diverticulosis of large intestine     Benign meningioma-right frontal posterior - no more annual MRI's needed per neurosurgery     Symptomatic menopausal or female climacteric states     Benign neoplasm of tonsil     Anxiety     HYPERLIPIDEMIA LDL GOAL <130 [272.4CK] - joint aches w/ simvastatin 11/2010,lipitor=nausea/abd pain 1/2012     Primary insomnia     Major depressive disorder, recurrent episode, mild (H)     Essential hypertension with goal  blood pressure less than 140/90     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- ophtho recommended auto-immune/arthritis work-up     Epiploic appendagitis- 2008 and 10/26/2014     Controlled substance agreement signed- re-signed 3/7/2018 ambien #30 -5mg tabs monthly - refill x5 - ok to see q6 months      Gastroesophageal reflux disease without esophagitis     Hypertriglyceridemia     S/P laparoscopic cholecystectomy for acute cholecystitis with cholelithiasis     Postsurgical dumping syndrome - s/p lap shashi - consider cholestyramine     Mass of small intestine- distal ileum - seen on CT scan at time of diverticulitis      Primary malignant neuroendocrine neoplasm of ileum (H)     Pulmonary nodules     Diarrhea, unspecified type- since sm bowel resection 11/2017     Hepatic steatosis - per MRI RUQ abdomen 4/12/2019 - liver not enlarged      Past Medical History:   Diagnosis Date     Allergic rhinitis, cause unspecified     year round - dog,dust-  Failed on claritin, claritin-D, zyrtec and zyrtec-D in past.      Benign neoplasm of cerebral meninges (H) 1999    has yearly MRI's. - sees 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 Thomas ENT      Cancer (H)      Depressive disorder      Deviated nasal septum     sees Dr. Thomas ENT      Diverticulosis of colon (without mention of hemorrhage) 02-     History of Guillain-[place] syndrome     NO FLU SHOTS - very mild case in [place] many years ago     Hyperlipidemia LDL goal < 130 doesn't want statins    simvastatin = severe hand joint pain , lipitor =nausea/abd. pain     Hypertension goal BP (blood pressure) < 140/90      Insomnia     trazodone -made pt feel hung over      Major depressive disorder, recurrent episode, moderate (H)     lexapro - sexual side effects      Moderate major depression (H) 2/4/2005    trazodone -made pt feel hung over      Other acne       Other extrapyramidal disease and abnormal movement disorder     ?GBS     Symptomatic menopausal or female climacteric states     vivelle-dot        CC Referred Self, MD  No address on file on close of this encounter.

## 2022-10-10 NOTE — PROGRESS NOTES
[facility] Dermatology Note  Encounter Date: Oct 10, 2022  Video (invitation sent by [phone] ). Location of teledermatologist: [facility] [place]. Start time: 8:36. End time: 8:46.    Dermatology Problem List:  1. Psoriasis/psoriatic arthritis: guttate, plaque, scalp, hands  - current: risankizumab, topicals (triamcinolone, calcipotriene, betamethasone), cetirizine 10 mg BID, hydroxyzine 25 mg at bedtime, home nbUVB, gabapentin   - prior: apremilast, adalimumab  - in reserve: methotrexate (okayed per oncology)  2. 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 Psoriasis/psoriatic arthritis: much improved after starting risankizumab, and only 2 injections to date (next due ~mid November). Skin with only one remaining spot by ankle, joints in hands still active but improving.  - risankizumab q12 weeks  - QFN due 3-4/2023    Procedures Performed:    None    Follow-up: 6 months    Staff:     Nick Menjivar MD, FAAD   of Dermatology  Department of Dermatology  [facility]    ____________________________________________    CC: Video Visit (Plaque psoriasis /)    HPI:  Ms. Connie Brunson is a(n) 61 year old female who presents today as a return patient for psoriasis/psoriatic arthritis    Skin almost totally clear - one small rough spot near ankle  - no itching  - not needing creams    Joints - still having issue with hands - some exacerbation with certain activities (e.g. squeezing washcloth, opening can, etc)  - other joints are okay    Has had 2 bouts    Risankizumab - has had 2 shots so far    Patient is otherwise feeling well, without additional skin concerns.    Labs Reviewed:  3/2022 QFN negative    Physical Exam:  Vitals: LMP 09/05/2000   SKIN: video visits were reviewed; image quality and  interpretability: acceptable. Image date: n/a.  - face, forearms, hands clear  - No other lesions of concern on areas examined.     Medications:  Current Outpatient Medications   Medication     cetirizine (ZYRTEC) 10 MG tablet     fenofibrate (TRIGLIDE/LOFIBRA) 160 MG tablet     fluticasone (FLONASE) 50 MCG/ACT nasal spray     lisinopril (ZESTRIL) 10 MG tablet     nicotine (NICORETTE) 2 MG gum     omeprazole (PRILOSEC) 20 MG DR capsule     Risankizumab-rzaa (SKYRIZI) 150 MG/ML subcutaneous     triamterene-HCTZ (DYAZIDE) 37.5-25 MG capsule     venlafaxine (EFFEXOR) 37.5 MG tablet     augmented betamethasone dipropionate (DIPROLENE-AF) 0.05 % external cream     betamethasone dipropionate (DIPROSONE) 0.05 % external lotion     calcipotriene (DOVONOX) 0.005 % external ointment     diazepam (VALIUM) 10 MG tablet     triamcinolone (KENALOG) 0.025 % external ointment     venlafaxine (EFFEXOR) 37.5 MG tablet     venlafaxine (EFFEXOR) 37.5 MG tablet     Current Facility-Administered Medications   Medication     triamcinolone acetonide (KENALOG-10) injection 10 mg      Past Medical/Surgical History:   Patient Active Problem List   Diagnosis     Allergic rhinitis     Diverticulosis of large intestine     Benign meningioma-right frontal posterior - no more annual MRI's needed per neurosurgery     Symptomatic menopausal or female climacteric states     Benign neoplasm of tonsil     Anxiety     HYPERLIPIDEMIA LDL GOAL <130 [272.4CK] - joint aches w/ simvastatin 11/2010,lipitor=nausea/abd pain 1/2012     Primary insomnia     Major depressive disorder, recurrent episode, mild (H)     Essential hypertension with goal blood pressure less than 140/90     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- ophtho recommended auto-immune/arthritis work-up     Epiploic appendagitis- 2008 and 10/26/2014     Controlled substance agreement signed- re-signed 3/7/2018 ambien #30 -5mg tabs monthly - refill x5 - ok to see q6 months       Gastroesophageal reflux disease without esophagitis     Hypertriglyceridemia     S/P laparoscopic cholecystectomy for acute cholecystitis with cholelithiasis     Postsurgical dumping syndrome - s/p lap shashi - consider cholestyramine     Mass of small intestine- distal ileum - seen on CT scan at time of diverticulitis      Primary malignant neuroendocrine neoplasm of ileum (H)     Pulmonary nodules     Diarrhea, unspecified type- since sm bowel resection 11/2017     Hepatic steatosis - per MRI RUQ abdomen 4/12/2019 - liver not enlarged      Past Medical History:   Diagnosis Date     Allergic rhinitis, cause unspecified     year round - dog,dust-  Failed on claritin, claritin-D, zyrtec and zyrtec-D in past.      Benign neoplasm of cerebral meninges (H) 1999    has yearly MRI's. - sees Dr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 Thomas ENT      Cancer (H)      Depressive disorder      Deviated nasal septum     sees Dr. Thomas ENT      Diverticulosis of colon (without mention of hemorrhage) 02-     History of Guillain-[place] syndrome     NO FLU SHOTS - very mild case in [place] many years ago     Hyperlipidemia LDL goal < 130 doesn't want statins    simvastatin = severe hand joint pain , lipitor =nausea/abd. pain     Hypertension goal BP (blood pressure) < 140/90      Insomnia     trazodone -made pt feel hung over      Major depressive disorder, recurrent episode, moderate (H)     lexapro - sexual side effects      Moderate major depression (H) 2/4/2005    trazodone -made pt feel hung over      Other acne      Other extrapyramidal disease and abnormal movement disorder     ?GBS     Symptomatic menopausal or female climacteric states     vivelle-dot        CC Referred Self, MD  No address on file on close of this encounter.

## 2022-10-10 NOTE — NURSING NOTE
Chief Complaint   Patient presents with     Video Visit     Plaque psoriasis      Teledermatology Nurse Call Patients:     Are you in the [facility] at the time of the encounter? yes    Today's visit will be billed to you and your insurance.    A teledermatology visit is not as thorough as an in-person visit and the quality of the photograph sent may not be of the same quality as that taken by the dermatology clinic.    Emilia Haas,   Virtual Visit Facilitator

## 2022-10-11 ENCOUNTER — TELEPHONE (OUTPATIENT)
Dept: DERMATOLOGY | Facility: CLINIC | Age: 62
End: 2022-10-11

## 2022-10-11 NOTE — TELEPHONE ENCOUNTER
Attempted to reach patient to schedule follow up in the Dermatology Clinic.  No answer,  LM on VM to call office .    Schedule with Dr. Nick Menjivar on or around 4/10/23.

## 2022-10-12 ENCOUNTER — TELEPHONE (OUTPATIENT)
Dept: DERMATOLOGY | Facility: CLINIC | Age: 62
End: 2022-10-12

## 2022-10-12 NOTE — TELEPHONE ENCOUNTER
Called patient to schedule follow up in the Dermatology Clinic with Dr Menjivar per the provider last visit on 10/10/22. No answered. Left message with clinic number for appointment scheduling.    FOLLOW-UP Check Out Comment:    - Return in 6 months (around 04/10/23) for Psoriasis/PsA follow up    Greta Clifton, Clinic , 10/12/22 at 2:35 PM

## 2022-11-17 NOTE — PATIENT INSTRUCTIONS
1 year follow up with Dr. Sorto and labs prior deferred until 3/15/23 MAURISIO Angel, RN, BSN.  RN Care Coordinator     [facility]   [phone]

## 2022-12-19 DIAGNOSIS — F17.210 CIGARETTE NICOTINE DEPENDENCE WITHOUT COMPLICATION: ICD-10-CM

## 2022-12-19 DIAGNOSIS — E78.1 HYPERTRIGLYCERIDEMIA: ICD-10-CM

## 2022-12-20 RX ORDER — FENOFIBRATE 160 MG/1
TABLET ORAL
Qty: 90 TABLET | Refills: 0 | Status: SHIPPED | OUTPATIENT
Start: 2022-12-20 | End: 2023-03-26

## 2022-12-20 NOTE — TELEPHONE ENCOUNTER
Routing refill request to provider for review/approval because:  Labs out of range & Labs not current:    LDL Cholesterol Calculated   Date Value Ref Range Status   01/29/2021 150 (H) <100 mg/dL Final     Comment:     Above desirable:  100-129 mg/dl  Borderline High:  130-159 mg/dL  High:             160-189 mg/dL  Very high:       >189 mg/dl       Monica RICHEY RN

## 2022-12-28 ENCOUNTER — TELEPHONE (OUTPATIENT)
Dept: DERMATOLOGY | Facility: CLINIC | Age: 62
End: 2022-12-28

## 2022-12-28 NOTE — TELEPHONE ENCOUNTER
[facility] Call Center    Phone Message    May a detailed message be left on voicemail: yes     Reason for Call: Other: Pt following-up on her [employer] message with image about a new issue with her eye. Please call at your earliest convenience. Thank you. [phone].     Action Taken: Message routed to:  [facility] ([facility]): [facility]    Travel Screening: Not Applicable

## 2023-01-05 DIAGNOSIS — I10 ESSENTIAL HYPERTENSION WITH GOAL BLOOD PRESSURE LESS THAN 140/90: ICD-10-CM

## 2023-01-05 RX ORDER — LISINOPRIL 10 MG/1
TABLET ORAL
Qty: 90 TABLET | Refills: 1 | Status: SHIPPED | OUTPATIENT
Start: 2023-01-05 | End: 2023-04-26

## 2023-01-05 NOTE — TELEPHONE ENCOUNTER
Routing refill request to provider for review/approval because:  Sodium   Date Value Ref Range Status   09/12/2022 134 (L) 136 - 145 mmol/L Final   09/04/2020 137 133 - 144 mmol/L Final     Pablo SHEETS RN, BSN

## 2023-01-06 ENCOUNTER — MYC MEDICAL ADVICE (OUTPATIENT)
Dept: FAMILY MEDICINE | Facility: CLINIC | Age: 63
End: 2023-01-06

## 2023-01-06 DIAGNOSIS — I10 ESSENTIAL HYPERTENSION WITH GOAL BLOOD PRESSURE LESS THAN 140/90: ICD-10-CM

## 2023-01-06 RX ORDER — TRIAMTERENE AND HYDROCHLOROTHIAZIDE 37.5; 25 MG/1; MG/1
CAPSULE ORAL
Qty: 90 CAPSULE | Refills: 1 | Status: SHIPPED | OUTPATIENT
Start: 2023-01-06 | End: 2023-07-21

## 2023-01-09 NOTE — TELEPHONE ENCOUNTER
My chart message sent     Samantha Escoto RN, BSN  [facility]

## 2023-02-08 ENCOUNTER — TELEPHONE (OUTPATIENT)
Dept: FAMILY MEDICINE | Facility: CLINIC | Age: 63
End: 2023-02-08
Payer: COMMERCIAL

## 2023-03-03 ENCOUNTER — TRANSFERRED RECORDS (OUTPATIENT)
Dept: HEALTH INFORMATION MANAGEMENT | Facility: CLINIC | Age: 63
End: 2023-03-03
Payer: COMMERCIAL

## 2023-03-14 DIAGNOSIS — L40.9 PSORIASIS: ICD-10-CM

## 2023-03-16 RX ORDER — HYDROXYZINE HYDROCHLORIDE 25 MG/1
25 TABLET, FILM COATED ORAL
Qty: 30 TABLET | Refills: 1 | Status: SHIPPED | OUTPATIENT
Start: 2023-03-16 | End: 2023-08-15

## 2023-03-22 ENCOUNTER — ANCILLARY PROCEDURE (OUTPATIENT)
Dept: MAMMOGRAPHY | Facility: CLINIC | Age: 63
End: 2023-03-22
Attending: FAMILY MEDICINE
Payer: COMMERCIAL

## 2023-03-22 DIAGNOSIS — Z12.31 VISIT FOR SCREENING MAMMOGRAM: ICD-10-CM

## 2023-03-22 PROCEDURE — 77067 SCR MAMMO BI INCL CAD: CPT | Mod: TC | Performed by: RADIOLOGY

## 2023-03-23 DIAGNOSIS — E78.1 HYPERTRIGLYCERIDEMIA: ICD-10-CM

## 2023-03-26 RX ORDER — FENOFIBRATE 160 MG/1
TABLET ORAL
Qty: 90 TABLET | Refills: 0 | Status: SHIPPED | OUTPATIENT
Start: 2023-03-26 | End: 2023-06-27

## 2023-03-26 NOTE — TELEPHONE ENCOUNTER
Pending Prescriptions:                       Disp   Refills    fenofibrate (TRIGLIDE/LOFIBRA) 160 MG tabl*90 tab*0        Sig: TAKE ONE TABLET BY MOUTH ONCE DAILY    Routing refill request to provider for review/approval because:  LDL Cholesterol Calculated   Date Value Ref Range Status   01/29/2021 150 (H) <100 mg/dL Final     Comment:     Above desirable:  100-129 mg/dl  Borderline High:  130-159 mg/dL  High:             160-189 mg/dL  Very high:       >189 mg/dl

## 2023-04-05 NOTE — RESULT ENCOUNTER NOTE
Your mammogram was normal.     Thank you so much for choosing [facility].  Please contact us with any questions that you may have.   We appreciate the opportunity to serve you now and look forward to supporting your healthcare needs for a long time to come!    Most Sincerely,     Gayatri Stephens MD

## 2023-04-10 ENCOUNTER — TELEPHONE (OUTPATIENT)
Dept: FAMILY MEDICINE | Facility: CLINIC | Age: 63
End: 2023-04-10

## 2023-04-10 ENCOUNTER — ANCILLARY PROCEDURE (OUTPATIENT)
Dept: GENERAL RADIOLOGY | Facility: CLINIC | Age: 63
End: 2023-04-10
Attending: FAMILY MEDICINE
Payer: COMMERCIAL

## 2023-04-10 ENCOUNTER — OFFICE VISIT (OUTPATIENT)
Dept: URGENT CARE | Facility: URGENT CARE | Age: 63
End: 2023-04-10
Payer: COMMERCIAL

## 2023-04-10 ENCOUNTER — NURSE TRIAGE (OUTPATIENT)
Dept: FAMILY MEDICINE | Facility: CLINIC | Age: 63
End: 2023-04-10
Payer: COMMERCIAL

## 2023-04-10 VITALS
OXYGEN SATURATION: 95 % | HEART RATE: 94 BPM | TEMPERATURE: 99.9 F | DIASTOLIC BLOOD PRESSURE: 88 MMHG | SYSTOLIC BLOOD PRESSURE: 139 MMHG

## 2023-04-10 DIAGNOSIS — R09.89 CHEST CONGESTION: ICD-10-CM

## 2023-04-10 DIAGNOSIS — R05.1 ACUTE COUGH: Primary | ICD-10-CM

## 2023-04-10 DIAGNOSIS — R53.83 OTHER FATIGUE: ICD-10-CM

## 2023-04-10 DIAGNOSIS — R05.3 PERSISTENT COUGH: ICD-10-CM

## 2023-04-10 DIAGNOSIS — D84.9 IMMUNOSUPPRESSION (H): ICD-10-CM

## 2023-04-10 DIAGNOSIS — R52 BODY ACHES: ICD-10-CM

## 2023-04-10 LAB
BASOPHILS # BLD AUTO: 0.1 10E3/UL (ref 0–0.2)
BASOPHILS NFR BLD AUTO: 1 %
DEPRECATED S PYO AG THROAT QL EIA: NEGATIVE
EOSINOPHIL # BLD AUTO: 0.3 10E3/UL (ref 0–0.7)
EOSINOPHIL NFR BLD AUTO: 3 %
ERYTHROCYTE [DISTWIDTH] IN BLOOD BY AUTOMATED COUNT: 12.2 % (ref 10–15)
GROUP A STREP BY PCR: NOT DETECTED
HCT VFR BLD AUTO: 41.8 % (ref 35–47)
HGB BLD-MCNC: 15.1 G/DL (ref 11.7–15.7)
LYMPHOCYTES # BLD AUTO: 1.8 10E3/UL (ref 0.8–5.3)
LYMPHOCYTES NFR BLD AUTO: 17 %
MCH RBC QN AUTO: 33 PG (ref 26.5–33)
MCHC RBC AUTO-ENTMCNC: 36.1 G/DL (ref 31.5–36.5)
MCV RBC AUTO: 91 FL (ref 78–100)
MONOCYTES # BLD AUTO: 0.4 10E3/UL (ref 0–1.3)
MONOCYTES NFR BLD AUTO: 4 %
NEUTROPHILS # BLD AUTO: 8.4 10E3/UL (ref 1.6–8.3)
NEUTROPHILS NFR BLD AUTO: 76 %
PLATELET # BLD AUTO: 283 10E3/UL (ref 150–450)
RBC # BLD AUTO: 4.58 10E6/UL (ref 3.8–5.2)
WBC # BLD AUTO: 11 10E3/UL (ref 4–11)

## 2023-04-10 PROCEDURE — 71046 X-RAY EXAM CHEST 2 VIEWS: CPT | Mod: TC | Performed by: RADIOLOGY

## 2023-04-10 PROCEDURE — 36415 COLL VENOUS BLD VENIPUNCTURE: CPT | Performed by: FAMILY MEDICINE

## 2023-04-10 PROCEDURE — 87651 STREP A DNA AMP PROBE: CPT | Performed by: FAMILY MEDICINE

## 2023-04-10 PROCEDURE — 85025 COMPLETE CBC W/AUTO DIFF WBC: CPT | Performed by: FAMILY MEDICINE

## 2023-04-10 PROCEDURE — 99214 OFFICE O/P EST MOD 30 MIN: CPT | Performed by: FAMILY MEDICINE

## 2023-04-10 RX ORDER — BENZONATATE 100 MG/1
100 CAPSULE ORAL 3 TIMES DAILY PRN
Qty: 30 CAPSULE | Refills: 0 | Status: SHIPPED | OUTPATIENT
Start: 2023-04-10 | End: 2023-09-22

## 2023-04-10 RX ORDER — DOXYCYCLINE 100 MG/1
100 CAPSULE ORAL 2 TIMES DAILY
Qty: 14 CAPSULE | Refills: 0 | Status: SHIPPED | OUTPATIENT
Start: 2023-04-12 | End: 2023-04-19

## 2023-04-10 NOTE — PROGRESS NOTES
Chief Complaint   Patient presents with     Urgent Care     Cough     Cough, congestion, body aches-Sx started Last Monday-Neg COVID x3-Exposed to Strep        Connie was seen today for urgent care and cough.    Diagnoses and all orders for this visit:    Acute cough  -     Streptococcus A Rapid Screen w/Reflex to PCR - Clinic Collect  -     Group A Streptococcus PCR Throat Swab  -     XR Chest 2 Views  -     benzonatate (TESSALON) 100 MG capsule; Take 1 capsule (100 mg) by mouth 3 times daily as needed for cough    Body aches    Chest congestion    Other fatigue  -     CBC with platelets and differential; Future  -     CBC with platelets and differential    Persistent cough  -     doxycycline hyclate (VIBRAMYCIN) 100 MG capsule; Take 1 capsule (100 mg) by mouth 2 times daily for 7 days    Immunosuppression (H)        D/d  Acute Bronchitis  Acute Sinusitis  Cough  Influenza  Pharyngitis  Pneumonia  Upper Respiratory Infection    PLAN:  See orders in Epic  Symptomatic measures encouraged, humidified air, plenty of fluids.  Connie Brunson is a 62 year old female who presents for evaluation of cough.  This is consistent with an upper respiratory tract infection.  There is no signs at this point of serious bacterial infection such as OM, RPA, epiglottitis, PTA, strep pharyngitis, pneumonia, sinusitis, meningitis, bacteremia, serious bacterial infection.    Given clear lung exam did reveal some rales and wheezes on the right consider getting an x-ray and also because of the extreme fatigue we will get a CBC.  She has no  fever no hypoxia and no respiratory distress   There are no  gastrointestinal symptoms at this point and no signs of dehydration.  Close followup with primary care physician is indicated.  Return to ED for fever > 103, protracted vomiting, confusion.  X-ray was within normal limits and CBC was also within normal limits.  I did review with patient to give it some more time for symptoms to get better.   Suggested to do Tessalon Perles to help with the coughing symptoms.  As she is on immunosuppressant  I did discuss with patient about doing a course of prednisone but should be watching closely for any signs worsening symptoms, I did discuss with patient that she would be given a prescription for an antibiotic only to take it if symptoms do not get better over the next 2 days    She was given a prescription for the Tessalon Perles I did review with patient that it is normal for cough symptoms to persist but as long as there is no worsening fever or worsening cough she can hold on antibiotic        SUBJECTIVE:  Connie Brunson is a 62 year old female on immunosuppressant who presents to the clinic today with a chief complaint of cough  for 7 day(s).  Her cough is described as persistent.    The patient's symptoms are moderate and not changing over the course of time.  Associated symptoms include myalgias. The patient's symptoms are exacerbated by no particular triggers  Patient has been using OTC cough suppressants  to improve symptoms.  Noticing worsening fatigue symptoms and also generalized body ache.  But denies any productive cough.  Has no high fever or chills currently.  Past Medical History:   Diagnosis Date     Allergic rhinitis, cause unspecified     year round - dog,dust-  Failed on claritin, claritin-D, zyrtec and zyrtec-D in past.      Benign neoplasm of cerebral meninges (H) 1999    has yearly MRI's. - sees Dr. 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 Thomas ENT      Cancer (H)      Depressive disorder      Deviated nasal septum     sees Dr. Thomas ENT      Diverticulosis of colon (without mention of hemorrhage) 02-     History of Guillain-Bristol syndrome     NO FLU SHOTS - very mild case in [place] many years ago     Hyperlipidemia LDL goal < 130 doesn't want statins    simvastatin = severe hand  joint pain , lipitor =nausea/abd. pain     Hypertension goal BP (blood pressure) < 140/90      Insomnia     trazodone -made pt feel hung over      Major depressive disorder, recurrent episode, moderate (H)     lexapro - sexual side effects      Moderate major depression (H) 2/4/2005    trazodone -made pt feel hung over      Other acne      Other extrapyramidal disease and abnormal movement disorder     ?GBS     Symptomatic menopausal or female climacteric states     vivelle-dot        Current Outpatient Medications   Medication Sig Dispense Refill     augmented betamethasone dipropionate (DIPROLENE-AF) 0.05 % external cream Apply topically 2 times daily as needed (Psoriasis) To affected area on hands and feet as needed for severe flares. Do not use on face or body folds. 100 g 2     benzonatate (TESSALON) 100 MG capsule Take 1 capsule (100 mg) by mouth 3 times daily as needed for cough 30 capsule 0     betamethasone dipropionate (DIPROSONE) 0.05 % external lotion Apply topically 2 times daily To affected area on scalp for 4-6 weeks. Do not use on face or body folds. 60 mL 4     calcipotriene (DOVONOX) 0.005 % external ointment Apply to hands and feet BID when flared. 120 g 3     cetirizine (ZYRTEC) 10 MG tablet Take 10 mg by mouth every evening       [START ON 4/12/2023] doxycycline hyclate (VIBRAMYCIN) 100 MG capsule Take 1 capsule (100 mg) by mouth 2 times daily for 7 days 14 capsule 0     fenofibrate (TRIGLIDE/LOFIBRA) 160 MG tablet TAKE ONE TABLET BY MOUTH ONCE DAILY 90 tablet 0     fluticasone (FLONASE) 50 MCG/ACT nasal spray Spray 2 sprays into both nostrils in the morning. 48 g 5     hydrocortisone 2.5 % ointment Apply twice daily to the eyelids for 2 weeks 30 g 0     hydrOXYzine (ATARAX) 25 MG tablet Take 1 tablet (25 mg) by mouth nightly as needed for itching 30 tablet 1     lisinopril (ZESTRIL) 10 MG tablet TAKE ONE TABLET BY MOUTH ONCE DAILY 90 tablet 1     nicotine (NICORETTE) 2 MG gum PLACE 1 EACH (2  MG) INSIDE CHEEK EVERY HOUR AS NEEDED FOR SMOKING CESSATION 120 each 3     omeprazole (PRILOSEC) 20 MG DR capsule Take 1 capsule (20 mg) by mouth daily 90 capsule 3     Risankizumab-rzaa (SKYRIZI) 150 MG/ML subcutaneous Inject 1 mL (150 mg) Subcutaneous every 3 months MAINTENANCE DOSE: Start at day 28, then every 12 weeks. 1 mL 3     triamterene-HCTZ (DYAZIDE) 37.5-25 MG capsule TAKE ONE CAPSULE BY MOUTH ONCE DAILY 90 capsule 1     venlafaxine (EFFEXOR) 37.5 MG tablet Take 1 tablet (37.5 mg) by mouth daily 90 tablet 3     diazepam (VALIUM) 10 MG tablet Take 1-2  tab or 10-20 mg 30-60 minutes prior to MRI scan. (Patient not taking: Reported on 4/10/2023) 4 tablet 1     triamcinolone (KENALOG) 0.025 % external ointment Apply topically 2 times daily To body, arms, legs (Patient not taking: Reported on 10/5/2022) 454 g 3     venlafaxine (EFFEXOR) 37.5 MG tablet Take 1 tablet (37.5 mg) by mouth 2 times daily (Patient not taking: Reported on 10/10/2022) 90 tablet 3     venlafaxine (EFFEXOR) 37.5 MG tablet Take 1 tablet (37.5 mg) by mouth daily Please call clinic and set up office visit for further refills (Patient not taking: Reported on 10/10/2022) 90 tablet 1       Social History     Tobacco Use     Smoking status: Every Day     Packs/day: 1.00     Years: 5.00     Pack years: 5.00     Types: Cigarettes     Last attempt to quit: 1994     Years since quittin.2     Smokeless tobacco: Former     Tobacco comments:     11/3/17 - occ e-cig use   Vaping Use     Vaping status: Former     Substances: Nicotine     Devices: Refillable tank   Substance Use Topics     Alcohol use: Yes     Alcohol/week: 0.0 standard drinks of alcohol     Comment: 3-5 drinks per week       ROS  Review of systems negative except as stated above.    OBJECTIVE:  /88   Pulse 94   Temp 99.9  F (37.7  C) (Tympanic)   LMP 2000   SpO2 95%   GENERAL APPEARANCE: healthy, alert and no distress  EYES: EOMI,  PERRL, conjunctiva clear  HENT:  ear canals and TM's normal.  Nose and mouth without ulcers, erythema or lesions  NECK: supple, nontender, no lymphadenopathy  RESP: lungs rales and wheezes  On the right - left no  rales, rhonchi or wheezes  CV: regular rates and rhythm, normal S1 S2, no murmur noted  SKIN: no suspicious lesions or rashes  PSYCH: mentation appears normal      Aminata Mckeon MD

## 2023-04-10 NOTE — TELEPHONE ENCOUNTER
Patient is calling and was told by triage she should be seen today. Patient would rather be seen in clinic, then go to UC. There are no openings today, accept the POA spot at 3:30. Is patient able to use this slot? Please advise. If patient cannot be seen today, patient will go to UC.

## 2023-04-10 NOTE — TELEPHONE ENCOUNTER
Called patient and advised urgent care today.     Patient stated she will go to urgent care soon in [place]. Knows location.     Advised after visit call triage line for new, persistent or worsening  symptoms.     Nova ABRAHAM RN   [facility] Triage

## 2023-04-10 NOTE — TELEPHONE ENCOUNTER
"Prior [place] [facility]  Dr Stephens    Pt should be seen today, she would like a visit at the clinic  Pt does not get the flu vaccine    3x test for COVID, all negative, last one was yesterday exposed to strep over 2 weeks ago  Not drinking enough fluids, advised to assure plenty of fluids, warm beverages  Throat feels \"full\" not a sore throat   Has been taking nitequill    Best contact number is     Reason for Disposition    Patient sounds very sick or weak to the triager    Additional Information    Negative: SEVERE difficulty breathing (e.g., struggling for each breath, speaks in single words, pulse > 120)    Negative: Breathing stopped and hasn't returned    Negative: Choking on something    Negative: Bluish (or gray) lips or face    Negative: Difficult to awaken or acting confused (e.g., disoriented, slurred speech)    Negative: Passed out (i.e., fainted, collapsed and was not responding)    Negative: Wheezing started suddenly after medicine, an allergic food, or bee sting    Negative: Stridor    Negative: Slow, shallow and weak breathing    Negative: Sounds like a life-threatening emergency to the triager    Negative: Chest pain    Negative: Wheezing (high pitched whistling sound) and previous asthma attacks or use of asthma medicines    Negative: Difficulty breathing and within 14 days of COVID-19 Exposure    Negative: Difficulty breathing and only present when coughing    Negative: Difficulty breathing and only from stuffy nose    Negative: Difficulty breathing and only from stuffy nose or runny nose from common cold    Negative: MODERATE difficulty breathing (e.g., speaks in phrases, SOB even at rest, pulse 100-120) of new-onset or worse than normal    Negative: Oxygen level (e.g., pulse oximetry) 90 percent or lower    Negative: Wheezing can be heard across the room    Negative: Drooling or spitting out saliva (because can't swallow)    Negative: Any history of prior \"blood clot\" in leg or " "lungs    Negative: Illness requiring prolonged bedrest in past month (e.g., immobilization, long hospital stay)    Negative: Hip or leg fracture (broken bone) in past month (or had cast on leg or ankle in past month)    Negative: Major surgery in the past month    Negative: Long-distance travel in past month (e.g., car, bus, train, plane; with trip lasting 6 or more hours)    Negative: Cancer treatment in past six months (or has cancer now)    Negative: Extra heart beats OR irregular heart beating (i.e., \"palpitations\")    Answer Assessment - Initial Assessment Questions  1. RESPIRATORY STATUS: \"Describe your breathing?\" (e.g., wheezing, shortness of breath, unable to speak, severe coughing)       Wheezy, cough with some occational production, feels congested, nasal passages runny and clear  2. ONSET: \"When did this breathing problem begin?\"       About a week ago  3. PATTERN \"Does the difficult breathing come and go, or has it been constant since it started?\"       constant  4. SEVERITY: \"How bad is your breathing?\" (e.g., mild, moderate, severe)     - MILD: No SOB at rest, mild SOB with walking, speaks normally in sentences, can lie down, no retractions, pulse < 100.     - MODERATE: SOB at rest, SOB with minimal exertion and prefers to sit, cannot lie down flat, speaks in phrases, mild retractions, audible wheezing, pulse 100-120.     - SEVERE: Very SOB at rest, speaks in single words, struggling to breathe, sitting hunched forward, retractions, pulse > 120       Congested and wheezy but feels she is able to breath ok  5. RECURRENT SYMPTOM: \"Have you had difficulty breathing before?\" If Yes, ask: \"When was the last time?\" and \"What happened that time?\"       Has had a flu before but not like this  6. CARDIAC HISTORY: \"Do you have any history of heart disease?\" (e.g., heart attack, angina, bypass surgery, angioplasty)       no  7. LUNG HISTORY: \"Do you have any history of lung disease?\"  (e.g., pulmonary embolus, " "asthma, emphysema)      no  8. CAUSE: \"What do you think is causing the breathing problem?\"       I mihg have strep throat  9. OTHER SYMPTOMS: \"Do you have any other symptoms? (e.g., dizziness, runny nose, cough, chest pain, fever)      Runny nose, cough, congetion, low grade on and off, night sweats  10. O2 SATURATION MONITOR:  \"Do you use an oxygen saturation monitor (pulse oximeter) at home?\" If Yes, \"What is your reading (oxygen level) today?\" \"What is your usual oxygen saturation reading?\" (e.g., 95%)        No oximeter  11. PREGNANCY: \"Is there any chance you are pregnant?\" \"When was your last menstrual period?\"        no  12. TRAVEL: \"Have you traveled out of the country in the last month?\" (e.g., travel history, exposures)        no    Protocols used: BREATHING DIFFICULTY-A-OH      "

## 2023-04-11 NOTE — TELEPHONE ENCOUNTER
Patient went to  yesterday    DILSHAD MARROQUIN RN on 4/11/2023 at 1:33 PM   [facility]

## 2023-04-25 ASSESSMENT — ANXIETY QUESTIONNAIRES
3. WORRYING TOO MUCH ABOUT DIFFERENT THINGS: NOT AT ALL
7. FEELING AFRAID AS IF SOMETHING AWFUL MIGHT HAPPEN: NOT AT ALL
GAD7 TOTAL SCORE: 0
6. BECOMING EASILY ANNOYED OR IRRITABLE: NOT AT ALL
1. FEELING NERVOUS, ANXIOUS, OR ON EDGE: NOT AT ALL
4. TROUBLE RELAXING: NOT AT ALL
2. NOT BEING ABLE TO STOP OR CONTROL WORRYING: NOT AT ALL
GAD7 TOTAL SCORE: 0
5. BEING SO RESTLESS THAT IT IS HARD TO SIT STILL: NOT AT ALL
7. FEELING AFRAID AS IF SOMETHING AWFUL MIGHT HAPPEN: NOT AT ALL
GAD7 TOTAL SCORE: 0

## 2023-04-26 ENCOUNTER — OFFICE VISIT (OUTPATIENT)
Dept: FAMILY MEDICINE | Facility: CLINIC | Age: 63
End: 2023-04-26
Payer: COMMERCIAL

## 2023-04-26 VITALS
SYSTOLIC BLOOD PRESSURE: 138 MMHG | DIASTOLIC BLOOD PRESSURE: 84 MMHG | HEART RATE: 85 BPM | OXYGEN SATURATION: 97 % | HEIGHT: 59 IN | WEIGHT: 158 LBS | TEMPERATURE: 97.2 F | BODY MASS INDEX: 31.85 KG/M2 | RESPIRATION RATE: 18 BRPM

## 2023-04-26 DIAGNOSIS — J20.9 ACUTE BRONCHITIS WITH COEXISTING CONDITION REQUIRING PROPHYLACTIC TREATMENT: ICD-10-CM

## 2023-04-26 DIAGNOSIS — C7A.8 PRIMARY MALIGNANT NEUROENDOCRINE NEOPLASM OF ILEUM (H): ICD-10-CM

## 2023-04-26 DIAGNOSIS — L40.50 PSORIATIC ARTHRITIS (H): ICD-10-CM

## 2023-04-26 DIAGNOSIS — E78.5 HYPERLIPIDEMIA LDL GOAL <130: ICD-10-CM

## 2023-04-26 DIAGNOSIS — E78.1 HYPERTRIGLYCERIDEMIA: ICD-10-CM

## 2023-04-26 DIAGNOSIS — F41.9 ANXIETY: ICD-10-CM

## 2023-04-26 DIAGNOSIS — F33.0 MAJOR DEPRESSIVE DISORDER, RECURRENT EPISODE, MILD (H): ICD-10-CM

## 2023-04-26 DIAGNOSIS — Z28.20 VACCINE REFUSED BY PATIENT: ICD-10-CM

## 2023-04-26 DIAGNOSIS — H01.119 CONTACT DERMATITIS OF EYELID, UNSPECIFIED LATERALITY: ICD-10-CM

## 2023-04-26 DIAGNOSIS — L40.9 PSORIASIS: ICD-10-CM

## 2023-04-26 DIAGNOSIS — Z12.11 SCREEN FOR COLON CANCER: Primary | ICD-10-CM

## 2023-04-26 DIAGNOSIS — E55.9 VITAMIN D DEFICIENCY: ICD-10-CM

## 2023-04-26 DIAGNOSIS — I10 ESSENTIAL HYPERTENSION WITH GOAL BLOOD PRESSURE LESS THAN 140/90: ICD-10-CM

## 2023-04-26 DIAGNOSIS — Z23 NEED FOR SHINGLES VACCINE: ICD-10-CM

## 2023-04-26 PROCEDURE — 99214 OFFICE O/P EST MOD 30 MIN: CPT | Performed by: FAMILY MEDICINE

## 2023-04-26 RX ORDER — LISINOPRIL 10 MG/1
10 TABLET ORAL DAILY
Qty: 90 TABLET | Refills: 1 | Status: SHIPPED | OUTPATIENT
Start: 2023-04-26 | End: 2023-09-22

## 2023-04-26 RX ORDER — HYDROCORTISONE 25 MG/G
OINTMENT TOPICAL
Qty: 30 G | Refills: 0 | Status: SHIPPED | OUTPATIENT
Start: 2023-04-26

## 2023-04-26 RX ORDER — DOXYCYCLINE 100 MG/1
100 CAPSULE ORAL 2 TIMES DAILY
Qty: 14 CAPSULE | Refills: 1 | Status: SHIPPED | OUTPATIENT
Start: 2023-04-26 | End: 2023-05-03

## 2023-04-26 RX ORDER — BETAMETHASONE DIPROPIONATE 0.5 MG/G
CREAM TOPICAL 2 TIMES DAILY PRN
Qty: 100 G | Refills: 2 | Status: SHIPPED | OUTPATIENT
Start: 2023-04-26 | End: 2024-04-26

## 2023-04-26 RX ORDER — CALCIPOTRIENE 50 UG/G
OINTMENT TOPICAL
Qty: 120 G | Refills: 3 | Status: SHIPPED | OUTPATIENT
Start: 2023-04-26

## 2023-04-26 RX ORDER — BETAMETHASONE DIPROPIONATE 0.5 MG/G
LOTION TOPICAL 2 TIMES DAILY
Qty: 60 ML | Refills: 4 | Status: SHIPPED | OUTPATIENT
Start: 2023-04-26

## 2023-04-26 ASSESSMENT — ANXIETY QUESTIONNAIRES: GAD7 TOTAL SCORE: 0

## 2023-04-26 ASSESSMENT — PATIENT HEALTH QUESTIONNAIRE - PHQ9
SUM OF ALL RESPONSES TO PHQ QUESTIONS 1-9: 0
SUM OF ALL RESPONSES TO PHQ QUESTIONS 1-9: 0

## 2023-04-26 NOTE — PROGRESS NOTES
Assessment & Plan :       ICD-10-CM    1. Screen for colon cancer  Z12.11 Colonoscopy Screening  Referral     Fecal colorectal cancer screen (FIT)      2. Psoriatic arthritis (H)  L40.50       3. Major depressive disorder, recurrent episode, mild (H)  F33.0       4. Primary malignant neuroendocrine neoplasm of ileum (H)  C7A.8 TSH with free T4 reflex     25 Hydroxyvitamin D2 and D3      5. Acute bronchitis with coexisting condition requiring prophylactic treatment  J20.9 doxycycline hyclate (VIBRAMYCIN) 100 MG capsule      6. Need for shingles vaccine  Z23 ZOSTER VACCINE RECOMBINANT ADJUVANTED (SHINGRIX)      7. Anxiety  F41.9       8. Essential hypertension with goal blood pressure less than 140/90  I10 REVIEW OF HEALTH MAINTENANCE PROTOCOL ORDERS     lisinopril (ZESTRIL) 10 MG tablet      9. HYPERLIPIDEMIA LDL GOAL <130 [272.4CK] - joint aches w/ simvastatin 11/2010,lipitor=nausea/abd pain 1/2012  E78.5 REVIEW OF HEALTH MAINTENANCE PROTOCOL ORDERS     Lipid panel reflex to direct LDL Fasting      10. Hypertriglyceridemia  E78.1 Lipid panel reflex to direct LDL Fasting      11. Vitamin D deficiency  E55.9 REVIEW OF HEALTH MAINTENANCE PROTOCOL ORDERS     25 Hydroxyvitamin D2 and D3      12. Vaccine refused by patient -shingles and flu - secondary to immunocompromised status   Z28.20       13. Psoriasis  L40.9 betamethasone dipropionate (DIPROSONE) 0.05 % external lotion     calcipotriene (DOVONOX) 0.005 % external ointment     augmented betamethasone dipropionate (DIPROLENE AF) 0.05 % external cream      14. Contact dermatitis of eyelid, unspecified laterality  H01.119 hydrocortisone 2.5 % ointment      15. Essential hypertension with goal blood pressure less than 140/90- pretty  well controlled today  I10 lisinopril (ZESTRIL) 10 MG tablet           Return in about 6 months (around 10/26/2023) for blood pressure, cholesterol/lipids, Physical/Preventative Visit, w/ Dr. RUVALCABA for 40 minute appointment.  "  Ordering of each unique test  Prescription drug management  32 minutes spent by me on the date of the encounter doing chart review, history and exam, documentation and further activities per the note       Nicotine/Tobacco Cessation:  She reports that she has been smoking cigarettes. She has a 5.00 pack-year smoking history. She has quit using smokeless tobacco.  Nicotine/Tobacco Cessation Plan:   Phone counseling: Place order for Quit Partner Referral      BMI:   Estimated body mass index is 31.91 kg/m  as calculated from the following:    Height as of this encounter: 1.499 m (4' 11\").    Weight as of this encounter: 71.7 kg (158 lb).   Weight management plan: Discussed healthy diet and exercise guidelines    MEDICATIONS:   Orders Placed This Encounter   Medications     doxycycline hyclate (VIBRAMYCIN) 100 MG capsule     Sig: Take 1 capsule (100 mg) by mouth 2 times daily for 7 days     Dispense:  14 capsule     Refill:  1     Profile Rx: patient will contact pharmacy when needed          - Continue other medications without change  Work on weight loss  Regular exercise  See Patient Instructions    Gayatri Stephens MD  [facility]    Ronn Rosales is a 62 year old, presenting for the following health issues:  Recheck Medication        4/26/2023    10:59 AM   Additional Questions   Roomed by rusty andrea   Accompanied by self         4/26/2023    10:59 AM   Patient Reported Additional Medications   Patient reports taking the following new medications none     History of Present Illness       Reason for visit:  Medications check    She eats 2-3 servings of fruits and vegetables daily.She consumes 0 sweetened beverage(s) daily.She exercises with enough effort to increase her heart rate 9 or less minutes per day.  She exercises with enough effort to increase her heart rate 3 or less days per week.   She is taking medications regularly.    Today's PHQ-9         PHQ-9 Total Score: 0    PHQ-9 " Q9 Thoughts of better off dead/self-harm past 2 weeks :   Not at all      Today's NAHOMY-7 Score: 0     started her on Skyrizi for her psoriasis and psoriatic arthritis.  Her psoriasis was so bad it was scalp to toes - reviewed her submitted photographs from 2022.   Is so much improved from previous.     Had an acute bronchitis secondary to influenza a couple of months again and got doxycycline which helped a lot - would like to have an rx for doxycycline on hold. Discussed doing e-visits when needed as well        Hyperlipidemia Follow-Up:  nonfasting today.  Doesn't want to go on statin - got really sick and achey, even with rosuvastatin 5mg every other day.       Are you regularly taking any medication or supplement to lower your cholesterol?   No    Are you having muscle aches or other side effects that you think could be caused by your cholesterol lowering medication?  No     Recent Labs   Lab Test 21  0859 19  0833   CHOL 254* 243*   HDL 46* 51   * 141*   TRIG 288* 254*          Hypertension Follow-up:       Do you check your blood pressure regularly outside of the clinic? No     Are you following a low salt diet? No    Are your blood pressures ever more than 140 on the top number (systolic) OR more   than 90 on the bottom number (diastolic), for example 140/90? na    BP Readings from Last 3 Encounters:   23 138/84   04/10/23 139/88   10/05/22 137/84         Social History     Tobacco Use     Smoking status: Every Day     Packs/day: 1.00     Years: 5.00     Pack years: 5.00     Types: Cigarettes     Last attempt to quit: 1994     Years since quittin.3     Smokeless tobacco: Former     Tobacco comments:     11/3/17 - occ e-cig use   Vaping Use     Vaping status: Former     Substances: Nicotine     Devices: Refillable tank   Substance Use Topics     Alcohol use: Yes     Alcohol/week: 0.0 standard drinks of alcohol     Comment: 3-5 drinks per week     Drug use: No          "5/2/2022     7:33 AM 10/5/2022     8:40 AM 4/26/2023    10:15 AM   PHQ   PHQ-9 Total Score 0 2 0   Q9: Thoughts of better off dead/self-harm past 2 weeks Not at all Not at all Not at all         10/12/2021     5:31 PM 5/2/2022     7:33 AM 4/25/2023     2:36 PM   NAHOMY-7 SCORE   Total Score 0 (minimal anxiety)  0 (minimal anxiety)   Total Score 0 0 0         4/26/2023    10:15 AM   Last PHQ-9   1.  Little interest or pleasure in doing things 0   2.  Feeling down, depressed, or hopeless 0   3.  Trouble falling or staying asleep, or sleeping too much 0   4.  Feeling tired or having little energy 0   5.  Poor appetite or overeating 0   6.  Feeling bad about yourself 0   7.  Trouble concentrating 0   8.  Moving slowly or restless 0   Q9: Thoughts of better off dead/self-harm past 2 weeks 0   PHQ-9 Total Score 0         4/25/2023     2:36 PM   NAHOMY-7    1. Feeling nervous, anxious, or on edge 0   2. Not being able to stop or control worrying 0   3. Worrying too much about different things 0   4. Trouble relaxing 0   5. Being so restless that it is hard to sit still 0   6. Becoming easily annoyed or irritable 0   7. Feeling afraid, as if something awful might happen 0   NAHOMY-7 Total Score 0       Suicide Assessment Five-step Evaluation and Treatment (SAFE-T)          Review of Systems   Constitutional, HEENT, cardiovascular, pulmonary, GI, , musculoskeletal, neuro, skin, endocrine and psych systems are negative, except as otherwise noted.      Objective    /84   Pulse 85   Temp 97.2  F (36.2  C)   Resp 18   Ht 1.499 m (4' 11\")   Wt 71.7 kg (158 lb)   LMP 09/05/2000   SpO2 97%   BMI 31.91 kg/m    Body mass index is 31.91 kg/m .  Physical Exam   GENERAL: healthy, alert and no distress  EYES: Eyes grossly normal to inspection, PERRL and conjunctivae and sclerae normal  HENT: ear canals and TM's normal, nose and mouth without ulcers or lesions  NECK: no adenopathy, no asymmetry, masses, or scars and thyroid normal " to palpation  RESP: lungs clear to auscultation - no rales, rhonchi or wheezes  CV: regular rate and rhythm, normal S1 S2, no S3 or S4, no murmur, click or rub, no peripheral edema and peripheral pulses strong  ABDOMEN: soft, nontender, no hepatosplenomegaly, no masses and bowel sounds normal  MS: no gross musculoskeletal defects noted, no edema  SKIN: no suspicious lesions or rashes  NEURO: Normal strength and tone, mentation intact and speech normal  PSYCH: mentation appears normal, affect normal/bright    Office Visit on 04/10/2023   Component Date Value Ref Range Status     Group A Strep antigen 04/10/2023 Negative  Negative Final     Group A strep by PCR 04/10/2023 Not Detected  Not Detected Final     WBC Count 04/10/2023 11.0  4.0 - 11.0 10e3/uL Final     RBC Count 04/10/2023 4.58  3.80 - 5.20 10e6/uL Final     Hemoglobin 04/10/2023 15.1  11.7 - 15.7 g/dL Final     Hematocrit 04/10/2023 41.8  35.0 - 47.0 % Final     MCV 04/10/2023 91  78 - 100 fL Final     MCH 04/10/2023 33.0  26.5 - 33.0 pg Final     MCHC 04/10/2023 36.1  31.5 - 36.5 g/dL Final     RDW 04/10/2023 12.2  10.0 - 15.0 % Final     Platelet Count 04/10/2023 283  150 - 450 10e3/uL Final     % Neutrophils 04/10/2023 76  % Final     % Lymphocytes 04/10/2023 17  % Final     % Monocytes 04/10/2023 4  % Final     % Eosinophils 04/10/2023 3  % Final     % Basophils 04/10/2023 1  % Final     Absolute Neutrophils 04/10/2023 8.4 (H)  1.6 - 8.3 10e3/uL Final     Absolute Lymphocytes 04/10/2023 1.8  0.8 - 5.3 10e3/uL Final     Absolute Monocytes 04/10/2023 0.4  0.0 - 1.3 10e3/uL Final     Absolute Eosinophils 04/10/2023 0.3  0.0 - 0.7 10e3/uL Final     Absolute Basophils 04/10/2023 0.1  0.0 - 0.2 10e3/uL Final

## 2023-05-01 NOTE — NURSING NOTE
"Oncology Rooming Note    November 16, 2017 7:49 AM   Connie Brunson is a 57 year old female who presents for:    Chief Complaint   Patient presents with     Oncology Clinic Visit     New Patient - Consult     Initial Vitals: /82  Pulse 86  Temp 97.8  F (36.6  C) (Tympanic)  Resp 16  Ht 1.521 m (4' 11.9\")  Wt 68.2 kg (150 lb 6 oz)  LMP 09/05/2000  SpO2 97%  BMI 29.47 kg/m2 Estimated body mass index is 29.47 kg/(m^2) as calculated from the following:    Height as of this encounter: 1.521 m (4' 11.9\").    Weight as of this encounter: 68.2 kg (150 lb 6 oz). Body surface area is 1.7 meters squared.  Mild Pain (3) Comment: surgery site   Patient's last menstrual period was 09/05/2000.  Allergies reviewed: Yes  Medications reviewed: Yes    Medications: Medication refills not needed today.  Pharmacy name entered into [employer]:    [facility] [address]  WRITTEN PRESCRIPTION REQUESTED  [facility] [address] [place] [facility] [address] [facility]    Clinical concerns: follow up     8 minutes for nursing intake (face to face time)     Patti Garcia CMA     DISCHARGE PLAN:  Next appointments: See patient instruction section  Departure Mode: Ambulatory  Accompanied by:  and daughter  0 minutes for nursing discharge (face to face time)   Patti Garcia CMA                  "
Vaccine status unknown

## 2023-06-06 ENCOUNTER — TELEPHONE (OUTPATIENT)
Dept: DERMATOLOGY | Facility: CLINIC | Age: 63
End: 2023-06-06
Payer: COMMERCIAL

## 2023-06-06 NOTE — TELEPHONE ENCOUNTER
Prior Authorization Retail Medication Request    Medication/Dose: Risankizumab-rzaa (SKYRIZI) 150 MG/ML subcutaneous  ICD code (if different than what is on RX):    Plaque psoriasis [L40.0]  - Primary       Psoriatic arthritis (H) [L40.50]       Previously Tried and Failed:  See chart  Rationale:      Insurance Name:  [facility]  Insurance ID:  [id number]      Key: [id number]

## 2023-06-10 ENCOUNTER — HEALTH MAINTENANCE LETTER (OUTPATIENT)
Age: 63
End: 2023-06-10

## 2023-06-23 ENCOUNTER — MYC MEDICAL ADVICE (OUTPATIENT)
Dept: DERMATOLOGY | Facility: CLINIC | Age: 63
End: 2023-06-23
Payer: COMMERCIAL

## 2023-06-23 NOTE — TELEPHONE ENCOUNTER
PA Initiation    Medication: SKYRIZI  MG/ML SC SOAJ  Insurance Company: [facility] - Phone [phone] Fax [phone]  Pharmacy Filling the Rx: [facility] - [place], [place] - [address]  Filling Pharmacy Phone: [phone]  Filling Pharmacy Fax:    Start Date:      Key: [id number]

## 2023-06-24 DIAGNOSIS — E78.1 HYPERTRIGLYCERIDEMIA: ICD-10-CM

## 2023-06-26 NOTE — TELEPHONE ENCOUNTER
Prior Authorization Approval    Medication: SKYRIZI  MG/ML SC SOAJ  Authorization Effective Date: 7/12/2023  Authorization Expiration Date: 7/12/2024  Approved Dose/Quantity: 1 per 84 days  Reference #: Key: [id number]   Insurance Company: [facility] - Phone [phone] Fax [phone]  Expected CoPay:       CoPay Card Available:      Financial Assistance Needed: no  Which Pharmacy is filling the prescription: [facility] - [facility] [address]  Pharmacy Notified: Yes  Patient Notified: No

## 2023-06-27 RX ORDER — FENOFIBRATE 160 MG/1
TABLET ORAL
Qty: 90 TABLET | Refills: 0 | Status: SHIPPED | OUTPATIENT
Start: 2023-06-27 | End: 2023-09-22

## 2023-06-27 NOTE — TELEPHONE ENCOUNTER
Medication is being filled for 1 time refill only due to:  Patient needs labs .    Pt has appt schedule with pcp  Pablo SHEETS RN, BSN

## 2023-07-13 DIAGNOSIS — L40.0 PLAQUE PSORIASIS: ICD-10-CM

## 2023-07-13 DIAGNOSIS — L40.50 PSORIATIC ARTHRITIS (H): ICD-10-CM

## 2023-07-14 NOTE — TELEPHONE ENCOUNTER
Risankizumab-rzaa (SKYRIZI) 150 MG/ML     Last Written Prescription Date:  7/8/22  Last Fill Quantity: 1ML,   # refills: 3  Last Office Visit : 10/10/22  Future Office visit:  NONE  Routing refill request to provider for review/approval because:  Drug not on the refill protocol

## 2023-07-15 DIAGNOSIS — I10 ESSENTIAL HYPERTENSION WITH GOAL BLOOD PRESSURE LESS THAN 140/90: ICD-10-CM

## 2023-07-18 NOTE — TELEPHONE ENCOUNTER
Routing refill request to provider for review/approval because:  Labs out of range:    Lab Test 09/12/22  0825   *

## 2023-07-21 RX ORDER — TRIAMTERENE AND HYDROCHLOROTHIAZIDE 37.5; 25 MG/1; MG/1
CAPSULE ORAL
Qty: 90 CAPSULE | Refills: 1 | Status: SHIPPED | OUTPATIENT
Start: 2023-07-21 | End: 2023-09-22

## 2023-08-01 ENCOUNTER — TRANSFERRED RECORDS (OUTPATIENT)
Dept: HEALTH INFORMATION MANAGEMENT | Facility: CLINIC | Age: 63
End: 2023-08-01

## 2023-08-14 DIAGNOSIS — J30.2 OTHER SEASONAL ALLERGIC RHINITIS: ICD-10-CM

## 2023-08-15 DIAGNOSIS — L40.9 PSORIASIS: ICD-10-CM

## 2023-08-15 RX ORDER — FLUTICASONE PROPIONATE 50 MCG
SPRAY, SUSPENSION (ML) NASAL
Qty: 48 ML | Refills: 0 | Status: SHIPPED | OUTPATIENT
Start: 2023-08-15 | End: 2023-09-22

## 2023-08-17 RX ORDER — HYDROXYZINE HYDROCHLORIDE 25 MG/1
25 TABLET, FILM COATED ORAL
Qty: 30 TABLET | Refills: 1 | Status: SHIPPED | OUTPATIENT
Start: 2023-08-17 | End: 2024-09-26

## 2023-08-31 ENCOUNTER — MYC MEDICAL ADVICE (OUTPATIENT)
Dept: FAMILY MEDICINE | Facility: CLINIC | Age: 63
End: 2023-08-31
Payer: COMMERCIAL

## 2023-09-01 ENCOUNTER — MYC MEDICAL ADVICE (OUTPATIENT)
Dept: FAMILY MEDICINE | Facility: CLINIC | Age: 63
End: 2023-09-01
Payer: COMMERCIAL

## 2023-09-01 NOTE — LETTER
September 12, 2023      Connie Brunson  [address] [facility] [address]        To Whom It May Concern:    Connie Brunson is a patient very well known to me.  I have been her primary care physician for 20 years. Please excuse her from night call responsibilities.  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 She has medical conditions that require a full night's sleep.   She also has a medical condition that requires her to take a sedating medication periodically.     Thank you in advance for your cooperation with this medical need.       Sincerely,            Gayatri Stephens MD

## 2023-09-05 ENCOUNTER — LAB (OUTPATIENT)
Dept: LAB | Facility: CLINIC | Age: 63
End: 2023-09-05
Payer: COMMERCIAL

## 2023-09-05 DIAGNOSIS — E78.1 HYPERTRIGLYCERIDEMIA: ICD-10-CM

## 2023-09-05 DIAGNOSIS — I10 ESSENTIAL HYPERTENSION WITH GOAL BLOOD PRESSURE LESS THAN 140/90: Primary | ICD-10-CM

## 2023-09-05 DIAGNOSIS — Z12.11 SCREEN FOR COLON CANCER: ICD-10-CM

## 2023-09-05 DIAGNOSIS — C7A.8 PRIMARY MALIGNANT NEUROENDOCRINE NEOPLASM OF ILEUM (H): ICD-10-CM

## 2023-09-05 DIAGNOSIS — E55.9 VITAMIN D DEFICIENCY: ICD-10-CM

## 2023-09-05 DIAGNOSIS — E78.5 HYPERLIPIDEMIA LDL GOAL <130: ICD-10-CM

## 2023-09-05 LAB
ALBUMIN SERPL BCG-MCNC: 4.8 G/DL (ref 3.5–5.2)
ALP SERPL-CCNC: 42 U/L (ref 35–104)
ALT SERPL W P-5'-P-CCNC: 18 U/L (ref 0–50)
ANION GAP SERPL CALCULATED.3IONS-SCNC: 15 MMOL/L (ref 7–15)
AST SERPL W P-5'-P-CCNC: 23 U/L (ref 0–45)
BASOPHILS # BLD AUTO: 0.1 10E3/UL (ref 0–0.2)
BASOPHILS NFR BLD AUTO: 1 %
BILIRUB SERPL-MCNC: 0.4 MG/DL
BUN SERPL-MCNC: 13.1 MG/DL (ref 8–23)
CALCIUM SERPL-MCNC: 10 MG/DL (ref 8.8–10.2)
CHLORIDE SERPL-SCNC: 95 MMOL/L (ref 98–107)
CHOLEST SERPL-MCNC: 238 MG/DL
CREAT SERPL-MCNC: 0.7 MG/DL (ref 0.51–0.95)
DEPRECATED HCO3 PLAS-SCNC: 27 MMOL/L (ref 22–29)
EOSINOPHIL # BLD AUTO: 0.3 10E3/UL (ref 0–0.7)
EOSINOPHIL NFR BLD AUTO: 4 %
ERYTHROCYTE [DISTWIDTH] IN BLOOD BY AUTOMATED COUNT: 12.6 % (ref 10–15)
GFR SERPL CREATININE-BSD FRML MDRD: >90 ML/MIN/1.73M2
GLUCOSE SERPL-MCNC: 114 MG/DL (ref 70–99)
HCT VFR BLD AUTO: 41.8 % (ref 35–47)
HDLC SERPL-MCNC: 43 MG/DL
HGB BLD-MCNC: 15 G/DL (ref 11.7–15.7)
IMM GRANULOCYTES # BLD: 0 10E3/UL
IMM GRANULOCYTES NFR BLD: 0 %
LDLC SERPL CALC-MCNC: 149 MG/DL
LYMPHOCYTES # BLD AUTO: 1.7 10E3/UL (ref 0.8–5.3)
LYMPHOCYTES NFR BLD AUTO: 24 %
MCH RBC QN AUTO: 33 PG (ref 26.5–33)
MCHC RBC AUTO-ENTMCNC: 35.9 G/DL (ref 31.5–36.5)
MCV RBC AUTO: 92 FL (ref 78–100)
MONOCYTES # BLD AUTO: 0.5 10E3/UL (ref 0–1.3)
MONOCYTES NFR BLD AUTO: 8 %
NEUTROPHILS # BLD AUTO: 4.5 10E3/UL (ref 1.6–8.3)
NEUTROPHILS NFR BLD AUTO: 64 %
NONHDLC SERPL-MCNC: 195 MG/DL
PLATELET # BLD AUTO: 338 10E3/UL (ref 150–450)
POTASSIUM SERPL-SCNC: 3.8 MMOL/L (ref 3.4–5.3)
PROT SERPL-MCNC: 7.7 G/DL (ref 6.4–8.3)
RBC # BLD AUTO: 4.55 10E6/UL (ref 3.8–5.2)
SODIUM SERPL-SCNC: 137 MMOL/L (ref 136–145)
TRIGL SERPL-MCNC: 230 MG/DL
TSH SERPL DL<=0.005 MIU/L-ACNC: 3.66 UIU/ML (ref 0.3–4.2)
WBC # BLD AUTO: 7.1 10E3/UL (ref 4–11)

## 2023-09-05 PROCEDURE — 80053 COMPREHEN METABOLIC PANEL: CPT

## 2023-09-05 PROCEDURE — 80061 LIPID PANEL: CPT

## 2023-09-05 PROCEDURE — 99000 SPECIMEN HANDLING OFFICE-LAB: CPT

## 2023-09-05 PROCEDURE — 86316 IMMUNOASSAY TUMOR OTHER: CPT | Mod: 90

## 2023-09-05 PROCEDURE — 84260 ASSAY OF SEROTONIN: CPT | Mod: 90

## 2023-09-05 PROCEDURE — 82308 ASSAY OF CALCITONIN: CPT | Mod: 90

## 2023-09-05 PROCEDURE — 84443 ASSAY THYROID STIM HORMONE: CPT

## 2023-09-05 PROCEDURE — 85025 COMPLETE CBC W/AUTO DIFF WBC: CPT

## 2023-09-05 PROCEDURE — 82306 VITAMIN D 25 HYDROXY: CPT

## 2023-09-05 PROCEDURE — 36415 COLL VENOUS BLD VENIPUNCTURE: CPT

## 2023-09-06 NOTE — TELEPHONE ENCOUNTER
Please see my chart message below     Please review and advise     Thank you     Samantha Escoto RN, BSN  [facility]

## 2023-09-07 LAB
CALCIT SERPL-MCNC: <2 PG/ML
CGA SERPL-MCNC: 249 NG/ML
SEROTONIN BLD-MCNC: 42 NG/ML

## 2023-09-11 LAB
DEPRECATED CALCIDIOL+CALCIFEROL SERPL-MC: <76 UG/L (ref 20–75)
VITAMIN D2 SERPL-MCNC: <5 UG/L
VITAMIN D3 SERPL-MCNC: 71 UG/L

## 2023-09-14 PROCEDURE — 82274 ASSAY TEST FOR BLOOD FECAL: CPT

## 2023-09-15 ENCOUNTER — ONCOLOGY VISIT (OUTPATIENT)
Dept: ONCOLOGY | Facility: CLINIC | Age: 63
End: 2023-09-15
Attending: INTERNAL MEDICINE
Payer: COMMERCIAL

## 2023-09-15 VITALS
RESPIRATION RATE: 16 BRPM | SYSTOLIC BLOOD PRESSURE: 134 MMHG | TEMPERATURE: 98.5 F | HEART RATE: 93 BPM | WEIGHT: 162.8 LBS | DIASTOLIC BLOOD PRESSURE: 77 MMHG | BODY MASS INDEX: 32.88 KG/M2 | OXYGEN SATURATION: 96 %

## 2023-09-15 DIAGNOSIS — C7A.8 PRIMARY MALIGNANT NEUROENDOCRINE NEOPLASM OF ILEUM (H): Primary | ICD-10-CM

## 2023-09-15 LAB — HEMOCCULT STL QL IA: NEGATIVE

## 2023-09-15 PROCEDURE — 99214 OFFICE O/P EST MOD 30 MIN: CPT | Performed by: INTERNAL MEDICINE

## 2023-09-15 PROCEDURE — 99213 OFFICE O/P EST LOW 20 MIN: CPT | Performed by: INTERNAL MEDICINE

## 2023-09-15 ASSESSMENT — PAIN SCALES - GENERAL: PAINLEVEL: NO PAIN (0)

## 2023-09-15 NOTE — LETTER
9/15/2023         RE: Connie Brunson  [address] [facility] [address]        Dear Colleague,    Thank you for referring your patient, Connie Brunson, to the [facility]. Please see a copy of my visit note below.    [facility]    Hematology/Oncology Established Patient Note      Today's Date: Sep 15, 2023     Reason for Follow-up: neuroendocrine tumor of the ileum      HISTORY OF PRESENT ILLNESS: Connie Brunson is a 60 year old female with PMHx of HTN, HLD, depression, who presents with neuroendocrine tumor of the ileum.  In October 2017, she had a CT abdomen/pelvis done due to lower abdominal pain related to sigmoid diverticulitis and loose stools.  She had been having diarrhea for ~3 years.  It showed an incidental finding of an enhancing 1.3 cm intraluminal nodule in the distal ileum.  There was comment on radiology report on CT enterography on 11/2/17 that the nodule, in retrospect, appears to be present and stable since an older CT from 10/27/14.  On 11/7/17, she underwent a segmental small bowel resection with primary anastomosis by Dr. Cristobal.  Pathology showed a 1.5 cm tumor in the ileum, well-differentiated neuroendocrine tumor, grade 2, mitotic rate 410 HPF, Ki-67 of 5%, 2 of 3 lymph nodes were involved, stage pT3N1 (stage IIIB).        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      She was followed in person and is doing well.  She has no new complaints at this visit.    REVIEW OF SYSTEMS:   14 point ROS was reviewed and is negative other than as noted above in HPI.       HOME MEDICATIONS:  Current Outpatient Medications   Medication Sig Dispense Refill     augmented betamethasone dipropionate (DIPROLENE AF) 0.05 % external cream Apply topically 2 times daily as needed (Psoriasis) To affected area on hands and feet as needed for severe flares. Do not use on face or body  folds. 100 g 2     benzonatate (TESSALON) 100 MG capsule Take 1 capsule (100 mg) by mouth 3 times daily as needed for cough 30 capsule 0     betamethasone dipropionate (DIPROSONE) 0.05 % external lotion Apply topically 2 times daily To affected area on scalp for 4-6 weeks. Do not use on face or body folds. 60 mL 4     calcipotriene (DOVONOX) 0.005 % external ointment Apply to hands and feet BID when flared. 120 g 3     cetirizine (ZYRTEC) 10 MG tablet Take 10 mg by mouth every evening       fenofibrate (TRIGLIDE/LOFIBRA) 160 MG tablet TAKE ONE TABLET BY MOUTH ONCE DAILY 90 tablet 0     fluticasone (FLONASE) 50 MCG/ACT nasal spray SPRAY TWO SPRAYS INTO BOTH NOSTRILS IN THE MORNING. 48 mL 0     hydrocortisone 2.5 % ointment Apply twice daily to the eyelids for 2 weeks 30 g 0     hydrOXYzine (ATARAX) 25 MG tablet Take 1 tablet (25 mg) by mouth nightly as needed for itching 30 tablet 1     lisinopril (ZESTRIL) 10 MG tablet Take 1 tablet (10 mg) by mouth daily 90 tablet 1     nicotine (NICORETTE) 2 MG gum PLACE 1 EACH (2 MG) INSIDE CHEEK EVERY HOUR AS NEEDED FOR SMOKING CESSATION 120 each 3     omeprazole (PRILOSEC) 20 MG DR capsule TAKE ONE CAPSULE BY MOUTH ONCE DAILY 90 capsule 0     Risankizumab-rzaa (SKYRIZI) 150 MG/ML subcutaneous Inject 1 mL (150 mg) Subcutaneous every 3 months MAINTENANCE DOSE: Start at day 28, then every 12 weeks. 1 mL 3     triamterene-HCTZ (DYAZIDE) 37.5-25 MG capsule TAKE ONE CAPSULE BY MOUTH ONCE DAILY 90 capsule 1     venlafaxine (EFFEXOR) 37.5 MG tablet Take 1 tablet (37.5 mg) by mouth 2 times daily 90 tablet 3         ALLERGIES:  Allergies   Allergen Reactions     Atorvastatin Nausea     Nausea and abd pain      Ceftin GI Disturbance     Stomach upset and bloating - given at same time as clindamycin ? Which one as cause.       Ciprofloxacin Nausea and Vomiting     Clindamycin GI Disturbance     Stomach upset and bloating - given at same time as ceftin, ? Which one as cause      Flagyl  [Metronidazole]      immediate migraine headache      Morphine Itching     Severe with nose, facial  Swelling - oxycodone = ok /no problems      Penicillins Hives     Sulfa Antibiotics Rash     Severe red , flushed rash     Levaquin Rash     States she can take cipro and avelox         PAST MEDICAL HISTORY:  Past Medical History:   Diagnosis Date     Allergic rhinitis, cause unspecified     year round - dog,dust-  Failed on claritin, claritin-D, zyrtec and zyrtec-D in past.      Benign neoplasm of cerebral meninges (H) 1999    has yearly MRI's. - 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 Thomas ENT      Cancer (H)      Depressive disorder      Deviated nasal septum     sees Dr. Thomas ENT      Diverticulosis of colon (without mention of hemorrhage) 02-     History of Guillain-[place] syndrome     NO FLU SHOTS - very mild case in [place] many years ago     Hyperlipidemia LDL goal < 130 doesn't want statins    simvastatin = severe hand joint pain , lipitor =nausea/abd. pain     Hypertension goal BP (blood pressure) < 140/90      Insomnia     trazodone -made pt feel hung over      Major depressive disorder, recurrent episode, moderate (H)     lexapro - sexual side effects      Moderate major depression (H) 2/4/2005    trazodone -made pt feel hung over      Other acne      Other extrapyramidal disease and abnormal movement disorder     ?GBS     Psoriatic arthritis (H) 4/26/2023     Symptomatic menopausal or female climacteric states     vivelle-dot          PAST SURGICAL HISTORY:  Past Surgical History:   Procedure Laterality Date     ABDOMEN SURGERY       BIOPSY       COLONOSCOPY  1/16/2012    Procedure:COLONOSCOPY; Colonoscopy; Surgeon:SHOLA JOYCE; Location: GI     HC REMOVE TONSILS/ADENOIDS,<13 Y/O      T and A, under 12 yrs     HEAD & NECK SURGERY       LAPAROSCOPIC CHOLECYSTECTOMY WITH  CHOLANGIOGRAMS N/A 2016    Procedure: LAPAROSCOPIC CHOLECYSTECTOMY WITH CHOLANGIOGRAMS;  Surgeon: Rosa M Cristobal MD;  Location: [facility] OR     LAPAROSCOPY DIAGNOSTIC (GENERAL) N/A 2017    Procedure: LAPAROSCOPY DIAGNOSTIC (GENERAL);  Exploratory Laparoscopy, Laparotomy and Small Bowel resection.;  Surgeon: Rosa M Cristobal MD;  Location: [facility] OR     LAPAROTOMY EXPLORATORY N/A 2017    Procedure: LAPAROTOMY EXPLORATORY;;  Surgeon: Rosa M Cristobal MD;  Location: [facility] OR     RESECT SMALL BOWEL WITHOUT OSTOMY N/A 2017    Procedure: RESECT SMALL BOWEL WITHOUT OSTOMY;;  Surgeon: Rosa M Cristobal MD;  Location: [facility] OR     SURGICAL HISTORY OF -       Menigioma excision     ZZC LIGATE FALLOPIAN TUBE      Tubal Ligation     ZZC NONSPECIFIC PROCEDURE      PAROTID TUMOR L SIDE OF NECK - BENIGN     ZZC TOTAL ABDOM HYSTERECTOMY   ?    Hysterectomy, Total Abdominal with BSO         SOCIAL HISTORY:  Social History     Socioeconomic History     Marital status:      Spouse name: Perry Brunson      Number of children: 3     Years of education: 15     Highest education level: Not on file   Occupational History     Occupation: RN and in admin with [employer]      Employer: [employer]   Tobacco Use     Smoking status: Every Day     Packs/day: 1.00     Years: 5.00     Pack years: 5.00     Types: Cigarettes     Last attempt to quit: 1994     Years since quittin.7     Smokeless tobacco: Former     Tobacco comments:     11/3/17 - occ e-cig use   Vaping Use     Vaping Use: Former     Substances: Nicotine     Devices: Refillable tank   Substance and Sexual Activity     Alcohol use: Yes     Alcohol/week: 0.0 standard drinks of alcohol     Comment: 3-5 drinks per week     Drug use: No     Sexual activity: Not Currently     Partners: Male     Birth control/protection: Post-menopausal     Comment: tubal ligation-    Other Topics Concern       Service No     Blood Transfusions No     Caffeine Concern Yes     Comment: 2-3 cups coffee in am     Occupational Exposure Not Asked     Hobby Hazards Not Asked     Sleep Concern Not Asked     Stress Concern Not Asked     Weight Concern Not Asked     Special Diet Not Asked     Back Care Not Asked     Exercise No     Comment: regular walking 4x/week      Bike Helmet Not Asked     Seat Belt Yes     Comment: always     Self-Exams Yes     Comment: SBE encouraged monthly     Parent/sibling w/ CABG, MI or angioplasty before 65F 55M? No   Social History Narrative    calcium - taking 500mg po qday - increase to Calcium - 1000-1200mg/day    flex sig/colonoscopy -at age 48    sun precautions - discussed    mammogram - yearly    Td booster - 1991 per our records - pt will call [facility]     pneumovax -at age 58    DEXA -this year- 2003    stool hemoccults - every year after age 40    ASA- start 81 mg ASA qday.    mulvitamin - encouraged    doing citrucel qday         from second , [employer]. Now back together with her first  and father of their  children, Perry Brunson - fall 2011.      Social Determinants of Health     Financial Resource Strain: Not on file   Food Insecurity: Not on file   Transportation Needs: Not on file   Physical Activity: Not on file   Stress: Not on file   Social Connections: Not on file   Intimate Partner Violence: Not At Risk (9/15/2023)    Humiliation, Afraid, Rape, and Kick questionnaire      Fear of Current or Ex-Partner: No      Emotionally Abused: No      Physically Abused: No      Sexually Abused: No   Housing Stability: Not on file         FAMILY HISTORY:  Family History   Problem Relation Age of Onset     Hyperlipidemia Mother      Thyroid Disease Mother      Cancer Father         lung     Hypertension Father      Hyperlipidemia Father      Thyroid Disease Brother      Diabetes Maternal Grandmother      Cerebrovascular Disease Maternal Grandmother       Substance Abuse Maternal Grandfather      Thyroid Disease Son      Her father  of lung cancer and paranasal cancer at around age 65.    She has 3 children.      PHYSICAL EXAM:  Vital signs:  /77   Pulse 93   Temp 98.5  F (36.9  C) (Oral)   Resp 16   Wt 73.8 kg (162 lb 12.8 oz)   LMP 2000   SpO2 96%   BMI 32.88 kg/m     ECO  GENERAL/CONSTITUTIONAL: No acute distress.  EYES: No scleral icterus.  NEUROLOGIC: Alert, oriented, answers questions appropriately.  INTEGUMENTARY: No jaundice. Psoriatic lesions on left wrist, legs.  GAIT: Steady, does not use assistive device        LABS:  Recent Labs   Lab Test 23  0840 22  0825 22  1246 21  0952 20  0750 20  0946    134* 135 134 137 133   POTASSIUM 3.8 3.7  --  3.4 3.6 3.2*   CHLORIDE 95* 94* 103 100 103 98   CO2 27 28 28 29 28 31   ANIONGAP 15 12 4 5 6 4   BUN 13.1 10.5 15 9 13 9   CR 0.70 0.59 0.52 0.64 0.73 0.68   * 99 82 103* 92 105*   FADI 10.0 9.4 9.0 9.4 9.7 9.3     Recent Labs   Lab Test 21  0859 11/10/17  0800 17  0810 17  0843 16  0903   MAG 1.8  --  1.8 2.0 1.9   PHOS  --  2.4* 2.4*  --   --      Recent Labs   Lab Test 23  0840 04/10/23  1123 22  0825 22  1246 21  0952   WBC 7.1 11.0 6.7 7.8 8.4   HGB 15.0 15.1 13.9 13.5 14.6    283 302 317 309   MCV 92 91 93 98 96   NEUTROPHIL 64 76 71 75 68     Recent Labs   Lab Test 23  0840 22  0825 22  1246 21  0952   BILITOTAL 0.4 0.4 0.4 0.4   ALKPHOS 42 42 42 39*   ALT 18 19 31 33   AST 23 20  --  20   ALBUMIN 4.8 4.3 3.6 4.0     TSH   Date Value Ref Range Status   2023 3.66 0.30 - 4.20 uIU/mL Final   2021 3.32 0.40 - 4.00 mU/L Final   2019 4.07 (H) 0.40 - 4.00 mU/L Final   2017 3.18 0.40 - 4.00 mU/L Final     No results for input(s): CEA in the last 07615 hours.  Results for orders placed or performed in visit on 04/10/23   XR Chest 2 Views     Narrative    CHEST TWO VIEWS 4/10/2023 11:20 AM     HISTORY: Acute cough    COMPARISON: Chest CT 9/12/2022       Impression    IMPRESSION: The cardiac silhouette and pulmonary vasculature are  within normal limits. No focal pulmonary consolidations. No pleural  effusion or pneumothorax.    KY SABA MD         SYSTEM ID:  [id number]        Latest Reference Range & Units 09/24/18 09:00 04/12/19 08:58 08/31/21 09:45 09/12/22 08:25 09/05/23 08:40   Chromogranin A 0 - 103 ng/mL 602 (H) 438 (H) 261 (H) 182 (H) 249 (H)   (H): Data is abnormally high        ASSESSMENT/PLAN:  Connie Brunson is a 60 year old female with:    1) Neuroendocrine tumor of the ileum: s/p resection on 11/7/17, 1.5 cm, grade 2, well-differentiated, T3N1 (stage IIIB).      Recent MRI abdomen and CT scan reviewed with Connie.  MRI abdomen is negative, with no evidence of metastatic disease.  CT chest shows stable pulmonary nodule.  She has hepatic steatosis.  I reviewed the imaging and reports.    Chromogranin A has remained abnormal, although stable although it has been trending down over time.  Her symptoms remain the same.  Her imaging remains clear.  Since her imaging is negative, we will continue to observe for now.      She has been monitored for nearly 5 years now without any evidence of disease recurrence.  I offered her options of stopping any further surveillance scans at this time and monitoring the labs alone.  She would like to continue with annual visits but without the restaging scans.    -she will call if she gets worsening diarrhea or flushing symptoms or pain and can get labs/scans done earlier.    -labs (CBC, CMP, chromogranin A) in 1 year  -RTC in 1 year    2) HTN, HLD  -follow-up with PCP    3) History of meningioma: diagnosed in 1999, was getting yearly MRI's with neurosurgery, but was told she does not need surveillance MRI's anymore unless she has new neurologic symptoms.    4) Pulmonary nodule: There is an indeterminate 2  x 3 mm anterior right mid-lung nodule seen on CT.  She does have history of smoking.  Recent CT chest shows that the tiny nodule is stable.  It has been stable since 2017.    5) Psoriasis:  -follows with dermatology  -she considered methotrexate, but is reluctant to start it and wants to wait    20 minutes spent on the date of the encounter doing chart review, history and exam, documentation and further activities as noted above       Again, thank you for allowing me to participate in the care of your patient.        Sincerely,        Remy Sorto MD

## 2023-09-15 NOTE — NURSING NOTE
"Oncology Rooming Note    September 15, 2023 2:28 PM   Connie Brunson is a 62 year old female who presents for:    Chief Complaint   Patient presents with    Oncology Clinic Visit     Initial Vitals: /77   Pulse 93   Temp 98.5  F (36.9  C) (Oral)   Resp 16   Wt 73.8 kg (162 lb 12.8 oz)   LMP 09/05/2000   SpO2 96%   BMI 32.88 kg/m   Estimated body mass index is 32.88 kg/m  as calculated from the following:    Height as of 4/26/23: 1.499 m (4' 11\").    Weight as of this encounter: 73.8 kg (162 lb 12.8 oz). Body surface area is 1.75 meters squared.  No Pain (0) Comment: Data Unavailable   Patient's last menstrual period was 09/05/2000.  Allergies reviewed: Yes  Medications reviewed: Yes    Medications: Medication refills not needed today.  Pharmacy name entered into [employer]:    [facility] - [address]  WRITTEN PRESCRIPTION REQUESTED  [facility] - [place], [place] - [facility] [address]     Clinical concerns: follow up        Connie Otoole            "

## 2023-09-15 NOTE — PROGRESS NOTES
[facility]    Hematology/Oncology Established Patient Note      Today's Date: Sep 15, 2023     Reason for Follow-up: neuroendocrine tumor of the ileum      HISTORY OF PRESENT ILLNESS: Connie Brunson is a 60 year old female with PMHx of HTN, HLD, depression, who presents with neuroendocrine tumor of the ileum.  In October 2017, she had a CT abdomen/pelvis done due to lower abdominal pain related to sigmoid diverticulitis and loose stools.  She had been having diarrhea for ~3 years.  It showed an incidental finding of an enhancing 1.3 cm intraluminal nodule in the distal ileum.  There was comment on radiology report on CT enterography on 11/2/17 that the nodule, in retrospect, appears to be present and stable since an older CT from 10/27/14.  On 11/7/17, she underwent a segmental small bowel resection with primary anastomosis by Dr. Cristobal.  Pathology showed a 1.5 cm tumor in the ileum, well-differentiated neuroendocrine tumor, grade 2, mitotic rate 410 HPF, Ki-67 of 5%, 2 of 3 lymph nodes were involved, stage pT3N1 (stage IIIB).        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      She was followed in person and is doing well.  She has no new complaints at this visit.    REVIEW OF SYSTEMS:   14 point ROS was reviewed and is negative other than as noted above in HPI.       HOME MEDICATIONS:  Current Outpatient Medications   Medication Sig Dispense Refill    augmented betamethasone dipropionate (DIPROLENE AF) 0.05 % external cream Apply topically 2 times daily as needed (Psoriasis) To affected area on hands and feet as needed for severe flares. Do not use on face or body folds. 100 g 2    benzonatate (TESSALON) 100 MG capsule Take 1 capsule (100 mg) by mouth 3 times daily as needed for cough 30 capsule 0    betamethasone dipropionate (DIPROSONE) 0.05 % external lotion Apply topically 2 times daily To affected area on scalp for 4-6  weeks. Do not use on face or body folds. 60 mL 4    calcipotriene (DOVONOX) 0.005 % external ointment Apply to hands and feet BID when flared. 120 g 3    cetirizine (ZYRTEC) 10 MG tablet Take 10 mg by mouth every evening      fenofibrate (TRIGLIDE/LOFIBRA) 160 MG tablet TAKE ONE TABLET BY MOUTH ONCE DAILY 90 tablet 0    fluticasone (FLONASE) 50 MCG/ACT nasal spray SPRAY TWO SPRAYS INTO BOTH NOSTRILS IN THE MORNING. 48 mL 0    hydrocortisone 2.5 % ointment Apply twice daily to the eyelids for 2 weeks 30 g 0    hydrOXYzine (ATARAX) 25 MG tablet Take 1 tablet (25 mg) by mouth nightly as needed for itching 30 tablet 1    lisinopril (ZESTRIL) 10 MG tablet Take 1 tablet (10 mg) by mouth daily 90 tablet 1    nicotine (NICORETTE) 2 MG gum PLACE 1 EACH (2 MG) INSIDE CHEEK EVERY HOUR AS NEEDED FOR SMOKING CESSATION 120 each 3    omeprazole (PRILOSEC) 20 MG DR capsule TAKE ONE CAPSULE BY MOUTH ONCE DAILY 90 capsule 0    Risankizumab-rzaa (SKYRIZI) 150 MG/ML subcutaneous Inject 1 mL (150 mg) Subcutaneous every 3 months MAINTENANCE DOSE: Start at day 28, then every 12 weeks. 1 mL 3    triamterene-HCTZ (DYAZIDE) 37.5-25 MG capsule TAKE ONE CAPSULE BY MOUTH ONCE DAILY 90 capsule 1    venlafaxine (EFFEXOR) 37.5 MG tablet Take 1 tablet (37.5 mg) by mouth 2 times daily 90 tablet 3         ALLERGIES:  Allergies   Allergen Reactions    Atorvastatin Nausea     Nausea and abd pain     Ceftin GI Disturbance     Stomach upset and bloating - given at same time as clindamycin ? Which one as cause.      Ciprofloxacin Nausea and Vomiting    Clindamycin GI Disturbance     Stomach upset and bloating - given at same time as ceftin, ? Which one as cause     Flagyl [Metronidazole]      immediate migraine headache     Morphine Itching     Severe with nose, facial  Swelling - oxycodone = ok /no problems     Penicillins Hives    Sulfa Antibiotics Rash     Severe red , flushed rash    Levaquin Rash     States she can take cipro and avelox         PAST  MEDICAL HISTORY:  Past Medical History:   Diagnosis Date    Allergic rhinitis, cause unspecified     year round - dog,dust-  Failed on claritin, claritin-D, zyrtec and zyrtec-D in past.     Benign neoplasm of cerebral meninges (H) 1999    has yearly MRI's. - sees Dr. [facility] MRI7/24/06 = no change from 7/21/05     Benign neoplasm of tonsil 7/05    ?tonsillar re-growth - sees Dr. Thomas ENT     Cancer (H)     Depressive disorder     Deviated nasal septum     sees Dr. Thomas ENT     Diverticulosis of colon (without mention of hemorrhage) 02-    History of Guillain-[place] syndrome     NO FLU SHOTS - very mild case in [place] many years ago    Hyperlipidemia LDL goal < 130 doesn't want statins    simvastatin = severe hand joint pain , lipitor =nausea/abd. pain    Hypertension goal BP (blood pressure) < 140/90     Insomnia     trazodone -made pt feel hung over     Major depressive disorder, recurrent episode, moderate (H)     lexapro - sexual side effects     Moderate major depression (H) 2/4/2005    trazodone -made pt feel hung over     Other acne     Other extrapyramidal disease and abnormal movement disorder     ?GBS    Psoriatic arthritis (H) 4/26/2023    Symptomatic menopausal or female climacteric states     vivelle-dot          PAST SURGICAL HISTORY:  Past Surgical History:   Procedure Laterality Date    ABDOMEN SURGERY      BIOPSY      COLONOSCOPY  1/16/2012    Procedure:COLONOSCOPY; Colonoscopy; Surgeon:SHOLA JOYCE; Location: GI    HC REMOVE TONSILS/ADENOIDS,<11 Y/O      T and A, under 12 yrs    HEAD & NECK SURGERY      LAPAROSCOPIC CHOLECYSTECTOMY WITH CHOLANGIOGRAMS N/A 4/25/2016    Procedure: LAPAROSCOPIC CHOLECYSTECTOMY WITH CHOLANGIOGRAMS;  Surgeon: Rosa M Cristobal MD;  Location:  OR    LAPAROSCOPY DIAGNOSTIC (GENERAL) N/A 11/7/2017    Procedure: LAPAROSCOPY DIAGNOSTIC (GENERAL);  Exploratory Laparoscopy, Laparotomy and Small Bowel resection.;  Surgeon:  Rosa M Cristobal MD;  Location: [facility] OR    LAPAROTOMY EXPLORATORY N/A 2017    Procedure: LAPAROTOMY EXPLORATORY;;  Surgeon: Rosa M Cristobal MD;  Location: [facility] OR    RESECT SMALL BOWEL WITHOUT OSTOMY N/A 2017    Procedure: RESECT SMALL BOWEL WITHOUT OSTOMY;;  Surgeon: Rosa M Cristobal MD;  Location: [facility] OR    SURGICAL HISTORY OF -       Menigioma excision    ZZC LIGATE FALLOPIAN TUBE      Tubal Ligation    ZZC NONSPECIFIC PROCEDURE      PAROTID TUMOR L SIDE OF NECK - BENIGN    ZZC TOTAL ABDOM HYSTERECTOMY   ?    Hysterectomy, Total Abdominal with BSO         SOCIAL HISTORY:  Social History     Socioeconomic History    Marital status:      Spouse name: Perry Brunson     Number of children: 3    Years of education: 15    Highest education level: Not on file   Occupational History    Occupation: RN and in admin with [employer]      Employer: [employer]   Tobacco Use    Smoking status: Every Day     Packs/day: 1.00     Years: 5.00     Pack years: 5.00     Types: Cigarettes     Last attempt to quit: 1994     Years since quittin.7    Smokeless tobacco: Former    Tobacco comments:     11/3/17 - occ e-cig use   Vaping Use    Vaping Use: Former    Substances: Nicotine    Devices: Refillable tank   Substance and Sexual Activity    Alcohol use: Yes     Alcohol/week: 0.0 standard drinks of alcohol     Comment: 3-5 drinks per week    Drug use: No    Sexual activity: Not Currently     Partners: Male     Birth control/protection: Post-menopausal     Comment: tubal ligation-    Other Topics Concern     Service No    Blood Transfusions No    Caffeine Concern Yes     Comment: 2-3 cups coffee in am    Occupational Exposure Not Asked    Hobby Hazards Not Asked    Sleep Concern Not Asked    Stress Concern Not Asked    Weight Concern Not Asked    Special Diet Not Asked    Back Care Not Asked    Exercise No     Comment: regular walking 4x/week      Bike Helmet Not Asked    Seat Belt Yes     Comment: always    Self-Exams Yes     Comment: SBE encouraged monthly    Parent/sibling w/ CABG, MI or angioplasty before 65F 55M? No   Social History Narrative    calcium - taking 500mg po qday - increase to Calcium - 1000-1200mg/day    flex sig/colonoscopy -at age 48    sun precautions - discussed    mammogram - yearly    Td booster -  per our records - pt will call [facility]     pneumovax -at age 58    DEXA -this year-     stool hemoccults - every year after age 40    ASA- start 81 mg ASA qday.    mulvitamin - encouraged    doing citrucel qday         from second , [employer]. Now back together with her first  and father of their  children, Perry Brunson - 2011.      Social Determinants of Health     Financial Resource Strain: Not on file   Food Insecurity: Not on file   Transportation Needs: Not on file   Physical Activity: Not on file   Stress: Not on file   Social Connections: Not on file   Intimate Partner Violence: Not At Risk (9/15/2023)    Humiliation, Afraid, Rape, and Kick questionnaire     Fear of Current or Ex-Partner: No     Emotionally Abused: No     Physically Abused: No     Sexually Abused: No   Housing Stability: Not on file         FAMILY HISTORY:  Family History   Problem Relation Age of Onset    Hyperlipidemia Mother     Thyroid Disease Mother     Cancer Father         lung    Hypertension Father     Hyperlipidemia Father     Thyroid Disease Brother     Diabetes Maternal Grandmother     Cerebrovascular Disease Maternal Grandmother     Substance Abuse Maternal Grandfather     Thyroid Disease Son      Her father  of lung cancer and paranasal cancer at around age 65.    She has 3 children.      PHYSICAL EXAM:  Vital signs:  /77   Pulse 93   Temp 98.5  F (36.9  C) (Oral)   Resp 16   Wt 73.8 kg (162 lb 12.8 oz)   LMP 2000   SpO2 96%   BMI 32.88 kg/m     ECO  GENERAL/CONSTITUTIONAL:  No acute distress.  EYES: No scleral icterus.  NEUROLOGIC: Alert, oriented, answers questions appropriately.  INTEGUMENTARY: No jaundice. Psoriatic lesions on left wrist, legs.  GAIT: Steady, does not use assistive device        LABS:  Recent Labs   Lab Test 09/05/23  0840 09/12/22  0825 07/22/22  1246 08/31/21  0952 09/04/20  0750 02/28/20  0946    134* 135 134 137 133   POTASSIUM 3.8 3.7  --  3.4 3.6 3.2*   CHLORIDE 95* 94* 103 100 103 98   CO2 27 28 28 29 28 31   ANIONGAP 15 12 4 5 6 4   BUN 13.1 10.5 15 9 13 9   CR 0.70 0.59 0.52 0.64 0.73 0.68   * 99 82 103* 92 105*   FADI 10.0 9.4 9.0 9.4 9.7 9.3     Recent Labs   Lab Test 01/29/21  0859 11/10/17  0800 11/09/17  0810 04/12/17  0843 01/27/16  0903   MAG 1.8  --  1.8 2.0 1.9   PHOS  --  2.4* 2.4*  --   --      Recent Labs   Lab Test 09/05/23  0840 04/10/23  1123 09/12/22  0825 07/22/22  1246 08/31/21  0952   WBC 7.1 11.0 6.7 7.8 8.4   HGB 15.0 15.1 13.9 13.5 14.6    283 302 317 309   MCV 92 91 93 98 96   NEUTROPHIL 64 76 71 75 68     Recent Labs   Lab Test 09/05/23  0840 09/12/22  0825 07/22/22  1246 08/31/21  0952   BILITOTAL 0.4 0.4 0.4 0.4   ALKPHOS 42 42 42 39*   ALT 18 19 31 33   AST 23 20  --  20   ALBUMIN 4.8 4.3 3.6 4.0     TSH   Date Value Ref Range Status   09/05/2023 3.66 0.30 - 4.20 uIU/mL Final   01/29/2021 3.32 0.40 - 4.00 mU/L Final   12/09/2019 4.07 (H) 0.40 - 4.00 mU/L Final   04/12/2017 3.18 0.40 - 4.00 mU/L Final     No results for input(s): CEA in the last [id number] hours.  Results for orders placed or performed in visit on 04/10/23   XR Chest 2 Views    Narrative    CHEST TWO VIEWS 4/10/2023 11:20 AM     HISTORY: Acute cough    COMPARISON: Chest CT 9/12/2022       Impression    IMPRESSION: The cardiac silhouette and pulmonary vasculature are  within normal limits. No focal pulmonary consolidations. No pleural  effusion or pneumothorax.    KY SABA MD         SYSTEM ID:  [id number]        Latest Reference Range & Units  09/24/18 09:00 04/12/19 08:58 08/31/21 09:45 09/12/22 08:25 09/05/23 08:40   Chromogranin A 0 - 103 ng/mL 602 (H) 438 (H) 261 (H) 182 (H) 249 (H)   (H): Data is abnormally high        ASSESSMENT/PLAN:  Connie Brunson is a 60 year old female with:    1) Neuroendocrine tumor of the ileum: s/p resection on 11/7/17, 1.5 cm, grade 2, well-differentiated, T3N1 (stage IIIB).      Recent MRI abdomen and CT scan reviewed with Connie.  MRI abdomen is negative, with no evidence of metastatic disease.  CT chest shows stable pulmonary nodule.  She has hepatic steatosis.  I reviewed the imaging and reports.    Chromogranin A has remained abnormal, although stable although it has been trending down over time.  Her symptoms remain the same.  Her imaging remains clear.  Since her imaging is negative, we will continue to observe for now.      She has been monitored for nearly 5 years now without any evidence of disease recurrence.  I offered her options of stopping any further surveillance scans at this time and monitoring the labs alone.  She would like to continue with annual visits but without the restaging scans.    -she will call if she gets worsening diarrhea or flushing symptoms or pain and can get labs/scans done earlier.    -labs (CBC, CMP, chromogranin A) in 1 year  -RTC in 1 year    2) HTN, HLD  -follow-up with PCP    3) History of meningioma: diagnosed in 1999, was getting yearly MRI's with neurosurgery, but was told she does not need surveillance MRI's anymore unless she has new neurologic symptoms.    4) Pulmonary nodule: There is an indeterminate 2 x 3 mm anterior right mid-lung nodule seen on CT.  She does have history of smoking.  Recent CT chest shows that the tiny nodule is stable.  It has been stable since 2017.    5) Psoriasis:  -follows with dermatology  -she considered methotrexate, but is reluctant to start it and wants to wait    20 minutes spent on the date of the encounter doing chart review, history and  exam, documentation and further activities as noted above

## 2023-09-20 ASSESSMENT — ENCOUNTER SYMPTOMS
JOINT SWELLING: 0
CONSTIPATION: 0
COUGH: 0
WEAKNESS: 0
CHILLS: 0
MYALGIAS: 0
DIZZINESS: 0
PALPITATIONS: 0
ARTHRALGIAS: 0
HEARTBURN: 0
FREQUENCY: 0
SHORTNESS OF BREATH: 0
BREAST MASS: 0
NERVOUS/ANXIOUS: 0
ABDOMINAL PAIN: 0
HEMATURIA: 0
EYE PAIN: 0
PARESTHESIAS: 0
FEVER: 0
SORE THROAT: 0
NAUSEA: 0
HEMATOCHEZIA: 0
HEADACHES: 0
DYSURIA: 0
DIARRHEA: 0

## 2023-09-21 ASSESSMENT — ANXIETY QUESTIONNAIRES
GAD7 TOTAL SCORE: 0
2. NOT BEING ABLE TO STOP OR CONTROL WORRYING: NOT AT ALL
GAD7 TOTAL SCORE: 0
1. FEELING NERVOUS, ANXIOUS, OR ON EDGE: NOT AT ALL
3. WORRYING TOO MUCH ABOUT DIFFERENT THINGS: NOT AT ALL
6. BECOMING EASILY ANNOYED OR IRRITABLE: NOT AT ALL
4. TROUBLE RELAXING: NOT AT ALL
7. FEELING AFRAID AS IF SOMETHING AWFUL MIGHT HAPPEN: NOT AT ALL
IF YOU CHECKED OFF ANY PROBLEMS ON THIS QUESTIONNAIRE, HOW DIFFICULT HAVE THESE PROBLEMS MADE IT FOR YOU TO DO YOUR WORK, TAKE CARE OF THINGS AT HOME, OR GET ALONG WITH OTHER PEOPLE: NOT DIFFICULT AT ALL
5. BEING SO RESTLESS THAT IT IS HARD TO SIT STILL: NOT AT ALL

## 2023-09-21 ASSESSMENT — PATIENT HEALTH QUESTIONNAIRE - PHQ9
SUM OF ALL RESPONSES TO PHQ QUESTIONS 1-9: 0
SUM OF ALL RESPONSES TO PHQ QUESTIONS 1-9: 0
10. IF YOU CHECKED OFF ANY PROBLEMS, HOW DIFFICULT HAVE THESE PROBLEMS MADE IT FOR YOU TO DO YOUR WORK, TAKE CARE OF THINGS AT HOME, OR GET ALONG WITH OTHER PEOPLE: NOT DIFFICULT AT ALL

## 2023-09-22 ENCOUNTER — OFFICE VISIT (OUTPATIENT)
Dept: FAMILY MEDICINE | Facility: CLINIC | Age: 63
End: 2023-09-22
Payer: COMMERCIAL

## 2023-09-22 VITALS
HEIGHT: 59 IN | HEART RATE: 83 BPM | SYSTOLIC BLOOD PRESSURE: 136 MMHG | TEMPERATURE: 97.3 F | RESPIRATION RATE: 16 BRPM | DIASTOLIC BLOOD PRESSURE: 70 MMHG | OXYGEN SATURATION: 96 % | BODY MASS INDEX: 32.9 KG/M2 | WEIGHT: 163.2 LBS

## 2023-09-22 DIAGNOSIS — Z12.31 VISIT FOR SCREENING MAMMOGRAM: ICD-10-CM

## 2023-09-22 DIAGNOSIS — F41.9 ANXIETY: ICD-10-CM

## 2023-09-22 DIAGNOSIS — K21.9 GASTROESOPHAGEAL REFLUX DISEASE WITHOUT ESOPHAGITIS: ICD-10-CM

## 2023-09-22 DIAGNOSIS — J30.2 OTHER SEASONAL ALLERGIC RHINITIS: ICD-10-CM

## 2023-09-22 DIAGNOSIS — I10 ESSENTIAL HYPERTENSION WITH GOAL BLOOD PRESSURE LESS THAN 140/90: ICD-10-CM

## 2023-09-22 DIAGNOSIS — Z00.00 ROUTINE GENERAL MEDICAL EXAMINATION AT A HEALTH CARE FACILITY: Primary | ICD-10-CM

## 2023-09-22 DIAGNOSIS — E78.1 HYPERTRIGLYCERIDEMIA: ICD-10-CM

## 2023-09-22 DIAGNOSIS — R73.01 ELEVATED FASTING GLUCOSE: ICD-10-CM

## 2023-09-22 DIAGNOSIS — L40.9 PSORIASIS: ICD-10-CM

## 2023-09-22 PROCEDURE — 99396 PREV VISIT EST AGE 40-64: CPT | Performed by: FAMILY MEDICINE

## 2023-09-22 PROCEDURE — 99214 OFFICE O/P EST MOD 30 MIN: CPT | Mod: 25 | Performed by: FAMILY MEDICINE

## 2023-09-22 RX ORDER — FLUTICASONE PROPIONATE 50 MCG
SPRAY, SUSPENSION (ML) NASAL
Qty: 48 ML | Refills: 4 | Status: SHIPPED | OUTPATIENT
Start: 2023-09-22 | End: 2024-09-26

## 2023-09-22 RX ORDER — FENOFIBRATE 160 MG/1
TABLET ORAL
Qty: 90 TABLET | Refills: 3 | Status: SHIPPED | OUTPATIENT
Start: 2023-09-22 | End: 2024-09-26

## 2023-09-22 RX ORDER — FENOFIBRATE 160 MG/1
TABLET ORAL
Qty: 90 TABLET | Refills: 0 | Status: SHIPPED | OUTPATIENT
Start: 2023-09-22 | End: 2023-09-22

## 2023-09-22 RX ORDER — TRIAMTERENE AND HYDROCHLOROTHIAZIDE 37.5; 25 MG/1; MG/1
1 CAPSULE ORAL DAILY
Qty: 90 CAPSULE | Refills: 3 | Status: SHIPPED | OUTPATIENT
Start: 2023-09-22 | End: 2024-09-26

## 2023-09-22 RX ORDER — LISINOPRIL 10 MG/1
10 TABLET ORAL DAILY
Qty: 90 TABLET | Refills: 3 | Status: SHIPPED | OUTPATIENT
Start: 2023-09-22 | End: 2024-09-26

## 2023-09-22 RX ORDER — VENLAFAXINE 37.5 MG/1
37.5 TABLET ORAL 2 TIMES DAILY
Qty: 90 TABLET | Refills: 3 | Status: SHIPPED | OUTPATIENT
Start: 2023-09-22 | End: 2023-09-22

## 2023-09-22 RX ORDER — VENLAFAXINE 37.5 MG/1
37.5 TABLET ORAL 2 TIMES DAILY
Qty: 180 TABLET | Refills: 3 | Status: SHIPPED | OUTPATIENT
Start: 2023-09-22 | End: 2024-09-26

## 2023-09-22 ASSESSMENT — ENCOUNTER SYMPTOMS
HEARTBURN: 0
NERVOUS/ANXIOUS: 0
MYALGIAS: 0
HEMATURIA: 0
SORE THROAT: 0
CHILLS: 0
FREQUENCY: 0
SHORTNESS OF BREATH: 0
JOINT SWELLING: 0
NAUSEA: 0
BREAST MASS: 0
EYE PAIN: 0
DYSURIA: 0
HEMATOCHEZIA: 0
DIZZINESS: 0
DIARRHEA: 0
WEAKNESS: 0
COUGH: 0
CONSTIPATION: 0
ABDOMINAL PAIN: 0
PARESTHESIAS: 0
ARTHRALGIAS: 0
FEVER: 0
HEADACHES: 0
PALPITATIONS: 0

## 2023-09-22 NOTE — PROGRESS NOTES
SUBJECTIVE:   CC: Connie is an 62 year old who presents for preventive health visit and the following other medical problems:      1. Routine general medical examination at a health care facility    2. Elevated fasting glucose - needs lab followup    3. Psoriasis - needs med refill    4. Hypertriglyceridemia -- needs lab followup    5. Other seasonal allergic rhinitis    6. Essential hypertension with goal blood pressure less than 140/90- pretty  well controlled today    7. Gastroesophageal reflux disease without esophagitis    8. Anxiety    9. Visit for screening mammogram            9/22/2023     3:05 PM   Additional Questions   Roomed by Sandra CMA   Accompanied by Self       Healthy Habits:     Getting at least 3 servings of Calcium per day:  Yes    Bi-annual eye exam:  Yes    Dental care twice a year:  Yes    Sleep apnea or symptoms of sleep apnea:  None    Diet:  Regular (no restrictions)    Frequency of exercise:  None    Taking medications regularly:  Yes    Medication side effects:  Not applicable    Additional concerns today:  No    Seeing Dr. Remy Sorto - for her neuroendocrine tumor in her ileum follow up .     Will schedule her colonoscopy once she retires next year - declines right now.    Your FIT test was negative. No signs of blood in your stool. - 9/14/2023.     Today's PHQ-9 Score:       9/21/2023     3:48 PM   PHQ-9 SCORE   PHQ-9 Total Score MyChart 0   PHQ-9 Total Score 0     Hypertension Follow-up:     Do you check your blood pressure regularly outside of the clinic? No   Are you following a low salt diet? No  Are your blood pressures ever more than 140 on the top number (systolic) OR more   than 90 on the bottom number (diastolic), for example 140/90? No    Depression and Anxiety Follow-Up  How are you doing with your depression since your last visit? No change  How are you doing with your anxiety since your last visit?  No change  Are you having other symptoms that might be associated with  depression or anxiety? No  Have you had a significant life event? No   Do you have any concerns with your use of alcohol or other drugs? No    Still smoking - plans to quit when she retires.  Declines to quit now.     Social History     Tobacco Use    Smoking status: Every Day     Packs/day: 1.00     Years: 5.00     Additional pack years: 0.00     Total pack years: 5.00     Types: Cigarettes     Last attempt to quit: 1994     Years since quittin.8    Smokeless tobacco: Former    Tobacco comments:     11/3/17 - occ e-cig use   Vaping Use    Vaping Use: Former    Substances: Nicotine    Devices: Refillable tank   Substance Use Topics    Alcohol use: Yes     Alcohol/week: 0.0 standard drinks of alcohol     Comment: 3-5 drinks per week    Drug use: No         10/5/2022     8:40 AM 2023    10:15 AM 2023     3:48 PM   PHQ   PHQ-9 Total Score 2 0 0   Q9: Thoughts of better off dead/self-harm past 2 weeks Not at all Not at all Not at all         2022     7:33 AM 2023     2:36 PM 2023     3:49 PM   NAHOMY-7 SCORE   Total Score  0 (minimal anxiety) 0 (minimal anxiety)   Total Score 0 0 0         2023     3:48 PM   Last PHQ-9   1.  Little interest or pleasure in doing things 0   2.  Feeling down, depressed, or hopeless 0   3.  Trouble falling or staying asleep, or sleeping too much 0   4.  Feeling tired or having little energy 0   5.  Poor appetite or overeating 0   6.  Feeling bad about yourself 0   7.  Trouble concentrating 0   8.  Moving slowly or restless 0   Q9: Thoughts of better off dead/self-harm past 2 weeks 0   PHQ-9 Total Score 0         2023     3:49 PM   NAHOMY-7    1. Feeling nervous, anxious, or on edge 0   2. Not being able to stop or control worrying 0   3. Worrying too much about different things 0   4. Trouble relaxing 0   5. Being so restless that it is hard to sit still 0   6. Becoming easily annoyed or irritable 0   7. Feeling afraid, as if something awful might happen  0   NAHOMY-7 Total Score 0   If you checked any problems, how difficult have they made it for you to do your work, take care of things at home, or get along with other people? Not difficult at all       Suicide Assessment Five-step Evaluation and Treatment (SAFE-T)        Social History     Tobacco Use    Smoking status: Every Day     Packs/day: 1.00     Years: 5.00     Additional pack years: 0.00     Total pack years: 5.00     Types: Cigarettes     Last attempt to quit: 1994     Years since quittin.8    Smokeless tobacco: Former    Tobacco comments:     11/3/17 - occ e-cig use   Substance Use Topics    Alcohol use: Yes     Alcohol/week: 0.0 standard drinks of alcohol     Comment: 3-5 drinks per week             2023     8:37 AM   Alcohol Use   Prescreen: >3 drinks/day or >7 drinks/week? No          No data to display              Reviewed orders with patient.  Reviewed health maintenance and updated orders accordingly - Yes  Lab work is in process  Labs reviewed in EPIC  BP Readings from Last 3 Encounters:   10/20/23 132/84   23 136/70   09/15/23 134/77    Wt Readings from Last 3 Encounters:   10/20/23 74.2 kg (163 lb 8 oz)   23 74 kg (163 lb 3.2 oz)   09/15/23 73.8 kg (162 lb 12.8 oz)                  Patient Active Problem List   Diagnosis    Other seasonal allergic rhinitis    Diverticulosis of large intestine    Benign meningioma-right frontal posterior - no more annual MRI's needed per neurosurgery    Symptomatic menopausal or female climacteric states    Benign neoplasm of tonsil    Anxiety    HYPERLIPIDEMIA LDL GOAL <130 [272.4CK] - joint aches w/ simvastatin 2010,lipitor=nausea/abd pain 2012    Primary insomnia    Major depressive disorder, recurrent episode, mild (H24)    Essential hypertension with goal blood pressure less than 140/90    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- ophtho recommended auto-immune/arthritis work-up    Epiploic appendagitis-  and  10/26/2014    Controlled substance agreement signed- re-signed 3/7/2018 ambien #30 -5mg tabs monthly - refill x5 - ok to see q6 months     Gastroesophageal reflux disease without esophagitis    Hypertriglyceridemia    S/P laparoscopic cholecystectomy for acute cholecystitis with cholelithiasis    Postsurgical dumping syndrome - s/p lap shashi - consider cholestyramine    Mass of small intestine- distal ileum - seen on CT scan at time of diverticulitis     Primary malignant neuroendocrine neoplasm of ileum (H)    Pulmonary nodules    Diarrhea, unspecified type- since sm bowel resection 11/2017    Hepatic steatosis - per MRI RUQ abdomen 4/12/2019 - liver not enlarged     Psoriatic arthritis (H)    Psoriasis    Elevated fasting glucose     Past Surgical History:   Procedure Laterality Date    ABDOMEN SURGERY      BIOPSY      COLONOSCOPY  1/16/2012    Procedure:COLONOSCOPY; Colonoscopy; Surgeon:SHOLA JOYCE; Location: GI    HC REMOVE TONSILS/ADENOIDS,<13 Y/O      T and A, under 12 yrs    HEAD & NECK SURGERY      LAPAROSCOPIC CHOLECYSTECTOMY WITH CHOLANGIOGRAMS N/A 4/25/2016    Procedure: LAPAROSCOPIC CHOLECYSTECTOMY WITH CHOLANGIOGRAMS;  Surgeon: Rosa M Cristobal MD;  Location:  OR    LAPAROSCOPY DIAGNOSTIC (GENERAL) N/A 11/7/2017    Procedure: LAPAROSCOPY DIAGNOSTIC (GENERAL);  Exploratory Laparoscopy, Laparotomy and Small Bowel resection.;  Surgeon: Rosa M Cristobal MD;  Location: [facility] OR    LAPAROTOMY EXPLORATORY N/A 11/7/2017    Procedure: LAPAROTOMY EXPLORATORY;;  Surgeon: Rosa M Cristobal MD;  Location: [facility] OR    RESECT SMALL BOWEL WITHOUT OSTOMY N/A 11/7/2017    Procedure: RESECT SMALL BOWEL WITHOUT OSTOMY;;  Surgeon: Rosa M Cristobal MD;  Location: [facility] OR    SURGICAL HISTORY OF -   2004    Menigioma excision    ZZC LIGATE FALLOPIAN TUBE  1988    Tubal Ligation    ZZC NONSPECIFIC PROCEDURE      PAROTID TUMOR L SIDE OF NECK - BENIGN    ZZC TOTAL ABDOM HYSTERECTOMY  2000 ?     Hysterectomy, Total Abdominal with BSO       Social History     Tobacco Use    Smoking status: Every Day     Packs/day: 1.00     Years: 5.00     Additional pack years: 0.00     Total pack years: 5.00     Types: Cigarettes     Last attempt to quit: 1994     Years since quittin.8    Smokeless tobacco: Former    Tobacco comments:     11/3/17 - occ e-cig use   Substance Use Topics    Alcohol use: Yes     Alcohol/week: 0.0 standard drinks of alcohol     Comment: 3-5 drinks per week     Family History   Problem Relation Age of Onset    Hyperlipidemia Mother     Thyroid Disease Mother     Cancer Father         lung    Hypertension Father     Hyperlipidemia Father     Thyroid Disease Brother     Diabetes Maternal Grandmother     Cerebrovascular Disease Maternal Grandmother     Substance Abuse Maternal Grandfather     Thyroid Disease Son          Current Outpatient Medications   Medication Sig Dispense Refill    augmented betamethasone dipropionate (DIPROLENE AF) 0.05 % external cream Apply topically 2 times daily as needed (Psoriasis) To affected area on hands and feet as needed for severe flares. Do not use on face or body folds. 100 g 2    betamethasone dipropionate (DIPROSONE) 0.05 % external lotion Apply topically 2 times daily To affected area on scalp for 4-6 weeks. Do not use on face or body folds. 60 mL 4    calcipotriene (DOVONOX) 0.005 % external ointment Apply to hands and feet BID when flared. 120 g 3    cetirizine (ZYRTEC) 10 MG tablet Take 10 mg by mouth every evening      fenofibrate (TRIGLIDE/LOFIBRA) 160 MG tablet TAKE ONE TABLET BY MOUTH ONCE DAILY 90 tablet 3    fluticasone (FLONASE) 50 MCG/ACT nasal spray SPRAY TWO SPRAYS INTO BOTH NOSTRILS IN THE MORNING. 48 mL 4    hydrocortisone 2.5 % ointment Apply twice daily to the eyelids for 2 weeks 30 g 0    hydrOXYzine (ATARAX) 25 MG tablet Take 1 tablet (25 mg) by mouth nightly as needed for itching 30 tablet 1    lisinopril (ZESTRIL) 10 MG tablet  Take 1 tablet (10 mg) by mouth daily 90 tablet 3    omeprazole (PRILOSEC) 20 MG DR capsule Take 1 capsule (20 mg) by mouth daily 90 capsule 3    Risankizumab-rzaa (SKYRIZI) 150 MG/ML subcutaneous Inject 1 mL (150 mg) Subcutaneous every 3 months MAINTENANCE DOSE: Start at day 28, then every 12 weeks. 1 mL 3    triamterene-HCTZ (DYAZIDE) 37.5-25 MG capsule Take 1 capsule by mouth daily 90 capsule 3    venlafaxine (EFFEXOR) 37.5 MG tablet Take 1 tablet (37.5 mg) by mouth 2 times daily 180 tablet 3    albuterol (PROAIR HFA/PROVENTIL HFA/VENTOLIN HFA) 108 (90 Base) MCG/ACT inhaler Inhale 2 puffs into the lungs every 6 hours as needed for shortness of breath, wheezing or cough 18 g 0    azithromycin (ZITHROMAX) 250 MG tablet 2 tabs day 1 and the 1 tab daily for 4 more days 6 tablet 0    benzonatate (TESSALON) 200 MG capsule Take 1 capsule (200 mg) by mouth 3 times daily as needed for cough 30 capsule 0    doxycycline hyclate (VIBRAMYCIN) 100 MG capsule       guaiFENesin-codeine (ROBITUSSIN AC) 100-10 MG/5ML solution Take 5-10 mLs by mouth every 4 hours as needed for cough 118 mL 0    nicotine (NICORETTE) 2 MG gum CHEW AND PARK ONE PIECE OF GUM INSIDE CHEEK EVERY HOUR AS NEEDED FOR SMOKING CESSATION, MAXIMUM OF 24 PIECES PER  each 1     Allergies   Allergen Reactions    Atorvastatin Nausea     Nausea and abd pain     Ceftin GI Disturbance     Stomach upset and bloating - given at same time as clindamycin ? Which one as cause.      Ciprofloxacin Nausea and Vomiting    Clindamycin GI Disturbance     Stomach upset and bloating - given at same time as ceftin, ? Which one as cause     Flagyl [Metronidazole]      immediate migraine headache     Morphine Itching     Severe with nose, facial  Swelling - oxycodone = ok /no problems     Penicillins Hives    Sulfa Antibiotics Rash     Severe red , flushed rash    Levaquin Rash     States she can take cipro and avelox     Recent Labs   Lab Test 09/05/23  0840 09/12/22  0879  22  1246 21  0952 21  0859 20  0750 20  0946 19  0833   *  --   --   --  150*  --   --  141*   HDL 43*  --   --   --  46*  --   --  51   TRIG 230*  --   --   --  288*  --   --  254*   ALT 18 19 31   < >  --  30 74* 48   CR 0.70 0.59 0.52   < >  --  0.73 0.68 0.73   GFRESTIMATED >90 >90 >90   < >  --  88 >90 88   GFRESTBLACK  --   --   --   --   --  >90 >90 >90   POTASSIUM 3.8 3.7  --    < >  --  3.6 3.2* 3.8   TSH 3.66  --   --   --  3.32  --   --  4.07*    < > = values in this interval not displayed.        Breast Cancer Screenin/21/2023     3:51 PM   Breast CA Risk Assessment (FHS-7)   Do you have a family history of breast, colon, or ovarian cancer? No / Unknown       click delete button to remove this line now  Mammogram Screening: Recommended annual mammography  Pertinent mammograms are reviewed under the imaging tab.    History of abnormal Pap smear: Status post benign hysterectomy. Health Maintenance and Surgical History updated.      2010    12:00 AM 3/27/2009    12:00 AM 2007    12:00 AM   PAP / HPV   PAP (Historical) NIL  NIL  NIL      Reviewed and updated as needed this visit by clinical staff   Tobacco  Allergies  Meds  Problems  Med Hx  Surg Hx  Fam Hx          Reviewed and updated as needed this visit by Provider   Tobacco  Allergies  Meds  Problems  Med Hx  Surg Hx  Fam Hx         Past Medical History:   Diagnosis Date    Allergic rhinitis, cause unspecified     year round - dog,dust-  Failed on claritin, claritin-D, zyrtec and zyrtec-D in past.     Benign neoplasm of cerebral meninges (H)     has yearly MRI's. - [facility] MRI06 = no change from 05     Benign neoplasm of tonsil     ?tonsillar re-growth - sees Dr. Thomas ENT     Cancer (H)     Depressive disorder     Deviated nasal septum     sees Dr. Thomas ENT     Diverticulosis of colon (without mention of hemorrhage)  02-    History of Guillain-Mancos syndrome     NO FLU SHOTS - very mild case in [place] many years ago    Hyperlipidemia LDL goal < 130 doesn't want statins    simvastatin = severe hand joint pain , lipitor =nausea/abd. pain    Hypertension goal BP (blood pressure) < 140/90     Insomnia     trazodone -made pt feel hung over     Major depressive disorder, recurrent episode, moderate (H)     lexapro - sexual side effects     Moderate major depression (H) 2/4/2005    trazodone -made pt feel hung over     Other acne     Other extrapyramidal disease and abnormal movement disorder     ?GBS    Psoriatic arthritis (H) 4/26/2023    Symptomatic menopausal or female climacteric states     vivelle-dot       Past Surgical History:   Procedure Laterality Date    ABDOMEN SURGERY      BIOPSY      COLONOSCOPY  1/16/2012    Procedure:COLONOSCOPY; Colonoscopy; Surgeon:SHOLA JOYCE; Location: GI    HC REMOVE TONSILS/ADENOIDS,<13 Y/O      T and A, under 12 yrs    HEAD & NECK SURGERY      LAPAROSCOPIC CHOLECYSTECTOMY WITH CHOLANGIOGRAMS N/A 4/25/2016    Procedure: LAPAROSCOPIC CHOLECYSTECTOMY WITH CHOLANGIOGRAMS;  Surgeon: Rosa M Cristobal MD;  Location:  OR    LAPAROSCOPY DIAGNOSTIC (GENERAL) N/A 11/7/2017    Procedure: LAPAROSCOPY DIAGNOSTIC (GENERAL);  Exploratory Laparoscopy, Laparotomy and Small Bowel resection.;  Surgeon: Rosa M Cristobal MD;  Location: [facility] OR    LAPAROTOMY EXPLORATORY N/A 11/7/2017    Procedure: LAPAROTOMY EXPLORATORY;;  Surgeon: Rosa M Cristobal MD;  Location:  OR    RESECT SMALL BOWEL WITHOUT OSTOMY N/A 11/7/2017    Procedure: RESECT SMALL BOWEL WITHOUT OSTOMY;;  Surgeon: Rosa M Cristobal MD;  Location:  OR    SURGICAL HISTORY OF -   2004    Menigioma excision    ZZC LIGATE FALLOPIAN TUBE  1988    Tubal Ligation    ZZC NONSPECIFIC PROCEDURE      PAROTID TUMOR L SIDE OF NECK - BENIGN    ZZC TOTAL ABDOM HYSTERECTOMY  2000 ?    Hysterectomy, Total Abdominal with BSO  "    OB History    Para Term  AB Living   6 6 6 0 0 3   SAB IAB Ectopic Multiple Live Births   0 0 0 0 0      # Outcome Date GA Lbr Hudson/2nd Weight Sex Delivery Anes PTL Lv   6 Term            5 Term            4 Term            3 Term            2 Term            1 Term                Review of Systems   Constitutional:  Negative for chills and fever.   HENT:  Negative for congestion, ear pain, hearing loss and sore throat.    Eyes:  Negative for pain and visual disturbance.   Respiratory:  Negative for cough and shortness of breath.    Cardiovascular:  Negative for chest pain, palpitations and peripheral edema.   Gastrointestinal:  Negative for abdominal pain, constipation, diarrhea, heartburn, hematochezia and nausea.   Breasts:  Negative for tenderness, breast mass and discharge.   Genitourinary:  Negative for dysuria, frequency, genital sores, hematuria, pelvic pain, urgency, vaginal bleeding and vaginal discharge.   Musculoskeletal:  Negative for arthralgias, joint swelling and myalgias.   Skin:  Negative for rash.   Neurological:  Negative for dizziness, weakness, headaches and paresthesias.   Psychiatric/Behavioral:  Negative for mood changes. The patient is not nervous/anxious.           OBJECTIVE:   /70   Pulse 83   Temp 97.3  F (36.3  C) (Tympanic)   Resp 16   Ht 1.499 m (4' 11\")   Wt 74 kg (163 lb 3.2 oz)   LMP 2000   SpO2 96%   BMI 32.96 kg/m    Physical Exam  GENERAL APPEARANCE: healthy, alert and no distress  EYES: Eyes grossly normal to inspection, PERRL and conjunctivae and sclerae normal  HENT: ear canals and TM's normal, nose and mouth without ulcers or lesions, oropharynx clear and oral mucous membranes moist  NECK: no adenopathy, no asymmetry, masses, or scars and thyroid normal to palpation  RESP: lungs clear to auscultation - no rales, rhonchi or wheezes  BREAST: normal without masses, tenderness or nipple discharge and no palpable axillary masses or " adenopathy  CV: regular rate and rhythm, normal S1 S2, no S3 or S4, no murmur, click or rub, no peripheral edema and peripheral pulses strong  ABDOMEN: soft, nontender, no hepatosplenomegaly, no masses and bowel sounds normal  MS: no musculoskeletal defects are noted and gait is age appropriate without ataxia  SKIN: no suspicious lesions or rashes  NEURO: Normal strength and tone, sensory exam grossly normal, mentation intact and speech normal  PSYCH: mentation appears normal and affect normal/bright    Diagnostic Test Results:  Labs reviewed in Epic    Lab on 09/05/2023   Component Date Value Ref Range Status    Sodium 09/05/2023 137  136 - 145 mmol/L Final    Potassium 09/05/2023 3.8  3.4 - 5.3 mmol/L Final    Chloride 09/05/2023 95 (L)  98 - 107 mmol/L Final    Carbon Dioxide (CO2) 09/05/2023 27  22 - 29 mmol/L Final    Anion Gap 09/05/2023 15  7 - 15 mmol/L Final    Urea Nitrogen 09/05/2023 13.1  8.0 - 23.0 mg/dL Final    Creatinine 09/05/2023 0.70  0.51 - 0.95 mg/dL Final    Calcium 09/05/2023 10.0  8.8 - 10.2 mg/dL Final    Glucose 09/05/2023 114 (H)  70 - 99 mg/dL Final    Alkaline Phosphatase 09/05/2023 42  35 - 104 U/L Final    AST 09/05/2023 23  0 - 45 U/L Final    Reference intervals for this test were updated on 6/12/2023 to more accurately reflect our healthy population. There may be differences in the flagging of prior results with similar values performed with this method. Interpretation of those prior results can be made in the context of the updated reference intervals.    ALT 09/05/2023 18  0 - 50 U/L Final    Reference intervals for this test were updated on 6/12/2023 to more accurately reflect our healthy population. There may be differences in the flagging of prior results with similar values performed with this method. Interpretation of those prior results can be made in the context of the updated reference intervals.      Protein Total 09/05/2023 7.7  6.4 - 8.3 g/dL Final    Albumin 09/05/2023  4.8  3.5 - 5.2 g/dL Final    Bilirubin Total 09/05/2023 0.4  <=1.2 mg/dL Final    GFR Estimate 09/05/2023 >90  >60 mL/min/1.73m2 Final    Calcitonin 09/05/2023 <2.0  0.0 - 5.1 pg/mL Final    INTERPRETIVE INFORMATION: Calcitonin  Calcitonin levels greater than 100 pg/mL may occur in the   following conditions: medullary thyroid carcinomas (MTC),   leukemias, and myeloproliferative disorders.    Provocative testing (calcium) is suggested in patients with   MTC if the calcitonin is not clearly diagnostic.    The Siemens Immulite 2000 method is used.  Results obtained   with different assay methods or kits cannot be used   interchangeably.  Calcitonin is useful in monitoring   medullary thyroid carcinoma.  The calcitonin assay value,   regardless of level, should not be interpreted as absolute   evidence of the presence or absence of malignant disease.  Performed By: [facility]  [address] [place] [address]  : Garcia Day MD, PhD  CLIA Number: [id number]    Chromogranin A 09/05/2023 249 (H)  0 - 103 ng/mL Final    Comment: INTERPRETIVE INFORMATION: Chromogranin A, Serum    This test is performed using the [employer] CGA II Kryptor kit.   Results obtained with different methods or kits cannot be   used interchangeably. Results cannot be interpreted as   absolute evidence of the presence or absence of malignant   disease.    Nontumor-related elevations of Chromogranin A can be   observed   in gastrointestinal, cardiovascular, and renal disorders,   as well as with proton pump inhibitor (PPI) therapy. It is   recommended to stop PPI treatment for at least two weeks   prior to testing. Moderate H2-receptor antagonist therapy   does not lead to significant elevations of Chromogranin A.    This test was developed and its performance characteristics   determined by [facility]. It has not been cleared or   approved by the U.S. Food and Drug Administration. This   test   was  performed in a CLIA-certified laboratory and is   intended   for clinical purposes.  Performed by [facility],  [address] [place] [address] [phone]  www.[facility], Oskar Denney MD, Lab. Director  CLIA Number: [id number]    Serotonin, WB 09/05/2023 42 (L)  50 - 200 ng/mL Final    TEST INFORMATION:  Serotonin Whole Blood    This test was developed and its performance characteristics   determined by [facility]. It has not been cleared or   approved by the US Food and Drug Administration. This test   was performed in a CLIA certified laboratory and is   intended for clinical purposes.  Performed By: [facility]  [address] [place] [address]  : Garcia Day MD, PhD  CLIA Number: [id number]    TSH 09/05/2023 3.66  0.30 - 4.20 uIU/mL Final    Cholesterol 09/05/2023 238 (H)  <200 mg/dL Final    Triglycerides 09/05/2023 230 (H)  <150 mg/dL Final    Direct Measure HDL 09/05/2023 43 (L)  >=50 mg/dL Final    LDL Cholesterol Calculated 09/05/2023 149 (H)  <=100 mg/dL Final    Non HDL Cholesterol 09/05/2023 195 (H)  <130 mg/dL Final    25 OH Vitamin D2 09/05/2023 <5  ug/L Final    25 OH Vitamin D3 09/05/2023 71  ug/L Final    25 OH Vit D Total 09/05/2023 <76 (H)  20 - 75 ug/L Final    Season, race, dietary intake, and treatment affect the concentration of 25-hydroxy-Vitamin D. Values may decrease during winter months and increase during summer months. Values 20-29 ug/L may indicate Vitamin D insufficiency and values <20 ug/L may indicate Vitamin D deficiency.    Occult Blood Screen FIT 09/14/2023 Negative  Negative Final    WBC Count 09/05/2023 7.1  4.0 - 11.0 10e3/uL Final    RBC Count 09/05/2023 4.55  3.80 - 5.20 10e6/uL Final    Hemoglobin 09/05/2023 15.0  11.7 - 15.7 g/dL Final    Hematocrit 09/05/2023 41.8  35.0 - 47.0 % Final    MCV 09/05/2023 92  78 - 100 fL Final    MCH 09/05/2023 33.0  26.5 - 33.0 pg Final    MCHC 09/05/2023  35.9  31.5 - 36.5 g/dL Final    RDW 09/05/2023 12.6  10.0 - 15.0 % Final    Platelet Count 09/05/2023 338  150 - 450 10e3/uL Final    % Neutrophils 09/05/2023 64  % Final    % Lymphocytes 09/05/2023 24  % Final    % Monocytes 09/05/2023 8  % Final    % Eosinophils 09/05/2023 4  % Final    % Basophils 09/05/2023 1  % Final    % Immature Granulocytes 09/05/2023 0  % Final    Absolute Neutrophils 09/05/2023 4.5  1.6 - 8.3 10e3/uL Final    Absolute Lymphocytes 09/05/2023 1.7  0.8 - 5.3 10e3/uL Final    Absolute Monocytes 09/05/2023 0.5  0.0 - 1.3 10e3/uL Final    Absolute Eosinophils 09/05/2023 0.3  0.0 - 0.7 10e3/uL Final    Absolute Basophils 09/05/2023 0.1  0.0 - 0.2 10e3/uL Final    Absolute Immature Granulocytes 09/05/2023 0.0  <=0.4 10e3/uL Final         ASSESSMENT/PLAN:       ICD-10-CM    1. Routine general medical examination at a health care facility  Z00.00       2. Elevated fasting glucose - needs lab followup  R73.01 Hemoglobin A1c      3. Psoriasis - needs med refill  L40.9       4. Hypertriglyceridemia -- needs lab followup  E78.1 Albumin Random Urine Quantitative with Creat Ratio     fenofibrate (TRIGLIDE/LOFIBRA) 160 MG tablet      5. Other seasonal allergic rhinitis  J30.2 fluticasone (FLONASE) 50 MCG/ACT nasal spray      6. Essential hypertension with goal blood pressure less than 140/90- pretty  well controlled today  I10 Albumin Random Urine Quantitative with Creat Ratio     lisinopril (ZESTRIL) 10 MG tablet     triamterene-HCTZ (DYAZIDE) 37.5-25 MG capsule      7. Gastroesophageal reflux disease without esophagitis  K21.9 omeprazole (PRILOSEC) 20 MG DR capsule      8. Anxiety  F41.9 venlafaxine (EFFEXOR) 37.5 MG tablet     DISCONTINUED: venlafaxine (EFFEXOR) 37.5 MG tablet      9. Visit for screening mammogram  Z12.31 MA Screening Bilateral w/ Raffi        Wt Readings from Last 5 Encounters:   10/20/23 74.2 kg (163 lb 8 oz)   09/22/23 74 kg (163 lb 3.2 oz)   09/15/23 73.8 kg (162 lb 12.8 oz)    04/26/23 71.7 kg (158 lb)   10/05/22 71.8 kg (158 lb 6.4 oz)         Patient has been advised of split billing requirements and indicates understanding: Yes    Return in about 1 year (around 9/22/2024) for blood pressure, cholesterol, depression, anxiety, Wellness/Preventative Visit, w/ Dr Stephens.     COUNSELING:  Reviewed preventive health counseling, as reflected in patient instructions        She reports that she has been smoking cigarettes. She has a 5.00 pack-year smoking history. She has quit using smokeless tobacco.  Nicotine/Tobacco Cessation Plan:   Information offered: Patient not interested at this time          Gayatri Stephens MD  [facility]IOR LAKEAnswers submitted by the patient for this visit:  Patient Health Questionnaire (Submitted on 9/21/2023)  If you checked off any problems, how difficult have these problems made it for you to do your work, take care of things at home, or get along with other people?: Not difficult at all  PHQ9 TOTAL SCORE: 0  NAHOMY-7 (Submitted on 9/21/2023)  NAHOMY 7 TOTAL SCORE: 0

## 2023-09-22 NOTE — PATIENT INSTRUCTIONS
[facility]  [address] [place] [address]  Office: [phone]   Fax:    [phone]     Preventive Health Recommendations  Female Ages 50 - 64    Yearly exam: See your health care provider every year in order to  Review health changes.   Discuss preventive care.    Review your medicines if your doctor has prescribed any.    Get a Pap test every three years (unless you have an abnormal result and your provider advises testing more often).  If you get Pap tests with HPV test, you only need to test every 5 years, unless you have an abnormal result.   You do not need a Pap test if your uterus was removed (hysterectomy) and you have not had cancer.  You should be tested each year for STDs (sexually transmitted diseases) if you're at risk.   Have a mammogram every 1 to 2 years.  Have a colonoscopy at age 50, or have a yearly FIT test (stool test). These exams screen for colon cancer.    Have a cholesterol test every 5 years, or more often if advised.  Have a diabetes test (fasting glucose) every three years. If you are at risk for diabetes, you should have this test more often.   If you are at risk for osteoporosis (brittle bone disease), think about having a bone density scan (DEXA).    Shots: Get a flu shot each year. Get a tetanus shot every 10 years.    Nutrition:   Eat at least 5 servings of fruits and vegetables each day.  Eat whole-grain bread, whole-wheat pasta and brown rice instead of white grains and rice.  Get adequate Calcium and Vitamin D.     Lifestyle  Exercise at least 150 minutes a week (30 minutes a day, 5 days a week). This will help you control your weight and prevent disease.  Limit alcohol to one drink per day.  No smoking.   Wear sunscreen to prevent skin cancer.   See your dentist every six months for an exam and cleaning.  See your eye doctor every 1 to 2 years.    Thank you so much or choosing [facility]  for your Health Care.  "It was a pleasure seeing you at your visit today! Please contact us with any questions or concerns you may have.                   Gayatri Stephens MD                              To reach your [facility] care team after hours call:   [phone] press #[phone] \"to speak with your care team\".  This will get you to our clinic instead of routing to central [facility]  scheduling.     PLEASE NOTE OUR HOURS HAVE CHANGED secondary to COVID-19 coronavirus pandemic, as we are trying to minimize patient exposure to the virus,  which is now widespread in the [facility].  These hours may change with very little notice.  We apologize for any inconvenience.       Our current clinic hours are:          Monday- Thursday   7:00am - 6:00pm  in person.      Friday  7:00am- 5:00pm                       Saturday and Sunday : Closed to in person and virtual visits        We have telephone and virtual visit times available between    7:00am - 6pm on Monday-Friday as well.                                                Phone:  [phone]      Our pharmacy hours: Monday through Friday 8:00am to 5:00pm                        Saturday - 9:00 am to 12 noon       Sunday : Closed.              Phone:  [phone]              ###  Please note: at this time we are not accepting any walk-in visits. ###      There is also information available at our web site:  [email or web address]    If your provider ordered any lab tests and you do not receive the results within 10 business days, please call the clinic.    If you need a medication refill please contact your pharmacy.  Please allow 3 business days for your refill to be completed.    Our clinic offers telephone visits and e visits.  Please ask one of your team members to explain more.      Use Zlio (secure email communication and access to your chart) to send your primary care provider a message or make an appointment. Ask someone on your Team how to sign up for " MyChart.

## 2023-10-03 ENCOUNTER — VIRTUAL VISIT (OUTPATIENT)
Dept: FAMILY MEDICINE | Facility: CLINIC | Age: 63
End: 2023-10-03
Payer: COMMERCIAL

## 2023-10-03 ENCOUNTER — ANCILLARY PROCEDURE (OUTPATIENT)
Dept: GENERAL RADIOLOGY | Facility: CLINIC | Age: 63
End: 2023-10-03
Payer: COMMERCIAL

## 2023-10-03 DIAGNOSIS — R06.2 WHEEZING: ICD-10-CM

## 2023-10-03 DIAGNOSIS — D84.9 IMMUNOSUPPRESSION (H): ICD-10-CM

## 2023-10-03 DIAGNOSIS — R05.1 ACUTE COUGH: ICD-10-CM

## 2023-10-03 DIAGNOSIS — J40 BRONCHITIS: ICD-10-CM

## 2023-10-03 DIAGNOSIS — J40 BRONCHITIS: Primary | ICD-10-CM

## 2023-10-03 PROCEDURE — 99214 OFFICE O/P EST MOD 30 MIN: CPT | Mod: VID | Performed by: NURSE PRACTITIONER

## 2023-10-03 PROCEDURE — 71046 X-RAY EXAM CHEST 2 VIEWS: CPT | Mod: TC | Performed by: STUDENT IN AN ORGANIZED HEALTH CARE EDUCATION/TRAINING PROGRAM

## 2023-10-03 RX ORDER — ALBUTEROL SULFATE 90 UG/1
2 AEROSOL, METERED RESPIRATORY (INHALATION) EVERY 6 HOURS PRN
Qty: 18 G | Refills: 0 | Status: SHIPPED | OUTPATIENT
Start: 2023-10-03 | End: 2024-09-26

## 2023-10-03 RX ORDER — PREDNISONE 20 MG/1
40 TABLET ORAL DAILY
Qty: 10 TABLET | Refills: 0 | Status: SHIPPED | OUTPATIENT
Start: 2023-10-03 | End: 2023-10-08

## 2023-10-03 ASSESSMENT — ENCOUNTER SYMPTOMS: COUGH: 1

## 2023-10-03 NOTE — PROGRESS NOTES
Connie is a 62 year old who is being evaluated via a billable video visit.      How would you like to obtain your AVS? MyChart  If the video visit is dropped, the invitation should be resent by: Text to cell phone: [phone]  Will anyone else be joining your video visit? No      Assessment & Plan     Bronchitis  Reassuring exam limited by video;  no higher level of care felt to be needed.   Persistent symptoms-already completed antibiotics will not provide more unless pneumonia on CXR. Will treat with steroids burst and inhaler.   CXR pending official radiology read.   Increase tessalon to 200 mg every 8 hours.   Been seen if not improving in 48 hours.   Red flag symptoms discussed and if these occur present to the emergency room or call 911.  Connie verbalizes understanding of plan of care and is in agreement.       - XR Chest 2 Views  - albuterol (PROAIR HFA/PROVENTIL HFA/VENTOLIN HFA) 108 (90 Base) MCG/ACT inhaler  Dispense: 18 g; Refill: 0  - predniSONE (DELTASONE) 20 MG tablet  Dispense: 10 tablet; Refill: 0    Acute cough    - XR Chest 2 Views  - albuterol (PROAIR HFA/PROVENTIL HFA/VENTOLIN HFA) 108 (90 Base) MCG/ACT inhaler  Dispense: 18 g; Refill: 0  - predniSONE (DELTASONE) 20 MG tablet  Dispense: 10 tablet; Refill: 0    Wheezing    - XR Chest 2 Views  - albuterol (PROAIR HFA/PROVENTIL HFA/VENTOLIN HFA) 108 (90 Base) MCG/ACT inhaler  Dispense: 18 g; Refill: 0  - predniSONE (DELTASONE) 20 MG tablet  Dispense: 10 tablet; Refill: 0    Immunosuppression (H24)    - XR Chest 2 Views  - albuterol (PROAIR HFA/PROVENTIL HFA/VENTOLIN HFA) 108 (90 Base) MCG/ACT inhaler  Dispense: 18 g; Refill: 0  - predniSONE (DELTASONE) 20 MG tablet  Dispense: 10 tablet; Refill: 0      Return in about 1 day (around 10/4/2023) for NEEDS CXR this am soon in clinic please call her to set this up; mask/she is 10 + days of symptoms.             LORAINE Almanza [facility]   Connie is a 62 year  old, presenting for the following health issues:  Cough      10/3/2023     7:37 AM   Additional Questions   Roomed by rusty Varela    History of Present Illness       Reason for visit:  Coughing and congestion  Symptom onset:  1-2 weeks ago  Symptoms include:  Cough  Symptom intensity:  Severe  Symptom progression:  Staying the same  Had these symptoms before:  Yes  Has tried/received treatment for these symptoms:  Yes  Previous treatment was successful:  Yes  Prior treatment description:  Inhaler  What makes it worse:  No  What makes it better:  Cough meds    She eats 0-1 servings of fruits and vegetables daily.She consumes 0 sweetened beverage(s) daily.She exercises with enough effort to increase her heart rate 9 or less minutes per day.  She exercises with enough effort to increase her heart rate 3 or less days per week.   She is taking medications regularly.       Felt she was in sick after being at her in clinic visit 9/22/23 for her physical. Reports many people at her work are ill and no one has COVID.  She has not tested for COVID; sx started 9/23/23   Started herself on doxycyline-reports she has standing prescription took 10 days last Sunday. Didn't feel better   Coughing continuously not shortness of breath; wheezing.   SKYRIZI on for psoriasis.   Cough all night long all day. Hears a wheeze.   Tessalon dose 100 mg         Review of Systems   Respiratory:  Positive for cough.          Objective           Vitals:  No vitals were obtained today due to virtual visit.    Physical Exam   GENERAL: Healthy, alert and no distress  EYES: Eyes grossly normal to inspection.  No discharge or erythema, or obvious scleral/conjunctival abnormalities.  RESP: No audible wheeze, continuously coughing mostly dry hacking but occasional deep, or visible cyanosis.  No visible retractions or increased work of breathing.    SKIN: Visible skin clear. No significant rash, abnormal pigmentation or lesions.  NEURO: Cranial  nerves grossly intact.  Mentation and speech appropriate for age.  PSYCH: Mentation appears normal, affect normal/bright, judgement and insight intact, normal speech and appearance well-groomed.      Video-Visit Details    Type of service:  Video Visit     Originating Location (pt. Location): Home  Distant Location (provider location):  off site  Platform used for Video Visit: Mp

## 2023-10-03 NOTE — RESULT ENCOUNTER NOTE
Dear Connie,    Here is a summary of your recent test results:    Xray thankfully does not show pneumonia. Please take the medications as planned and let me know how you are doing.       For additional lab test information, [email or web address] is an excellent reference.    In addition, here is a list of due or overdue Health Maintenance reminders:    Kidney Microalbumin Urine Test due on 01/29/2022  COVID-19 Vaccine(3 - 2023-24 season) due on 09/01/2023  LUNG CANCER SCREENING due on 09/12/2023    Please call us at [phone] (or use [employer]ofinder) to address the above recommendations if needed.    Thank you for choosing [facility].  It was an honor and a privilege to participate in your care.       Healthy regards,    Emilia Barillas, CARMEN  [facility]

## 2023-10-16 DIAGNOSIS — F17.210 CIGARETTE NICOTINE DEPENDENCE WITHOUT COMPLICATION: ICD-10-CM

## 2023-10-19 ENCOUNTER — TELEPHONE (OUTPATIENT)
Dept: FAMILY MEDICINE | Facility: CLINIC | Age: 63
End: 2023-10-19
Payer: COMMERCIAL

## 2023-10-19 NOTE — TELEPHONE ENCOUNTER
Called and spoke with patient.     Scheduled in-person visit for tomorrow as directed by Emilia Barillas CNP. Advised if any worsening symtpoms, difficulty breathing or chest pain to be seen in ER. Patient stated an understanding and agreed with plan.     DILSHAD MARROQUIN RN on 10/19/2023 at 11:50 AM   [facility]

## 2023-10-19 NOTE — TELEPHONE ENCOUNTER
Has only had virtual visit if not improving needs in person exam.   I recommend in person visit to fully assess her heart and lungs.     Please set her up with first available or UC if appropriate.     Healthy regards,            Emilia Barillas, FNP-BC

## 2023-10-19 NOTE — TELEPHONE ENCOUNTER
Patient had 10/3/23 virtual visit with Emilia Barillas for URI, came in for CXR which was negative. She does not feel chest is as congested but the cough remains.  She has had very little sleep due to frequent cough that is non-productive and worse while laying down. Tessalon Perles, Delsym, Nyquil and Ventolin inhaler have all been ineffective. She has a headache from all the coughing and clear nasal drainage.  She denies fever, wheezing, difficulty breathing and has tested negative for Covid.    Of note, patient is on Skyrizi. Jeanine Mai R.N.

## 2023-10-20 ENCOUNTER — OFFICE VISIT (OUTPATIENT)
Dept: FAMILY MEDICINE | Facility: CLINIC | Age: 63
End: 2023-10-20
Payer: COMMERCIAL

## 2023-10-20 VITALS
DIASTOLIC BLOOD PRESSURE: 84 MMHG | SYSTOLIC BLOOD PRESSURE: 132 MMHG | RESPIRATION RATE: 16 BRPM | BODY MASS INDEX: 32.96 KG/M2 | HEART RATE: 89 BPM | OXYGEN SATURATION: 95 % | HEIGHT: 59 IN | TEMPERATURE: 97.8 F | WEIGHT: 163.5 LBS

## 2023-10-20 DIAGNOSIS — J20.9 ACUTE BRONCHITIS WITH SYMPTOMS > 10 DAYS: Primary | ICD-10-CM

## 2023-10-20 DIAGNOSIS — Z78.0 POSTMENOPAUSAL STATUS: ICD-10-CM

## 2023-10-20 DIAGNOSIS — E55.9 VITAMIN D DEFICIENCY: ICD-10-CM

## 2023-10-20 PROCEDURE — 99214 OFFICE O/P EST MOD 30 MIN: CPT | Performed by: NURSE PRACTITIONER

## 2023-10-20 RX ORDER — BENZONATATE 200 MG/1
200 CAPSULE ORAL 3 TIMES DAILY PRN
Qty: 30 CAPSULE | Refills: 0 | Status: ON HOLD | OUTPATIENT
Start: 2023-10-20 | End: 2024-08-19

## 2023-10-20 RX ORDER — DOXYCYCLINE 100 MG/1
CAPSULE ORAL
COMMUNITY
Start: 2023-09-24

## 2023-10-20 RX ORDER — CODEINE PHOSPHATE AND GUAIFENESIN 10; 100 MG/5ML; MG/5ML
1-2 SOLUTION ORAL EVERY 4 HOURS PRN
Qty: 118 ML | Refills: 0 | Status: ON HOLD | OUTPATIENT
Start: 2023-10-20 | End: 2024-08-19

## 2023-10-20 RX ORDER — AZITHROMYCIN 250 MG/1
TABLET, FILM COATED ORAL
Qty: 6 TABLET | Refills: 0 | Status: SHIPPED | OUTPATIENT
Start: 2023-10-20 | End: 2024-09-19

## 2023-10-20 NOTE — PROGRESS NOTES
"  Assessment & Plan     Acute bronchitis with symptoms > 10 days  3 weeks of illness.Treat as below. Update us if ongoing symptoms. Stressed 1 time fill of cough syrup. Has not slept in 3 days due to cough. Continue inhaler.   - benzonatate (TESSALON) 200 MG capsule  Dispense: 30 capsule; Refill: 0  - azithromycin (ZITHROMAX) 250 MG tablet  Dispense: 6 tablet; Refill: 0  - guaiFENesin-codeine (ROBITUSSIN AC) 100-10 MG/5ML solution  Dispense: 118 mL; Refill: 0    Vitamin D deficiency  - DEXA HIP/PELVIS/SPINE - Future    Postmenopausal status  - DEXA HIP/PELVIS/SPINE - Future      Prescription drug management         BMI:   Estimated body mass index is 33.02 kg/m  as calculated from the following:    Height as of this encounter: 1.499 m (4' 11\").    Weight as of this encounter: 74.2 kg (163 lb 8 oz).           Emilia Funk [facility] is a 63 year old, presenting for the following health issues:  URI        10/20/2023     7:13 AM   Additional Questions   Roomed by Dot GOFF       History of Present Illness       Reason for visit:  Cough  Symptom onset:  1-2 weeks ago  Symptoms include:  Consistent cough  Symptom intensity:  Severe  Symptom progression:  Staying the same  Had these symptoms before:  Yes  Has tried/received treatment for these symptoms:  Yes  Previous treatment was successful:  Yes  Prior treatment description:  Prednisone  What makes it worse:  No  What makes it better:  No    She eats 2-3 servings of fruits and vegetables daily.She consumes 0 sweetened beverage(s) daily.She exercises with enough effort to increase her heart rate 9 or less minutes per day.  She exercises with enough effort to increase her heart rate 3 or less days per week.   She is taking medications regularly.         Concern - Follow up from virtual visit with Emilia Barillas NP , 10/3/2023  Still coughing , has taken dayquil, nyquil , deslym , tessalon pearls nothing is helping up all " night . Non productive cough , runny nose.     All Conversations: Cough  (Newest Message First)  October 19, 2023  Mira Bonner RN         10/19/23 11:51 AM  Note  Called and spoke with patient.      Scheduled in-person visit for tomorrow as directed by Emilia Barillas CNP. Advised if any worsening symtpoms, difficulty breathing or chest pain to be seen in ER. Patient stated an understanding and agreed with plan.      MIRA BONNER RN on 10/19/2023 at 11:50 AM   [facility]               AL    10/19/23  9:38 AM  Emilia Barillas APRN CNP routed this conversation to Rv Triage   Emilia Barillas APRN CNP     AL    10/19/23  9:38 AM  Note  Has only had virtual visit if not improving needs in person exam.   I recommend in person visit to fully assess her heart and lungs.      Please set her up with first available or UC if appropriate.      Healthy regards,               Emilia Barillas, FNP-BC             CL    10/19/23  8:37 AM  Verona Mai RN routed this conversation to Gayatri Stephens MD Larson, Amy Charlotte, APRN CNP  Rv Triage   Verona Mai RN     CL    10/19/23  8:37 AM  Note  Patient had 10/3/23 virtual visit with Emilia Barillas for URI, came in for CXR which was negative. She does not feel chest is as congested but the cough remains.  She has had very little sleep due to frequent cough that is non-productive and worse while laying down. Tessalon Perles, Delsym, Nyquil and Ventolin inhaler have all been ineffective. She has a headache from all the coughing and clear nasal drainage.  She denies fever, wheezing, difficulty breathing and has tested negative for Covid.    Of note, patient is on Skyrizi. Jeanine Mai R.N.              Review of Systems   Constitutional, HEENT, cardiovascular, pulmonary, GI, , musculoskeletal, neuro, skin, endocrine and psych systems are negative, except as otherwise noted.      Objective    /84   Pulse 89   Temp 97.8  " F (36.6  C) (Tympanic)   Resp 16   Ht 1.499 m (4' 11\")   Wt 74.2 kg (163 lb 8 oz)   LMP 09/05/2000   SpO2 95%   BMI 33.02 kg/m    Body mass index is 33.02 kg/m .  Physical Exam   GENERAL: healthy, alert and no distress  NECK: no adenopathy, no asymmetry, masses, or scars and thyroid normal to palpation  RESP: lungs clear to auscultation - no rales, rhonchi or wheezes  CV: regular rate and rhythm, normal S1 S2, no S3 or S4, no murmur, click or rub, no peripheral edema and peripheral pulses strong  ABDOMEN: soft, nontender, no hepatosplenomegaly, no masses and bowel sounds normal  MS: no gross musculoskeletal defects noted, no edema              BO Keating     [address] [address]  [email or web address]   Office: [phone]                     "

## 2023-10-27 ENCOUNTER — VIRTUAL VISIT (OUTPATIENT)
Dept: DERMATOLOGY | Facility: CLINIC | Age: 63
End: 2023-10-27
Payer: COMMERCIAL

## 2023-10-27 DIAGNOSIS — L40.0 PLAQUE PSORIASIS: Primary | ICD-10-CM

## 2023-10-27 DIAGNOSIS — J01.90 SUBACUTE SINUSITIS, UNSPECIFIED LOCATION: ICD-10-CM

## 2023-10-27 DIAGNOSIS — D84.9 IMMUNOSUPPRESSION (H): ICD-10-CM

## 2023-10-27 DIAGNOSIS — L40.50 PSORIATIC ARTHRITIS (H): ICD-10-CM

## 2023-10-27 PROCEDURE — 99214 OFFICE O/P EST MOD 30 MIN: CPT | Mod: VID | Performed by: DERMATOLOGY

## 2023-10-27 RX ORDER — AZITHROMYCIN 250 MG/1
TABLET, FILM COATED ORAL
Qty: 6 TABLET | Refills: 0 | Status: SHIPPED | OUTPATIENT
Start: 2023-10-27 | End: 2023-11-01

## 2023-10-27 RX ORDER — BETAMETHASONE DIPROPIONATE 0.5 MG/G
OINTMENT, AUGMENTED TOPICAL 2 TIMES DAILY
Qty: 50 G | Refills: 3 | Status: SHIPPED | OUTPATIENT
Start: 2023-10-27

## 2023-10-27 ASSESSMENT — PAIN SCALES - GENERAL: PAINLEVEL: NO PAIN (0)

## 2023-10-27 NOTE — NURSING NOTE
Teledermatology Nurse Call Patients:     Are you in the [facility] at the time of the encounter? yes    Today's visit will be billed to you and your insurance.    A teledermatology visit is not as thorough as an in-person visit and the quality of the photograph sent may not be of the same quality as that taken by the dermatology clinic.  Chief Complaint   Patient presents with    FELI Hall

## 2023-10-27 NOTE — PROGRESS NOTES
[facility] Dermatology Note  Encounter Date: Oct 27, 2023  Video (invitation sent by [phone] ). Location of teledermatologist: [facility] [place]. Start time: 8:20. End time: 8:40.    Dermatology Problem List:  1. Psoriasis/psoriatic arthritis: guttate, plaque, scalp, hands  - current: risankizumab, topicals (triamcinolone, calcipotriene, augmented betamethasone), cetirizine 10 mg BID, hydroxyzine 25 mg at bedtime, home nbUVB, gabapentin   - prior: apremilast, adalimumab  - in reserve: methotrexate (okayed per oncology)  2. 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 We briefly discussed alternative systemic treatment options but given excellent improvement otherwise, defer for now and will uptitrate topicals. Due for QFN.  - risankizumab  - QFN  - augmented betamethasone ointment    Procedures Performed:    None    Follow-up: 12 months    Staff:     Nick Menjivar MD, FAAD   of Dermatology  Department of Dermatology  [facility]    ____________________________________________    CC: RECHECK    HPI:  Ms. Connie Brunson is a(n) 63 year old female who presents today as a return patient for psoriasis    Persistent lesion on the lower leg  - flaky, intermittently itchy  - persistent - not going away  - rest of body is clear  - finishing up     Patient is otherwise feeling well, without additional skin concerns.    Labs Reviewed:  3/2022 QFN negative    Physical Exam:  Vitals: LMP 09/05/2000   SKIN: Teledermatology photos were reviewed; image quality and interpretability: acceptable. Image date: 10/26/23.  - erythematous scaly plaque on the lower leg  - No other lesions of concern on areas examined.      Medications:  Current Outpatient Medications   Medication    albuterol (PROAIR HFA/PROVENTIL HFA/VENTOLIN HFA) 108 (90 Base) MCG/ACT inhaler    augmented betamethasone dipropionate (DIPROLENE AF) 0.05 % external cream    azithromycin (ZITHROMAX) 250 MG tablet    benzonatate (TESSALON) 200 MG capsule    betamethasone dipropionate (DIPROSONE) 0.05 % external lotion    calcipotriene (DOVONOX) 0.005 % external ointment    cetirizine (ZYRTEC) 10 MG tablet    doxycycline hyclate (VIBRAMYCIN) 100 MG capsule    fenofibrate (TRIGLIDE/LOFIBRA) 160 MG tablet    fluticasone (FLONASE) 50 MCG/ACT nasal spray    guaiFENesin-codeine (ROBITUSSIN AC) 100-10 MG/5ML solution    hydrocortisone 2.5 % ointment    hydrOXYzine (ATARAX) 25 MG tablet    lisinopril (ZESTRIL) 10 MG tablet    nicotine (NICORETTE) 2 MG gum    omeprazole (PRILOSEC) 20 MG DR capsule    Risankizumab-rzaa (SKYRIZI) 150 MG/ML subcutaneous    triamterene-HCTZ (DYAZIDE) 37.5-25 MG capsule    venlafaxine (EFFEXOR) 37.5 MG tablet     Current Facility-Administered Medications   Medication    triamcinolone acetonide (KENALOG-10) injection 10 mg      Past Medical/Surgical History:   Patient Active Problem List   Diagnosis    Other seasonal allergic rhinitis    Diverticulosis of large intestine    Benign meningioma-right frontal posterior - no more annual MRI's needed per neurosurgery    Symptomatic menopausal or female climacteric states    Benign neoplasm of tonsil    Anxiety    HYPERLIPIDEMIA LDL GOAL <130 [272.4CK] - joint aches w/ simvastatin 11/2010,lipitor=nausea/abd pain 1/2012    Primary insomnia    Major depressive disorder, recurrent episode, mild (H24)    Essential hypertension with goal blood pressure less than 140/90    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- ophtho recommended auto-immune/arthritis work-up    Epiploic appendagitis- 2008 and 10/26/2014    Controlled substance agreement signed- re-signed 3/7/2018 ambien #30 -5mg tabs monthly -  refill x5 - ok to see q6 months     Gastroesophageal reflux disease without esophagitis    Hypertriglyceridemia    S/P laparoscopic cholecystectomy for acute cholecystitis with cholelithiasis    Postsurgical dumping syndrome - s/p lap shashi - consider cholestyramine    Mass of small intestine- distal ileum - seen on CT scan at time of diverticulitis     Primary malignant neuroendocrine neoplasm of ileum (H)    Pulmonary nodules    Diarrhea, unspecified type- since sm bowel resection 11/2017    Hepatic steatosis - per MRI RUQ abdomen 4/12/2019 - liver not enlarged     Psoriatic arthritis (H)    Psoriasis    Elevated fasting glucose     Past Medical History:   Diagnosis Date    Allergic rhinitis, cause unspecified     year round - dog,dust-  Failed on claritin, claritin-D, zyrtec and zyrtec-D in past.     Benign neoplasm of cerebral meninges (H) 1999    has yearly MRI's. - sees Dr. [facility] MRI7/24/06 = no change from 7/21/05     Benign neoplasm of tonsil 7/05    ?tonsillar re-growth - sees Dr. Thomas ENT     Cancer (H)     Depressive disorder     Deviated nasal septum     sees Dr. Thomas ENT     Diverticulosis of colon (without mention of hemorrhage) 02-    History of Guillain-Camden syndrome     NO FLU SHOTS - very mild case in [place] many years ago    Hyperlipidemia LDL goal < 130 doesn't want statins    simvastatin = severe hand joint pain , lipitor =nausea/abd. pain    Hypertension goal BP (blood pressure) < 140/90     Insomnia     trazodone -made pt feel hung over     Major depressive disorder, recurrent episode, moderate (H)     lexapro - sexual side effects     Moderate major depression (H) 2/4/2005    trazodone -made pt feel hung over     Other acne     Other extrapyramidal disease and abnormal movement disorder     ?GBS    Psoriatic arthritis (H) 4/26/2023    Symptomatic menopausal or female climacteric states     vivelle-dot        CC No referring provider defined for this  encounter. on close of this encounter.

## 2023-10-27 NOTE — LETTER
10/27/2023       RE: Connie Brunson  [address] [facility] [address]     Dear Colleague,    Thank you for referring your patient, Connie Brunson, to the [facility] [place] at [facility]. Please see a copy of my visit note below.    [facility] Dermatology Note  Encounter Date: Oct 27, 2023  Video (invitation sent by [phone] ). Location of teledermatologist: [facility] [place]. Start time: 8:20. End time: 8:40.    Dermatology Problem List:  1. Psoriasis/psoriatic arthritis: guttate, plaque, scalp, hands  - current: risankizumab, topicals (triamcinolone, calcipotriene, augmented betamethasone), cetirizine 10 mg BID, hydroxyzine 25 mg at bedtime, home nbUVB, gabapentin   - prior: apremilast, adalimumab  - in reserve: methotrexate (okayed per oncology)  2. 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 We briefly discussed alternative systemic treatment options but given excellent improvement otherwise, defer for now and will uptitrate topicals. Due for QFN.  - risankizumab  - QFN  - augmented betamethasone ointment    Procedures Performed:    None    Follow-up: 12 months    Staff:     Nick Menjivar MD, FAAD   of Dermatology  Department of Dermatology  [facility]    ____________________________________________    CC: RECHECK    HPI:  Ms. Connie Brunson is a(n) 63 year old female who presents today as a return patient for psoriasis    Persistent lesion on the lower leg  - flaky, intermittently itchy  - persistent - not going away  - rest of body is clear  - finishing up     Patient is otherwise feeling well,  without additional skin concerns.    Labs Reviewed:  3/2022 QFN negative    Physical Exam:  Vitals: LMP 09/05/2000   SKIN: Teledermatology photos were reviewed; image quality and interpretability: acceptable. Image date: 10/26/23.  - erythematous scaly plaque on the lower leg  - No other lesions of concern on areas examined.     Medications:  Current Outpatient Medications   Medication    albuterol (PROAIR HFA/PROVENTIL HFA/VENTOLIN HFA) 108 (90 Base) MCG/ACT inhaler    augmented betamethasone dipropionate (DIPROLENE AF) 0.05 % external cream    azithromycin (ZITHROMAX) 250 MG tablet    benzonatate (TESSALON) 200 MG capsule    betamethasone dipropionate (DIPROSONE) 0.05 % external lotion    calcipotriene (DOVONOX) 0.005 % external ointment    cetirizine (ZYRTEC) 10 MG tablet    doxycycline hyclate (VIBRAMYCIN) 100 MG capsule    fenofibrate (TRIGLIDE/LOFIBRA) 160 MG tablet    fluticasone (FLONASE) 50 MCG/ACT nasal spray    guaiFENesin-codeine (ROBITUSSIN AC) 100-10 MG/5ML solution    hydrocortisone 2.5 % ointment    hydrOXYzine (ATARAX) 25 MG tablet    lisinopril (ZESTRIL) 10 MG tablet    nicotine (NICORETTE) 2 MG gum    omeprazole (PRILOSEC) 20 MG DR capsule    Risankizumab-rzaa (SKYRIZI) 150 MG/ML subcutaneous    triamterene-HCTZ (DYAZIDE) 37.5-25 MG capsule    venlafaxine (EFFEXOR) 37.5 MG tablet     Current Facility-Administered Medications   Medication    triamcinolone acetonide (KENALOG-10) injection 10 mg      Past Medical/Surgical History:   Patient Active Problem List   Diagnosis    Other seasonal allergic rhinitis    Diverticulosis of large intestine    Benign meningioma-right frontal posterior - no more annual MRI's needed per neurosurgery    Symptomatic menopausal or female climacteric states    Benign neoplasm of tonsil    Anxiety    HYPERLIPIDEMIA LDL GOAL <130 [272.4CK] - joint aches w/ simvastatin 11/2010,lipitor=nausea/abd pain 1/2012    Primary insomnia    Major depressive disorder, recurrent  episode, mild (H24)    Essential hypertension with goal blood pressure less than 140/90    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- ophtho recommended auto-immune/arthritis work-up    Epiploic appendagitis- 2008 and 10/26/2014    Controlled substance agreement signed- re-signed 3/7/2018 ambien #30 -5mg tabs monthly - refill x5 - ok to see q6 months     Gastroesophageal reflux disease without esophagitis    Hypertriglyceridemia    S/P laparoscopic cholecystectomy for acute cholecystitis with cholelithiasis    Postsurgical dumping syndrome - s/p lap shashi - consider cholestyramine    Mass of small intestine- distal ileum - seen on CT scan at time of diverticulitis     Primary malignant neuroendocrine neoplasm of ileum (H)    Pulmonary nodules    Diarrhea, unspecified type- since sm bowel resection 11/2017    Hepatic steatosis - per MRI RUQ abdomen 4/12/2019 - liver not enlarged     Psoriatic arthritis (H)    Psoriasis    Elevated fasting glucose     Past Medical History:   Diagnosis Date    Allergic rhinitis, cause unspecified     year round - dog,dust-  Failed on claritin, claritin-D, zyrtec and zyrtec-D in past.     Benign neoplasm of cerebral meninges (H) 1999    has yearly MRI's. - sees Dr. [facility] MRI7/24/06 = no change from 7/21/05     Benign neoplasm of tonsil 7/05    ?tonsillar re-growth - sees Dr. Thomas ENT     Cancer (H)     Depressive disorder     Deviated nasal septum     sees Dr. Thomas ENT     Diverticulosis of colon (without mention of hemorrhage) 02-    History of Guillain-[place] syndrome     NO FLU SHOTS - very mild case in [place] many years ago    Hyperlipidemia LDL goal < 130 doesn't want statins    simvastatin = severe hand joint pain , lipitor =nausea/abd. pain    Hypertension goal BP (blood pressure) < 140/90     Insomnia     trazodone -made pt feel hung over     Major depressive disorder, recurrent episode, moderate (H)     lexapro - sexual side  effects     Moderate major depression (H) 2/4/2005    trazodone -made pt feel hung over     Other acne     Other extrapyramidal disease and abnormal movement disorder     ?GBS    Psoriatic arthritis (H) 4/26/2023    Symptomatic menopausal or female climacteric states     vivelle-dot        CC No referring provider defined for this encounter. on close of this encounter.

## 2023-11-07 ENCOUNTER — TELEPHONE (OUTPATIENT)
Dept: DERMATOLOGY | Facility: CLINIC | Age: 63
End: 2023-11-07
Payer: COMMERCIAL

## 2023-11-07 NOTE — TELEPHONE ENCOUNTER
Patient Contacted, patient stated she will call back and schedule the following:    Appointment type: Return  Provider: Dr. Menjivar  Return date: October 2024  Specialty phone number: [phone]

## 2023-12-05 NOTE — PATIENT INSTRUCTIONS
4/19/24 Lab + CT   4/26/24 Return visit with Dr. Noreen Angel, RN, BSN.  RN Care Coordinator     [facility]   [phone]

## 2023-12-06 DIAGNOSIS — E78.5 HYPERLIPIDEMIA LDL GOAL <130: Primary | ICD-10-CM

## 2023-12-06 RX ORDER — ROSUVASTATIN CALCIUM 5 MG/1
5 TABLET, COATED ORAL DAILY
Qty: 90 TABLET | Refills: 1 | Status: SHIPPED | OUTPATIENT
Start: 2023-12-06 | End: 2024-04-26

## 2024-02-07 ENCOUNTER — TELEPHONE (OUTPATIENT)
Dept: DERMATOLOGY | Facility: CLINIC | Age: 64
End: 2024-02-07
Payer: COMMERCIAL

## 2024-04-19 ENCOUNTER — LAB (OUTPATIENT)
Dept: INFUSION THERAPY | Facility: CLINIC | Age: 64
End: 2024-04-19
Attending: INTERNAL MEDICINE
Payer: COMMERCIAL

## 2024-04-19 ENCOUNTER — HOSPITAL ENCOUNTER (OUTPATIENT)
Dept: CT IMAGING | Facility: CLINIC | Age: 64
Discharge: HOME | End: 2024-04-19
Attending: INTERNAL MEDICINE | Admitting: INTERNAL MEDICINE
Payer: COMMERCIAL

## 2024-04-19 DIAGNOSIS — E78.5 HYPERLIPIDEMIA LDL GOAL <130: ICD-10-CM

## 2024-04-19 DIAGNOSIS — R73.01 ELEVATED FASTING GLUCOSE: ICD-10-CM

## 2024-04-19 DIAGNOSIS — C7A.8 PRIMARY MALIGNANT NEUROENDOCRINE NEOPLASM OF ILEUM (H): ICD-10-CM

## 2024-04-19 LAB
ALBUMIN SERPL BCG-MCNC: 4.7 G/DL (ref 3.5–5.2)
ALP SERPL-CCNC: 33 U/L (ref 40–150)
ALT SERPL W P-5'-P-CCNC: 33 U/L (ref 0–50)
ANION GAP SERPL CALCULATED.3IONS-SCNC: 15 MMOL/L (ref 7–15)
AST SERPL W P-5'-P-CCNC: 27 U/L (ref 0–45)
BASOPHILS # BLD AUTO: 0.1 10E3/UL (ref 0–0.2)
BASOPHILS NFR BLD AUTO: 1 %
BILIRUB SERPL-MCNC: 0.5 MG/DL
BUN SERPL-MCNC: 13.5 MG/DL (ref 8–23)
CALCIUM SERPL-MCNC: 9.3 MG/DL (ref 8.8–10.2)
CHLORIDE SERPL-SCNC: 94 MMOL/L (ref 98–107)
CREAT SERPL-MCNC: 0.7 MG/DL (ref 0.51–0.95)
DEPRECATED HCO3 PLAS-SCNC: 23 MMOL/L (ref 22–29)
EGFRCR SERPLBLD CKD-EPI 2021: >90 ML/MIN/1.73M2
EOSINOPHIL # BLD AUTO: 0.3 10E3/UL (ref 0–0.7)
EOSINOPHIL NFR BLD AUTO: 5 %
ERYTHROCYTE [DISTWIDTH] IN BLOOD BY AUTOMATED COUNT: 12.7 % (ref 10–15)
GLUCOSE SERPL-MCNC: 104 MG/DL (ref 70–99)
HBA1C MFR BLD: 5 %
HCT VFR BLD AUTO: 36.1 % (ref 35–47)
HGB BLD-MCNC: 13.1 G/DL (ref 11.7–15.7)
IMM GRANULOCYTES # BLD: 0 10E3/UL
IMM GRANULOCYTES NFR BLD: 1 %
LYMPHOCYTES # BLD AUTO: 1.2 10E3/UL (ref 0.8–5.3)
LYMPHOCYTES NFR BLD AUTO: 22 %
MCH RBC QN AUTO: 34.5 PG (ref 26.5–33)
MCHC RBC AUTO-ENTMCNC: 36.3 G/DL (ref 31.5–36.5)
MCV RBC AUTO: 95 FL (ref 78–100)
MONOCYTES # BLD AUTO: 0.5 10E3/UL (ref 0–1.3)
MONOCYTES NFR BLD AUTO: 9 %
NEUTROPHILS # BLD AUTO: 3.6 10E3/UL (ref 1.6–8.3)
NEUTROPHILS NFR BLD AUTO: 62 %
NRBC # BLD AUTO: 0 10E3/UL
NRBC BLD AUTO-RTO: 0 /100
PLATELET # BLD AUTO: 280 10E3/UL (ref 150–450)
POTASSIUM SERPL-SCNC: 3.5 MMOL/L (ref 3.4–5.3)
PROT SERPL-MCNC: 7.3 G/DL (ref 6.4–8.3)
RBC # BLD AUTO: 3.8 10E6/UL (ref 3.8–5.2)
SODIUM SERPL-SCNC: 132 MMOL/L (ref 135–145)
WBC # BLD AUTO: 5.8 10E3/UL (ref 4–11)

## 2024-04-19 PROCEDURE — 36415 COLL VENOUS BLD VENIPUNCTURE: CPT | Performed by: INTERNAL MEDICINE

## 2024-04-19 PROCEDURE — 71260 CT THORAX DX C+: CPT

## 2024-04-19 PROCEDURE — 250N000009 HC RX 250: Performed by: INTERNAL MEDICINE

## 2024-04-19 PROCEDURE — 83036 HEMOGLOBIN GLYCOSYLATED A1C: CPT | Performed by: FAMILY MEDICINE

## 2024-04-19 PROCEDURE — 82308 ASSAY OF CALCITONIN: CPT | Performed by: INTERNAL MEDICINE

## 2024-04-19 PROCEDURE — 86316 IMMUNOASSAY TUMOR OTHER: CPT | Performed by: INTERNAL MEDICINE

## 2024-04-19 PROCEDURE — 250N000011 HC RX IP 250 OP 636: Performed by: INTERNAL MEDICINE

## 2024-04-19 PROCEDURE — 85025 COMPLETE CBC W/AUTO DIFF WBC: CPT | Performed by: INTERNAL MEDICINE

## 2024-04-19 PROCEDURE — 80053 COMPREHEN METABOLIC PANEL: CPT | Performed by: INTERNAL MEDICINE

## 2024-04-19 PROCEDURE — 84260 ASSAY OF SEROTONIN: CPT | Performed by: INTERNAL MEDICINE

## 2024-04-19 PROCEDURE — 80061 LIPID PANEL: CPT | Performed by: FAMILY MEDICINE

## 2024-04-19 PROCEDURE — 36415 COLL VENOUS BLD VENIPUNCTURE: CPT

## 2024-04-19 RX ORDER — IOPAMIDOL 755 MG/ML
500 INJECTION, SOLUTION INTRAVASCULAR ONCE
Status: COMPLETED | OUTPATIENT
Start: 2024-04-19 | End: 2024-04-19

## 2024-04-19 RX ADMIN — SODIUM CHLORIDE 60 ML: 9 INJECTION, SOLUTION INTRAVENOUS at 09:44

## 2024-04-19 RX ADMIN — IOPAMIDOL 80 ML: 755 INJECTION, SOLUTION INTRAVENOUS at 09:44

## 2024-04-19 NOTE — PROGRESS NOTES
Nursing Note:  Connie Brunson presents today for PIV Start and labs.    Patient seen by provider today: No   present during visit today: Not Applicable.    Note: N/A.    Intravenous Access:  Labs drawn without difficulty.  Peripheral IV placed.    Discharge Plan:   Patient was sent to imaging for appointment.    Clarita Vásquez RN

## 2024-04-20 LAB
CHOLEST SERPL-MCNC: 221 MG/DL
FASTING STATUS PATIENT QL REPORTED: YES
HDLC SERPL-MCNC: 47 MG/DL
LDLC SERPL CALC-MCNC: 119 MG/DL
NONHDLC SERPL-MCNC: 174 MG/DL
TRIGL SERPL-MCNC: 274 MG/DL

## 2024-04-21 LAB
CALCIT SERPL-MCNC: <2 PG/ML
CGA SERPL-MCNC: 247 NG/ML

## 2024-04-22 LAB — SEROTONIN BLD-MCNC: 28 NG/ML

## 2024-04-26 ENCOUNTER — ONCOLOGY VISIT (OUTPATIENT)
Dept: ONCOLOGY | Facility: CLINIC | Age: 64
End: 2024-04-26
Attending: INTERNAL MEDICINE
Payer: COMMERCIAL

## 2024-04-26 VITALS
DIASTOLIC BLOOD PRESSURE: 82 MMHG | TEMPERATURE: 98.4 F | BODY MASS INDEX: 33.91 KG/M2 | OXYGEN SATURATION: 96 % | SYSTOLIC BLOOD PRESSURE: 144 MMHG | WEIGHT: 167.9 LBS | HEART RATE: 95 BPM | RESPIRATION RATE: 18 BRPM

## 2024-04-26 DIAGNOSIS — C7A.8 PRIMARY MALIGNANT NEUROENDOCRINE NEOPLASM OF ILEUM (H): Primary | ICD-10-CM

## 2024-04-26 PROCEDURE — G2211 COMPLEX E/M VISIT ADD ON: HCPCS | Performed by: INTERNAL MEDICINE

## 2024-04-26 PROCEDURE — 99214 OFFICE O/P EST MOD 30 MIN: CPT | Performed by: INTERNAL MEDICINE

## 2024-04-26 PROCEDURE — G0463 HOSPITAL OUTPT CLINIC VISIT: HCPCS | Performed by: INTERNAL MEDICINE

## 2024-04-26 ASSESSMENT — PAIN SCALES - GENERAL: PAINLEVEL: NO PAIN (0)

## 2024-04-26 NOTE — NURSING NOTE
"Oncology Rooming Note    April 26, 2024 8:56 AM   Connie Brunson is a 63 year old female who presents for:    Chief Complaint   Patient presents with    Oncology Clinic Visit     Initial Vitals: BP (!) 144/82   Pulse 95   Temp 98.4  F (36.9  C) (Tympanic)   Resp 18   Wt 76.2 kg (167 lb 14.4 oz)   LMP 09/05/2000   SpO2 96%   BMI 33.91 kg/m   Estimated body mass index is 33.91 kg/m  as calculated from the following:    Height as of 10/20/23: 1.499 m (4' 11\").    Weight as of this encounter: 76.2 kg (167 lb 14.4 oz). Body surface area is 1.78 meters squared.  No Pain (0) Comment: Data Unavailable   Patient's last menstrual period was 09/05/2000.  Allergies reviewed: Yes  Medications reviewed: Yes    Medications: Medication refills not needed today.  Pharmacy name entered into [employer]:    [facility] - [address]  WRITTEN PRESCRIPTION REQUESTED  [facility] - [place], [place] - [facility] [address]     Frailty Screening:   Is the patient here for a new oncology consult visit in cancer care? 2. No      Clinical concerns: f/u       Dayana Rose CMA              "

## 2024-04-26 NOTE — LETTER
4/26/2024         RE: Connie Brunson  [address] [facility] [address]        Dear Colleague,    Thank you for referring your patient, Connie Brunson, to the [facility]. Please see a copy of my visit note below.    [facility]    Hematology/Oncology Established Patient Note      Today's Date: Apr 26, 2024     Reason for Follow-up: neuroendocrine tumor of the ileum      HISTORY OF PRESENT ILLNESS: Connie Brunson is a 60 year old female with PMHx of HTN, HLD, depression, who presents with neuroendocrine tumor of the ileum.  In October 2017, she had a CT abdomen/pelvis done due to lower abdominal pain related to sigmoid diverticulitis and loose stools.  She had been having diarrhea for ~3 years.  It showed an incidental finding of an enhancing 1.3 cm intraluminal nodule in the distal ileum.  There was comment on radiology report on CT enterography on 11/2/17 that the nodule, in retrospect, appears to be present and stable since an older CT from 10/27/14.  On 11/7/17, she underwent a segmental small bowel resection with primary anastomosis by Dr. Cristobal.  Pathology showed a 1.5 cm tumor in the ileum, well-differentiated neuroendocrine tumor, grade 2, mitotic rate 410 HPF, Ki-67 of 5%, 2 of 3 lymph nodes were involved, stage pT3N1 (stage IIIB).        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      She was followed in person and is doing well.  She has no new complaints at this visit.    She had retired earlier this year but was bored at home and now is working again 3 days a week.     She has quit smoking since January. She still has to chew nicotine gum. It has been hard on her.     She has been gaining weight and is unhappy about it.     REVIEW OF SYSTEMS:   14 point ROS was reviewed and is negative other than as noted above in HPI.       HOME MEDICATIONS:  Current Outpatient  Medications   Medication Sig     albuterol (PROAIR HFA/PROVENTIL HFA/VENTOLIN HFA) 108 (90 Base) MCG/ACT inhaler Inhale 2 puffs into the lungs every 6 hours as needed for shortness of breath, wheezing or cough     augmented betamethasone dipropionate (DIPROLENE-AF) 0.05 % external ointment Apply topically 2 times daily     azithromycin (ZITHROMAX) 250 MG tablet 2 tabs day 1 and the 1 tab daily for 4 more days     benzonatate (TESSALON) 200 MG capsule Take 1 capsule (200 mg) by mouth 3 times daily as needed for cough     betamethasone dipropionate (DIPROSONE) 0.05 % external lotion Apply topically 2 times daily To affected area on scalp for 4-6 weeks. Do not use on face or body folds.     calcipotriene (DOVONOX) 0.005 % external ointment Apply to hands and feet BID when flared.     cetirizine (ZYRTEC) 10 MG tablet Take 10 mg by mouth every evening     doxycycline hyclate (VIBRAMYCIN) 100 MG capsule      fenofibrate (TRIGLIDE/LOFIBRA) 160 MG tablet TAKE ONE TABLET BY MOUTH ONCE DAILY     fluticasone (FLONASE) 50 MCG/ACT nasal spray SPRAY TWO SPRAYS INTO BOTH NOSTRILS IN THE MORNING.     guaiFENesin-codeine (ROBITUSSIN AC) 100-10 MG/5ML solution Take 5-10 mLs by mouth every 4 hours as needed for cough     hydrocortisone 2.5 % ointment Apply twice daily to the eyelids for 2 weeks     hydrOXYzine (ATARAX) 25 MG tablet Take 1 tablet (25 mg) by mouth nightly as needed for itching     lisinopril (ZESTRIL) 10 MG tablet Take 1 tablet (10 mg) by mouth daily     nicotine (NICORETTE) 2 MG gum CHEW AND PARK ONE PIECE OF GUM INSIDE CHEEK EVERY HOUR AS NEEDED FOR SMOKING CESSATION, MAXIMUM OF 24 PIECES PER DAY     omeprazole (PRILOSEC) 20 MG DR capsule Take 1 capsule (20 mg) by mouth daily     Risankizumab-rzaa (SKYRIZI) 150 MG/ML subcutaneous Inject 1 mL (150 mg) Subcutaneous every 3 months MAINTENANCE DOSE     triamterene-HCTZ (DYAZIDE) 37.5-25 MG capsule Take 1 capsule by mouth daily     venlafaxine (EFFEXOR) 37.5 MG tablet Take  1 tablet (37.5 mg) by mouth 2 times daily         ALLERGIES:  Allergies   Allergen Reactions     Atorvastatin Nausea     Nausea and abd pain      Ceftin GI Disturbance     Stomach upset and bloating - given at same time as clindamycin ? Which one as cause.       Ciprofloxacin Nausea and Vomiting     Clindamycin GI Disturbance     Stomach upset and bloating - given at same time as ceftin, ? Which one as cause      Flagyl [Metronidazole]      immediate migraine headache      Morphine Itching     Severe with nose, facial  Swelling - oxycodone = ok /no problems      Penicillins Hives     Sulfa Antibiotics Rash     Severe red , flushed rash     Levaquin Rash     States she can take cipro and avelox         PAST MEDICAL HISTORY:  Past Medical History:   Diagnosis Date     Allergic rhinitis, cause unspecified     year round - dog,dust-  Failed on claritin, claritin-D, zyrtec and zyrtec-D in past.      Benign neoplasm of cerebral meninges (H) 1999    has yearly MRI's. - sees 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 Thomas ENT      Cancer (H)      Depressive disorder      Deviated nasal septum     sees Dr. Thomas ENT      Diverticulosis of colon (without mention of hemorrhage) 02-     History of Guillain-Freehold syndrome     NO FLU SHOTS - very mild case in [place] many years ago     Hyperlipidemia LDL goal < 130 doesn't want statins    simvastatin = severe hand joint pain , lipitor =nausea/abd. pain     Hypertension goal BP (blood pressure) < 140/90      Insomnia     trazodone -made pt feel hung over      Major depressive disorder, recurrent episode, moderate (H)     lexapro - sexual side effects      Moderate major depression (H) 2/4/2005    trazodone -made pt feel hung over      Other acne      Other extrapyramidal disease and abnormal movement disorder     ?GBS     Psoriatic arthritis (H) 4/26/2023      Symptomatic menopausal or female climacteric states     vivelle-dot          PAST SURGICAL HISTORY:  Past Surgical History:   Procedure Laterality Date     ABDOMEN SURGERY       BIOPSY       COLONOSCOPY  2012    Procedure:COLONOSCOPY; Colonoscopy; Surgeon:SHOLA JOYCE; Location:[facility]     HC REMOVE TONSILS/ADENOIDS,<11 Y/O      T and A, under 12 yrs     HEAD & NECK SURGERY       LAPAROSCOPIC CHOLECYSTECTOMY WITH CHOLANGIOGRAMS N/A 2016    Procedure: LAPAROSCOPIC CHOLECYSTECTOMY WITH CHOLANGIOGRAMS;  Surgeon: Rosa M Cristobal MD;  Location: [facility]     LAPAROSCOPY DIAGNOSTIC (GENERAL) N/A 2017    Procedure: LAPAROSCOPY DIAGNOSTIC (GENERAL);  Exploratory Laparoscopy, Laparotomy and Small Bowel resection.;  Surgeon: Rosa M Cristobal MD;  Location: [facility]     LAPAROTOMY EXPLORATORY N/A 2017    Procedure: LAPAROTOMY EXPLORATORY;;  Surgeon: Rosa M Cristobal MD;  Location: [facility]     RESECT SMALL BOWEL WITHOUT OSTOMY N/A 2017    Procedure: RESECT SMALL BOWEL WITHOUT OSTOMY;;  Surgeon: Rosa M Cristobal MD;  Location: [facility]     SURGICAL HISTORY OF -       Menigioma excision     ZZC LIGATE FALLOPIAN TUBE      Tubal Ligation     ZZC NONSPECIFIC PROCEDURE      PAROTID TUMOR L SIDE OF NECK - BENIGN     ZZC TOTAL ABDOM HYSTERECTOMY   ?    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 [place]   Tobacco Use     Smoking status: Former     Current packs/day: 0.00     Average packs/day: 1 pack/day for 5.0 years (5.0 ttl pk-yrs)     Types: Cigarettes     Start date: 1989     Quit date: 1994     Years since quittin.3     Smokeless tobacco:  Former     Tobacco comments:     11/3/17 - occ e-cig use     As of 10/27/23 pt states no longer doing cigarette use quit date 1/1/24   Vaping Use     Vaping status: Former     Substances: Nicotine     Devices: Refillable tank   Substance and Sexual Activity     Alcohol use: Yes     Alcohol/week: 0.0 standard drinks of alcohol     Comment: 3-5 drinks per week     Drug use: No     Sexual activity: Not Currently     Partners: Male     Birth control/protection: Post-menopausal     Comment: tubal ligation-    Other Topics Concern      Service No     Blood Transfusions No     Caffeine Concern Yes     Comment: 2-3 cups coffee in am     Occupational Exposure Not Asked     Hobby Hazards Not Asked     Sleep Concern Not Asked     Stress Concern Not Asked     Weight Concern Not Asked     Special Diet Not Asked     Back Care Not Asked     Exercise No     Comment: regular walking 4x/week      Bike Helmet Not Asked     Seat Belt Yes     Comment: always     Self-Exams Yes     Comment: SBE encouraged monthly     Parent/sibling w/ CABG, MI or angioplasty before 65F 55M? No   Social History Narrative    calcium - taking 500mg po qday - increase to Calcium - 1000-1200mg/day    flex sig/colonoscopy -at age 48    sun precautions - discussed    mammogram - yearly    Td booster - 1991 per our records - pt will call [facility]     pneumovax -at age 58    DEXA -this year- 2003    stool hemoccults - every year after age 40    ASA- start 81 mg ASA qday.    mulvitamin - encouraged    doing citrucel qday         from second , Dan [employer]. Now back together with her first  and father of their  children, Perry Brunson - fall 2011.      Social Determinants of Health     Financial Resource Strain: Low Risk  (9/21/2023)    Financial Resource Strain      Within the past 12 months, have you or your family members you live with been unable to get utilities (heat, electricity) when it was really needed?:  No   Food Insecurity: Low Risk  (2023)    Food Insecurity      Within the past 12 months, did you worry that your food would run out before you got money to buy more?: No      Within the past 12 months, did the food you bought just not last and you didn t have money to get more?: No   Transportation Needs: Low Risk  (2023)    Transportation Needs      Within the past 12 months, has lack of transportation kept you from medical appointments, getting your medicines, non-medical meetings or appointments, work, or from getting things that you need?: No   Physical Activity: Not on file   Stress: Not on file   Social Connections: Not on file   Interpersonal Safety: Low Risk  (2023)    Interpersonal Safety      Do you feel physically and emotionally safe where you currently live?: Yes      Within the past 12 months, have you been hit, slapped, kicked or otherwise physically hurt by someone?: No      Within the past 12 months, have you been humiliated or emotionally abused in other ways by your partner or ex-partner?: No   Housing Stability: Low Risk  (2023)    Housing Stability      Do you have housing? : Yes      Are you worried about losing your housing?: No         FAMILY HISTORY:  Family History   Problem Relation Age of Onset     Hyperlipidemia Mother      Thyroid Disease Mother      Cancer Father         lung     Hypertension Father      Hyperlipidemia Father      Thyroid Disease Brother      Diabetes Maternal Grandmother      Cerebrovascular Disease Maternal Grandmother      Substance Abuse Maternal Grandfather      Thyroid Disease Son      Her father  of lung cancer and paranasal cancer at around age 65.    She has 3 children.      PHYSICAL EXAM:  Vital signs:  BP (!) 144/82   Pulse 95   Temp 98.4  F (36.9  C) (Tympanic)   Resp 18   Wt 76.2 kg (167 lb 14.4 oz)   LMP 2000   SpO2 96%   BMI 33.91 kg/m     ECO  GENERAL/CONSTITUTIONAL: No acute distress.  EYES: No scleral  "icterus.  NEUROLOGIC: Alert, oriented, answers questions appropriately.  INTEGUMENTARY: No jaundice. Psoriatic lesions on left wrist, legs.  GAIT: Steady, does not use assistive device        LABS:  Recent Labs   Lab Test 04/19/24  0857 09/05/23  0840 09/12/22  0825 07/22/22  1246 08/31/21  0952 09/04/20  0750   * 137 134* 135 134 137   POTASSIUM 3.5 3.8 3.7  --  3.4 3.6   CHLORIDE 94* 95* 94* 103 100 103   CO2 23 27 28 28 29 28   ANIONGAP 15 15 12 4 5 6   BUN 13.5 13.1 10.5 15 9 13   CR 0.70 0.70 0.59 0.52 0.64 0.73   * 114* 99 82 103* 92   FADI 9.3 10.0 9.4 9.0 9.4 9.7     Recent Labs   Lab Test 01/29/21  0859 11/10/17  0800 11/09/17  0810 04/12/17  0843   MAG 1.8  --  1.8 2.0   PHOS  --  2.4* 2.4*  --      Recent Labs   Lab Test 04/19/24  0857 09/05/23  0840 04/10/23  1123 09/12/22  0825 07/22/22  1246   WBC 5.8 7.1 11.0 6.7 7.8   HGB 13.1 15.0 15.1 13.9 13.5    338 283 302 317   MCV 95 92 91 93 98   NEUTROPHIL 62 64 76 71 75     Recent Labs   Lab Test 04/19/24  0857 09/05/23  0840 09/12/22  0825   BILITOTAL 0.5 0.4 0.4   ALKPHOS 33* 42 42   ALT 33 18 19   AST 27 23 20   ALBUMIN 4.7 4.8 4.3     TSH   Date Value Ref Range Status   09/05/2023 3.66 0.30 - 4.20 uIU/mL Final   01/29/2021 3.32 0.40 - 4.00 mU/L Final   12/09/2019 4.07 (H) 0.40 - 4.00 mU/L Final   04/12/2017 3.18 0.40 - 4.00 mU/L Final     No results for input(s): \"CEA\" in the last 46280 hours.  Results for orders placed or performed during the hospital encounter of 04/19/24   CT Chest/Abdomen/Pelvis w Contrast    Narrative    CT CHEST/ABDOMEN/PELVIS WITH CONTRAST 4/19/2024 9:59 AM    CLINICAL HISTORY: Small bowel carcinoid with rising tumor marker -  chromogranin. Primary malignant neuroendocrine neoplasm of ileum (H).    TECHNIQUE: CT scan of the chest, abdomen, and pelvis was performed  following injection of IV contrast. Multiplanar reformats were  obtained. Dose reduction techniques were used.   CONTRAST: 80 mL " [id number]    COMPARISON: CT chest 9/12/2022, CT abdomen and pelvis 7/22/2022.    FINDINGS:   LUNGS AND PLEURA: No new worrisome airspace disease. Stable nodule  anterior right upper lobe measuring 4 mm series 12 image 90. This  nodule was present on a CT from 12/8/2017.    MEDIASTINUM/AXILLAE: No acute abnormality. No new adenopathy.    CORONARY ARTERY CALCIFICATION: Moderate.    HEPATOBILIARY: Hepatic steatosis. No worrisome hepatic observation.  Cholecystectomy.    PANCREAS: Normal.    SPLEEN: Normal.    ADRENAL GLANDS: Normal.    KIDNEYS/BLADDER: No significant mass, stones, or hydronephrosis. There  are simple or benign cysts. No follow up is needed. Bladder  unremarkable.    BOWEL: Stable postoperative changes. No bowel obstruction or acute  inflammation. Normal appendix. No abscess or free air.    PELVIC ORGANS: Hysterectomy. No new pelvic abnormality.    ADDITIONAL FINDINGS: No areas of new adenopathy or fluid collection.  Mild vascular calcifications.    MUSCULOSKELETAL: No suspicious bone lesion.      Impression    IMPRESSION:  1.  Stable exam without evidence for new disease.  2.  Fatty liver.     ISHMAEL ASKEW MD         SYSTEM ID:  [id number]         ASSESSMENT/PLAN:  Connie Brunson is a 60 year old female with:    1) Neuroendocrine tumor of the ileum: s/p resection on 11/7/17, 1.5 cm, grade 2, well-differentiated, T3N1 (stage IIIB).      Recent MRI abdomen and CT scan reviewed with Connie.  MRI abdomen is negative, with no evidence of metastatic disease.  CT chest shows stable pulmonary nodule.  She has hepatic steatosis.  I reviewed the imaging and reports.    Chromogranin A has remained abnormal, although stable although it has been trending down over time.  Her symptoms remain the same.  Her imaging remains clear.  Since her imaging is negative, we will continue to observe for now.      She has been monitored for nearly 5 years now without any evidence of disease recurrence.  I offered her options of  stopping any further surveillance scans at this time and monitoring the labs alone.  She would like to continue with annual visits but without the restaging scans.    -she will call if she gets worsening diarrhea or flushing symptoms or pain and can get labs/scans done earlier.    -labs (CBC, CMP, chromogranin A) in 1 year  -RTC in 1 year    2) HTN, HLD  -follow-up with PCP    3) History of meningioma: diagnosed in 1999, was getting yearly MRI's with neurosurgery, but was told she does not need surveillance MRI's anymore unless she has new neurologic symptoms.    4) Pulmonary nodule: There is an indeterminate 2 x 3 mm anterior right mid-lung nodule seen on CT.  She does have history of smoking.  Recent CT chest shows that the tiny nodule is stable.  It has been stable since 2017.    5) Psoriasis:  -follows with dermatology  -she considered methotrexate, but is reluctant to start it and wants to wait    The longitudinal plan of care for the diagnosis(es)/condition(s) as documented were addressed during this visit. Due to the added complexity in care, I will continue to support Connie in the subsequent management and with ongoing continuity of care.     30 minutes spent on the date of the encounter doing chart review, history and exam, documentation and further activities as noted above       Again, thank you for allowing me to participate in the care of your patient.        Sincerely,        Remy Sorto MD

## 2024-04-26 NOTE — PROGRESS NOTES
[facility]    Hematology/Oncology Established Patient Note      Today's Date: Apr 26, 2024     Reason for Follow-up: neuroendocrine tumor of the ileum      HISTORY OF PRESENT ILLNESS: Connie Brunson is a 60 year old female with PMHx of HTN, HLD, depression, who presents with neuroendocrine tumor of the ileum.  In October 2017, she had a CT abdomen/pelvis done due to lower abdominal pain related to sigmoid diverticulitis and loose stools.  She had been having diarrhea for ~3 years.  It showed an incidental finding of an enhancing 1.3 cm intraluminal nodule in the distal ileum.  There was comment on radiology report on CT enterography on 11/2/17 that the nodule, in retrospect, appears to be present and stable since an older CT from 10/27/14.  On 11/7/17, she underwent a segmental small bowel resection with primary anastomosis by Dr. Cristobal.  Pathology showed a 1.5 cm tumor in the ileum, well-differentiated neuroendocrine tumor, grade 2, mitotic rate 410 HPF, Ki-67 of 5%, 2 of 3 lymph nodes were involved, stage pT3N1 (stage IIIB).        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      She was followed in person and is doing well.  She has no new complaints at this visit.    She had retired earlier this year but was bored at home and now is working again 3 days a week.     She has quit smoking since January. She still has to chew nicotine gum. It has been hard on her.     She has been gaining weight and is unhappy about it.     REVIEW OF SYSTEMS:   14 point ROS was reviewed and is negative other than as noted above in HPI.       HOME MEDICATIONS:  Current Outpatient Medications   Medication Sig    albuterol (PROAIR HFA/PROVENTIL HFA/VENTOLIN HFA) 108 (90 Base) MCG/ACT inhaler Inhale 2 puffs into the lungs every 6 hours as needed for shortness of breath, wheezing or cough    augmented betamethasone dipropionate (DIPROLENE-AF) 0.05 %  external ointment Apply topically 2 times daily    azithromycin (ZITHROMAX) 250 MG tablet 2 tabs day 1 and the 1 tab daily for 4 more days    benzonatate (TESSALON) 200 MG capsule Take 1 capsule (200 mg) by mouth 3 times daily as needed for cough    betamethasone dipropionate (DIPROSONE) 0.05 % external lotion Apply topically 2 times daily To affected area on scalp for 4-6 weeks. Do not use on face or body folds.    calcipotriene (DOVONOX) 0.005 % external ointment Apply to hands and feet BID when flared.    cetirizine (ZYRTEC) 10 MG tablet Take 10 mg by mouth every evening    doxycycline hyclate (VIBRAMYCIN) 100 MG capsule     fenofibrate (TRIGLIDE/LOFIBRA) 160 MG tablet TAKE ONE TABLET BY MOUTH ONCE DAILY    fluticasone (FLONASE) 50 MCG/ACT nasal spray SPRAY TWO SPRAYS INTO BOTH NOSTRILS IN THE MORNING.    guaiFENesin-codeine (ROBITUSSIN AC) 100-10 MG/5ML solution Take 5-10 mLs by mouth every 4 hours as needed for cough    hydrocortisone 2.5 % ointment Apply twice daily to the eyelids for 2 weeks    hydrOXYzine (ATARAX) 25 MG tablet Take 1 tablet (25 mg) by mouth nightly as needed for itching    lisinopril (ZESTRIL) 10 MG tablet Take 1 tablet (10 mg) by mouth daily    nicotine (NICORETTE) 2 MG gum CHEW AND PARK ONE PIECE OF GUM INSIDE CHEEK EVERY HOUR AS NEEDED FOR SMOKING CESSATION, MAXIMUM OF 24 PIECES PER DAY    omeprazole (PRILOSEC) 20 MG DR capsule Take 1 capsule (20 mg) by mouth daily    Risankizumab-rzaa (SKYRIZI) 150 MG/ML subcutaneous Inject 1 mL (150 mg) Subcutaneous every 3 months MAINTENANCE DOSE    triamterene-HCTZ (DYAZIDE) 37.5-25 MG capsule Take 1 capsule by mouth daily    venlafaxine (EFFEXOR) 37.5 MG tablet Take 1 tablet (37.5 mg) by mouth 2 times daily         ALLERGIES:  Allergies   Allergen Reactions    Atorvastatin Nausea     Nausea and abd pain     Ceftin GI Disturbance     Stomach upset and bloating - given at same time as clindamycin ? Which one as cause.      Ciprofloxacin Nausea and  Vomiting    Clindamycin GI Disturbance     Stomach upset and bloating - given at same time as ceftin, ? Which one as cause     Flagyl [Metronidazole]      immediate migraine headache     Morphine Itching     Severe with nose, facial  Swelling - oxycodone = ok /no problems     Penicillins Hives    Sulfa Antibiotics Rash     Severe red , flushed rash    Levaquin Rash     States she can take cipro and avelox         PAST MEDICAL HISTORY:  Past Medical History:   Diagnosis Date    Allergic rhinitis, cause unspecified     year round - dog,dust-  Failed on claritin, claritin-D, zyrtec and zyrtec-D in past.     Benign neoplasm of cerebral meninges (H) 1999    has yearly MRI's. - [facility] MRI7/24/06 = no change from 7/21/05     Benign neoplasm of tonsil 7/05    ?tonsillar re-growth - sees Dr. Thomas ENT     Cancer (H)     Depressive disorder     Deviated nasal septum     sees Dr. Thomas ENT     Diverticulosis of colon (without mention of hemorrhage) 02-    History of Guillain-Fenwick syndrome     NO FLU SHOTS - very mild case in [place] many years ago    Hyperlipidemia LDL goal < 130 doesn't want statins    simvastatin = severe hand joint pain , lipitor =nausea/abd. pain    Hypertension goal BP (blood pressure) < 140/90     Insomnia     trazodone -made pt feel hung over     Major depressive disorder, recurrent episode, moderate (H)     lexapro - sexual side effects     Moderate major depression (H) 2/4/2005    trazodone -made pt feel hung over     Other acne     Other extrapyramidal disease and abnormal movement disorder     ?GBS    Psoriatic arthritis (H) 4/26/2023    Symptomatic menopausal or female climacteric states     vivelle-dot          PAST SURGICAL HISTORY:  Past Surgical History:   Procedure Laterality Date    ABDOMEN SURGERY      BIOPSY      COLONOSCOPY  1/16/2012    Procedure:COLONOSCOPY; Colonoscopy; Surgeon:SHOLA JOYCE; Location: GI    HC REMOVE  TONSILS/ADENOIDS,<13 Y/O      T and A, under 12 yrs    HEAD & NECK SURGERY      LAPAROSCOPIC CHOLECYSTECTOMY WITH CHOLANGIOGRAMS N/A 2016    Procedure: LAPAROSCOPIC CHOLECYSTECTOMY WITH CHOLANGIOGRAMS;  Surgeon: Rosa M Cristobal MD;  Location: [facility] OR    LAPAROSCOPY DIAGNOSTIC (GENERAL) N/A 2017    Procedure: LAPAROSCOPY DIAGNOSTIC (GENERAL);  Exploratory Laparoscopy, Laparotomy and Small Bowel resection.;  Surgeon: Rosa M Cristobal MD;  Location: [facility] OR    LAPAROTOMY EXPLORATORY N/A 2017    Procedure: LAPAROTOMY EXPLORATORY;;  Surgeon: Rosa M Cristobal MD;  Location: [facility] OR    RESECT SMALL BOWEL WITHOUT OSTOMY N/A 2017    Procedure: RESECT SMALL BOWEL WITHOUT OSTOMY;;  Surgeon: Rosa M Cristobal MD;  Location: [facility] OR    SURGICAL HISTORY OF -       Menigioma excision    ZZC LIGATE FALLOPIAN TUBE      Tubal Ligation    ZZC NONSPECIFIC PROCEDURE      PAROTID TUMOR L SIDE OF NECK - BENIGN    ZZC TOTAL ABDOM HYSTERECTOMY   ?    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 [place]   Tobacco Use    Smoking status: Former     Current packs/day: 0.00     Average packs/day: 1 pack/day for 5.0 years (5.0 ttl pk-yrs)     Types: Cigarettes     Start date: 1989     Quit date: 1994     Years since quittin.3    Smokeless tobacco: Former    Tobacco comments:     11/3/17 - occ e-cig use     As of 10/27/23 pt states no longer doing cigarette use quit date 24   Vaping Use    Vaping status: Former    Substances: Nicotine    Devices: Refillable tank   Substance and Sexual Activity    Alcohol use: Yes     Alcohol/week: 0.0 standard drinks of alcohol      Comment: 3-5 drinks per week    Drug use: No    Sexual activity: Not Currently     Partners: Male     Birth control/protection: Post-menopausal     Comment: tubal ligation-    Other Topics Concern     Service No    Blood Transfusions No    Caffeine Concern Yes     Comment: 2-3 cups coffee in am    Occupational Exposure Not Asked    Hobby Hazards Not Asked    Sleep Concern Not Asked    Stress Concern Not Asked    Weight Concern Not Asked    Special Diet Not Asked    Back Care Not Asked    Exercise No     Comment: regular walking 4x/week     Bike Helmet Not Asked    Seat Belt Yes     Comment: always    Self-Exams Yes     Comment: SBE encouraged monthly    Parent/sibling w/ CABG, MI or angioplasty before 65F 55M? No   Social History Narrative    calcium - taking 500mg po qday - increase to Calcium - 1000-1200mg/day    flex sig/colonoscopy -at age 48    sun precautions - discussed    mammogram - yearly    Td booster - 1991 per our records - pt will call [facility]     pneumovax -at age 58    DEXA -this year- 2003    stool hemoccults - every year after age 40    ASA- start 81 mg ASA qday.    mulvitamin - encouraged    doing citrucel qday         from second , [employer]. Now back together with her first  and father of their  children, Perry Brunson - fall 2011.      Social Determinants of Health     Financial Resource Strain: Low Risk  (9/21/2023)    Financial Resource Strain     Within the past 12 months, have you or your family members you live with been unable to get utilities (heat, electricity) when it was really needed?: No   Food Insecurity: Low Risk  (9/21/2023)    Food Insecurity     Within the past 12 months, did you worry that your food would run out before you got money to buy more?: No     Within the past 12 months, did the food you bought just not last and you didn t have money to get more?: No   Transportation Needs: Low Risk  (9/21/2023)    Transportation  Needs     Within the past 12 months, has lack of transportation kept you from medical appointments, getting your medicines, non-medical meetings or appointments, work, or from getting things that you need?: No   Physical Activity: Not on file   Stress: Not on file   Social Connections: Not on file   Interpersonal Safety: Low Risk  (2023)    Interpersonal Safety     Do you feel physically and emotionally safe where you currently live?: Yes     Within the past 12 months, have you been hit, slapped, kicked or otherwise physically hurt by someone?: No     Within the past 12 months, have you been humiliated or emotionally abused in other ways by your partner or ex-partner?: No   Housing Stability: Low Risk  (2023)    Housing Stability     Do you have housing? : Yes     Are you worried about losing your housing?: No         FAMILY HISTORY:  Family History   Problem Relation Age of Onset    Hyperlipidemia Mother     Thyroid Disease Mother     Cancer Father         lung    Hypertension Father     Hyperlipidemia Father     Thyroid Disease Brother     Diabetes Maternal Grandmother     Cerebrovascular Disease Maternal Grandmother     Substance Abuse Maternal Grandfather     Thyroid Disease Son      Her father  of lung cancer and paranasal cancer at around age 65.    She has 3 children.      PHYSICAL EXAM:  Vital signs:  BP (!) 144/82   Pulse 95   Temp 98.4  F (36.9  C) (Tympanic)   Resp 18   Wt 76.2 kg (167 lb 14.4 oz)   LMP 2000   SpO2 96%   BMI 33.91 kg/m     ECO  GENERAL/CONSTITUTIONAL: No acute distress.  EYES: No scleral icterus.  NEUROLOGIC: Alert, oriented, answers questions appropriately.  INTEGUMENTARY: No jaundice. Psoriatic lesions on left wrist, legs.  GAIT: Steady, does not use assistive device        LABS:  Recent Labs   Lab Test 24  0857 23  0840 22  0825 22  1246 21  0952 20  0750   * 137 134* 135 134 137   POTASSIUM 3.5 3.8 3.7  --  3.4  "3.6   CHLORIDE 94* 95* 94* 103 100 103   CO2 23 27 28 28 29 28   ANIONGAP 15 15 12 4 5 6   BUN 13.5 13.1 10.5 15 9 13   CR 0.70 0.70 0.59 0.52 0.64 0.73   * 114* 99 82 103* 92   FADI 9.3 10.0 9.4 9.0 9.4 9.7     Recent Labs   Lab Test 01/29/21  0859 11/10/17  0800 11/09/17  0810 04/12/17  0843   MAG 1.8  --  1.8 2.0   PHOS  --  2.4* 2.4*  --      Recent Labs   Lab Test 04/19/24  0857 09/05/23  0840 04/10/23  1123 09/12/22  0825 07/22/22  1246   WBC 5.8 7.1 11.0 6.7 7.8   HGB 13.1 15.0 15.1 13.9 13.5    338 283 302 317   MCV 95 92 91 93 98   NEUTROPHIL 62 64 76 71 75     Recent Labs   Lab Test 04/19/24  0857 09/05/23  0840 09/12/22  0825   BILITOTAL 0.5 0.4 0.4   ALKPHOS 33* 42 42   ALT 33 18 19   AST 27 23 20   ALBUMIN 4.7 4.8 4.3     TSH   Date Value Ref Range Status   09/05/2023 3.66 0.30 - 4.20 uIU/mL Final   01/29/2021 3.32 0.40 - 4.00 mU/L Final   12/09/2019 4.07 (H) 0.40 - 4.00 mU/L Final   04/12/2017 3.18 0.40 - 4.00 mU/L Final     No results for input(s): \"CEA\" in the last 17364 hours.  Results for orders placed or performed during the hospital encounter of 04/19/24   CT Chest/Abdomen/Pelvis w Contrast    Narrative    CT CHEST/ABDOMEN/PELVIS WITH CONTRAST 4/19/2024 9:59 AM    CLINICAL HISTORY: Small bowel carcinoid with rising tumor marker -  chromogranin. Primary malignant neuroendocrine neoplasm of ileum (H).    TECHNIQUE: CT scan of the chest, abdomen, and pelvis was performed  following injection of IV contrast. Multiplanar reformats were  obtained. Dose reduction techniques were used.   CONTRAST: 80 mL Isovue-370    COMPARISON: CT chest 9/12/2022, CT abdomen and pelvis 7/22/2022.    FINDINGS:   LUNGS AND PLEURA: No new worrisome airspace disease. Stable nodule  anterior right upper lobe measuring 4 mm series 12 image 90. This  nodule was present on a CT from 12/8/2017.    MEDIASTINUM/AXILLAE: No acute abnormality. No new adenopathy.    CORONARY ARTERY CALCIFICATION: " Moderate.    HEPATOBILIARY: Hepatic steatosis. No worrisome hepatic observation.  Cholecystectomy.    PANCREAS: Normal.    SPLEEN: Normal.    ADRENAL GLANDS: Normal.    KIDNEYS/BLADDER: No significant mass, stones, or hydronephrosis. There  are simple or benign cysts. No follow up is needed. Bladder  unremarkable.    BOWEL: Stable postoperative changes. No bowel obstruction or acute  inflammation. Normal appendix. No abscess or free air.    PELVIC ORGANS: Hysterectomy. No new pelvic abnormality.    ADDITIONAL FINDINGS: No areas of new adenopathy or fluid collection.  Mild vascular calcifications.    MUSCULOSKELETAL: No suspicious bone lesion.      Impression    IMPRESSION:  1.  Stable exam without evidence for new disease.  2.  Fatty liver.     ISHMAEL ASKEW MD         SYSTEM ID:  [id number]         ASSESSMENT/PLAN:  Connie Brunson is a 60 year old female with:    1) Neuroendocrine tumor of the ileum: s/p resection on 11/7/17, 1.5 cm, grade 2, well-differentiated, T3N1 (stage IIIB).      Recent MRI abdomen and CT scan reviewed with Connie.  MRI abdomen is negative, with no evidence of metastatic disease.  CT chest shows stable pulmonary nodule.  She has hepatic steatosis.  I reviewed the imaging and reports.    Chromogranin A has remained abnormal, although stable although it has been trending down over time.  Her symptoms remain the same.  Her imaging remains clear.  Since her imaging is negative, we will continue to observe for now.      She has been monitored for nearly 5 years now without any evidence of disease recurrence.  I offered her options of stopping any further surveillance scans at this time and monitoring the labs alone.  She would like to continue with annual visits but without the restaging scans.    -she will call if she gets worsening diarrhea or flushing symptoms or pain and can get labs/scans done earlier.    -labs (CBC, CMP, chromogranin A) in 1 year  -RTC in 1 year    2) HTN, HLD  -follow-up with  PCP    3) History of meningioma: diagnosed in 1999, was getting yearly MRI's with neurosurgery, but was told she does not need surveillance MRI's anymore unless she has new neurologic symptoms.    4) Pulmonary nodule: There is an indeterminate 2 x 3 mm anterior right mid-lung nodule seen on CT.  She does have history of smoking.  Recent CT chest shows that the tiny nodule is stable.  It has been stable since 2017.    5) Psoriasis:  -follows with dermatology  -she considered methotrexate, but is reluctant to start it and wants to wait    The longitudinal plan of care for the diagnosis(es)/condition(s) as documented were addressed during this visit. Due to the added complexity in care, I will continue to support Connie in the subsequent management and with ongoing continuity of care.     30 minutes spent on the date of the encounter doing chart review, history and exam, documentation and further activities as noted above

## 2024-05-07 ENCOUNTER — ANCILLARY PROCEDURE (OUTPATIENT)
Dept: MAMMOGRAPHY | Facility: CLINIC | Age: 64
End: 2024-05-07
Attending: FAMILY MEDICINE
Payer: COMMERCIAL

## 2024-05-07 DIAGNOSIS — Z12.31 VISIT FOR SCREENING MAMMOGRAM: ICD-10-CM

## 2024-05-07 PROCEDURE — 77067 SCR MAMMO BI INCL CAD: CPT | Mod: TC | Performed by: RADIOLOGY

## 2024-05-07 PROCEDURE — 77063 BREAST TOMOSYNTHESIS BI: CPT | Mod: TC | Performed by: RADIOLOGY

## 2024-05-13 ENCOUNTER — MYC MEDICAL ADVICE (OUTPATIENT)
Dept: FAMILY MEDICINE | Facility: CLINIC | Age: 64
End: 2024-05-13
Payer: COMMERCIAL

## 2024-05-13 DIAGNOSIS — Z12.11 SPECIAL SCREENING FOR MALIGNANT NEOPLASMS, COLON: Primary | ICD-10-CM

## 2024-05-13 NOTE — TELEPHONE ENCOUNTER
Last physical was 9/22/23 with Dr. Stephens.     Colonoscopy order is from 4/26/23.     Routing to provider to review and advise.     Mira Bonner RN  [facility]

## 2024-05-30 ENCOUNTER — TELEPHONE (OUTPATIENT)
Dept: GASTROENTEROLOGY | Facility: CLINIC | Age: 64
End: 2024-05-30
Payer: COMMERCIAL

## 2024-05-30 NOTE — TELEPHONE ENCOUNTER
"Endoscopy Scheduling Screen    Have you had a positive Covid test in the last 14 days?  No    What is your communication preference for Instructions and/or Bowel Prep?   MyChart    What insurance is in the chart?  Other:       Ordering/Referring Provider:     GIRMA SOL       (If ordering provider performs procedure, schedule with ordering provider unless otherwise instructed. )    BMI: Estimated body mass index is 33.91 kg/m  as calculated from the following:    Height as of 10/20/23: 1.499 m (4' 11\").    Weight as of 4/26/24: 76.2 kg (167 lb 14.4 oz).     Sedation Ordered  moderate sedation.   If patient BMI > 50 do not schedule in [facility].    If patient BMI > 45 do not schedule at [facility].    Are you taking methadone or Suboxone?  No    Have you had difficulties, pain, or discomfort during past endoscopy procedures?  No    Are you taking any prescription medications for pain 3 or more times per week?   NO, No RN review required.    Do you have a history of malignant hyperthermia?  No    (Females) Are you currently pregnant?   No     Have you been diagnosed or told you have pulmonary hypertension?   No    Do you have an LVAD?  No    Have you been told you have moderate to severe sleep apnea?  No    Have you been told you have COPD, asthma, or any other lung disease?  No    Do you have any heart conditions?  No     Have you ever had or are you waiting for an organ transplant?  No. Continue scheduling, no site restrictions.    Have you had a stroke or transient ischemic attack (TIA aka \"mini stroke\" in the last 6 months?   No    Have you been diagnosed with or been told you have cirrhosis of the liver?   No    Are you currently on dialysis?   No    Do you need assistance transferring?   No    BMI: Estimated body mass index is 33.91 kg/m  as calculated from the following:    Height as of 10/20/23: 1.499 m (4' 11\").    Weight as of 4/26/24: 76.2 kg (167 lb 14.4 oz).     Is patients BMI > 40 and scheduling location " [facility]?  No    Do you take an injectable medication for weight loss or diabetes (excluding insulin)?  No    Do you take the medication Naltrexone?  No    Do you take blood thinners?  No       Prep   Are you currently on dialysis or do you have chronic kidney disease?  No    Do you have a diagnosis of diabetes?  No    Do you have a diagnosis of cystic fibrosis (CF)?  No    On a regular basis do you go 3 -5 days between bowel movements?  No    BMI > 40?  No    Preferred Pharmacy:    [facility] [address]  [address] [address]  Phone: [phone] Fax: [phone] Alternate Fax: [phone], [phone]      Final Scheduling Details     Procedure scheduled  Colonoscopy    Surgeon:  Pavan      Date of procedure:  08/19/2024      Pre-OP / PAC:   No - Not required for this site.    Location  [facility] - Per order.    Sedation   Moderate Sedation - Per order.      Patient Reminders:   You will receive a call from a Nurse to review instructions and health history.  This assessment must be completed prior to your procedure.  Failure to complete the Nurse assessment may result in the procedure being cancelled.      On the day of your procedure, please designate an adult(s) who can drive you home stay with you for the next 24 hours. The medicines used in the exam will make you sleepy. You will not be able to drive.      You cannot take public transportation, ride share services, or non-medical taxi service without a responsible caregiver.  Medical transport services are allowed with the requirement that a responsible caregiver will receive you at your destination.  We require that drivers and caregivers are confirmed prior to your procedure.

## 2024-07-29 NOTE — TELEPHONE ENCOUNTER
Standard Miralax Bowel Prep recommended due to standard bowel prep. Instructions were sent via [employer].

## 2024-08-06 ENCOUNTER — TELEPHONE (OUTPATIENT)
Dept: GASTROENTEROLOGY | Facility: CLINIC | Age: 64
End: 2024-08-06
Payer: COMMERCIAL

## 2024-08-06 NOTE — TELEPHONE ENCOUNTER
Pre visit planning completed.      Procedure details:    Patient scheduled for Colonoscopy on 8/19/24.     Arrival time: 0715. Procedure time 0800    Facility location: [facility]; [place] [address]. Check in location: Main entrance, door #1 on the North side of the building under roundabout awning. DO NOT GO TO SURGERY/ED ENTRANCE.     Sedation type: Conscious sedation     Pre op exam needed? No.    Indication for procedure: screening       Chart review:     Electronic implanted devices? No    Recent diagnosis of diverticulitis within the last 6 weeks? No      Medication review:    Diabetic? No    Anticoagulants? No    Weight loss medication/injectable? No GLP 1 medications per patient's medication list.  RN will verify with pre-assessment call.    NSAIDS? No    Other medication HOLDING recommendations:  N/A      Prep for procedure:     Bowel prep recommendation: Standard Miralax  Due to: standard bowel prep.    Prep instructions sent via [employer] by [facility] team.         Janeen Hugo RN  Endoscopy Procedure Pre Assessment RN  [phone] option 4

## 2024-08-07 NOTE — TELEPHONE ENCOUNTER
Pre assessment completed for upcoming procedure.   (Please see previous telephone encounter notes for complete details)      Procedure details:    Arrival time and facility location reviewed.    Pre op exam needed? No.    Designated  policy reviewed. Instructed to have someone stay 6  hours post procedure.       Medication review:    Medications reviewed. Please see supporting documentation below. Holding recommendations discussed (if applicable).   N/A      Prep for procedure:     Procedure prep instructions reviewed.        Any additional information needed:  N/A      Patient  verbalized understanding and had no questions or concerns at this time.      Vero Kaur RN  Endoscopy Procedure Pre Assessment   [phone] option 4

## 2024-08-19 ENCOUNTER — HOSPITAL ENCOUNTER (OUTPATIENT)
Facility: CLINIC | Age: 64
Discharge: HOME | End: 2024-08-19
Attending: SURGERY | Admitting: SURGERY
Payer: COMMERCIAL

## 2024-08-19 VITALS
OXYGEN SATURATION: 92 % | HEIGHT: 59 IN | WEIGHT: 165 LBS | BODY MASS INDEX: 33.26 KG/M2 | SYSTOLIC BLOOD PRESSURE: 105 MMHG | DIASTOLIC BLOOD PRESSURE: 80 MMHG | RESPIRATION RATE: 16 BRPM | HEART RATE: 76 BPM

## 2024-08-19 LAB — COLONOSCOPY: NORMAL

## 2024-08-19 PROCEDURE — 250N000011 HC RX IP 250 OP 636: Performed by: SURGERY

## 2024-08-19 PROCEDURE — 45385 COLONOSCOPY W/LESION REMOVAL: CPT | Performed by: SURGERY

## 2024-08-19 PROCEDURE — G0500 MOD SEDAT ENDO SERVICE >5YRS: HCPCS | Mod: PT | Performed by: SURGERY

## 2024-08-19 PROCEDURE — 45385 COLONOSCOPY W/LESION REMOVAL: CPT | Mod: PT | Performed by: SURGERY

## 2024-08-19 PROCEDURE — 88305 TISSUE EXAM BY PATHOLOGIST: CPT | Mod: TC | Performed by: SURGERY

## 2024-08-19 PROCEDURE — G0500 MOD SEDAT ENDO SERVICE >5YRS: HCPCS | Mod: 59 | Performed by: SURGERY

## 2024-08-19 PROCEDURE — 99153 MOD SED SAME PHYS/QHP EA: CPT | Mod: PT | Performed by: SURGERY

## 2024-08-19 RX ORDER — FENTANYL CITRATE 50 UG/ML
50-100 INJECTION, SOLUTION INTRAMUSCULAR; INTRAVENOUS EVERY 5 MIN PRN
Status: DISCONTINUED | OUTPATIENT
Start: 2024-08-19 | End: 2024-08-19 | Stop reason: HOSPADM

## 2024-08-19 RX ORDER — NALOXONE HYDROCHLORIDE 0.4 MG/ML
0.2 INJECTION, SOLUTION INTRAMUSCULAR; INTRAVENOUS; SUBCUTANEOUS
Status: DISCONTINUED | OUTPATIENT
Start: 2024-08-19 | End: 2024-08-19 | Stop reason: HOSPADM

## 2024-08-19 RX ORDER — FLUMAZENIL 0.1 MG/ML
0.2 INJECTION, SOLUTION INTRAVENOUS
Status: DISCONTINUED | OUTPATIENT
Start: 2024-08-19 | End: 2024-08-19 | Stop reason: HOSPADM

## 2024-08-19 RX ORDER — SIMETHICONE 40MG/0.6ML
133 SUSPENSION, DROPS(FINAL DOSAGE FORM)(ML) ORAL
Status: DISCONTINUED | OUTPATIENT
Start: 2024-08-19 | End: 2024-08-19 | Stop reason: HOSPADM

## 2024-08-19 RX ORDER — DIPHENHYDRAMINE HYDROCHLORIDE 50 MG/ML
25-50 INJECTION INTRAMUSCULAR; INTRAVENOUS
Status: DISCONTINUED | OUTPATIENT
Start: 2024-08-19 | End: 2024-08-19 | Stop reason: HOSPADM

## 2024-08-19 RX ORDER — EPINEPHRINE 1 MG/ML
0.1 INJECTION, SOLUTION INTRAMUSCULAR; SUBCUTANEOUS
Status: DISCONTINUED | OUTPATIENT
Start: 2024-08-19 | End: 2024-08-19 | Stop reason: HOSPADM

## 2024-08-19 RX ORDER — ATROPINE SULFATE 0.1 MG/ML
1 INJECTION INTRAVENOUS
Status: DISCONTINUED | OUTPATIENT
Start: 2024-08-19 | End: 2024-08-19 | Stop reason: HOSPADM

## 2024-08-19 RX ORDER — ONDANSETRON 2 MG/ML
4 INJECTION INTRAMUSCULAR; INTRAVENOUS EVERY 6 HOURS PRN
Status: DISCONTINUED | OUTPATIENT
Start: 2024-08-19 | End: 2024-08-19 | Stop reason: HOSPADM

## 2024-08-19 RX ORDER — PROCHLORPERAZINE MALEATE 5 MG
5 TABLET ORAL EVERY 6 HOURS PRN
Status: DISCONTINUED | OUTPATIENT
Start: 2024-08-19 | End: 2024-08-19 | Stop reason: HOSPADM

## 2024-08-19 RX ORDER — NALOXONE HYDROCHLORIDE 0.4 MG/ML
0.4 INJECTION, SOLUTION INTRAMUSCULAR; INTRAVENOUS; SUBCUTANEOUS
Status: DISCONTINUED | OUTPATIENT
Start: 2024-08-19 | End: 2024-08-19 | Stop reason: HOSPADM

## 2024-08-19 RX ORDER — LIDOCAINE 40 MG/G
CREAM TOPICAL
Status: DISCONTINUED | OUTPATIENT
Start: 2024-08-19 | End: 2024-08-19 | Stop reason: HOSPADM

## 2024-08-19 RX ORDER — ONDANSETRON 2 MG/ML
4 INJECTION INTRAMUSCULAR; INTRAVENOUS
Status: DISCONTINUED | OUTPATIENT
Start: 2024-08-19 | End: 2024-08-19 | Stop reason: HOSPADM

## 2024-08-19 RX ORDER — ACETAMINOPHEN 325 MG/1
325-650 TABLET ORAL EVERY 6 HOURS PRN
COMMUNITY

## 2024-08-19 RX ORDER — ONDANSETRON 4 MG/1
4 TABLET, ORALLY DISINTEGRATING ORAL EVERY 6 HOURS PRN
Status: DISCONTINUED | OUTPATIENT
Start: 2024-08-19 | End: 2024-08-19 | Stop reason: HOSPADM

## 2024-08-19 RX ADMIN — FENTANYL CITRATE 50 MCG: 50 INJECTION, SOLUTION INTRAMUSCULAR; INTRAVENOUS at 08:10

## 2024-08-19 RX ADMIN — FENTANYL CITRATE 100 MCG: 50 INJECTION, SOLUTION INTRAMUSCULAR; INTRAVENOUS at 08:01

## 2024-08-19 RX ADMIN — FENTANYL CITRATE 50 MCG: 50 INJECTION, SOLUTION INTRAMUSCULAR; INTRAVENOUS at 08:16

## 2024-08-19 RX ADMIN — MIDAZOLAM 1 MG: 1 INJECTION INTRAMUSCULAR; INTRAVENOUS at 08:16

## 2024-08-19 RX ADMIN — MIDAZOLAM 2 MG: 1 INJECTION INTRAMUSCULAR; INTRAVENOUS at 08:01

## 2024-08-19 RX ADMIN — MIDAZOLAM 1 MG: 1 INJECTION INTRAMUSCULAR; INTRAVENOUS at 08:10

## 2024-08-19 ASSESSMENT — ACTIVITIES OF DAILY LIVING (ADL)
ADLS_ACUITY_SCORE: 35

## 2024-08-19 NOTE — H&P
[facility] Anesthesia Pre-op History and Physical    Connie Brunson MRN# [id number]   Age: 63 year old YOB: 1960      Date of Surgery: 08/19/24   [facility]      Date of Exam 8/19/2024 Facility (Same day)       Primary care provider: Gayatri Martínez         Chief Complaint and/or Reason for Procedure:   screening colonoscopy         Active problem list:     Patient Active Problem List    Diagnosis Date Noted    Essential hypertension with goal blood pressure less than 140/90      Priority: High    Major depressive disorder, recurrent episode, mild (H24) 06/11/2012     Priority: High    Primary insomnia 08/04/2011     Priority: High     Patient is followed by GAYATRI MARTÍNEZ for ongoing prescription of pain medication.  All refills should be approved by this provider, or covering partner.    Medication(s): ambien 5mg .   Maximum quantity per month: #30  Clinic visit frequency required: Q 6  months     Controlled substance agreement on file: Yes       Date(s): 4/12/2017    Pain Clinic evaluation in the past: No    DIRE Total Score(s):  No flowsheet data found.    Last [facility] website verification:  done on 4/12/2017   [email or web address]        HYPERLIPIDEMIA LDL GOAL <130 [272.4CK] - joint aches w/ simvastatin 11/2010,lipitor=nausea/abd pain 1/2012 12/31/2010     Priority: High    Anxiety 01/21/2010     Priority: High     Tried one tab of paxil and felt side effects - too anxious to try again       Benign meningioma-right frontal posterior - no more annual MRI's needed per neurosurgery 10/06/2004     Priority: High    Psoriasis 09/22/2023     Priority: Medium    Elevated fasting glucose 09/22/2023     Priority: Medium    Psoriatic arthritis (H) 04/26/2023     Priority: Medium    Hepatic steatosis - per MRI RUQ abdomen 4/12/2019 - liver not enlarged  04/21/2019     Priority: Medium    Diarrhea, unspecified type- since sm bowel resection  11/2017 03/07/2018     Priority: Medium    Pulmonary nodules 12/14/2017     Priority: Medium    Primary malignant neuroendocrine neoplasm of ileum (H) 11/16/2017     Priority: Medium    Mass of small intestine- distal ileum - seen on CT scan at time of diverticulitis  10/12/2017     Priority: Medium    Postsurgical dumping syndrome - s/p lap shashi - consider cholestyramine 10/05/2016     Priority: Medium    S/P laparoscopic cholecystectomy for acute cholecystitis with cholelithiasis 04/25/2016     Priority: Medium    Hypertriglyceridemia 02/21/2016     Priority: Medium    Gastroesophageal reflux disease without esophagitis 01/27/2016     Priority: Medium    Controlled substance agreement signed- re-signed 3/7/2018 ambien #30 -5mg tabs monthly - refill x5 - ok to see q6 months  06/19/2015     Priority: Medium    Epiploic appendagitis- 2008 and 10/26/2014 10/27/2014     Priority: Medium    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 Provider to review      Other seasonal allergic rhinitis      Priority: Medium      Failed on claritin, claritin-D, zyrtec and zyrtec-D in past.   Problem list name updated by automated process. Provider to review      Benign neoplasm of tonsil      Priority: Low     ?tonsillar re-growth - sees Dr. Thomas ENT       Symptomatic menopausal or female climacteric states 11/04/2005     Priority: Low            Medications (include herbals and vitamins):     Current Outpatient Medications   Medication Instructions    acetaminophen (TYLENOL) 325-650 mg, Oral, EVERY 6 HOURS PRN    albuterol (PROAIR HFA/PROVENTIL HFA/VENTOLIN HFA) 108 (90 Base) MCG/ACT inhaler 2 puffs, Inhalation, EVERY 6 HOURS PRN    augmented betamethasone dipropionate (DIPROLENE-AF) 0.05 % external ointment Topical, 2  TIMES DAILY    azithromycin (ZITHROMAX) 250 MG tablet 2 tabs day 1 and the 1 tab daily for 4 more days    betamethasone dipropionate (DIPROSONE) 0.05 % external lotion Topical, 2 TIMES DAILY, To affected area on scalp for 4-6 weeks. Do not use on face or body folds.    calcipotriene (DOVONOX) 0.005 % external ointment Apply to hands and feet BID when flared.    cetirizine (ZYRTEC) 10 mg, Oral, EVERY EVENING    doxycycline hyclate (VIBRAMYCIN) 100 MG capsule     fenofibrate (TRIGLIDE/LOFIBRA) 160 MG tablet TAKE ONE TABLET BY MOUTH ONCE DAILY    fluticasone (FLONASE) 50 MCG/ACT nasal spray SPRAY TWO SPRAYS INTO BOTH NOSTRILS IN THE MORNING.    hydrocortisone 2.5 % ointment Apply twice daily to the eyelids for 2 weeks    hydrOXYzine HCl (ATARAX) 25 mg, Oral, AT BEDTIME PRN    lisinopril (ZESTRIL) 10 mg, Oral, DAILY    nicotine (NICORETTE) 2 MG gum CHEW AND PARK ONE PIECE OF GUM INSIDE CHEEK EVERY HOUR AS NEEDED FOR SMOKING CESSATION, MAXIMUM OF 24 PIECES PER DAY    omeprazole (PRILOSEC) 20 mg, Oral, DAILY    Risankizumab-rzaa (SKYRIZI) 150 mg, Subcutaneous, EVERY 3 MONTHS, MAINTENANCE DOSE    triamterene-HCTZ (DYAZIDE) 37.5-25 MG capsule 1 capsule, Oral, DAILY    venlafaxine (EFFEXOR) 37.5 mg, Oral, 2 TIMES DAILY                Allergies:      Allergies   Allergen Reactions    Atorvastatin Nausea     Nausea and abd pain     Ceftin GI Disturbance     Stomach upset and bloating - given at same time as clindamycin ? Which one as cause.      Ciprofloxacin Nausea and Vomiting    Clindamycin GI Disturbance     Stomach upset and bloating - given at same time as ceftin, ? Which one as cause     Flagyl [Metronidazole]      immediate migraine headache     Morphine Itching     Severe with nose, facial  Swelling - oxycodone = ok /no problems     Penicillins Hives    Sulfa Antibiotics Rash     Severe red , flushed rash    Levaquin Rash     States she can take cipro and avelox               Physical Exam:   All vitals have been  "reviewed  Patient Vitals for the past 8 hrs:   BP Pulse Resp SpO2 Height Weight   08/19/24 0738 (!) 146/78 71 13 94 % 1.499 m (4' 11\") 74.8 kg (165 lb)     Airway assessment:   Mallampatti classification: Class II (visualization of the soft palate, fauces, and uvula)}     Lungs:   No increased work of breathing, good air exchange, clear to auscultation bilaterally     Cardiovascular:   regular rate and rhythm             Lab / Radiology Results:   N/a        Anesthetic risk and/or ASA classification:   asa2    Gayatri Browne MD       "

## 2024-08-20 LAB
PATH REPORT.COMMENTS IMP SPEC: NORMAL
PATH REPORT.COMMENTS IMP SPEC: NORMAL
PATH REPORT.FINAL DX SPEC: NORMAL
PATH REPORT.GROSS SPEC: NORMAL
PATH REPORT.MICROSCOPIC SPEC OTHER STN: NORMAL
PATH REPORT.RELEVANT HX SPEC: NORMAL
PHOTO IMAGE: NORMAL

## 2024-08-20 PROCEDURE — 88305 TISSUE EXAM BY PATHOLOGIST: CPT | Mod: 26 | Performed by: PATHOLOGY

## 2024-09-03 ENCOUNTER — PATIENT OUTREACH (OUTPATIENT)
Dept: GASTROENTEROLOGY | Facility: CLINIC | Age: 64
End: 2024-09-03
Payer: COMMERCIAL

## 2024-09-18 ENCOUNTER — OFFICE VISIT (OUTPATIENT)
Dept: URGENT CARE | Facility: URGENT CARE | Age: 64
End: 2024-09-18
Payer: COMMERCIAL

## 2024-09-18 ENCOUNTER — MYC MEDICAL ADVICE (OUTPATIENT)
Dept: FAMILY MEDICINE | Facility: CLINIC | Age: 64
End: 2024-09-18
Payer: COMMERCIAL

## 2024-09-18 ENCOUNTER — TELEPHONE (OUTPATIENT)
Dept: FAMILY MEDICINE | Facility: CLINIC | Age: 64
End: 2024-09-18

## 2024-09-18 ENCOUNTER — HOSPITAL ENCOUNTER (OUTPATIENT)
Dept: CT IMAGING | Facility: CLINIC | Age: 64
Discharge: HOME | End: 2024-09-18
Attending: PHYSICIAN ASSISTANT | Admitting: PHYSICIAN ASSISTANT
Payer: COMMERCIAL

## 2024-09-18 ENCOUNTER — OFFICE VISIT (OUTPATIENT)
Dept: PEDIATRICS | Facility: CLINIC | Age: 64
End: 2024-09-18
Payer: COMMERCIAL

## 2024-09-18 VITALS — SYSTOLIC BLOOD PRESSURE: 134 MMHG | TEMPERATURE: 98.4 F | DIASTOLIC BLOOD PRESSURE: 88 MMHG

## 2024-09-18 VITALS
HEART RATE: 83 BPM | WEIGHT: 165 LBS | SYSTOLIC BLOOD PRESSURE: 132 MMHG | DIASTOLIC BLOOD PRESSURE: 78 MMHG | RESPIRATION RATE: 20 BRPM | BODY MASS INDEX: 33.33 KG/M2 | TEMPERATURE: 98.4 F | OXYGEN SATURATION: 98 %

## 2024-09-18 DIAGNOSIS — K57.32 DIVERTICULITIS OF COLON: Primary | ICD-10-CM

## 2024-09-18 DIAGNOSIS — R10.32 LLQ ABDOMINAL PAIN: ICD-10-CM

## 2024-09-18 DIAGNOSIS — R10.32 LLQ ABDOMINAL PAIN: Primary | ICD-10-CM

## 2024-09-18 LAB
ALBUMIN SERPL BCG-MCNC: 4.5 G/DL (ref 3.5–5.2)
ALP SERPL-CCNC: 38 U/L (ref 40–150)
ALT SERPL W P-5'-P-CCNC: 24 U/L (ref 0–50)
ANION GAP SERPL CALCULATED.3IONS-SCNC: 16 MMOL/L (ref 7–15)
AST SERPL W P-5'-P-CCNC: 24 U/L (ref 0–45)
BASOPHILS # BLD AUTO: 0.1 10E3/UL (ref 0–0.2)
BASOPHILS NFR BLD AUTO: 1 %
BILIRUB SERPL-MCNC: 0.4 MG/DL
BUN SERPL-MCNC: 12.8 MG/DL (ref 8–23)
CALCIUM SERPL-MCNC: 10 MG/DL (ref 8.8–10.4)
CHLORIDE SERPL-SCNC: 95 MMOL/L (ref 98–107)
CREAT SERPL-MCNC: 0.7 MG/DL (ref 0.51–0.95)
EGFRCR SERPLBLD CKD-EPI 2021: >90 ML/MIN/1.73M2
EOSINOPHIL # BLD AUTO: 0.2 10E3/UL (ref 0–0.7)
EOSINOPHIL NFR BLD AUTO: 1 %
ERYTHROCYTE [DISTWIDTH] IN BLOOD BY AUTOMATED COUNT: 11.9 % (ref 10–15)
GLUCOSE SERPL-MCNC: 118 MG/DL (ref 70–99)
HCO3 SERPL-SCNC: 24 MMOL/L (ref 22–29)
HCT VFR BLD AUTO: 38.1 % (ref 35–47)
HGB BLD-MCNC: 13.3 G/DL (ref 11.7–15.7)
IMM GRANULOCYTES # BLD: 0.1 10E3/UL
IMM GRANULOCYTES NFR BLD: 0 %
LYMPHOCYTES # BLD AUTO: 1.1 10E3/UL (ref 0.8–5.3)
LYMPHOCYTES NFR BLD AUTO: 10 %
MCH RBC QN AUTO: 32.8 PG (ref 26.5–33)
MCHC RBC AUTO-ENTMCNC: 34.9 G/DL (ref 31.5–36.5)
MCV RBC AUTO: 94 FL (ref 78–100)
MONOCYTES # BLD AUTO: 0.6 10E3/UL (ref 0–1.3)
MONOCYTES NFR BLD AUTO: 5 %
NEUTROPHILS # BLD AUTO: 9.4 10E3/UL (ref 1.6–8.3)
NEUTROPHILS NFR BLD AUTO: 83 %
NRBC # BLD AUTO: 0 10E3/UL
NRBC BLD AUTO-RTO: 0 /100
PLATELET # BLD AUTO: 319 10E3/UL (ref 150–450)
POTASSIUM SERPL-SCNC: 3.9 MMOL/L (ref 3.4–5.3)
PROT SERPL-MCNC: 7.5 G/DL (ref 6.4–8.3)
RBC # BLD AUTO: 4.05 10E6/UL (ref 3.8–5.2)
SODIUM SERPL-SCNC: 135 MMOL/L (ref 135–145)
WBC # BLD AUTO: 11.3 10E3/UL (ref 4–11)

## 2024-09-18 PROCEDURE — 250N000011 HC RX IP 250 OP 636: Performed by: PHYSICIAN ASSISTANT

## 2024-09-18 PROCEDURE — 74177 CT ABD & PELVIS W/CONTRAST: CPT

## 2024-09-18 PROCEDURE — 85025 COMPLETE CBC W/AUTO DIFF WBC: CPT | Performed by: PHYSICIAN ASSISTANT

## 2024-09-18 PROCEDURE — 99207 REFERRAL TO ACUTE AND DIAGNOSTIC SERVICES: CPT | Performed by: NURSE PRACTITIONER

## 2024-09-18 PROCEDURE — 99215 OFFICE O/P EST HI 40 MIN: CPT | Performed by: PHYSICIAN ASSISTANT

## 2024-09-18 PROCEDURE — 36415 COLL VENOUS BLD VENIPUNCTURE: CPT | Performed by: PHYSICIAN ASSISTANT

## 2024-09-18 PROCEDURE — 80053 COMPREHEN METABOLIC PANEL: CPT | Performed by: PHYSICIAN ASSISTANT

## 2024-09-18 RX ORDER — IOPAMIDOL 755 MG/ML
500 INJECTION, SOLUTION INTRAVASCULAR ONCE
Status: COMPLETED | OUTPATIENT
Start: 2024-09-18 | End: 2024-09-18

## 2024-09-18 RX ORDER — MOXIFLOXACIN HYDROCHLORIDE 400 MG/1
400 TABLET ORAL DAILY
Qty: 10 TABLET | Refills: 0 | Status: SHIPPED | OUTPATIENT
Start: 2024-09-18 | End: 2024-09-26 | Stop reason: SINTOL

## 2024-09-18 RX ADMIN — IOPAMIDOL 83 ML: 755 INJECTION, SOLUTION INTRAVENOUS at 15:39

## 2024-09-18 ASSESSMENT — ENCOUNTER SYMPTOMS
VOMITING: 0
DIARRHEA: 1
ABDOMINAL DISTENTION: 1
FATIGUE: 1
CARDIOVASCULAR NEGATIVE: 1
RESPIRATORY NEGATIVE: 1
NAUSEA: 1
FEVER: 0
ABDOMINAL PAIN: 1
EYES NEGATIVE: 1

## 2024-09-18 ASSESSMENT — LIFESTYLE VARIABLES
SKIP TO QUESTIONS 9-10: 1
AUDIT-C TOTAL SCORE: 3

## 2024-09-18 NOTE — PROGRESS NOTES
Acute and Diagnostic Services Clinic Visit    {PROVIDER CHARTING PREFERENCE:[id number]}    Ronn Rosales is a 63 year old, presenting for the following health issues:  No chief complaint on file.  {(!) Visit Details have not yet been documented.  Please enter Visit Details and then use this list to pull in documentation. (Optional):332461}  HPI     Abdominal/Flank Pain  Onset/Duration: x 3 days  Description:   Character: Sharp and Gnawing  Location: right lower quadrant left lower quadrant. LLQ > RLQ  Radiation: None  Intensity: 7/10  Progression of Symptoms:  worsening  Accompanying Signs & Symptoms:  Fever/chills: YES- chills  Gas/Bloating: YES  Nausea: YES- a little this AM  Vomitting: no   Diarrhea: YES- baseline loose stools  Constipation:no   Dysuria: no            Hematuria: no            Frequency: no            Incontinence of urine: no   History:            Last bowel movement: today  Trauma: no   Previous similar pain: YES- Hx of Divertic.   Previous tests done: none           Previous Abdominal surgery: YES- Gallbladder out, hysterectomy, cancer of small bowel  Precipitating factors:   Does the pain change with:     Food: YES- ***     Bowel Movement: no     Urination: no              Other factors: no   Therapies tried and outcome:  Took two doses of doxycycline. Took Tylenol and then ibuprofen 800mg before bedtime and still was unable to sleep due to the pain     When food last eaten: piece of toast around 1200      {ROS Picklists (Optional):502418}      Objective    LMP 09/05/2000   There is no height or weight on file to calculate BMI.  Physical Exam   {Exam List (Optional):486513}    {Diagnostic Test Results (Optional):488692}        Signed Electronically by: Kristen Chaidez PA-C  {Email feedback regarding this note to [email or web address]   :[id number]}

## 2024-09-18 NOTE — TELEPHONE ENCOUNTER
Called #   Telephone Information:   Mobile [phone]   Mobile [phone]        Pt stated that she is being seen at the [place] urgent care     Rn advised that she needs to stay there and be seen     Patient stated an understanding and agreed with plan.\    Samantha Escoto RN, BSN  [facility] [place][note 2]

## 2024-09-18 NOTE — TELEPHONE ENCOUNTER
Moved note to phone note     Samantha Escoto RN, BSN  [facility]

## 2024-09-18 NOTE — PROGRESS NOTES
Assessment/Plan:    Mild leukocytosis noted, labs otherwise unremarkable. CT abd/pelvis consistent with diverticulitis vs short segment sigmoid colitis. As patient has a prior history of diverticulitis & symptoms feel similar, feel that is the more likely etiology.   Discussed with patient that antibiotics may not be indicated according to new guidelines. She states she has tried conservative management before and it did not work, and she strongly prefers to try an antibiotic. Unfortunately, she is allergic to sulfa and penicillins, and has reported adverse effects from Flagyl, Levaquin, & Cipro. She is not willing to take any of the medications with adverse effects again. Will Rx Avelox, as chart states she has tolerated this in the past without issues. Tylenol for pain PRN.    See patient instructions below.    At the end of the encounter, I discussed results, diagnosis, medications. Discussed red flags for immediate return to clinic/ER, as well as indications for follow up if no improvement. Patient understood and agreed to plan. Patient was stable for discharge.    49 minutes was spent preparing for the visit and reviewing the patient's chart; getting a history and performing an exam; counseling and providing education to the patient, family, or caregiver; ordering medicines and/or tests; communicating with other healthcare professionals; documenting information in the medical record; interpreting results and sharing that information with the patient, family, or caregiver; and care coordination.       ICD-10-CM    1. Diverticulitis of colon  K57.32 moxifloxacin (AVELOX) 400 MG tablet      2. LLQ abdominal pain  R10.32 Referral to Acute and Diagnostic Services (Day of diagnostic / First order acute)     CBC with platelets differential     Comprehensive metabolic panel     sodium chloride (PF) 0.9% PF flush 3 mL     CT Abdomen Pelvis w Contrast     CBC with platelets differential     Comprehensive metabolic  panel            Return in about 3 days (around 9/21/2024) for Follow up w/ primary care provider if not better.    LEO Montanez, DWIGHT  [facility]  -----------------------------------------------------------------------------------------------------------------------------------------------------    HPI:  Connie Brunson is a 63 year old female who presents for evaluation of the following:    Abdominal/Flank Pain  Onset/Duration: x 3 days  Description:   Character: Sharp and Gnawing  Location: right lower quadrant, left lower quadrant. LLQ > RLQ  Radiation: None  Intensity: 7/10  Progression of Symptoms:  worsening  Accompanying Signs & Symptoms:  Fever/chills: YES- chills  Gas/Bloating: YES  Nausea: YES- a little this AM  Vomitting: no   Diarrhea: YES- baseline loose stools  Constipation:no   Dysuria: no            Hematuria: no            Frequency: no            Incontinence of urine: no   History:            Last bowel movement: today  Trauma: no   Previous similar pain: YES- Hx of Diverticulitis   Previous tests done: none           Previous Abdominal surgery: YES-  cholecystectomy, hysterectomy, cancer of small bowel  Precipitating factors:   Does the pain change with:     Food: YES- worse    Bowel Movement: no     Urination: no              Other factors: no   Therapies tried and outcome:  Took two doses of doxycycline. (Leftover from previous infection). Took Tylenol and then ibuprofen 800mg before bedtime and still was unable to sleep due to the pain       Past Medical History:   Diagnosis Date    Allergic rhinitis, cause unspecified     year round - dog,dust-  Failed on claritin, claritin-D, zyrtec and zyrtec-D in past.     Benign neoplasm of cerebral meninges (H) 1999    has yearly MRI's. - sees [facility] MRI7/24/06 = no change from 7/21/05     Benign neoplasm of tonsil 7/05    ?tonsillar re-growth - sees Dr. Thomas ENT     Cancer (H)      Depressive disorder     Deviated nasal septum     sees Dr. Thomas ENT     Diverticulosis of colon (without mention of hemorrhage) 02-    History of Guillain-[place] syndrome     NO FLU SHOTS - very mild case in [place] many years ago    Hyperlipidemia LDL goal < 130 doesn't want statins    simvastatin = severe hand joint pain , lipitor =nausea/abd. pain    Hypertension goal BP (blood pressure) < 140/90     Insomnia     trazodone -made pt feel hung over     Major depressive disorder, recurrent episode, moderate (H)     lexapro - sexual side effects     Moderate major depression (H) 2/4/2005    trazodone -made pt feel hung over     Other acne     Other extrapyramidal disease and abnormal movement disorder     ?GBS    Psoriatic arthritis (H) 4/26/2023    Symptomatic menopausal or female climacteric states     vivelle-dot        Vitals:    09/18/24 1451   BP: 134/88   Patient Position: Sitting   Temp: 98.4  F (36.9  C)   TempSrc: Oral       Physical Exam  Vitals and nursing note reviewed.   Pulmonary:      Effort: Pulmonary effort is normal.   Abdominal:      General: There is no distension.      Palpations: Abdomen is soft.      Tenderness: There is abdominal tenderness in the left lower quadrant. There is no guarding or rebound.   Neurological:      Mental Status: She is alert.         Labs/Imaging:  Results for orders placed or performed in visit on 09/18/24 (from the past 24 hour(s))   CBC with platelets differential    Narrative    The following orders were created for panel order CBC with platelets differential.  Procedure                               Abnormality         Status                     ---------                               -----------         ------                     CBC with platelets and d...[[id number]]  Abnormal            Final result                 Please view results for these tests on the individual orders.   Comprehensive metabolic panel   Result Value Ref Range    Sodium 135 135 -  145 mmol/L    Potassium 3.9 3.4 - 5.3 mmol/L    Carbon Dioxide (CO2) 24 22 - 29 mmol/L    Anion Gap 16 (H) 7 - 15 mmol/L    Urea Nitrogen 12.8 8.0 - 23.0 mg/dL    Creatinine 0.70 0.51 - 0.95 mg/dL    GFR Estimate >90 >60 mL/min/1.73m2    Calcium 10.0 8.8 - 10.4 mg/dL    Chloride 95 (L) 98 - 107 mmol/L    Glucose 118 (H) 70 - 99 mg/dL    Alkaline Phosphatase 38 (L) 40 - 150 U/L    AST 24 0 - 45 U/L    ALT 24 0 - 50 U/L    Protein Total 7.5 6.4 - 8.3 g/dL    Albumin 4.5 3.5 - 5.2 g/dL    Bilirubin Total 0.4 <=1.2 mg/dL   CBC with platelets and differential   Result Value Ref Range    WBC Count 11.3 (H) 4.0 - 11.0 10e3/uL    RBC Count 4.05 3.80 - 5.20 10e6/uL    Hemoglobin 13.3 11.7 - 15.7 g/dL    Hematocrit 38.1 35.0 - 47.0 %    MCV 94 78 - 100 fL    MCH 32.8 26.5 - 33.0 pg    MCHC 34.9 31.5 - 36.5 g/dL    RDW 11.9 10.0 - 15.0 %    Platelet Count 319 150 - 450 10e3/uL    % Neutrophils 83 %    % Lymphocytes 10 %    % Monocytes 5 %    % Eosinophils 1 %    % Basophils 1 %    % Immature Granulocytes 0 %    NRBCs per 100 WBC 0 <1 /100    Absolute Neutrophils 9.4 (H) 1.6 - 8.3 10e3/uL    Absolute Lymphocytes 1.1 0.8 - 5.3 10e3/uL    Absolute Monocytes 0.6 0.0 - 1.3 10e3/uL    Absolute Eosinophils 0.2 0.0 - 0.7 10e3/uL    Absolute Basophils 0.1 0.0 - 0.2 10e3/uL    Absolute Immature Granulocytes 0.1 <=0.4 10e3/uL    Absolute NRBCs 0.0 10e3/uL     Recent Results (from the past 24 hour(s))   CT Abdomen Pelvis w Contrast    Narrative    CT ABDOMEN PELVIS W CONTRAST 9/18/2024 3:46 PM    CLINICAL HISTORY: Abdominal pain. lower abd pain (LLQ > RLQ); LLQ  abdominal pain    TECHNIQUE: CT scan of the abdomen and pelvis was performed following  injection of IV contrast. Multiplanar reformats were obtained. Dose  reduction techniques were used.  CONTRAST: 83 mL Isovue-370    COMPARISON: April 19, 2024    FINDINGS:   LOWER CHEST: No infiltrates or effusions.    HEPATOBILIARY: No significant mass or bile duct  dilatation.  Cholecystectomy. Severe hepatic steatosis.    PANCREAS: No significant mass, duct dilatation, or inflammatory  change.    SPLEEN: Normal size.    ADRENAL GLANDS: No significant nodules.    KIDNEYS/BLADDER: No significant mass, stones, or hydronephrosis.    BOWEL: Inflammation around sigmoid colon compatible with short segment  colitis vs. diverticulitis. No obstruction. No intraperitoneal free  air.    PELVIC ORGANS: No pelvic masses.    ADDITIONAL FINDINGS: No adenopathy or free fluid. There are mild  atherosclerotic changes of the visualized aorta and its branches.  There is no evidence of aortic dissection or aneurysm.    MUSCULOSKELETAL: No frankly destructive bony lesions.      Impression    IMPRESSION:   1.  Short segment sigmoid colitis vs. diverticulitis.  2.  Severe hepatic steatosis.    AGNIESZKA SEGURA MD         SYSTEM ID:  [id number]           Patient Instructions   1. Take antibiotics as prescribed.  2. Take Tylenol as needed for pain control.  3. Start a clear liquid diet for the next 24-48 hours hours, advance your diet as tolerated. Start advancing this to thick liquids then soft solids.   4. Follow up with your primary care provider in 2-3 days for recheck.  5. You do not need to avoid seeds, nuts, popcorn, or other similar foods after this resolves. This was once thought to be true for diverticulitis prevention, but research has shown that not to be true.    Diverticulitis  Some people get pouches along the wall of the colon as they get older. The pouches, called diverticuli, usually cause no symptoms. If the pouches become blocked, you can get an infection. This infection is called diverticulitis. It causes pain in your lower abdomen and fever. If not treated, it can become a serious condition, causing an abscess to form inside the pouch. The abscess may block the intestinal tract even or rupture, spreading infection throughout the abdomen.  When treatment is started early, oral  antibiotics alone may be enough to cure diverticulitis. This method is tried first. But, if you don't improve or if your condition gets worse while using oral antibiotics, you may need to be admitted to the hospital for IV antibiotics. Severe cases may require surgery.  Home care  The following guidelines will help you care for yourself at home:  Take antibiotics exactly as instructed. Don't miss any doses or stop taking the medication, even if you feel better.  Monitor your temperature and tell your healthcare provider if you have rising temperatures.  Preventing future attacks  Once you have an episode of diverticulitis, you are at risk for having it again. After you have recovered from this episode, you may be able to lower your risk by eating a high-fiber diet (20 gm/day to 35 gm/day of fiber). This cleans out the colon pouches that already exist and may prevent new ones from forming. Foods high in fiber include fresh fruits and edible peelings, raw or lightly cooked vegetables, whole grain cereals and breads, dried beans and peas, and bran.  Other steps that can help prevent future attacks include:  Take your medicines, such as antibiotics, as your healthcare provider says.  Drink 6 to 8 glasses of water every day, unless told otherwise.  Use a heating pad or hot water bottle to help abdominal cramping or pain.  Begin an exercise program. Ask your healthcare provider how to get started. You can benefit from simple activities such as walking or gardening.  Treat diarrhea with a bland diet. Start with liquids only; then slowly add fiber over time.  Watch for changes in your bowel movements (constipation to diarrhea). Avoid constipation by eating a high fiber diet and taking a gentle laxative such as Metamucil or Miralax if needed.  Get plenty of rest and sleep.  Follow-up care  Follow up with your healthcare provider as advised or sooner if you are not getting better in the next 2 days.  When to seek medical  advice  Call your healthcare provider right away if any of these occur:  Fever of 100.4 F (38 C) or higher, or as directed by your healthcare provider  Repeated vomiting or swelling of the abdomen  Weakness, dizziness, light-headedness  Pain in your abdomen that gets worse, severe, or spreads to your back  Pain that moves to the right lower abdomen  Rectal bleeding (stools that are red, black or maroon color)  Unexpected vaginal bleeding

## 2024-09-18 NOTE — PROGRESS NOTES
"Assessment & Plan       ICD-10-CM    1. LLQ abdominal pain  R10.32 Referral to Acute and Diagnostic Services (Day of diagnostic / First order acute)             MDM: History of diverticulitis and cancer of ileum. Since Monday has had bilateral lower abdominal pain more on the left than right. Has been trying to keep up fluid intake, has had decreased appetite. Feels bloated and decreased  bowel movements. Took two doses of doxycycline. Took Tylenol and then ibuprofen 800mg before bedtime and still was unable to sleep due to the pain.     Referred to [facility] for CT and further work-up. Appointment today at 3pm in [place]. Patient verbalized understanding and agreed to the treatment plan.      Ronn Rosales is a 63 year old female who presents to clinic today for the following health issues:  Chief Complaint   Patient presents with    Urgent Care     X 3 days, bloating, lower abdominal pain, cramping, has had diverticulitis before similar symptoms. Took two doxycyline yesterday and two today.      HPI  Bilateral lower abdominal pain since Monday more on the left than right. Has been trying to keep up fluid intake, has had decreased appetite. Feels bloated. Took two doses of doxycycline. Took Tylenol and then ibuprofen 800mg before bedtime and still was unable to sleep due to the pain. No vaginal or urinary symptoms. No concerns for STIs. Has a history of diverticulitis, cholecystectomy, hysterectomy, and small bowel cancer excision. No known fevers. Diarrhea but patient states this is baseline for her. However, decreased bowel movements since Sunday.      Review of Systems   Constitutional:  Positive for fatigue. Negative for fever.   Eyes: Negative.    Respiratory: Negative.     Cardiovascular: Negative.    Gastrointestinal:  Positive for abdominal distention (patient feels \"bloated\"), abdominal pain (bilateral lower abdomen, more notable on left), diarrhea (patient states she normally has diarrhea) and nausea " (on occasion). Negative for vomiting.   Genitourinary: Negative.        Problem List:  2023-09: Psoriasis  2023-09: Elevated fasting glucose  2023-04: Psoriatic arthritis (H)  2019-04: Hepatic steatosis - per MRI RUQ abdomen 4/12/2019 - liver   not enlarged   2018-03: Diarrhea, unspecified type- since sm bowel resection 11/2017 2017-12: Pulmonary nodules  2017-11: Primary malignant neuroendocrine neoplasm of ileum (H)  2017-10: Mass of small intestine- distal ileum - seen on CT scan at   time of diverticulitis   2016-10: Postsurgical dumping syndrome - s/p lap shashi - consider   cholestyramine  2016-04: S/P laparoscopic cholecystectomy for acute cholecystitis   with cholelithiasis  2016-02: Hypertriglyceridemia  2016-01: Gastroesophageal reflux disease without esophagitis  2015-06: Controlled substance agreement signed- re-signed 3/7/2018   ambien #30 -5mg tabs monthly - refill x5 - ok to see q6 months   2014-12: Advanced care planning/counseling discussion  2014-10: Epiploic appendagitis- 2008 and 10/26/2014  2013-09: 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- ophtho   recommended auto-immune/arthritis work-up  2012-06: Major depressive disorder, recurrent episode, mild (H24)  2011-08: Primary insomnia  2010-12: HYPERLIPIDEMIA LDL GOAL <130 [272.4CK] - joint aches w/   simvastatin 11/2010,lipitor=nausea/abd pain 1/2012 2010-01: Anxiety  2005-11: Symptomatic menopausal or female climacteric states  2004-10: Benign meningioma-right frontal posterior - no more annual   MRI's needed per neurosurgery  2003-12: Diverticulosis of large intestine  Other seasonal allergic rhinitis  Benign neoplasm of tonsil  Essential hypertension with goal blood pressure less than 140/90      Past Medical History:   Diagnosis Date    Allergic rhinitis, cause unspecified     year round - dog,dust-  Failed on claritin, claritin-D, zyrtec and zyrtec-D in past.     Benign neoplasm of cerebral meninges (H) 1999    has yearly MRI's. -  sees Dr[facility]06 = no change from 05     Benign neoplasm of tonsil     ?tonsillar re-growth - sees Dr. Thomas ENT     Cancer (H)     Depressive disorder     Deviated nasal septum     sees Dr. Thomas ENT     Diverticulosis of colon (without mention of hemorrhage) 02-    History of [place] syndrome     NO FLU SHOTS - very mild case in [place] many years ago    Hyperlipidemia LDL goal < 130 doesn't want statins    simvastatin = severe hand joint pain , lipitor =nausea/abd. pain    Hypertension goal BP (blood pressure) < 140/90     Insomnia     trazodone -made pt feel hung over     Major depressive disorder, recurrent episode, moderate (H)     lexapro - sexual side effects     Moderate major depression (H) 2005    trazodone -made pt feel hung over     Other acne     Other extrapyramidal disease and abnormal movement disorder     ?GBS    Psoriatic arthritis (H) 2023    Symptomatic menopausal or female climacteric states     vivelle-dot          Social History     Tobacco Use    Smoking status: Former     Current packs/day: 0.00     Average packs/day: 1 pack/day for 5.0 years (5.0 ttl pk-yrs)     Types: Cigarettes     Start date: 1989     Quit date:      Years since quittin.7    Smokeless tobacco: Former    Tobacco comments:     11/3/17 - occ e-cig use     As of 10/27/23 pt states no longer doing cigarette use quit date 24   Substance Use Topics    Alcohol use: Yes     Alcohol/week: 0.0 standard drinks of alcohol     Comment: 3-5 drinks per week           Objective    /78   Pulse 83   Temp 98.4  F (36.9  C)   Resp 20   Wt 74.8 kg (165 lb)   LMP 2000   SpO2 98%   BMI 33.33 kg/m    Physical Exam  Constitutional:       Appearance: Normal appearance.   HENT:      Head: Normocephalic and atraumatic.      Right Ear: Tympanic membrane normal.      Left Ear: Tympanic membrane normal.      Nose: Nose normal.      Mouth/Throat:       Mouth: Mucous membranes are moist.      Pharynx: Oropharynx is clear.   Eyes:      Extraocular Movements: Extraocular movements intact.   Cardiovascular:      Rate and Rhythm: Normal rate and regular rhythm.      Heart sounds: Normal heart sounds.   Pulmonary:      Effort: Pulmonary effort is normal.      Breath sounds: Normal breath sounds.   Abdominal:      Comments: Abdomen is soft and slightly rounded with no notable bulges or masses.  Bowel sounds are decreased in all 4 quadrants.  Tenderness to palpation bilaterally in the lower abdomen, more notably on the left side.  No guarding.   Skin:     General: Skin is warm and dry.   Neurological:      General: No focal deficit present.      Mental Status: She is alert and oriented to person, place, and time.   Psychiatric:         Mood and Affect: Mood normal.         Behavior: Behavior normal.         Thought Content: Thought content normal.         Judgment: Judgment normal.        Referral to Acute and Diagnostic Services    [phone] ([place] [address] [facility], [address], [place] [address]    Transition to Acute & Diagnostic Services Clinic has been discussed with patient, and she agrees with next level of care.   Patient understands that evaluation/treatment at [facility] typically takes significantly longer than in clinic/urgent care (>2 hours).  [facility] has been contacted by provider/staff to confirm patient acceptance.     Lucina Carter NP

## 2024-09-18 NOTE — TELEPHONE ENCOUNTER
[facility] (supporting Gayatri Stephens MD)50 minutes ago (10:31 AM)     KATHY Mendieta I woke up Monday morning with lower abdominal pressure, pain and bloating I waited until today to see if it would pass. It has not and as you know I recognize the symptoms as I have had many times. The pain kept me up all night last night. I assume I will need a CT scan to rule out diverticulitis and wondering if you can get me sent in for that. I would appreciate it. As you know it is miserable so the sooner the better. I did call the clinic but got the main switchboard who said they would send a message.  Thanks Connie

## 2024-09-18 NOTE — PATIENT INSTRUCTIONS
1. Take antibiotics as prescribed.  2. Take Tylenol as needed for pain control.  3. Start a clear liquid diet for the next 24-48 hours hours, advance your diet as tolerated. Start advancing this to thick liquids then soft solids.   4. Follow up with your primary care provider in 2-3 days for recheck.  5. You do not need to avoid seeds, nuts, popcorn, or other similar foods after this resolves. This was once thought to be true for diverticulitis prevention, but research has shown that not to be true.    Diverticulitis  Some people get pouches along the wall of the colon as they get older. The pouches, called diverticuli, usually cause no symptoms. If the pouches become blocked, you can get an infection. This infection is called diverticulitis. It causes pain in your lower abdomen and fever. If not treated, it can become a serious condition, causing an abscess to form inside the pouch. The abscess may block the intestinal tract even or rupture, spreading infection throughout the abdomen.  When treatment is started early, oral antibiotics alone may be enough to cure diverticulitis. This method is tried first. But, if you don't improve or if your condition gets worse while using oral antibiotics, you may need to be admitted to the hospital for IV antibiotics. Severe cases may require surgery.  Home care  The following guidelines will help you care for yourself at home:  Take antibiotics exactly as instructed. Don't miss any doses or stop taking the medication, even if you feel better.  Monitor your temperature and tell your healthcare provider if you have rising temperatures.  Preventing future attacks  Once you have an episode of diverticulitis, you are at risk for having it again. After you have recovered from this episode, you may be able to lower your risk by eating a high-fiber diet (20 gm/day to 35 gm/day of fiber). This cleans out the colon pouches that already exist and may prevent new ones from forming. Foods  high in fiber include fresh fruits and edible peelings, raw or lightly cooked vegetables, whole grain cereals and breads, dried beans and peas, and bran.  Other steps that can help prevent future attacks include:  Take your medicines, such as antibiotics, as your healthcare provider says.  Drink 6 to 8 glasses of water every day, unless told otherwise.  Use a heating pad or hot water bottle to help abdominal cramping or pain.  Begin an exercise program. Ask your healthcare provider how to get started. You can benefit from simple activities such as walking or gardening.  Treat diarrhea with a bland diet. Start with liquids only; then slowly add fiber over time.  Watch for changes in your bowel movements (constipation to diarrhea). Avoid constipation by eating a high fiber diet and taking a gentle laxative such as Metamucil or Miralax if needed.  Get plenty of rest and sleep.  Follow-up care  Follow up with your healthcare provider as advised or sooner if you are not getting better in the next 2 days.  When to seek medical advice  Call your healthcare provider right away if any of these occur:  Fever of 100.4 F (38 C) or higher, or as directed by your healthcare provider  Repeated vomiting or swelling of the abdomen  Weakness, dizziness, light-headedness  Pain in your abdomen that gets worse, severe, or spreads to your back  Pain that moves to the right lower abdomen  Rectal bleeding (stools that are red, black or maroon color)  Unexpected vaginal bleeding

## 2024-09-18 NOTE — TELEPHONE ENCOUNTER
Pt is having lower abdominal discomfort and bloating, she is looking to have a CT scan. Please reach out to pt if she needs an appt or if that can be ordered for her to schedule.

## 2024-09-19 RX ORDER — DOXYCYCLINE 100 MG/1
100 CAPSULE ORAL 2 TIMES DAILY
Qty: 14 CAPSULE | Refills: 0 | OUTPATIENT
Start: 2024-09-19 | End: 2024-09-26

## 2024-09-22 ASSESSMENT — ANXIETY QUESTIONNAIRES
8. IF YOU CHECKED OFF ANY PROBLEMS, HOW DIFFICULT HAVE THESE MADE IT FOR YOU TO DO YOUR WORK, TAKE CARE OF THINGS AT HOME, OR GET ALONG WITH OTHER PEOPLE?: NOT DIFFICULT AT ALL
7. FEELING AFRAID AS IF SOMETHING AWFUL MIGHT HAPPEN: NOT AT ALL
GAD7 TOTAL SCORE: 0

## 2024-09-24 ENCOUNTER — TELEPHONE (OUTPATIENT)
Dept: DERMATOLOGY | Facility: CLINIC | Age: 64
End: 2024-09-24
Payer: COMMERCIAL

## 2024-09-24 ENCOUNTER — NURSE TRIAGE (OUTPATIENT)
Dept: FAMILY MEDICINE | Facility: CLINIC | Age: 64
End: 2024-09-24
Payer: COMMERCIAL

## 2024-09-24 NOTE — TELEPHONE ENCOUNTER
Left Voicemail (1st Attempt) for the patient to call back and schedule the following:    Appointment type: FOLLOW UP    Provider: Tiara    Return date: next available     Specialty phone number: [phone]    Additonal Notes: Virtual

## 2024-09-24 NOTE — TELEPHONE ENCOUNTER
Provider Response to 2nd Level Triage Request    I have reviewed the RN documentation. My recommendation is:  Stop the moxifloxacin - do a liquid diet and ok for tylenol for pain .   If pain significant can do prescription pain med.      Re-eval in clinic in 1 week or sooner to ER if worse.     Allergies   Allergen Reactions    Atorvastatin Nausea     Nausea and abd pain     Ceftin GI Disturbance     Stomach upset and bloating - given at same time as clindamycin ? Which one as cause.      Ciprofloxacin Nausea and Vomiting    Clindamycin GI Disturbance     Stomach upset and bloating - given at same time as ceftin, ? Which one as cause     Flagyl [Metronidazole]      immediate migraine headache     Morphine Itching     Severe with nose, facial  Swelling - oxycodone = ok /no problems     Penicillins Hives    Sulfa Antibiotics Rash     Severe red , flushed rash    Levaquin Rash     States she can take avelox

## 2024-09-24 NOTE — TELEPHONE ENCOUNTER
2nd level triage- yes Routing to pcp     Situation   Patient states she is on Moxifloxacin for diverticulitis  She has been taking the antibiotic for 5 days and experiencing prickling and tingling in both leg.     Patient wants to know if she can stop the antibiotic at this point or need a different one.     Background   Multiple allergies   9/18/24- ads/ct scan    oxifloxacin (AVELOX) 400 MG tablet 10 tablet 0 9/18/2024 9/28/2024 No   Sig - Route: Take 1 tablet (400 mg) by mouth daily for 10 days. - Oral     Assessment   after taking the first dose of the antibiotic she started  experiencing prickling and tingling in both legs  Thigh through foot   Does not have this at baseline.     Prickling and tingling is present day and night     States she walk normally and is not effective    Patient states concern that the prickling and tingling could be permanent that is why she wants to stop the anx. The information sheet told her to call about these symptoms     Abdominal pain has resolved , no fevers or other symptoms     Recommendations     Declined a virtual today with other provider to discuss symptoms and if alterative medication is needed   Explained Will forward to Dr. Stephens and call her back.     Worsening CMS changes, unable to walk pain, worsening be seen in person  Future Appointments 9/24/2024 - 3/23/2025        Date Visit Type Length Department Provider     9/26/2024  9:40 AM WELCOME TO MEDICARE 40 min  FAMILY PRACTICE Gayatri Stephens MD    Location Instructions:     [facility] is located at [address] [place] [address], along [address] Free parking is available; access the lot by turning [address] from [address] onto [address], then west onto [facility] [address].                     Reason for Disposition   Caller has medication question about med NOT prescribed by PCP and triager unable to answer question (e.g., compatibility with other med, storage)    Additional  Information   Negative: Drug overdose and triager unable to answer question   Negative: Caller requesting a renewal or refill of a medicine patient is currently taking   Negative: Caller requesting information unrelated to medicine   Negative: Caller requesting information about COVID-19 Vaccine   Negative: Caller requesting information about Emergency Contraception   Negative: Caller requesting information about Combined Birth Control Pills   Negative: Caller requesting information about Progestin Birth Control Pills   Negative: Caller requesting information about Post-Op pain or medicines   Negative: Caller requesting a prescription antibiotic (such as penicillin) for Strep throat and has a positive culture result   Negative: Caller requesting a prescription anti-viral med (such as Tamiflu) and has influenza (flu) symptoms   Negative: Immunization reaction suspected   Negative: Rash while taking a medicine or within 3 days of stopping it   Negative: Asthma and having symptoms of asthma (cough, wheezing, etc.)   Negative: Symptom of illness (e.g., headache, abdominal pain, earache, vomiting) that are more than mild   Negative: Breastfeeding questions about mother's medicines and diet   Negative: MORE THAN A DOUBLE DOSE of a prescription or over-the-counter (OTC) drug   Negative: DOUBLE DOSE (an extra dose or lesser amount) of prescription drug and any symptoms (e.g., dizziness, nausea, pain, sleepiness)   Negative: DOUBLE DOSE (an extra dose or lesser amount) of over-the-counter (OTC) drug and any symptoms (e.g., dizziness, nausea, pain, sleepiness)   Negative: Took another person's prescription drug   Negative: DOUBLE DOSE (an extra dose or lesser amount) of prescription drug and NO symptoms  (Exception: A double dose of antibiotics.)   Negative: Diabetes drug error or overdose (e.g., took wrong type of insulin or took extra dose)    Protocols used: Medication Question Call-A-[place]

## 2024-09-24 NOTE — TELEPHONE ENCOUNTER
Pt has been eating regular diet and doesn't have any pain. Advised full liquids/soft since stopping ABX. Advised worsening symptoms go to ED> Pt has an appointment scheduled with PCP on Wednesday.     Magui Mckinnon RN [facility]

## 2024-09-26 ENCOUNTER — OFFICE VISIT (OUTPATIENT)
Dept: FAMILY MEDICINE | Facility: CLINIC | Age: 64
End: 2024-09-26
Payer: COMMERCIAL

## 2024-09-26 VITALS
HEIGHT: 59 IN | BODY MASS INDEX: 33.97 KG/M2 | HEART RATE: 82 BPM | RESPIRATION RATE: 18 BRPM | WEIGHT: 168.5 LBS | TEMPERATURE: 97.7 F | SYSTOLIC BLOOD PRESSURE: 130 MMHG | OXYGEN SATURATION: 97 % | DIASTOLIC BLOOD PRESSURE: 72 MMHG

## 2024-09-26 DIAGNOSIS — E78.5 HYPERLIPIDEMIA LDL GOAL <130: ICD-10-CM

## 2024-09-26 DIAGNOSIS — F41.9 ANXIETY: ICD-10-CM

## 2024-09-26 DIAGNOSIS — Z00.00 WELCOME TO MEDICARE PREVENTIVE VISIT: Primary | ICD-10-CM

## 2024-09-26 DIAGNOSIS — K91.1 POSTSURGICAL DUMPING SYNDROME: ICD-10-CM

## 2024-09-26 DIAGNOSIS — Z78.0 ASYMPTOMATIC MENOPAUSE: ICD-10-CM

## 2024-09-26 DIAGNOSIS — D84.9 IMMUNOSUPPRESSION (H): ICD-10-CM

## 2024-09-26 DIAGNOSIS — R73.9 BLOOD GLUCOSE ELEVATED: ICD-10-CM

## 2024-09-26 DIAGNOSIS — J30.2 OTHER SEASONAL ALLERGIC RHINITIS: ICD-10-CM

## 2024-09-26 DIAGNOSIS — C7A.8 PRIMARY MALIGNANT NEUROENDOCRINE NEOPLASM OF ILEUM (H): ICD-10-CM

## 2024-09-26 DIAGNOSIS — K21.9 GASTROESOPHAGEAL REFLUX DISEASE WITHOUT ESOPHAGITIS: ICD-10-CM

## 2024-09-26 DIAGNOSIS — Z28.20 VACCINE REFUSED BY PATIENT: ICD-10-CM

## 2024-09-26 DIAGNOSIS — K57.30 DIVERTICULOSIS OF LARGE INTESTINE WITHOUT HEMORRHAGE: ICD-10-CM

## 2024-09-26 DIAGNOSIS — L40.50 PSORIATIC ARTHRITIS (H): ICD-10-CM

## 2024-09-26 DIAGNOSIS — I10 ESSENTIAL HYPERTENSION WITH GOAL BLOOD PRESSURE LESS THAN 140/90: ICD-10-CM

## 2024-09-26 DIAGNOSIS — L40.9 PSORIASIS: ICD-10-CM

## 2024-09-26 DIAGNOSIS — F33.0 MAJOR DEPRESSIVE DISORDER, RECURRENT EPISODE, MILD (H): ICD-10-CM

## 2024-09-26 DIAGNOSIS — E78.1 HYPERTRIGLYCERIDEMIA: ICD-10-CM

## 2024-09-26 DIAGNOSIS — K76.0 HEPATIC STEATOSIS: ICD-10-CM

## 2024-09-26 LAB
CREAT UR-MCNC: 66.2 MG/DL
MICROALBUMIN UR-MCNC: <12 MG/L
MICROALBUMIN/CREAT UR: NORMAL MG/G{CREAT}

## 2024-09-26 PROCEDURE — G0402 INITIAL PREVENTIVE EXAM: HCPCS | Performed by: FAMILY MEDICINE

## 2024-09-26 PROCEDURE — 82043 UR ALBUMIN QUANTITATIVE: CPT | Performed by: FAMILY MEDICINE

## 2024-09-26 PROCEDURE — 82570 ASSAY OF URINE CREATININE: CPT | Performed by: FAMILY MEDICINE

## 2024-09-26 PROCEDURE — 99214 OFFICE O/P EST MOD 30 MIN: CPT | Mod: 25 | Performed by: FAMILY MEDICINE

## 2024-09-26 RX ORDER — VENLAFAXINE 37.5 MG/1
37.5 TABLET ORAL 2 TIMES DAILY
Qty: 180 TABLET | Refills: 3 | Status: SHIPPED | OUTPATIENT
Start: 2024-09-26

## 2024-09-26 RX ORDER — HYDROXYZINE HYDROCHLORIDE 25 MG/1
25 TABLET, FILM COATED ORAL
Qty: 30 TABLET | Refills: 1 | Status: SHIPPED | OUTPATIENT
Start: 2024-09-26

## 2024-09-26 RX ORDER — FENOFIBRATE 160 MG/1
TABLET ORAL
Qty: 90 TABLET | Refills: 3 | Status: SHIPPED | OUTPATIENT
Start: 2024-09-26

## 2024-09-26 RX ORDER — TRIAMTERENE AND HYDROCHLOROTHIAZIDE 37.5; 25 MG/1; MG/1
1 CAPSULE ORAL DAILY
Qty: 90 CAPSULE | Refills: 3 | Status: SHIPPED | OUTPATIENT
Start: 2024-09-26

## 2024-09-26 RX ORDER — LISINOPRIL 10 MG/1
10 TABLET ORAL DAILY
Qty: 90 TABLET | Refills: 3 | Status: SHIPPED | OUTPATIENT
Start: 2024-09-26

## 2024-09-26 RX ORDER — FLUTICASONE PROPIONATE 50 MCG
SPRAY, SUSPENSION (ML) NASAL
Qty: 48 ML | Refills: 4 | Status: SHIPPED | OUTPATIENT
Start: 2024-09-26

## 2024-09-26 ASSESSMENT — PATIENT HEALTH QUESTIONNAIRE - PHQ9
10. IF YOU CHECKED OFF ANY PROBLEMS, HOW DIFFICULT HAVE THESE PROBLEMS MADE IT FOR YOU TO DO YOUR WORK, TAKE CARE OF THINGS AT HOME, OR GET ALONG WITH OTHER PEOPLE: NOT DIFFICULT AT ALL
SUM OF ALL RESPONSES TO PHQ QUESTIONS 1-9: 0
SUM OF ALL RESPONSES TO PHQ QUESTIONS 1-9: 0

## 2024-09-26 NOTE — PATIENT INSTRUCTIONS
"Patient Education   If you change your mind on any vaccines:     You can do your vaccinations/immunizations and/or BP checks in any [place] pharmacy:     To make a pharmacy only appointment online, please go to [email or web address] and select \"Click here to schedule a Vaccination or Blood Pressure Check at available [place] Pharmacies \" at the bottom of the first page.  You can then select a location, then date and time bubbles that best fits your schedule.      Preventive Care Advice   This is general advice given by our system to help you stay healthy. However, your care team may have specific advice just for you. Please talk to your care team about your preventive care needs.  Nutrition  Eat 5 or more servings of fruits and vegetables each day.  Try wheat bread, brown rice and whole grain pasta (instead of white bread, rice, and pasta).  Get enough calcium and vitamin D. Check the label on foods and aim for 100% of the RDA (recommended daily allowance).  Lifestyle  Exercise at least 150 minutes each week  (30 minutes a day, 5 days a week).  Do muscle strengthening activities 2 days a week. These help control your weight and prevent disease.  No smoking.  Wear sunscreen to prevent skin cancer.  Have a dental exam and cleaning every 6 months.  Yearly exams  See your health care team every year to talk about:  Any changes in your health.  Any medicines your care team has prescribed.  Preventive care, family planning, and ways to prevent chronic diseases.  Shots (vaccines)   HPV shots (up to age 26), if you've never had them before.  Hepatitis B shots (up to age 59), if you've never had them before.  COVID-19 shot: Get this shot when it's due.  Flu shot: Get a flu shot every year.  Tetanus shot: Get a tetanus shot every 10 years.  Pneumococcal, hepatitis A, and RSV shots: Ask your care team if you need these based on your risk.  Shingles shot (for age 50 and up)  General health tests  Diabetes screening:  Starting " at age 35, Get screened for diabetes at least every 3 years.  If you are younger than age 35, ask your care team if you should be screened for diabetes.  Cholesterol test: At age 39, start having a cholesterol test every 5 years, or more often if advised.  Bone density scan (DEXA): At age 50, ask your care team if you should have this scan for osteoporosis (brittle bones).  Hepatitis C: Get tested at least once in your life.  STIs (sexually transmitted infections)  Before age 24: Ask your care team if you should be screened for STIs.  After age 24: Get screened for STIs if you're at risk. You are at risk for STIs (including HIV) if:  You are sexually active with more than one person.  You don't use condoms every time.  You or a partner was diagnosed with a sexually transmitted infection.  If you are at risk for HIV, ask about PrEP medicine to prevent HIV.  Get tested for HIV at least once in your life, whether you are at risk for HIV or not.  Cancer screening tests  Cervical cancer screening: If you have a cervix, begin getting regular cervical cancer screening tests starting at age 21.  Breast cancer scan (mammogram): If you've ever had breasts, begin having regular mammograms starting at age 40. This is a scan to check for breast cancer.  Colon cancer screening: It is important to start screening for colon cancer at age 45.  Have a colonoscopy test every 10 years (or more often if you're at risk) Or, ask your provider about stool tests like a FIT test every year or Cologuard test every 3 years.  To learn more about your testing options, visit:   .  For help making a decision, visit:   https://bit.ly/xu37798.  Prostate cancer screening test: If you have a prostate, ask your care team if a prostate cancer screening test (PSA) at age 55 is right for you.  Lung cancer screening: If you are a current or former smoker ages 50 to 80, ask your care team if ongoing lung cancer screenings are right for you.  For  "informational purposes only. Not to replace the advice of your health care provider. Copyright   2023 [facility]. All rights reserved. Clinically reviewed by the [facility]. Zeuss [id number] - REV 01/24.  Learning About Being Physically Active  What is physical activity?     Being physically active means doing any kind of activity that gets your body moving.  The types of physical activity that can help you get fit and stay healthy include:  Aerobic or \"cardio\" activities. These make your heart beat faster and make you breathe harder, such as brisk walking, riding a bike, or running. They strengthen your heart and lungs and build up your endurance.  Strength training activities. These make your muscles work against, or \"resist,\" something. Examples include lifting weights or doing push-ups. These activities help tone and strengthen your muscles and bones.  Stretches. These let you move your joints and muscles through their full range of motion. Stretching helps you be more flexible.  Reaching a balance between these three types of physical activity is important because each one contributes to your overall fitness.  What are the benefits of being active?  Being active is one of the best things you can do for your health. It helps you to:  Feel stronger and have more energy to do all the things you like to do.  Focus better at school or work.  Feel, think, and sleep better.  Reach and stay at a healthy weight.  Lose fat and build lean muscle.  Lower your risk for serious health problems, including diabetes, heart attack, high blood pressure, and some cancers.  Keep your heart, lungs, bones, muscles, and joints strong and healthy.  How can you make being active part of your life?  Start slowly. Make it your long-term goal to get at least 30 minutes of exercise on most days of the week. Walking is a good choice. You also may want to do other activities, such as running, " "swimming, cycling, or playing tennis or team sports.  Pick activities that you like--ones that make your heart beat faster, your muscles stronger, and your muscles and joints more flexible. If you find more than one thing you like doing, do them all. You don't have to do the same thing every day.  Get your heart pumping every day. Any activity that makes your heart beat faster and keeps it at that rate for a while counts.  Here are some great ways to get your heart beating faster:  Go for a brisk walk, run, or hike.  Go for a swim or bike ride.  Take an online exercise class or dance.  Play a game of touch football, basketball, or soccer.  Play tennis, pickleball, or racquetball.  Climb stairs.  Even some household chores can be aerobic. Just do them at a faster pace. Raking or mowing the lawn, sweeping the garage, and vacuuming and cleaning your home all can help get your heart rate up.  Strengthen your muscles during the week. You don't have to lift heavy weights or grow big, bulky muscles to get stronger. Doing a few simple activities that make your muscles work against, or \"resist,\" something can help you get stronger. Aim for at least twice a week.  For example, you can:  Do push-ups or sit-ups, which use your own body weight as resistance.  Lift weights or dumbbells or use stretch bands at home or in a gym or community center.  Stretch your muscles often. Stretching will help you as you become more active. It can help you stay flexible and loosen tight muscles. It can also help improve your balance and posture and can be a great way to relax.  Be sure to stretch the muscles you'll be using when you work out. It's best to warm your muscles slightly before you stretch them. Walk or do some other light aerobic activity for a few minutes. Then start stretching.  When you stretch your muscles:  Do it slowly. Stretching is not about going fast or making sudden movements.  Don't push or bounce during a stretch.  Hold " "each stretch for at least 15 to 30 seconds, if you can. You should feel a stretch in the muscle, but not pain.  Breathe out as you do the stretch. Then breathe in as you hold the stretch. Don't hold your breath.  If you're worried about how more activity might affect your health, have a checkup before you start. Follow any special advice your doctor gives you for getting a smart start.  Where can you learn more?  Go to https://www.[employer].net/patiented  Enter W332 in the search box to learn more about \"Learning About Being Physically Active.\"  Current as of: June 5, 2023  Content Version: 14.1 2006-2024 [employer].   Care instructions adapted under license by your healthcare professional. If you have questions about a medical condition or this instruction, always ask your healthcare professional. [employer] disclaims any warranty or liability for your use of this information.    Learning About Being Active as an Older Adult  Why is being active important as you get older?     Being active is one of the best things you can do for your health. And it's never too late to start. Being active--or getting active, if you aren't already--has definite benefits. It can:  Give you more energy,  Keep your mind sharp.  Improve balance to reduce your risk of falls.  Help you manage chronic illness with fewer medicines.  No matter how old you are, how fit you are, or what health problems you have, there is a form of activity that will work for you. And the more physical activity you can do, the better your overall health will be.  What kinds of activity can help you stay healthy?  Being more active will make your daily activities easier. Physical activity includes planned exercise and things you do in daily life. There are four types of activity:  Aerobic.  Doing aerobic activity makes your heart and lungs strong.  Includes walking, dancing, and gardening.  Aim for at least 2  hours spread " throughout the week.  It improves your energy and can help you sleep better.  Muscle-strengthening.  This type of activity can help maintain muscle and strengthen bones.  Includes climbing stairs, using resistance bands, and lifting or carrying heavy loads.  Aim for at least twice a week.  It can help protect the knees and other joints.  Stretching.  Stretching gives you better range of motion in joints and muscles.  Includes upper arm stretches, calf stretches, and gentle yoga.  Aim for at least twice a week, preferably after your muscles are warmed up from other activities.  It can help you function better in daily life.  Balancing.  This helps you stay coordinated and have good posture.  Includes heel-to-toe walking, trevon chi, and certain types of yoga.  Aim for at least 3 days a week.  It can reduce your risk of falling.  Even if you have a hard time meeting the recommendations, it's better to be more active than less active. All activity done in each category counts toward your weekly total. You'd be surprised how daily things like carrying groceries, keeping up with grandchildren, and taking the stairs can add up.  What keeps you from being active?  If you've had a hard time being more active, you're not alone. Maybe you remember being able to do more. Or maybe you've never thought of yourself as being active. It's frustrating when you can't do the things you want. Being more active can help. What's holding you back?  Getting started.  Have a goal, but break it into easy tasks. Small steps build into big accomplishments.  Staying motivated.  If you feel like skipping your activity, remember your goal. Maybe you want to move better and stay independent. Every activity gets you one step closer.  Not feeling your best.  Start with 5 minutes of an activity you enjoy. Prove to yourself you can do it. As you get comfortable, increase your time.  You may not be where you want to be. But you're in the process of  "getting there. Everyone starts somewhere.  How can you find safe ways to stay active?  Talk with your doctor about any physical challenges you're facing. Make a plan with your doctor if you have a health problem or aren't sure how to get started with activity.  If you're already active, ask your doctor if there is anything you should change to stay safe as your body and health change.  If you tend to feel dizzy after you take medicine, avoid activity at that time. Try being active before you take your medicine. This will reduce your risk of falls.  If you plan to be active at home, make sure to clear your space before you get started. Remove things like TV cords, coffee tables, and throw rugs. It's safest to have plenty of space to move freely.  The key to getting more active is to take it slow and steady. Try to improve only a little bit at a time. Pick just one area to improve on at first. And if an activity hurts, stop and talk to your doctor.  Where can you learn more?  Go to https://www.[employer].net/patiented  Enter P600 in the search box to learn more about \"Learning About Being Active as an Older Adult.\"  Current as of: June 5, 2023  Content Version: 14.1 2006-2024 [employer].   Care instructions adapted under license by your healthcare professional. If you have questions about a medical condition or this instruction, always ask your healthcare professional. [employer] disclaims any warranty or liability for your use of this information.    Eating Healthy Foods: Care Instructions  With every meal, you can make healthy food choices. Try to eat a variety of fruits, vegetables, whole grains, lean proteins, and low-fat dairy products. This can help you get the right balance of nutrients, including vitamins and minerals. Small changes add up over time. You can start by adding one healthy food to your meals each day.    Try to make half your plate fruits and vegetables, one-fourth " "whole grains, and one-fourth lean proteins. Try including dairy with your meals.   Eat more fruits and vegetables. Try to have them with most meals and snacks.   Foods for healthy eating    Fruits    These can be fresh, frozen, canned, or dried.  Try to choose whole fruit rather than fruit juice.  Eat a variety of colors.    Vegetables    These can be fresh, frozen, canned, or dried.  Beans, peas, and lentils count too.    Whole grains    Choose whole-grain breads, cereals, and noodles.  Try brown rice.    Lean proteins    These can include lean meat, poultry, fish, and eggs.  You can also have tofu, beans, peas, lentils, nuts, and seeds.    Dairy    Try milk, yogurt, and cheese.  Choose low-fat or fat-free when you can.  If you need to, use lactose-free milk or fortified plant-based milk products, such as soy milk.    Water    Drink water when you're thirsty.  Limit sugar-sweetened drinks, including soda, fruit drinks, and sports drinks.  Where can you learn more?  Go to https://www.Pinion.gg.net/patiented  Enter T756 in the search box to learn more about \"Eating Healthy Foods: Care Instructions.\"  Current as of: September 20, 2023               Content Version: 14.0    3951-2543 [employer].   Care instructions adapted under license by your healthcare professional. If you have questions about a medical condition or this instruction, always ask your healthcare professional. [employer] disclaims any warranty or liability for your use of this information.      Nutrition for Older Adults: Care Instructions  Overview     Good nutrition is important at any age. But it is especially important for older adults. Eating healthy foods helps keep your body strong. And it can help lower your risk for disease.  As you get older, your body needs more of certain nutrients. These include vitamin B12, calcium, and vitamin D. But it may be harder for you to get these and other important nutrients. This " could be for many reasons. You may not feel as hungry as you used to. Or you could have problems with your teeth or mouth that make it hard to chew. Or you may not enjoy planning and preparing meals, especially if you live alone.  Talk with your doctor if you want help getting the most nutrition from what you eat. They may have you work with a dietitian to help you plan meals.  Follow-up care is a key part of your treatment and safety. Be sure to make and go to all appointments, and call your doctor if you are having problems. It's also a good idea to know your test results and keep a list of the medicines you take.  How can you care for yourself at home?  To stay healthy  Eat a variety of foods. The more you vary the foods you eat, the more vitamins, minerals, and other nutrients you get.  Ask your doctor if you should take a multivitamin. Choose one with about 100% of the daily value (DV) for vitamins and minerals. Do not take more than 100% of the daily value for any vitamin or mineral unless your doctor tells you to. Talk with your doctor if you are not sure which multivitamin is right for you.  Try to eat lots of fruits and vegetables. Fresh or frozen vegetables and fruits are healthy choices. Choose canned vegetables that have no salt added and fruits that are canned in their own juice or light syrup.  Include foods that are high in vitamin B12 in your diet. Good choices are fortified breakfast cereal, nonfat or low-fat milk and other dairy products, meat, poultry, fish, and eggs.  Get enough calcium and vitamin D. Good choices include nonfat or low-fat milk, cheese, and yogurt. Other good options are tofu, orange juice with added calcium, and some leafy green vegetables, such as ramsey greens and kale. If you don't use milk products, talk to your doctor about calcium and vitamin D supplements.  Try to eat protein foods every day. Good choices include lean meat, fish, poultry, eggs, and cheese. Other good  options are cooked beans, peanut butter, and nuts and seeds.  Choose whole grains for half of the grains you eat. Look for 100% whole wheat bread, whole-grain cereals, brown rice, and other whole grains.  If you have constipation  Eat high-fiber foods every day if you can. These include fruits, vegetables, cooked dried beans, and whole grains.  Drink plenty of fluids. If you have kidney, heart, or liver disease and have to limit fluids, talk with your doctor before you increase the amount of fluids you drink.  Ask your doctor if stool softeners may help keep your bowels regular.  If you have mouth problems that make chewing hard  Pick canned or cooked fruits and vegetables. These are often softer.  Chop or shred meat, poultry, and fish. Add sauce or gravy to the meat to help keep it moist.  Pick other protein foods that are soft. These include cheese, peanut butter, cooked beans, cottage cheese, and eggs.  If you have trouble shopping for yourself  Ask a local food store to deliver groceries to your home.  Contact your local area agency on aging and ask about resources that can help.  Ask a family member or neighbor to help you.  If you have trouble preparing meals  Try easier cooking methods such as using a slow cooker or microwave oven.  Let the grocery store do some of the work for you. Look for precut, washed, and ready-to-eat foods.  Take part in group meal programs. You can find these through senior citizen programs.  Have meals brought to your home. Your community may offer programs that deliver meals, such as Meals on Wheels. Or you could use an online meal delivery service.  If you are able, take a cooking class.  If your appetite is poor  Try to eat meals on a regular schedule. It may help to eat smaller meals more often throughout the day.  If you can, eat some meals with other people. You could ask family or friends to eat with you. Or you could take part in group meal programs offered in your  "community.  Ask your doctor if your medicines could cause appetite or taste problems. If so, ask about changing medicines.  Add spices and herbs to increase the flavor of food.  If you think you are depressed, ask your doctor for help. Depression can affect your appetite. And it can make it hard to do everyday activities like grocery shopping and making meals. Treatment can help.  When should you call for help?  Watch closely for changes in your health, and be sure to contact your doctor if you have any problems.  Where can you learn more?  Go to https://www.[employer].net/patiented  Enter L643 in the search box to learn more about \"Nutrition for Older Adults: Care Instructions.\"  Current as of: September 25, 2023               Content Version: 14.0    9158-7061 [employer].   Care instructions adapted under license by your healthcare professional. If you have questions about a medical condition or this instruction, always ask your healthcare professional. [employer] disclaims any warranty or liability for your use of this information.      Hearing Loss: Care Instructions  Overview     Hearing loss is a sudden or slow decrease in how well you hear. It can range from slight to profound. Permanent hearing loss can occur with aging. It also can happen when you are exposed long-term to loud noise. Examples include listening to loud music, riding motorcycles, or being around other loud machines.  Hearing loss can affect your work and home life. It can make you feel lonely or depressed. You may feel that you have lost your independence. But hearing aids and other devices can help you hear better and feel connected to others.  Follow-up care is a key part of your treatment and safety. Be sure to make and go to all appointments, and call your doctor if you are having problems. It's also a good idea to know your test results and keep a list of the medicines you take.  How can you care for yourself " at home?  Avoid loud noises whenever possible. This helps keep your hearing from getting worse.  Always wear hearing protection around loud noises.  Wear a hearing aid as directed.  A professional can help you pick a hearing aid that will work best for you.  You can also get hearing aids over the counter for mild to moderate hearing loss.  Have hearing tests as your doctor suggests. They can show whether your hearing has changed. Your hearing aid may need to be adjusted.  Use other devices as needed. These may include:  Telephone amplifiers and hearing aids that can connect to a television, stereo, radio, or microphone.  Devices that use lights or vibrations. These alert you to the doorbell, a ringing telephone, or a baby monitor.  Television closed-captioning. This shows the words at the bottom of the screen. Most new TVs can do this.  TTY (text telephone). This lets you type messages back and forth on the telephone instead of talking or listening. These devices are also called TDD. When messages are typed on the keyboard, they are sent over the phone line to a receiving TTY. The message is shown on a monitor.  Use text messaging, social media, and email if it is hard for you to communicate by telephone.  Try to learn a listening technique called speechreading. It is not lipreading. You pay attention to people's gestures, expressions, posture, and tone of voice. These clues can help you understand what a person is saying. Face the person you are talking to, and have them face you. Make sure the lighting is good. You need to see the other person's face clearly.  Think about counseling if you need help to adjust to your hearing loss.  When should you call for help?  Watch closely for changes in your health, and be sure to contact your doctor if:    You think your hearing is getting worse.     You have new symptoms, such as dizziness or nausea.   Where can you learn more?  Go to  "https://www.PushPage.net/patiented  Enter R798 in the search box to learn more about \"Hearing Loss: Care Instructions.\"  Current as of: September 27, 2023               Content Version: 14.0    1405-0517 [employer].   Care instructions adapted under license by your healthcare professional. If you have questions about a medical condition or this instruction, always ask your healthcare professional. [employer] disclaims any warranty or liability for your use of this information.         "

## 2024-09-26 NOTE — PROGRESS NOTES
Preventive Care Visit  [facility] [place]  Gayatri Stephens MD, Family Medicine  Sep 26, 2024      Assessment & Plan :       ICD-10-CM    1. Welcome to Medicare preventive visit  Z00.00       2. Anxiety  F41.9 venlafaxine (EFFEXOR) 37.5 MG tablet      3. Immunosuppression (H24)  D84.9       4. Psoriatic arthritis (H)  L40.50       5. Major depressive disorder, recurrent episode, mild (H24)  F33.0       6. Diverticulosis of large intestine without hemorrhage - started in 2003 - has every 2-3 years or so - running out of antibiotics for use  K57.30       7. Essential hypertension with goal blood pressure less than 140/90  I10 Albumin Random Urine Quantitative with Creat Ratio     REVIEW OF HEALTH MAINTENANCE PROTOCOL ORDERS     triamterene-HCTZ (DYAZIDE) 37.5-25 MG capsule     Albumin Random Urine Quantitative with Creat Ratio     Comprehensive metabolic panel     CBC with Platelets & Differential      8. Hepatic steatosis - per MRI RUQ abdomen 4/12/2019 - liver not enlarged   K76.0 Comprehensive metabolic panel     CBC with Platelets & Differential      9. HYPERLIPIDEMIA LDL GOAL <130 [272.4CK] - joint aches w/ simvastatin 11/2010,lipitor=nausea/abd pain 1/2012  E78.5 Lipid panel reflex to direct LDL Fasting     Albumin Random Urine Quantitative with Creat Ratio     Comprehensive metabolic panel      10. Postsurgical dumping syndrome - s/p lap shashi = liquidy stools most of the time  - consider cholestyramine  K91.1       11. Hypertriglyceridemia -- needs lab followup  E78.1 fenofibrate (TRIGLIDE/LOFIBRA) 160 MG tablet     Lipid panel reflex to direct LDL Fasting      12. Other seasonal allergic rhinitis  J30.2 fluticasone (FLONASE) 50 MCG/ACT nasal spray      13. Psoriasis  L40.9 hydrOXYzine HCl (ATARAX) 25 MG tablet      14. Essential hypertension with goal blood pressure less than 140/90- pretty  well controlled today-- needs lab followup/med refills  I10 lisinopril (ZESTRIL) 10 MG tablet  "     15. Gastroesophageal reflux disease without esophagitis  K21.9 omeprazole (PRILOSEC) 20 MG DR capsule      16. Blood glucose elevated- nonfasting - A1c= 5.3on  4/19/2024  R73.9 Hemoglobin A1c     Comprehensive metabolic panel      17. Primary malignant neuroendocrine neoplasm of ileum - sees Dr. Remy Sorto -[facility] - yearly in the spring  C7A.8       18. Vaccine refused by patient - flu, covid-19, shingles, and pneumonia vaccine  Z28.20         Pt declines a nutrition referral for her fatty liver  and weight loss at this time.   Patient has been advised of split billing requirements and indicates understanding: Yes      Return in about 1 year (around 9/26/2025) for Wellness/Preventative Visit, blood pressure, cholesterol, w/ Dr. RUVALCABA for 40 min appt.       BMI  Estimated body mass index is 33.88 kg/m  as calculated from the following:    Height as of this encounter: 1.502 m (4' 11.13\").    Weight as of this encounter: 76.4 kg (168 lb 8 oz).   Weight management plan: Discussed healthy diet and exercise guidelines    Counseling  Appropriate preventive services were addressed with this patient via screening, questionnaire, or discussion as appropriate for fall prevention, nutrition, physical activity, Tobacco-use cessation, social engagement, weight loss and cognition.  Checklist reviewing preventive services available has been given to the patient.  Reviewed patient's diet, addressing concerns and/or questions.   Patient is at risk for social isolation and has been provided with information about the benefit of social connection.   The patient was provided with written information regarding signs of hearing loss.       MEDICATIONS:   Orders Placed This Encounter   Medications    venlafaxine (EFFEXOR) 37.5 MG tablet     Sig: Take 1 tablet (37.5 mg) by mouth 2 times daily.     Dispense:  180 tablet     Refill:  3    fenofibrate (TRIGLIDE/LOFIBRA) 160 MG tablet     Sig: TAKE ONE TABLET BY MOUTH " ONCE DAILY     Dispense:  90 tablet     Refill:  3    fluticasone (FLONASE) 50 MCG/ACT nasal spray     Sig: SPRAY TWO SPRAYS INTO BOTH NOSTRILS IN THE MORNING.     Dispense:  48 mL     Refill:  4    hydrOXYzine HCl (ATARAX) 25 MG tablet     Sig: Take 1 tablet (25 mg) by mouth nightly as needed for itching.     Dispense:  30 tablet     Refill:  1    lisinopril (ZESTRIL) 10 MG tablet     Sig: Take 1 tablet (10 mg) by mouth daily.     Dispense:  90 tablet     Refill:  3    omeprazole (PRILOSEC) 20 MG DR capsule     Sig: Take 1 capsule (20 mg) by mouth daily.     Dispense:  90 capsule     Refill:  3    triamterene-HCTZ (DYAZIDE) 37.5-25 MG capsule     Sig: Take 1 capsule by mouth daily.     Dispense:  90 capsule     Refill:  3          - Continue other medications without change  Work on weight loss  Regular exercise  See Patient Instructions         Gayatri Stephens MD    Ronn Rosales is a 63 year old, presenting for the following:  Physical  and the following other medical problems:       1. Welcome to Medicare preventive visit    2. Anxiety    3. Immunosuppression (H24)    4. Psoriatic arthritis (H)    5. Major depressive disorder, recurrent episode, mild (H24)    6. Diverticulosis of large intestine without hemorrhage - started in 2003 - has every 2-3 years or so - running out of antibiotics for use    7. Essential hypertension with goal blood pressure less than 140/90    8. Hepatic steatosis - per MRI RUQ abdomen 4/12/2019 - liver not enlarged     9. HYPERLIPIDEMIA LDL GOAL <130 [272.4CK] - joint aches w/ simvastatin 11/2010,lipitor=nausea/abd pain 1/2012    10. Postsurgical dumping syndrome - s/p lap shashi = liquidy stools most of the time  - consider cholestyramine    11. Hypertriglyceridemia -- needs lab followup    12. Other seasonal allergic rhinitis    13. Psoriasis    14. Essential hypertension with goal blood pressure less than 140/90- pretty  well controlled today-- needs lab followup/med  refills    15. Gastroesophageal reflux disease without esophagitis    16. Blood glucose elevated- nonfasting - A1c= 5.3on  2024    17. Primary malignant neuroendocrine neoplasm of ileum - sees Dr. Remy Sorto -[facility] care - yearly in the spring    18. Vaccine refused by patient - flu, covid-19, shingles, and pneumonia vaccine            2024     9:24 AM   Additional Questions   Roomed by malcolm LOPEZ   Accompanied by self        Health Care Directive:   Patient has a Health Care Directive on file  Advance care planning document is on file and is current.    HPI      Hyperlipidemia Follow-Up    Are you regularly taking any medication or supplement to lower your cholesterol?   No  Are you having muscle aches or other side effects that you think could be caused by your cholesterol lowering medication?  No    Hypertension Follow-up    Do you check your blood pressure regularly outside of the clinic? No   Are you following a low salt diet? No  Are your blood pressures ever more than 140 on the top number (systolic) OR more   than 90 on the bottom number (diastolic), for example 140/90? No    Depression and Anxiety   How are you doing with your depression since your last visit? No change  How are you doing with your anxiety since your last visit?  No change  Are you having other symptoms that might be associated with depression or anxiety? No  Have you had a significant life event? No   Do you have any concerns with your use of alcohol or other drugs? No    Social History     Tobacco Use    Smoking status: Former     Current packs/day: 0.00     Average packs/day: 1 pack/day for 5.0 years (5.0 ttl pk-yrs)     Types: Cigarettes     Start date: 1989     Quit date:      Years since quittin.7    Smokeless tobacco: Former    Tobacco comments:     11/3/17 - occ e-cig use     As of 10/27/23 pt states no longer doing cigarette use quit date 24   Vaping Use    Vaping status: Former     Substances: Nicotine    Devices: [employer]   Substance Use Topics    Alcohol use: Yes     Alcohol/week: 0.0 standard drinks of alcohol     Comment: 3-5 drinks per week    Drug use: No         4/26/2023    10:15 AM 9/21/2023     3:48 PM 9/26/2024     9:21 AM   PHQ   PHQ-9 Total Score 0 0 0   Q9: Thoughts of better off dead/self-harm past 2 weeks Not at all Not at all Not at all         4/25/2023     2:36 PM 9/21/2023     3:49 PM 9/22/2024     2:57 PM   NAHOMY-7 SCORE   Total Score 0 (minimal anxiety) 0 (minimal anxiety) 0 (minimal anxiety)   Total Score 0 0 0         9/26/2024     9:21 AM   Last PHQ-9   1.  Little interest or pleasure in doing things 0   2.  Feeling down, depressed, or hopeless 0   3.  Trouble falling or staying asleep, or sleeping too much 0   4.  Feeling tired or having little energy 0   5.  Poor appetite or overeating 0   6.  Feeling bad about yourself 0   7.  Trouble concentrating 0   8.  Moving slowly or restless 0   Q9: Thoughts of better off dead/self-harm past 2 weeks 0   PHQ-9 Total Score 0         9/22/2024     2:57 PM   NAHOMY-7    1. Feeling nervous, anxious, or on edge 0   2. Not being able to stop or control worrying 0   3. Worrying too much about different things 0   4. Trouble relaxing 0   5. Being so restless that it is hard to sit still 0   6. Becoming easily annoyed or irritable 0   7. Feeling afraid, as if something awful might happen 0   NAHOMY-7 Total Score 0   If you checked any problems, how difficult have they made it for you to do your work, take care of things at home, or get along with other people? Not difficult at all       Suicide Assessment Five-step Evaluation and Treatment (SAFE-T)          9/22/2024   General Health   How would you rate your overall physical health? Good   Feel stress (tense, anxious, or unable to sleep) Not at all            9/22/2024   Nutrition   Diet: Regular (no restrictions)            9/22/2024   Exercise   Days per week of moderate/strenous  exercise 0 days   Average minutes spent exercising at this level 0 min      (!) EXERCISE CONCERN      9/22/2024   Social Factors   Frequency of gathering with friends or relatives Never   Worry food won't last until get money to buy more No   Food not last or not have enough money for food? No   Do you have housing? (Housing is defined as stable permanent housing and does not include staying ouside in a car, in a tent, in an abandoned building, in an overnight shelter, or couch-surfing.) Yes   Are you worried about losing your housing? No   Lack of transportation? No   Unable to get utilities (heat,electricity)? No      (!) SOCIAL CONNECTIONS CONCERN      9/22/2024   Fall Risk   Fallen 2 or more times in the past year? No   Trouble with walking or balance? No             9/22/2024   Activities of Daily Living- Home Safety   Needs help with the following daily activites None of the above   Safety concerns in the home None of the above            9/22/2024   Dental   Dentist two times every year? Yes            9/22/2024   Hearing Screening   Hearing concerns? (!) IT'S HARD TO FOLLOW A CONVERSATION IN A NOISY RESTAURANT OR CROWDED ROOM.            9/22/2024   Driving Risk Screening   Patient/family members have concerns about driving No            9/22/2024   General Alertness/Fatigue Screening   Have you been more tired than usual lately? No            9/22/2024   Urinary Incontinence Screening   Bothered by leaking urine in past 6 months No             Today's PHQ-9 Score:       9/26/2024     9:21 AM   PHQ-9 SCORE   PHQ-9 Total Score MyChart 0   PHQ-9 Total Score 0         9/22/2024   Substance Use   Alcohol more than 3/day or more than 7/wk No   Do you have a current opioid prescription? No   How severe/bad is pain from 1 to 10? 0/10 (No Pain)   Do you use any other substances recreationally? No        Social History     Tobacco Use    Smoking status: Former     Current packs/day: 0.00     Average packs/day: 1  pack/day for 5.0 years (5.0 ttl pk-yrs)     Types: Cigarettes     Start date: 1989     Quit date:      Years since quittin.7    Smokeless tobacco: Former    Tobacco comments:     11/3/17 - occ e-cig use     As of 10/27/23 pt states no longer doing cigarette use quit date 24   Vaping Use    Vaping status: Former    Substances: Nicotine    Devices: Novavax ABble tank   Substance Use Topics    Alcohol use: Yes     Alcohol/week: 0.0 standard drinks of alcohol     Comment: 3-5 drinks per week    Drug use: No         2024   LAST FHS-7 RESULTS   1st degree relative breast or ovarian cancer No   Any relative bilateral breast cancer No   Any male have breast cancer No   Any ONE woman have BOTH breast AND ovarian cancer No   Any woman with breast cancer before 50yrs No   2 or more relatives with breast AND/OR ovarian cancer No   2 or more relatives with breast AND/OR bowel cancer No      Mammogram Screening - Mammogram every 1-2 years updated in Health Maintenance based on mutual decision making      History of abnormal Pap smear: No - age 65 or older with adequate negative prior screening test results (3 consecutive negative cytology results, 2 consecutive negative cotesting results, or 2 consecutive negative HrHPV test results within 10 years, with the most recent test occurring within the recommended screening interval for the test used)        2010    12:00 AM 3/27/2009    12:00 AM 2007    12:00 AM   PAP / HPV   PAP (Historical) NIL  NIL  NIL      ASCVD Risk   The 10-year ASCVD risk score ([place] ARANGO, et al., 2019) is: 8.6%    Values used to calculate the score:      Age: 63 years      Sex: Female      Is Non- : No      Diabetic: No      Tobacco smoker: No      Systolic Blood Pressure: 130 mmHg      Is BP treated: Yes      HDL Cholesterol: 47 mg/dL      Total Cholesterol: 221 mg/dL    Needs dexa scan.     Reviewed and updated as needed this visit by Provider                     Past Medical History:   Diagnosis Date    Allergic rhinitis, cause unspecified     year round - dog,dust-  Failed on claritin, claritin-D, zyrtec and zyrtec-D in past.     Benign neoplasm of cerebral meninges (H) 1999    has yearly MRI's. - [facility] MRI7/24/06 = no change from 7/21/05     Benign neoplasm of tonsil 7/05    ?tonsillar re-growth - sees Dr. Thomas ENT     Cancer (H)     Depressive disorder     Deviated nasal septum     sees Dr. Thomas ENT     Diverticulosis of colon (without mention of hemorrhage) 02-    History of Guillain-Danvers syndrome     NO FLU SHOTS - very mild case in [place] many years ago    Hyperlipidemia LDL goal < 130 doesn't want statins    simvastatin = severe hand joint pain , lipitor =nausea/abd. pain    Hypertension goal BP (blood pressure) < 140/90     Insomnia     trazodone -made pt feel hung over     Major depressive disorder, recurrent episode, moderate (H)     lexapro - sexual side effects     Moderate major depression (H) 2/4/2005    trazodone -made pt feel hung over     Other acne     Other extrapyramidal disease and abnormal movement disorder     ?GBS    Psoriatic arthritis (H) 4/26/2023    Symptomatic menopausal or female climacteric states     vivelle-dot      Past Surgical History:   Procedure Laterality Date    ABDOMEN SURGERY      BIOPSY      COLONOSCOPY  1/16/2012    Procedure:COLONOSCOPY; Colonoscopy; Surgeon:SHLOA JOYCE; Location: GI    COLONOSCOPY N/A 8/19/2024    Procedure: Colonoscopy with polypectomies using cold snare;  Surgeon: Gayatri Browne MD;  Location:  GI    HC REMOVE TONSILS/ADENOIDS,<13 Y/O      T and A, under 12 yrs    HEAD & NECK SURGERY      LAPAROSCOPIC CHOLECYSTECTOMY WITH CHOLANGIOGRAMS N/A 4/25/2016    Procedure: LAPAROSCOPIC CHOLECYSTECTOMY WITH CHOLANGIOGRAMS;  Surgeon: Rosa M Cristobal MD;  Location:  OR    LAPAROSCOPY DIAGNOSTIC (GENERAL) N/A 11/7/2017    Procedure:  LAPAROSCOPY DIAGNOSTIC (GENERAL);  Exploratory Laparoscopy, Laparotomy and Small Bowel resection.;  Surgeon: Rosa M Cristobal MD;  Location: [facility] OR    LAPAROTOMY EXPLORATORY N/A 2017    Procedure: LAPAROTOMY EXPLORATORY;;  Surgeon: Rosa M Cristobal MD;  Location: [facility] OR    RESECT SMALL BOWEL WITHOUT OSTOMY N/A 2017    Procedure: RESECT SMALL BOWEL WITHOUT OSTOMY;;  Surgeon: Rosa M Cristobal MD;  Location: [facility] OR    SURGICAL HISTORY OF -       Menigioma excision    ZZC LIGATE FALLOPIAN TUBE      Tubal Ligation    ZZC NONSPECIFIC PROCEDURE      PAROTID TUMOR L SIDE OF NECK - BENIGN    ZZC TOTAL ABDOM HYSTERECTOMY   ?    Hysterectomy, Total Abdominal with BSO     OB History    Para Term  AB Living   6 6 6 0 0 3   SAB IAB Ectopic Multiple Live Births   0 0 0 0 0      # Outcome Date GA Lbr Hudson/2nd Weight Sex Type Anes PTL Lv   6 Term            5 Term            4 Term            3 Term            2 Term            1 Term              Lab work is in process  Labs reviewed in EPIC  BP Readings from Last 3 Encounters:   24 130/72   24 134/88   24 132/78    Wt Readings from Last 3 Encounters:   24 76.4 kg (168 lb 8 oz)   24 74.8 kg (165 lb)   24 74.8 kg (165 lb)                  Patient Active Problem List   Diagnosis    Other seasonal allergic rhinitis    Diverticulosis of large intestine without hemorrhage - started in  - has every 2-3 years or so - running out of antibiotics for use    Benign meningioma-right frontal posterior - no more annual MRI's needed per neurosurgery    Symptomatic menopausal or female climacteric states    Benign neoplasm of tonsil    Anxiety    HYPERLIPIDEMIA LDL GOAL <130 [272.4CK] - joint aches w/ simvastatin 2010,lipitor=nausea/abd pain 2012    Primary insomnia    Major depressive disorder, recurrent episode, mild (H24)    Essential hypertension with goal blood pressure less than 140/90     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- ophtho recommended auto-immune/arthritis work-up    Epiploic appendagitis- 2008 and 10/26/2014    Controlled substance agreement signed- re-signed 3/7/2018 ambien #30 -5mg tabs monthly - refill x5 - ok to see q6 months     Gastroesophageal reflux disease without esophagitis    Hypertriglyceridemia    S/P laparoscopic cholecystectomy for acute cholecystitis with cholelithiasis    Postsurgical dumping syndrome - s/p lap shashi = liquidy stools most of the time  - consider cholestyramine    Mass of small intestine- distal ileum - seen on CT scan at time of diverticulitis     Primary malignant neuroendocrine neoplasm of ileum - sees Dr. Remy Sorto [facility] - yearly in the spring    Pulmonary nodules    Diarrhea, unspecified type- since sm bowel resection 11/2017    Hepatic steatosis - per MRI RUQ abdomen 4/12/2019 - liver not enlarged     Psoriatic arthritis (H)    Psoriasis- seeing Dr. Nick Menjivar - Dermatology - on Skyrizi injections (gets assistance from Abvi)    Elevated fasting glucose    Immunosuppression (H24)    Blood glucose elevated- nonfasting - A1c= 5.3on  4/19/2024    Vaccine refused by patient - flu, covid-19, shingles, and pneumonia vaccine     Past Surgical History:   Procedure Laterality Date    ABDOMEN SURGERY      BIOPSY      COLONOSCOPY  1/16/2012    Procedure:COLONOSCOPY; Colonoscopy; Surgeon:SHOLA JOYCE; Location: GI    COLONOSCOPY N/A 8/19/2024    Procedure: Colonoscopy with polypectomies using cold snare;  Surgeon: Gayatri Browne MD;  Location:  GI    HC REMOVE TONSILS/ADENOIDS,<11 Y/O      T and A, under 12 yrs    HEAD & NECK SURGERY      LAPAROSCOPIC CHOLECYSTECTOMY WITH CHOLANGIOGRAMS N/A 4/25/2016    Procedure: LAPAROSCOPIC CHOLECYSTECTOMY WITH CHOLANGIOGRAMS;  Surgeon: Rosa M Cristobal MD;  Location:  OR    LAPAROSCOPY DIAGNOSTIC (GENERAL) N/A 11/7/2017    Procedure: LAPAROSCOPY DIAGNOSTIC  (GENERAL);  Exploratory Laparoscopy, Laparotomy and Small Bowel resection.;  Surgeon: Rosa M Cristobal MD;  Location: [facility] OR    LAPAROTOMY EXPLORATORY N/A 2017    Procedure: LAPAROTOMY EXPLORATORY;;  Surgeon: Rosa M Cristobal MD;  Location: [facility] OR    RESECT SMALL BOWEL WITHOUT OSTOMY N/A 2017    Procedure: RESECT SMALL BOWEL WITHOUT OSTOMY;;  Surgeon: Rosa M Cristobal MD;  Location: [facility] OR    SURGICAL HISTORY OF -       Menigioma excision    ZZC LIGATE FALLOPIAN TUBE      Tubal Ligation    ZZC NONSPECIFIC PROCEDURE      PAROTID TUMOR L SIDE OF NECK - BENIGN    ZZC TOTAL ABDOM HYSTERECTOMY   ?    Hysterectomy, Total Abdominal with BSO       Social History     Tobacco Use    Smoking status: Former     Current packs/day: 0.00     Average packs/day: 1 pack/day for 5.0 years (5.0 ttl pk-yrs)     Types: Cigarettes     Start date: 1989     Quit date:      Years since quittin.7    Smokeless tobacco: Former    Tobacco comments:     11/3/17 - occ e-cig use     As of 10/27/23 pt states no longer doing cigarette use quit date 24   Substance Use Topics    Alcohol use: Yes     Alcohol/week: 0.0 standard drinks of alcohol     Comment: 3-5 drinks per week     Family History   Problem Relation Age of Onset    Hyperlipidemia Mother     Thyroid Disease Mother     Macular Degeneration Mother     Cancer Father         lung    Hypertension Father     Hyperlipidemia Father     Thyroid Disease Brother     Diabetes Maternal Grandmother     Cerebrovascular Disease Maternal Grandmother     Substance Abuse Maternal Grandfather     Thyroid Disease Son     Macular Degeneration Maternal Aunt     Anxiety Disorder Maternal Aunt     Colon Cancer No family hx of          Current Outpatient Medications   Medication Sig Dispense Refill    augmented betamethasone dipropionate (DIPROLENE-AF) 0.05 % external ointment Apply topically 2 times daily 50 g 3    betamethasone dipropionate (DIPROSONE) 0.05  % external lotion Apply topically 2 times daily To affected area on scalp for 4-6 weeks. Do not use on face or body folds. 60 mL 4    calcipotriene (DOVONOX) 0.005 % external ointment Apply to hands and feet BID when flared. 120 g 3    cetirizine (ZYRTEC) 10 MG tablet Take 10 mg by mouth every evening      doxycycline hyclate (VIBRAMYCIN) 100 MG capsule       fenofibrate (TRIGLIDE/LOFIBRA) 160 MG tablet TAKE ONE TABLET BY MOUTH ONCE DAILY 90 tablet 3    fluticasone (FLONASE) 50 MCG/ACT nasal spray SPRAY TWO SPRAYS INTO BOTH NOSTRILS IN THE MORNING. 48 mL 4    hydrocortisone 2.5 % ointment Apply twice daily to the eyelids for 2 weeks 30 g 0    hydrOXYzine HCl (ATARAX) 25 MG tablet Take 1 tablet (25 mg) by mouth nightly as needed for itching. 30 tablet 1    lisinopril (ZESTRIL) 10 MG tablet Take 1 tablet (10 mg) by mouth daily. 90 tablet 3    omeprazole (PRILOSEC) 20 MG DR capsule Take 1 capsule (20 mg) by mouth daily. 90 capsule 3    Risankizumab-rzaa (SKYRIZI) 150 MG/ML subcutaneous Inject 1 mL (150 mg) Subcutaneous every 3 months MAINTENANCE DOSE 1 mL 3    triamterene-HCTZ (DYAZIDE) 37.5-25 MG capsule Take 1 capsule by mouth daily. 90 capsule 3    venlafaxine (EFFEXOR) 37.5 MG tablet Take 1 tablet (37.5 mg) by mouth 2 times daily. 180 tablet 3    acetaminophen (TYLENOL) 325 MG tablet Take 325-650 mg by mouth every 6 hours as needed for mild pain       Allergies   Allergen Reactions    Atorvastatin Nausea     Nausea and abd pain     Ceftin GI Disturbance     Stomach upset and bloating - given at same time as clindamycin ? Which one as cause.      Ciprofloxacin Nausea and Vomiting    Clindamycin GI Disturbance     Stomach upset and bloating - given at same time as ceftin, ? Which one as cause     Flagyl [Metronidazole]      immediate migraine headache     Morphine Itching     Severe with nose, facial  Swelling - oxycodone = ok /no problems     Penicillins Hives    Sulfa Antibiotics Rash     Severe red , flushed rash     Avelox [Moxifloxacin] Other (See Comments)     Tingling and numbness in both legs and anxiety  started with first pill, then continued for 5 days while on med and for 24 hours after off of that recently - was on for diverticulitis     Levaquin Rash     States she can take avelox     Recent Labs   Lab Test 09/18/24  1520 04/19/24  0857 09/05/23  0840 08/31/21  0952 01/29/21  0859 09/04/20  0750 02/28/20  0946   A1C  --  5.0  --   --   --   --   --    LDL  --  119* 149*  --  150*  --   --    HDL  --  47* 43*  --  46*  --   --    TRIG  --  274* 230*  --  288*  --   --    ALT 24 33 18   < >  --  30 74*   CR 0.70 0.70 0.70   < >  --  0.73 0.68   GFRESTIMATED >90 >90 >90   < >  --  88 >90   GFRESTBLACK  --   --   --   --   --  >90 >90   POTASSIUM 3.9 3.5 3.8   < >  --  3.6 3.2*   TSH  --   --  3.66  --  3.32  --   --     < > = values in this interval not displayed.      Current providers sharing in care for this patient include:  Patient Care Team:  Gayatri Stephens MD as PCP - General  Mary Weir MD as MD (Hematology & Oncology)  Nick Menjivar MD as MD (Dermatology)  Nick Menjivar MD as Assigned Surgical Provider  Oli Angel RN as Specialty Care Coordinator (Hematology & Oncology)  Remy Sorto MD as Assigned Cancer Care Provider  Gayatri Stephens MD as Assigned PCP    The following health maintenance items are reviewed in Epic and correct as of today:  Health Maintenance   Topic Date Due    DEXA  Never done    MICROALBUMIN  01/29/2022    Pneumococcal Vaccine: 65+ Years (1 of 1 - PCV) Never done    ANNUAL REVIEW OF HM ORDERS  04/26/2024    COVID-19 Vaccine (3 - 2024-25 season) 09/01/2024    MEDICARE ANNUAL WELLNESS VISIT  09/22/2024    ADVANCE CARE PLANNING  12/11/2024    PHQ-9  03/26/2025    LUNG CANCER SCREENING  04/19/2025    MAMMO SCREENING  05/07/2025    BMP  09/18/2025    FALL RISK ASSESSMENT  09/26/2025    COLORECTAL CANCER SCREENING  08/19/2027    GLUCOSE   "09/18/2027    DTAP/TDAP/TD IMMUNIZATION (2 - Td or Tdap) 09/07/2028    LIPID  04/19/2029    RSV VACCINE (1 - 1-dose 75+ series) 10/11/2033    HEPATITIS C SCREENING  Completed    HIV SCREENING  Completed    DEPRESSION ACTION PLAN  Completed    HPV IMMUNIZATION  Aged Out    MENINGITIS IMMUNIZATION  Aged Out    RSV MONOCLONAL ANTIBODY  Aged Out    PAP  Discontinued    INFLUENZA VACCINE  Discontinued    ZOSTER IMMUNIZATION  Discontinued         Review of Systems  Constitutional, HEENT, cardiovascular, pulmonary, GI, , musculoskeletal, neuro, skin, endocrine and psych systems are negative, except as otherwise noted.     Objective    Exam  /72   Pulse 82   Temp 97.7  F (36.5  C) (Tympanic)   Resp 18   Ht 1.502 m (4' 11.13\")   Wt 76.4 kg (168 lb 8 oz)   LMP 09/05/2000   SpO2 97%   BMI 33.88 kg/m     Estimated body mass index is 33.88 kg/m  as calculated from the following:    Height as of this encounter: 1.502 m (4' 11.13\").    Weight as of this encounter: 76.4 kg (168 lb 8 oz).    Physical Exam  GENERAL: alert and no distress  EYES: Eyes grossly normal to inspection, PERRL and conjunctivae and sclerae normal  HENT: ear canals and TM's normal, nose and mouth without ulcers or lesions  NECK: no adenopathy, no asymmetry, masses, or scars  RESP: lungs clear to auscultation - no rales, rhonchi or wheezes  BREAST: normal without masses, tenderness or nipple discharge and no palpable axillary masses or adenopathy  CV: regular rate and rhythm, normal S1 S2, no S3 or S4, no murmur, click or rub, no peripheral edema  ABDOMEN: soft, nontender, no hepatosplenomegaly, no masses and bowel sounds normal  MS: no gross musculoskeletal defects noted, no edema  SKIN: no suspicious lesions or rashes  NEURO: Normal strength and tone, mentation intact and speech normal  PSYCH: mentation appears normal, affect normal/bright         9/26/2024   Mini Cog   Clock Draw Score 2 Normal   3 Item Recall 3 objects recalled   Mini Cog " Total Score 5      Vision Screen:          Signed Electronically by: Gayatri Stephens MD    Answers submitted by the patient for this visit:  Patient Health Questionnaire (Submitted on 9/26/2024)  If you checked off any problems, how difficult have these problems made it for you to do your work, take care of things at home, or get along with other people?: Not difficult at all  PHQ9 TOTAL SCORE: 0  Patient Health Questionnaire (G7) (Submitted on 9/22/2024)  NAHOMY 7 TOTAL SCORE: 0

## 2024-11-29 DIAGNOSIS — J41.1 BRONCHITIS, MUCOPURULENT RECURRENT (H): Primary | ICD-10-CM

## 2024-12-02 ENCOUNTER — TELEPHONE (OUTPATIENT)
Dept: DERMATOLOGY | Facility: CLINIC | Age: 64
End: 2024-12-02

## 2024-12-02 NOTE — TELEPHONE ENCOUNTER
PA Initiation    Medication: SKYRIZI  MG/ML SC SOAJ  Insurance Company: [employer] - Phone [phone] Fax [phone]  Pharmacy Filling the Rx: [facility] - [place], [place] - [facility] [address]  Filling Pharmacy Phone:    Filling Pharmacy Fax:    Start Date: 12/2/2024      Key: [id number]

## 2024-12-03 NOTE — TELEPHONE ENCOUNTER
Called #   Telephone Information:   Mobile [phone]   Mobile [phone]     9/26/2024     Pt stated that she has been sick with a URI she is immunocompromised too and it was dicussed with pcp to have an antibiotic on file for her just incase she gets sick      Acute Illness   Acute illness concerns: cough, sinus, congestion   Onset: 11/27/2024  Fever: no   Chills/Sweats: no   Headache (location?): YES  Sinus Pressure:YES- post-nasal drainage and facial pain  Conjunctivitis:  no  Ear Pain: no  Rhinorrhea: no   Congestion: YES- chest and face  Sore Throat: no    Cough: YES-productive of green sputum  Wheeze: no   Decreased Appetite: no   Nausea: no   Vomiting: no  Diarrhea:  no  Dysuria/Freq.: no  Fatigue/Achiness: YES- tired  Sick/Strep Exposure: no   Had COVID a month ago - but it cleared up    Therapies Tried and outcome: zyrtec, cough medicine, tessalon pearls ( is now out of them) dayquil and nightquil       She just does not want this to get worse so she would like to get the antibiotic     Please review and advise

## 2024-12-04 NOTE — TELEPHONE ENCOUNTER
Message patient about FPAP program or if she would like to do free drug renewal. Will check back in a few days

## 2024-12-04 NOTE — TELEPHONE ENCOUNTER
Prior Authorization Approval    Medication: SKYRIZI  MG/ML SC SOAJ  Authorization Effective Date: 11/2/2024  Authorization Expiration Date: 12/2/2025  Approved Dose/Quantity: 1 for 84 days  Reference #: Key: [id number]   Insurance Company: [employer] - Phone [phone] Fax [phone]  Expected CoPay: $    CoPay Card Available: No    Financial Assistance Needed: yes  [facility] [address]  Pharmacy Notified: not needed  Patient Notified: yes

## 2024-12-05 RX ORDER — DOXYCYCLINE 100 MG/1
100 CAPSULE ORAL 2 TIMES DAILY
Qty: 14 CAPSULE | Refills: 1 | Status: SHIPPED | OUTPATIENT
Start: 2024-12-05 | End: 2024-12-12

## 2024-12-10 NOTE — TELEPHONE ENCOUNTER
Left message for patient to follow up on FPAP. Will check back in a few days if we have not heard back

## 2024-12-10 NOTE — TELEPHONE ENCOUNTER
Spoke with patient and she would like to continue with [facility] free drug rather then [place] program. Emailed provider portion of application over to [facility] to have provider sign

## 2024-12-19 NOTE — TELEPHONE ENCOUNTER
FREE DRUG APPLICATION INITIATED    Medication: SKYRIZI  MG/ML SC SOAJ  Free Drug Program Name:  [facility]  Date Submitted: 12/10/2024 10:23 AM  Phone #: [phone]  Fax #: [phone]  Additional Information: faxed provider portion of application

## 2024-12-23 NOTE — TELEPHONE ENCOUNTER
[employer] has received provider portion but is in need of patient portion. Sent my chart message to patient to complete.

## 2024-12-31 NOTE — TELEPHONE ENCOUNTER
Tried to call [employer] and they are closed until 1/2/25 will follow up later this week for another update

## 2025-01-06 NOTE — TELEPHONE ENCOUNTER
Spoke with [employer] and they have all documentation on file that is needed. They are current reviewing application. Will check back later this week for another update

## 2025-01-15 NOTE — TELEPHONE ENCOUNTER
Spoke with [employer] and application is still under review and they are behind. No eta when it will be complete. Will check back again early next week

## 2025-01-23 NOTE — TELEPHONE ENCOUNTER
Spoke with [employer],application is still under review and still no eta on when it will be complete. Will check back again next week

## 2025-02-07 NOTE — TELEPHONE ENCOUNTER
Called [employer], application is still under review and it is likely going to be another 7-10 business days. Asked if this can be expedited as they have had all documentation for over a month. They were able to expedite, should be completed in 2-3 business days.

## 2025-02-13 NOTE — TELEPHONE ENCOUNTER
FREE DRUG APPLICATION APPROVED    Medication: SKYRIZI  MG/ML SC SOAJ  Program Name:  [facility]  Effective Date: 2/7/2025  Expiration Date: 12/31/2025  [facility]illing the Rx[facility] - [address]  Patient Notified: yes  Additional Information:

## 2025-03-20 ENCOUNTER — MYC MEDICAL ADVICE (OUTPATIENT)
Dept: ONCOLOGY | Facility: CLINIC | Age: 65
End: 2025-03-20
Payer: COMMERCIAL

## 2025-03-20 ENCOUNTER — MYC MEDICAL ADVICE (OUTPATIENT)
Dept: FAMILY MEDICINE | Facility: CLINIC | Age: 65
End: 2025-03-20
Payer: COMMERCIAL

## 2025-03-20 DIAGNOSIS — C7A.8 PRIMARY MALIGNANT NEUROENDOCRINE NEOPLASM OF ILEUM (H): Primary | ICD-10-CM

## 2025-04-04 NOTE — PROGRESS NOTES
[facility] Dermatology Note    Encounter Date: Apr 7, 2025    Dermatology Problem List:  Last skin check 4/7/25  1. Psoriasis/psoriatic arthritis: guttate, plaque, scalp, hands  - current: risankizumab, topicals (triamcinolone, calcipotriene, augmented betamethasone), cetirizine 10 mg BID, hydroxyzine 25 mg at bedtime, home nbUVB, gabapentin   - prior: apremilast, adalimumab  - in reserve: methotrexate (okayed per oncology)  2. 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 Not having to use topicals. Due for QFN.  Discussed increasing cetirizine to BID for intermittent pruritus.   - Continue risankizumab. Refilled today.  - augmented betamethasone ointment PRN  - Labs ordered today: QFN    2. 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  - Offered patient referral to hand surgery, patient defers. Will contact clinic if becomes bothersome.     3. Reassurance provided for benign lesions not treated today including poikiloderma, ganglion cyst, cherry angiomata, solar lentigines, seborrheic keratoses, and banal-appearing melanocytic nevi.        Follow-up in 1 year psoriasis check in, 2 years skin check.       Staff Involved:  Staff and Scribe  Scribe Disclosure:   I, Macey Heck, am serving as a scribe to document services personally performed by Nick Menjivar MD based on data collection and the provider's statements to me.     Provider Disclosure:   The documentation recorded by the scribe accurately reflects the services I personally performed and the decisions made by me.    Nick Menjivar MD   of Dermatology  Department of Dermatology  [facility] [employer] [facility]        CC:   Chief Complaint   Patient presents with    Skin Check     Has never had a skin check- doesn't go out in the sun much and  no areas of conern    Psoriasis     Doing well on Skyrizi       History of Present Illness:  Ms. Connie Brunson is a 64 year old female who presents as a return patient.    Today, patient reports doing well on Skyrizi with no active areas but does occasionally experiencing itching. Taking cetirizine and occasionally hydroxyzine. Not having to use topical steroid creams.     Labs:  3/2022 QFN negative    Physical exam:  Vitals: LMP 09/05/2000   GEN: This is a well developed, well-nourished female in no acute distress, in a pleasant mood.    SKIN: Watson phototype II  - Full skin, which includes the head/face, both arms, chest, back, abdomen,both legs, genitalia and/or groin buttocks, digits and/or nails, was examined.  - There is a translucent papule on the distal phalangeal joint of the R 3rd finger  - There is a translucent papule on the distal interphalangeal joint of the R 3rd finger  - There are dome shaped bright red papules on the trunk and extremities.   - Multiple regular brown pigmented macules and papules are identified on the trunk and extremities. .   - Scattered brown macules on sun exposed areas.  - Waxy stuck on papules and plaques on trunk and extremities.   - No other lesions of concern on areas examined.     Past Medical History:   Past Medical History:   Diagnosis Date    Allergic rhinitis, cause unspecified     year round - dog,dust-  Failed on claritin, claritin-D, zyrtec and zyrtec-D in past.     Benign neoplasm of cerebral meninges (H) 1999    has yearly MRI's. - sees Dr. [facility] MRI7/24/06 = no change from 7/21/05     Benign neoplasm of tonsil 7/05    ?tonsillar re-growth - sees Dr. Thomas ENT     Cancer (H)     Depressive disorder     Deviated nasal septum     sees Dr. Thomas ENT     Diverticulosis of colon (without mention of hemorrhage) 02-    History of Guillain-[place] syndrome     NO FLU SHOTS - very mild case in [place] many years ago     Hyperlipidemia LDL goal < 130 doesn't want statins    simvastatin = severe hand joint pain , lipitor =nausea/abd. pain    Hypertension goal BP (blood pressure) < 140/90     Insomnia     trazodone -made pt feel hung over     Major depressive disorder, recurrent episode, moderate (H)     lexapro - sexual side effects     Moderate major depression (H) 2/4/2005    trazodone -made pt feel hung over     Other acne     Other extrapyramidal disease and abnormal movement disorder     ?GBS    Psoriatic arthritis (H) 4/26/2023    Symptomatic menopausal or female climacteric states     vivelle-dot      Past Surgical History:   Procedure Laterality Date    ABDOMEN SURGERY      BIOPSY      COLONOSCOPY  1/16/2012    Procedure:COLONOSCOPY; Colonoscopy; Surgeon:SHOLA JOYCE; Location: GI    COLONOSCOPY N/A 8/19/2024    Procedure: Colonoscopy with polypectomies using cold snare;  Surgeon: Gayatri Browne MD;  Location:  GI    HC REMOVE TONSILS/ADENOIDS,<11 Y/O      T and A, under 12 yrs    HEAD & NECK SURGERY      LAPAROSCOPIC CHOLECYSTECTOMY WITH CHOLANGIOGRAMS N/A 4/25/2016    Procedure: LAPAROSCOPIC CHOLECYSTECTOMY WITH CHOLANGIOGRAMS;  Surgeon: Rosa M Cristobal MD;  Location:  OR    LAPAROSCOPY DIAGNOSTIC (GENERAL) N/A 11/7/2017    Procedure: LAPAROSCOPY DIAGNOSTIC (GENERAL);  Exploratory Laparoscopy, Laparotomy and Small Bowel resection.;  Surgeon: Rosa M Cristobal MD;  Location:  OR    LAPAROTOMY EXPLORATORY N/A 11/7/2017    Procedure: LAPAROTOMY EXPLORATORY;;  Surgeon: Rosa M Cristobal MD;  Location: [facility] OR    RESECT SMALL BOWEL WITHOUT OSTOMY N/A 11/7/2017    Procedure: RESECT SMALL BOWEL WITHOUT OSTOMY;;  Surgeon: Rosa M Cristobal MD;  Location: [facility] OR    SURGICAL HISTORY OF -   2004    Menigioma excision    ZZC LIGATE FALLOPIAN TUBE  1988    Tubal Ligation    ZZC NONSPECIFIC PROCEDURE      PAROTID TUMOR L SIDE OF NECK - BENIGN    ZZC TOTAL ABDOM HYSTERECTOMY  2000 ?     Hysterectomy, Total Abdominal with BSO       Social History:   reports that she quit smoking about 15 months ago. Her smoking use included cigarettes. She started smoking about 36 years ago. She has a 5 pack-year smoking history. She has quit using smokeless tobacco. She reports current alcohol use. She reports that she does not use drugs.    Family History:  Family History   Problem Relation Age of Onset    Hyperlipidemia Mother     Thyroid Disease Mother     Macular Degeneration Mother     Cancer Father         lung    Hypertension Father     Hyperlipidemia Father     Thyroid Disease Brother     Diabetes Maternal Grandmother     Cerebrovascular Disease Maternal Grandmother     Substance Abuse Maternal Grandfather     Thyroid Disease Son     Macular Degeneration Maternal Aunt     Anxiety Disorder Maternal Aunt     Colon Cancer No family hx of        Medications:  Current Outpatient Medications   Medication Sig Dispense Refill    doxycycline hyclate (VIBRAMYCIN) 100 MG capsule Take 100 mg by mouth daily.      acetaminophen (TYLENOL) 325 MG tablet Take 325-650 mg by mouth every 6 hours as needed for mild pain      augmented betamethasone dipropionate (DIPROLENE-AF) 0.05 % external ointment Apply topically 2 times daily 50 g 3    betamethasone dipropionate (DIPROSONE) 0.05 % external lotion Apply topically 2 times daily To affected area on scalp for 4-6 weeks. Do not use on face or body folds. 60 mL 4    calcipotriene (DOVONOX) 0.005 % external ointment Apply to hands and feet BID when flared. 120 g 3    cetirizine (ZYRTEC) 10 MG tablet Take 10 mg by mouth every evening      fenofibrate (TRIGLIDE/LOFIBRA) 160 MG tablet TAKE ONE TABLET BY MOUTH ONCE DAILY 90 tablet 3    fluticasone (FLONASE) 50 MCG/ACT nasal spray SPRAY TWO SPRAYS INTO BOTH NOSTRILS IN THE MORNING. 48 mL 4    hydrocortisone 2.5 % ointment Apply twice daily to the eyelids for 2 weeks 30 g 0    hydrOXYzine HCl (ATARAX) 25 MG tablet Take 1 tablet (25 mg)  by mouth nightly as needed for itching. 30 tablet 1    lisinopril (ZESTRIL) 10 MG tablet Take 1 tablet (10 mg) by mouth daily. 90 tablet 3    omeprazole (PRILOSEC) 20 MG DR capsule Take 1 capsule (20 mg) by mouth daily. 90 capsule 3    Risankizumab-rzaa (SKYRIZI) 150 MG/ML subcutaneous Inject 1 mL (150 mg) Subcutaneous every 3 months MAINTENANCE DOSE 1 mL 3    triamterene-HCTZ (DYAZIDE) 37.5-25 MG capsule Take 1 capsule by mouth daily. 90 capsule 3    venlafaxine (EFFEXOR) 37.5 MG tablet Take 1 tablet (37.5 mg) by mouth 2 times daily. 180 tablet 3     Allergies   Allergen Reactions    Atorvastatin Nausea     Nausea and abd pain     Ceftin GI Disturbance     Stomach upset and bloating - given at same time as clindamycin ? Which one as cause.      Ciprofloxacin Nausea and Vomiting    Clindamycin GI Disturbance     Stomach upset and bloating - given at same time as ceftin, ? Which one as cause     Flagyl [Metronidazole]      immediate migraine headache     Morphine Itching     Severe with nose, facial  Swelling - oxycodone = ok /no problems     Penicillins Hives    Sulfa Antibiotics Rash     Severe red , flushed rash    Avelox [Moxifloxacin] Other (See Comments)     Tingling and numbness in both legs and anxiety  started with first pill, then continued for 5 days while on med and for 24 hours after off of that recently - was on for diverticulitis     Levaquin Rash     States she can take avelox

## 2025-04-07 ENCOUNTER — PATIENT OUTREACH (OUTPATIENT)
Dept: CARE COORDINATION | Facility: CLINIC | Age: 65
End: 2025-04-07

## 2025-04-07 ENCOUNTER — OFFICE VISIT (OUTPATIENT)
Dept: DERMATOLOGY | Facility: CLINIC | Age: 65
End: 2025-04-07
Payer: COMMERCIAL

## 2025-04-07 DIAGNOSIS — L82.1 SEBORRHEIC KERATOSIS: ICD-10-CM

## 2025-04-07 DIAGNOSIS — L81.4 SOLAR LENTIGO: ICD-10-CM

## 2025-04-07 DIAGNOSIS — L40.0 PLAQUE PSORIASIS: Primary | ICD-10-CM

## 2025-04-07 DIAGNOSIS — L40.9 PSORIASIS: ICD-10-CM

## 2025-04-07 DIAGNOSIS — D22.9 MULTIPLE MELANOCYTIC NEVI: ICD-10-CM

## 2025-04-07 DIAGNOSIS — D18.01 CHERRY ANGIOMA: ICD-10-CM

## 2025-04-07 DIAGNOSIS — M71.30 MYXOID CYST: ICD-10-CM

## 2025-04-07 DIAGNOSIS — L40.50 PSORIATIC ARTHRITIS (H): ICD-10-CM

## 2025-04-07 DIAGNOSIS — D84.9 IMMUNOSUPPRESSION: ICD-10-CM

## 2025-04-07 RX ORDER — HYDROXYZINE HYDROCHLORIDE 25 MG/1
25 TABLET, FILM COATED ORAL
Qty: 30 TABLET | Refills: 5 | Status: SHIPPED | OUTPATIENT
Start: 2025-04-07

## 2025-04-07 RX ORDER — DOXYCYCLINE 100 MG/1
100 CAPSULE ORAL DAILY
COMMUNITY
Start: 2025-01-06

## 2025-04-07 NOTE — LETTER
4/7/2025      Connie Brunson  [address] [facility] [address]      Dear Colleague,    Thank you for referring your patient, Connie Brunson, to the [facility]. Please see a copy of my visit note below.    [facility] Dermatology Note    Encounter Date: Apr 7, 2025    Dermatology Problem List:  Last skin check 4/7/25  1. Psoriasis/psoriatic arthritis: guttate, plaque, scalp, hands  - current: risankizumab, topicals (triamcinolone, calcipotriene, augmented betamethasone), cetirizine 10 mg BID, hydroxyzine 25 mg at bedtime, home nbUVB, gabapentin   - prior: apremilast, adalimumab  - in reserve: methotrexate (okayed per oncology)  2. 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 Not having to use topicals. Due for QFN.  Discussed increasing cetirizine to BID for intermittent pruritus.   - Continue risankizumab. Refilled today.  - augmented betamethasone ointment PRN  - Labs ordered today: QFN    2. 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  - Offered patient referral to hand surgery, patient defers. Will contact clinic if becomes bothersome.     3. Reassurance provided for benign lesions not treated today including poikiloderma, ganglion cyst, cherry angiomata, solar lentigines, seborrheic keratoses, and banal-appearing melanocytic nevi.        Follow-up in 1 year psoriasis check in, 2 years skin check.       Staff Involved:  Staff and Scribe  Scribe Disclosure:   I, Mcaey Heck, am serving as a scribe to document services personally performed by Nick Menjivar MD based on data collection and the provider's statements to me.     Provider Disclosure:   The documentation recorded by the scribe accurately reflects the services I personally performed and the decisions made by me.    Nick ABRAHAM  MD [facility]  Department of Dermatology  [facility]        CC:   Chief Complaint   Patient presents with     Skin Check     Has never had a skin check- doesn't go out in the sun much and no areas of conern     Psoriasis     Doing well on Skyrizi       History of Present Illness:  Ms. Connie Brunson is a 64 year old female who presents as a return patient.    Today, patient reports doing well on Skyrizi with no active areas but does occasionally experiencing itching. Taking cetirizine and occasionally hydroxyzine. Not having to use topical steroid creams.     Labs:  3/2022 QFN negative    Physical exam:  Vitals: LMP 09/05/2000   GEN: This is a well developed, well-nourished female in no acute distress, in a pleasant mood.    SKIN: Watson phototype II  - Full skin, which includes the head/face, both arms, chest, back, abdomen,both legs, genitalia and/or groin buttocks, digits and/or nails, was examined.  - There is a translucent papule on the distal phalangeal joint of the R 3rd finger  - There is a translucent papule on the distal interphalangeal joint of the R 3rd finger  - There are dome shaped bright red papules on the trunk and extremities.   - Multiple regular brown pigmented macules and papules are identified on the trunk and extremities. .   - Scattered brown macules on sun exposed areas.  - Waxy stuck on papules and plaques on trunk and extremities.   - No other lesions of concern on areas examined.     Past Medical History:   Past Medical History:   Diagnosis Date     Allergic rhinitis, cause unspecified     year round - dog,dust-  Failed on claritin, claritin-D, zyrtec and zyrtec-D in past.      Benign neoplasm of cerebral meninges (H) 1999    has yearly MRI's. - sees Dr. 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 Thomas ENT      Cancer  (H)      Depressive disorder      Deviated nasal septum     sees Dr. Thomas ENT      Diverticulosis of colon (without mention of hemorrhage) 02-     History of Guillain-Aiea syndrome     NO FLU SHOTS - very mild case in [place] many years ago     Hyperlipidemia LDL goal < 130 doesn't want statins    simvastatin = severe hand joint pain , lipitor =nausea/abd. pain     Hypertension goal BP (blood pressure) < 140/90      Insomnia     trazodone -made pt feel hung over      Major depressive disorder, recurrent episode, moderate (H)     lexapro - sexual side effects      Moderate major depression (H) 2/4/2005    trazodone -made pt feel hung over      Other acne      Other extrapyramidal disease and abnormal movement disorder     ?GBS     Psoriatic arthritis (H) 4/26/2023     Symptomatic menopausal or female climacteric states     vivelle-dot      Past Surgical History:   Procedure Laterality Date     ABDOMEN SURGERY       BIOPSY       COLONOSCOPY  1/16/2012    Procedure:COLONOSCOPY; Colonoscopy; Surgeon:SHOLA JOYCE; Location: GI     COLONOSCOPY N/A 8/19/2024    Procedure: Colonoscopy with polypectomies using cold snare;  Surgeon: Gayatri Browne MD;  Location:  GI     HC REMOVE TONSILS/ADENOIDS,<11 Y/O      T and A, under 12 yrs     HEAD & NECK SURGERY       LAPAROSCOPIC CHOLECYSTECTOMY WITH CHOLANGIOGRAMS N/A 4/25/2016    Procedure: LAPAROSCOPIC CHOLECYSTECTOMY WITH CHOLANGIOGRAMS;  Surgeon: Rosa M Cristobal MD;  Location:  OR     LAPAROSCOPY DIAGNOSTIC (GENERAL) N/A 11/7/2017    Procedure: LAPAROSCOPY DIAGNOSTIC (GENERAL);  Exploratory Laparoscopy, Laparotomy and Small Bowel resection.;  Surgeon: Rosa M Cristobal MD;  Location: [facility] OR     LAPAROTOMY EXPLORATORY N/A 11/7/2017    Procedure: LAPAROTOMY EXPLORATORY;;  Surgeon: Rosa M Cristobal MD;  Location:  OR     RESECT SMALL BOWEL WITHOUT OSTOMY N/A 11/7/2017    Procedure: RESECT SMALL BOWEL WITHOUT OSTOMY;;  Surgeon:  Rosa M Cristobal MD;  Location: [facility] OR     SURGICAL HISTORY OF -   2004    Menigioma excision     Z LIGATE FALLOPIAN TUBE  1988    Tubal Ligation     ZC NONSPECIFIC PROCEDURE      PAROTID TUMOR L SIDE OF NECK - BENIGN     ZZC TOTAL ABDOM HYSTERECTOMY  2000 ?    Hysterectomy, Total Abdominal with BSO       Social History:   reports that she quit smoking about 15 months ago. Her smoking use included cigarettes. She started smoking about 36 years ago. She has a 5 pack-year smoking history. She has quit using smokeless tobacco. She reports current alcohol use. She reports that she does not use drugs.    Family History:  Family History   Problem Relation Age of Onset     Hyperlipidemia Mother      Thyroid Disease Mother      Macular Degeneration Mother      Cancer Father         lung     Hypertension Father      Hyperlipidemia Father      Thyroid Disease Brother      Diabetes Maternal Grandmother      Cerebrovascular Disease Maternal Grandmother      Substance Abuse Maternal Grandfather      Thyroid Disease Son      Macular Degeneration Maternal Aunt      Anxiety Disorder Maternal Aunt      Colon Cancer No family hx of        Medications:  Current Outpatient Medications   Medication Sig Dispense Refill     doxycycline hyclate (VIBRAMYCIN) 100 MG capsule Take 100 mg by mouth daily.       acetaminophen (TYLENOL) 325 MG tablet Take 325-650 mg by mouth every 6 hours as needed for mild pain       augmented betamethasone dipropionate (DIPROLENE-AF) 0.05 % external ointment Apply topically 2 times daily 50 g 3     betamethasone dipropionate (DIPROSONE) 0.05 % external lotion Apply topically 2 times daily To affected area on scalp for 4-6 weeks. Do not use on face or body folds. 60 mL 4     calcipotriene (DOVONOX) 0.005 % external ointment Apply to hands and feet BID when flared. 120 g 3     cetirizine (ZYRTEC) 10 MG tablet Take 10 mg by mouth every evening       fenofibrate (TRIGLIDE/LOFIBRA) 160 MG tablet TAKE ONE  TABLET BY MOUTH ONCE DAILY 90 tablet 3     fluticasone (FLONASE) 50 MCG/ACT nasal spray SPRAY TWO SPRAYS INTO BOTH NOSTRILS IN THE MORNING. 48 mL 4     hydrocortisone 2.5 % ointment Apply twice daily to the eyelids for 2 weeks 30 g 0     hydrOXYzine HCl (ATARAX) 25 MG tablet Take 1 tablet (25 mg) by mouth nightly as needed for itching. 30 tablet 1     lisinopril (ZESTRIL) 10 MG tablet Take 1 tablet (10 mg) by mouth daily. 90 tablet 3     omeprazole (PRILOSEC) 20 MG DR capsule Take 1 capsule (20 mg) by mouth daily. 90 capsule 3     Risankizumab-rzaa (SKYRIZI) 150 MG/ML subcutaneous Inject 1 mL (150 mg) Subcutaneous every 3 months MAINTENANCE DOSE 1 mL 3     triamterene-HCTZ (DYAZIDE) 37.5-25 MG capsule Take 1 capsule by mouth daily. 90 capsule 3     venlafaxine (EFFEXOR) 37.5 MG tablet Take 1 tablet (37.5 mg) by mouth 2 times daily. 180 tablet 3     Allergies   Allergen Reactions     Atorvastatin Nausea     Nausea and abd pain      Ceftin GI Disturbance     Stomach upset and bloating - given at same time as clindamycin ? Which one as cause.       Ciprofloxacin Nausea and Vomiting     Clindamycin GI Disturbance     Stomach upset and bloating - given at same time as ceftin, ? Which one as cause      Flagyl [Metronidazole]      immediate migraine headache      Morphine Itching     Severe with nose, facial  Swelling - oxycodone = ok /no problems      Penicillins Hives     Sulfa Antibiotics Rash     Severe red , flushed rash     Avelox [Moxifloxacin] Other (See Comments)     Tingling and numbness in both legs and anxiety  started with first pill, then continued for 5 days while on med and for 24 hours after off of that recently - was on for diverticulitis      Levaquin Rash     States she can take avelox                 Again, thank you for allowing me to participate in the care of your patient.        Sincerely,        Nick Menjivar MD    Electronically signed

## 2025-04-07 NOTE — NURSING NOTE
Connie Brunson's chief complaint for this visit includes:  Chief Complaint   Patient presents with    Skin Check     Has never had a skin check- doesn't go out in the sun much and no areas of conern    Psoriasis     Doing well on Skyrizi     PCP: Gayatri Stephens    Referring Provider:  Referred Self, MD  No address on file    LMP 09/05/2000   Data Unavailable        Allergies   Allergen Reactions    Atorvastatin Nausea     Nausea and abd pain     Ceftin GI Disturbance     Stomach upset and bloating - given at same time as clindamycin ? Which one as cause.      Ciprofloxacin Nausea and Vomiting    Clindamycin GI Disturbance     Stomach upset and bloating - given at same time as ceftin, ? Which one as cause     Flagyl [Metronidazole]      immediate migraine headache     Morphine Itching     Severe with nose, facial  Swelling - oxycodone = ok /no problems     Penicillins Hives    Sulfa Antibiotics Rash     Severe red , flushed rash    Avelox [Moxifloxacin] Other (See Comments)     Tingling and numbness in both legs and anxiety  started with first pill, then continued for 5 days while on med and for 24 hours after off of that recently - was on for diverticulitis     Levaquin Rash     States she can take avelox         Do you need any medication refills at today's visit? Skyrizi renewal

## 2025-04-08 NOTE — TELEPHONE ENCOUNTER
Future orders placed in Epic (our computer record)  for labs.    Please come in fasting :  nothing to eat for at least 8 , preferably 12 hours ahead of appointment. Please do come in well hydrated though - ok to  have water, black coffee or plain tea, BUT NO creamers or sweeteners of any kind, please.        Please inform pt. And please assist her in making appt with me for her annual exam on or after 9/27/2025.     Future Appointments 4/7/2025 - 10/4/2025        Date Visit Type Length Department Provider     4/15/2025 10:00 AM LAB 15 min [facility] LABORATORY RV LAB    Location Instructions:     The clinic is located at [address] [place] [address] in [place].&nbsp; We offer free parking in our on-site lot.              4/18/2025 10:00 AM RETURN [facility] 30 min [facility]ER CL [facility] Noreen, Remy White MD    Location Instructions:     Located at: [address] [facility], [address] [place] [id number]  This appointment is in a hospital-based location.&nbsp; Before your visit, you may want to check with your insurance company for coverage and referral options, including cost differences between services provided in different clinic settings.&nbsp; For more information visit this link on the [facility] Website:&nbsp; [email or web address]

## 2025-04-09 ENCOUNTER — TELEPHONE (OUTPATIENT)
Dept: DERMATOLOGY | Facility: CLINIC | Age: 65
End: 2025-04-09
Payer: COMMERCIAL

## 2025-04-09 NOTE — TELEPHONE ENCOUNTER
MTM referral from: [facility] visit (referral by provider)    MTM referral outreach attempt #2 on April 9, 2025 at 11:35 AM      Outcome: Patient not reachable after several attempts, routed to Pharmacist Team/Provider as an FYI    Use hbc for the carrier/Plan on the flowsheet      [facility] Medical Message Sent    Imani Jaquez CPhT  MTM

## 2025-04-15 NOTE — TELEPHONE ENCOUNTER
Patient is scheduled to see Janeth Fermin RPH on 04/22/2025 at 9:00 AM for a visit. This referral is completed.

## 2025-04-16 ENCOUNTER — LAB (OUTPATIENT)
Dept: LAB | Facility: CLINIC | Age: 65
End: 2025-04-16
Payer: COMMERCIAL

## 2025-04-16 DIAGNOSIS — R73.9 BLOOD GLUCOSE ELEVATED: ICD-10-CM

## 2025-04-16 DIAGNOSIS — C7A.8 PRIMARY MALIGNANT NEUROENDOCRINE NEOPLASM OF ILEUM (H): ICD-10-CM

## 2025-04-16 DIAGNOSIS — I10 ESSENTIAL HYPERTENSION WITH GOAL BLOOD PRESSURE LESS THAN 140/90: ICD-10-CM

## 2025-04-16 DIAGNOSIS — E78.1 HYPERTRIGLYCERIDEMIA: ICD-10-CM

## 2025-04-16 DIAGNOSIS — K76.0 HEPATIC STEATOSIS: ICD-10-CM

## 2025-04-16 DIAGNOSIS — D84.9 IMMUNOSUPPRESSION: ICD-10-CM

## 2025-04-16 DIAGNOSIS — E78.5 HYPERLIPIDEMIA LDL GOAL <130: ICD-10-CM

## 2025-04-16 LAB
ALBUMIN SERPL BCG-MCNC: 4.8 G/DL (ref 3.5–5.2)
ALP SERPL-CCNC: 36 U/L (ref 40–150)
ALT SERPL W P-5'-P-CCNC: 23 U/L (ref 0–50)
ANION GAP SERPL CALCULATED.3IONS-SCNC: 13 MMOL/L (ref 7–15)
AST SERPL W P-5'-P-CCNC: 24 U/L (ref 0–45)
BASOPHILS # BLD AUTO: 0.1 10E3/UL (ref 0–0.2)
BASOPHILS NFR BLD AUTO: 1 %
BILIRUB SERPL-MCNC: 0.3 MG/DL
BUN SERPL-MCNC: 20.4 MG/DL (ref 8–23)
CALCIUM SERPL-MCNC: 10.2 MG/DL (ref 8.8–10.4)
CHLORIDE SERPL-SCNC: 99 MMOL/L (ref 98–107)
CHOLEST SERPL-MCNC: 233 MG/DL
CREAT SERPL-MCNC: 0.83 MG/DL (ref 0.51–0.95)
CREAT UR-MCNC: 322 MG/DL
EGFRCR SERPLBLD CKD-EPI 2021: 77 ML/MIN/1.73M2
EOSINOPHIL # BLD AUTO: 0.2 10E3/UL (ref 0–0.7)
EOSINOPHIL NFR BLD AUTO: 4 %
ERYTHROCYTE [DISTWIDTH] IN BLOOD BY AUTOMATED COUNT: 12 % (ref 10–15)
EST. AVERAGE GLUCOSE BLD GHB EST-MCNC: 100 MG/DL
FASTING STATUS PATIENT QL REPORTED: YES
FASTING STATUS PATIENT QL REPORTED: YES
GLUCOSE SERPL-MCNC: 110 MG/DL (ref 70–99)
HBA1C MFR BLD: 5.1 % (ref 0–5.6)
HCO3 SERPL-SCNC: 27 MMOL/L (ref 22–29)
HCT VFR BLD AUTO: 37.9 % (ref 35–47)
HDLC SERPL-MCNC: 50 MG/DL
HGB BLD-MCNC: 13.5 G/DL (ref 11.7–15.7)
IMM GRANULOCYTES # BLD: 0 10E3/UL
IMM GRANULOCYTES NFR BLD: 0 %
LDLC SERPL CALC-MCNC: 136 MG/DL
LYMPHOCYTES # BLD AUTO: 1.6 10E3/UL (ref 0.8–5.3)
LYMPHOCYTES NFR BLD AUTO: 26 %
MCH RBC QN AUTO: 32.9 PG (ref 26.5–33)
MCHC RBC AUTO-ENTMCNC: 35.6 G/DL (ref 31.5–36.5)
MCV RBC AUTO: 92 FL (ref 78–100)
MICROALBUMIN UR-MCNC: 13.4 MG/L
MICROALBUMIN/CREAT UR: 4.16 MG/G CR (ref 0–25)
MONOCYTES # BLD AUTO: 0.4 10E3/UL (ref 0–1.3)
MONOCYTES NFR BLD AUTO: 7 %
NEUTROPHILS # BLD AUTO: 3.9 10E3/UL (ref 1.6–8.3)
NEUTROPHILS NFR BLD AUTO: 63 %
NONHDLC SERPL-MCNC: 183 MG/DL
PLATELET # BLD AUTO: 362 10E3/UL (ref 150–450)
POTASSIUM SERPL-SCNC: 4.1 MMOL/L (ref 3.4–5.3)
PROT SERPL-MCNC: 7.5 G/DL (ref 6.4–8.3)
RBC # BLD AUTO: 4.1 10E6/UL (ref 3.8–5.2)
SODIUM SERPL-SCNC: 139 MMOL/L (ref 135–145)
TRIGL SERPL-MCNC: 236 MG/DL
WBC # BLD AUTO: 6.3 10E3/UL (ref 4–11)

## 2025-04-16 PROCEDURE — 80053 COMPREHEN METABOLIC PANEL: CPT

## 2025-04-16 PROCEDURE — 82570 ASSAY OF URINE CREATININE: CPT

## 2025-04-16 PROCEDURE — 85025 COMPLETE CBC W/AUTO DIFF WBC: CPT

## 2025-04-16 PROCEDURE — 82043 UR ALBUMIN QUANTITATIVE: CPT

## 2025-04-16 PROCEDURE — 83036 HEMOGLOBIN GLYCOSYLATED A1C: CPT

## 2025-04-16 PROCEDURE — 80061 LIPID PANEL: CPT

## 2025-04-17 LAB
QUANTIFERON MITOGEN: 1.69 IU/ML
QUANTIFERON NIL TUBE: 0.04 IU/ML
QUANTIFERON TB1 TUBE: 0.05 IU/ML
QUANTIFERON TB2 TUBE: 0.05

## 2025-04-18 ENCOUNTER — ONCOLOGY VISIT (OUTPATIENT)
Dept: ONCOLOGY | Facility: CLINIC | Age: 65
End: 2025-04-18
Attending: INTERNAL MEDICINE
Payer: COMMERCIAL

## 2025-04-18 VITALS
SYSTOLIC BLOOD PRESSURE: 146 MMHG | RESPIRATION RATE: 16 BRPM | OXYGEN SATURATION: 94 % | HEIGHT: 59 IN | HEART RATE: 70 BPM | BODY MASS INDEX: 33.67 KG/M2 | WEIGHT: 167 LBS | TEMPERATURE: 97.6 F | DIASTOLIC BLOOD PRESSURE: 94 MMHG

## 2025-04-18 DIAGNOSIS — C7A.8 PRIMARY MALIGNANT NEUROENDOCRINE NEOPLASM OF ILEUM (H): Primary | ICD-10-CM

## 2025-04-18 PROCEDURE — G0463 HOSPITAL OUTPT CLINIC VISIT: HCPCS | Performed by: INTERNAL MEDICINE

## 2025-04-18 PROCEDURE — G2211 COMPLEX E/M VISIT ADD ON: HCPCS | Performed by: INTERNAL MEDICINE

## 2025-04-18 PROCEDURE — 99214 OFFICE O/P EST MOD 30 MIN: CPT | Performed by: INTERNAL MEDICINE

## 2025-04-18 ASSESSMENT — PAIN SCALES - GENERAL: PAINLEVEL_OUTOF10: NO PAIN (0)

## 2025-04-18 NOTE — NURSING NOTE
"Oncology Rooming Note    April 18, 2025 10:14 AM   Connie Brunson is a 64 year old female who presents for:    Chief Complaint   Patient presents with    Oncology Clinic Visit     Initial Vitals: BP (!) 146/94   Pulse 70   Temp 97.6  F (36.4  C) (Temporal)   Resp 16   Ht 1.505 m (4' 11.25\")   Wt 75.8 kg (167 lb)   LMP 09/05/2000   SpO2 94%   BMI 33.45 kg/m   Estimated body mass index is 33.45 kg/m  as calculated from the following:    Height as of this encounter: 1.505 m (4' 11.25\").    Weight as of this encounter: 75.8 kg (167 lb). Body surface area is 1.78 meters squared.  No Pain (0) Comment: Data Unavailable   Patient's last menstrual period was 09/05/2000.  Allergies reviewed: Yes  Medications reviewed: Yes    Medications: Medication refills not needed today.  Pharmacy name entered into [facility]:    [facility] - [place], [place] - [address] [place] [address]  WRITTEN PRESCRIPTION REQUESTED  [facility] - [place], [place] - [address] [place] [address]    Frailty Screening:   Is the patient here for a new oncology consult visit in cancer care? 2. No    PHQ9:  Did this patient require a PHQ9?: No      Clinical concerns: f/u       Doretha Peguero CMA              "

## 2025-04-18 NOTE — PROGRESS NOTES
Chief complaint  Routine follow-up for post-surgical monitoring and lab review, 7.5 years after surgery.    History of present illness  - No changes in health over the past year, feeling good.  - Underwent surgery in October 2017, now approaching 7.5 years post-surgery.  - History of gallbladder removal, resulting in dumping syndrome and loose stools.  - No recent episodes of flushing.  - Previous chromogranin A levels were high, with the last recorded level at 247, which is slightly elevated.    Past medical history  - Dumping syndrome    Past surgical history  - Gallbladder removal in October 2017    Family history  - Family history of dumping syndrome    Current medications  - Skyrizi (Risankizumab), route and dosage not specified    Immunizations  - Quantiferon TB test, administered    Lab results  - A1c: 5.1  - Chromogranin A: 247 (previously 600, decreased to 247)    Assessment  - No current evidence of disease recurrence 7.5 years post-surgery.  - Slightly elevated chromogranin A levels, but not indicative of disease progression at this time.    Plan  - Monitor for any symptoms of flushing. If flushing occurs, return for further evaluation and potential special scans.

## 2025-04-18 NOTE — LETTER
4/18/2025      Connie Brunson  [address] [facility] [id number]      Dear Colleague,    Thank you for referring your patient, Connie Brunson, to the [facility]. Please see a copy of my visit note below.    [facility]    Hematology/Oncology Established Patient Note      Today's Date: Apr 18, 2025     Reason for Follow-up: neuroendocrine tumor of the ileum      HISTORY OF PRESENT ILLNESS: Connie Brunson is a 60 year old female with PMHx of HTN, HLD, depression, who presents with neuroendocrine tumor of the ileum.  In October 2017, she had a CT abdomen/pelvis done due to lower abdominal pain related to sigmoid diverticulitis and loose stools.  She had been having diarrhea for ~3 years.  It showed an incidental finding of an enhancing 1.3 cm intraluminal nodule in the distal ileum.  There was comment on radiology report on CT enterography on 11/2/17 that the nodule, in retrospect, appears to be present and stable since an older CT from 10/27/14.  On 11/7/17, she underwent a segmental small bowel resection with primary anastomosis by Dr. Cristobal.  Pathology showed a 1.5 cm tumor in the ileum, well-differentiated neuroendocrine tumor, grade 2, mitotic rate 410 HPF, Ki-67 of 5%, 2 of 3 lymph nodes were involved, stage pT3N1 (stage IIIB).        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      She was followed in person and is doing well.  She has no new complaints at this visit.    She had retired earlier this year but was bored at home and now is working again 3 days a week.     She has quit smoking since January. She still has to chew nicotine gum. It has been hard on her.     She has been gaining weight and is unhappy about it.     - No changes in health over the past year, feeling good.  - Underwent surgery in October 2017, now approaching 7.5 years post-surgery.  - History of gallbladder  removal, resulting in dumping syndrome and loose stools.  - No recent episodes of flushing.  - Previous chromogranin A levels were high, with the last recorded level at 247, which is slightly elevated.    REVIEW OF SYSTEMS:   14 point ROS was reviewed and is negative other than as noted above in HPI.       HOME MEDICATIONS:  Current Outpatient Medications   Medication Sig     albuterol (PROAIR HFA/PROVENTIL HFA/VENTOLIN HFA) 108 (90 Base) MCG/ACT inhaler Inhale 2 puffs into the lungs every 6 hours as needed for shortness of breath, wheezing or cough     augmented betamethasone dipropionate (DIPROLENE-AF) 0.05 % external ointment Apply topically 2 times daily     azithromycin (ZITHROMAX) 250 MG tablet 2 tabs day 1 and the 1 tab daily for 4 more days     benzonatate (TESSALON) 200 MG capsule Take 1 capsule (200 mg) by mouth 3 times daily as needed for cough     betamethasone dipropionate (DIPROSONE) 0.05 % external lotion Apply topically 2 times daily To affected area on scalp for 4-6 weeks. Do not use on face or body folds.     calcipotriene (DOVONOX) 0.005 % external ointment Apply to hands and feet BID when flared.     cetirizine (ZYRTEC) 10 MG tablet Take 10 mg by mouth every evening     doxycycline hyclate (VIBRAMYCIN) 100 MG capsule      fenofibrate (TRIGLIDE/LOFIBRA) 160 MG tablet TAKE ONE TABLET BY MOUTH ONCE DAILY     fluticasone (FLONASE) 50 MCG/ACT nasal spray SPRAY TWO SPRAYS INTO BOTH NOSTRILS IN THE MORNING.     guaiFENesin-codeine (ROBITUSSIN AC) 100-10 MG/5ML solution Take 5-10 mLs by mouth every 4 hours as needed for cough     hydrocortisone 2.5 % ointment Apply twice daily to the eyelids for 2 weeks     hydrOXYzine (ATARAX) 25 MG tablet Take 1 tablet (25 mg) by mouth nightly as needed for itching     lisinopril (ZESTRIL) 10 MG tablet Take 1 tablet (10 mg) by mouth daily     nicotine (NICORETTE) 2 MG gum CHEW AND PARK ONE PIECE OF GUM INSIDE CHEEK EVERY HOUR AS NEEDED FOR SMOKING CESSATION, MAXIMUM OF  24 PIECES PER DAY     omeprazole (PRILOSEC) 20 MG DR capsule Take 1 capsule (20 mg) by mouth daily     Risankizumab-rzaa (SKYRIZI) 150 MG/ML subcutaneous Inject 1 mL (150 mg) Subcutaneous every 3 months MAINTENANCE DOSE     triamterene-HCTZ (DYAZIDE) 37.5-25 MG capsule Take 1 capsule by mouth daily     venlafaxine (EFFEXOR) 37.5 MG tablet Take 1 tablet (37.5 mg) by mouth 2 times daily         ALLERGIES:  Allergies   Allergen Reactions     Atorvastatin Nausea     Nausea and abd pain      Ceftin GI Disturbance     Stomach upset and bloating - given at same time as clindamycin ? Which one as cause.       Ciprofloxacin Nausea and Vomiting     Clindamycin GI Disturbance     Stomach upset and bloating - given at same time as ceftin, ? Which one as cause      Flagyl [Metronidazole]      immediate migraine headache      Morphine Itching     Severe with nose, facial  Swelling - oxycodone = ok /no problems      Penicillins Hives     Sulfa Antibiotics Rash     Severe red , flushed rash     Avelox [Moxifloxacin] Other (See Comments)     Tingling and numbness in both legs and anxiety  started with first pill, then continued for 5 days while on med and for 24 hours after off of that recently - was on for diverticulitis      Levaquin Rash     States she can take avelox         PAST MEDICAL HISTORY:  Past Medical History:   Diagnosis Date     Allergic rhinitis, cause unspecified     year round - dog,dust-  Failed on claritin, claritin-D, zyrtec and zyrtec-D in past.      Benign neoplasm of cerebral meninges (H) 1999    has yearly MRI's. - 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 Thomas ENT      Cancer (H)      Depressive disorder      Deviated nasal septum     sees Dr. Thomas ENT      Diverticulosis of colon (without mention of hemorrhage) 02-     History of Guillain-Tavares syndrome     NO FLU SHOTS - very mild case  in [place] many years ago     Hyperlipidemia LDL goal < 130 doesn't want statins    simvastatin = severe hand joint pain , lipitor =nausea/abd. pain     Hypertension goal BP (blood pressure) < 140/90      Insomnia     trazodone -made pt feel hung over      Major depressive disorder, recurrent episode, moderate (H)     lexapro - sexual side effects      Moderate major depression (H) 2/4/2005    trazodone -made pt feel hung over      Other acne      Other extrapyramidal disease and abnormal movement disorder     ?GBS     Psoriatic arthritis (H) 4/26/2023     Symptomatic menopausal or female climacteric states     vivelle-dot          PAST SURGICAL HISTORY:  Past Surgical History:   Procedure Laterality Date     ABDOMEN SURGERY       BIOPSY       COLONOSCOPY  1/16/2012    Procedure:COLONOSCOPY; Colonoscopy; Surgeon:SHOLA JOYCE; Location: GI     COLONOSCOPY N/A 8/19/2024    Procedure: Colonoscopy with polypectomies using cold snare;  Surgeon: Gayatri Browne MD;  Location:  GI     HC REMOVE TONSILS/ADENOIDS,<11 Y/O      T and A, under 12 yrs     HEAD & NECK SURGERY       LAPAROSCOPIC CHOLECYSTECTOMY WITH CHOLANGIOGRAMS N/A 4/25/2016    Procedure: LAPAROSCOPIC CHOLECYSTECTOMY WITH CHOLANGIOGRAMS;  Surgeon: Rosa M Cristobal MD;  Location:  OR     LAPAROSCOPY DIAGNOSTIC (GENERAL) N/A 11/7/2017    Procedure: LAPAROSCOPY DIAGNOSTIC (GENERAL);  Exploratory Laparoscopy, Laparotomy and Small Bowel resection.;  Surgeon: Rosa M Cristobal MD;  Location: [facility] OR     LAPAROTOMY EXPLORATORY N/A 11/7/2017    Procedure: LAPAROTOMY EXPLORATORY;;  Surgeon: Rosa M Cristobal MD;  Location: [facility] OR     RESECT SMALL BOWEL WITHOUT OSTOMY N/A 11/7/2017    Procedure: RESECT SMALL BOWEL WITHOUT OSTOMY;;  Surgeon: Rosa M Cristobal MD;  Location: [facility] OR     SURGICAL HISTORY OF -   2004    Menigioma excision     ZZC LIGATE FALLOPIAN TUBE  1988    Tubal Ligation     ZZC NONSPECIFIC PROCEDURE      PAROTID TUMOR L  SIDE OF NECK - BENIGN     ZZC TOTAL ABDOM HYSTERECTOMY   ?    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 [place]     Comment: retired in 2024, then went back for part-time in same department   Tobacco Use     Smoking status: Former     Current packs/day: 0.00     Average packs/day: 1 pack/day for 5.0 years (5.0 ttl pk-yrs)     Types: Cigarettes     Start date: 1989     Quit date:      Years since quittin.2     Smokeless tobacco: Former     Tobacco comments:     11/3/17 - occ e-cig use     As of 10/27/23 pt states no longer doing cigarette use quit date 24   Vaping Use     Vaping status: Former     Substances: Nicotine     Devices: Refillable tank   Substance and Sexual Activity     Alcohol use: Yes     Alcohol/week: 0.0 standard drinks of alcohol     Comment: 3-5 drinks per week     Drug use: No     Sexual activity: Not Currently     Partners: Male     Birth control/protection: Post-menopausal     Comment: tubal ligation-    Other Topics Concern      Service No     Blood Transfusions No     Caffeine Concern Yes     Comment: 2-3 cups coffee in am     Occupational Exposure Not Asked     Hobby Hazards Not Asked     Sleep Concern Not Asked     Stress Concern Not Asked     Weight Concern Not Asked     Special Diet Not Asked     Back Care Not Asked     Exercise No     Comment: regular walking 4x/week      Bike Helmet Not Asked     Seat Belt Yes     Comment: always     Self-Exams Yes     Comment: SBE encouraged monthly     Parent/sibling w/ CABG, MI or angioplasty before 65F 55M? No   Social History Narrative    calcium - taking 500mg po qday - increase to Calcium -  1000-1200mg/day    flex sig/colonoscopy -at age 48    sun precautions - discussed    mammogram - yearly    Td booster - 1991 per our records - pt will call [facility]     pneumovax -at age 58    DEXA -this year- 2003    stool hemoccults - every year after age 40    ASA- start 81 mg ASA qday.    mulvitamin - encouraged    doing citrucel qday         from second , [employer]. Now back together with her first  and father of their  children, Perry Brunson - fall 2011.      Social Drivers of Health     Financial Resource Strain: Low Risk  (9/22/2024)    Financial Resource Strain      Within the past 12 months, have you or your family members you live with been unable to get utilities (heat, electricity) when it was really needed?: No   Food Insecurity: Low Risk  (9/22/2024)    Food Insecurity      Within the past 12 months, did you worry that your food would run out before you got money to buy more?: No      Within the past 12 months, did the food you bought just not last and you didn t have money to get more?: No   Transportation Needs: Low Risk  (9/22/2024)    Transportation Needs      Within the past 12 months, has lack of transportation kept you from medical appointments, getting your medicines, non-medical meetings or appointments, work, or from getting things that you need?: No   Physical Activity: Inactive (9/22/2024)    Exercise Vital Sign      Days of Exercise per Week: 0 days      Minutes of Exercise per Session: 0 min   Stress: No Stress Concern Present (9/22/2024)    [employer] [place] [employer] - Occupational Stress Questionnaire      Feeling of Stress : Not at all   Social Connections: Moderately Integrated (9/22/2024)    Social Connection and Isolation Panel [NHANES]      Frequency of Communication with Friends and Family: Three times a week      Frequency of Social Gatherings with Friends and Family: Never      Attends Holiness Services: More than 4 times  "per year      Active Member of Clubs or Organizations: No      Attends Club or Organization Meetings: Never      Marital Status:    Interpersonal Safety: Low Risk  (2024)    Interpersonal Safety      Do you feel physically and emotionally safe where you currently live?: Yes      Within the past 12 months, have you been hit, slapped, kicked or otherwise physically hurt by someone?: No      Within the past 12 months, have you been humiliated or emotionally abused in other ways by your partner or ex-partner?: No   Housing Stability: Low Risk  (2024)    Housing Stability      Do you have housing? : Yes      Are you worried about losing your housing?: No         FAMILY HISTORY:  Family History   Problem Relation Age of Onset     Hyperlipidemia Mother      Thyroid Disease Mother      Macular Degeneration Mother      Cancer Father         lung     Hypertension Father      Hyperlipidemia Father      Thyroid Disease Brother      Diabetes Maternal Grandmother      Cerebrovascular Disease Maternal Grandmother      Substance Abuse Maternal Grandfather      Thyroid Disease Son      Macular Degeneration Maternal Aunt      Anxiety Disorder Maternal Aunt      Colon Cancer No family hx of      Her father  of lung cancer and paranasal cancer at around age 65.    She has 3 children.      PHYSICAL EXAM:  Vital signs:  BP (!) 146/94   Pulse 70   Temp 97.6  F (36.4  C) (Temporal)   Resp 16   Ht 1.505 m (4' 11.25\")   Wt 75.8 kg (167 lb)   LMP 2000   SpO2 94%   BMI 33.45 kg/m     ECO  GENERAL/CONSTITUTIONAL: No acute distress.  EYES: No scleral icterus.  NEUROLOGIC: Alert, oriented, answers questions appropriately.  INTEGUMENTARY: No jaundice. Psoriatic lesions on left wrist, legs.  GAIT: Steady, does not use assistive device        LABS:  Recent Labs   Lab Test 25  0841 24  1520 24  0857 23  0840 22  0825    135 132* 137 134*   POTASSIUM 4.1 3.9 3.5 3.8 3.7 " "  CHLORIDE 99 95* 94* 95* 94*   CO2 27 24 23 27 28   ANIONGAP 13 16* 15 15 12   BUN 20.4 12.8 13.5 13.1 10.5   CR 0.83 0.70 0.70 0.70 0.59   * 118* 104* 114* 99   FADI 10.2 10.0 9.3 10.0 9.4     Recent Labs   Lab Test 01/29/21  0859 11/10/17  0800 11/09/17  0810 04/12/17  0843   MAG 1.8  --  1.8 2.0   PHOS  --  2.4* 2.4*  --      Recent Labs   Lab Test 04/16/25  0841 09/18/24  1520 04/19/24  0857 09/05/23  0840 04/10/23  1123   WBC 6.3 11.3* 5.8 7.1 11.0   HGB 13.5 13.3 13.1 15.0 15.1    319 280 338 283   MCV 92 94 95 92 91   NEUTROPHIL 63 83 62 64 76     Recent Labs   Lab Test 04/16/25  0841 09/18/24  1520 04/19/24  0857   BILITOTAL 0.3 0.4 0.5   ALKPHOS 36* 38* 33*   ALT 23 24 33   AST 24 24 27   ALBUMIN 4.8 4.5 4.7     TSH   Date Value Ref Range Status   09/05/2023 3.66 0.30 - 4.20 uIU/mL Final   01/29/2021 3.32 0.40 - 4.00 mU/L Final   12/09/2019 4.07 (H) 0.40 - 4.00 mU/L Final   04/12/2017 3.18 0.40 - 4.00 mU/L Final     No results for input(s): \"CEA\" in the last 49312 hours.  Results for orders placed or performed during the hospital encounter of 09/18/24   CT Abdomen Pelvis w Contrast    Narrative    CT ABDOMEN PELVIS W CONTRAST 9/18/2024 3:46 PM    CLINICAL HISTORY: Abdominal pain. lower abd pain (LLQ > RLQ); LLQ  abdominal pain    TECHNIQUE: CT scan of the abdomen and pelvis was performed following  injection of IV contrast. Multiplanar reformats were obtained. Dose  reduction techniques were used.  CONTRAST: 83 mL [id number]    COMPARISON: April 19, 2024    FINDINGS:   LOWER CHEST: No infiltrates or effusions.    HEPATOBILIARY: No significant mass or bile duct dilatation.  Cholecystectomy. Severe hepatic steatosis.    PANCREAS: No significant mass, duct dilatation, or inflammatory  change.    SPLEEN: Normal size.    ADRENAL GLANDS: No significant nodules.    KIDNEYS/BLADDER: No significant mass, stones, or hydronephrosis.    BOWEL: Inflammation around sigmoid colon compatible with short " segment  colitis vs. diverticulitis. No obstruction. No intraperitoneal free  air.    PELVIC ORGANS: No pelvic masses.    ADDITIONAL FINDINGS: No adenopathy or free fluid. There are mild  atherosclerotic changes of the visualized aorta and its branches.  There is no evidence of aortic dissection or aneurysm.    MUSCULOSKELETAL: No frankly destructive bony lesions.      Impression    IMPRESSION:   1.  Short segment sigmoid colitis vs. diverticulitis.  2.  Severe hepatic steatosis.    AGNIESZKA SEGURA MD         SYSTEM ID:  [id number]         ASSESSMENT/PLAN:  Connie Brunson is a 60 year old female with:    1) Neuroendocrine tumor of the ileum: s/p resection on 11/7/17, 1.5 cm, grade 2, well-differentiated, T3N1 (stage IIIB).      We do not have tumor markers available for her. Her last imaging studies are from 6 months ago and it did not show any evidence of recurrent disease     Her chromogranin A has remained abnormal, although stable although it has been trending down over time.  Her symptoms remain the same.  Her imaging has been negative for recurrent disease. If her tumor markers are elevated, we would have to restage her.     She has been monitored for nearly 5 years now without any evidence of disease recurrence.  I offered her options of stopping any further surveillance scans at this time and monitoring the labs alone.  She would like to continue with annual visits but without the restaging scans.    -she will call if she gets worsening diarrhea or flushing symptoms or pain and can get labs/scans done earlier.    -labs (CBC, CMP, chromogranin A) in 1 year  -RTC in 1 year    2) HTN, HLD  -follow-up with PCP    3) History of meningioma: diagnosed in 1999, was getting yearly MRI's with neurosurgery, but was told she does not need surveillance MRI's anymore unless she has new neurologic symptoms.    4) Pulmonary nodule: There is an indeterminate 2 x 3 mm anterior right mid-lung nodule seen on CT.  She does have  history of smoking.  Recent CT chest shows that the tiny nodule is stable.  It has been stable since 2017.    5) Psoriasis:  -follows with dermatology  -she considered methotrexate, but is reluctant to start it and wants to wait    Addendum: Unfortunately her chromogranin A level was elevated and we will get dotatate PET for her.     The longitudinal plan of care for the diagnosis(es)/condition(s) as documented were addressed during this visit. Due to the added complexity in care, I will continue to support Connie in the subsequent management and with ongoing continuity of care.     30 minutes spent on the date of the encounter doing chart review, history and exam, documentation and further activities as noted above     Chief complaint  Routine follow-up for post-surgical monitoring and lab review, 7.5 years after surgery.    History of present illness  - No changes in health over the past year, feeling good.  - Underwent surgery in October 2017, now approaching 7.5 years post-surgery.  - History of gallbladder removal, resulting in dumping syndrome and loose stools.  - No recent episodes of flushing.  - Previous chromogranin A levels were high, with the last recorded level at 247, which is slightly elevated.    Past medical history  - Dumping syndrome    Past surgical history  - Gallbladder removal in October 2017    Family history  - Family history of dumping syndrome    Current medications  - Skyrizi (Risankizumab), route and dosage not specified    Immunizations  - Quantiferon TB test, administered    Lab results  - A1c: 5.1  - Chromogranin A: 247 (previously 600, decreased to 247)    Assessment  - No current evidence of disease recurrence 7.5 years post-surgery.  - Slightly elevated chromogranin A levels, but not indicative of disease progression at this time.    Plan  - Monitor for any symptoms of flushing. If flushing occurs, return for further evaluation and potential special scans.    Again, thank you for  allowing me to participate in the care of your patient.        Sincerely,        Remy Sorto MD    Electronically signed

## 2025-04-18 NOTE — PROGRESS NOTES
[facility]    Hematology/Oncology Established Patient Note      Today's Date: Apr 18, 2025     Reason for Follow-up: neuroendocrine tumor of the ileum      HISTORY OF PRESENT ILLNESS: Connie Brunson is a 60 year old female with PMHx of HTN, HLD, depression, who presents with neuroendocrine tumor of the ileum.  In October 2017, she had a CT abdomen/pelvis done due to lower abdominal pain related to sigmoid diverticulitis and loose stools.  She had been having diarrhea for ~3 years.  It showed an incidental finding of an enhancing 1.3 cm intraluminal nodule in the distal ileum.  There was comment on radiology report on CT enterography on 11/2/17 that the nodule, in retrospect, appears to be present and stable since an older CT from 10/27/14.  On 11/7/17, she underwent a segmental small bowel resection with primary anastomosis by Dr. Cristobal.  Pathology showed a 1.5 cm tumor in the ileum, well-differentiated neuroendocrine tumor, grade 2, mitotic rate 410 HPF, Ki-67 of 5%, 2 of 3 lymph nodes were involved, stage pT3N1 (stage IIIB).        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      She was followed in person and is doing well.  She has no new complaints at this visit.    She had retired earlier this year but was bored at home and now is working again 3 days a week.     She has quit smoking since January. She still has to chew nicotine gum. It has been hard on her.     She has been gaining weight and is unhappy about it.     REVIEW OF SYSTEMS:   14 point ROS was reviewed and is negative other than as noted above in HPI.       HOME MEDICATIONS:  Current Outpatient Medications   Medication Sig    albuterol (PROAIR HFA/PROVENTIL HFA/VENTOLIN HFA) 108 (90 Base) MCG/ACT inhaler Inhale 2 puffs into the lungs every 6 hours as needed for shortness of breath, wheezing or cough    augmented betamethasone dipropionate (DIPROLENE-AF) 0.05 %  external ointment Apply topically 2 times daily    azithromycin (ZITHROMAX) 250 MG tablet 2 tabs day 1 and the 1 tab daily for 4 more days    benzonatate (TESSALON) 200 MG capsule Take 1 capsule (200 mg) by mouth 3 times daily as needed for cough    betamethasone dipropionate (DIPROSONE) 0.05 % external lotion Apply topically 2 times daily To affected area on scalp for 4-6 weeks. Do not use on face or body folds.    calcipotriene (DOVONOX) 0.005 % external ointment Apply to hands and feet BID when flared.    cetirizine (ZYRTEC) 10 MG tablet Take 10 mg by mouth every evening    doxycycline hyclate (VIBRAMYCIN) 100 MG capsule     fenofibrate (TRIGLIDE/LOFIBRA) 160 MG tablet TAKE ONE TABLET BY MOUTH ONCE DAILY    fluticasone (FLONASE) 50 MCG/ACT nasal spray SPRAY TWO SPRAYS INTO BOTH NOSTRILS IN THE MORNING.    guaiFENesin-codeine (ROBITUSSIN AC) 100-10 MG/5ML solution Take 5-10 mLs by mouth every 4 hours as needed for cough    hydrocortisone 2.5 % ointment Apply twice daily to the eyelids for 2 weeks    hydrOXYzine (ATARAX) 25 MG tablet Take 1 tablet (25 mg) by mouth nightly as needed for itching    lisinopril (ZESTRIL) 10 MG tablet Take 1 tablet (10 mg) by mouth daily    nicotine (NICORETTE) 2 MG gum CHEW AND PARK ONE PIECE OF GUM INSIDE CHEEK EVERY HOUR AS NEEDED FOR SMOKING CESSATION, MAXIMUM OF 24 PIECES PER DAY    omeprazole (PRILOSEC) 20 MG DR capsule Take 1 capsule (20 mg) by mouth daily    Risankizumab-rzaa (SKYRIZI) 150 MG/ML subcutaneous Inject 1 mL (150 mg) Subcutaneous every 3 months MAINTENANCE DOSE    triamterene-HCTZ (DYAZIDE) 37.5-25 MG capsule Take 1 capsule by mouth daily    venlafaxine (EFFEXOR) 37.5 MG tablet Take 1 tablet (37.5 mg) by mouth 2 times daily         ALLERGIES:  Allergies   Allergen Reactions    Atorvastatin Nausea     Nausea and abd pain     Ceftin GI Disturbance     Stomach upset and bloating - given at same time as clindamycin ? Which one as cause.      Ciprofloxacin Nausea and  Vomiting    Clindamycin GI Disturbance     Stomach upset and bloating - given at same time as ceftin, ? Which one as cause     Flagyl [Metronidazole]      immediate migraine headache     Morphine Itching     Severe with nose, facial  Swelling - oxycodone = ok /no problems     Penicillins Hives    Sulfa Antibiotics Rash     Severe red , flushed rash    Avelox [Moxifloxacin] Other (See Comments)     Tingling and numbness in both legs and anxiety  started with first pill, then continued for 5 days while on med and for 24 hours after off of that recently - was on for diverticulitis     Levaquin Rash     States she can take avelox         PAST MEDICAL HISTORY:  Past Medical History:   Diagnosis Date    Allergic rhinitis, cause unspecified     year round - dog,dust-  Failed on claritin, claritin-D, zyrtec and zyrtec-D in past.     Benign neoplasm of cerebral meninges (H) 1999    has yearly MRI's. - [facility] MRI7/24/06 = no change from 7/21/05     Benign neoplasm of tonsil 7/05    ?tonsillar re-growth - sees Dr. Thomas ENT     Cancer (H)     Depressive disorder     Deviated nasal septum     sees Dr. Thomas ENT     Diverticulosis of colon (without mention of hemorrhage) 02-    History of Guillain-[place] syndrome     NO FLU SHOTS - very mild case in [place] many years ago    Hyperlipidemia LDL goal < 130 doesn't want statins    simvastatin = severe hand joint pain , lipitor =nausea/abd. pain    Hypertension goal BP (blood pressure) < 140/90     Insomnia     trazodone -made pt feel hung over     Major depressive disorder, recurrent episode, moderate (H)     lexapro - sexual side effects     Moderate major depression (H) 2/4/2005    trazodone -made pt feel hung over     Other acne     Other extrapyramidal disease and abnormal movement disorder     ?GBS    Psoriatic arthritis (H) 4/26/2023    Symptomatic menopausal or female climacteric states     vivelle-dot          PAST SURGICAL  HISTORY:  Past Surgical History:   Procedure Laterality Date    ABDOMEN SURGERY      BIOPSY      COLONOSCOPY  1/16/2012    Procedure:COLONOSCOPY; Colonoscopy; Surgeon:SHOLA JOYCE; Location: GI    COLONOSCOPY N/A 8/19/2024    Procedure: Colonoscopy with polypectomies using cold snare;  Surgeon: Gayatri Browne MD;  Location:  GI    HC REMOVE TONSILS/ADENOIDS,<11 Y/O      T and A, under 12 yrs    HEAD & NECK SURGERY      LAPAROSCOPIC CHOLECYSTECTOMY WITH CHOLANGIOGRAMS N/A 4/25/2016    Procedure: LAPAROSCOPIC CHOLECYSTECTOMY WITH CHOLANGIOGRAMS;  Surgeon: Rosa M Cristobal MD;  Location: [facility] OR    LAPAROSCOPY DIAGNOSTIC (GENERAL) N/A 11/7/2017    Procedure: LAPAROSCOPY DIAGNOSTIC (GENERAL);  Exploratory Laparoscopy, Laparotomy and Small Bowel resection.;  Surgeon: Rosa M Cristobal MD;  Location: [facility] OR    LAPAROTOMY EXPLORATORY N/A 11/7/2017    Procedure: LAPAROTOMY EXPLORATORY;;  Surgeon: Rosa M Cristobal MD;  Location: [facility] OR    RESECT SMALL BOWEL WITHOUT OSTOMY N/A 11/7/2017    Procedure: RESECT SMALL BOWEL WITHOUT OSTOMY;;  Surgeon: Rosa M Cristobal MD;  Location: [facility] OR    SURGICAL HISTORY OF -   2004    Menigioma excision    ZZC LIGATE FALLOPIAN TUBE  1988    Tubal Ligation    ZZC NONSPECIFIC PROCEDURE      PAROTID TUMOR L SIDE OF NECK - BENIGN    ZZC TOTAL ABDOM HYSTERECTOMY  2000 ?    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 [place]     Comment: retired in 1/2024, then went back for part-time in same department   Tobacco Use    Smoking status: Former     Current packs/day: 0.00     Average packs/day: 1 pack/day for 5.0 years (5.0 ttl pk-yrs)     Types:  Cigarettes     Start date: 1989     Quit date:      Years since quittin.2    Smokeless tobacco: Former    Tobacco comments:     11/3/17 - occ e-cig use     As of 10/27/23 pt states no longer doing cigarette use quit date 24   Vaping Use    Vaping status: Former    Substances: Nicotine    Devices: Refillable tank   Substance and Sexual Activity    Alcohol use: Yes     Alcohol/week: 0.0 standard drinks of alcohol     Comment: 3-5 drinks per week    Drug use: No    Sexual activity: Not Currently     Partners: Male     Birth control/protection: Post-menopausal     Comment: tubal ligation-    Other Topics Concern     Service No    Blood Transfusions No    Caffeine Concern Yes     Comment: 2-3 cups coffee in am    Occupational Exposure Not Asked    Hobby Hazards Not Asked    Sleep Concern Not Asked    Stress Concern Not Asked    Weight Concern Not Asked    Special Diet Not Asked    Back Care Not Asked    Exercise No     Comment: regular walking 4x/week     Bike Helmet Not Asked    Seat Belt Yes     Comment: always    Self-Exams Yes     Comment: SBE encouraged monthly    Parent/sibling w/ CABG, MI or angioplasty before 65F 55M? No   Social History Narrative    calcium - taking 500mg po qday - increase to Calcium - 1000-1200mg/day    flex sig/colonoscopy -at age 48    sun precautions - discussed    mammogram - yearly    Td booster -  per our records - pt will call [facility]     pneumovax -at age 58    DEXA -this year-     stool hemoccults - every year after age 40    ASA- start 81 mg ASA qday.    mulvitamin - encouraged    doing citrucel qday         from second , Dan [employer]en[employer]. Now back together with her first  and father of their  children, Perry Brunson - 2011.      Social Drivers of Health     Financial Resource Strain: Low Risk  (2024)    Financial Resource Strain     Within the past 12 months, have you or your family members you live  with been unable to get utilities (heat, electricity) when it was really needed?: No   Food Insecurity: Low Risk  (9/22/2024)    Food Insecurity     Within the past 12 months, did you worry that your food would run out before you got money to buy more?: No     Within the past 12 months, did the food you bought just not last and you didn t have money to get more?: No   Transportation Needs: Low Risk  (9/22/2024)    Transportation Needs     Within the past 12 months, has lack of transportation kept you from medical appointments, getting your medicines, non-medical meetings or appointments, work, or from getting things that you need?: No   Physical Activity: Inactive (9/22/2024)    Exercise Vital Sign     Days of Exercise per Week: 0 days     Minutes of Exercise per Session: 0 min   Stress: No Stress Concern Present (9/22/2024)    [place] [employer] - Occupational Stress Questionnaire     Feeling of Stress : Not at all   Social Connections: Moderately Integrated (9/22/2024)    Social Connection and Isolation Panel [NHANES]     Frequency of Communication with Friends and Family: Three times a week     Frequency of Social Gatherings with Friends and Family: Never     Attends Pentec[employer]al Services: More than 4 times per year     Active Member of Clubs or Organizations: No     Attends Club or Organization Meetings: Never     Marital Status:    Interpersonal Safety: Low Risk  (9/26/2024)    Interpersonal Safety     Do you feel physically and emotionally safe where you currently live?: Yes     Within the past 12 months, have you been hit, slapped, kicked or otherwise physically hurt by someone?: No     Within the past 12 months, have you been humiliated or emotionally abused in other ways by your partner or ex-partner?: No   Housing Stability: Low Risk  (9/22/2024)    Housing Stability     Do you have housing? : Yes     Are you worried about losing your housing?: No         FAMILY HISTORY:  Family  "History   Problem Relation Age of Onset    Hyperlipidemia Mother     Thyroid Disease Mother     Macular Degeneration Mother     Cancer Father         lung    Hypertension Father     Hyperlipidemia Father     Thyroid Disease Brother     Diabetes Maternal Grandmother     Cerebrovascular Disease Maternal Grandmother     Substance Abuse Maternal Grandfather     Thyroid Disease Son     Macular Degeneration Maternal Aunt     Anxiety Disorder Maternal Aunt     Colon Cancer No family hx of      Her father  of lung cancer and paranasal cancer at around age 65.    She has 3 children.      PHYSICAL EXAM:  Vital signs:  BP (!) 146/94   Pulse 70   Temp 97.6  F (36.4  C) (Temporal)   Resp 16   Ht 1.505 m (4' 11.25\")   Wt 75.8 kg (167 lb)   LMP 2000   SpO2 94%   BMI 33.45 kg/m     ECO  GENERAL/CONSTITUTIONAL: No acute distress.  EYES: No scleral icterus.  NEUROLOGIC: Alert, oriented, answers questions appropriately.  INTEGUMENTARY: No jaundice. Psoriatic lesions on left wrist, legs.  GAIT: Steady, does not use assistive device        LABS:  Recent Labs   Lab Test 25  0841 24  1520 24  0857 23  0840 22  0825    135 132* 137 134*   POTASSIUM 4.1 3.9 3.5 3.8 3.7   CHLORIDE 99 95* 94* 95* 94*   CO2 27 24 23 27 28   ANIONGAP 13 16* 15 15 12   BUN 20.4 12.8 13.5 13.1 10.5   CR 0.83 0.70 0.70 0.70 0.59   * 118* 104* 114* 99   FADI 10.2 10.0 9.3 10.0 9.4     Recent Labs   Lab Test 21  0859 11/10/17  0800 17  0810 17  0843   MAG 1.8  --  1.8 2.0   PHOS  --  2.4* 2.4*  --      Recent Labs   Lab Test 25  0841 24  1520 24  0857 23  0840 04/10/23  1123   WBC 6.3 11.3* 5.8 7.1 11.0   HGB 13.5 13.3 13.1 15.0 15.1    319 280 338 283   MCV 92 94 95 92 91   NEUTROPHIL 63 83 62 64 76     Recent Labs   Lab Test 25  0841 24  1520 24  0857   BILITOTAL 0.3 0.4 0.5   ALKPHOS 36* 38* 33*   ALT 23 24 33   AST 24 24 27   ALBUMIN " "4.8 4.5 4.7     TSH   Date Value Ref Range Status   09/05/2023 3.66 0.30 - 4.20 uIU/mL Final   01/29/2021 3.32 0.40 - 4.00 mU/L Final   12/09/2019 4.07 (H) 0.40 - 4.00 mU/L Final   04/12/2017 3.18 0.40 - 4.00 mU/L Final     No results for input(s): \"CEA\" in the last 60706 hours.  Results for orders placed or performed during the hospital encounter of 09/18/24   CT Abdomen Pelvis w Contrast    Narrative    CT ABDOMEN PELVIS W CONTRAST 9/18/2024 3:46 PM    CLINICAL HISTORY: Abdominal pain. lower abd pain (LLQ > RLQ); LLQ  abdominal pain    TECHNIQUE: CT scan of the abdomen and pelvis was performed following  injection of IV contrast. Multiplanar reformats were obtained. Dose  reduction techniques were used.  CONTRAST: 83 mL Isovue-370    COMPARISON: April 19, 2024    FINDINGS:   LOWER CHEST: No infiltrates or effusions.    HEPATOBILIARY: No significant mass or bile duct dilatation.  Cholecystectomy. Severe hepatic steatosis.    PANCREAS: No significant mass, duct dilatation, or inflammatory  change.    SPLEEN: Normal size.    ADRENAL GLANDS: No significant nodules.    KIDNEYS/BLADDER: No significant mass, stones, or hydronephrosis.    BOWEL: Inflammation around sigmoid colon compatible with short segment  colitis vs. diverticulitis. No obstruction. No intraperitoneal free  air.    PELVIC ORGANS: No pelvic masses.    ADDITIONAL FINDINGS: No adenopathy or free fluid. There are mild  atherosclerotic changes of the visualized aorta and its branches.  There is no evidence of aortic dissection or aneurysm.    MUSCULOSKELETAL: No frankly destructive bony lesions.      Impression    IMPRESSION:   1.  Short segment sigmoid colitis vs. diverticulitis.  2.  Severe hepatic steatosis.    AGNIESZKA SEGURA MD         SYSTEM ID:  [id number]         ASSESSMENT/PLAN:  Connie Brunson is a 60 year old female with:    1) Neuroendocrine tumor of the ileum: s/p resection on 11/7/17, 1.5 cm, grade 2, well-differentiated, T3N1 (stage IIIB).  "     Recent MRI abdomen and CT scan reviewed with Connie.  MRI abdomen is negative, with no evidence of metastatic disease.  CT chest shows stable pulmonary nodule.  She has hepatic steatosis.  I reviewed the imaging and reports.    Chromogranin A has remained abnormal, although stable although it has been trending down over time.  Her symptoms remain the same.  Her imaging remains clear.  Since her imaging is negative, we will continue to observe for now.      She has been monitored for nearly 5 years now without any evidence of disease recurrence.  I offered her options of stopping any further surveillance scans at this time and monitoring the labs alone.  She would like to continue with annual visits but without the restaging scans.    -she will call if she gets worsening diarrhea or flushing symptoms or pain and can get labs/scans done earlier.    -labs (CBC, CMP, chromogranin A) in 1 year  -RTC in 1 year    2) HTN, HLD  -follow-up with PCP    3) History of meningioma: diagnosed in 1999, was getting yearly MRI's with neurosurgery, but was told she does not need surveillance MRI's anymore unless she has new neurologic symptoms.    4) Pulmonary nodule: There is an indeterminate 2 x 3 mm anterior right mid-lung nodule seen on CT.  She does have history of smoking.  Recent CT chest shows that the tiny nodule is stable.  It has been stable since 2017.    5) Psoriasis:  -follows with dermatology  -she considered methotrexate, but is reluctant to start it and wants to wait    The longitudinal plan of care for the diagnosis(es)/condition(s) as documented were addressed during this visit. Due to the added complexity in care, I will continue to support Connie in the subsequent management and with ongoing continuity of care.     30 minutes spent on the date of the encounter doing chart review, history and exam, documentation and further activities as noted above    which has shown increased uptake in mesenteric node and pituitary. I have referred her to Dr. Bajwa in colorectal surgery. Dr. Bajwa agrees with possible surgical excision of the node. I will get brain MRI for her pituitary uptake. I feel it is physiologic. She was a little nervous on phone about it.      The longitudinal plan of care for the diagnosis(es)/condition(s) as documented were addressed during this visit. Due to the added complexity in care, I will continue to support Connie in the subsequent management and with ongoing continuity of care.     30 minutes spent on the date of the encounter doing chart review, history and exam, documentation and further activities as noted above

## 2025-04-21 DIAGNOSIS — E23.7 PITUITARY LESION: ICD-10-CM

## 2025-04-21 DIAGNOSIS — C7A.8 PRIMARY MALIGNANT NEUROENDOCRINE NEOPLASM OF ILEUM (H): Primary | ICD-10-CM

## 2025-04-21 DIAGNOSIS — G44.219 EPISODIC TENSION-TYPE HEADACHE, NOT INTRACTABLE: ICD-10-CM

## 2025-04-21 NOTE — PROGRESS NOTES
Medication Therapy Management (MTM) Encounter    ASSESSMENT:                            Medication Adherence/Access: No issues identified.    Psoriasis/Psoriatic arthritis: Well-controlled. Encouraged receiving the following non-live vaccines: Shingrix, Prevnar 20. She is not interested in vaccines. Encouraged patient to contact the Dermatology clinic in the event they have questions. Pre-biologic labs up-to-date.    Hypertension goal <140/90 mmHg: Stable    Hypertriglyceridemia: Stable    GERD: Stable    Mood: Stable    Seasonal allergies: Stable     PLAN:                            Continue Skyrizi (risankizumab) 150 mg every 3 months    Follow-up: as needed via MyChart and in 9-12 months via telephone to continue assessing safety and efficacy.    SUBJECTIVE/OBJECTIVE:                          Connie Brunson is a 64 year old female called for an initial visit. She was referred to me from Dr. Nick Menjivar MD.      Reason for visit: Skyrizi (risankizumab) continuation.    Allergies/ADRs: Reviewed in chart.  Past Medical History: Reviewed in chart.  Tobacco: She reports that she quit smoking about 15 months ago. Her smoking use included cigarettes. She started smoking about 36 years ago. She has a 5 pack-year smoking history. She has quit using smokeless tobacco.  Alcohol: Reviewed in chart.    Medication Adherence/Access: no issues reported.    Psoriasis/Psoriatic arthritis:   Current treatment:  Skyrizi (risankizumab) 150 mg every 3 months  Started 7/2022  Free drug through [facility]  PA expires 12/31/2025    - calcipotriene 0.005% ointment twice daily for flares  - hydrocortisone 2.5% ointment twice daily to face as needed   - augmented betamethasone 0.05% ointment twice daily as needed   - home nbUVB    Symptoms: going well, completely clear skin.     Side effects: denies    Specialist: Dr. Nick Menjivar MD, Dermatology. Last visit on 4/7/2025. The following was recommended:   Psoriasis: overall doing well on  risankizumab. Not having to use topicals. Due for QFN.  Discussed increasing cetirizine to BID for intermittent pruritus.   - Continue risankizumab. Refilled today.  - augmented betamethasone ointment PRN  - Labs ordered today: QFN    Previous treatment:   - Otezla (apremilast)  - adalimumab    Pre-Biologic Screenings      Hep B Surface Antibody Non-reactive (3/16/2022)    Hep B Core Antibody  Non-reactive (3/16/2022)    Hep B Surface Antigen Non-reactive (3/16/2022)    Hep C Antibody  Non-reactive (3/16/2022)    Quantiferon TB Gold Negative (4/16/2025)    CBC 4/16/2025   CMP 4/16/2025 (eGFR 77 mL/min)     Immunization History   Pneumococcal Due to receive   Shingrix Due to receive   All patients on biologics should avoid live vaccines (varicella/VZV, intranasal influenza, MMR, or yellow fever vaccine (if traveling))      Hypertension goal <140/90 mmHg:  - lisinopril 10 mg once daily  - triamterene-hydrochlorothiazide 37.5-25 mg once daily  Doesn't check BP at home. Denies side effects or concerns.     Hypertriglyceridemia:   - fenofibrate 160 mg once daily  Denies side effects or concerns.    GERD:  - omeprazole 20 mg once daily  Denies side effects or concerns.    Mood:  - venlafaxine 37.5 mg once daily   Denies side effects or concerns.     Seasonal allergies:  - fluticasone nasal spray 2 sprays daily  - cetirizine 10 mg twice daily  - hydroxyzine 25 mg at bedtime as needed   Denies side effects or concerns.    Today's Vitals: LMP 09/05/2000   ----------------      I spent 19 minutes with this patient today. All changes were made via collaborative practice agreement with Dr. Nick Menjivar MD.     A summary of these recommendations was sent via [employer].    Janeth Fermin, YvonneD, MPH  Medication Therapy Management Pharmacist  [facility]    Telemedicine Visit Details  The patient's medications can be safely assessed via a telemedicine encounter.  Type of service:  Telephone  visit  Originating Location (pt. Location): Home    Distant Location (provider location):  Off-site  Start Time:  9:00 AM  End Time: 9:19 AM     Medication Therapy Recommendations  No medication therapy recommendations to display

## 2025-04-22 ENCOUNTER — VIRTUAL VISIT (OUTPATIENT)
Dept: PHARMACY | Facility: CLINIC | Age: 65
End: 2025-04-22
Attending: DERMATOLOGY
Payer: COMMERCIAL

## 2025-04-22 ENCOUNTER — PATIENT OUTREACH (OUTPATIENT)
Dept: ONCOLOGY | Facility: CLINIC | Age: 65
End: 2025-04-22
Payer: COMMERCIAL

## 2025-04-22 ENCOUNTER — LAB (OUTPATIENT)
Dept: LAB | Facility: CLINIC | Age: 65
End: 2025-04-22
Payer: COMMERCIAL

## 2025-04-22 DIAGNOSIS — L40.9 PSORIASIS: Primary | ICD-10-CM

## 2025-04-22 DIAGNOSIS — K21.9 GASTROESOPHAGEAL REFLUX DISEASE WITHOUT ESOPHAGITIS: ICD-10-CM

## 2025-04-22 DIAGNOSIS — F33.0 MAJOR DEPRESSIVE DISORDER, RECURRENT EPISODE, MILD: ICD-10-CM

## 2025-04-22 DIAGNOSIS — L40.50 PSORIATIC ARTHRITIS (H): ICD-10-CM

## 2025-04-22 DIAGNOSIS — E78.1 HYPERTRIGLYCERIDEMIA: ICD-10-CM

## 2025-04-22 DIAGNOSIS — C7A.8 PRIMARY MALIGNANT NEUROENDOCRINE NEOPLASM OF ILEUM (H): ICD-10-CM

## 2025-04-22 DIAGNOSIS — I10 ESSENTIAL HYPERTENSION WITH GOAL BLOOD PRESSURE LESS THAN 140/90: ICD-10-CM

## 2025-04-22 NOTE — PROGRESS NOTES
Pt was wondering if she could  her supplies for the 5 HIAA Quantitative urine at the [facility]?  Writer called [facility] and confirmed they have the supplies needed for the lab.  They said patient can just stop by at her convenience to pick them up.  They confirmed that she can also drop the specimen off at their laboratory after it is collected.      Writer relayed above information to patient.  She will plan to stop by there today to  supplies.      No further questions or concerns at this time.    Gretchen Morton, RN, BSN    RN Care Coordinator  [facility]  [phone]

## 2025-04-27 LAB
5HIAA & CREATININE UR-IMP: NORMAL
5OH-INDOLEACETATE 24H UR-MCNC: 1.2 MG/L
5OH-INDOLEACETATE 24H UR-MRATE: 4 MG/D
5OH-INDOLEACETATE/CREAT 24H UR: 4 MG/GCR
CREAT 24H UR-MRATE: 960 MG/D
CREAT UR-MCNC: 32 MG/DL

## 2025-05-05 ENCOUNTER — HOSPITAL ENCOUNTER (OUTPATIENT)
Dept: PET IMAGING | Facility: CLINIC | Age: 65
Setting detail: NUCLEAR MEDICINE
Discharge: HOME | End: 2025-05-05
Attending: INTERNAL MEDICINE | Admitting: INTERNAL MEDICINE
Payer: COMMERCIAL

## 2025-05-05 ENCOUNTER — PATIENT OUTREACH (OUTPATIENT)
Dept: CARE COORDINATION | Facility: CLINIC | Age: 65
End: 2025-05-05
Payer: COMMERCIAL

## 2025-05-05 ENCOUNTER — TELEPHONE (OUTPATIENT)
Dept: DERMATOLOGY | Facility: CLINIC | Age: 65
End: 2025-05-05
Payer: COMMERCIAL

## 2025-05-05 DIAGNOSIS — C7A.8 PRIMARY MALIGNANT NEUROENDOCRINE NEOPLASM OF ILEUM (H): ICD-10-CM

## 2025-05-05 PROCEDURE — A9592 HC RX IP 250 OP 636: HCPCS | Mod: JZ | Performed by: INTERNAL MEDICINE

## 2025-05-05 PROCEDURE — 78815 PET IMAGE W/CT SKULL-THIGH: CPT | Mod: PI

## 2025-05-05 PROCEDURE — 250N000011 HC RX IP 250 OP 636: Performed by: INTERNAL MEDICINE

## 2025-05-05 PROCEDURE — 74177 CT ABD & PELVIS W/CONTRAST: CPT

## 2025-05-05 RX ORDER — IOPAMIDOL 755 MG/ML
10-135 INJECTION, SOLUTION INTRAVASCULAR ONCE
Status: COMPLETED | OUTPATIENT
Start: 2025-05-05 | End: 2025-05-05

## 2025-05-05 RX ADMIN — COPPER CU 64 DOTATATE 4.54 MILLICURIE: 1 INJECTION, SOLUTION INTRAVENOUS at 12:28

## 2025-05-05 RX ADMIN — IOPAMIDOL 103 ML: 755 INJECTION, SOLUTION INTRAVENOUS at 13:25

## 2025-05-05 NOTE — TELEPHONE ENCOUNTER
Left Voicemail (1st Attempt) for the patient to call back and schedule the following:    Appointment type: Return  Provider: Jayy Olivera  Return date: 04/07/2026  Specialty phone number: [phone]    [facility]   Dermatology, Urology, General Surgery Specialties   [facility]

## 2025-05-06 NOTE — PROGRESS NOTES
I called patient to review findings from recent dotatate PET. The uptake in the mesenteric node could reflect disease but could be artefact. Even the increased uptake in the pituitary is likely an artefact but will get a brain MRI as recommended by radiology as she gets headaches.     I will have brain MRI scheduled and follow-up with her to review findings.     Remy Sorto

## 2025-05-13 NOTE — PROGRESS NOTES
Colon and Rectal Surgery Clinic Note    RE: Connie Brunson.  : 1960.  DORINDA: 2025.    Reason for visit: well-differentiated neuroendocrine tumor, grade 2 (2017) now with locoregional recurrence within the TI mesentery .     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 She follow with Dr. Sorto in medical oncology. She has had persistent elevation in chromogranin A levels, most recent PET scan is significant for single nonenlarged Dotatate avid right lower quadrant mesenteric lymph node, concerning for neuroendocrine lymph node metastasis.     Latest Reference Range & Units 24 09:03 25 08:41   Chromogranin A 0 - 187 ng/mL 247 (H) 388 (H)     PET Dotatate 25  IMPRESSION:   1. Single nonenlarged Dotatate avid right lower quadrant mesenteric  lymph node, concerning for neuroendocrine lymph node metastasis  (Krenning Score of 3). While misregistration of activity with the  adjacent ureter is possible, the focal calyectasis uptake in the  region of this tiny node makes this more suspicious for metastatic  disease than urine. Attention to this region is recommended on  follow-up Dotatate scans for further characterization.     2. Changes of prior segmental small bowel resection and reanastomosis  without evidence of locally recurrent small bowel disease.     3. No evidence of distant metastatic disease.     4. Mild low level uptake within bilateral nonenlarged axillary and  inguinal lymph nodes, presumably reactive.     5. Increased uptake within the region of the pituitary. While uptake  at this location is physiologic with this radiotracer, the confluence  of uptake covers a wider area than is typical. Recommend further  characterization with MRI if not recently performed to  better  characterize the pituitary.     6. Incidental CT findings, as above    MRI Brain 5/18/25   IMPRESSION:  1. Normal MRI of the pituitary gland. No sellar mass. Specifically, no abnormal finding in the region of increased Dotatate uptake on prior PET/CT 5/5/2025, which was presumably physiologic.  2. Post surgical changes of right parietal craniotomy with underlying small region of encephalomalacia/gliosis and hemosiderin staining.  3. Mild chronic small vessel ischemic disease and generalized brain parenchymal volume loss.  4. No evidence of intracranial metastatic disease.    CT A/P 4/2024  IMPRESSION:   1.  Short segment sigmoid colitis vs. diverticulitis.  2.  Severe hepatic steatosis.    Last colonoscopy: 8/19/24  Findings:       The perianal and digital rectal examinations were normal.        The terminal ileum appeared normal.        A 2 mm polyp was found in the hepatic flexure. The polyp was        semi-sessile. The polyp was removed with a cold snare. Resection and        retrieval were complete.        A 1 mm polyp was found in the sigmoid colon. The polyp was sessile. The        polyp was removed with a cold snare. Resection and retrieval were        complete.        A 2 mm polyp was found in the rectum. The polyp was sessile. The polyp        was removed with a cold snare. Resection and retrieval were complete.        No additional abnormalities were found on retroflexion.                                                                                    Impression:               - The examined portion of the ileum was normal.                             - One 2 mm polyp at the hepatic flexure, removed                             with a cold snare. Resected and retrieved.                             - One 1 mm polyp in the sigmoid colon, removed with                             a cold snare. Resected and retrieved.                             - One 2 mm polyp in the rectum, removed with a cold                              snare. Resected and retrieved.   Recommendation:           - Repeat colonoscopy for surveillance based on                             pathology results. If all 3 polyps are                             adenomatous,repeat in 3-5 years. If 1-2 polyps are                             adenomatous repeatin 7 years.     Final Diagnosis   A: Large intestine, hepatic flexure, polypectomy:  -Tubular adenoma, no evidence of high-grade dysplasia or malignancy     B: Large intestine, sigmoid, polypectomy:   -Tubular adenoma, no evidence of high-grade dysplasia or malignancy     C: Large intestine, rectum, polypectomy:  -Tubular adenoma, no evidence of high-grade dysplasia or malignancy     OR notes: Ex lap with small bowel resection 2017  INDICATIONS:  This is a 57-year-old female with a history of sigmoid diverticulitis who was noted to have a small-bowel mass on a recent CT of the abdomen and pelvis.  This mass was confirmed with CT enterography.  The patient now presents for surgical resection.      DESCRIPTION OF PROCEDURE:  After obtaining informed consent, the patient was taken to the operating room.  The abdomen was prepped and draped in standard sterile fashion.  A longitudinal incision just above the umbilicus was made with the blade.  The incision was carried down to the fascia.  The fascia was incised in the midline.  Stay sutures of 0 Vicryl were placed at the extremes of this fascial incision.  The peritoneum was entered bluntly with a Carmalt clamp.  The Flower trocar was introduced.  Two 5 mm trocars were placed in the left lower quadrant.  We began running the ileum starting at the ileocecal valve with atraumatic graspers.  A mass was noted in the distal ileum.  No other abnormalities were noted in the peritoneal cavity.  The liver, peritoneal surfaces and bowel were otherwise normal.  We extended our incision for a length of 6 cm centered around the umbilicus.  The incision was carried down  to the fascia.  The fascia was divided with Bovie electrocautery.  The Akshat wound retractor was used for exposure.  The distal ileum was brought up through the laparotomy incision.  The firm mass was noted to be 15 cm proximal to the ileocecal valve.  We came across the ileum 4 cm downstream of the mass with the JASS stapler.  We came across the ileum 6 cm upstream of the mass with a reload of the JASS 80 stapler.  The adjacent mesentery was serially ligated and divided with the LigaSure.  The specimen was marked with a suture at the distal aspect and passed off the field as specimen.  We then performed a side-to-side functional end-to-end anastomosis between the 2 limbs of ileum using a reload of the JASS 80 stapler.  The confluent defect was closed with a TA-60 stapler.  Our anastomosis was widely patent by palpation.  Our staple lines were hemostatic.  We took seromuscular bites of the adjacent limbs of small bowel with a 3-0 Vicryl suture, which was cinched down and tied to take tension off the end of the staple line.  The mesenteric defect was closed with running 2-0 Vicryl suture.  The anastomosis was dropped back into the abdomen.  We irrigated the abdomen with sterile saline and aspirated the effluent.  The fascial incision was closed with running 0 looped PDS.  The subcutaneous tissue was closed with interrupted 3-0 Vicryl sutures, and the skin was closed with a running 4-0 Vicryl subcuticular suture and Steri-Strips.  The two 5 mm trocar sites were likewise closed with interrupted 4-0 Vicryl subcuticular sutures and Steri-Strips.  The patient tolerated the procedure well.  She was transferred to the recovery room in good condition.  All sponge, needle and instrument counts were correct at the end of the case.   Pathology:   Tumor       Histologic Type:   Well-differentiated neuroendocrine tumor.       Histologic Grade:    Grade 2.    Extent       Tumor Size:   1.5 cm       Microscopic Tumor Extension, Small  Intestine:   Tumor invades  subserosal tissue without involvement of visceral peritoneum    Margins: Negative.   Tumor at 1.5 cm from mesenteric margin, 2.5 cm from  distal margin and 5.5 cm from proximal margin.   Medical history:  Past Medical History:   Diagnosis Date    Allergic rhinitis, cause unspecified     year round - dog,dust-  Failed on claritin, claritin-D, zyrtec and zyrtec-D in past.     Benign neoplasm of cerebral meninges (H) 1999    has yearly MRI's. - sees Dr. [facility] MRI7/24/06 = no change from 7/21/05     Benign neoplasm of tonsil 7/05    ?tonsillar re-growth - sees Dr. Thomas ENT     Cancer (H)     Depressive disorder     Deviated nasal septum     sees Dr. Thomas ENT     Diverticulosis of colon (without mention of hemorrhage) 02-    History of Guillain-Minerva syndrome     NO FLU SHOTS - very mild case in [place] many years ago    Hyperlipidemia LDL goal < 130 doesn't want statins    simvastatin = severe hand joint pain , lipitor =nausea/abd. pain    Hypertension goal BP (blood pressure) < 140/90     Insomnia     trazodone -made pt feel hung over     Major depressive disorder, recurrent episode, moderate (H)     lexapro - sexual side effects     Moderate major depression (H) 2/4/2005    trazodone -made pt feel hung over     Other acne     Other extrapyramidal disease and abnormal movement disorder     ?GBS    Psoriatic arthritis (H) 4/26/2023    Symptomatic menopausal or female climacteric states     vivelle-dot        Surgical history:  Past Surgical History:   Procedure Laterality Date    ABDOMEN SURGERY      BIOPSY      COLONOSCOPY  1/16/2012    Procedure:COLONOSCOPY; Colonoscopy; Surgeon:SHOLA JOYCE; Location: GI    COLONOSCOPY N/A 8/19/2024    Procedure: Colonoscopy with polypectomies using cold snare;  Surgeon: Gayatri Browne MD;  Location:  GI    HC REMOVE TONSILS/ADENOIDS,<11 Y/O      T and A, under 12 yrs    HEAD & NECK SURGERY       LAPAROSCOPIC CHOLECYSTECTOMY WITH CHOLANGIOGRAMS N/A 4/25/2016    Procedure: LAPAROSCOPIC CHOLECYSTECTOMY WITH CHOLANGIOGRAMS;  Surgeon: Rosa M Cristobal MD;  Location: [facility] OR    LAPAROSCOPY DIAGNOSTIC (GENERAL) N/A 11/7/2017    Procedure: LAPAROSCOPY DIAGNOSTIC (GENERAL);  Exploratory Laparoscopy, Laparotomy and Small Bowel resection.;  Surgeon: Rosa M Cristobal MD;  Location: [facility] OR    LAPAROTOMY EXPLORATORY N/A 11/7/2017    Procedure: LAPAROTOMY EXPLORATORY;;  Surgeon: Rosa M Cristobal MD;  Location: [facility] OR    RESECT SMALL BOWEL WITHOUT OSTOMY N/A 11/7/2017    Procedure: RESECT SMALL BOWEL WITHOUT OSTOMY;;  Surgeon: Rosa M Cristobal MD;  Location: [facility] OR    SURGICAL HISTORY OF -   2004    Menigioma excision    ZZC LIGATE FALLOPIAN TUBE  1988    Tubal Ligation    ZZC NONSPECIFIC PROCEDURE      PAROTID TUMOR L SIDE OF NECK - BENIGN    ZZC TOTAL ABDOM HYSTERECTOMY  2000 ?    Hysterectomy, Total Abdominal with BSO       Family history:  Family History   Problem Relation Age of Onset    Hyperlipidemia Mother     Thyroid Disease Mother     Macular Degeneration Mother     Cancer Father         lung    Hypertension Father     Hyperlipidemia Father     Thyroid Disease Brother     Diabetes Maternal Grandmother     Cerebrovascular Disease Maternal Grandmother     Substance Abuse Maternal Grandfather     Thyroid Disease Son     Macular Degeneration Maternal Aunt     Anxiety Disorder Maternal Aunt     Colon Cancer No family hx of      No Hx of IBD or GI malignancy    Medications:  Current Outpatient Medications   Medication Sig Dispense Refill    acetaminophen (TYLENOL) 325 MG tablet Take 325-650 mg by mouth every 6 hours as needed for mild pain      augmented betamethasone dipropionate (DIPROLENE-AF) 0.05 % external ointment Apply topically 2 times daily 50 g 3    betamethasone dipropionate (DIPROSONE) 0.05 % external lotion Apply topically 2 times daily To affected area on scalp for 4-6 weeks. Do not  use on face or body folds. 60 mL 4    calcipotriene (DOVONOX) 0.005 % external ointment Apply to hands and feet BID when flared. 120 g 3    cetirizine (ZYRTEC) 10 MG tablet Take 10 mg by mouth every evening      diazepam (VALIUM) 5 MG tablet Take 1 tab one hour prior to MRI and additional tab right before MRI if needed 2 tablet 0    doxycycline hyclate (VIBRAMYCIN) 100 MG capsule Take 100 mg by mouth daily.      fenofibrate (TRIGLIDE/LOFIBRA) 160 MG tablet TAKE ONE TABLET BY MOUTH ONCE DAILY 90 tablet 3    fluticasone (FLONASE) 50 MCG/ACT nasal spray SPRAY TWO SPRAYS INTO BOTH NOSTRILS IN THE MORNING. 48 mL 4    hydrocortisone 2.5 % ointment Apply twice daily to the eyelids for 2 weeks 30 g 0    hydrOXYzine HCl (ATARAX) 25 MG tablet Take 1 tablet (25 mg) by mouth nightly as needed for itching. 30 tablet 5    lisinopril (ZESTRIL) 10 MG tablet Take 1 tablet (10 mg) by mouth daily. 90 tablet 3    omeprazole (PRILOSEC) 20 MG DR capsule Take 1 capsule (20 mg) by mouth daily. 90 capsule 3    Risankizumab-rzaa (SKYRIZI) 150 MG/ML subcutaneous Inject 1 mL (150 mg) subcutaneously every 3 months. MAINTENANCE DOSE 1 mL 3    triamterene-HCTZ (DYAZIDE) 37.5-25 MG capsule Take 1 capsule by mouth daily. 90 capsule 3    venlafaxine (EFFEXOR) 37.5 MG tablet Take 1 tablet (37.5 mg) by mouth 2 times daily. (Patient taking differently: Take 37.5 mg by mouth daily.) 180 tablet 3       Allergies:  Allergies   Allergen Reactions    Atorvastatin Nausea     Nausea and abd pain     Ceftin GI Disturbance     Stomach upset and bloating - given at same time as clindamycin ? Which one as cause.      Ciprofloxacin Nausea and Vomiting    Clindamycin GI Disturbance     Stomach upset and bloating - given at same time as ceftin, ? Which one as cause     Flagyl [Metronidazole]      immediate migraine headache     Morphine Itching     Severe with nose, facial  Swelling - oxycodone = ok /no problems     Penicillins Hives    Sulfa Antibiotics Rash      Severe red , flushed rash    Avelox [Moxifloxacin] Other (See Comments)     Tingling and numbness in both legs and anxiety  started with first pill, then continued for 5 days while on med and for 24 hours after off of that recently - was on for diverticulitis     Levaquin Rash     States she can take avelox       Social history:   Social History     Tobacco Use    Smoking status: Former     Current packs/day: 0.00     Average packs/day: 1 pack/day for 5.0 years (5.0 ttl pk-yrs)     Types: Cigarettes     Start date: 1989     Quit date:      Years since quittin.3    Smokeless tobacco: Former    Tobacco comments:     11/3/17 - occ e-cig use     As of 10/27/23 pt states no longer doing cigarette use quit date 24   Substance Use Topics    Alcohol use: Yes     Alcohol/week: 0.0 standard drinks of alcohol     Comment: 3-5 drinks per week     Marital status: .    ROS:  A complete review of systems was performed with the patient and all systems negative except as per HPI.      Laboratory values reviewed:  Recent Labs   Lab Test 25  0841   WBC 6.3   HGB 13.5      CR 0.83   ALBUMIN 4.8   BILITOTAL 0.3   ALKPHOS 36*   ALT 23   AST 24       Imaging personally reviewed by me:  PET Dotatate 25  IMPRESSION:      1. Single nonenlarged Dotatate avid right lower quadrant mesenteric  lymph node, concerning for neuroendocrine lymph node metastasis  (Krenning Score of 3). While misregistration of activity with the  adjacent ureter is possible, the focal calyectasis uptake in the  region of this tiny node makes this more suspicious for metastatic  disease than urine. Attention to this region is recommended on  follow-up Dotatate scans for further characterization.     2. Changes of prior segmental small bowel resection and reanastomosis  without evidence of locally recurrent small bowel disease.     3. No evidence of distant metastatic disease.     4. Mild low level uptake within bilateral  nonenlarged axillary and  inguinal lymph nodes, presumably reactive.     5. Increased uptake within the region of the pituitary. While uptake  at this location is physiologic with this radiotracer, the confluence  of uptake covers a wider area than is typical. Recommend further  characterization with MRI if not recently performed to better  characterize the pituitary.     6. Incidental CT findings, as above    MRI Brain 5/18/25 IMPRESSION:  1. Normal MRI of the pituitary gland. No sellar mass. Specifically, no abnormal finding in the region of increased Dotatate uptake on prior PET/CT 5/5/2025, which was presumably physiologic.  2. Post surgical changes of right parietal craniotomy with underlying small region of encephalomalacia/gliosis and hemosiderin staining.  3. Mild chronic small vessel ischemic disease and generalized brain parenchymal volume loss.  4. No evidence of intracranial metastatic disease.    CT A/P 4/2024  IMPRESSION:   1.  Short segment sigmoid colitis vs. diverticulitis.  2.  Severe hepatic steatosis.    ASSESSMENT  1. Well-differentiated neuroendocrine tumor, grade 2 (2017) now with locoregional recurrence within the TI mesentery     PLAN  1. I reviewed the images extensively. The node also appears there in the past year, so it has remained stable in size. This is a normal sized lymph node located in close proximity to the prior ileoileal resection. This lymph node would be extremely challenging if not impossible to identify laparoscopically, or palpable open since it is of normal size. Thus, I agree with Dr. Sorto to monitor and repeat imaging and monitor changes in its size.     Risks, benefits, and alternatives of operative treatment were thoroughly discussed with the patient, she understands these well and agrees to proceed.    Time spent: 45 minutes, >50% spent in discussing, counseling and coordinating care.    Mando Bajwa M.D    Division of Colon and Rectal  Surgery  [facility]    Referring Provider:  No referring provider defined for this encounter.     Primary Care Provider:  Gayatri Stephens

## 2025-05-13 NOTE — TELEPHONE ENCOUNTER
Diagnosis, Referred by & from: Malignant Neuroendocrine neoplasm of ileum   Appt date: 5/29/2025   NOTES STATUS DETAILS   OFFICE NOTE from referring provider N/A    OFFICE NOTE from other specialist Internal [facility]:  4/18/25 - HEM ONC OV with Dr. Sorto  9/18/24 - PCC OV with TRINA Davila  11/22/17 - GEN SURG OV with TRINA Prado  10/31/17 - GEN SURG OV with Dr. Cristobal    [facility]:  9/26/24 - PCC OV with Dr. Stephens    [facility]:  9/18/24 - UC OV with Lucina Carter NP   DISCHARGE SUMMARY from hospital N/A    DISCHARGE REPORT from the ER Internal [facility]:  10/9/17 - ED OV with Dr. Prajapati   OPERATIVE REPORT [facility]:  11/7/17 - OP Note for Exploratory Laparoscopy, Laparotomy and Small Bowel resection, EXCISION, SMALL INTESTINE, WITHOUT OSTOMY CREATION with Dr. Cristobal  4/25/16 - OP Note for LAPAROSCOPIC CHOLECYSTECTOMY WITH CHOLANGIOGRAMS with Dr. Cristobal   MEDICATION LIST Internal    LABS     BIOPSIES/PATHOLOGY RELATED TO DIAGNOSIS [facility]:  8/19/24 - Colon Biopsy (Case: [id number])  11/7/17 - Ileum Biopsy (Case: [id number])   DIAGNOSTIC PROCEDURES     COLONOSCOPY (most recent all time after 5 years) [facility]:  8/19/24 - Colonoscopy  1/16/12 - Colonoscopy   IMAGING (DISC & REPORT)      CT [facility]:  5/5/25 - CT Chest/Abd/Pelvis  5/5/25 - PET/CT  9/18/24 - CT Abd/Pelvis  4/19/24 - CT Chest/Abd/Pelvis  7/22/22 - CT Abd/Pelvis  2/28/20 - CT Abd/Pelvis   XRAY [facility]:  9/12/22 - MRI Abdomen  8/31/21 - MRI Abdomen  9/4/20 - MRI Abdomen  4/12/19 - MRI Abdomen

## 2025-05-18 ENCOUNTER — HOSPITAL ENCOUNTER (OUTPATIENT)
Dept: MRI IMAGING | Facility: CLINIC | Age: 65
Discharge: HOME | End: 2025-05-18
Attending: INTERNAL MEDICINE | Admitting: INTERNAL MEDICINE
Payer: COMMERCIAL

## 2025-05-18 DIAGNOSIS — G44.219 EPISODIC TENSION-TYPE HEADACHE, NOT INTRACTABLE: ICD-10-CM

## 2025-05-18 DIAGNOSIS — E23.7 PITUITARY LESION: ICD-10-CM

## 2025-05-18 DIAGNOSIS — C7A.8 PRIMARY MALIGNANT NEUROENDOCRINE NEOPLASM OF ILEUM (H): ICD-10-CM

## 2025-05-18 PROCEDURE — 70553 MRI BRAIN STEM W/O & W/DYE: CPT

## 2025-05-18 PROCEDURE — 255N000002 HC RX 255 OP 636: Performed by: INTERNAL MEDICINE

## 2025-05-18 PROCEDURE — A9585 GADOBUTROL INJECTION: HCPCS | Performed by: INTERNAL MEDICINE

## 2025-05-18 RX ORDER — GADOBUTROL 604.72 MG/ML
7.5 INJECTION INTRAVENOUS ONCE
Status: COMPLETED | OUTPATIENT
Start: 2025-05-18 | End: 2025-05-18

## 2025-05-18 RX ADMIN — GADOBUTROL 7.5 ML: 604.72 INJECTION INTRAVENOUS at 12:52

## 2025-05-29 ENCOUNTER — PRE VISIT (OUTPATIENT)
Dept: SURGERY | Facility: CLINIC | Age: 65
End: 2025-05-29

## 2025-05-29 ENCOUNTER — OFFICE VISIT (OUTPATIENT)
Dept: SURGERY | Facility: CLINIC | Age: 65
End: 2025-05-29
Payer: COMMERCIAL

## 2025-05-29 VITALS
BODY MASS INDEX: 33.77 KG/M2 | SYSTOLIC BLOOD PRESSURE: 133 MMHG | HEIGHT: 59 IN | DIASTOLIC BLOOD PRESSURE: 81 MMHG | OXYGEN SATURATION: 97 % | HEART RATE: 98 BPM | WEIGHT: 167.5 LBS

## 2025-05-29 DIAGNOSIS — C7A.8 PRIMARY MALIGNANT NEUROENDOCRINE NEOPLASM OF ILEUM (H): Primary | ICD-10-CM

## 2025-05-29 ASSESSMENT — PAIN SCALES - GENERAL: PAINLEVEL_OUTOF10: NO PAIN (0)

## 2025-05-29 NOTE — LETTER
2025       RE: Connie Brunson  [address] [facility] [address]     Dear Colleague,    Thank you for referring your patient, Connie Brunson, to the [facility]ON [facility]ERY [facility] [place] at [facility]. Please see a copy of my visit note below.    Colon and Rectal Surgery Clinic Note    RE: Connie Brunson.  : 1960.  DORINDA: 2025.    Reason for visit: well-differentiated neuroendocrine tumor, grade 2 (2017) now with locoregional recurrence within the TI mesentery .     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 She follow with Dr. Sorto in medical oncology. She has had persistent elevation in chromogranin A levels, most recent PET scan is significant for single nonenlarged Dotatate avid right lower quadrant mesenteric lymph node, concerning for neuroendocrine lymph node metastasis.     Latest Reference Range & Units 24 09:03 25 08:41   Chromogranin A 0 - 187 ng/mL 247 (H) 388 (H)     PET Dotatate 25  IMPRESSION:   1. Single nonenlarged Dotatate avid right lower quadrant mesenteric  lymph node, concerning for neuroendocrine lymph node metastasis  (Krenning Score of 3). While misregistration of activity with the  adjacent ureter is possible, the focal calyectasis uptake in the  region of this tiny node makes this more suspicious for metastatic  disease than urine. Attention to this region is recommended on  follow-up Dotatate scans for further characterization.     2. Changes of prior segmental small bowel resection and reanastomosis  without evidence of locally recurrent small bowel disease.     3. No evidence of distant metastatic disease.     4. Mild low level uptake within bilateral nonenlarged  axillary and  inguinal lymph nodes, presumably reactive.     5. Increased uptake within the region of the pituitary. While uptake  at this location is physiologic with this radiotracer, the confluence  of uptake covers a wider area than is typical. Recommend further  characterization with MRI if not recently performed to better  characterize the pituitary.     6. Incidental CT findings, as above    MRI Brain 5/18/25   IMPRESSION:  1. Normal MRI of the pituitary gland. No sellar mass. Specifically, no abnormal finding in the region of increased Dotatate uptake on prior PET/CT 5/5/2025, which was presumably physiologic.  2. Post surgical changes of right parietal craniotomy with underlying small region of encephalomalacia/gliosis and hemosiderin staining.  3. Mild chronic small vessel ischemic disease and generalized brain parenchymal volume loss.  4. No evidence of intracranial metastatic disease.    CT A/P 4/2024  IMPRESSION:   1.  Short segment sigmoid colitis vs. diverticulitis.  2.  Severe hepatic steatosis.    Last colonoscopy: 8/19/24  Findings:       The perianal and digital rectal examinations were normal.        The terminal ileum appeared normal.        A 2 mm polyp was found in the hepatic flexure. The polyp was        semi-sessile. The polyp was removed with a cold snare. Resection and        retrieval were complete.        A 1 mm polyp was found in the sigmoid colon. The polyp was sessile. The        polyp was removed with a cold snare. Resection and retrieval were        complete.        A 2 mm polyp was found in the rectum. The polyp was sessile. The polyp        was removed with a cold snare. Resection and retrieval were complete.        No additional abnormalities were found on retroflexion.                                                                                    Impression:               - The examined portion of the ileum was normal.                             - One 2 mm polyp at the  hepatic flexure, removed                             with a cold snare. Resected and retrieved.                             - One 1 mm polyp in the sigmoid colon, removed with                             a cold snare. Resected and retrieved.                             - One 2 mm polyp in the rectum, removed with a cold                             snare. Resected and retrieved.   Recommendation:           - Repeat colonoscopy for surveillance based on                             pathology results. If all 3 polyps are                             adenomatous,repeat in 3-5 years. If 1-2 polyps are                             adenomatous repeatin 7 years.     Final Diagnosis   A: Large intestine, hepatic flexure, polypectomy:  -Tubular adenoma, no evidence of high-grade dysplasia or malignancy     B: Large intestine, sigmoid, polypectomy:   -Tubular adenoma, no evidence of high-grade dysplasia or malignancy     C: Large intestine, rectum, polypectomy:  -Tubular adenoma, no evidence of high-grade dysplasia or malignancy     OR notes: Ex lap with small bowel resection 2017  INDICATIONS:  This is a 57-year-old female with a history of sigmoid diverticulitis who was noted to have a small-bowel mass on a recent CT of the abdomen and pelvis.  This mass was confirmed with CT enterography.  The patient now presents for surgical resection.      DESCRIPTION OF PROCEDURE:  After obtaining informed consent, the patient was taken to the operating room.  The abdomen was prepped and draped in standard sterile fashion.  A longitudinal incision just above the umbilicus was made with the blade.  The incision was carried down to the fascia.  The fascia was incised in the midline.  Stay sutures of 0 Vicryl were placed at the extremes of this fascial incision.  The peritoneum was entered bluntly with a Carmalt clamp.  The Flower trocar was introduced.  Two 5 mm trocars were placed in the left lower quadrant.  We began running the ileum  starting at the ileocecal valve with atraumatic graspers.  A mass was noted in the distal ileum.  No other abnormalities were noted in the peritoneal cavity.  The liver, peritoneal surfaces and bowel were otherwise normal.  We extended our incision for a length of 6 cm centered around the umbilicus.  The incision was carried down to the fascia.  The fascia was divided with Bovie electrocautery.  The Akshat wound retractor was used for exposure.  The distal ileum was brought up through the laparotomy incision.  The firm mass was noted to be 15 cm proximal to the ileocecal valve.  We came across the ileum 4 cm downstream of the mass with the JASS stapler.  We came across the ileum 6 cm upstream of the mass with a reload of the JASS 80 stapler.  The adjacent mesentery was serially ligated and divided with the LigaSure.  The specimen was marked with a suture at the distal aspect and passed off the field as specimen.  We then performed a side-to-side functional end-to-end anastomosis between the 2 limbs of ileum using a reload of the JASS 80 stapler.  The confluent defect was closed with a TA-60 stapler.  Our anastomosis was widely patent by palpation.  Our staple lines were hemostatic.  We took seromuscular bites of the adjacent limbs of small bowel with a 3-0 Vicryl suture, which was cinched down and tied to take tension off the end of the staple line.  The mesenteric defect was closed with running 2-0 Vicryl suture.  The anastomosis was dropped back into the abdomen.  We irrigated the abdomen with sterile saline and aspirated the effluent.  The fascial incision was closed with running 0 looped PDS.  The subcutaneous tissue was closed with interrupted 3-0 Vicryl sutures, and the skin was closed with a running 4-0 Vicryl subcuticular suture and Steri-Strips.  The two 5 mm trocar sites were likewise closed with interrupted 4-0 Vicryl subcuticular sutures and Steri-Strips.  The patient tolerated the procedure well.  She was  transferred to the recovery room in good condition.  All sponge, needle and instrument counts were correct at the end of the case.   Pathology:   Tumor       Histologic Type:   Well-differentiated neuroendocrine tumor.       Histologic Grade:    Grade 2.    Extent       Tumor Size:   1.5 cm       Microscopic Tumor Extension, Small Intestine:   Tumor invades  subserosal tissue without involvement of visceral peritoneum    Margins: Negative.   Tumor at 1.5 cm from mesenteric margin, 2.5 cm from  distal margin and 5.5 cm from proximal margin.   Medical history:  Past Medical History:   Diagnosis Date     Allergic rhinitis, cause unspecified     year round - dog,dust-  Failed on claritin, claritin-D, zyrtec and zyrtec-D in past.      Benign neoplasm of cerebral meninges (H) 1999    has yearly MRI's. - sees Dr. 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 Thomas ENT      Cancer (H)      Depressive disorder      Deviated nasal septum     sees Dr. Thomas ENT      Diverticulosis of colon (without mention of hemorrhage) 02-     History of Guillain-Manson syndrome     NO FLU SHOTS - very mild case in [place] many years ago     Hyperlipidemia LDL goal < 130 doesn't want statins    simvastatin = severe hand joint pain , lipitor =nausea/abd. pain     Hypertension goal BP (blood pressure) < 140/90      Insomnia     trazodone -made pt feel hung over      Major depressive disorder, recurrent episode, moderate (H)     lexapro - sexual side effects      Moderate major depression (H) 2/4/2005    trazodone -made pt feel hung over      Other acne      Other extrapyramidal disease and abnormal movement disorder     ?GBS     Psoriatic arthritis (H) 4/26/2023     Symptomatic menopausal or female climacteric states     vivelle-dot        Surgical history:  Past Surgical History:   Procedure Laterality Date     ABDOMEN SURGERY       BIOPSY        COLONOSCOPY  1/16/2012    Procedure:COLONOSCOPY; Colonoscopy; Surgeon:SHOLA JOYCE; Location: GI     COLONOSCOPY N/A 8/19/2024    Procedure: Colonoscopy with polypectomies using cold snare;  Surgeon: Gayatri Browne MD;  Location:  GI     HC REMOVE TONSILS/ADENOIDS,<11 Y/O      T and A, under 12 yrs     HEAD & NECK SURGERY       LAPAROSCOPIC CHOLECYSTECTOMY WITH CHOLANGIOGRAMS N/A 4/25/2016    Procedure: LAPAROSCOPIC CHOLECYSTECTOMY WITH CHOLANGIOGRAMS;  Surgeon: Rosa M Cristobal MD;  Location: [facility] OR     LAPAROSCOPY DIAGNOSTIC (GENERAL) N/A 11/7/2017    Procedure: LAPAROSCOPY DIAGNOSTIC (GENERAL);  Exploratory Laparoscopy, Laparotomy and Small Bowel resection.;  Surgeon: Rosa M Cristobal MD;  Location: [facility] OR     LAPAROTOMY EXPLORATORY N/A 11/7/2017    Procedure: LAPAROTOMY EXPLORATORY;;  Surgeon: Rosa M Cristobal MD;  Location: [facility] OR     RESECT SMALL BOWEL WITHOUT OSTOMY N/A 11/7/2017    Procedure: RESECT SMALL BOWEL WITHOUT OSTOMY;;  Surgeon: Rosa M Cristobal MD;  Location: [facility] OR     SURGICAL HISTORY OF -   2004    Menigioma excision     ZZC LIGATE FALLOPIAN TUBE  1988    Tubal Ligation     ZZC NONSPECIFIC PROCEDURE      PAROTID TUMOR L SIDE OF NECK - BENIGN     ZZC TOTAL ABDOM HYSTERECTOMY  2000 ?    Hysterectomy, Total Abdominal with BSO       Family history:  Family History   Problem Relation Age of Onset     Hyperlipidemia Mother      Thyroid Disease Mother      Macular Degeneration Mother      Cancer Father         lung     Hypertension Father      Hyperlipidemia Father      Thyroid Disease Brother      Diabetes Maternal Grandmother      Cerebrovascular Disease Maternal Grandmother      Substance Abuse Maternal Grandfather      Thyroid Disease Son      Macular Degeneration Maternal Aunt      Anxiety Disorder Maternal Aunt      Colon Cancer No family hx of      No Hx of IBD or GI malignancy    Medications:  Current Outpatient Medications   Medication Sig Dispense  Refill     acetaminophen (TYLENOL) 325 MG tablet Take 325-650 mg by mouth every 6 hours as needed for mild pain       augmented betamethasone dipropionate (DIPROLENE-AF) 0.05 % external ointment Apply topically 2 times daily 50 g 3     betamethasone dipropionate (DIPROSONE) 0.05 % external lotion Apply topically 2 times daily To affected area on scalp for 4-6 weeks. Do not use on face or body folds. 60 mL 4     calcipotriene (DOVONOX) 0.005 % external ointment Apply to hands and feet BID when flared. 120 g 3     cetirizine (ZYRTEC) 10 MG tablet Take 10 mg by mouth every evening       diazepam (VALIUM) 5 MG tablet Take 1 tab one hour prior to MRI and additional tab right before MRI if needed 2 tablet 0     doxycycline hyclate (VIBRAMYCIN) 100 MG capsule Take 100 mg by mouth daily.       fenofibrate (TRIGLIDE/LOFIBRA) 160 MG tablet TAKE ONE TABLET BY MOUTH ONCE DAILY 90 tablet 3     fluticasone (FLONASE) 50 MCG/ACT nasal spray SPRAY TWO SPRAYS INTO BOTH NOSTRILS IN THE MORNING. 48 mL 4     hydrocortisone 2.5 % ointment Apply twice daily to the eyelids for 2 weeks 30 g 0     hydrOXYzine HCl (ATARAX) 25 MG tablet Take 1 tablet (25 mg) by mouth nightly as needed for itching. 30 tablet 5     lisinopril (ZESTRIL) 10 MG tablet Take 1 tablet (10 mg) by mouth daily. 90 tablet 3     omeprazole (PRILOSEC) 20 MG DR capsule Take 1 capsule (20 mg) by mouth daily. 90 capsule 3     Risankizumab-rzaa (SKYRIZI) 150 MG/ML subcutaneous Inject 1 mL (150 mg) subcutaneously every 3 months. MAINTENANCE DOSE 1 mL 3     triamterene-HCTZ (DYAZIDE) 37.5-25 MG capsule Take 1 capsule by mouth daily. 90 capsule 3     venlafaxine (EFFEXOR) 37.5 MG tablet Take 1 tablet (37.5 mg) by mouth 2 times daily. (Patient taking differently: Take 37.5 mg by mouth daily.) 180 tablet 3       Allergies:  Allergies   Allergen Reactions     Atorvastatin Nausea     Nausea and abd pain      Ceftin GI Disturbance     Stomach upset and bloating - given at same time as  clindamycin ? Which one as cause.       Ciprofloxacin Nausea and Vomiting     Clindamycin GI Disturbance     Stomach upset and bloating - given at same time as ceftin, ? Which one as cause      Flagyl [Metronidazole]      immediate migraine headache      Morphine Itching     Severe with nose, facial  Swelling - oxycodone = ok /no problems      Penicillins Hives     Sulfa Antibiotics Rash     Severe red , flushed rash     Avelox [Moxifloxacin] Other (See Comments)     Tingling and numbness in both legs and anxiety  started with first pill, then continued for 5 days while on med and for 24 hours after off of that recently - was on for diverticulitis      Levaquin Rash     States she can take avelox       Social history:   Social History     Tobacco Use     Smoking status: Former     Current packs/day: 0.00     Average packs/day: 1 pack/day for 5.0 years (5.0 ttl pk-yrs)     Types: Cigarettes     Start date: 1989     Quit date:      Years since quittin.3     Smokeless tobacco: Former     Tobacco comments:     11/3/17 - occ e-cig use     As of 10/27/23 pt states no longer doing cigarette use quit date 24   Substance Use Topics     Alcohol use: Yes     Alcohol/week: 0.0 standard drinks of alcohol     Comment: 3-5 drinks per week     Marital status: .    ROS:  A complete review of systems was performed with the patient and all systems negative except as per HPI.      Laboratory values reviewed:  Recent Labs   Lab Test 25  0841   WBC 6.3   HGB 13.5      CR 0.83   ALBUMIN 4.8   BILITOTAL 0.3   ALKPHOS 36*   ALT 23   AST 24       Imaging personally reviewed by me:  PET Dotatate 25  IMPRESSION:      1. Single nonenlarged Dotatate avid right lower quadrant mesenteric  lymph node, concerning for neuroendocrine lymph node metastasis  (Krenning Score of 3). While misregistration of activity with the  adjacent ureter is possible, the focal calyectasis uptake in the  region of this tiny node  makes this more suspicious for metastatic  disease than urine. Attention to this region is recommended on  follow-up Dotatate scans for further characterization.     2. Changes of prior segmental small bowel resection and reanastomosis  without evidence of locally recurrent small bowel disease.     3. No evidence of distant metastatic disease.     4. Mild low level uptake within bilateral nonenlarged axillary and  inguinal lymph nodes, presumably reactive.     5. Increased uptake within the region of the pituitary. While uptake  at this location is physiologic with this radiotracer, the confluence  of uptake covers a wider area than is typical. Recommend further  characterization with MRI if not recently performed to better  characterize the pituitary.     6. Incidental CT findings, as above    MRI Brain 5/18/25 IMPRESSION:  1. Normal MRI of the pituitary gland. No sellar mass. Specifically, no abnormal finding in the region of increased Dotatate uptake on prior PET/CT 5/5/2025, which was presumably physiologic.  2. Post surgical changes of right parietal craniotomy with underlying small region of encephalomalacia/gliosis and hemosiderin staining.  3. Mild chronic small vessel ischemic disease and generalized brain parenchymal volume loss.  4. No evidence of intracranial metastatic disease.    CT A/P 4/2024  IMPRESSION:   1.  Short segment sigmoid colitis vs. diverticulitis.  2.  Severe hepatic steatosis.    ASSESSMENT  1. Well-differentiated neuroendocrine tumor, grade 2 (2017) now with locoregional recurrence within the TI mesentery     PLAN  1. I reviewed the images extensively. The node also appears there in the past year, so it has remained stable in size. This is a normal sized lymph node located in close proximity to the prior ileoileal resection. This lymph node would be extremely challenging if not impossible to identify laparoscopically, or palpable open since it is of normal size. Thus, I agree with   Noreen to monitor and repeat imaging and monitor changes in its size.     Risks, benefits, and alternatives of operative treatment were thoroughly discussed with the patient, she understands these well and agrees to proceed.    Time spent: 45 minutes, >50% spent in discussing, counseling and coordinating care.    Mando Bajwa M.D    Division of Colon and Rectal Surgery  [facility]    Referring Provider:  No referring provider defined for this encounter.     Primary Care Provider:  Gayatri Stephens, thank you for allowing me to participate in the care of your patient.      Sincerely,    Mando Bajwa MD, MD

## 2025-05-29 NOTE — NURSING NOTE
"Chief Complaint   Patient presents with    Consult     Malignant Neuroendocrine neoplasm on ileum       Vitals:    05/29/25 1409   BP: 133/81   BP Location: Left arm   Patient Position: Sitting   Cuff Size: Adult Regular   Pulse: 98   SpO2: 97%   Weight: 167 lb 8 oz   Height: 4' 11.25\"       Body mass index is 33.55 kg/m .    Sahara Payne CMA    "

## 2025-05-30 ENCOUNTER — PATIENT OUTREACH (OUTPATIENT)
Dept: OTHER | Facility: CLINIC | Age: 65
End: 2025-05-30
Payer: COMMERCIAL

## 2025-06-02 ENCOUNTER — TUMOR CONFERENCE (OUTPATIENT)
Dept: ONCOLOGY | Facility: CLINIC | Age: 65
End: 2025-06-02
Payer: COMMERCIAL

## 2025-06-02 NOTE — TUMOR CONFERENCE
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  Treatment to Date: Surgery (Comment: SB resection)  Clinical Trials: Not discussed  Genetic Testing Discussed/Recommended?: No  Supportive Care Services Discussed/Recommended?: No  Recommended Plan: Outside of evidence-based guidelines (comment required) (Comment: Survillence with scans, colonoscopy now)  Did the review exceed 30 minutes?: did not         Documentation / Disclaimer Cancer Tumor Board Note  Cancer tumor board recommendations do not override what is determined to be reasonable care and treatment, which is dependent on the circumstances of a patient's case; the patient's medical, social, and personal concerns; and the clinical judgment of the oncologist [physician].      
26-Jan-2018

## 2025-06-16 ENCOUNTER — DOCUMENTATION ONLY (OUTPATIENT)
Dept: OTHER | Facility: CLINIC | Age: 65
End: 2025-06-16
Payer: COMMERCIAL

## 2025-06-16 DIAGNOSIS — C7A.8 PRIMARY MALIGNANT NEUROENDOCRINE NEOPLASM OF ILEUM (H): Primary | ICD-10-CM

## 2025-06-24 DIAGNOSIS — F41.1 GAD (GENERALIZED ANXIETY DISORDER): ICD-10-CM

## 2025-06-24 DIAGNOSIS — E23.7 PITUITARY LESION: ICD-10-CM

## 2025-06-24 DIAGNOSIS — C7A.8 PRIMARY MALIGNANT NEUROENDOCRINE NEOPLASM OF ILEUM (H): Primary | ICD-10-CM

## 2025-07-26 ENCOUNTER — HEALTH MAINTENANCE LETTER (OUTPATIENT)
Age: 65
End: 2025-07-26

## (undated) DEVICE — ESU GROUND PAD ADULT W/CORD E7507

## (undated) DEVICE — OSTOMY BAG CLAMP 8770

## (undated) DEVICE — Device

## (undated) DEVICE — ESU ELEC BLADE 6" COATED/INSULATED E1455-6

## (undated) DEVICE — PREFILTER SMOKE EVAC E6330

## (undated) DEVICE — SUCTION CANISTER MEDIVAC LINER 3000ML W/LID 65651-530

## (undated) DEVICE — KIT ENDO TURNOVER/PROCEDURE W/CLEAN A SCOPE LINERS 103888

## (undated) DEVICE — GLOVE PROTEXIS BLUE W/NEU-THERA 7.0  2D73EB70

## (undated) DEVICE — DECANTER VIAL 2006S

## (undated) DEVICE — ESU LIGASURE IMPACT OPEN SEALER/DVDR CVD LG JAW LF4418

## (undated) DEVICE — LINEN FULL SHEET 5511

## (undated) DEVICE — SU VICRYL 3-0 TIE 12X18" J904T

## (undated) DEVICE — SU VICRYL 2-0 SH 27" J317H

## (undated) DEVICE — STPL 80 X 3.8MM GIA8038S

## (undated) DEVICE — TUBING SMOKE EVAC 3/8"X10' E3645

## (undated) DEVICE — GLOVE ESTEEM POWDER FREE SMT 6.0  2D72PT60

## (undated) DEVICE — SU VICRYL 3-0 SH 27" UND J416H

## (undated) DEVICE — SU VICRYL 4-0 PS-2 18" UND J496H

## (undated) DEVICE — SU VICRYL 3-0 SH CR 8X18" J774

## (undated) DEVICE — ENDO SNARE POLYPECTOMY OVAL 15MM LOOP SD-240U-15

## (undated) DEVICE — SUCTION TIP YANKAUER W/O VENT K86

## (undated) DEVICE — STPL RELOAD 80 X 3.8MM GIA8038L

## (undated) DEVICE — LINEN TOWEL PACK X10 5473

## (undated) DEVICE — GLOVE ESTEEM POWDER FREE SMT 7.5  2D72PT75

## (undated) DEVICE — GLOVE PROTEXIS BLUE W/NEU-THERA 6.5  2D73EB65

## (undated) DEVICE — DRAPE LAP W/ARMBOARD 29410

## (undated) DEVICE — ESU ELEC BLADE 2.75" COATED/INSULATED E1455

## (undated) DEVICE — LINEN POUCH DBL 5427

## (undated) DEVICE — PACK MAJOR SBA15MAFSI

## (undated) DEVICE — GLOVE ESTEEM POWDER FREE SMT 6.5  2D72PT65

## (undated) DEVICE — BLADE KNIFE SURG 15 371115

## (undated) DEVICE — ESU CORD MONOPOLAR 10'  E0510

## (undated) DEVICE — GLOVE PROTEXIS POWDER FREE SMT 6.5  2D72PT65X

## (undated) DEVICE — BLADE KNIFE SURG 11 371111

## (undated) DEVICE — STPL LINEAR 60 X 3.5MM TA6035S

## (undated) DEVICE — OSTOMY BAG COLOSTOMY ACTIVE LIFE 1 PIECE 8651

## (undated) DEVICE — ESU PENCIL W/COATED BLADE E2450H

## (undated) DEVICE — BNDG ABDOMINAL BINDER 9X45-62" 79-89071

## (undated) DEVICE — PREP POVIDONE IODINE SOLUTION 10% 120ML

## (undated) DEVICE — BLADE CLIPPER 3M 9670

## (undated) DEVICE — TUBING SMOKE EVAC ATTACHMENT E3590

## (undated) DEVICE — SUCTION TIP POOLE K770

## (undated) DEVICE — PREP SKIN SCRUB TRAY 4461A

## (undated) DEVICE — SU PDS II 0 CTX 60" Z990G

## (undated) DEVICE — GOWN IMPERVIOUS ZONED XLG 9041

## (undated) DEVICE — ENDO TROCAR 05MM VERSAONE BLADELESS W/STD FIX CAN NONB5STF

## (undated) DEVICE — LINEN HALF SHEET 5512

## (undated) DEVICE — CATH TRAY FOLEY SURESTEP 16FR DRAIN BAG STATOCK A899916

## (undated) DEVICE — BAG CLEAR TRASH 1.3M 39X33" P4040C

## (undated) DEVICE — DRSG GAUZE 4X4" 8044

## (undated) DEVICE — ENDO TROCAR BLUNT 100MM W/THRD ANCHOR BLUNTPORT BPT12STS

## (undated) DEVICE — PREP CHLORAPREP 26ML TINTED ORANGE  260815

## (undated) RX ORDER — PIPERACILLIN SODIUM, TAZOBACTAM SODIUM 3; .375 G/15ML; G/15ML
INJECTION, POWDER, LYOPHILIZED, FOR SOLUTION INTRAVENOUS
Status: DISPENSED
Start: 2017-11-07

## (undated) RX ORDER — DEXAMETHASONE SODIUM PHOSPHATE 4 MG/ML
INJECTION, SOLUTION INTRA-ARTICULAR; INTRALESIONAL; INTRAMUSCULAR; INTRAVENOUS; SOFT TISSUE
Status: DISPENSED
Start: 2017-11-07

## (undated) RX ORDER — GLYCOPYRROLATE 0.2 MG/ML
INJECTION INTRAMUSCULAR; INTRAVENOUS
Status: DISPENSED
Start: 2017-11-07

## (undated) RX ORDER — CEFAZOLIN SODIUM 1 G/3ML
INJECTION, POWDER, FOR SOLUTION INTRAMUSCULAR; INTRAVENOUS
Status: DISPENSED
Start: 2017-11-07

## (undated) RX ORDER — PROPOFOL 10 MG/ML
INJECTION, EMULSION INTRAVENOUS
Status: DISPENSED
Start: 2017-11-07

## (undated) RX ORDER — LIDOCAINE HYDROCHLORIDE 10 MG/ML
INJECTION, SOLUTION EPIDURAL; INFILTRATION; INTRACAUDAL; PERINEURAL
Status: DISPENSED
Start: 2017-11-07

## (undated) RX ORDER — SIMETHICONE 40MG/0.6ML
SUSPENSION, DROPS(FINAL DOSAGE FORM)(ML) ORAL
Status: DISPENSED
Start: 2024-08-19

## (undated) RX ORDER — HYDROMORPHONE HYDROCHLORIDE 1 MG/ML
INJECTION, SOLUTION INTRAMUSCULAR; INTRAVENOUS; SUBCUTANEOUS
Status: DISPENSED
Start: 2017-11-07

## (undated) RX ORDER — BUPIVACAINE HYDROCHLORIDE 2.5 MG/ML
INJECTION, SOLUTION EPIDURAL; INFILTRATION; INTRACAUDAL
Status: DISPENSED
Start: 2017-11-07

## (undated) RX ORDER — DIPHENHYDRAMINE HYDROCHLORIDE 50 MG/ML
INJECTION INTRAMUSCULAR; INTRAVENOUS
Status: DISPENSED
Start: 2017-11-07

## (undated) RX ORDER — FENTANYL CITRATE 50 UG/ML
INJECTION, SOLUTION INTRAMUSCULAR; INTRAVENOUS
Status: DISPENSED
Start: 2017-11-07

## (undated) RX ORDER — ONDANSETRON 2 MG/ML
INJECTION INTRAMUSCULAR; INTRAVENOUS
Status: DISPENSED
Start: 2017-11-07

## (undated) RX ORDER — SCOLOPAMINE TRANSDERMAL SYSTEM 1 MG/1
PATCH, EXTENDED RELEASE TRANSDERMAL
Status: DISPENSED
Start: 2017-11-07

## (undated) RX ORDER — FENTANYL CITRATE 50 UG/ML
INJECTION, SOLUTION INTRAMUSCULAR; INTRAVENOUS
Status: DISPENSED
Start: 2024-08-19

## (undated) RX ORDER — BUPIVACAINE HYDROCHLORIDE AND EPINEPHRINE 5; 5 MG/ML; UG/ML
INJECTION, SOLUTION EPIDURAL; INTRACAUDAL; PERINEURAL
Status: DISPENSED
Start: 2017-11-07

## (undated) RX ORDER — NEOSTIGMINE METHYLSULFATE 1 MG/ML
VIAL (ML) INJECTION
Status: DISPENSED
Start: 2017-11-07